# Patient Record
Sex: FEMALE | Race: WHITE | HISPANIC OR LATINO | Employment: PART TIME | URBAN - METROPOLITAN AREA
[De-identification: names, ages, dates, MRNs, and addresses within clinical notes are randomized per-mention and may not be internally consistent; named-entity substitution may affect disease eponyms.]

---

## 2018-01-24 ENCOUNTER — OFFICE VISIT (OUTPATIENT)
Dept: SURGERY | Facility: CLINIC | Age: 43
End: 2018-01-24

## 2018-01-24 VITALS — WEIGHT: 174.6 LBS | HEIGHT: 60 IN | OXYGEN SATURATION: 96 % | HEART RATE: 80 BPM | BODY MASS INDEX: 34.28 KG/M2

## 2018-01-24 DIAGNOSIS — Z94.0 HX OF KIDNEY TRANSPLANT: ICD-10-CM

## 2018-01-24 DIAGNOSIS — N05.9 NEPHRITIS: ICD-10-CM

## 2018-01-24 DIAGNOSIS — I10 HYPERTENSION, UNSPECIFIED TYPE: ICD-10-CM

## 2018-01-24 DIAGNOSIS — E21.3 HYPERPARATHYROIDISM (HCC): Primary | ICD-10-CM

## 2018-01-24 DIAGNOSIS — Z94.0 KIDNEY TRANSPLANT STATUS, CADAVERIC: ICD-10-CM

## 2018-01-24 DIAGNOSIS — E83.52 HYPERCALCEMIA: ICD-10-CM

## 2018-01-24 PROBLEM — T86.11 ACUTE REJECTION OF KIDNEY TRANSPLANT: Status: ACTIVE | Noted: 2017-05-10

## 2018-01-24 PROCEDURE — 99215 OFFICE O/P EST HI 40 MIN: CPT | Performed by: SURGERY

## 2018-01-24 RX ORDER — CYCLOSPORINE 100 MG/1
100 CAPSULE, GELATIN COATED ORAL 2 TIMES DAILY
COMMUNITY
End: 2019-07-02 | Stop reason: ALTCHOICE

## 2018-01-24 RX ORDER — CYCLOSPORINE 25 MG/1
25 CAPSULE, GELATIN COATED ORAL 2 TIMES DAILY
COMMUNITY
End: 2019-07-02 | Stop reason: ALTCHOICE

## 2018-01-24 RX ORDER — OXYCODONE AND ACETAMINOPHEN 7.5; 325 MG/1; MG/1
1 TABLET ORAL EVERY 6 HOURS PRN
COMMUNITY
Start: 2018-01-19 | End: 2018-01-24

## 2018-01-24 NOTE — PROGRESS NOTES
SURGERY  Sunni Mcneil   1975  01/24/18    Chief Complaint:  hyperparathyroidism    HPI    Patient is a very nice 42 y.o. female who has a kidney transplant, cadaveric, who presents with hyperparathyroidism.  She has a decreasing PTH, going from 797 to 377, but has been on sensipar, with increase in calcium to 11.7.  Her phosphorus is 2.4, consistent with an endogenous hyperparathyroidism.      She has a CKD with 35 GFR, with creatinine of 1.6.  She is maintained on cellcept, prednisone, and cyclosporine for her transplant.      She had a nuclear medicine study scheduled by Dr Hansen for the end of October, but she says she did not even know about it.      She denies a history of kidney stones, muscle aches, or memory changes.  She does have mild fatigue, tho she works at [employer] part time.    Past Medical History:   Diagnosis Date   • Acid reflux    • Anemia    • Arthritis    • History of peritoneal dialysis     KIDNEY TRANSPLANT   • History of transfusion    • Hypertension    • Kidney disease     End-stage renal disease   • Kidney transplant recipient 2016    RIGHT KIDNEY     Past Surgical History:   Procedure Laterality Date   • INSERTION PERITONEAL DIALYSIS CATHETER  2014    Laparoscopic placement of Curl Cath peritoneal dialysis catheter, Dr. Vinod Goldstein   • REMOVAL PERITONEAL DIALYSIS CATHETER N/A 10/17/2016    Procedure: REMOVAL PERITONEAL DIALYSIS CATHETER;  Surgeon: Damon Rooney MD;  Location: [facility];  Service:    • TRANSPLANTATION RENAL Right 2016    from  donor   • TUBAL ABDOMINAL LIGATION Bilateral      No family history on file.  Social History     Social History   • Marital status:      Spouse name: N/A   • Number of children: 2   • Years of education: 12     Occupational History   •  [employer]     Social History Main Topics   • Smoking status: Never Smoker   • Smokeless tobacco: Never Used   • Alcohol use No   • Drug use: No   • Sexual activity:  "Defer     Other Topics Concern   • Not on file     Social History Narrative     Occupation/Additional Social Hx: [employer]       Current Outpatient Prescriptions:   •  aspirin 81 MG EC tablet, Take 81 mg by mouth Daily., Disp: , Rfl:   •  cinacalcet (SENSIPAR) 60 MG tablet, Take 120 mg by mouth Daily., Disp: , Rfl:   •  cycloSPORINE (sandIMMUNE) 100 MG capsule, Take 100 mg by mouth 2 (Two) Times a Day., Disp: , Rfl:   •  cycloSPORINE (sandIMMUNE) 25 MG capsule, Take 25 mg by mouth 2 (Two) Times a Day., Disp: , Rfl:   •  folic acid (FOLVITE) 1 MG tablet, Take 1 mg by mouth Daily., Disp: , Rfl:   •  iron polysaccharides (NIFEREX) 150 MG capsule, Take 150 mg by mouth 2 (Two) Times a Day., Disp: , Rfl:   •  metoprolol tartrate (LOPRESSOR) 25 MG tablet, Take 25 mg by mouth 2 (Two) Times a Day., Disp: , Rfl:   •  mycophenolate (CELLCEPT) 250 MG capsule, Take 250 mg by mouth 2 (Two) Times a Day., Disp: , Rfl:   •  predniSONE (DELTASONE) 5 MG tablet, Take 15 mg by mouth Every Other Day., Disp: , Rfl:   •  raNITIdine (ZANTAC) 150 MG tablet, Take 150 mg by mouth 2 (Two) Times a Day., Disp: , Rfl:     No Known Allergies    Review of Systems   Respiratory: Negative for shortness of breath.    Cardiovascular: Negative for chest pain.   Allergic/Immunologic:        No history of wound infections or MRSA   Hematological: Does not bruise/bleed easily.   All other systems reviewed and are negative.      Vitals:    01/24/18 1338   Pulse: 80   SpO2: 96%   Weight: 79.2 kg (174 lb 9.6 oz)   Height: 152.4 cm (60\")       PHYSICAL EXAM:    Pulse 80  Ht 152.4 cm (60\")  Wt 79.2 kg (174 lb 9.6 oz)  SpO2 96%  BMI 34.1 kg/m2  Body mass index is 34.1 kg/(m^2).    Constitutional: well developed, well nourished, appears healthy, despite her transplant status  Eyes: sclera nonicteric, conjunctiva not injected or edematous  ENMT: Hearing intact, trachea midline, thyroid without masses  CVS: RRR, no murmur, peripheral edema not " present  Respiratory: CTA, normal respiratory effort   Gastrointestinal: no hepatosplenomegaly, abdomen soft, nontender, no masses, abdominal hernia not detected  Genitourinary: inguinal hernia not detected  Musculoskeletal: gait normal, muscle mass normal, and good in tone  Skin: warm and dry, no rashes visible  Neurological: awake and alert, seems to have reasonable capacity for understanding for medical decision making  Psychiatric: appears to have reasonable judgement, pleasant  Lymphatics: no cervical adenopathy     Radiographic Findings: 2 cm lobular nodular opacity in the right lower lobe, 2015, stable since 2014    Lab Findings: as above    Pamphlet reviewed: none    IMPRESSION:  · Hyperparathyroidism, likely tertiary, with elevated calcium and PTH, 11 and 377  · S/p cadaveric transplant, on cellcept, cyclosporine, prednione  · Chronic immunosuppression  · Stable lung nodule    PLAN:  · SPECT CT scan, without IV contrast.  I understand that this will compromise the quality of such, without IV contrast, but this will be necessary with her kidney disease.  This will give us information on the location of the most overactive parathyroid, noting to her that she will likely have a picture that is more complex than a single overactive parathyroid.  · Follow up in the office after her study.  The risk of surgery were discussed with the patient including: bleeding, infection, potential difficulty in identifying all 4 glands intraoperatively and determining if each of these is hyperplastic or normal, thus resulting in either incomplete resection of all hyperplastic glands (and continued hyperparathyroidism) or excess removal (with the potential need for permanent supplementation of calcium and activated vitamin D), Location of parathyroids outside the neck despite studies that do not indicate such, recurrent laryngeal nerve damage resulting in temporary or permanent hoarseness, swallowing difficulty with aspiration,  swelling and scar at the neck and arm incision (if autotransplantation of a portion of a gland is necessary)  · We also discussed the fact that if she undergoes arm autotransplantation, she may have recurrent hyperparathyroidism that will require re-entry into the arm and additional removal.  · Hold sensipar 2 weeks prior to surgery  · Solumedrol in pre op    Rochelle Barreto MD   4:52 PM  01/24/18    >50 minutes spent with over half in counselling      Patient originally scheduled for parathyroid scan and cancelled due to anxiety.  We gave her an Rx for ativan 0.5 night prior and 0.5 AM of.  Despite this, she did not do the CT portion.  Thus we have a study that is not ideal, showing a dominant right inferior parathyroid and mildly suspicious left superior based on SPECT study.    Rochelle Barreto MD  03/09/18    Patient needs office visit to discuss findings, as they are not straightforward.    03/15/18

## 2018-02-12 ENCOUNTER — TELEPHONE (OUTPATIENT)
Dept: SURGERY | Facility: CLINIC | Age: 43
End: 2018-02-12

## 2018-02-12 RX ORDER — LORAZEPAM 0.5 MG/1
TABLET ORAL
Qty: 2 TABLET | Refills: 0 | Status: SHIPPED | OUTPATIENT
Start: 2018-02-12 | End: 2018-03-28

## 2018-02-12 NOTE — TELEPHONE ENCOUNTER
Patient called stating that she had a NM Parathyroid scan scheduled this morning at [facility], however, she was unable to complete it due to being too claustrophobic. She was advised to ask you if there is something you can prescribe her to take prior to having this test completed.    Addendum  I am reluctant to give her too much with her reduced kidney function and the fact that the medicine is cleared thru the kidneys.  Atenolol happily early labor relative Kang will give her a low dose.  rx will be on Arlene's desk.  Rochelle Barreto MD

## 2018-03-07 DIAGNOSIS — E21.3 HYPERPARATHYROIDISM (HCC): ICD-10-CM

## 2018-03-15 ENCOUNTER — TELEPHONE (OUTPATIENT)
Dept: SURGERY | Facility: CLINIC | Age: 43
End: 2018-03-15

## 2018-03-15 NOTE — TELEPHONE ENCOUNTER
Addend  Luna,  Please look at my two addendums to her last office visit, and forward that information, noting she needs an office visit.  Rochelle Barreto MD  03/15/18

## 2018-03-28 ENCOUNTER — PREP FOR SURGERY (OUTPATIENT)
Dept: OTHER | Facility: HOSPITAL | Age: 43
End: 2018-03-28

## 2018-03-28 ENCOUNTER — OFFICE VISIT (OUTPATIENT)
Dept: SURGERY | Facility: CLINIC | Age: 43
End: 2018-03-28

## 2018-03-28 VITALS — BODY MASS INDEX: 34.65 KG/M2 | WEIGHT: 177.4 LBS | HEART RATE: 76 BPM | OXYGEN SATURATION: 99 %

## 2018-03-28 DIAGNOSIS — E83.52 HYPERCALCEMIA: ICD-10-CM

## 2018-03-28 DIAGNOSIS — Z94.0 KIDNEY TRANSPLANT STATUS, CADAVERIC: ICD-10-CM

## 2018-03-28 DIAGNOSIS — E21.3 HYPERPARATHYROIDISM (HCC): ICD-10-CM

## 2018-03-28 DIAGNOSIS — I10 HYPERTENSION, UNSPECIFIED TYPE: Primary | ICD-10-CM

## 2018-03-28 DIAGNOSIS — Z94.0 HX OF KIDNEY TRANSPLANT: Primary | ICD-10-CM

## 2018-03-28 PROCEDURE — 99214 OFFICE O/P EST MOD 30 MIN: CPT | Performed by: SURGERY

## 2018-03-28 RX ORDER — ACETAMINOPHEN 325 MG/1
650 TABLET ORAL ONCE
Status: CANCELLED | OUTPATIENT
Start: 2018-04-18 | End: 2018-04-18

## 2018-03-28 RX ORDER — SODIUM CHLORIDE 0.9 % (FLUSH) 0.9 %
1-10 SYRINGE (ML) INJECTION AS NEEDED
Status: CANCELLED | OUTPATIENT
Start: 2018-04-18

## 2018-03-28 RX ORDER — METHYLPREDNISOLONE SODIUM SUCCINATE 125 MG/2ML
80 INJECTION, POWDER, LYOPHILIZED, FOR SOLUTION INTRAMUSCULAR; INTRAVENOUS ONCE
Status: CANCELLED | OUTPATIENT
Start: 2018-04-18 | End: 2018-04-18

## 2018-03-28 NOTE — PROGRESS NOTES
SURGERY  Sunni Mcneil   1975  03/28/18    Chief Complaint:  hyperparathyroidism    HPI    Patient is a very nice 42 y.o. female who returns after initial visit January 24, 2018, at which time I recommended that she get a SPECT-CT scan, to further evaluate her hyperparathyroidism.  She had presented at that visit in January with a history of a kidney transplant, cadaveric, and with hyperparathyroidism.  She initially had a nuclear medicine study scheduled by Dr. Hansen for the end of October, that being last fall, but she says she never knew anything about it.  After we scheduled her SPECT-CT scan, she showed up on the day of the procedure, and would not proceed due to claustrophobia.  We gave her a prescription for Ativan the night prior, in the morning, but she still presented and refused to do the CT portion and thus we have only a SPECT result.  That result shows a dominant isotope avid right inferior cervical parathyroid adenoma, and a mildly metabolic less dominant left superior cervical parathyroid adenoma suspected.  This was read by Dr. Casimiro Huertas.    She has a decreasing PTH, going from 797 to 377, but has been on sensipar, with increase in calcium to 11.7.  Her phosphorus is 2.4, consistent with an endogenous hyperparathyroidism.      She has a CKD with 35 GFR, with creatinine of 1.6.  She is maintained on cellcept, prednisone, and cyclosporine for her transplant.      She denies a history of kidney stones, muscle aches, or memory changes.  She does have mild fatigue, tho she works at [employer] part time.    Past Medical History:   Diagnosis Date   • Acid reflux    • Anemia    • Arthritis    • History of peritoneal dialysis     KIDNEY TRANSPLANT   • History of transfusion    • Hypertension    • Kidney disease     End-stage renal disease   • Kidney transplant recipient 09/02/2016    RIGHT KIDNEY     Past Surgical History:   Procedure Laterality Date   • INSERTION PERITONEAL DIALYSIS CATHETER   2014    Laparoscopic placement of Curl Cath peritoneal dialysis catheter, Dr. Vinod Goldstein   • REMOVAL PERITONEAL DIALYSIS CATHETER N/A 10/17/2016    Procedure: REMOVAL PERITONEAL DIALYSIS CATHETER;  Surgeon: Damon Rooney MD;  Location: [facility];  Service:    • TRANSPLANTATION RENAL Right 2016    from  donor   • TUBAL ABDOMINAL LIGATION Bilateral      History reviewed. No pertinent family history.  Social History     Social History   • Marital status:      Spouse name: N/A   • Number of children: 2   • Years of education: 12     Occupational History   •  [employer]     Social History Main Topics   • Smoking status: Never Smoker   • Smokeless tobacco: Never Used   • Alcohol use No   • Drug use: No   • Sexual activity: Defer     Other Topics Concern   • Not on file     Social History Narrative   • No narrative on file     Occupation/Additional Social Hx: [employer]       Current Outpatient Prescriptions:   •  aspirin 81 MG EC tablet, Take 81 mg by mouth Daily., Disp: , Rfl:   •  cinacalcet (SENSIPAR) 60 MG tablet, Take 120 mg by mouth Daily., Disp: , Rfl:   •  cycloSPORINE (sandIMMUNE) 100 MG capsule, Take 100 mg by mouth 2 (Two) Times a Day., Disp: , Rfl:   •  cycloSPORINE (sandIMMUNE) 25 MG capsule, Take 25 mg by mouth 2 (Two) Times a Day., Disp: , Rfl:   •  folic acid (FOLVITE) 1 MG tablet, Take 1 mg by mouth Daily., Disp: , Rfl:   •  iron polysaccharides (NIFEREX) 150 MG capsule, Take 150 mg by mouth 2 (Two) Times a Day., Disp: , Rfl:   •  metoprolol tartrate (LOPRESSOR) 25 MG tablet, Take 25 mg by mouth 2 (Two) Times a Day., Disp: , Rfl:   •  mycophenolate (CELLCEPT) 250 MG capsule, Take 250 mg by mouth 2 (Two) Times a Day., Disp: , Rfl:   •  predniSONE (DELTASONE) 5 MG tablet, Take 15 mg by mouth Every Other Day., Disp: , Rfl:   •  raNITIdine (ZANTAC) 150 MG tablet, Take 150 mg by mouth 2 (Two) Times a Day., Disp: , Rfl:     No Known Allergies    Review of Systems    Respiratory: Negative for shortness of breath.    Cardiovascular: Negative for chest pain.   Allergic/Immunologic:        No history of wound infections or MRSA   Hematological: Does not bruise/bleed easily.   All other systems reviewed and are negative.      Vitals:    03/28/18 1419   Pulse: 76   SpO2: 99%   Weight: 80.5 kg (177 lb 6.4 oz)       PHYSICAL EXAM:    Pulse 76   Wt 80.5 kg (177 lb 6.4 oz)   SpO2 99%   BMI 34.65 kg/m²   Body mass index is 34.65 kg/m².    Constitutional: well developed, well nourished, appears healthy, despite her transplant status  Eyes: sclera nonicteric, conjunctiva not injected or edematous  ENMT: Hearing intact, trachea midline, thyroid without masses  CVS: RRR, no murmur, peripheral edema not present  Respiratory: CTA, normal respiratory effort   Gastrointestinal: no hepatosplenomegaly, abdomen soft, nontender, no masses, abdominal hernia not detected  Genitourinary: inguinal hernia not detected  Musculoskeletal: gait normal, muscle mass normal, and good in tone  Skin: warm and dry, no rashes visible  Neurological: awake and alert, seems to have reasonable capacity for understanding for medical decision making  Psychiatric: appears to have reasonable judgement, pleasant  Lymphatics: no cervical adenopathy     Radiographic Findings: 2 cm lobular nodular opacity in the right lower lobe, 2015, stable since 2014    Lab Findings: Latest calcium is 10.4, phosphorus 2.2.    Pamphlet reviewed: none    IMPRESSION:  · Hyperparathyroidism, likely tertiary, with elevated calcium and PTH, 11 and 377  · Hypercalcemia   · Suspected right inferior dominant parathyroid adenoma, less dominant left superior parathyroid adenoma by SPECT.  She could in fact have 4 gland hyperplasia.  · S/p cadaveric transplant, on cellcept, cyclosporine, prednisone.  · Chronic immunosuppression  · Stable lung nodule    PLAN:  · Bilateral neck exploration, with right inferior and left superior parathyroid adenoma  resection.  She understands that this may wind up being a 4 gland resection with possible half gland autotransplantation.  She understands that the risk associated with having to explore both sides is greater, both for recurrent laryngeal nerve injury, as well as hypocalcemia postoperatively.  She understands that the inability to complete the CT portion of the SPECT CT may in fact have increased her risk associated with surgery.  Case request is been entered  Additional risk of surgery were discussed with the patient including: bleeding, infection, potential difficulty in identifying all 4 glands intraoperatively and determining if each of these is hyperplastic or normal, thus resulting in either incomplete resection of all hyperplastic glands (and continued hyperparathyroidism) or excess removal (with the potential need for permanent supplementation of calcium and activated vitamin D), Location of parathyroids outside the neck despite studies that do not indicate such, recurrent laryngeal nerve damage resulting in temporary or permanent hoarseness, swallowing difficulty with aspiration, swelling and scar at the neck and arm incision (if autotransplantation of a portion of a gland is necessary)  · We also discussed the fact that if she undergoes arm autotransplantation, she may have recurrent hyperparathyroidism that will require re-entry into the arm and additional removal.  · Hold sensipar 2 weeks prior to surgery  · Hold aspirin 2 weeks prior to surgery  · Solumedrol in pre op  · We've notified Dr. Hansen of the planned surgical time, and phoned his office, work through this staff, to get an appointment set up for the patient for 20/4/18, one week after her planned surgery, to ensure that she gets labs and is seen by the physician at that time.    Rochelle Barreto MD   4:52 PM  03/28/18

## 2018-04-04 ENCOUNTER — HOSPITAL ENCOUNTER (OUTPATIENT)
Dept: ULTRASOUND IMAGING | Facility: HOSPITAL | Age: 43
Discharge: HOME | End: 2018-04-04
Attending: SURGERY | Admitting: SURGERY

## 2018-04-04 DIAGNOSIS — E21.3 HYPERPARATHYROIDISM (HCC): ICD-10-CM

## 2018-04-04 PROCEDURE — 76536 US EXAM OF HEAD AND NECK: CPT

## 2018-04-16 ENCOUNTER — APPOINTMENT (OUTPATIENT)
Dept: PREADMISSION TESTING | Facility: HOSPITAL | Age: 43
End: 2018-04-16

## 2018-04-16 VITALS
SYSTOLIC BLOOD PRESSURE: 145 MMHG | BODY MASS INDEX: 34.95 KG/M2 | RESPIRATION RATE: 16 BRPM | WEIGHT: 178 LBS | HEART RATE: 85 BPM | DIASTOLIC BLOOD PRESSURE: 96 MMHG | HEIGHT: 60 IN | TEMPERATURE: 98.4 F | OXYGEN SATURATION: 99 %

## 2018-04-16 DIAGNOSIS — E21.3 HYPERPARATHYROIDISM (HCC): ICD-10-CM

## 2018-04-16 DIAGNOSIS — E83.52 HYPERCALCEMIA: ICD-10-CM

## 2018-04-16 LAB
25(OH)D3 SERPL-MCNC: 8.9 NG/ML (ref 30–100)
ANION GAP SERPL CALCULATED.3IONS-SCNC: 11.3 MMOL/L
BASOPHILS # BLD AUTO: 0.01 10*3/MM3 (ref 0–0.2)
BASOPHILS NFR BLD AUTO: 0.1 % (ref 0–1.5)
BUN BLD-MCNC: 16 MG/DL (ref 6–20)
BUN/CREAT SERPL: 14.2 (ref 7–25)
CALCIUM SPEC-SCNC: 10.3 MG/DL (ref 8.6–10.5)
CALCIUM SPEC-SCNC: 10.3 MG/DL (ref 8.6–10.5)
CHLORIDE SERPL-SCNC: 102 MMOL/L (ref 98–107)
CO2 SERPL-SCNC: 20.7 MMOL/L (ref 22–29)
CREAT BLD-MCNC: 1.13 MG/DL (ref 0.57–1)
DEPRECATED RDW RBC AUTO: 48.8 FL (ref 37–54)
EOSINOPHIL # BLD AUTO: 0.16 10*3/MM3 (ref 0–0.7)
EOSINOPHIL NFR BLD AUTO: 2 % (ref 0.3–6.2)
ERYTHROCYTE [DISTWIDTH] IN BLOOD BY AUTOMATED COUNT: 15.8 % (ref 11.7–13)
GFR SERPL CREATININE-BSD FRML MDRD: 53 ML/MIN/1.73
GLUCOSE BLD-MCNC: 88 MG/DL (ref 65–99)
HCT VFR BLD AUTO: 32.3 % (ref 35.6–45.5)
HGB BLD-MCNC: 10.3 G/DL (ref 11.9–15.5)
IMM GRANULOCYTES # BLD: 0.18 10*3/MM3 (ref 0–0.03)
IMM GRANULOCYTES NFR BLD: 2.2 % (ref 0–0.5)
LYMPHOCYTES # BLD AUTO: 0.58 10*3/MM3 (ref 0.9–4.8)
LYMPHOCYTES NFR BLD AUTO: 7.2 % (ref 19.6–45.3)
MCH RBC QN AUTO: 27.1 PG (ref 26.9–32)
MCHC RBC AUTO-ENTMCNC: 31.9 G/DL (ref 32.4–36.3)
MCV RBC AUTO: 85 FL (ref 80.5–98.2)
MONOCYTES # BLD AUTO: 0.53 10*3/MM3 (ref 0.2–1.2)
MONOCYTES NFR BLD AUTO: 6.6 % (ref 5–12)
NEUTROPHILS # BLD AUTO: 6.61 10*3/MM3 (ref 1.9–8.1)
NEUTROPHILS NFR BLD AUTO: 81.9 % (ref 42.7–76)
PLATELET # BLD AUTO: 338 10*3/MM3 (ref 140–500)
PMV BLD AUTO: 10.5 FL (ref 6–12)
POTASSIUM BLD-SCNC: 3.5 MMOL/L (ref 3.5–5.2)
PTH-INTACT SERPL-MCNC: 223.5 PG/ML (ref 15–65)
RBC # BLD AUTO: 3.8 10*6/MM3 (ref 3.9–5.2)
SODIUM BLD-SCNC: 134 MMOL/L (ref 136–145)
WBC NRBC COR # BLD: 8.07 10*3/MM3 (ref 4.5–10.7)

## 2018-04-16 PROCEDURE — 36415 COLL VENOUS BLD VENIPUNCTURE: CPT

## 2018-04-16 PROCEDURE — 80048 BASIC METABOLIC PNL TOTAL CA: CPT | Performed by: SURGERY

## 2018-04-16 PROCEDURE — 82652 VIT D 1 25-DIHYDROXY: CPT | Performed by: SURGERY

## 2018-04-16 PROCEDURE — 93005 ELECTROCARDIOGRAM TRACING: CPT

## 2018-04-16 PROCEDURE — 87081 CULTURE SCREEN ONLY: CPT | Performed by: SURGERY

## 2018-04-16 PROCEDURE — 82306 VITAMIN D 25 HYDROXY: CPT | Performed by: SURGERY

## 2018-04-16 PROCEDURE — 83970 ASSAY OF PARATHORMONE: CPT | Performed by: SURGERY

## 2018-04-16 PROCEDURE — 85025 COMPLETE CBC W/AUTO DIFF WBC: CPT | Performed by: SURGERY

## 2018-04-16 PROCEDURE — 93010 ELECTROCARDIOGRAM REPORT: CPT | Performed by: INTERNAL MEDICINE

## 2018-04-16 RX ORDER — CEFDINIR 300 MG/1
300 CAPSULE ORAL 2 TIMES DAILY
COMMUNITY
End: 2019-07-02

## 2018-04-16 NOTE — DISCHARGE INSTRUCTIONS
Take the following medications the morning of surgery with a small sip of water: CELLCEPT, CYCLOSPORINE, METOPROLOL      General Instructions:  • Do not eat solid food after midnight the night before surgery.  • You may drink clear liquids day of surgery but must stop at least one hour before your hospital arrival time.  • It is beneficial for you to have a clear drink that contains carbohydrates the day of surgery.  We suggest a 12 to 20 ounce bottle of Gatorade or Powerade for non-diabetic patients or a 12 to 20 ounce bottle of G2 or Powerade Zero for diabetic patients.     Clear liquids are liquids you can see through.  Nothing red in color.     Plain water                               Sports drinks  Sodas                                   Gelatin (Jell-O)  Fruit juices without pulp such as white grape juice and apple juice  Popsicles that contain no fruit or yogurt  Tea or coffee (no cream or milk added)  Gatorade / Powerade  G2 / Powerade Zero    • Bring any papers given to you in the doctor’s office.  • Wear clean comfortable clothes and socks.  • Do not wear contact lenses or make-up.  Bring a case for your glasses.   • Remove all piercings.  Leave jewelry and any other valuables at home.  • The Pre-Admission Testing nurse will instruct you to bring medications if unable to obtain an accurate list in Pre-Admission Testing.            Preventing a Surgical Site Infection:  • For 2 to 3 days before surgery, avoid shaving with a razor because the razor can irritate skin and make it easier to develop an infection.  • The night prior to surgery sleep in a clean bed with clean clothing.  Do not allow pets to sleep with you.  • Shower on the morning of surgery using a fresh bar of anti-bacterial soap (such as Dial) and clean washcloth.  Dry with a clean towel and dress in clean clothing.  • Ask your surgeon if you will be receiving antibiotics prior to surgery.  • Make sure you, your family, and all healthcare  providers clean their hands with soap and water or an alcohol based hand  before caring for you or your wound.    Day of surgery: 4/18/2018. MAIN Or. ARRIVAL TIME 6 AM  Upon arrival, a Pre-op nurse and Anesthesiologist will review your health history, obtain vital signs, and answer questions you may have.  The only belongings needed at this time will be your home medications and if applicable your C-PAP/BI-PAP machine.  If you are staying overnight your family can leave the rest of your belongings in the car and bring them to your room later.  A Pre-op nurse will start an IV and you may receive medication in preparation for surgery, including something to help you relax.  Your family will be able to see you in the Pre-op area.  While you are in surgery your family should notify the waiting room  if they leave the waiting room area and provide a contact phone number.    Please be aware that surgery does come with discomfort.  We want to make every effort to control your discomfort so please discuss any uncontrolled symptoms with your nurse.   Your doctor will most likely have prescribed pain medications.          If you are staying overnight following surgery, you will be transported to your hospital room following the recovery period.  [facility] has all private rooms.    If you have any questions please call Pre-Admission Testing at [phone].  Deductibles and co-payments are collected on the day of service. Please be prepared to pay the required co-pay, deductible or deposit on the day of service as defined by your plan.

## 2018-04-18 ENCOUNTER — HOSPITAL ENCOUNTER (OUTPATIENT)
Facility: HOSPITAL | Age: 43
Discharge: HOME | End: 2018-04-19
Attending: SURGERY | Admitting: SURGERY

## 2018-04-18 ENCOUNTER — ANESTHESIA (OUTPATIENT)
Dept: PERIOP | Facility: HOSPITAL | Age: 43
End: 2018-04-18

## 2018-04-18 ENCOUNTER — ANESTHESIA EVENT (OUTPATIENT)
Dept: PERIOP | Facility: HOSPITAL | Age: 43
End: 2018-04-18

## 2018-04-18 DIAGNOSIS — E83.52 HYPERCALCEMIA: ICD-10-CM

## 2018-04-18 DIAGNOSIS — E21.3 HYPERPARATHYROIDISM (HCC): ICD-10-CM

## 2018-04-18 LAB
1,25(OH)2D3 SERPL-MCNC: 56.5 PG/ML (ref 19.9–79.3)
B-HCG UR QL: NEGATIVE
CA-I BLD-MCNC: 5.7 MG/DL (ref 4.6–5.4)
CA-I SERPL ISE-MCNC: 1.42 MMOL/L (ref 1.15–1.35)
CALCIUM SPEC-SCNC: 9.7 MG/DL (ref 8.6–10.5)
INTERNAL NEGATIVE CONTROL: NEGATIVE
INTERNAL POSITIVE CONTROL: POSITIVE
Lab: NORMAL
MAGNESIUM SERPL-MCNC: 1.4 MG/DL (ref 1.6–2.6)
MRSA SPEC QL CULT: NORMAL
PTH-INTACT SERPL-MCNC: 10 PG/ML (ref 15–65)
PTH-INTACT SERPL-SCNC: 244.2 PG/ML (ref 15–65)
PTH-INTACT SERPL-SCNC: 30.7 PG/ML (ref 15–65)

## 2018-04-18 PROCEDURE — 88331 PATH CONSLTJ SURG 1 BLK 1SPC: CPT | Performed by: SURGERY

## 2018-04-18 PROCEDURE — A9270 NON-COVERED ITEM OR SERVICE: HCPCS | Performed by: INTERNAL MEDICINE

## 2018-04-18 PROCEDURE — 25010000002 METHYLPREDNISOLONE PER 125 MG: Performed by: SURGERY

## 2018-04-18 PROCEDURE — 63710000001 POTASSIUM CHLORIDE 10 MEQ CAPSULE CONTROLLED-RELEASE: Performed by: INTERNAL MEDICINE

## 2018-04-18 PROCEDURE — 60500 EXPLORE PARATHYROID GLANDS: CPT | Performed by: SURGERY

## 2018-04-18 PROCEDURE — 25010000002 HYDROMORPHONE PER 4 MG: Performed by: NURSE ANESTHETIST, CERTIFIED REGISTERED

## 2018-04-18 PROCEDURE — 88305 TISSUE EXAM BY PATHOLOGIST: CPT | Performed by: SURGERY

## 2018-04-18 PROCEDURE — 25010000002 MIDAZOLAM PER 1 MG: Performed by: ANESTHESIOLOGY

## 2018-04-18 PROCEDURE — 83735 ASSAY OF MAGNESIUM: CPT | Performed by: SURGERY

## 2018-04-18 PROCEDURE — A9270 NON-COVERED ITEM OR SERVICE: HCPCS | Performed by: SURGERY

## 2018-04-18 PROCEDURE — 63710000001 CALCIUM CARBONATE 500 MG CHEWABLE TABLET: Performed by: INTERNAL MEDICINE

## 2018-04-18 PROCEDURE — 63710000001 ACETAMINOPHEN 325 MG TABLET: Performed by: SURGERY

## 2018-04-18 PROCEDURE — 82330 ASSAY OF CALCIUM: CPT | Performed by: INTERNAL MEDICINE

## 2018-04-18 PROCEDURE — 63710000001 MYCOPHENOLATE MOFETIL PER 250 MG: Performed by: SURGERY

## 2018-04-18 PROCEDURE — 63710000001 FOLIC ACID 1 MG TABLET: Performed by: SURGERY

## 2018-04-18 PROCEDURE — 60512 AUTOTRANSPLANT PARATHYROID: CPT | Performed by: PHYSICIAN ASSISTANT

## 2018-04-18 PROCEDURE — 82310 ASSAY OF CALCIUM: CPT | Performed by: SURGERY

## 2018-04-18 PROCEDURE — 83970 ASSAY OF PARATHORMONE: CPT | Performed by: SURGERY

## 2018-04-18 PROCEDURE — 63710000001 FAMOTIDINE 20 MG TABLET: Performed by: SURGERY

## 2018-04-18 PROCEDURE — 25010000002 PROPOFOL 10 MG/ML EMULSION: Performed by: NURSE ANESTHETIST, CERTIFIED REGISTERED

## 2018-04-18 PROCEDURE — 25010000002 MAGNESIUM SULFATE IN D5W 1G/100ML (PREMIX) 1-5 GM/100ML-% SOLUTION: Performed by: INTERNAL MEDICINE

## 2018-04-18 PROCEDURE — 63710000001 CYCLOSPORINE PER 100 MG: Performed by: SURGERY

## 2018-04-18 PROCEDURE — 63710000001 CYCLOSPORINE PER 25 MG: Performed by: SURGERY

## 2018-04-18 PROCEDURE — 25010000002 FENTANYL CITRATE (PF) 100 MCG/2ML SOLUTION: Performed by: NURSE ANESTHETIST, CERTIFIED REGISTERED

## 2018-04-18 PROCEDURE — 63710000001 CALCITRIOL 0.25 MCG CAPSULE: Performed by: INTERNAL MEDICINE

## 2018-04-18 PROCEDURE — 60512 AUTOTRANSPLANT PARATHYROID: CPT | Performed by: SURGERY

## 2018-04-18 PROCEDURE — 25010000002 DEXAMETHASONE PER 1 MG: Performed by: NURSE ANESTHETIST, CERTIFIED REGISTERED

## 2018-04-18 PROCEDURE — 25010000002 ONDANSETRON PER 1 MG: Performed by: NURSE ANESTHETIST, CERTIFIED REGISTERED

## 2018-04-18 PROCEDURE — 25010000002 HYDRALAZINE PER 20 MG: Performed by: NURSE ANESTHETIST, CERTIFIED REGISTERED

## 2018-04-18 PROCEDURE — G0378 HOSPITAL OBSERVATION PER HR: HCPCS

## 2018-04-18 PROCEDURE — 60500 EXPLORE PARATHYROID GLANDS: CPT | Performed by: PHYSICIAN ASSISTANT

## 2018-04-18 PROCEDURE — 25010000002 FENTANYL CITRATE (PF) 100 MCG/2ML SOLUTION: Performed by: ANESTHESIOLOGY

## 2018-04-18 PROCEDURE — 25010000002 VANCOMYCIN PER 500 MG: Performed by: SURGERY

## 2018-04-18 PROCEDURE — 63710000001 METOPROLOL TARTRATE 25 MG TABLET: Performed by: SURGERY

## 2018-04-18 PROCEDURE — 88307 TISSUE EXAM BY PATHOLOGIST: CPT | Performed by: SURGERY

## 2018-04-18 PROCEDURE — 25010000002 ONDANSETRON PER 1 MG: Performed by: SURGERY

## 2018-04-18 RX ORDER — OXYCODONE AND ACETAMINOPHEN 7.5; 325 MG/1; MG/1
1 TABLET ORAL ONCE AS NEEDED
Status: DISCONTINUED | OUTPATIENT
Start: 2018-04-18 | End: 2018-04-18 | Stop reason: HOSPADM

## 2018-04-18 RX ORDER — SODIUM CHLORIDE, SODIUM LACTATE, POTASSIUM CHLORIDE, CALCIUM CHLORIDE 600; 310; 30; 20 MG/100ML; MG/100ML; MG/100ML; MG/100ML
9 INJECTION, SOLUTION INTRAVENOUS CONTINUOUS
Status: DISCONTINUED | OUTPATIENT
Start: 2018-04-18 | End: 2018-04-18

## 2018-04-18 RX ORDER — PROMETHAZINE HYDROCHLORIDE 25 MG/ML
12.5 INJECTION, SOLUTION INTRAMUSCULAR; INTRAVENOUS ONCE AS NEEDED
Status: DISCONTINUED | OUTPATIENT
Start: 2018-04-18 | End: 2018-04-18 | Stop reason: HOSPADM

## 2018-04-18 RX ORDER — LIDOCAINE HYDROCHLORIDE 20 MG/ML
INJECTION, SOLUTION INFILTRATION; PERINEURAL AS NEEDED
Status: DISCONTINUED | OUTPATIENT
Start: 2018-04-18 | End: 2018-04-18 | Stop reason: SURG

## 2018-04-18 RX ORDER — FLUMAZENIL 0.1 MG/ML
0.2 INJECTION INTRAVENOUS AS NEEDED
Status: DISCONTINUED | OUTPATIENT
Start: 2018-04-18 | End: 2018-04-18 | Stop reason: HOSPADM

## 2018-04-18 RX ORDER — HYDROMORPHONE HCL 110MG/55ML
0.5 PATIENT CONTROLLED ANALGESIA SYRINGE INTRAVENOUS
Status: DISCONTINUED | OUTPATIENT
Start: 2018-04-18 | End: 2018-04-18 | Stop reason: HOSPADM

## 2018-04-18 RX ORDER — EPHEDRINE SULFATE 50 MG/ML
5 INJECTION, SOLUTION INTRAVENOUS ONCE AS NEEDED
Status: DISCONTINUED | OUTPATIENT
Start: 2018-04-18 | End: 2018-04-18 | Stop reason: HOSPADM

## 2018-04-18 RX ORDER — HYDROMORPHONE HYDROCHLORIDE 1 MG/ML
0.5 INJECTION, SOLUTION INTRAMUSCULAR; INTRAVENOUS; SUBCUTANEOUS
Status: DISCONTINUED | OUTPATIENT
Start: 2018-04-18 | End: 2018-04-19 | Stop reason: HOSPADM

## 2018-04-18 RX ORDER — HYDROCODONE BITARTRATE AND ACETAMINOPHEN 5; 325 MG/1; MG/1
1 TABLET ORAL EVERY 4 HOURS PRN
Status: DISCONTINUED | OUTPATIENT
Start: 2018-04-18 | End: 2018-04-19 | Stop reason: HOSPADM

## 2018-04-18 RX ORDER — ONDANSETRON 4 MG/1
4 TABLET, FILM COATED ORAL EVERY 6 HOURS PRN
Status: DISCONTINUED | OUTPATIENT
Start: 2018-04-18 | End: 2018-04-19 | Stop reason: HOSPADM

## 2018-04-18 RX ORDER — HYDROCODONE BITARTRATE AND ACETAMINOPHEN 5; 325 MG/1; MG/1
1 TABLET ORAL ONCE AS NEEDED
Status: DISCONTINUED | OUTPATIENT
Start: 2018-04-18 | End: 2018-04-18 | Stop reason: HOSPADM

## 2018-04-18 RX ORDER — METHYLPREDNISOLONE SODIUM SUCCINATE 125 MG/2ML
80 INJECTION, POWDER, LYOPHILIZED, FOR SOLUTION INTRAMUSCULAR; INTRAVENOUS ONCE
Status: COMPLETED | OUTPATIENT
Start: 2018-04-18 | End: 2018-04-18

## 2018-04-18 RX ORDER — ONDANSETRON 2 MG/ML
4 INJECTION INTRAMUSCULAR; INTRAVENOUS EVERY 6 HOURS PRN
Status: DISCONTINUED | OUTPATIENT
Start: 2018-04-18 | End: 2018-04-19 | Stop reason: HOSPADM

## 2018-04-18 RX ORDER — POTASSIUM CHLORIDE 750 MG/1
40 CAPSULE, EXTENDED RELEASE ORAL ONCE
Status: COMPLETED | OUTPATIENT
Start: 2018-04-18 | End: 2018-04-18

## 2018-04-18 RX ORDER — METOCLOPRAMIDE HYDROCHLORIDE 5 MG/ML
10 INJECTION INTRAMUSCULAR; INTRAVENOUS EVERY 6 HOURS PRN
Status: DISCONTINUED | OUTPATIENT
Start: 2018-04-18 | End: 2018-04-19 | Stop reason: HOSPADM

## 2018-04-18 RX ORDER — HYDRALAZINE HYDROCHLORIDE 20 MG/ML
5 INJECTION INTRAMUSCULAR; INTRAVENOUS
Status: DISCONTINUED | OUTPATIENT
Start: 2018-04-18 | End: 2018-04-18 | Stop reason: HOSPADM

## 2018-04-18 RX ORDER — DEXAMETHASONE SODIUM PHOSPHATE 10 MG/ML
INJECTION INTRAMUSCULAR; INTRAVENOUS AS NEEDED
Status: DISCONTINUED | OUTPATIENT
Start: 2018-04-18 | End: 2018-04-18 | Stop reason: SURG

## 2018-04-18 RX ORDER — MAGNESIUM SULFATE 1 G/100ML
1 INJECTION INTRAVENOUS
Status: COMPLETED | OUTPATIENT
Start: 2018-04-18 | End: 2018-04-18

## 2018-04-18 RX ORDER — PROMETHAZINE HYDROCHLORIDE 25 MG/1
12.5 TABLET ORAL ONCE AS NEEDED
Status: DISCONTINUED | OUTPATIENT
Start: 2018-04-18 | End: 2018-04-18 | Stop reason: HOSPADM

## 2018-04-18 RX ORDER — SODIUM CHLORIDE 450 MG/100ML
45 INJECTION, SOLUTION INTRAVENOUS CONTINUOUS
Status: DISCONTINUED | OUTPATIENT
Start: 2018-04-18 | End: 2018-04-19

## 2018-04-18 RX ORDER — NALOXONE HCL 0.4 MG/ML
0.4 VIAL (ML) INJECTION
Status: DISCONTINUED | OUTPATIENT
Start: 2018-04-18 | End: 2018-04-19 | Stop reason: HOSPADM

## 2018-04-18 RX ORDER — FENTANYL CITRATE 50 UG/ML
50 INJECTION, SOLUTION INTRAMUSCULAR; INTRAVENOUS
Status: DISCONTINUED | OUTPATIENT
Start: 2018-04-18 | End: 2018-04-18 | Stop reason: HOSPADM

## 2018-04-18 RX ORDER — LIDOCAINE HYDROCHLORIDE 10 MG/ML
0.5 INJECTION, SOLUTION EPIDURAL; INFILTRATION; INTRACAUDAL; PERINEURAL ONCE AS NEEDED
Status: DISCONTINUED | OUTPATIENT
Start: 2018-04-18 | End: 2018-04-18 | Stop reason: HOSPADM

## 2018-04-18 RX ORDER — FOLIC ACID 1 MG/1
1 TABLET ORAL DAILY
Status: DISCONTINUED | OUTPATIENT
Start: 2018-04-18 | End: 2018-04-19 | Stop reason: HOSPADM

## 2018-04-18 RX ORDER — CYCLOSPORINE 25 MG/1
25 CAPSULE, GELATIN COATED ORAL 2 TIMES DAILY
Status: DISCONTINUED | OUTPATIENT
Start: 2018-04-18 | End: 2018-04-19 | Stop reason: HOSPADM

## 2018-04-18 RX ORDER — CALCIUM CARBONATE 200(500)MG
1 TABLET,CHEWABLE ORAL 3 TIMES DAILY
Status: DISCONTINUED | OUTPATIENT
Start: 2018-04-18 | End: 2018-04-19 | Stop reason: HOSPADM

## 2018-04-18 RX ORDER — ONDANSETRON 2 MG/ML
4 INJECTION INTRAMUSCULAR; INTRAVENOUS ONCE AS NEEDED
Status: DISCONTINUED | OUTPATIENT
Start: 2018-04-18 | End: 2018-04-18 | Stop reason: HOSPADM

## 2018-04-18 RX ORDER — MORPHINE SULFATE 2 MG/ML
4 INJECTION, SOLUTION INTRAMUSCULAR; INTRAVENOUS
Status: DISCONTINUED | OUTPATIENT
Start: 2018-04-18 | End: 2018-04-19 | Stop reason: HOSPADM

## 2018-04-18 RX ORDER — ROCURONIUM BROMIDE 10 MG/ML
INJECTION, SOLUTION INTRAVENOUS AS NEEDED
Status: DISCONTINUED | OUTPATIENT
Start: 2018-04-18 | End: 2018-04-18 | Stop reason: SURG

## 2018-04-18 RX ORDER — PROMETHAZINE HYDROCHLORIDE 25 MG/1
25 SUPPOSITORY RECTAL ONCE AS NEEDED
Status: DISCONTINUED | OUTPATIENT
Start: 2018-04-18 | End: 2018-04-18 | Stop reason: HOSPADM

## 2018-04-18 RX ORDER — LABETALOL HYDROCHLORIDE 5 MG/ML
5 INJECTION, SOLUTION INTRAVENOUS
Status: DISCONTINUED | OUTPATIENT
Start: 2018-04-18 | End: 2018-04-18 | Stop reason: HOSPADM

## 2018-04-18 RX ORDER — ONDANSETRON 2 MG/ML
INJECTION INTRAMUSCULAR; INTRAVENOUS AS NEEDED
Status: DISCONTINUED | OUTPATIENT
Start: 2018-04-18 | End: 2018-04-18 | Stop reason: SURG

## 2018-04-18 RX ORDER — MIDAZOLAM HYDROCHLORIDE 1 MG/ML
1 INJECTION INTRAMUSCULAR; INTRAVENOUS
Status: DISCONTINUED | OUTPATIENT
Start: 2018-04-18 | End: 2018-04-18 | Stop reason: HOSPADM

## 2018-04-18 RX ORDER — DIPHENHYDRAMINE HYDROCHLORIDE 50 MG/ML
12.5 INJECTION INTRAMUSCULAR; INTRAVENOUS
Status: DISCONTINUED | OUTPATIENT
Start: 2018-04-18 | End: 2018-04-18 | Stop reason: HOSPADM

## 2018-04-18 RX ORDER — ONDANSETRON 4 MG/1
4 TABLET, ORALLY DISINTEGRATING ORAL EVERY 6 HOURS PRN
Status: DISCONTINUED | OUTPATIENT
Start: 2018-04-18 | End: 2018-04-19 | Stop reason: HOSPADM

## 2018-04-18 RX ORDER — LABETALOL HYDROCHLORIDE 5 MG/ML
INJECTION, SOLUTION INTRAVENOUS AS NEEDED
Status: DISCONTINUED | OUTPATIENT
Start: 2018-04-18 | End: 2018-04-18 | Stop reason: SURG

## 2018-04-18 RX ORDER — NALOXONE HCL 0.4 MG/ML
0.1 VIAL (ML) INJECTION
Status: DISCONTINUED | OUTPATIENT
Start: 2018-04-18 | End: 2018-04-19 | Stop reason: HOSPADM

## 2018-04-18 RX ORDER — SODIUM CHLORIDE 0.9 % (FLUSH) 0.9 %
1-10 SYRINGE (ML) INJECTION AS NEEDED
Status: DISCONTINUED | OUTPATIENT
Start: 2018-04-18 | End: 2018-04-18 | Stop reason: HOSPADM

## 2018-04-18 RX ORDER — ACETAMINOPHEN 325 MG/1
650 TABLET ORAL ONCE
Status: COMPLETED | OUTPATIENT
Start: 2018-04-18 | End: 2018-04-18

## 2018-04-18 RX ORDER — HYDROCODONE BITARTRATE AND ACETAMINOPHEN 7.5; 325 MG/1; MG/1
1 TABLET ORAL ONCE AS NEEDED
Status: DISCONTINUED | OUTPATIENT
Start: 2018-04-18 | End: 2018-04-18 | Stop reason: HOSPADM

## 2018-04-18 RX ORDER — MIDAZOLAM HYDROCHLORIDE 1 MG/ML
2 INJECTION INTRAMUSCULAR; INTRAVENOUS
Status: DISCONTINUED | OUTPATIENT
Start: 2018-04-18 | End: 2018-04-18 | Stop reason: HOSPADM

## 2018-04-18 RX ORDER — PROPOFOL 10 MG/ML
VIAL (ML) INTRAVENOUS AS NEEDED
Status: DISCONTINUED | OUTPATIENT
Start: 2018-04-18 | End: 2018-04-18 | Stop reason: SURG

## 2018-04-18 RX ORDER — CALCITRIOL 0.25 UG/1
0.5 CAPSULE, LIQUID FILLED ORAL EVERY 12 HOURS SCHEDULED
Status: DISCONTINUED | OUTPATIENT
Start: 2018-04-18 | End: 2018-04-19 | Stop reason: HOSPADM

## 2018-04-18 RX ORDER — NALOXONE HCL 0.4 MG/ML
0.2 VIAL (ML) INJECTION AS NEEDED
Status: DISCONTINUED | OUTPATIENT
Start: 2018-04-18 | End: 2018-04-18 | Stop reason: HOSPADM

## 2018-04-18 RX ORDER — MYCOPHENOLATE MOFETIL 250 MG/1
250 CAPSULE ORAL EVERY 12 HOURS SCHEDULED
Status: DISCONTINUED | OUTPATIENT
Start: 2018-04-18 | End: 2018-04-19 | Stop reason: HOSPADM

## 2018-04-18 RX ORDER — CYCLOSPORINE 100 MG/1
100 CAPSULE, GELATIN COATED ORAL 2 TIMES DAILY
Status: DISCONTINUED | OUTPATIENT
Start: 2018-04-18 | End: 2018-04-19 | Stop reason: HOSPADM

## 2018-04-18 RX ORDER — MAGNESIUM HYDROXIDE 1200 MG/15ML
LIQUID ORAL AS NEEDED
Status: DISCONTINUED | OUTPATIENT
Start: 2018-04-18 | End: 2018-04-18 | Stop reason: HOSPADM

## 2018-04-18 RX ORDER — PROMETHAZINE HYDROCHLORIDE 25 MG/1
25 TABLET ORAL ONCE AS NEEDED
Status: DISCONTINUED | OUTPATIENT
Start: 2018-04-18 | End: 2018-04-18 | Stop reason: HOSPADM

## 2018-04-18 RX ORDER — BUPIVACAINE HYDROCHLORIDE AND EPINEPHRINE 5; 5 MG/ML; UG/ML
INJECTION, SOLUTION PERINEURAL AS NEEDED
Status: DISCONTINUED | OUTPATIENT
Start: 2018-04-18 | End: 2018-04-18 | Stop reason: HOSPADM

## 2018-04-18 RX ORDER — FAMOTIDINE 10 MG/ML
20 INJECTION, SOLUTION INTRAVENOUS ONCE
Status: COMPLETED | OUTPATIENT
Start: 2018-04-18 | End: 2018-04-18

## 2018-04-18 RX ORDER — FAMOTIDINE 20 MG/1
20 TABLET, FILM COATED ORAL DAILY
Status: DISCONTINUED | OUTPATIENT
Start: 2018-04-18 | End: 2018-04-19 | Stop reason: HOSPADM

## 2018-04-18 RX ADMIN — Medication 1 TABLET: at 20:28

## 2018-04-18 RX ADMIN — FAMOTIDINE 20 MG: 10 INJECTION INTRAVENOUS at 07:33

## 2018-04-18 RX ADMIN — FAMOTIDINE 20 MG: 20 TABLET, FILM COATED ORAL at 15:02

## 2018-04-18 RX ADMIN — ROCURONIUM BROMIDE 50 MG: 10 INJECTION INTRAVENOUS at 08:05

## 2018-04-18 RX ADMIN — CYCLOSPORINE 25 MG: 25 CAPSULE, LIQUID FILLED ORAL at 20:29

## 2018-04-18 RX ADMIN — LABETALOL HYDROCHLORIDE 10 MG: 5 INJECTION, SOLUTION INTRAVENOUS at 09:41

## 2018-04-18 RX ADMIN — CALCITRIOL 0.5 MCG: 0.25 CAPSULE, LIQUID FILLED ORAL at 20:28

## 2018-04-18 RX ADMIN — SODIUM CHLORIDE, POTASSIUM CHLORIDE, SODIUM LACTATE AND CALCIUM CHLORIDE 9 ML/HR: 600; 310; 30; 20 INJECTION, SOLUTION INTRAVENOUS at 07:28

## 2018-04-18 RX ADMIN — ONDANSETRON 4 MG: 2 INJECTION, SOLUTION INTRAMUSCULAR; INTRAVENOUS at 13:27

## 2018-04-18 RX ADMIN — FENTANYL CITRATE 50 MCG: 50 INJECTION, SOLUTION INTRAMUSCULAR; INTRAVENOUS at 11:04

## 2018-04-18 RX ADMIN — FENTANYL CITRATE 50 MCG: 50 INJECTION INTRAMUSCULAR; INTRAVENOUS at 10:20

## 2018-04-18 RX ADMIN — FOLIC ACID 1 MG: 1 TABLET ORAL at 15:03

## 2018-04-18 RX ADMIN — METHYLPREDNISOLONE SODIUM SUCCINATE 80 MG: 125 INJECTION, POWDER, FOR SOLUTION INTRAMUSCULAR; INTRAVENOUS at 07:33

## 2018-04-18 RX ADMIN — MYCOPHENOLATE MOFETIL 250 MG: 250 CAPSULE ORAL at 20:28

## 2018-04-18 RX ADMIN — ONDANSETRON 4 MG: 2 INJECTION INTRAMUSCULAR; INTRAVENOUS at 09:52

## 2018-04-18 RX ADMIN — SODIUM CHLORIDE 45 ML/HR: 4.5 INJECTION, SOLUTION INTRAVENOUS at 15:03

## 2018-04-18 RX ADMIN — HYDROMORPHONE HYDROCHLORIDE 0.5 MG: 2 INJECTION, SOLUTION INTRAMUSCULAR; INTRAVENOUS; SUBCUTANEOUS at 11:22

## 2018-04-18 RX ADMIN — FENTANYL CITRATE 50 MCG: 50 INJECTION INTRAMUSCULAR; INTRAVENOUS at 08:26

## 2018-04-18 RX ADMIN — MIDAZOLAM HYDROCHLORIDE 2 MG: 2 INJECTION, SOLUTION INTRAMUSCULAR; INTRAVENOUS at 07:39

## 2018-04-18 RX ADMIN — ROCURONIUM BROMIDE 20 MG: 10 INJECTION INTRAVENOUS at 08:26

## 2018-04-18 RX ADMIN — Medication 5 MG: at 10:44

## 2018-04-18 RX ADMIN — ACETAMINOPHEN 650 MG: 325 TABLET ORAL at 07:33

## 2018-04-18 RX ADMIN — SODIUM CHLORIDE 0.5 G: 900 INJECTION, SOLUTION INTRAVENOUS at 08:29

## 2018-04-18 RX ADMIN — POTASSIUM CHLORIDE 40 MEQ: 750 CAPSULE, EXTENDED RELEASE ORAL at 20:34

## 2018-04-18 RX ADMIN — FENTANYL CITRATE 50 MCG: 50 INJECTION, SOLUTION INTRAMUSCULAR; INTRAVENOUS at 10:59

## 2018-04-18 RX ADMIN — LIDOCAINE HYDROCHLORIDE 40 MG: 20 INJECTION, SOLUTION INFILTRATION; PERINEURAL at 08:03

## 2018-04-18 RX ADMIN — MAGNESIUM SULFATE HEPTAHYDRATE 1 G: 1 INJECTION, SOLUTION INTRAVENOUS at 20:27

## 2018-04-18 RX ADMIN — SUGAMMADEX 300 MG: 100 INJECTION, SOLUTION INTRAVENOUS at 09:52

## 2018-04-18 RX ADMIN — METOPROLOL TARTRATE 25 MG: 25 TABLET ORAL at 20:34

## 2018-04-18 RX ADMIN — CYCLOSPORINE 100 MG: 100 CAPSULE, GELATIN COATED ORAL at 20:28

## 2018-04-18 RX ADMIN — FENTANYL CITRATE 150 MCG: 50 INJECTION INTRAMUSCULAR; INTRAVENOUS at 08:02

## 2018-04-18 RX ADMIN — DEXAMETHASONE SODIUM PHOSPHATE 8 MG: 10 INJECTION INTRAMUSCULAR; INTRAVENOUS at 08:08

## 2018-04-18 RX ADMIN — SODIUM CHLORIDE, POTASSIUM CHLORIDE, SODIUM LACTATE AND CALCIUM CHLORIDE: 600; 310; 30; 20 INJECTION, SOLUTION INTRAVENOUS at 08:32

## 2018-04-18 RX ADMIN — PROPOFOL 200 MG: 10 INJECTION, EMULSION INTRAVENOUS at 08:03

## 2018-04-18 RX ADMIN — LABETALOL HYDROCHLORIDE 10 MG: 5 INJECTION, SOLUTION INTRAVENOUS at 10:25

## 2018-04-18 RX ADMIN — Medication 5 MG: at 10:57

## 2018-04-18 RX ADMIN — MAGNESIUM SULFATE HEPTAHYDRATE 1 G: 1 INJECTION, SOLUTION INTRAVENOUS at 16:01

## 2018-04-18 NOTE — PROGRESS NOTES
Clinical Pharmacy Services: Medication History    Sunni Mcneil is a 43 y.o. female presenting to [facility] for Hypercalcemia [E83.52]  Hyperparathyroidism [E21.3]  Hyperparathyroidism [E21.3]    She  has a past medical history of Acid reflux; Anemia; Arthritis; History of peritoneal dialysis; History of transfusion; Hypercalcemia; Hyperparathyroidism; Hypertension; Kidney disease; Kidney transplant recipient (09/02/2016); Seasonal allergies; and UTI (urinary tract infection).    Allergies as of 03/28/2018   • (No Known Allergies)       Medication information was obtained from: patient  Pharmacy and Phone Number: [facility] [address] [place] [address] [phone] [facility] [phone] [facility]    Prior to Admission Medications       Prescriptions Last Dose Informant Patient Reported? Taking?    aspirin 81 MG EC tablet 2 weeks ago Self Yes Yes    Take 81 mg by mouth Daily. HELD FOR SURGERY    cefdinir (OMNICEF) 300 MG capsule 4/17/2018 Self Yes Yes    Take 300 mg by mouth 2 (Two) Times a Day. FOR 7 DAYS. FOR RECENT DX UTI, pt states she has three days left    cycloSPORINE (sandIMMUNE) 100 MG capsule 4/18/2018 Self Yes Yes    Take 100 mg by mouth 2 (Two) Times a Day.    cycloSPORINE (sandIMMUNE) 25 MG capsule 4/18/2018 Self Yes Yes    Take 25 mg by mouth 2 (Two) Times a Day.    folic acid (FOLVITE) 1 MG tablet 4/17/2018 Self Yes Yes    Take 1 mg by mouth Daily.    iron polysaccharides (NIFEREX) 150 MG capsule 4/17/2018 Self Yes Yes    Take 150 mg by mouth 2 (Two) Times a Day.    metoprolol tartrate (LOPRESSOR) 25 MG tablet 4/18/2018 Self Yes Yes    Take 25 mg by mouth 2 (Two) Times a Day.    mycophenolate (CELLCEPT) 250 MG capsule 4/18/2018 Self Yes Yes    Take 250 mg by mouth 2 (Two) Times a Day.    predniSONE (DELTASONE) 5 MG tablet 4/17/2018 Self Yes Yes    Take 15 mg by mouth Every Other Day.    raNITIdine (ZANTAC) 150 MG tablet 4/17/2018 Self Yes Yes    Take 150 mg by mouth  2 (Two) Times a Day.       Medication notes:     This medication list is complete to the best of my knowledge as of 4/18/2018    Please call if questions.    Fatoumata Seymour, PharmD, BCPS  4/18/2018 1:45 PM

## 2018-04-18 NOTE — PROGRESS NOTES
Discharge Planning Assessment  [facility]     Patient Name: Sunni Mcneil  MRN: [id number]  Today's Date: 4/18/2018    Admit Date: 4/18/2018          Discharge Needs Assessment     Row Name 04/18/18 1401       Living Environment    Lives With spouse;child(dagmar), adult    Name(s) of Who Lives With Patient Chuy Mcneil,     Current Living Arrangements home/apartment/condo    Primary Care Provided by self    Provides Primary Care For no one    Family Caregiver if Needed spouse    Quality of Family Relationships helpful;involved;supportive    Able to Return to Prior Arrangements yes       Resource/Environmental Concerns    Resource/Environmental Concerns none    Transportation Concerns car, none       Transition Planning    Patient/Family Anticipates Transition to home with family    Patient/Family Anticipated Services at Transition none    Transportation Anticipated family or friend will provide       Discharge Needs Assessment    Concerns to be Addressed no discharge needs identified;denies needs/concerns at this time    Equipment Currently Used at Home none    Discharge Coordination/Progress Home            Discharge Plan     Row Name 04/18/18 1408       Plan    Plan Home    Patient/Family in Agreement with Plan yes    Plan Comments IMM letter checked. CCP met with pt and  to discuss d/c planning. Facesheet verified. CCP role explained. Pt resides with her  and son in a second floor apartment with one flight exterior steps and denies DME use. Pt denies past home health or sub-acute rehab. Pt's pharmacy is [facility] on [facility]. Pt uses [facility], no particular provider, for primary care. Pt denies known needs at this time. CCP to follow to assist should d/c needs arise. Grecia Lord [facility]        Destination     No service coordination in this encounter.      Durable Medical Equipment     No service coordination in this encounter.      Dialysis/Infusion     No  service coordination in this encounter.      Home Medical Care     No service coordination in this encounter.      Social Care     No service coordination in this encounter.                Demographic Summary     Row Name 04/18/18 1401       General Information    Admission Type inpatient    Arrived From home    Required Notices Provided Important Message from Medicare    Referral Source nursing;physician    Reason for Consult discharge planning    Preferred Language English            Functional Status     Row Name 04/18/18 1401       Functional Status    Usual Activity Tolerance good    Current Activity Tolerance good       Functional Status, IADL    Medications independent    Meal Preparation independent    Housekeeping independent    Laundry independent    Shopping independent       Mental Status Summary    Recent Changes in Mental Status/Cognitive Functioning no changes            Psychosocial    No documentation.           Abuse/Neglect    No documentation.           Legal    No documentation.           Substance Abuse    No documentation.           Patient Forms    No documentation.         Estefania Lord LCSW

## 2018-04-18 NOTE — H&P (VIEW-ONLY)
SURGERY  Sunni Mcneil   1975  03/28/18    Chief Complaint:  hyperparathyroidism    HPI    Patient is a very nice 42 y.o. female who returns after initial visit January 24, 2018, at which time I recommended that she get a SPECT-CT scan, to further evaluate her hyperparathyroidism.  She had presented at that visit in January with a history of a kidney transplant, cadaveric, and with hyperparathyroidism.  She initially had a nuclear medicine study scheduled by Dr. Hansen for the end of October, that being last fall, but she says she never knew anything about it.  After we scheduled her SPECT-CT scan, she showed up on the day of the procedure, and would not proceed due to claustrophobia.  We gave her a prescription for Ativan the night prior, in the morning, but she still presented and refused to do the CT portion and thus we have only a SPECT result.  That result shows a dominant isotope avid right inferior cervical parathyroid adenoma, and a mildly metabolic less dominant left superior cervical parathyroid adenoma suspected.  This was read by Dr. Casimiro Huertas.    She has a decreasing PTH, going from 797 to 377, but has been on sensipar, with increase in calcium to 11.7.  Her phosphorus is 2.4, consistent with an endogenous hyperparathyroidism.      She has a CKD with 35 GFR, with creatinine of 1.6.  She is maintained on cellcept, prednisone, and cyclosporine for her transplant.      She denies a history of kidney stones, muscle aches, or memory changes.  She does have mild fatigue, tho she works at [employer] part time.    Past Medical History:   Diagnosis Date   • Acid reflux    • Anemia    • Arthritis    • History of peritoneal dialysis     KIDNEY TRANSPLANT   • History of transfusion    • Hypertension    • Kidney disease     End-stage renal disease   • Kidney transplant recipient 09/02/2016    RIGHT KIDNEY     Past Surgical History:   Procedure Laterality Date   • INSERTION PERITONEAL DIALYSIS CATHETER   2014    Laparoscopic placement of Curl Cath peritoneal dialysis catheter, Dr. Vinod Goldstein   • REMOVAL PERITONEAL DIALYSIS CATHETER N/A 10/17/2016    Procedure: REMOVAL PERITONEAL DIALYSIS CATHETER;  Surgeon: Damon Rooney MD;  Location: [facility];  Service:    • TRANSPLANTATION RENAL Right 2016    from  donor   • TUBAL ABDOMINAL LIGATION Bilateral      History reviewed. No pertinent family history.  Social History     Social History   • Marital status:      Spouse name: N/A   • Number of children: 2   • Years of education: 12     Occupational History   •  [employer]     Social History Main Topics   • Smoking status: Never Smoker   • Smokeless tobacco: Never Used   • Alcohol use No   • Drug use: No   • Sexual activity: Defer     Other Topics Concern   • Not on file     Social History Narrative   • No narrative on file     Occupation/Additional Social Hx: [employer]       Current Outpatient Prescriptions:   •  aspirin 81 MG EC tablet, Take 81 mg by mouth Daily., Disp: , Rfl:   •  cinacalcet (SENSIPAR) 60 MG tablet, Take 120 mg by mouth Daily., Disp: , Rfl:   •  cycloSPORINE (sandIMMUNE) 100 MG capsule, Take 100 mg by mouth 2 (Two) Times a Day., Disp: , Rfl:   •  cycloSPORINE (sandIMMUNE) 25 MG capsule, Take 25 mg by mouth 2 (Two) Times a Day., Disp: , Rfl:   •  folic acid (FOLVITE) 1 MG tablet, Take 1 mg by mouth Daily., Disp: , Rfl:   •  iron polysaccharides (NIFEREX) 150 MG capsule, Take 150 mg by mouth 2 (Two) Times a Day., Disp: , Rfl:   •  metoprolol tartrate (LOPRESSOR) 25 MG tablet, Take 25 mg by mouth 2 (Two) Times a Day., Disp: , Rfl:   •  mycophenolate (CELLCEPT) 250 MG capsule, Take 250 mg by mouth 2 (Two) Times a Day., Disp: , Rfl:   •  predniSONE (DELTASONE) 5 MG tablet, Take 15 mg by mouth Every Other Day., Disp: , Rfl:   •  raNITIdine (ZANTAC) 150 MG tablet, Take 150 mg by mouth 2 (Two) Times a Day., Disp: , Rfl:     No Known Allergies    Review of Systems    Respiratory: Negative for shortness of breath.    Cardiovascular: Negative for chest pain.   Allergic/Immunologic:        No history of wound infections or MRSA   Hematological: Does not bruise/bleed easily.   All other systems reviewed and are negative.      Vitals:    03/28/18 1419   Pulse: 76   SpO2: 99%   Weight: 80.5 kg (177 lb 6.4 oz)       PHYSICAL EXAM:    Pulse 76   Wt 80.5 kg (177 lb 6.4 oz)   SpO2 99%   BMI 34.65 kg/m²   Body mass index is 34.65 kg/m².    Constitutional: well developed, well nourished, appears healthy, despite her transplant status  Eyes: sclera nonicteric, conjunctiva not injected or edematous  ENMT: Hearing intact, trachea midline, thyroid without masses  CVS: RRR, no murmur, peripheral edema not present  Respiratory: CTA, normal respiratory effort   Gastrointestinal: no hepatosplenomegaly, abdomen soft, nontender, no masses, abdominal hernia not detected  Genitourinary: inguinal hernia not detected  Musculoskeletal: gait normal, muscle mass normal, and good in tone  Skin: warm and dry, no rashes visible  Neurological: awake and alert, seems to have reasonable capacity for understanding for medical decision making  Psychiatric: appears to have reasonable judgement, pleasant  Lymphatics: no cervical adenopathy     Radiographic Findings: 2 cm lobular nodular opacity in the right lower lobe, 2015, stable since 2014    Lab Findings: Latest calcium is 10.4, phosphorus 2.2.    Pamphlet reviewed: none    IMPRESSION:  · Hyperparathyroidism, likely tertiary, with elevated calcium and PTH, 11 and 377  · Hypercalcemia   · Suspected right inferior dominant parathyroid adenoma, less dominant left superior parathyroid adenoma by SPECT.  She could in fact have 4 gland hyperplasia.  · S/p cadaveric transplant, on cellcept, cyclosporine, prednisone.  · Chronic immunosuppression  · Stable lung nodule    PLAN:  · Bilateral neck exploration, with right inferior and left superior parathyroid adenoma  resection.  She understands that this may wind up being a 4 gland resection with possible half gland autotransplantation.  She understands that the risk associated with having to explore both sides is greater, both for recurrent laryngeal nerve injury, as well as hypocalcemia postoperatively.  She understands that the inability to complete the CT portion of the SPECT CT may in fact have increased her risk associated with surgery.  Case request is been entered  Additional risk of surgery were discussed with the patient including: bleeding, infection, potential difficulty in identifying all 4 glands intraoperatively and determining if each of these is hyperplastic or normal, thus resulting in either incomplete resection of all hyperplastic glands (and continued hyperparathyroidism) or excess removal (with the potential need for permanent supplementation of calcium and activated vitamin D), Location of parathyroids outside the neck despite studies that do not indicate such, recurrent laryngeal nerve damage resulting in temporary or permanent hoarseness, swallowing difficulty with aspiration, swelling and scar at the neck and arm incision (if autotransplantation of a portion of a gland is necessary)  · We also discussed the fact that if she undergoes arm autotransplantation, she may have recurrent hyperparathyroidism that will require re-entry into the arm and additional removal.  · Hold sensipar 2 weeks prior to surgery  · Hold aspirin 2 weeks prior to surgery  · Solumedrol in pre op  · We've notified Dr. Hansen of the planned surgical time, and phoned his office, work through this staff, to get an appointment set up for the patient for 20/4/18, one week after her planned surgery, to ensure that she gets labs and is seen by the physician at that time.    Rochelle Barreto MD   4:52 PM  03/28/18

## 2018-04-18 NOTE — ANESTHESIA POSTPROCEDURE EVALUATION
Patient: Sunni Mcneil    Procedure Summary     Date:  04/18/18 Room / Location:  [facility] OR  / [facility] MAIN OR    Anesthesia Start:  0758 Anesthesia Stop:  1032    Procedure:  4 gland parathyroid resection with autotransplantation, thyroid nodule excision (N/A Neck) Diagnosis:       Hypercalcemia      Hyperparathyroidism      (Hypercalcemia [E83.52])      (Hyperparathyroidism [E21.3])    Surgeon:  Rochelle Barreto MD Provider:  Lisa Smith MD    Anesthesia Type:  general ASA Status:  3          Anesthesia Type: general  Last vitals  BP   (!) 183/116 (04/18/18 1045)   Temp   37.1 °C (98.7 °F) (04/18/18 1030)   Pulse   76 (04/18/18 1045)   Resp   16 (04/18/18 1045)     SpO2   98 % (04/18/18 1045)     Post Anesthesia Care and Evaluation    Patient location during evaluation: PACU  Patient participation: complete - patient participated  Level of consciousness: awake and alert  Pain management: adequate  Airway patency: patent  Anesthetic complications: No anesthetic complications    Cardiovascular status: acceptable  Respiratory status: acceptable  Hydration status: acceptable    Comments: ---------------------            04/18/18                1045      ---------------------   BP:     (!) 183/116   Pulse:      76        Resp:       16        Temp:                 SpO2:       98%      ---------------------

## 2018-04-18 NOTE — ANESTHESIA PREPROCEDURE EVALUATION
Anesthesia Evaluation     NPO Solid Status: > 8 hours             Airway   Mallampati: IV  Neck ROM: full  Possible difficult intubation  Dental - normal exam     Pulmonary - normal exam   (-) not a smoker  Cardiovascular - normal exam    ECG reviewed  Patient on routine beta blocker and Beta blocker given within 24 hours of surgery    (+) hypertension,       Neuro/Psych  GI/Hepatic/Renal/Endo    (+)  GERD,  renal disease,     ROS Comment: Kidney transplant 2016 and hyperparathyroid    Musculoskeletal     Abdominal    Substance History      OB/GYN          Other   (+) arthritis                     Anesthesia Plan    ASA 3     general     intravenous induction   Anesthetic plan and risks discussed with patient.

## 2018-04-18 NOTE — CONSULTS
Referring Provider: Dr. Jamaal Barreto  Reason for Consultation: hypercalcemia; prior kidney transplant    Subjective     Chief complaint No chief complaint on file.      History of present illness:  42 yo woman with CKT in  for Rx of ESRD attributed to HTN, admitted today for planned PTx to address persistent hyperCa d/t tertiary hyperparathyroidism that was resistant to sensipar.  PMH outlined below; pertinent is prior PD for ~ 2 years before her successful txplnt.  Current IS regimen: pred 15 QOD, CsA 150 BID, and  BID.  Her surgery took place earlier today. Pre-op Ca 10.3 and pre-op , with intra-op PTH earlier today similar at 220.  Post-op PTH 10 and Ca 9.7 this afternoon.  · Pain is controlled  · Had felt SOB earlier, but has no SOB now; talking w/o difficulty  · Already tolerating liquid diet  · F/c in place; no abd pain  · Had auto-txplnt of one gland into left forearm     Past Medical History:   Diagnosis Date   • Acid reflux    • Anemia    • Arthritis    • History of peritoneal dialysis     KIDNEY TRANSPLANT 2016   • History of transfusion     BEEN A WHILE   • Hypercalcemia    • Hyperparathyroidism    • Hypertension    • Kidney disease     End-stage renal disease. TRANSPLANT   • Kidney transplant recipient 2016    RIGHT KIDNEY   • Seasonal allergies     CURRENTLY    • UTI (urinary tract infection)     CURRENTLY BEING TREATED. WAS HOSPITALIZED AT [facility] - 2018     Past Surgical History:   Procedure Laterality Date   • INSERTION PERITONEAL DIALYSIS CATHETER  2014    Laparoscopic placement of Curl Cath peritoneal dialysis catheter, Dr. Vinod Goldstein   • REMOVAL PERITONEAL DIALYSIS CATHETER N/A 10/17/2016    Procedure: REMOVAL PERITONEAL DIALYSIS CATHETER;  Surgeon: Damon Rooney MD;  Location: [facility];  Service:    • TRANSPLANTATION RENAL Right 2016    from  donor   • TUBAL ABDOMINAL LIGATION Bilateral      History reviewed.  "No pertinent family history.  Social History   Substance Use Topics   • Smoking status: Never Smoker   • Smokeless tobacco: Never Used   • Alcohol use No     Prescriptions Prior to Admission   Medication Sig Dispense Refill Last Dose   • aspirin 81 MG EC tablet Take 81 mg by mouth Daily. HELD FOR SURGERY   2 weeks ago   • cefdinir (OMNICEF) 300 MG capsule Take 300 mg by mouth 2 (Two) Times a Day. FOR 7 DAYS. FOR RECENT DX UTI, pt states she has three days left   4/17/2018 at 1800   • cycloSPORINE (sandIMMUNE) 100 MG capsule Take 100 mg by mouth 2 (Two) Times a Day.   4/18/2018 at 0645   • cycloSPORINE (sandIMMUNE) 25 MG capsule Take 25 mg by mouth 2 (Two) Times a Day.   4/18/2018 at 0645   • folic acid (FOLVITE) 1 MG tablet Take 1 mg by mouth Daily.   4/17/2018 at 1500   • iron polysaccharides (NIFEREX) 150 MG capsule Take 150 mg by mouth 2 (Two) Times a Day.   4/17/2018 at 2130   • metoprolol tartrate (LOPRESSOR) 25 MG tablet Take 25 mg by mouth 2 (Two) Times a Day.   4/18/2018 at 0645   • mycophenolate (CELLCEPT) 250 MG capsule Take 250 mg by mouth 2 (Two) Times a Day.   4/18/2018 at 0645   • predniSONE (DELTASONE) 5 MG tablet Take 15 mg by mouth Every Other Day.   4/17/2018 at 1500   • raNITIdine (ZANTAC) 150 MG tablet Take 150 mg by mouth 2 (Two) Times a Day.   4/17/2018 at 1500     Allergies:  Review of patient's allergies indicates no known allergies.    Review of Systems  14-point ROS performed and all negative except for pertinent +/-'s detailed in HPI. Does have some neck pain post-op, but pain is controlled well.     Objective     Vital Signs  Temp:  [97.7 °F (36.5 °C)-99.1 °F (37.3 °C)] 97.7 °F (36.5 °C)  Heart Rate:  [72-85] 85  Resp:  [12-18] 18  BP: (134-184)/() 149/95    Flowsheet Rows    Flowsheet Row First Filed Value   Admission Height 152.4 cm (60\") Documented at 04/18/2018 0703   Admission Weight 79.5 kg (175 lb 6 oz) Documented at 04/18/2018 0703           I/O this shift:  In: 1400 " [I.V.:1400]  Out: 920 [Urine:900; Drains:20]  No intake/output data recorded.    Intake/Output Summary (Last 24 hours) at 04/18/18 1634  Last data filed at 04/18/18 1140   Gross per 24 hour   Intake             1400 ml   Output              920 ml   Net              480 ml       Physical Exam:  NAD; pleasant; oriented; looks stated age  Voice soft but not raspy  Dry MM; AT/NC  No eye d/c; no scleral icterus  No JVD; no carotid bruits; bandage on neck is C/D/I  CTA bilat; not labored; no stridor  RRR, no rub  Soft, NT, +D, BS+, renal allograft right iliac fossa  No edema  No clubbing  No asterixis  Moves all extremities   Left forearm with incision C/D/I  Speech fluent    Results Review:    Results from last 7 days  Lab Units 04/18/18  1325 04/16/18  0923   SODIUM mmol/L  --  134*   POTASSIUM mmol/L  --  3.5   CHLORIDE mmol/L  --  102   CO2 mmol/L  --  20.7*   BUN mg/dL  --  16   CREATININE mg/dL  --  1.13*   CALCIUM mg/dL 9.7 10.3  10.3   GLUCOSE mg/dL  --  88       Estimated Creatinine Clearance: 59.9 mL/min (by C-G formula based on SCr of 1.13 mg/dL (H)).      Results from last 7 days  Lab Units 04/18/18  1325   MAGNESIUM mg/dL 1.4*         Results from last 7 days  Lab Units 04/16/18  0923   WBC 10*3/mm3 8.07   HEMOGLOBIN g/dL 10.3*   PLATELETS 10*3/mm3 338             Active Medications    cycloSPORINE 100 mg Oral BID   cycloSPORINE 25 mg Oral BID   famotidine 20 mg Oral Daily   folic acid 1 mg Oral Daily   magnesium sulfate 1 g Intravenous Q1H   metoprolol tartrate 25 mg Oral Q12H   mycophenolate 250 mg Oral Q12H   [START ON 4/20/2018] predniSONE 15 mg Oral Every Other Day       sodium chloride 45 mL/hr Last Rate: 45 mL/hr (04/18/18 1503)       Assessment/Plan   Assessment  1.  CKT '16 for Rx of ESRD from HTN:  excellent allograft fxn; stable lytes other than low Mg and low K.  Serum Ca is normal but already starting to fall.  Vol fine  2.  Immunosuppression: being tolerated fine  3.  S/p 4-gland PTx with  auto-txplnt of one gland to left forearm: current serum Ca is normal  4.  Anemia  5.  HTN, acceptable for now    Active Problems:    Hyperparathyroidism    Hypercalcemia      Plan  1.  Magnesium sulfate 2 grams iv already ordered  2.  Serial Ca this afternoon and evening  3.  Will call our office to ensure she has f/u appt and labs already scheduled with Dr. Hansen next week  4.  Begin calcitriol; will have our office call this in tomorrow to ensure it will be available to her upon d/c  5.  Surveillance labs    I discussed the patient's findings and my recommendations with patient     Víctor Bee MD  04/18/18  4:34 PM

## 2018-04-18 NOTE — PROGRESS NOTES
Discharge Planning Assessment  [facility]     Patient Name: Sunni Mcneil  MRN: [id number]  Today's Date: 4/18/2018    Admit Date: 4/18/2018          Discharge Needs Assessment     Row Name 04/18/18 1401       Living Environment    Lives With spouse;child(dagmar), adult    Name(s) of Who Lives With Patient Chuy Mcneil,     Current Living Arrangements home/apartment/condo    Primary Care Provided by self    Provides Primary Care For no one    Family Caregiver if Needed spouse    Quality of Family Relationships helpful;involved;supportive    Able to Return to Prior Arrangements yes       Resource/Environmental Concerns    Resource/Environmental Concerns none    Transportation Concerns car, none       Transition Planning    Patient/Family Anticipates Transition to home with family    Patient/Family Anticipated Services at Transition none    Transportation Anticipated family or friend will provide       Discharge Needs Assessment    Concerns to be Addressed no discharge needs identified;denies needs/concerns at this time    Equipment Currently Used at Home none    Discharge Coordination/Progress Home            Discharge Plan     Row Name 04/18/18 1406       Plan    Plan Home    Patient/Family in Agreement with Plan yes    Plan Comments IMM letter checked. CCP met with pt and  to discuss d/c planning. Facesheet verified. CCP role explained. Pt resides with her  and son in a second floor apartment with one flight exterior steps and denies DME use. Pt denies past home health or sub-acute rehab. Pt's pharmacy is [facility] on [facility]. Pt denies known needs at this time. CCP to follow to assist should d/c needs arise. Grecia Lord [facility]        Destination     No service coordination in this encounter.      Durable Medical Equipment     No service coordination in this encounter.      Dialysis/Infusion     No service coordination in this encounter.      Home Medical Care     No service  coordination in this encounter.      Social Care     No service coordination in this encounter.                Demographic Summary     Row Name 04/18/18 1401       General Information    Admission Type inpatient    Arrived From home    Required Notices Provided Important Message from Medicare    Referral Source nursing;physician    Reason for Consult discharge planning    Preferred Language English            Functional Status     Row Name 04/18/18 1401       Functional Status    Usual Activity Tolerance good    Current Activity Tolerance good       Functional Status, IADL    Medications independent    Meal Preparation independent    Housekeeping independent    Laundry independent    Shopping independent       Mental Status Summary    Recent Changes in Mental Status/Cognitive Functioning no changes            Psychosocial    No documentation.           Abuse/Neglect    No documentation.           Legal    No documentation.           Substance Abuse    No documentation.           Patient Forms    No documentation.         Estefania Lord LCSW

## 2018-04-18 NOTE — ANESTHESIA PROCEDURE NOTES
Airway  Urgency: elective    Airway not difficult    General Information and Staff    Patient location during procedure: OR  Anesthesiologist: NATHANIEL YUAN  CRNA: YOLANDA BREWSTER    Indications and Patient Condition  Indications for airway management: airway protection    Preoxygenated: yes  Mask difficulty assessment: 1 - vent by mask    Final Airway Details  Final airway type: endotracheal airway      Successful airway: ETT  Cuffed: yes   Successful intubation technique: direct laryngoscopy  Facilitating devices/methods: Bougie  Endotracheal tube insertion site: oral  Blade: Leandra  Blade size: #3  ETT size: 7.0 mm  Cormack-Lehane Classification: grade IIa - partial view of glottis  Placement verified by: chest auscultation and capnometry   Measured from: lips  ETT to lips (cm): 21  Number of attempts at approach: 1    Additional Comments  Smooth IV/mask induction/intubation. Easy bag-mask ventilation. Orally intubated, easy, atraumatic, lips/teeth/mouth left intact, as preop. +ETCO2, bilateral breath sounds and equal.

## 2018-04-18 NOTE — OP NOTE
Operative Note  Parathyroid  04/18/18      Pre-op Diagnosis:   · Hyperparathyroidism, likely tertiary, with elevated calcium and PTH, 11 and 377  · Hypercalcemia   · Suspected right inferior dominant parathyroid adenoma, less dominant left superior parathyroid adenoma by SPECT.  She could in fact have 4 gland hyperplasia.  · S/p cadaveric transplant, on cellcept, cyclosporine, prednisone.  · Chronic immunosuppression  · Stable lung nodule    Post-op Diagnosis:  · Parathyroid hyperplasia, 4 gland  · Left thyroid nodule    Procedure:   · Parathyroid gland resection ×4, half gland autotransplantation left arm  · Excision left thyroid nodule    Surgeon: Mary Lou    Assistant: Can    Indications:  · 43-year-old patient who had a kidney transplant, remains on appropriate medications, and has developed hypercalcemia, and elevated PTH, despite the use of Sensipar.  She had a SPECT evaluation for parathyroid adenoma, with suspicion of right inferior, left superior parathyroid.  Despite the use of Ativan, she was unable to complete the CT portion due to claustrophobia.  · Nares swab negative for MRSA, but patient with acne, and posterior lesions on the face prompted use of vancomycin preoperatively    Findings:   · Intraoperative STAT .2, preoperatively  · Intraoperative STAT PTH 30.7, after 4 gland resection    Recommendations:   · We'll consult nephrology.  Anticipate that she will need to be on calcitriol, 0.25 µg probably twice a day, in the context of her PTH level, and low vitamin D level at 8.9, as well as the 4 gland resection.  She'll also need calcium supplementation  · She'll stay in the hospital until nephrology think she is stable from a electrolyte standpoint    Technique:     General anesthetic was induced.  IV vancomycin was given.  The neck was extended, and then prepped with Hibiclens and draped sterilely.    The neck was examined and the skin creases evaluated to determine the best location for the  incision, attempting to maximize both operative exposure and post op cosmesis.  I selected a lower neck site, at the site of a faintly visible crease, and marked.      1/2% Marcaine with epinephrine was used to inject the site.  Incision was made thru the skin and subcutaneous space and then completed with cautery to the cervical fascia.  Flaps were then raised with the facelift retractors and cautery, directly on the anterior aspect of the fascia, both superiorly and inferiorly.  The strap muscles were then divided in the midline.    Dissection was then begun under the strap muscle on the right side.  The bipolar on 15 was used.  The dissection was somewhat difficult, she having what appeared to be more of a neck consistent with a prior surgery.  The thyroid was stuck to the muscle, and tended to bleed, and was friable and difficult to pull up.    Ultimately identified an extremely large right inferior parathyroid, at least 2 cm, almost intrathyroidal.  It was dissected away from the thyroid and the muscle, lifted up and the pedicle divided with the bipolar.    I then dissected posteriorly on the right and identified the superior which likewise was enlarged and had a very unusual appearance, consistent with disease parathyroid of kidney disease, with a white nodular appearance.  This was resected as well.  Both were sent to pathology and confirmed to be hypercellular parathyroid.    We then went to the left side, and similarly, the strap muscles were difficult to lift up off of the thyroid.  Ultimately identified a left superior, that was enlarged, but did not appear to be as disease.  However with the SPECT scan suggesting it to be more active than the others, I felt compelled to consider that to be diseased.  In addition there was a nodule extending from the midportion of the thyroid somewhat posteriorly, and I resected that to ensure that it wasn't a disease parathyroid, although I felt it was likely a cornified  cyst of the thyroid, and sent it over as thyroid tissue.  This was confirmed to be thyroid.      I then dissected in the left inferior aspect, and at the pole, found a very enlarged very diseased appearing parathyroid.  They all appeared diseased without question, and more enlarged.    I thus went ahead and took out the left superior, and the left inferior, and collected what I fall was about the equivalent of 1-1/2 parathyroids from those 2, that had a more normal appearance.  I saved that, and sent the rest for pathology.    I went back and hemostased.  I had to use the cautery fairly extensively on the thyroid, where had use an Allis to lift the thyroid up.  I also had to use the bipolar for some of the more posterior areas, were very tiny vessels were identified to be bleeding.  The recurrent laryngeal nerve was identified on both sides and noted not to be intact.  Surgicel was placed.    In the context of her oozy state, and the inflammatory appearance around the thyroid.  I opted to place a 10 Emir[place] drain, and brought it out to the right lower aspect, with the silk suture site even bleeding.  Pressure resulted in hemostasis.    All pathology specimens were sent as parathyroid were identified as hypercellular parathyroid    The wound was then closed with a running 3-0 Vicryl to the midline strap muscles, followed by interrupted 3-0 vicryl to the platysma, interrupted 3-0 vicryl to the subcutaneous, and running 5-0 vicryl to the skin.  Skin affix was applied to the wound.    I then collected the parathyroid which I had set aside, minced that finding.  The left arm was prepped and draped with Hibiclens.  I anesthetized the region transversely over the brachial radialis muscle, made a transverse incision through the skin and the subcutaneous and hemostasis small vessel.  The muscle fascia was opened in the length of the fibers, and about half of the parathyroid tissue was inserted into a muscular pocket, with the  fascia being closed with a figure-of-eight 4-0 Prolene left long to identify the site.  Adjacent to that, offset by about a centimeter, total more lateral location, was the remaining parathyroid, which was secured in the subcutaneous space with interrupted 3-0 Vicryl, the dermis with 3-0 Vicryl, the skin with running 5-0 Vicryl.  Skin affix was applied to the wound.    Rochelle Barreto MD  04/18/18  10:15 AM

## 2018-04-19 VITALS
WEIGHT: 189.9 LBS | SYSTOLIC BLOOD PRESSURE: 128 MMHG | TEMPERATURE: 98.4 F | DIASTOLIC BLOOD PRESSURE: 97 MMHG | HEIGHT: 60 IN | RESPIRATION RATE: 16 BRPM | OXYGEN SATURATION: 96 % | BODY MASS INDEX: 37.28 KG/M2 | HEART RATE: 91 BPM

## 2018-04-19 LAB
ALBUMIN SERPL-MCNC: 3.5 G/DL (ref 3.5–5.2)
ANION GAP SERPL CALCULATED.3IONS-SCNC: 14.5 MMOL/L
BUN BLD-MCNC: 25 MG/DL (ref 6–20)
BUN/CREAT SERPL: 18.4 (ref 7–25)
CALCIUM SPEC-SCNC: 9.5 MG/DL (ref 8.6–10.5)
CALCIUM SPEC-SCNC: 9.7 MG/DL (ref 8.6–10.5)
CHLORIDE SERPL-SCNC: 105 MMOL/L (ref 98–107)
CO2 SERPL-SCNC: 16.5 MMOL/L (ref 22–29)
CREAT BLD-MCNC: 1.36 MG/DL (ref 0.57–1)
CYTO UR: NORMAL
GFR SERPL CREATININE-BSD FRML MDRD: 42 ML/MIN/1.73
GLUCOSE BLD-MCNC: 111 MG/DL (ref 65–99)
LAB AP CASE REPORT: NORMAL
Lab: NORMAL
Lab: NORMAL
MAGNESIUM SERPL-MCNC: 2.1 MG/DL (ref 1.6–2.6)
PATH REPORT.FINAL DX SPEC: NORMAL
PATH REPORT.GROSS SPEC: NORMAL
PHOSPHATE SERPL-MCNC: 2.9 MG/DL (ref 2.5–4.5)
POTASSIUM BLD-SCNC: 5 MMOL/L (ref 3.5–5.2)
PTH-INTACT SERPL-MCNC: 8.4 PG/ML (ref 15–65)
SODIUM BLD-SCNC: 136 MMOL/L (ref 136–145)

## 2018-04-19 PROCEDURE — 63710000001 METOPROLOL TARTRATE 25 MG TABLET: Performed by: SURGERY

## 2018-04-19 PROCEDURE — 83970 ASSAY OF PARATHORMONE: CPT | Performed by: SURGERY

## 2018-04-19 PROCEDURE — 63710000001 CALCITRIOL 0.25 MCG CAPSULE: Performed by: INTERNAL MEDICINE

## 2018-04-19 PROCEDURE — A9270 NON-COVERED ITEM OR SERVICE: HCPCS | Performed by: SURGERY

## 2018-04-19 PROCEDURE — A9270 NON-COVERED ITEM OR SERVICE: HCPCS | Performed by: INTERNAL MEDICINE

## 2018-04-19 PROCEDURE — 63710000001 FOLIC ACID 1 MG TABLET: Performed by: SURGERY

## 2018-04-19 PROCEDURE — 63710000001 SODIUM BICARBONATE 650 MG TABLET: Performed by: INTERNAL MEDICINE

## 2018-04-19 PROCEDURE — 80069 RENAL FUNCTION PANEL: CPT | Performed by: INTERNAL MEDICINE

## 2018-04-19 PROCEDURE — 63710000001 FAMOTIDINE 20 MG TABLET: Performed by: SURGERY

## 2018-04-19 PROCEDURE — G0378 HOSPITAL OBSERVATION PER HR: HCPCS

## 2018-04-19 PROCEDURE — 82310 ASSAY OF CALCIUM: CPT | Performed by: SURGERY

## 2018-04-19 PROCEDURE — 63710000001 HYDROCODONE-ACETAMINOPHEN 5-325 MG TABLET: Performed by: SURGERY

## 2018-04-19 PROCEDURE — 63710000001 CYCLOSPORINE PER 100 MG: Performed by: SURGERY

## 2018-04-19 PROCEDURE — 63710000001 MYCOPHENOLATE MOFETIL PER 250 MG: Performed by: SURGERY

## 2018-04-19 PROCEDURE — 63710000001 CYCLOSPORINE PER 25 MG: Performed by: SURGERY

## 2018-04-19 PROCEDURE — 83735 ASSAY OF MAGNESIUM: CPT | Performed by: INTERNAL MEDICINE

## 2018-04-19 PROCEDURE — 63710000001 CALCIUM CARBONATE 500 MG CHEWABLE TABLET: Performed by: INTERNAL MEDICINE

## 2018-04-19 RX ORDER — ONDANSETRON 4 MG/1
4 TABLET, FILM COATED ORAL EVERY 6 HOURS PRN
Qty: 20 TABLET | Refills: 0 | Status: SHIPPED | OUTPATIENT
Start: 2018-04-19 | End: 2019-08-06

## 2018-04-19 RX ORDER — OXYCODONE HYDROCHLORIDE AND ACETAMINOPHEN 5; 325 MG/1; MG/1
TABLET ORAL
Qty: 20 TABLET | Refills: 0 | Status: SHIPPED | OUTPATIENT
Start: 2018-04-19 | End: 2018-04-19

## 2018-04-19 RX ORDER — SODIUM BICARBONATE 650 MG/1
1300 TABLET ORAL ONCE
Status: COMPLETED | OUTPATIENT
Start: 2018-04-19 | End: 2018-04-19

## 2018-04-19 RX ORDER — OXYCODONE HYDROCHLORIDE AND ACETAMINOPHEN 5; 325 MG/1; MG/1
TABLET ORAL
Qty: 20 TABLET | Refills: 0 | Status: SHIPPED | OUTPATIENT
Start: 2018-04-19 | End: 2019-07-02

## 2018-04-19 RX ORDER — CALCIUM CARBONATE 200(500)MG
1 TABLET,CHEWABLE ORAL 3 TIMES DAILY
Qty: 90 TABLET | Refills: 0 | Status: SHIPPED | OUTPATIENT
Start: 2018-04-19 | End: 2019-07-02

## 2018-04-19 RX ORDER — ASPIRIN 81 MG/1
81 TABLET ORAL DAILY
Start: 2018-04-26 | End: 2019-07-02

## 2018-04-19 RX ADMIN — CYCLOSPORINE 25 MG: 25 CAPSULE, LIQUID FILLED ORAL at 11:00

## 2018-04-19 RX ADMIN — CALCITRIOL 0.5 MCG: 0.25 CAPSULE, LIQUID FILLED ORAL at 09:06

## 2018-04-19 RX ADMIN — METOPROLOL TARTRATE 25 MG: 25 TABLET ORAL at 09:06

## 2018-04-19 RX ADMIN — CYCLOSPORINE 100 MG: 100 CAPSULE, GELATIN COATED ORAL at 11:00

## 2018-04-19 RX ADMIN — FAMOTIDINE 20 MG: 20 TABLET, FILM COATED ORAL at 09:06

## 2018-04-19 RX ADMIN — HYDROCODONE BITARTRATE AND ACETAMINOPHEN 1 TABLET: 5; 325 TABLET ORAL at 06:46

## 2018-04-19 RX ADMIN — MYCOPHENOLATE MOFETIL 250 MG: 250 CAPSULE ORAL at 09:06

## 2018-04-19 RX ADMIN — SODIUM BICARBONATE 1300 MG: 650 TABLET ORAL at 12:14

## 2018-04-19 RX ADMIN — Medication 1 TABLET: at 09:06

## 2018-04-19 RX ADMIN — FOLIC ACID 1 MG: 1 TABLET ORAL at 09:06

## 2018-04-19 NOTE — PROGRESS NOTES
NEPHROLOGY PROGRESS NOTE    PATIENT IDENTIFICATION:   Name:  Sunni Mcneil      MRN:  [id number]     43 y.o.  female             Reason for visit: CKT and hypercalcemia, s/p PTx on 4.18.18    SUBJECTIVE:  Feels better; no SOB or difficulty swallowing; no hoarseness; no muscle twitching; eager to go home    OBJECTIVE:  Vitals:    04/18/18 1930 04/18/18 2328 04/19/18 0500 04/19/18 0700   BP: 130/83 118/85  128/97   BP Location: Left arm Left arm  Left arm   Patient Position: Sitting Lying  Lying   Pulse: 99 91     Resp: 18 16 16   Temp: 98.1 °F (36.7 °C) 98.5 °F (36.9 °C)  98.4 °F (36.9 °C)   TempSrc: Oral Oral  Oral   SpO2: 98% 96%     Weight:   86.1 kg (189 lb 14.4 oz)    Height:               Body mass index is 37.09 kg/m².    Intake/Output Summary (Last 24 hours) at 04/19/18 0817  Last data filed at 04/19/18 0500   Gross per 24 hour   Intake             1350 ml   Output             1930 ml   Net             -580 ml     Wt Readings from Last 1 Encounters:   04/19/18 0500 86.1 kg (189 lb 14.4 oz)   04/18/18 0703 79.5 kg (175 lb 6 oz)     Wt Readings from Last 3 Encounters:   04/19/18 86.1 kg (189 lb 14.4 oz)   04/16/18 80.7 kg (178 lb)   03/28/18 80.5 kg (177 lb 6.4 oz)         Exam:  NAD; pleasant; oriented; looks stated age  Voice stronger than yesterday  MMM; AT/NC  No eye d/c; no scleral icterus  No JVD; no carotid bruits; bandage on neck is C/D/I  CTA bilat; not labored; no stridor  RRR, no rub  Soft, NT, +D, BS+, renal allograft right iliac fossa  No edema  No clubbing  No asterixis; no myoclonus  Moves all extremities   Left forearm with incision C/D/I  Speech fluent    Scheduled meds:    calcitriol 0.5 mcg Oral Q12H   calcium carbonate 1 tablet Oral TID   cycloSPORINE 100 mg Oral BID   cycloSPORINE 25 mg Oral BID   famotidine 20 mg Oral Daily   folic acid 1 mg Oral Daily   metoprolol tartrate 25 mg Oral Q12H   mycophenolate 250 mg Oral Q12H   [START ON 4/20/2018] predniSONE 15 mg Oral Every Other Day      IV meds:                        sodium chloride 45 mL/hr Last Rate: 45 mL/hr (04/18/18 1503)       Data Review:      Results from last 7 days  Lab Units 04/19/18  0435 04/18/18  1325 04/16/18  0923   SODIUM mmol/L 136  --  134*   POTASSIUM mmol/L 5.0  --  3.5   CHLORIDE mmol/L 105  --  102   CO2 mmol/L 16.5*  --  20.7*   BUN mg/dL 25*  --  16   CREATININE mg/dL 1.36*  --  1.13*   CALCIUM mg/dL 9.7 9.7 10.3  10.3   GLUCOSE mg/dL 111*  --  88     Estimated Creatinine Clearance: 52 mL/min (by C-G formula based on SCr of 1.36 mg/dL (H)).        Results from last 7 days  Lab Units 04/19/18  0435 04/18/18  1325   MAGNESIUM mg/dL 2.1 1.4*   PHOSPHORUS mg/dL 2.9  --          Results from last 7 days  Lab Units 04/16/18  0923   WBC 10*3/mm3 8.07   HEMOGLOBIN g/dL 10.3*   PLATELETS 10*3/mm3 338                   ASSESSMENT:   Active Problems:    Hyperparathyroidism    Hypercalcemia    1.  CKT '16 for Rx of ESRD from HTN:  SCr has risen a bit, which is prob prerenal from modest PO intake; Ca is normal; serum bicarb has fallen, but AG is normal  2.  Immunosuppression: being tolerated fine  3.  S/p 4-gland PTx yesterday with auto-txplnt of one gland to left forearm: current serum Ca is normal  4.  Anemia  5.  HTN, acceptable for now      PLAN:  1.  Remove escobar catheter  2.  One dose of oral Na bicarb this AM  3.  Has f/u appt and labs 4.24.18 with our office (already set up by Dr. Barreto's office)   4.  Rx for calcitriol 0.5 mcg PO BID has been called in to her pharmacy already ([facility]) by our office  5.  Home anytime from renal view    Víctor Bee MD  4/19/2018    8:17 AM

## 2018-04-19 NOTE — DISCHARGE SUMMARY
Discharge Summary    Patient name: Sunni Mcneil    Medical record number: [id number]    Admission date: 4/18/2018  Discharge date:  4/19/2018        Attending physician: Dr. Rochelle Barreto    Primary care physician: Provider Not In System    Referring physician: Rochelle Barreto MD  [address] [address]    Consulting physician(s):  Víctor Bee    Primary Diagnoses:    · Hyperparathyroidism, tertiary    Secondary Diagnoses:     · Chronic kidney disease, status post transplant   · Chronic immunosuppression  · Hypercalcemia  · Hypomagnesemia, treated with one dose IV magnesium  · Expected postoperative hypocalcemia, now treated with calcitriol and calcium supplement    Procedure/Proc Date:      · 4 gland parathyroid resection, with half gland left arm autotransplantation: 4/18/2018    Hospital Course:   The patient is a very pleasant 43 y.o. female that was admitted to the hospital on 4/18/2018 for hypercalcemia and hyperparathyroidism, that has persisted to be elevated despite the use of Sensipar.    Pathology:   · Final pathology pending, but frozen sections consistent with hypercellular parathyroid tissue    Discharge medications:      Your medication list      START taking these medications      Instructions Last Dose Given Next Dose Due   calcium carbonate 500 MG chewable tablet  Commonly known as:  TUMS      Chew 500 mg 3 (Three) Times a Day.       ondansetron 4 MG tablet  Commonly known as:  ZOFRAN      Take 1 tablet by mouth Every 6 (Six) Hours As Needed for Nausea or Vomiting.       oxyCODONE-acetaminophen 5-325 MG per tablet  Commonly known as:  ROXICET      1-2 tabs q 4 hour prn pain          CONTINUE taking these medications      Instructions Last Dose Given Next Dose Due   aspirin 81 MG EC tablet  Start taking on:  4/26/2018      Take 1 tablet by mouth Daily. HELD FOR SURGERY       cefdinir 300 MG capsule  Commonly known as:  OMNICEF      Take 300 mg by mouth 2 (Two) Times a Day.  FOR 7 DAYS. FOR RECENT DX UTI, pt states she has three days left       cycloSPORINE 100 MG capsule  Commonly known as:  sandIMMUNE      Take 100 mg by mouth 2 (Two) Times a Day.       cycloSPORINE 25 MG capsule  Commonly known as:  sandIMMUNE      Take 25 mg by mouth 2 (Two) Times a Day.       folic acid 1 MG tablet  Commonly known as:  FOLVITE      Take 1 mg by mouth Daily.       iron polysaccharides 150 MG capsule  Commonly known as:  NIFEREX      Take 150 mg by mouth 2 (Two) Times a Day.       metoprolol tartrate 25 MG tablet  Commonly known as:  LOPRESSOR      Take 25 mg by mouth 2 (Two) Times a Day.       mycophenolate 250 MG capsule  Commonly known as:  CELLCEPT      Take 250 mg by mouth 2 (Two) Times a Day.       predniSONE 5 MG tablet  Commonly known as:  DELTASONE      Take 15 mg by mouth Every Other Day.       raNITIdine 150 MG tablet  Commonly known as:  ZANTAC      Take 150 mg by mouth 2 (Two) Times a Day.             Where to Get Your Medications      These medications were sent to [facility] [address] [place] [address] - [address] [place] [address] [phone] [facility] [phone] [address] [address]    Phone:  [phone]    calcium carbonate 500 MG chewable tablet   ondansetron 4 MG tablet     You can get these medications from any pharmacy    Bring a paper prescription for each of these medications   oxyCODONE-acetaminophen 5-325 MG per tablet     Information about where to get these medications is not yet available    Ask your nurse or doctor about these medications   aspirin 81 MG EC tablet         Discharge diet:  · Regular    Recommendations:    · Labs in the morning and call our office  · Follow-up with Dr. Hansen next week.  This was arranged with labs prior to surgery to ensure that the patient would have close follow-up postop        Rochelle Barreto MD  Office Number: [phone].

## 2018-04-19 NOTE — PLAN OF CARE
Problem: Patient Care Overview  Goal: Plan of Care Review   04/18/18 2008   Coping/Psychosocial   Plan of Care Reviewed With patient;spouse   OTHER   Outcome Summary VSS. Patient had complaints of nausea which was relieved with Zofran. Tolerated clear liquids. Magnesium has been infusing. Elida CTM.       Problem: Wound (Includes Pressure Injury) (Adult)  Goal: Signs and Symptoms of Listed Potential Problems Will be Absent, Minimized or Managed (Wound)  Outcome: Ongoing (interventions implemented as appropriate)      Problem: Infection, Risk/Actual (Adult)  Goal: Identify Related Risk Factors and Signs and Symptoms  Outcome: Ongoing (interventions implemented as appropriate)    Goal: Infection Prevention/Resolution  Outcome: Ongoing (interventions implemented as appropriate)      Problem: Nausea/Vomiting (Adult)  Goal: Identify Related Risk Factors and Signs and Symptoms  Outcome: Ongoing (interventions implemented as appropriate)    Goal: Symptom Relief  Outcome: Ongoing (interventions implemented as appropriate)    Goal: Adequate Hydration  Outcome: Ongoing (interventions implemented as appropriate)

## 2018-04-19 NOTE — PLAN OF CARE
Problem: Patient Care Overview  Goal: Plan of Care Review  Outcome: Ongoing (interventions implemented as appropriate)   04/19/18 0513   Coping/Psychosocial   Plan of Care Reviewed With patient   OTHER   Outcome Summary VSS, no c/o pain or nausea on this shift. Tolerating clear liquids. F/C in place. Pt ambulating with standby assist. Will continue to monitor progress closely   Plan of Care Review   Progress improving       Problem: Wound (Includes Pressure Injury) (Adult)  Goal: Signs and Symptoms of Listed Potential Problems Will be Absent, Minimized or Managed (Wound)  Outcome: Ongoing (interventions implemented as appropriate)      Problem: Infection, Risk/Actual (Adult)  Goal: Identify Related Risk Factors and Signs and Symptoms  Outcome: Ongoing (interventions implemented as appropriate)    Goal: Infection Prevention/Resolution  Outcome: Ongoing (interventions implemented as appropriate)      Problem: Nausea/Vomiting (Adult)  Goal: Identify Related Risk Factors and Signs and Symptoms  Outcome: Ongoing (interventions implemented as appropriate)    Goal: Symptom Relief  Outcome: Ongoing (interventions implemented as appropriate)    Goal: Adequate Hydration  Outcome: Ongoing (interventions implemented as appropriate)

## 2018-04-19 NOTE — PROGRESS NOTES
Case Management Discharge Note    Final Note: Home no additional dc needs. Fanny RN/CCP    Destination     No service coordination in this encounter.      Durable Medical Equipment     No service coordination in this encounter.      Dialysis/Infusion     No service coordination in this encounter.      Home Medical Care     No service coordination in this encounter.      Social Care     No service coordination in this encounter.        Other: Other (FAMILY)    Final Discharge Disposition Code: 01 - home or self-care

## 2018-04-20 ENCOUNTER — TELEPHONE (OUTPATIENT)
Dept: SURGERY | Facility: CLINIC | Age: 43
End: 2018-04-20

## 2018-04-20 ENCOUNTER — LAB (OUTPATIENT)
Dept: LAB | Facility: HOSPITAL | Age: 43
End: 2018-04-20
Attending: SURGERY

## 2018-04-20 DIAGNOSIS — E21.3 HYPERPARATHYROIDISM (HCC): ICD-10-CM

## 2018-04-20 LAB — CALCIUM SPEC-SCNC: 8.8 MG/DL (ref 8.6–10.5)

## 2018-04-20 PROCEDURE — 82310 ASSAY OF CALCIUM: CPT

## 2018-04-20 PROCEDURE — 36415 COLL VENOUS BLD VENIPUNCTURE: CPT

## 2018-04-20 NOTE — TELEPHONE ENCOUNTER
ADDENDUM    Please let the patient know that I forwarded her calcium results to Dr. Bee this morning, the nephrologist who saw her in the hospital.  He indicated that he would increase her calcium dosing.  If she has not heard from him, she should call the pharmacy to see if they have been given notification to increase her dose, and follow those directions that they have.  If she has not had a change in her medications through the pharmacy, ask her to call us, and I will contact Dr. Bee again.      Rochelle Barreto M.D.  4/20/2018  1:28 PM    Calling for blood work results

## 2018-10-01 ENCOUNTER — TRANSCRIBE ORDERS (OUTPATIENT)
Dept: ADMINISTRATIVE | Facility: HOSPITAL | Age: 43
End: 2018-10-01

## 2018-10-01 DIAGNOSIS — T86.11 CHRONIC REJECTION OF RENAL TRANSPLANT: Primary | ICD-10-CM

## 2018-10-03 ENCOUNTER — HOSPITAL ENCOUNTER (OUTPATIENT)
Dept: INFUSION THERAPY | Facility: HOSPITAL | Age: 43
Discharge: HOME | End: 2018-10-03
Attending: INTERNAL MEDICINE | Admitting: INTERNAL MEDICINE

## 2018-10-04 ENCOUNTER — HOSPITAL ENCOUNTER (OUTPATIENT)
Dept: INFUSION THERAPY | Facility: HOSPITAL | Age: 43
Discharge: HOME | End: 2018-10-04
Attending: INTERNAL MEDICINE

## 2018-10-04 VITALS
WEIGHT: 170 LBS | HEART RATE: 74 BPM | TEMPERATURE: 98.2 F | DIASTOLIC BLOOD PRESSURE: 98 MMHG | BODY MASS INDEX: 33.38 KG/M2 | RESPIRATION RATE: 16 BRPM | SYSTOLIC BLOOD PRESSURE: 180 MMHG | OXYGEN SATURATION: 98 % | HEIGHT: 60 IN

## 2018-10-04 DIAGNOSIS — T86.11 ACUTE REJECTION OF KIDNEY TRANSPLANT: ICD-10-CM

## 2018-10-04 PROCEDURE — P9041 ALBUMIN (HUMAN),5%, 50ML: HCPCS | Performed by: INTERNAL MEDICINE

## 2018-10-04 PROCEDURE — 25010000002 ALBUMIN HUMAN 5% PER 50 ML: Performed by: INTERNAL MEDICINE

## 2018-10-04 PROCEDURE — 36514 APHERESIS PLASMA: CPT

## 2018-10-04 RX ORDER — ANTICOAGULANT CITRATE DEXTROSE SOLUTION FORMULA A 12.25; 11; 3.65 G/500ML; G/500ML; G/500ML
500 SOLUTION INTRAVENOUS ONCE
Status: CANCELLED | OUTPATIENT
Start: 2018-10-04

## 2018-10-04 RX ORDER — HEPARIN SODIUM 5000 [USP'U]/ML
5000 INJECTION, SOLUTION INTRAVENOUS; SUBCUTANEOUS ONCE
Status: CANCELLED
Start: 2018-10-04 | End: 2018-10-04

## 2018-10-04 RX ORDER — ALBUMIN, HUMAN INJ 5% 5 %
2750 SOLUTION INTRAVENOUS ONCE
Status: CANCELLED | OUTPATIENT
Start: 2018-10-04

## 2018-10-04 RX ORDER — ALBUMIN, HUMAN INJ 5% 5 %
2750 SOLUTION INTRAVENOUS ONCE
Status: COMPLETED | OUTPATIENT
Start: 2018-10-04 | End: 2018-10-04

## 2018-10-04 RX ORDER — ANTICOAGULANT CITRATE DEXTROSE SOLUTION FORMULA A 12.25; 11; 3.65 G/500ML; G/500ML; G/500ML
500 SOLUTION INTRAVENOUS ONCE
Status: COMPLETED | OUTPATIENT
Start: 2018-10-04 | End: 2018-10-04

## 2018-10-04 RX ADMIN — ALBUMIN HUMAN 2500 ML: 0.05 INJECTION, SOLUTION INTRAVENOUS at 09:35

## 2018-10-04 RX ADMIN — ANTICOAGULANT CITRATE DEXTROSE SOLUTION FORMULA A 500 ML: 12.25; 11; 3.65 SOLUTION INTRAVENOUS at 09:35

## 2018-10-04 RX ADMIN — CALCIUM CHLORIDE 1 G: 100 INJECTION, SOLUTION INTRAVENOUS at 09:35

## 2018-10-04 NOTE — PATIENT INSTRUCTIONS
Plasmapheresis, Adult, Care After  Refer to this sheet in the next few weeks. These instructions provide you with information on caring for yourself after your procedure. Your health care provider may also give you more specific instructions. Your treatment has been planned according to current medical practices, but problems sometimes occur. Call your health care provider if you have any problems or questions after your procedure.  What can I expect after the procedure?  After your procedure, it is common:  · To have fatigue.  · To have soreness and bruising at your IV tube insertion sites.  · To bleed more easily during the several hours after your procedure.    Follow these instructions at home:  · Do not shave and do not cut your nails for several hours after treatment. Medicine that is used during the procedure slows blood clotting, and that makes it easier for you to bleed if you cut or injure yourself.  · You can eat what you usually do.  · You can do all of your regular activities.  · Go to all of your additional treatments and keep all follow-up visits as directed by your health care provider. This is important.  · Check your IV tube insertion site every day for signs of infection. Watch for:  ? Redness.  ? Swelling.  ? A feeling of warmth.  Contact a health care provider if:  · You have a fever or chills.  · You have redness, swelling, or a feeling of warmth at an IV tube insertion site.  · You have any unusual bruising or bleeding.  · You develop a rash or itching.  Get help right away if:  · You have persistent bleeding.  · You start to wheeze.  · You have trouble breathing.  · You have chest pain.  This information is not intended to replace advice given to you by your health care provider. Make sure you discuss any questions you have with your health care provider.  Document Released: 01/08/2016 Document Revised: 05/25/2017 Document Reviewed: 05/13/2015  [employer] Interactive Patient Education © 2018  [employer]

## 2018-10-04 NOTE — PROGRESS NOTES
Care provided by [facility] nurse, Kendall Greenberg. See Apheresis nurse treatment record for documentation regarding specific medication administration times, dosages and patient care. Charge nurse, SABRINA Bradley called MD office to clarify plasmapheresis orders, message left for return call. Return call received at 1050. See orders.    Patient tolerated treatment per dialysis nurse. Patient ambulatory, D/C'd from [facility] at 1054.

## 2018-10-06 ENCOUNTER — HOSPITAL ENCOUNTER (OUTPATIENT)
Dept: INFUSION THERAPY | Facility: HOSPITAL | Age: 43
Discharge: HOME | End: 2018-10-06
Attending: INTERNAL MEDICINE | Admitting: INTERNAL MEDICINE

## 2018-10-06 DIAGNOSIS — T86.11 ACUTE REJECTION OF KIDNEY TRANSPLANT: ICD-10-CM

## 2018-10-06 PROCEDURE — 25010000002 ALBUMIN HUMAN 5% PER 50 ML: Performed by: INTERNAL MEDICINE

## 2018-10-06 PROCEDURE — 36514 APHERESIS PLASMA: CPT

## 2018-10-06 PROCEDURE — P9041 ALBUMIN (HUMAN),5%, 50ML: HCPCS | Performed by: INTERNAL MEDICINE

## 2018-10-06 RX ORDER — ANTICOAGULANT CITRATE DEXTROSE SOLUTION FORMULA A 12.25; 11; 3.65 G/500ML; G/500ML; G/500ML
500 SOLUTION INTRAVENOUS ONCE
Status: CANCELLED | OUTPATIENT
Start: 2018-10-06

## 2018-10-06 RX ORDER — ALBUMIN, HUMAN INJ 5% 5 %
2750 SOLUTION INTRAVENOUS ONCE
Status: CANCELLED | OUTPATIENT
Start: 2018-10-06

## 2018-10-06 RX ORDER — ANTICOAGULANT CITRATE DEXTROSE SOLUTION FORMULA A 12.25; 11; 3.65 G/500ML; G/500ML; G/500ML
500 SOLUTION INTRAVENOUS ONCE
Status: COMPLETED | OUTPATIENT
Start: 2018-10-06 | End: 2018-10-06

## 2018-10-06 RX ORDER — HEPARIN SODIUM 5000 [USP'U]/ML
5000 INJECTION, SOLUTION INTRAVENOUS; SUBCUTANEOUS ONCE
Status: DISCONTINUED | OUTPATIENT
Start: 2018-10-06 | End: 2018-10-09 | Stop reason: HOSPADM

## 2018-10-06 RX ORDER — ALBUMIN, HUMAN INJ 5% 5 %
2750 SOLUTION INTRAVENOUS ONCE
Status: COMPLETED | OUTPATIENT
Start: 2018-10-06 | End: 2018-10-06

## 2018-10-06 RX ORDER — HEPARIN SODIUM 5000 [USP'U]/ML
5000 INJECTION, SOLUTION INTRAVENOUS; SUBCUTANEOUS ONCE
Start: 2018-10-06 | End: 2018-10-06

## 2018-10-06 RX ADMIN — CALCIUM CHLORIDE 1 G: 100 INJECTION, SOLUTION INTRAVENOUS at 09:20

## 2018-10-06 RX ADMIN — ANTICOAGULANT CITRATE DEXTROSE SOLUTION FORMULA A 500 ML: 12.25; 11; 3.65 SOLUTION INTRAVENOUS at 09:20

## 2018-10-06 RX ADMIN — ALBUMIN HUMAN 2500 ML: 0.05 INJECTION, SOLUTION INTRAVENOUS at 09:28

## 2018-10-08 ENCOUNTER — HOSPITAL ENCOUNTER (OUTPATIENT)
Dept: INFUSION THERAPY | Facility: HOSPITAL | Age: 43
Discharge: HOME | End: 2018-10-08
Attending: INTERNAL MEDICINE | Admitting: INTERNAL MEDICINE

## 2018-10-08 VITALS
SYSTOLIC BLOOD PRESSURE: 125 MMHG | TEMPERATURE: 98.6 F | DIASTOLIC BLOOD PRESSURE: 90 MMHG | RESPIRATION RATE: 20 BRPM | HEART RATE: 66 BPM | OXYGEN SATURATION: 95 %

## 2018-10-08 DIAGNOSIS — T86.11 ACUTE REJECTION OF KIDNEY TRANSPLANT: ICD-10-CM

## 2018-10-08 PROCEDURE — 36514 APHERESIS PLASMA: CPT

## 2018-10-08 PROCEDURE — G0463 HOSPITAL OUTPT CLINIC VISIT: HCPCS

## 2018-10-08 PROCEDURE — P9041 ALBUMIN (HUMAN),5%, 50ML: HCPCS | Performed by: INTERNAL MEDICINE

## 2018-10-08 PROCEDURE — 25010000002 ALBUMIN HUMAN 5% PER 50 ML: Performed by: INTERNAL MEDICINE

## 2018-10-08 RX ORDER — ANTICOAGULANT CITRATE DEXTROSE SOLUTION FORMULA A 12.25; 11; 3.65 G/500ML; G/500ML; G/500ML
500 SOLUTION INTRAVENOUS ONCE
Status: COMPLETED | OUTPATIENT
Start: 2018-10-08 | End: 2018-10-08

## 2018-10-08 RX ORDER — ALBUMIN, HUMAN INJ 5% 5 %
2750 SOLUTION INTRAVENOUS ONCE
Status: COMPLETED | OUTPATIENT
Start: 2018-10-08 | End: 2018-10-08

## 2018-10-08 RX ORDER — ALBUMIN, HUMAN INJ 5% 5 %
2750 SOLUTION INTRAVENOUS ONCE
Status: CANCELLED | OUTPATIENT
Start: 2018-10-08

## 2018-10-08 RX ORDER — ANTICOAGULANT CITRATE DEXTROSE SOLUTION FORMULA A 12.25; 11; 3.65 G/500ML; G/500ML; G/500ML
500 SOLUTION INTRAVENOUS ONCE
Status: CANCELLED | OUTPATIENT
Start: 2018-10-08

## 2018-10-08 RX ORDER — HEPARIN SODIUM 5000 [USP'U]/ML
5000 INJECTION, SOLUTION INTRAVENOUS; SUBCUTANEOUS ONCE
Start: 2018-10-08 | End: 2018-10-08

## 2018-10-08 RX ADMIN — CALCIUM CHLORIDE 1 G: 100 INJECTION, SOLUTION INTRAVENOUS at 12:15

## 2018-10-08 RX ADMIN — ANTICOAGULANT CITRATE DEXTROSE SOLUTION FORMULA A 500 ML: 12.25; 11; 3.65 SOLUTION INTRAVENOUS at 12:15

## 2018-10-08 RX ADMIN — ALBUMIN HUMAN 2500 ML: 0.05 INJECTION, SOLUTION INTRAVENOUS at 12:15

## 2018-10-08 NOTE — PROGRESS NOTES
Care provided by [facility] nurse, Kendall Greenberg. See Apheresis nurse treatment record for documentation regarding specific medication administration times, dosages and patient care. Right Shiley catheter removed after cleaning area with chlorahexidine. Vasoline gauze applied followed be 4x4 and paper tape. Continous presure held for 3 minutes post removal. Patient stayed in a reclined position for 20 minutes post removal.  Patient discharged ambulatory at 1432 with dressing intact. Patient instructed to change dsg daily starting tomorrow night with antibotic ointment, gauze, and paper tape until healed.

## 2019-01-04 ENCOUNTER — TRANSCRIBE ORDERS (OUTPATIENT)
Dept: ADMINISTRATIVE | Facility: HOSPITAL | Age: 44
End: 2019-01-04

## 2019-01-04 DIAGNOSIS — T86.11 KIDNEY TRANSPLANT REJECTION: Primary | ICD-10-CM

## 2019-01-10 RX ORDER — SODIUM CHLORIDE 9 MG/ML
250 INJECTION, SOLUTION INTRAVENOUS ONCE
Status: CANCELLED | OUTPATIENT
Start: 2019-01-11

## 2019-01-11 ENCOUNTER — INFUSION (OUTPATIENT)
Dept: ONCOLOGY | Facility: HOSPITAL | Age: 44
End: 2019-01-11
Attending: INTERNAL MEDICINE

## 2019-01-11 VITALS
OXYGEN SATURATION: 96 % | HEIGHT: 60 IN | HEART RATE: 76 BPM | DIASTOLIC BLOOD PRESSURE: 89 MMHG | WEIGHT: 194 LBS | SYSTOLIC BLOOD PRESSURE: 145 MMHG | BODY MASS INDEX: 38.09 KG/M2 | RESPIRATION RATE: 18 BRPM | TEMPERATURE: 97.7 F

## 2019-01-11 DIAGNOSIS — T86.11 ACUTE REJECTION OF KIDNEY TRANSPLANT: Primary | ICD-10-CM

## 2019-01-11 PROCEDURE — 96366 THER/PROPH/DIAG IV INF ADDON: CPT | Performed by: NURSE PRACTITIONER

## 2019-01-11 PROCEDURE — A9270 NON-COVERED ITEM OR SERVICE: HCPCS | Performed by: INTERNAL MEDICINE

## 2019-01-11 PROCEDURE — 63710000001 DIPHENHYDRAMINE PER 50 MG: Performed by: INTERNAL MEDICINE

## 2019-01-11 PROCEDURE — 96365 THER/PROPH/DIAG IV INF INIT: CPT | Performed by: NURSE PRACTITIONER

## 2019-01-11 PROCEDURE — 25010000002 IMMUNE GLOBULIN (HUMAN) 20 GM/200ML SOLUTION: Performed by: INTERNAL MEDICINE

## 2019-01-11 PROCEDURE — 63710000001 ACETAMINOPHEN 325 MG TABLET: Performed by: INTERNAL MEDICINE

## 2019-01-11 RX ORDER — ACETAMINOPHEN 325 MG/1
650 TABLET ORAL EVERY 6 HOURS PRN
Status: DISCONTINUED | OUTPATIENT
Start: 2019-01-11 | End: 2019-01-11 | Stop reason: HOSPADM

## 2019-01-11 RX ORDER — ACETAMINOPHEN 325 MG/1
650 TABLET ORAL ONCE
Status: CANCELLED
Start: 2019-01-11 | End: 2019-01-11

## 2019-01-11 RX ORDER — SODIUM CHLORIDE 9 MG/ML
250 INJECTION, SOLUTION INTRAVENOUS ONCE
Status: CANCELLED | OUTPATIENT
Start: 2019-01-11

## 2019-01-11 RX ORDER — DIPHENHYDRAMINE HCL 25 MG
25 CAPSULE ORAL ONCE
Status: COMPLETED | OUTPATIENT
Start: 2019-01-11 | End: 2019-01-11

## 2019-01-11 RX ORDER — SODIUM CHLORIDE 9 MG/ML
250 INJECTION, SOLUTION INTRAVENOUS ONCE
Status: COMPLETED | OUTPATIENT
Start: 2019-01-11 | End: 2019-01-11

## 2019-01-11 RX ADMIN — ACETAMINOPHEN 650 MG: 325 TABLET, FILM COATED ORAL at 09:21

## 2019-01-11 RX ADMIN — IMMUNE GLOBULIN INFUSION (HUMAN) 40 G: 100 INJECTION, SOLUTION INTRAVENOUS; SUBCUTANEOUS at 09:47

## 2019-01-11 RX ADMIN — DIPHENHYDRAMINE HYDROCHLORIDE 25 MG: 25 CAPSULE ORAL at 09:21

## 2019-01-11 RX ADMIN — SODIUM CHLORIDE 250 ML: 900 INJECTION, SOLUTION INTRAVENOUS at 08:30

## 2019-01-11 NOTE — PROGRESS NOTES
Verbal order from Dr. Hansen to premedicate with Benadryl 25mg PO and Tylenol 650mg with all four IVIG treatments.

## 2019-01-18 ENCOUNTER — INFUSION (OUTPATIENT)
Dept: ONCOLOGY | Facility: HOSPITAL | Age: 44
End: 2019-01-18

## 2019-01-18 VITALS
TEMPERATURE: 98.2 F | WEIGHT: 196.2 LBS | DIASTOLIC BLOOD PRESSURE: 93 MMHG | OXYGEN SATURATION: 96 % | SYSTOLIC BLOOD PRESSURE: 152 MMHG | BODY MASS INDEX: 38.32 KG/M2 | HEART RATE: 88 BPM

## 2019-01-18 DIAGNOSIS — T86.11 ACUTE REJECTION OF KIDNEY TRANSPLANT: Primary | ICD-10-CM

## 2019-01-18 PROCEDURE — 25010000002 DIPHENHYDRAMINE PER 50 MG: Performed by: INTERNAL MEDICINE

## 2019-01-18 PROCEDURE — 63710000001 ACETAMINOPHEN 325 MG TABLET: Performed by: INTERNAL MEDICINE

## 2019-01-18 PROCEDURE — 25010000002 IMMUNE GLOBULIN (HUMAN) 20 GM/200ML SOLUTION: Performed by: INTERNAL MEDICINE

## 2019-01-18 PROCEDURE — A9270 NON-COVERED ITEM OR SERVICE: HCPCS | Performed by: INTERNAL MEDICINE

## 2019-01-18 PROCEDURE — 96365 THER/PROPH/DIAG IV INF INIT: CPT | Performed by: NURSE PRACTITIONER

## 2019-01-18 PROCEDURE — 96375 TX/PRO/DX INJ NEW DRUG ADDON: CPT | Performed by: NURSE PRACTITIONER

## 2019-01-18 PROCEDURE — 96366 THER/PROPH/DIAG IV INF ADDON: CPT | Performed by: NURSE PRACTITIONER

## 2019-01-18 RX ORDER — SODIUM CHLORIDE 9 MG/ML
250 INJECTION, SOLUTION INTRAVENOUS ONCE
Status: COMPLETED | OUTPATIENT
Start: 2019-01-18 | End: 2019-01-18

## 2019-01-18 RX ORDER — SODIUM CHLORIDE 9 MG/ML
250 INJECTION, SOLUTION INTRAVENOUS ONCE
Status: CANCELLED | OUTPATIENT
Start: 2019-01-18

## 2019-01-18 RX ORDER — ACETAMINOPHEN 325 MG/1
650 TABLET ORAL ONCE
Status: COMPLETED | OUTPATIENT
Start: 2019-01-18 | End: 2019-01-18

## 2019-01-18 RX ORDER — ACETAMINOPHEN 325 MG/1
650 TABLET ORAL ONCE
Status: CANCELLED
Start: 2019-01-18 | End: 2019-01-18

## 2019-01-18 RX ADMIN — DIPHENHYDRAMINE HYDROCHLORIDE 25 MG: 50 INJECTION, SOLUTION INTRAMUSCULAR; INTRAVENOUS at 09:45

## 2019-01-18 RX ADMIN — SODIUM CHLORIDE 250 ML: 900 INJECTION, SOLUTION INTRAVENOUS at 09:30

## 2019-01-18 RX ADMIN — ACETAMINOPHEN 650 MG: 325 TABLET, FILM COATED ORAL at 09:45

## 2019-01-18 RX ADMIN — IMMUNE GLOBULIN INFUSION (HUMAN) 40 G: 100 INJECTION, SOLUTION INTRAVENOUS; SUBCUTANEOUS at 10:30

## 2019-01-25 ENCOUNTER — INFUSION (OUTPATIENT)
Dept: ONCOLOGY | Facility: HOSPITAL | Age: 44
End: 2019-01-25

## 2019-01-25 VITALS
SYSTOLIC BLOOD PRESSURE: 161 MMHG | BODY MASS INDEX: 37.18 KG/M2 | OXYGEN SATURATION: 98 % | HEIGHT: 60 IN | DIASTOLIC BLOOD PRESSURE: 105 MMHG | TEMPERATURE: 97.5 F | WEIGHT: 189.4 LBS | HEART RATE: 65 BPM | RESPIRATION RATE: 20 BRPM

## 2019-01-25 DIAGNOSIS — T86.11 ACUTE REJECTION OF KIDNEY TRANSPLANT: Primary | ICD-10-CM

## 2019-01-25 PROCEDURE — 25010000002 DIPHENHYDRAMINE PER 50 MG: Performed by: INTERNAL MEDICINE

## 2019-01-25 PROCEDURE — 96366 THER/PROPH/DIAG IV INF ADDON: CPT | Performed by: NURSE PRACTITIONER

## 2019-01-25 PROCEDURE — A9270 NON-COVERED ITEM OR SERVICE: HCPCS | Performed by: INTERNAL MEDICINE

## 2019-01-25 PROCEDURE — 25010000002 IMMUNE GLOBULIN (HUMAN) 20 GM/200ML SOLUTION: Performed by: INTERNAL MEDICINE

## 2019-01-25 PROCEDURE — 96365 THER/PROPH/DIAG IV INF INIT: CPT | Performed by: NURSE PRACTITIONER

## 2019-01-25 PROCEDURE — 63710000001 ACETAMINOPHEN 325 MG TABLET: Performed by: INTERNAL MEDICINE

## 2019-01-25 PROCEDURE — 96375 TX/PRO/DX INJ NEW DRUG ADDON: CPT | Performed by: NURSE PRACTITIONER

## 2019-01-25 RX ORDER — ACETAMINOPHEN 325 MG/1
650 TABLET ORAL ONCE
Status: CANCELLED
Start: 2019-01-25 | End: 2019-01-25

## 2019-01-25 RX ORDER — ACETAMINOPHEN 325 MG/1
650 TABLET ORAL ONCE
Status: COMPLETED | OUTPATIENT
Start: 2019-01-25 | End: 2019-01-25

## 2019-01-25 RX ORDER — SODIUM CHLORIDE 9 MG/ML
250 INJECTION, SOLUTION INTRAVENOUS ONCE
Status: COMPLETED | OUTPATIENT
Start: 2019-01-25 | End: 2019-01-25

## 2019-01-25 RX ORDER — SODIUM CHLORIDE 9 MG/ML
250 INJECTION, SOLUTION INTRAVENOUS ONCE
Status: CANCELLED | OUTPATIENT
Start: 2019-01-25

## 2019-01-25 RX ADMIN — DIPHENHYDRAMINE HYDROCHLORIDE 25 MG: 50 INJECTION, SOLUTION INTRAMUSCULAR; INTRAVENOUS at 10:23

## 2019-01-25 RX ADMIN — IMMUNE GLOBULIN INFUSION (HUMAN) 20 G: 100 INJECTION, SOLUTION INTRAVENOUS; SUBCUTANEOUS at 10:39

## 2019-01-25 RX ADMIN — ACETAMINOPHEN 650 MG: 325 TABLET, FILM COATED ORAL at 10:20

## 2019-01-25 RX ADMIN — SODIUM CHLORIDE 250 ML: 900 INJECTION, SOLUTION INTRAVENOUS at 10:15

## 2019-01-31 ENCOUNTER — APPOINTMENT (OUTPATIENT)
Dept: ONCOLOGY | Facility: HOSPITAL | Age: 44
End: 2019-01-31

## 2019-02-01 ENCOUNTER — INFUSION (OUTPATIENT)
Dept: ONCOLOGY | Facility: HOSPITAL | Age: 44
End: 2019-02-01

## 2019-02-01 VITALS
WEIGHT: 191.6 LBS | OXYGEN SATURATION: 96 % | DIASTOLIC BLOOD PRESSURE: 98 MMHG | HEART RATE: 72 BPM | SYSTOLIC BLOOD PRESSURE: 168 MMHG | BODY MASS INDEX: 37.42 KG/M2

## 2019-02-01 DIAGNOSIS — T86.11 ACUTE REJECTION OF KIDNEY TRANSPLANT: Primary | ICD-10-CM

## 2019-02-01 PROCEDURE — 63710000001 ACETAMINOPHEN 325 MG TABLET: Performed by: INTERNAL MEDICINE

## 2019-02-01 PROCEDURE — A9270 NON-COVERED ITEM OR SERVICE: HCPCS | Performed by: INTERNAL MEDICINE

## 2019-02-01 PROCEDURE — 25010000002 DIPHENHYDRAMINE PER 50 MG: Performed by: INTERNAL MEDICINE

## 2019-02-01 PROCEDURE — 96365 THER/PROPH/DIAG IV INF INIT: CPT | Performed by: NURSE PRACTITIONER

## 2019-02-01 PROCEDURE — 25010000002 IMMUNE GLOBULIN (HUMAN) 20 GM/200ML SOLUTION: Performed by: INTERNAL MEDICINE

## 2019-02-01 PROCEDURE — 96366 THER/PROPH/DIAG IV INF ADDON: CPT | Performed by: NURSE PRACTITIONER

## 2019-02-01 PROCEDURE — 96375 TX/PRO/DX INJ NEW DRUG ADDON: CPT | Performed by: NURSE PRACTITIONER

## 2019-02-01 RX ORDER — ACETAMINOPHEN 325 MG/1
650 TABLET ORAL ONCE
Status: CANCELLED
Start: 2019-02-01 | End: 2019-02-01

## 2019-02-01 RX ORDER — ACETAMINOPHEN 325 MG/1
650 TABLET ORAL ONCE
Status: COMPLETED | OUTPATIENT
Start: 2019-02-01 | End: 2019-02-01

## 2019-02-01 RX ORDER — SODIUM CHLORIDE 9 MG/ML
250 INJECTION, SOLUTION INTRAVENOUS ONCE
Status: COMPLETED | OUTPATIENT
Start: 2019-02-01 | End: 2019-02-01

## 2019-02-01 RX ORDER — SODIUM CHLORIDE 9 MG/ML
250 INJECTION, SOLUTION INTRAVENOUS ONCE
Status: CANCELLED | OUTPATIENT
Start: 2019-02-01

## 2019-02-01 RX ADMIN — DIPHENHYDRAMINE HYDROCHLORIDE 25 MG: 50 INJECTION, SOLUTION INTRAMUSCULAR; INTRAVENOUS at 10:13

## 2019-02-01 RX ADMIN — IMMUNE GLOBULIN INFUSION (HUMAN) 40 G: 100 INJECTION, SOLUTION INTRAVENOUS; SUBCUTANEOUS at 10:36

## 2019-02-01 RX ADMIN — ACETAMINOPHEN 650 MG: 325 TABLET, FILM COATED ORAL at 10:13

## 2019-02-01 RX ADMIN — SODIUM CHLORIDE 250 ML: 900 INJECTION, SOLUTION INTRAVENOUS at 10:13

## 2019-03-05 ENCOUNTER — TRANSCRIBE ORDERS (OUTPATIENT)
Dept: ADMINISTRATIVE | Facility: HOSPITAL | Age: 44
End: 2019-03-05

## 2019-03-05 DIAGNOSIS — T86.11 KIDNEY TRANSPLANT REJECTION: Primary | ICD-10-CM

## 2019-03-12 ENCOUNTER — APPOINTMENT (OUTPATIENT)
Dept: ONCOLOGY | Facility: HOSPITAL | Age: 44
End: 2019-03-12

## 2019-03-13 ENCOUNTER — INFUSION (OUTPATIENT)
Dept: ONCOLOGY | Facility: HOSPITAL | Age: 44
End: 2019-03-13

## 2019-03-13 VITALS
SYSTOLIC BLOOD PRESSURE: 149 MMHG | HEART RATE: 81 BPM | DIASTOLIC BLOOD PRESSURE: 96 MMHG | OXYGEN SATURATION: 100 % | TEMPERATURE: 97.7 F | BODY MASS INDEX: 37.54 KG/M2 | WEIGHT: 192.2 LBS

## 2019-03-13 DIAGNOSIS — T86.11 ACUTE REJECTION OF KIDNEY TRANSPLANT: Primary | ICD-10-CM

## 2019-03-13 PROCEDURE — 96365 THER/PROPH/DIAG IV INF INIT: CPT | Performed by: NURSE PRACTITIONER

## 2019-03-13 PROCEDURE — A9270 NON-COVERED ITEM OR SERVICE: HCPCS | Performed by: INTERNAL MEDICINE

## 2019-03-13 PROCEDURE — 63710000001 ACETAMINOPHEN 325 MG TABLET: Performed by: INTERNAL MEDICINE

## 2019-03-13 PROCEDURE — 96366 THER/PROPH/DIAG IV INF ADDON: CPT | Performed by: NURSE PRACTITIONER

## 2019-03-13 PROCEDURE — 25010000002 IMMUNE GLOBULIN (HUMAN) 5 GM/50ML SOLUTION: Performed by: INTERNAL MEDICINE

## 2019-03-13 PROCEDURE — 63710000001 DIPHENHYDRAMINE PER 50 MG: Performed by: INTERNAL MEDICINE

## 2019-03-13 PROCEDURE — 25010000002 IMMUNE GLOBULIN (HUMAN) 30 GM/300ML SOLUTION: Performed by: INTERNAL MEDICINE

## 2019-03-13 PROCEDURE — 96375 TX/PRO/DX INJ NEW DRUG ADDON: CPT | Performed by: NURSE PRACTITIONER

## 2019-03-13 PROCEDURE — 25010000002 METHYLPREDNISOLONE PER 125 MG: Performed by: INTERNAL MEDICINE

## 2019-03-13 PROCEDURE — 25010000002 IMMUNE GLOBULIN (HUMAN) 10 GM/100ML SOLUTION: Performed by: INTERNAL MEDICINE

## 2019-03-13 RX ORDER — ACETAMINOPHEN 325 MG/1
650 TABLET ORAL ONCE
Status: CANCELLED
Start: 2019-03-13 | End: 2019-03-13

## 2019-03-13 RX ORDER — METHYLPREDNISOLONE SODIUM SUCCINATE 125 MG/2ML
125 INJECTION, POWDER, LYOPHILIZED, FOR SOLUTION INTRAMUSCULAR; INTRAVENOUS ONCE
Status: CANCELLED
Start: 2019-03-13 | End: 2019-03-13

## 2019-03-13 RX ORDER — SODIUM CHLORIDE 9 MG/ML
250 INJECTION, SOLUTION INTRAVENOUS ONCE
Status: COMPLETED | OUTPATIENT
Start: 2019-03-13 | End: 2019-03-13

## 2019-03-13 RX ORDER — DIPHENHYDRAMINE HCL 25 MG
25 CAPSULE ORAL ONCE
Status: CANCELLED
Start: 2019-03-13 | End: 2019-03-13

## 2019-03-13 RX ORDER — DIPHENHYDRAMINE HCL 25 MG
25 CAPSULE ORAL ONCE
Status: COMPLETED | OUTPATIENT
Start: 2019-03-13 | End: 2019-03-13

## 2019-03-13 RX ORDER — METHYLPREDNISOLONE SODIUM SUCCINATE 125 MG/2ML
125 INJECTION, POWDER, LYOPHILIZED, FOR SOLUTION INTRAMUSCULAR; INTRAVENOUS ONCE
Status: COMPLETED | OUTPATIENT
Start: 2019-03-13 | End: 2019-03-13

## 2019-03-13 RX ORDER — ACETAMINOPHEN 325 MG/1
650 TABLET ORAL ONCE
Status: COMPLETED | OUTPATIENT
Start: 2019-03-13 | End: 2019-03-13

## 2019-03-13 RX ORDER — SODIUM CHLORIDE 9 MG/ML
250 INJECTION, SOLUTION INTRAVENOUS ONCE
Status: CANCELLED | OUTPATIENT
Start: 2019-03-13

## 2019-03-13 RX ADMIN — IMMUNE GLOBULIN INFUSION (HUMAN) 10 G: 100 INJECTION, SOLUTION INTRAVENOUS; SUBCUTANEOUS at 14:01

## 2019-03-13 RX ADMIN — IMMUNE GLOBULIN INFUSION (HUMAN) 5 G: 100 INJECTION, SOLUTION INTRAVENOUS; SUBCUTANEOUS at 14:38

## 2019-03-13 RX ADMIN — IMMUNE GLOBULIN INFUSION (HUMAN) 30 G: 100 INJECTION, SOLUTION INTRAVENOUS; SUBCUTANEOUS at 11:35

## 2019-03-13 RX ADMIN — ACETAMINOPHEN 650 MG: 325 TABLET, FILM COATED ORAL at 11:04

## 2019-03-13 RX ADMIN — DIPHENHYDRAMINE HYDROCHLORIDE 25 MG: 25 CAPSULE ORAL at 11:04

## 2019-03-13 RX ADMIN — SODIUM CHLORIDE 250 ML: 900 INJECTION, SOLUTION INTRAVENOUS at 11:07

## 2019-03-13 RX ADMIN — METHYLPREDNISOLONE SODIUM SUCCINATE 125 MG: 125 INJECTION, POWDER, FOR SOLUTION INTRAMUSCULAR; INTRAVENOUS at 11:05

## 2019-03-27 ENCOUNTER — INFUSION (OUTPATIENT)
Dept: ONCOLOGY | Facility: HOSPITAL | Age: 44
End: 2019-03-27

## 2019-03-27 VITALS
SYSTOLIC BLOOD PRESSURE: 152 MMHG | TEMPERATURE: 97.4 F | DIASTOLIC BLOOD PRESSURE: 96 MMHG | RESPIRATION RATE: 20 BRPM | HEART RATE: 88 BPM | OXYGEN SATURATION: 100 %

## 2019-03-27 DIAGNOSIS — T86.11 ACUTE REJECTION OF KIDNEY TRANSPLANT: Primary | ICD-10-CM

## 2019-03-27 PROCEDURE — 25010000002 METHYLPREDNISOLONE PER 125 MG: Performed by: INTERNAL MEDICINE

## 2019-03-27 PROCEDURE — 96365 THER/PROPH/DIAG IV INF INIT: CPT | Performed by: NURSE PRACTITIONER

## 2019-03-27 PROCEDURE — A9270 NON-COVERED ITEM OR SERVICE: HCPCS | Performed by: INTERNAL MEDICINE

## 2019-03-27 PROCEDURE — 63710000001 DIPHENHYDRAMINE PER 50 MG: Performed by: INTERNAL MEDICINE

## 2019-03-27 PROCEDURE — 25010000002 IMMUNE GLOBULIN (HUMAN) 5 GM/50ML SOLUTION 50 ML VIAL: Performed by: INTERNAL MEDICINE

## 2019-03-27 PROCEDURE — 63710000001 ACETAMINOPHEN 325 MG TABLET: Performed by: INTERNAL MEDICINE

## 2019-03-27 PROCEDURE — 96366 THER/PROPH/DIAG IV INF ADDON: CPT | Performed by: NURSE PRACTITIONER

## 2019-03-27 PROCEDURE — 96375 TX/PRO/DX INJ NEW DRUG ADDON: CPT | Performed by: NURSE PRACTITIONER

## 2019-03-27 PROCEDURE — 25010000002 IMMUNE GLOBULIN (HUMAN) 20 GM/200ML SOLUTION 200 ML VIAL: Performed by: INTERNAL MEDICINE

## 2019-03-27 RX ORDER — DIPHENHYDRAMINE HCL 25 MG
25 CAPSULE ORAL ONCE
Start: 2019-03-27 | End: 2019-03-27

## 2019-03-27 RX ORDER — DIPHENHYDRAMINE HCL 25 MG
25 CAPSULE ORAL ONCE
Status: COMPLETED | OUTPATIENT
Start: 2019-03-27 | End: 2019-03-27

## 2019-03-27 RX ORDER — METHYLPREDNISOLONE SODIUM SUCCINATE 125 MG/2ML
125 INJECTION, POWDER, LYOPHILIZED, FOR SOLUTION INTRAMUSCULAR; INTRAVENOUS ONCE
Start: 2019-03-27 | End: 2019-03-27

## 2019-03-27 RX ORDER — ACETAMINOPHEN 325 MG/1
650 TABLET ORAL ONCE
Status: COMPLETED | OUTPATIENT
Start: 2019-03-27 | End: 2019-03-27

## 2019-03-27 RX ORDER — ACETAMINOPHEN 325 MG/1
650 TABLET ORAL ONCE
Start: 2019-03-27 | End: 2019-03-27

## 2019-03-27 RX ORDER — SODIUM CHLORIDE 9 MG/ML
250 INJECTION, SOLUTION INTRAVENOUS ONCE
Status: COMPLETED | OUTPATIENT
Start: 2019-03-27 | End: 2019-03-27

## 2019-03-27 RX ORDER — SODIUM CHLORIDE 9 MG/ML
250 INJECTION, SOLUTION INTRAVENOUS ONCE
OUTPATIENT
Start: 2019-03-27

## 2019-03-27 RX ORDER — METHYLPREDNISOLONE SODIUM SUCCINATE 125 MG/2ML
125 INJECTION, POWDER, LYOPHILIZED, FOR SOLUTION INTRAMUSCULAR; INTRAVENOUS ONCE
Status: COMPLETED | OUTPATIENT
Start: 2019-03-27 | End: 2019-03-27

## 2019-03-27 RX ADMIN — METHYLPREDNISOLONE SODIUM SUCCINATE 125 MG: 125 INJECTION, POWDER, FOR SOLUTION INTRAMUSCULAR; INTRAVENOUS at 14:06

## 2019-03-27 RX ADMIN — ACETAMINOPHEN 650 MG: 325 TABLET, FILM COATED ORAL at 09:37

## 2019-03-27 RX ADMIN — DIPHENHYDRAMINE HYDROCHLORIDE 25 MG: 25 CAPSULE ORAL at 09:38

## 2019-03-27 RX ADMIN — IMMUNE GLOBULIN INFUSION (HUMAN) 45 G: 100 INJECTION, SOLUTION INTRAVENOUS; SUBCUTANEOUS at 10:12

## 2019-03-27 RX ADMIN — SODIUM CHLORIDE 250 ML: 900 INJECTION, SOLUTION INTRAVENOUS at 09:15

## 2019-04-10 ENCOUNTER — APPOINTMENT (OUTPATIENT)
Dept: ONCOLOGY | Facility: HOSPITAL | Age: 44
End: 2019-04-10

## 2019-07-02 ENCOUNTER — OFFICE VISIT (OUTPATIENT)
Dept: SURGERY | Facility: CLINIC | Age: 44
End: 2019-07-02

## 2019-07-02 VITALS — WEIGHT: 172 LBS | BODY MASS INDEX: 33.77 KG/M2 | HEIGHT: 60 IN | HEART RATE: 122 BPM | OXYGEN SATURATION: 99 %

## 2019-07-02 DIAGNOSIS — N18.6 ESRD ON HEMODIALYSIS (HCC): Primary | ICD-10-CM

## 2019-07-02 DIAGNOSIS — Z99.2 ESRD ON HEMODIALYSIS (HCC): Primary | ICD-10-CM

## 2019-07-02 PROCEDURE — 99213 OFFICE O/P EST LOW 20 MIN: CPT | Performed by: SURGERY

## 2019-07-02 RX ORDER — CEFAZOLIN SODIUM 2 G/100ML
2 INJECTION, SOLUTION INTRAVENOUS ONCE
Status: CANCELLED | OUTPATIENT
Start: 2019-07-24 | End: 2019-07-02

## 2019-07-02 RX ORDER — POLYETHYLENE GLYCOL 3350 17 G/17G
17 POWDER, FOR SOLUTION ORAL DAILY
Qty: 30 EACH | Refills: 2 | Status: ON HOLD | OUTPATIENT
Start: 2019-07-02 | End: 2020-03-16 | Stop reason: SDUPTHER

## 2019-07-02 RX ORDER — SIROLIMUS 0.5 MG/1
0.5 TABLET, FILM COATED ORAL DAILY
COMMUNITY
Start: 2019-06-20 | End: 2019-07-15

## 2019-07-02 RX ORDER — AMOXICILLIN 250 MG
2 CAPSULE ORAL DAILY PRN
Qty: 30 TABLET | Refills: 1 | Status: SHIPPED | OUTPATIENT
Start: 2019-07-02 | End: 2019-07-24 | Stop reason: HOSPADM

## 2019-07-02 NOTE — H&P (VIEW-ONLY)
Chief Complaint   Patient presents with   • PD Catheter Placement       Subjective      Sunni Mcneil is a 44 y.o. female who is referred by Dr. Hansen to be evaluated for peritoneal dialysis catheter placement. Patient has ESRD secondary to chronic glomerulonephritis and  is on dialysis.  She has history of cadaveric renal transplantation in 2016.  After transplant she underwent peritoneal dialysis catheter removal by me.  The kidney has rejected. She gets dialysis on Tuesday, Thursday and Saturday since last month. Patient does not have a AV access.  Patient has not had a recent intraabdominal infection. She reports having constipation.   Patient did have a Colonoscopy.     Past Medical History:   Diagnosis Date   • Acid reflux    • Anemia    • Arthritis    • History of peritoneal dialysis     KIDNEY TRANSPLANT SEPT 2016   • History of transfusion     BEEN A WHILE   • Hypercalcemia    • Hyperparathyroidism (CMS/HCC)    • Hypertension    • Kidney disease     End-stage renal disease. TRANSPLANT   • Kidney transplant recipient 09/02/2016    RIGHT KIDNEY   • Seasonal allergies     CURRENTLY    • UTI (urinary tract infection)     CURRENTLY BEING TREATED. WAS HOSPITALIZED AT [facility] MONDAY 9- THUR 12 2018     Surgical history:   -Tubal ligation 1988  -Laparoscopic peritoneal dialysis catheter placement  -Cadaveric kidney transplantation 2016  -Open peritoneal dialysis catheter removal 2016  -4 gland parathyroid resection without a transplantation and excision of left thyroid nodule  -Temporary dialysis catheter    Current Outpatient Medications:   •  folic acid (FOLVITE) 1 MG tablet, Take 1 mg by mouth Daily., Disp: , Rfl:   •  metoprolol tartrate (LOPRESSOR) 25 MG tablet, Take 25 mg by mouth 2 (Two) Times a Day., Disp: , Rfl:   •  ondansetron (ZOFRAN) 4 MG tablet, Take 1 tablet by mouth Every 6 (Six) Hours As Needed for Nausea or Vomiting., Disp: 20 tablet, Rfl: 0  •  sirolimus (RAPAMUNE) 0.5 MG tablet tablet, Take  "0.5 mg by mouth Daily., Disp: , Rfl:   •  iron polysaccharides (NIFEREX) 150 MG capsule, Take 150 mg by mouth 2 (Two) Times a Day., Disp: , Rfl:     No Known Allergies    No family history on file.    Social History     Socioeconomic History   • Marital status:      Spouse name: Not on file   • Number of children: 2   • Years of education: 12   • Highest education level: Not on file   Occupational History     Employer: [employer]   Tobacco Use   • Smoking status: Never Smoker   • Smokeless tobacco: Never Used   Substance and Sexual Activity   • Alcohol use: No   • Drug use: No   • Sexual activity: Defer     REVIEW OF SYSTEMS    Review of Systems   Constitutional: Positive for appetite change and unexpected weight change.   HENT: Positive for facial swelling. Negative for hearing loss.    Eyes: Negative for pain and discharge.   Respiratory: Negative for choking and chest tightness.    Cardiovascular: Positive for leg swelling. Negative for palpitations.   Gastrointestinal: Positive for constipation. Negative for abdominal distention.   Endocrine: Negative for cold intolerance and heat intolerance.   Genitourinary: Negative for dysuria and hematuria.   Musculoskeletal: Negative for arthralgias and back pain.   Allergic/Immunologic: Negative for environmental allergies and food allergies.   Neurological: Negative for dizziness, tremors and weakness.   Hematological: Negative for adenopathy. Does not bruise/bleed easily.   Psychiatric/Behavioral: Negative for agitation. The patient is nervous/anxious.        Physical Examination  Pulse (!) 122   Ht 152.4 cm (60\")   Wt 78 kg (172 lb)   SpO2 99%   BMI 33.59 kg/m²   Body mass index is 33.59 kg/m².  Physical Exam   Constitutional: She is oriented to person, place, and time. She appears well-developed and well-nourished.   HENT:   Head: Normocephalic and atraumatic.   Eyes: Conjunctivae are normal. No scleral icterus.   Neck: Normal range of motion. Neck supple.   "   Cardiovascular: Normal rate and regular rhythm.   Pulmonary/Chest: Effort normal and breath sounds normal.   Abdominal: Soft. Bowel sounds are normal. She exhibits no distension and no mass. There is no tenderness. There is no rebound and no guarding. No hernia.       Well-healed right lower quadrant incision, no hernias, well-healed periumbilical incisions and well-healed left lower quadrant prior peritoneal dialysis catheter site   Musculoskeletal: Normal range of motion.   Neurological: She is alert and oriented to person, place, and time.   Skin: Skin is warm and dry.   Psychiatric: She has a normal mood and affect. Her behavior is normal.     Labs reviewed on nephrologist note    Assessment:   Sunni Mcneil is a 44 y.o. female with end-stage renal disease status post CRT now with rejection in need of peritoneal dialysis.  She is now on hemodialysis.  She understands catheter may not be able to be placed due to adhesions of the pelvis.  She has constipation and discussed with her about the need to treat this    The procedure was explained in detail to the patient including risks and benefits.  The benefits including the possibility of having dialysis at home without the side effects of the hemodialysis.  The risks including but not limited to catheter dislodgment, obstruction, malfunction, bleeding, infection and possible injury to surrounding organs during peritoneal access. She is interested in proceeding with laparoscopic placement of peritoneal dialysis catheter. The patient understands that the catheter will not be used for dialysis for a period of approximately 2 weeks after its placement and that during this time they should not shower until the exit site is completely healed. The patient underwent at least one training session with the peritoneal dialysis nurse and their house was evaluated and is ready for the peritoneal dialysis. Patient verbalized understanding and agreed with the plan. All  questions were answered at this time.      Plan:     - Laparoscopic peritoneal dialysis catheter placement, possible open.  - Preparation for surgery orders have been placed  - Surgery scheduling.   -Senna and MiraLAX for constipation    Damon Rooney MD  General, Minimally Invasive and Endoscopic Surgery  [facility]    [address], [address]  [place], [place], [address]  P: [phone]  F: [phone]

## 2019-07-02 NOTE — PROGRESS NOTES
Chief Complaint   Patient presents with   • PD Catheter Placement       Subjective      Sunni Mcneil is a 44 y.o. female who is referred by Dr. Hansen to be evaluated for peritoneal dialysis catheter placement. Patient has ESRD secondary to chronic glomerulonephritis and  is on dialysis.  She has history of cadaveric renal transplantation in 2016.  After transplant she underwent peritoneal dialysis catheter removal by me.  The kidney has rejected. She gets dialysis on Tuesday, Thursday and Saturday since last month. Patient does not have a AV access.  Patient has not had a recent intraabdominal infection. She reports having constipation.   Patient did have a Colonoscopy.     Past Medical History:   Diagnosis Date   • Acid reflux    • Anemia    • Arthritis    • History of peritoneal dialysis     KIDNEY TRANSPLANT SEPT 2016   • History of transfusion     BEEN A WHILE   • Hypercalcemia    • Hyperparathyroidism (CMS/HCC)    • Hypertension    • Kidney disease     End-stage renal disease. TRANSPLANT   • Kidney transplant recipient 09/02/2016    RIGHT KIDNEY   • Seasonal allergies     CURRENTLY    • UTI (urinary tract infection)     CURRENTLY BEING TREATED. WAS HOSPITALIZED AT [facility] MONDAY 9- THUR 12 2018     Surgical history:   -Tubal ligation 1988  -Laparoscopic peritoneal dialysis catheter placement  -Cadaveric kidney transplantation 2016  -Open peritoneal dialysis catheter removal 2016  -4 gland parathyroid resection without a transplantation and excision of left thyroid nodule  -Temporary dialysis catheter    Current Outpatient Medications:   •  folic acid (FOLVITE) 1 MG tablet, Take 1 mg by mouth Daily., Disp: , Rfl:   •  metoprolol tartrate (LOPRESSOR) 25 MG tablet, Take 25 mg by mouth 2 (Two) Times a Day., Disp: , Rfl:   •  ondansetron (ZOFRAN) 4 MG tablet, Take 1 tablet by mouth Every 6 (Six) Hours As Needed for Nausea or Vomiting., Disp: 20 tablet, Rfl: 0  •  sirolimus (RAPAMUNE) 0.5 MG tablet tablet, Take  "0.5 mg by mouth Daily., Disp: , Rfl:   •  iron polysaccharides (NIFEREX) 150 MG capsule, Take 150 mg by mouth 2 (Two) Times a Day., Disp: , Rfl:     No Known Allergies    No family history on file.    Social History     Socioeconomic History   • Marital status:      Spouse name: Not on file   • Number of children: 2   • Years of education: 12   • Highest education level: Not on file   Occupational History     Employer: [employer]   Tobacco Use   • Smoking status: Never Smoker   • Smokeless tobacco: Never Used   Substance and Sexual Activity   • Alcohol use: No   • Drug use: No   • Sexual activity: Defer     REVIEW OF SYSTEMS    Review of Systems   Constitutional: Positive for appetite change and unexpected weight change.   HENT: Positive for facial swelling. Negative for hearing loss.    Eyes: Negative for pain and discharge.   Respiratory: Negative for choking and chest tightness.    Cardiovascular: Positive for leg swelling. Negative for palpitations.   Gastrointestinal: Positive for constipation. Negative for abdominal distention.   Endocrine: Negative for cold intolerance and heat intolerance.   Genitourinary: Negative for dysuria and hematuria.   Musculoskeletal: Negative for arthralgias and back pain.   Allergic/Immunologic: Negative for environmental allergies and food allergies.   Neurological: Negative for dizziness, tremors and weakness.   Hematological: Negative for adenopathy. Does not bruise/bleed easily.   Psychiatric/Behavioral: Negative for agitation. The patient is nervous/anxious.        Physical Examination  Pulse (!) 122   Ht 152.4 cm (60\")   Wt 78 kg (172 lb)   SpO2 99%   BMI 33.59 kg/m²   Body mass index is 33.59 kg/m².  Physical Exam   Constitutional: She is oriented to person, place, and time. She appears well-developed and well-nourished.   HENT:   Head: Normocephalic and atraumatic.   Eyes: Conjunctivae are normal. No scleral icterus.   Neck: Normal range of motion. Neck supple. "   Cardiovascular: Normal rate and regular rhythm.   Pulmonary/Chest: Effort normal and breath sounds normal.   Abdominal: Soft. Bowel sounds are normal. She exhibits no distension and no mass. There is no tenderness. There is no rebound and no guarding. No hernia.       Well-healed right lower quadrant incision, no hernias, well-healed periumbilical incisions and well-healed left lower quadrant prior peritoneal dialysis catheter site   Musculoskeletal: Normal range of motion.   Neurological: She is alert and oriented to person, place, and time.   Skin: Skin is warm and dry.   Psychiatric: She has a normal mood and affect. Her behavior is normal.     Labs reviewed on nephrologist note    Assessment:   Sunni Mcneil is a 44 y.o. female with end-stage renal disease status post CRT now with rejection in need of peritoneal dialysis.  She is now on hemodialysis.  She understands catheter may not be able to be placed due to adhesions of the pelvis.  She has constipation and discussed with her about the need to treat this    The procedure was explained in detail to the patient including risks and benefits.  The benefits including the possibility of having dialysis at home without the side effects of the hemodialysis.  The risks including but not limited to catheter dislodgment, obstruction, malfunction, bleeding, infection and possible injury to surrounding organs during peritoneal access. She is interested in proceeding with laparoscopic placement of peritoneal dialysis catheter. The patient understands that the catheter will not be used for dialysis for a period of approximately 2 weeks after its placement and that during this time they should not shower until the exit site is completely healed. The patient underwent at least one training session with the peritoneal dialysis nurse and their house was evaluated and is ready for the peritoneal dialysis. Patient verbalized understanding and agreed with the plan. All  questions were answered at this time.      Plan:     - Laparoscopic peritoneal dialysis catheter placement, possible open.  - Preparation for surgery orders have been placed  - Surgery scheduling.   -Senna and MiraLAX for constipation    Damon Rooney MD  General, Minimally Invasive and Endoscopic Surgery  [facility]    [address], [address]  [place], [place], [address]  P: [phone]  F: [phone]

## 2019-07-15 ENCOUNTER — APPOINTMENT (OUTPATIENT)
Dept: PREADMISSION TESTING | Facility: HOSPITAL | Age: 44
End: 2019-07-15

## 2019-07-15 VITALS
WEIGHT: 181.5 LBS | DIASTOLIC BLOOD PRESSURE: 96 MMHG | TEMPERATURE: 97.4 F | SYSTOLIC BLOOD PRESSURE: 158 MMHG | BODY MASS INDEX: 35.63 KG/M2 | HEIGHT: 60 IN | HEART RATE: 92 BPM | RESPIRATION RATE: 16 BRPM | OXYGEN SATURATION: 99 %

## 2019-07-15 DIAGNOSIS — Z99.2 ESRD ON HEMODIALYSIS (HCC): ICD-10-CM

## 2019-07-15 DIAGNOSIS — N18.6 ESRD ON HEMODIALYSIS (HCC): ICD-10-CM

## 2019-07-15 LAB
ANION GAP SERPL CALCULATED.3IONS-SCNC: 14.6 MMOL/L (ref 5–15)
BASOPHILS # BLD AUTO: 0.05 10*3/MM3 (ref 0–0.2)
BASOPHILS NFR BLD AUTO: 0.5 % (ref 0–1.5)
BUN BLD-MCNC: 31 MG/DL (ref 6–20)
BUN/CREAT SERPL: 4.5 (ref 7–25)
CALCIUM SPEC-SCNC: 8.3 MG/DL (ref 8.6–10.5)
CHLORIDE SERPL-SCNC: 97 MMOL/L (ref 98–107)
CO2 SERPL-SCNC: 26.4 MMOL/L (ref 22–29)
CREAT BLD-MCNC: 6.9 MG/DL (ref 0.57–1)
DEPRECATED RDW RBC AUTO: 51 FL (ref 37–54)
EOSINOPHIL # BLD AUTO: 0.47 10*3/MM3 (ref 0–0.4)
EOSINOPHIL NFR BLD AUTO: 4.4 % (ref 0.3–6.2)
ERYTHROCYTE [DISTWIDTH] IN BLOOD BY AUTOMATED COUNT: 16.2 % (ref 12.3–15.4)
GFR SERPL CREATININE-BSD FRML MDRD: 6 ML/MIN/1.73
GFR SERPL CREATININE-BSD FRML MDRD: ABNORMAL ML/MIN/1.73
GLUCOSE BLD-MCNC: 135 MG/DL (ref 65–99)
HCT VFR BLD AUTO: 26 % (ref 34–46.6)
HGB BLD-MCNC: 7.8 G/DL (ref 12–15.9)
IMM GRANULOCYTES # BLD AUTO: 0.22 10*3/MM3 (ref 0–0.05)
IMM GRANULOCYTES NFR BLD AUTO: 2.1 % (ref 0–0.5)
LYMPHOCYTES # BLD AUTO: 1.09 10*3/MM3 (ref 0.7–3.1)
LYMPHOCYTES NFR BLD AUTO: 10.2 % (ref 19.6–45.3)
MCH RBC QN AUTO: 26.4 PG (ref 26.6–33)
MCHC RBC AUTO-ENTMCNC: 30 G/DL (ref 31.5–35.7)
MCV RBC AUTO: 87.8 FL (ref 79–97)
MONOCYTES # BLD AUTO: 0.58 10*3/MM3 (ref 0.1–0.9)
MONOCYTES NFR BLD AUTO: 5.4 % (ref 5–12)
NEUTROPHILS # BLD AUTO: 8.24 10*3/MM3 (ref 1.7–7)
NEUTROPHILS NFR BLD AUTO: 77.4 % (ref 42.7–76)
NRBC BLD AUTO-RTO: 0.1 /100 WBC (ref 0–0.2)
PLATELET # BLD AUTO: 229 10*3/MM3 (ref 140–450)
PMV BLD AUTO: 10.5 FL (ref 6–12)
POTASSIUM BLD-SCNC: 3.4 MMOL/L (ref 3.5–5.2)
RBC # BLD AUTO: 2.96 10*6/MM3 (ref 3.77–5.28)
SODIUM BLD-SCNC: 138 MMOL/L (ref 136–145)
WBC NRBC COR # BLD: 10.65 10*3/MM3 (ref 3.4–10.8)

## 2019-07-15 PROCEDURE — 85025 COMPLETE CBC W/AUTO DIFF WBC: CPT | Performed by: SURGERY

## 2019-07-15 PROCEDURE — 80048 BASIC METABOLIC PNL TOTAL CA: CPT | Performed by: SURGERY

## 2019-07-15 PROCEDURE — 36415 COLL VENOUS BLD VENIPUNCTURE: CPT

## 2019-07-15 RX ORDER — CLONIDINE HYDROCHLORIDE 0.3 MG/1
0.3 TABLET ORAL 3 TIMES DAILY
COMMUNITY
End: 2021-03-18 | Stop reason: HOSPADM

## 2019-07-15 RX ORDER — CARVEDILOL 12.5 MG/1
12.5 TABLET ORAL 2 TIMES DAILY WITH MEALS
COMMUNITY
End: 2020-09-15

## 2019-07-15 RX ORDER — MINOXIDIL 10 MG/1
10 TABLET ORAL DAILY
Status: ON HOLD | COMMUNITY
End: 2020-05-06

## 2019-07-15 NOTE — DISCHARGE INSTRUCTIONS
Take the following medications the morning of surgery with a small sip of water:    CLONIDINE  CARVEDILOL      General Instructions:  • Do not eat solid food after midnight the night before surgery.  • You may drink clear liquids day of surgery but must stop at least one hour before your hospital arrival time @ 0930 AM STOP DRINKING!!!  • It is beneficial for you to have a clear drink that contains carbohydrates the day of surgery.  We suggest  a 12 to 20 ounce bottle of G2 or Powerade Zero for diabetic patients.     Clear liquids are liquids you can see through.  Nothing red in color.     Plain water                               Sports drinks  Sodas                                   Gelatin (Jell-O)  Fruit juices without pulp such as white grape juice and apple juice  Popsicles that contain no fruit or yogurt  Tea or coffee (no cream or milk added)  G2 / Powerade Zero    • Patients who avoid smoking, chewing tobacco and alcohol for 4 weeks prior to surgery have a reduced risk of post-operative complications.  Quit smoking as many days before surgery as you can.  • Do not smoke, use chewing tobacco or drink alcohol the day of surgery.   • If applicable bring your C-PAP/ BI-PAP machine.  • Bring any papers given to you in the doctor’s office.  • Wear clean comfortable clothes and socks.  • Do not wear contact lenses, false eyelashes or make-up.  Bring a case for your glasses.   • Bring crutches or walker if applicable.  • Remove all piercings.  Leave jewelry and any other valuables at home.  • Hair extensions with metal clips must be removed prior to surgery.  • The Pre-Admission Testing nurse will instruct you to bring medications if unable to obtain an accurate list in Pre-Admission Testing.      Preventing a Surgical Site Infection:  • For 2 to 3 days before surgery, avoid shaving with a razor because the razor can irritate skin and make it easier to develop an infection.    • Any areas of open skin can increase  the risk of a post-operative wound infection by allowing bacteria to enter and travel throughout the body.  Notify your surgeon if you have any skin wounds / rashes even if it is not near the expected surgical site.  The area will need assessed to determine if surgery should be delayed until it is healed.  • The night prior to surgery sleep in a clean bed with clean clothing.  Do not allow pets to sleep with you.  • Shower on the morning of surgery using a fresh bar of anti-bacterial soap (such as Dial) and clean washcloth.  Dry with a clean towel and dress in clean clothing.  • Ask your surgeon if you will be receiving antibiotics prior to surgery.  • Make sure you, your family, and all healthcare providers clean their hands with soap and water or an alcohol based hand  before caring for you or your wound.    Day of surgery:07- ARRIVE @ 1030 AM REPORT TO THE MAIN OR   Upon arrival, a Pre-op nurse and Anesthesiologist will review your health history, obtain vital signs, and answer questions you may have.  The only belongings needed at this time will be a list of your home medications and if applicable your C-PAP/BI-PAP machine.  If you are staying overnight your family can leave the rest of your belongings in the car and bring them to your room later.  A Pre-op nurse will start an IV and you may receive medication in preparation for surgery, including something to help you relax.  Your family will be able to see you in the Pre-op area.  While you are in surgery your family should notify the waiting room  if they leave the waiting room area and provide a contact phone number.    Please be aware that surgery does come with discomfort.  We want to make every effort to control your discomfort so please discuss any uncontrolled symptoms with your nurse.   Your doctor will most likely have prescribed pain medications.      If you are going home after surgery you will receive individualized  written care instructions before being discharged.  A responsible adult must drive you to and from the hospital on the day of your surgery and stay with you for 24 hours.    If you are staying overnight following surgery, you will be transported to your hospital room following the recovery period.  [facility] has all private rooms.    You have received a list of surgical assistants for your reference.  If you have any questions please call Pre-Admission Testing at [phone].  Deductibles and co-payments are collected on the day of service. Please be prepared to pay the required co-pay, deductible or deposit on the day of service as defined by your plan.

## 2019-07-24 ENCOUNTER — ANESTHESIA (OUTPATIENT)
Dept: PERIOP | Facility: HOSPITAL | Age: 44
End: 2019-07-24

## 2019-07-24 ENCOUNTER — ANESTHESIA EVENT (OUTPATIENT)
Dept: PERIOP | Facility: HOSPITAL | Age: 44
End: 2019-07-24

## 2019-07-24 ENCOUNTER — HOSPITAL ENCOUNTER (OUTPATIENT)
Facility: HOSPITAL | Age: 44
Setting detail: HOSPITAL OUTPATIENT SURGERY
Discharge: HOME | End: 2019-07-24
Attending: SURGERY | Admitting: SURGERY

## 2019-07-24 VITALS
HEART RATE: 79 BPM | TEMPERATURE: 97.9 F | RESPIRATION RATE: 16 BRPM | OXYGEN SATURATION: 98 % | DIASTOLIC BLOOD PRESSURE: 99 MMHG | SYSTOLIC BLOOD PRESSURE: 133 MMHG

## 2019-07-24 DIAGNOSIS — N18.6 ESRD ON HEMODIALYSIS (HCC): ICD-10-CM

## 2019-07-24 DIAGNOSIS — Z99.2 ESRD ON HEMODIALYSIS (HCC): ICD-10-CM

## 2019-07-24 LAB
ABO GROUP BLD: NORMAL
ANION GAP SERPL CALCULATED.3IONS-SCNC: 15.5 MMOL/L (ref 5–15)
BLD GP AB SCN SERPL QL: NEGATIVE
BUN BLD-MCNC: 25 MG/DL (ref 6–20)
BUN/CREAT SERPL: 4.3 (ref 7–25)
CALCIUM SPEC-SCNC: 9 MG/DL (ref 8.6–10.5)
CHLORIDE SERPL-SCNC: 96 MMOL/L (ref 98–107)
CO2 SERPL-SCNC: 24.5 MMOL/L (ref 22–29)
CREAT BLD-MCNC: 5.85 MG/DL (ref 0.57–1)
GFR SERPL CREATININE-BSD FRML MDRD: 8 ML/MIN/1.73
GFR SERPL CREATININE-BSD FRML MDRD: ABNORMAL ML/MIN/1.73
GLUCOSE BLD-MCNC: 127 MG/DL (ref 65–99)
GLUCOSE BLDC GLUCOMTR-MCNC: 123 MG/DL (ref 70–130)
GLUCOSE BLDC GLUCOMTR-MCNC: 125 MG/DL (ref 70–130)
HCG SERPL QL: NEGATIVE
POTASSIUM BLD-SCNC: 4 MMOL/L (ref 3.5–5.2)
RH BLD: POSITIVE
SODIUM BLD-SCNC: 136 MMOL/L (ref 136–145)
T&S EXPIRATION DATE: NORMAL

## 2019-07-24 PROCEDURE — 25010000002 ONDANSETRON PER 1 MG: Performed by: NURSE ANESTHETIST, CERTIFIED REGISTERED

## 2019-07-24 PROCEDURE — 86901 BLOOD TYPING SEROLOGIC RH(D): CPT | Performed by: SURGERY

## 2019-07-24 PROCEDURE — C1750 CATH, HEMODIALYSIS,LONG-TERM: HCPCS | Performed by: SURGERY

## 2019-07-24 PROCEDURE — 25010000002 NEOSTIGMINE PER 0.5 MG: Performed by: NURSE ANESTHETIST, CERTIFIED REGISTERED

## 2019-07-24 PROCEDURE — 82962 GLUCOSE BLOOD TEST: CPT

## 2019-07-24 PROCEDURE — 80048 BASIC METABOLIC PNL TOTAL CA: CPT | Performed by: SURGERY

## 2019-07-24 PROCEDURE — 25010000003 CEFAZOLIN IN DEXTROSE 2-4 GM/100ML-% SOLUTION: Performed by: SURGERY

## 2019-07-24 PROCEDURE — 25010000002 FENTANYL CITRATE (PF) 100 MCG/2ML SOLUTION: Performed by: NURSE ANESTHETIST, CERTIFIED REGISTERED

## 2019-07-24 PROCEDURE — 94799 UNLISTED PULMONARY SVC/PX: CPT

## 2019-07-24 PROCEDURE — 25010000002 PROPOFOL 10 MG/ML EMULSION: Performed by: NURSE ANESTHETIST, CERTIFIED REGISTERED

## 2019-07-24 PROCEDURE — 25010000002 MIDAZOLAM PER 1 MG: Performed by: ANESTHESIOLOGY

## 2019-07-24 PROCEDURE — 25010000002 PROMETHAZINE PER 50 MG: Performed by: NURSE ANESTHETIST, CERTIFIED REGISTERED

## 2019-07-24 PROCEDURE — 49324 LAP INSERT TUNNEL IP CATH: CPT | Performed by: SURGERY

## 2019-07-24 PROCEDURE — 25010000002 HEPARIN (PORCINE) PER 1000 UNITS: Performed by: SURGERY

## 2019-07-24 PROCEDURE — 86850 RBC ANTIBODY SCREEN: CPT | Performed by: SURGERY

## 2019-07-24 PROCEDURE — 84703 CHORIONIC GONADOTROPIN ASSAY: CPT | Performed by: SURGERY

## 2019-07-24 PROCEDURE — 86900 BLOOD TYPING SEROLOGIC ABO: CPT | Performed by: SURGERY

## 2019-07-24 DEVICE — IMPLANTABLE DEVICE: Type: IMPLANTABLE DEVICE | Site: ABDOMEN | Status: FUNCTIONAL

## 2019-07-24 RX ORDER — SIROLIMUS 0.5 MG/1
0.5 TABLET, FILM COATED ORAL DAILY
COMMUNITY
Start: 2019-06-20 | End: 2019-07-24 | Stop reason: HOSPADM

## 2019-07-24 RX ORDER — SODIUM CHLORIDE 0.9 % (FLUSH) 0.9 %
3 SYRINGE (ML) INJECTION EVERY 12 HOURS SCHEDULED
Status: DISCONTINUED | OUTPATIENT
Start: 2019-07-24 | End: 2019-07-24 | Stop reason: HOSPADM

## 2019-07-24 RX ORDER — HYDROCODONE BITARTRATE AND ACETAMINOPHEN 5; 325 MG/1; MG/1
1 TABLET ORAL ONCE AS NEEDED
Status: DISCONTINUED | OUTPATIENT
Start: 2019-07-24 | End: 2019-07-24 | Stop reason: HOSPADM

## 2019-07-24 RX ORDER — PROMETHAZINE HYDROCHLORIDE 25 MG/1
12.5 TABLET ORAL ONCE AS NEEDED
Status: DISCONTINUED | OUTPATIENT
Start: 2019-07-24 | End: 2019-07-24 | Stop reason: HOSPADM

## 2019-07-24 RX ORDER — PROMETHAZINE HYDROCHLORIDE 25 MG/ML
12.5 INJECTION, SOLUTION INTRAMUSCULAR; INTRAVENOUS ONCE AS NEEDED
Status: DISCONTINUED | OUTPATIENT
Start: 2019-07-24 | End: 2019-07-24 | Stop reason: HOSPADM

## 2019-07-24 RX ORDER — AMOXICILLIN 250 MG
2 CAPSULE ORAL DAILY PRN
Qty: 30 TABLET | Refills: 1 | Status: SHIPPED | OUTPATIENT
Start: 2019-07-24 | End: 2019-10-02

## 2019-07-24 RX ORDER — MAGNESIUM HYDROXIDE 1200 MG/15ML
LIQUID ORAL AS NEEDED
Status: DISCONTINUED | OUTPATIENT
Start: 2019-07-24 | End: 2019-07-24 | Stop reason: HOSPADM

## 2019-07-24 RX ORDER — LABETALOL HYDROCHLORIDE 5 MG/ML
5 INJECTION, SOLUTION INTRAVENOUS
Status: DISCONTINUED | OUTPATIENT
Start: 2019-07-24 | End: 2019-07-24 | Stop reason: HOSPADM

## 2019-07-24 RX ORDER — SODIUM CHLORIDE 0.9 % (FLUSH) 0.9 %
3-10 SYRINGE (ML) INJECTION AS NEEDED
Status: DISCONTINUED | OUTPATIENT
Start: 2019-07-24 | End: 2019-07-24 | Stop reason: HOSPADM

## 2019-07-24 RX ORDER — PROMETHAZINE HYDROCHLORIDE 25 MG/ML
12.5 INJECTION, SOLUTION INTRAMUSCULAR; INTRAVENOUS ONCE AS NEEDED
Status: COMPLETED | OUTPATIENT
Start: 2019-07-24 | End: 2019-07-24

## 2019-07-24 RX ORDER — ACETAMINOPHEN 325 MG/1
650 TABLET ORAL ONCE AS NEEDED
Status: DISCONTINUED | OUTPATIENT
Start: 2019-07-24 | End: 2019-07-24 | Stop reason: HOSPADM

## 2019-07-24 RX ORDER — GLYCOPYRROLATE 0.2 MG/ML
INJECTION INTRAMUSCULAR; INTRAVENOUS AS NEEDED
Status: DISCONTINUED | OUTPATIENT
Start: 2019-07-24 | End: 2019-07-24 | Stop reason: SURG

## 2019-07-24 RX ORDER — BUPIVACAINE HYDROCHLORIDE AND EPINEPHRINE 5; 5 MG/ML; UG/ML
INJECTION, SOLUTION PERINEURAL AS NEEDED
Status: DISCONTINUED | OUTPATIENT
Start: 2019-07-24 | End: 2019-07-24 | Stop reason: HOSPADM

## 2019-07-24 RX ORDER — NALOXONE HCL 0.4 MG/ML
0.2 VIAL (ML) INJECTION AS NEEDED
Status: DISCONTINUED | OUTPATIENT
Start: 2019-07-24 | End: 2019-07-24 | Stop reason: HOSPADM

## 2019-07-24 RX ORDER — HYDROCODONE BITARTRATE AND ACETAMINOPHEN 7.5; 325 MG/1; MG/1
1 TABLET ORAL ONCE AS NEEDED
Status: DISCONTINUED | OUTPATIENT
Start: 2019-07-24 | End: 2019-07-24 | Stop reason: HOSPADM

## 2019-07-24 RX ORDER — OXYCODONE AND ACETAMINOPHEN 7.5; 325 MG/1; MG/1
1 TABLET ORAL ONCE AS NEEDED
Status: DISCONTINUED | OUTPATIENT
Start: 2019-07-24 | End: 2019-07-24 | Stop reason: HOSPADM

## 2019-07-24 RX ORDER — DIPHENHYDRAMINE HYDROCHLORIDE 50 MG/ML
12.5 INJECTION INTRAMUSCULAR; INTRAVENOUS
Status: DISCONTINUED | OUTPATIENT
Start: 2019-07-24 | End: 2019-07-24 | Stop reason: HOSPADM

## 2019-07-24 RX ORDER — SODIUM CHLORIDE, SODIUM LACTATE, POTASSIUM CHLORIDE, CALCIUM CHLORIDE 600; 310; 30; 20 MG/100ML; MG/100ML; MG/100ML; MG/100ML
9 INJECTION, SOLUTION INTRAVENOUS CONTINUOUS
Status: DISCONTINUED | OUTPATIENT
Start: 2019-07-24 | End: 2019-07-24 | Stop reason: HOSPADM

## 2019-07-24 RX ORDER — PROMETHAZINE HYDROCHLORIDE 25 MG/ML
6.25 INJECTION, SOLUTION INTRAMUSCULAR; INTRAVENOUS
Status: COMPLETED | OUTPATIENT
Start: 2019-07-24 | End: 2019-07-24

## 2019-07-24 RX ORDER — PROMETHAZINE HYDROCHLORIDE 25 MG/1
25 SUPPOSITORY RECTAL ONCE AS NEEDED
Status: COMPLETED | OUTPATIENT
Start: 2019-07-24 | End: 2019-07-24

## 2019-07-24 RX ORDER — MIDAZOLAM HYDROCHLORIDE 1 MG/ML
2 INJECTION INTRAMUSCULAR; INTRAVENOUS
Status: DISCONTINUED | OUTPATIENT
Start: 2019-07-24 | End: 2019-07-24 | Stop reason: HOSPADM

## 2019-07-24 RX ORDER — EPHEDRINE SULFATE 50 MG/ML
5 INJECTION, SOLUTION INTRAVENOUS ONCE AS NEEDED
Status: DISCONTINUED | OUTPATIENT
Start: 2019-07-24 | End: 2019-07-24 | Stop reason: HOSPADM

## 2019-07-24 RX ORDER — ACETAMINOPHEN 500 MG
1000 TABLET ORAL ONCE
Status: COMPLETED | OUTPATIENT
Start: 2019-07-24 | End: 2019-07-24

## 2019-07-24 RX ORDER — ONDANSETRON 2 MG/ML
4 INJECTION INTRAMUSCULAR; INTRAVENOUS ONCE AS NEEDED
Status: COMPLETED | OUTPATIENT
Start: 2019-07-24 | End: 2019-07-24

## 2019-07-24 RX ORDER — HYDRALAZINE HYDROCHLORIDE 20 MG/ML
5 INJECTION INTRAMUSCULAR; INTRAVENOUS
Status: DISCONTINUED | OUTPATIENT
Start: 2019-07-24 | End: 2019-07-24 | Stop reason: HOSPADM

## 2019-07-24 RX ORDER — FENTANYL CITRATE 50 UG/ML
50 INJECTION, SOLUTION INTRAMUSCULAR; INTRAVENOUS
Status: DISCONTINUED | OUTPATIENT
Start: 2019-07-24 | End: 2019-07-24 | Stop reason: HOSPADM

## 2019-07-24 RX ORDER — LIDOCAINE HYDROCHLORIDE 20 MG/ML
INJECTION, SOLUTION INFILTRATION; PERINEURAL AS NEEDED
Status: DISCONTINUED | OUTPATIENT
Start: 2019-07-24 | End: 2019-07-24 | Stop reason: SURG

## 2019-07-24 RX ORDER — HYDROMORPHONE HYDROCHLORIDE 1 MG/ML
0.5 INJECTION, SOLUTION INTRAMUSCULAR; INTRAVENOUS; SUBCUTANEOUS
Status: DISCONTINUED | OUTPATIENT
Start: 2019-07-24 | End: 2019-07-24 | Stop reason: HOSPADM

## 2019-07-24 RX ORDER — FENTANYL CITRATE 50 UG/ML
INJECTION, SOLUTION INTRAMUSCULAR; INTRAVENOUS AS NEEDED
Status: DISCONTINUED | OUTPATIENT
Start: 2019-07-24 | End: 2019-07-24 | Stop reason: SURG

## 2019-07-24 RX ORDER — SODIUM CHLORIDE 9 MG/ML
9 INJECTION, SOLUTION INTRAVENOUS CONTINUOUS
Status: DISCONTINUED | OUTPATIENT
Start: 2019-07-24 | End: 2019-07-24 | Stop reason: HOSPADM

## 2019-07-24 RX ORDER — CEFAZOLIN SODIUM 2 G/100ML
2 INJECTION, SOLUTION INTRAVENOUS ONCE
Status: COMPLETED | OUTPATIENT
Start: 2019-07-24 | End: 2019-07-24

## 2019-07-24 RX ORDER — DIPHENHYDRAMINE HCL 25 MG
25 CAPSULE ORAL
Status: DISCONTINUED | OUTPATIENT
Start: 2019-07-24 | End: 2019-07-24 | Stop reason: HOSPADM

## 2019-07-24 RX ORDER — FAMOTIDINE 10 MG/ML
20 INJECTION, SOLUTION INTRAVENOUS ONCE
Status: COMPLETED | OUTPATIENT
Start: 2019-07-24 | End: 2019-07-24

## 2019-07-24 RX ORDER — FLUMAZENIL 0.1 MG/ML
0.2 INJECTION INTRAVENOUS AS NEEDED
Status: DISCONTINUED | OUTPATIENT
Start: 2019-07-24 | End: 2019-07-24 | Stop reason: HOSPADM

## 2019-07-24 RX ORDER — PROMETHAZINE HYDROCHLORIDE 25 MG/1
25 TABLET ORAL ONCE AS NEEDED
Status: COMPLETED | OUTPATIENT
Start: 2019-07-24 | End: 2019-07-24

## 2019-07-24 RX ORDER — HYDROCODONE BITARTRATE AND ACETAMINOPHEN 5; 325 MG/1; MG/1
1 TABLET ORAL EVERY 6 HOURS PRN
Qty: 30 TABLET | Refills: 0 | Status: SHIPPED | OUTPATIENT
Start: 2019-07-24 | End: 2019-08-06

## 2019-07-24 RX ORDER — SENNOSIDES 8.6 MG
1 TABLET ORAL ONCE
Status: DISCONTINUED | OUTPATIENT
Start: 2019-07-24 | End: 2019-07-24 | Stop reason: HOSPADM

## 2019-07-24 RX ORDER — PROMETHAZINE HYDROCHLORIDE 12.5 MG/1
12.5 TABLET ORAL EVERY 6 HOURS PRN
Qty: 10 TABLET | Refills: 0 | Status: SHIPPED | OUTPATIENT
Start: 2019-07-24 | End: 2019-08-06

## 2019-07-24 RX ORDER — ONDANSETRON 2 MG/ML
INJECTION INTRAMUSCULAR; INTRAVENOUS AS NEEDED
Status: DISCONTINUED | OUTPATIENT
Start: 2019-07-24 | End: 2019-07-24 | Stop reason: SURG

## 2019-07-24 RX ORDER — ROCURONIUM BROMIDE 10 MG/ML
INJECTION, SOLUTION INTRAVENOUS AS NEEDED
Status: DISCONTINUED | OUTPATIENT
Start: 2019-07-24 | End: 2019-07-24 | Stop reason: SURG

## 2019-07-24 RX ORDER — PROPOFOL 10 MG/ML
VIAL (ML) INTRAVENOUS AS NEEDED
Status: DISCONTINUED | OUTPATIENT
Start: 2019-07-24 | End: 2019-07-24 | Stop reason: SURG

## 2019-07-24 RX ORDER — GABAPENTIN 300 MG/1
600 CAPSULE ORAL ONCE
Status: COMPLETED | OUTPATIENT
Start: 2019-07-24 | End: 2019-07-24

## 2019-07-24 RX ORDER — MIDAZOLAM HYDROCHLORIDE 1 MG/ML
1 INJECTION INTRAMUSCULAR; INTRAVENOUS
Status: DISCONTINUED | OUTPATIENT
Start: 2019-07-24 | End: 2019-07-24 | Stop reason: HOSPADM

## 2019-07-24 RX ADMIN — ACETAMINOPHEN 1000 MG: 500 TABLET, FILM COATED ORAL at 11:30

## 2019-07-24 RX ADMIN — ROCURONIUM BROMIDE 40 MG: 10 INJECTION INTRAVENOUS at 13:43

## 2019-07-24 RX ADMIN — PROPOFOL 200 MG: 10 INJECTION, EMULSION INTRAVENOUS at 13:43

## 2019-07-24 RX ADMIN — FAMOTIDINE 20 MG: 10 INJECTION INTRAVENOUS at 11:33

## 2019-07-24 RX ADMIN — ONDANSETRON 4 MG: 2 INJECTION INTRAMUSCULAR; INTRAVENOUS at 14:16

## 2019-07-24 RX ADMIN — ONDANSETRON 4 MG: 2 INJECTION INTRAMUSCULAR; INTRAVENOUS at 14:45

## 2019-07-24 RX ADMIN — CEFAZOLIN SODIUM 2 G: 2 INJECTION, SOLUTION INTRAVENOUS at 13:50

## 2019-07-24 RX ADMIN — GABAPENTIN 600 MG: 300 CAPSULE ORAL at 11:30

## 2019-07-24 RX ADMIN — GLYCOPYRROLATE 0.8 MG: 0.2 INJECTION INTRAMUSCULAR; INTRAVENOUS at 14:22

## 2019-07-24 RX ADMIN — FENTANYL CITRATE 100 MCG: 50 INJECTION INTRAMUSCULAR; INTRAVENOUS at 13:43

## 2019-07-24 RX ADMIN — LIDOCAINE HYDROCHLORIDE 60 MG: 20 INJECTION, SOLUTION INFILTRATION; PERINEURAL at 13:43

## 2019-07-24 RX ADMIN — SUGAMMADEX 170 MG: 100 INJECTION, SOLUTION INTRAVENOUS at 14:35

## 2019-07-24 RX ADMIN — PROMETHAZINE HYDROCHLORIDE 6.25 MG: 25 INJECTION INTRAMUSCULAR; INTRAVENOUS at 16:04

## 2019-07-24 RX ADMIN — MIDAZOLAM 2 MG: 1 INJECTION INTRAMUSCULAR; INTRAVENOUS at 11:33

## 2019-07-24 RX ADMIN — MIDAZOLAM 1 MG: 1 INJECTION INTRAMUSCULAR; INTRAVENOUS at 12:58

## 2019-07-24 RX ADMIN — SODIUM CHLORIDE 9 ML/HR: 9 INJECTION, SOLUTION INTRAVENOUS at 11:34

## 2019-07-24 RX ADMIN — PROMETHAZINE HYDROCHLORIDE 6.25 MG: 25 INJECTION INTRAMUSCULAR; INTRAVENOUS at 15:13

## 2019-07-24 RX ADMIN — NEOSTIGMINE METHYLSULFATE 5 MG: 1 INJECTION INTRAMUSCULAR; INTRAVENOUS; SUBCUTANEOUS at 14:22

## 2019-07-24 NOTE — BRIEF OP NOTE
INSERTION PERITONEAL DIALYSIS CATHETER LAPAROSCOPIC  Progress Note    Sunni Mcneil  7/24/2019    Pre-op Diagnosis:   ESRD on hemodialysis (CMS/HCC) [N18.6, Z99.2]       Post-Op Diagnosis Codes:     * ESRD on hemodialysis (CMS/HCC) [N18.6, Z99.2]    Procedure/CPT® Codes:      Procedure(s):  LAPAROSCOPIC INSERTION PERITONEAL DIALYSIS CATHETER    Surgeon(s):  Damon Rooney MD    Anesthesia: General    Staff:   Circulator: Fatoumata Sage RN; Lisa Watson RN  Scrub Person: Daylin Mendoza Maurie  Assistant: Lorenzo Espino CSA    Estimated Blood Loss: 2 mL    Urine Voided: * No values recorded between 7/24/2019  1:36 PM and 7/24/2019  2:22 PM *    Specimens:                None          Drains:   Peritoneal Dialysis Catheter  (Active)       Findings: Great working PD catheter    Complications: None      Damon Rooney MD     Date: 7/24/2019  Time: 2:22 PM

## 2019-07-24 NOTE — ANESTHESIA POSTPROCEDURE EVALUATION
Patient: Sunni Mcneil    Procedure Summary     Date:  07/24/19 Room / Location:  [facility] OR [address] [place] [address] MAIN OR    Anesthesia Start:  1338 Anesthesia Stop:  1444    Procedure:  LAPAROSCOPIC INSERTION PERITONEAL DIALYSIS CATHETER (N/A Abdomen) Diagnosis:       ESRD on hemodialysis (CMS/HCC)      (ESRD on hemodialysis (CMS/HCC) [N18.6, Z99.2])    Surgeon:  Damon Rooney MD Provider:  Kelly Cormier MD    Anesthesia Type:  general ASA Status:  3          Anesthesia Type: general  Last vitals  BP   132/92 (07/24/19 1700)   Temp   36.6 °C (97.9 °F) (07/24/19 1645)   Pulse   88 (07/24/19 1700)   Resp   16 (07/24/19 1700)     SpO2   95 % (07/24/19 1700)     Post Anesthesia Care and Evaluation    Patient location during evaluation: bedside  Patient participation: complete - patient participated  Level of consciousness: awake  Pain score: 2  Pain management: adequate  Airway patency: patent  Anesthetic complications: No anesthetic complications  PONV Status: none  Cardiovascular status: acceptable  Respiratory status: acceptable  Hydration status: acceptable    Comments: /92   Pulse 88   Temp 36.6 °C (97.9 °F) (Oral)   Resp 16   LMP 05/26/2019   SpO2 95%

## 2019-07-24 NOTE — ANESTHESIA PREPROCEDURE EVALUATION
Anesthesia Evaluation     no history of anesthetic complications:               Airway   Mallampati: IV  Neck ROM: full  Possible difficult intubation  Dental - normal exam     Pulmonary - negative pulmonary ROS and normal exam   (-) COPD, asthma, sleep apnea, not a smoker    PE comment: nonlabored  Cardiovascular - normal exam    Rhythm: regular  Rate: normal    (+) hypertension,   (-) valvular problems/murmurs, past MI, CAD, dysrhythmias, angina      Neuro/Psych- negative ROS  (-) seizures, TIA, CVA  GI/Hepatic/Renal/Endo    (+) morbid obesity, GERD,  renal disease (ESRD-->renal transplant-->rejection-->ESRD on hemodialysis) ESRD and dialysis, diabetes mellitus type 2,   (-) liver disease, hypothyroidism, hyperthyroidism    ROS Comment: Hyperparathyroidism    Musculoskeletal     (+) arthralgias,   Abdominal    Substance History      OB/GYN          Other   (+) blood dyscrasia (anemia), arthritis                     Anesthesia Plan    ASA 3     general     intravenous induction   Anesthetic plan, all risks, benefits, and alternatives have been provided, discussed and informed consent has been obtained with: patient.

## 2019-07-24 NOTE — PERIOPERATIVE NURSING NOTE
Dr Rooney notified of decreased h&h from 7/15/19- T&S sent as ordered, stated repeat CBC- NOT needed pre-op

## 2019-07-24 NOTE — ANESTHESIA PROCEDURE NOTES
Airway  Urgency: elective    Airway not difficult    General Information and Staff    Patient location during procedure: OR  Anesthesiologist: Kelly Cormier MD  CRNA: Yanira Hogue CRNA    Indications and Patient Condition  Indications for airway management: airway protection    Preoxygenated: yes  Mask difficulty assessment: 1 - vent by mask    Final Airway Details  Final airway type: endotracheal airway      Successful airway: ETT  Cuffed: yes   Successful intubation technique: direct laryngoscopy  Facilitating devices/methods: intubating stylet  Endotracheal tube insertion site: oral  Blade: Mendoza  Blade size: 2  ETT size (mm): 7.0  Cormack-Lehane Classification: grade IIa - partial view of glottis  Placement verified by: chest auscultation and capnometry   Measured from: lips  ETT to lips (cm): 20  Number of attempts at approach: 1    Additional Comments  Atraumatic, MOP to cuff, BSBE, no change to dentition, secured with tape

## 2019-07-24 NOTE — OP NOTE
DATE OF PROCEDURE: 7/24/19    SURGEON: Damon Rooney MD     ASSISTANT: SAMMY Espino CSA     PREOPERATIVE DIAGNOSES:   1. End stage kidney disease on hemodialysis.     POSTOPERATIVE DIAGNOSES:   1. same    PROCEDURES PERFORMED:   1. Laparoscopic insertion of Argyl peritoneal dialysis catheter.     ANESTHESIA: General.   ESTIMATED BLOOD LOSS: Minimal.   SPECIMEN: None.   IMPLANT: Cazenovia peritoneal dialysis catheter.   FINDINGS: Great catheter flow.   COMPLICATIONS: None.     INDICATIONS FOR PROCEDURE: The patient is a very pleasant 44 y.o. year old female with ESRD on HD that wants to switch to PD. She was evaluated for peritoneal dialysis and was found to be good candidate. I offered the patient laparoscopic peritoneal dialysis catheter placement. The risks and benefits of the procedure were explained to her. The patient verbalized understanding and agreed with the plan.     DESCRIPTION OF PROCEDURE: The patient was taken to the operating room and she was placed on the OR table in the supine position. Preoperative antibiotics were given and SCDs were placed. General endotracheal anesthesia was then induced. The patient's abdomen was then prepped and draped in the usual sterile fashion. Timeout was performed and the patient was correctly identified. I started the procedure by injecting 0.5% Marcaine with epinephrine in the left upper abdomen. With optiview technique a 5 mm trocar was introduced in the patient abdomen.  I then proceeded to insufflate patient's abdomen again, and upon exploration of the abdominal cavity there was no injury from the trocar placement. There were no intraabdominal adhesions. After this, 0.5% Marcaine was injected in the left  periumbilical area and an incision was performed. An 8 mm trocar was then placed in that position at 60°. It was pointed towards the pelvis. An Cazenovia coiled catheter was then placed through the trocar and the trocar was removed. It was positioned in the retrovesical  area. The first cuff was withdrawn back to the level of the rectus muscle fascia. The catheter was then tunnelized and an exit site was selected in the left upper quadrant.I then proceeded to desufflate the pneumoperitoneum and let 500 mL of heparinized saline run. The fluid flowed without any resistance. The catheter was placed then to gravity and good flow of the fluid was found and 300 mL of heparinized saline was retrieved. The catheter was then capped and pneumoperitoneum was again insufflated. Upon exploration of the abdominal cavity, there was no evidence of injury from the procedure.I then proceeded to remove all the trocars under direct laparoscopic vision. The catheter was then flushed with 25 mL of heparinized saline without any resistance to the flow. All the incisions were then closed in 2 layers with 3-0 Vicryl and 4-0 Monocryl. The catheter was secured in place with Steri-Strips. A biopatch was placed around the exit site.  All the incisions were covered with 4 x 4 and Tegaderm. The patient was awakened in the operating room and she was taken to the recovery area in stable condition.     Damon Rooney M.D.

## 2019-08-06 ENCOUNTER — OFFICE VISIT (OUTPATIENT)
Dept: SURGERY | Facility: CLINIC | Age: 44
End: 2019-08-06

## 2019-08-06 DIAGNOSIS — Z09 POSTOP CHECK: Primary | ICD-10-CM

## 2019-08-06 PROCEDURE — 99212 OFFICE O/P EST SF 10 MIN: CPT | Performed by: SURGERY

## 2019-08-06 NOTE — PROGRESS NOTES
CC: Postop check    S: This is a 44 y.o. female who presents for a post-operative visit after undergoing a Laparoscopic Peritoneal dialysis catheter placement on 7/24/19.     Patient reports she is doing well. Eating well without any significant nausea. Having good bowel function. No problems with constipation or diarrhea. No urinary complaints. Denies fever. Ambulating well and slowly returning to normal activities.   She complains of crampy abdominal pain that is intermittent and very mild    soft, nontender, nondistended, no masses or organomegaly  Incisions are healing well without any erythema or signs of infection. No signs of hernia. The catheter exit site is clean, dry and intact.      Assessment and plan:     The patient is a very pleasant 44 y.o. female s/p laparoscopic peritoneal dialysis catheter placement.  Patient is doing fine and does not have any specific complaints other than mild incisional pain.  She has mild constipation.  Advised her to continue with bowel regimen    - The patient was advised to continue to refrain from showering for 3 weeks but can have sponge bath  - continued to follow with the peritoneal dialysis catheter nurse for catheter flushes and training  -  can start using the catheter       I advised the patient to continue to refrain from doing any heavy lifting of more than 15 pounds for a total of 6 weeks.  Patient may start doing an aerobic type exercise in 4 weeks after surgery.    Patient was given time to ask questions and all of them were answered appropriately.  The patient verbalized understanding and agreed with the plan.    she will follow-up at our office on a prn basis unless there are any problems.

## 2019-10-02 ENCOUNTER — OFFICE VISIT (OUTPATIENT)
Dept: SURGERY | Facility: CLINIC | Age: 44
End: 2019-10-02

## 2019-10-02 VITALS — HEART RATE: 111 BPM | BODY MASS INDEX: 36.08 KG/M2 | HEIGHT: 60 IN | OXYGEN SATURATION: 98 % | WEIGHT: 183.8 LBS

## 2019-10-02 DIAGNOSIS — L72.3 SEBACEOUS CYST: Primary | ICD-10-CM

## 2019-10-02 PROCEDURE — 88304 TISSUE EXAM BY PATHOLOGIST: CPT | Performed by: SURGERY

## 2019-10-02 PROCEDURE — 11422 EXC H-F-NK-SP B9+MARG 1.1-2: CPT | Performed by: SURGERY

## 2019-10-04 LAB
CYTO UR: NORMAL
LAB AP CASE REPORT: NORMAL
LAB AP CLINICAL INFORMATION: NORMAL
PATH REPORT.FINAL DX SPEC: NORMAL
PATH REPORT.GROSS SPEC: NORMAL

## 2019-10-27 ENCOUNTER — APPOINTMENT (OUTPATIENT)
Dept: GENERAL RADIOLOGY | Facility: HOSPITAL | Age: 44
End: 2019-10-27

## 2019-10-27 ENCOUNTER — HOSPITAL ENCOUNTER (EMERGENCY)
Facility: HOSPITAL | Age: 44
Discharge: HOME | End: 2019-10-27
Attending: EMERGENCY MEDICINE | Admitting: EMERGENCY MEDICINE

## 2019-10-27 VITALS
BODY MASS INDEX: 35.93 KG/M2 | TEMPERATURE: 98.3 F | HEART RATE: 91 BPM | WEIGHT: 183 LBS | RESPIRATION RATE: 18 BRPM | HEIGHT: 60 IN | OXYGEN SATURATION: 98 % | SYSTOLIC BLOOD PRESSURE: 149 MMHG | DIASTOLIC BLOOD PRESSURE: 90 MMHG

## 2019-10-27 DIAGNOSIS — E83.51 HYPOCALCEMIA: ICD-10-CM

## 2019-10-27 DIAGNOSIS — R11.2 NON-INTRACTABLE VOMITING WITH NAUSEA, UNSPECIFIED VOMITING TYPE: Primary | ICD-10-CM

## 2019-10-27 DIAGNOSIS — R94.31 LONG QT INTERVAL: ICD-10-CM

## 2019-10-27 DIAGNOSIS — N18.6 ESRD (END STAGE RENAL DISEASE) (HCC): ICD-10-CM

## 2019-10-27 DIAGNOSIS — E87.1 HYPONATREMIA: ICD-10-CM

## 2019-10-27 DIAGNOSIS — E87.6 HYPOKALEMIA: ICD-10-CM

## 2019-10-27 LAB
ALBUMIN SERPL-MCNC: 3.8 G/DL (ref 3.5–5.2)
ALBUMIN/GLOB SERPL: 0.6 G/DL
ALP SERPL-CCNC: 76 U/L (ref 39–117)
ALT SERPL W P-5'-P-CCNC: 6 U/L (ref 1–33)
ANION GAP SERPL CALCULATED.3IONS-SCNC: 21.1 MMOL/L (ref 5–15)
AST SERPL-CCNC: 13 U/L (ref 1–32)
ATMOSPHERIC PRESS: 751.4 MMHG
B-OH-BUTYR SERPL-SCNC: 0.09 MMOL/L (ref 0.02–0.27)
BASE EXCESS BLDV CALC-SCNC: 0.8 MMOL/L
BASOPHILS # BLD AUTO: 0.07 10*3/MM3 (ref 0–0.2)
BASOPHILS NFR BLD AUTO: 0.5 % (ref 0–1.5)
BDY SITE: ABNORMAL
BILIRUB SERPL-MCNC: 0.3 MG/DL (ref 0.2–1.2)
BUN BLD-MCNC: 61 MG/DL (ref 6–20)
BUN/CREAT SERPL: 7 (ref 7–25)
CALCIUM SPEC-SCNC: 7.9 MG/DL (ref 8.6–10.5)
CHLORIDE SERPL-SCNC: 88 MMOL/L (ref 98–107)
CO2 SERPL-SCNC: 19.9 MMOL/L (ref 22–29)
CREAT BLD-MCNC: 8.76 MG/DL (ref 0.57–1)
DEPRECATED RDW RBC AUTO: 59.4 FL (ref 37–54)
EOSINOPHIL # BLD AUTO: 0.31 10*3/MM3 (ref 0–0.4)
EOSINOPHIL NFR BLD AUTO: 2.4 % (ref 0.3–6.2)
ERYTHROCYTE [DISTWIDTH] IN BLOOD BY AUTOMATED COUNT: 19 % (ref 12.3–15.4)
GFR SERPL CREATININE-BSD FRML MDRD: 5 ML/MIN/1.73
GFR SERPL CREATININE-BSD FRML MDRD: ABNORMAL ML/MIN/{1.73_M2}
GLOBULIN UR ELPH-MCNC: 6 GM/DL
GLUCOSE BLD-MCNC: 210 MG/DL (ref 65–99)
HCO3 BLDV-SCNC: 25.7 MMOL/L (ref 22–28)
HCT VFR BLD AUTO: 35.8 % (ref 34–46.6)
HGB BLD-MCNC: 12.1 G/DL (ref 12–15.9)
IMM GRANULOCYTES # BLD AUTO: 0.18 10*3/MM3 (ref 0–0.05)
IMM GRANULOCYTES NFR BLD AUTO: 1.4 % (ref 0–0.5)
LYMPHOCYTES # BLD AUTO: 1.33 10*3/MM3 (ref 0.7–3.1)
LYMPHOCYTES NFR BLD AUTO: 10.4 % (ref 19.6–45.3)
MAGNESIUM SERPL-MCNC: 2.5 MG/DL (ref 1.6–2.6)
MCH RBC QN AUTO: 29.3 PG (ref 26.6–33)
MCHC RBC AUTO-ENTMCNC: 33.8 G/DL (ref 31.5–35.7)
MCV RBC AUTO: 86.7 FL (ref 79–97)
MODALITY: ABNORMAL
MONOCYTES # BLD AUTO: 0.67 10*3/MM3 (ref 0.1–0.9)
MONOCYTES NFR BLD AUTO: 5.2 % (ref 5–12)
NEUTROPHILS # BLD AUTO: 10.22 10*3/MM3 (ref 1.7–7)
NEUTROPHILS NFR BLD AUTO: 80.1 % (ref 42.7–76)
NRBC BLD AUTO-RTO: 0 /100 WBC (ref 0–0.2)
PCO2 BLDV: 40.9 MM HG (ref 41–51)
PH BLDV: 7.41 PH UNITS (ref 7.31–7.41)
PHOSPHATE SERPL-MCNC: 5.9 MG/DL (ref 2.5–4.5)
PLATELET # BLD AUTO: 324 10*3/MM3 (ref 140–450)
PMV BLD AUTO: 9.7 FL (ref 6–12)
PO2 BLDV: 71.4 MM HG (ref 35–45)
POTASSIUM BLD-SCNC: 3.2 MMOL/L (ref 3.5–5.2)
PROT SERPL-MCNC: 9.8 G/DL (ref 6–8.5)
RBC # BLD AUTO: 4.13 10*6/MM3 (ref 3.77–5.28)
SAO2 % BLDCOA: 94.2 % (ref 92–99)
SODIUM BLD-SCNC: 129 MMOL/L (ref 136–145)
TOTAL RATE: 16 BREATHS/MINUTE
WBC NRBC COR # BLD: 12.78 10*3/MM3 (ref 3.4–10.8)

## 2019-10-27 PROCEDURE — 80053 COMPREHEN METABOLIC PANEL: CPT | Performed by: EMERGENCY MEDICINE

## 2019-10-27 PROCEDURE — 71045 X-RAY EXAM CHEST 1 VIEW: CPT

## 2019-10-27 PROCEDURE — 82803 BLOOD GASES ANY COMBINATION: CPT

## 2019-10-27 PROCEDURE — 83735 ASSAY OF MAGNESIUM: CPT | Performed by: EMERGENCY MEDICINE

## 2019-10-27 PROCEDURE — 93010 ELECTROCARDIOGRAM REPORT: CPT | Performed by: INTERNAL MEDICINE

## 2019-10-27 PROCEDURE — 84100 ASSAY OF PHOSPHORUS: CPT | Performed by: EMERGENCY MEDICINE

## 2019-10-27 PROCEDURE — 99284 EMERGENCY DEPT VISIT MOD MDM: CPT

## 2019-10-27 PROCEDURE — 25010000002 CALCIUM GLUCONATE PER 10 ML: Performed by: EMERGENCY MEDICINE

## 2019-10-27 PROCEDURE — 96374 THER/PROPH/DIAG INJ IV PUSH: CPT

## 2019-10-27 PROCEDURE — 25010000002 ONDANSETRON PER 1 MG: Performed by: EMERGENCY MEDICINE

## 2019-10-27 PROCEDURE — 96375 TX/PRO/DX INJ NEW DRUG ADDON: CPT

## 2019-10-27 PROCEDURE — 93005 ELECTROCARDIOGRAM TRACING: CPT | Performed by: EMERGENCY MEDICINE

## 2019-10-27 PROCEDURE — 85025 COMPLETE CBC W/AUTO DIFF WBC: CPT | Performed by: EMERGENCY MEDICINE

## 2019-10-27 PROCEDURE — 82010 KETONE BODYS QUAN: CPT | Performed by: EMERGENCY MEDICINE

## 2019-10-27 RX ORDER — ONDANSETRON 2 MG/ML
4 INJECTION INTRAMUSCULAR; INTRAVENOUS ONCE
Status: COMPLETED | OUTPATIENT
Start: 2019-10-27 | End: 2019-10-27

## 2019-10-27 RX ORDER — SODIUM CHLORIDE 0.9 % (FLUSH) 0.9 %
10 SYRINGE (ML) INJECTION AS NEEDED
Status: DISCONTINUED | OUTPATIENT
Start: 2019-10-27 | End: 2019-10-27 | Stop reason: HOSPADM

## 2019-10-27 RX ORDER — LORAZEPAM 0.5 MG/1
0.5 TABLET ORAL EVERY 6 HOURS PRN
Qty: 8 TABLET | Refills: 0 | Status: SHIPPED | OUTPATIENT
Start: 2019-10-27 | End: 2019-10-29

## 2019-10-27 RX ORDER — POTASSIUM CHLORIDE 750 MG/1
40 CAPSULE, EXTENDED RELEASE ORAL ONCE
Status: COMPLETED | OUTPATIENT
Start: 2019-10-27 | End: 2019-10-27

## 2019-10-27 RX ADMIN — ONDANSETRON 4 MG: 2 INJECTION INTRAMUSCULAR; INTRAVENOUS at 14:05

## 2019-10-27 RX ADMIN — POTASSIUM CHLORIDE 40 MEQ: 750 CAPSULE, EXTENDED RELEASE ORAL at 15:23

## 2019-10-27 RX ADMIN — SODIUM CHLORIDE 500 ML: 9 INJECTION, SOLUTION INTRAVENOUS at 13:53

## 2019-10-27 RX ADMIN — CALCIUM GLUCONATE 1 G: 98 INJECTION, SOLUTION INTRAVENOUS at 15:12

## 2019-11-06 ENCOUNTER — HOSPITAL ENCOUNTER (OUTPATIENT)
Facility: HOSPITAL | Age: 44
Setting detail: OBSERVATION
Discharge: HOME | End: 2019-11-08
Attending: EMERGENCY MEDICINE | Admitting: INTERNAL MEDICINE

## 2019-11-06 DIAGNOSIS — D64.9 ANEMIA, UNSPECIFIED TYPE: ICD-10-CM

## 2019-11-06 DIAGNOSIS — E87.6 HYPOKALEMIA: ICD-10-CM

## 2019-11-06 DIAGNOSIS — R11.2 NON-INTRACTABLE VOMITING WITH NAUSEA, UNSPECIFIED VOMITING TYPE: ICD-10-CM

## 2019-11-06 DIAGNOSIS — E83.39 HYPERPHOSPHATEMIA: ICD-10-CM

## 2019-11-06 DIAGNOSIS — R94.31 PROLONGED QT INTERVAL: Primary | ICD-10-CM

## 2019-11-06 LAB
ALBUMIN SERPL-MCNC: 4 G/DL (ref 3.5–5.2)
ALBUMIN/GLOB SERPL: 0.8 G/DL
ALP SERPL-CCNC: 69 U/L (ref 39–117)
ALT SERPL W P-5'-P-CCNC: <5 U/L (ref 1–33)
ANION GAP SERPL CALCULATED.3IONS-SCNC: 18.8 MMOL/L (ref 5–15)
ANISOCYTOSIS BLD QL: ABNORMAL
ARTERIAL PATENCY WRIST A: POSITIVE
AST SERPL-CCNC: 9 U/L (ref 1–32)
ATMOSPHERIC PRESS: 755.7 MMHG
BASE EXCESS BLDA CALC-SCNC: 3.7 MMOL/L (ref 0–2)
BASOPHILS # BLD MANUAL: 0.13 10*3/MM3 (ref 0–0.2)
BASOPHILS NFR BLD AUTO: 1 % (ref 0–1.5)
BDY SITE: ABNORMAL
BILIRUB SERPL-MCNC: 0.3 MG/DL (ref 0.2–1.2)
BUN BLD-MCNC: 42 MG/DL (ref 6–20)
BUN/CREAT SERPL: 4.6 (ref 7–25)
CALCIUM SPEC-SCNC: 7.6 MG/DL (ref 8.6–10.5)
CHLORIDE SERPL-SCNC: 90 MMOL/L (ref 98–107)
CO2 SERPL-SCNC: 26.2 MMOL/L (ref 22–29)
CREAT BLD-MCNC: 9.06 MG/DL (ref 0.57–1)
DEPRECATED RDW RBC AUTO: 55.5 FL (ref 37–54)
EOSINOPHIL # BLD MANUAL: 0.39 10*3/MM3 (ref 0–0.4)
EOSINOPHIL NFR BLD MANUAL: 2.9 % (ref 0.3–6.2)
ERYTHROCYTE [DISTWIDTH] IN BLOOD BY AUTOMATED COUNT: 17.6 % (ref 12.3–15.4)
GFR SERPL CREATININE-BSD FRML MDRD: 5 ML/MIN/1.73
GFR SERPL CREATININE-BSD FRML MDRD: ABNORMAL ML/MIN/{1.73_M2}
GLOBULIN UR ELPH-MCNC: 5.1 GM/DL
GLUCOSE BLD-MCNC: 144 MG/DL (ref 65–99)
HCO3 BLDA-SCNC: 27.6 MMOL/L (ref 22–28)
HCT VFR BLD AUTO: 29.7 % (ref 34–46.6)
HGB BLD-MCNC: 9.7 G/DL (ref 12–15.9)
LYMPHOCYTES # BLD MANUAL: 1.18 10*3/MM3 (ref 0.7–3.1)
LYMPHOCYTES NFR BLD MANUAL: 2.9 % (ref 5–12)
LYMPHOCYTES NFR BLD MANUAL: 8.8 % (ref 19.6–45.3)
MAGNESIUM SERPL-MCNC: 2.4 MG/DL (ref 1.6–2.6)
MCH RBC QN AUTO: 28.2 PG (ref 26.6–33)
MCHC RBC AUTO-ENTMCNC: 32.7 G/DL (ref 31.5–35.7)
MCV RBC AUTO: 86.3 FL (ref 79–97)
MODALITY: ABNORMAL
MONOCYTES # BLD AUTO: 0.39 10*3/MM3 (ref 0.1–0.9)
NEUTROPHILS # BLD AUTO: 11.28 10*3/MM3 (ref 1.7–7)
NEUTROPHILS NFR BLD MANUAL: 84.3 % (ref 42.7–76)
PCO2 BLDA: 38.2 MM HG (ref 35–45)
PH BLDA: 7.47 PH UNITS (ref 7.35–7.45)
PHOSPHATE SERPL-MCNC: 6.5 MG/DL (ref 2.5–4.5)
PLAT MORPH BLD: NORMAL
PLATELET # BLD AUTO: 441 10*3/MM3 (ref 140–450)
PMV BLD AUTO: 9.4 FL (ref 6–12)
PO2 BLDA: 83.4 MM HG (ref 80–100)
POTASSIUM BLD-SCNC: 3 MMOL/L (ref 3.5–5.2)
PROT SERPL-MCNC: 9.1 G/DL (ref 6–8.5)
RBC # BLD AUTO: 3.44 10*6/MM3 (ref 3.77–5.28)
SAO2 % BLDCOA: 96.9 % (ref 92–99)
SODIUM BLD-SCNC: 135 MMOL/L (ref 136–145)
TOTAL RATE: 18 BREATHS/MINUTE
WBC MORPH BLD: NORMAL
WBC NRBC COR # BLD: 13.38 10*3/MM3 (ref 3.4–10.8)

## 2019-11-06 PROCEDURE — 83735 ASSAY OF MAGNESIUM: CPT | Performed by: PHYSICIAN ASSISTANT

## 2019-11-06 PROCEDURE — 84100 ASSAY OF PHOSPHORUS: CPT | Performed by: PHYSICIAN ASSISTANT

## 2019-11-06 PROCEDURE — 93010 ELECTROCARDIOGRAM REPORT: CPT | Performed by: INTERNAL MEDICINE

## 2019-11-06 PROCEDURE — 85007 BL SMEAR W/DIFF WBC COUNT: CPT | Performed by: PHYSICIAN ASSISTANT

## 2019-11-06 PROCEDURE — 25010000002 PROMETHAZINE PER 50 MG: Performed by: PHYSICIAN ASSISTANT

## 2019-11-06 PROCEDURE — 99285 EMERGENCY DEPT VISIT HI MDM: CPT

## 2019-11-06 PROCEDURE — 85025 COMPLETE CBC W/AUTO DIFF WBC: CPT | Performed by: PHYSICIAN ASSISTANT

## 2019-11-06 PROCEDURE — 80053 COMPREHEN METABOLIC PANEL: CPT | Performed by: PHYSICIAN ASSISTANT

## 2019-11-06 PROCEDURE — 82803 BLOOD GASES ANY COMBINATION: CPT

## 2019-11-06 PROCEDURE — 93005 ELECTROCARDIOGRAM TRACING: CPT | Performed by: PHYSICIAN ASSISTANT

## 2019-11-06 PROCEDURE — 96375 TX/PRO/DX INJ NEW DRUG ADDON: CPT

## 2019-11-06 PROCEDURE — 36600 WITHDRAWAL OF ARTERIAL BLOOD: CPT

## 2019-11-06 RX ORDER — PROMETHAZINE HYDROCHLORIDE 25 MG/ML
12.5 INJECTION, SOLUTION INTRAMUSCULAR; INTRAVENOUS ONCE
Status: COMPLETED | OUTPATIENT
Start: 2019-11-06 | End: 2019-11-06

## 2019-11-06 RX ORDER — SODIUM CHLORIDE 0.9 % (FLUSH) 0.9 %
10 SYRINGE (ML) INJECTION AS NEEDED
Status: DISCONTINUED | OUTPATIENT
Start: 2019-11-06 | End: 2019-11-08 | Stop reason: HOSPADM

## 2019-11-06 RX ORDER — POTASSIUM CHLORIDE 750 MG/1
40 CAPSULE, EXTENDED RELEASE ORAL ONCE
Status: COMPLETED | OUTPATIENT
Start: 2019-11-06 | End: 2019-11-07

## 2019-11-06 RX ORDER — ONDANSETRON 4 MG/1
4 TABLET, FILM COATED ORAL EVERY 4 HOURS PRN
COMMUNITY
End: 2019-11-08 | Stop reason: HOSPADM

## 2019-11-06 RX ADMIN — PROMETHAZINE HYDROCHLORIDE 12.5 MG: 25 INJECTION INTRAMUSCULAR; INTRAVENOUS at 22:45

## 2019-11-07 ENCOUNTER — APPOINTMENT (OUTPATIENT)
Dept: GENERAL RADIOLOGY | Facility: HOSPITAL | Age: 44
End: 2019-11-07

## 2019-11-07 PROBLEM — R94.31 PROLONGED QT INTERVAL: Status: ACTIVE | Noted: 2019-11-07

## 2019-11-07 PROBLEM — E11.9 DM TYPE 2 (DIABETES MELLITUS, TYPE 2): Status: ACTIVE | Noted: 2019-11-07

## 2019-11-07 PROBLEM — D72.829 LEUKOCYTOSIS: Status: ACTIVE | Noted: 2019-11-07

## 2019-11-07 PROBLEM — E83.51 HYPOCALCEMIA: Status: ACTIVE | Noted: 2019-11-07

## 2019-11-07 PROBLEM — E83.39 HYPERPHOSPHATEMIA: Status: ACTIVE | Noted: 2019-11-07

## 2019-11-07 PROBLEM — E87.6 HYPOKALEMIA: Status: ACTIVE | Noted: 2019-11-07

## 2019-11-07 PROBLEM — R11.0 NAUSEA: Status: ACTIVE | Noted: 2019-11-07

## 2019-11-07 PROBLEM — N18.6 ESRD (END STAGE RENAL DISEASE): Status: ACTIVE | Noted: 2019-11-07

## 2019-11-07 LAB
AMYLASE SERPL-CCNC: 128 U/L (ref 28–100)
ANION GAP SERPL CALCULATED.3IONS-SCNC: 16.3 MMOL/L (ref 5–15)
BUN BLD-MCNC: 39 MG/DL (ref 6–20)
BUN/CREAT SERPL: 4 (ref 7–25)
CALCIUM SPEC-SCNC: 6.9 MG/DL (ref 8.6–10.5)
CHLORIDE SERPL-SCNC: 92 MMOL/L (ref 98–107)
CO2 SERPL-SCNC: 27.7 MMOL/L (ref 22–29)
CREAT BLD-MCNC: 9.66 MG/DL (ref 0.57–1)
D-LACTATE SERPL-SCNC: 1.7 MMOL/L (ref 0.5–2)
DEPRECATED RDW RBC AUTO: 56.7 FL (ref 37–54)
ERYTHROCYTE [DISTWIDTH] IN BLOOD BY AUTOMATED COUNT: 17.9 % (ref 12.3–15.4)
GFR SERPL CREATININE-BSD FRML MDRD: 4 ML/MIN/1.73
GFR SERPL CREATININE-BSD FRML MDRD: ABNORMAL ML/MIN/{1.73_M2}
GLUCOSE BLD-MCNC: 104 MG/DL (ref 65–99)
GLUCOSE BLDC GLUCOMTR-MCNC: 104 MG/DL (ref 70–130)
GLUCOSE BLDC GLUCOMTR-MCNC: 117 MG/DL (ref 70–130)
GLUCOSE BLDC GLUCOMTR-MCNC: 128 MG/DL (ref 70–130)
GLUCOSE BLDC GLUCOMTR-MCNC: 186 MG/DL (ref 70–130)
HCT VFR BLD AUTO: 29.7 % (ref 34–46.6)
HGB BLD-MCNC: 9.7 G/DL (ref 12–15.9)
LIPASE SERPL-CCNC: 128 U/L (ref 13–60)
MAGNESIUM SERPL-MCNC: 2.2 MG/DL (ref 1.6–2.6)
MCH RBC QN AUTO: 28.6 PG (ref 26.6–33)
MCHC RBC AUTO-ENTMCNC: 32.7 G/DL (ref 31.5–35.7)
MCV RBC AUTO: 87.6 FL (ref 79–97)
PHOSPHATE SERPL-MCNC: 7.1 MG/DL (ref 2.5–4.5)
PLATELET # BLD AUTO: 385 10*3/MM3 (ref 140–450)
PMV BLD AUTO: 9.5 FL (ref 6–12)
POTASSIUM BLD-SCNC: 3.1 MMOL/L (ref 3.5–5.2)
PROCALCITONIN SERPL-MCNC: 0.39 NG/ML (ref 0.1–0.25)
RBC # BLD AUTO: 3.39 10*6/MM3 (ref 3.77–5.28)
SODIUM BLD-SCNC: 136 MMOL/L (ref 136–145)
WBC NRBC COR # BLD: 10.99 10*3/MM3 (ref 3.4–10.8)

## 2019-11-07 PROCEDURE — 36415 COLL VENOUS BLD VENIPUNCTURE: CPT | Performed by: NURSE PRACTITIONER

## 2019-11-07 PROCEDURE — 99203 OFFICE O/P NEW LOW 30 MIN: CPT | Performed by: INTERNAL MEDICINE

## 2019-11-07 PROCEDURE — G0378 HOSPITAL OBSERVATION PER HR: HCPCS

## 2019-11-07 PROCEDURE — 93010 ELECTROCARDIOGRAM REPORT: CPT | Performed by: INTERNAL MEDICINE

## 2019-11-07 PROCEDURE — 93005 ELECTROCARDIOGRAM TRACING: CPT | Performed by: NURSE PRACTITIONER

## 2019-11-07 PROCEDURE — 63710000001 INSULIN LISPRO (HUMAN) PER 5 UNITS: Performed by: INTERNAL MEDICINE

## 2019-11-07 PROCEDURE — 25010000002 CALCIUM GLUCONATE PER 10 ML: Performed by: INTERNAL MEDICINE

## 2019-11-07 PROCEDURE — 84155 ASSAY OF PROTEIN SERUM: CPT | Performed by: INTERNAL MEDICINE

## 2019-11-07 PROCEDURE — 80048 BASIC METABOLIC PNL TOTAL CA: CPT | Performed by: NURSE PRACTITIONER

## 2019-11-07 PROCEDURE — 83735 ASSAY OF MAGNESIUM: CPT | Performed by: NURSE PRACTITIONER

## 2019-11-07 PROCEDURE — 82150 ASSAY OF AMYLASE: CPT | Performed by: INTERNAL MEDICINE

## 2019-11-07 PROCEDURE — 84165 PROTEIN E-PHORESIS SERUM: CPT | Performed by: INTERNAL MEDICINE

## 2019-11-07 PROCEDURE — 84100 ASSAY OF PHOSPHORUS: CPT | Performed by: NURSE PRACTITIONER

## 2019-11-07 PROCEDURE — 83690 ASSAY OF LIPASE: CPT | Performed by: INTERNAL MEDICINE

## 2019-11-07 PROCEDURE — 83605 ASSAY OF LACTIC ACID: CPT | Performed by: NURSE PRACTITIONER

## 2019-11-07 PROCEDURE — 71046 X-RAY EXAM CHEST 2 VIEWS: CPT

## 2019-11-07 PROCEDURE — 82962 GLUCOSE BLOOD TEST: CPT

## 2019-11-07 PROCEDURE — 96366 THER/PROPH/DIAG IV INF ADDON: CPT

## 2019-11-07 PROCEDURE — 96365 THER/PROPH/DIAG IV INF INIT: CPT

## 2019-11-07 PROCEDURE — 84145 PROCALCITONIN (PCT): CPT | Performed by: NURSE PRACTITIONER

## 2019-11-07 PROCEDURE — 85027 COMPLETE CBC AUTOMATED: CPT | Performed by: NURSE PRACTITIONER

## 2019-11-07 RX ORDER — NICOTINE POLACRILEX 4 MG
15 LOZENGE BUCCAL
Status: DISCONTINUED | OUTPATIENT
Start: 2019-11-07 | End: 2019-11-08 | Stop reason: HOSPADM

## 2019-11-07 RX ORDER — ACETAMINOPHEN 650 MG/1
650 SUPPOSITORY RECTAL EVERY 4 HOURS PRN
Status: DISCONTINUED | OUTPATIENT
Start: 2019-11-07 | End: 2019-11-08 | Stop reason: HOSPADM

## 2019-11-07 RX ORDER — CALCIUM CARBONATE 200(500)MG
2 TABLET,CHEWABLE ORAL 2 TIMES DAILY PRN
Status: DISCONTINUED | OUTPATIENT
Start: 2019-11-07 | End: 2019-11-08 | Stop reason: HOSPADM

## 2019-11-07 RX ORDER — CALCIUM CARBONATE 500(1250)
1000 TABLET ORAL 2 TIMES DAILY
Status: DISCONTINUED | OUTPATIENT
Start: 2019-11-07 | End: 2019-11-08 | Stop reason: HOSPADM

## 2019-11-07 RX ORDER — DOXYLAMINE SUCCINATE AND PYRIDOXINE HYDROCHLORIDE, DELAYED RELEASE TABLETS 10 MG/10 MG 10; 10 MG/1; MG/1
1 TABLET, DELAYED RELEASE ORAL 4 TIMES DAILY
Status: DISCONTINUED | OUTPATIENT
Start: 2019-11-07 | End: 2019-11-08 | Stop reason: HOSPADM

## 2019-11-07 RX ORDER — ACETAMINOPHEN 325 MG/1
650 TABLET ORAL EVERY 4 HOURS PRN
Status: DISCONTINUED | OUTPATIENT
Start: 2019-11-07 | End: 2019-11-08 | Stop reason: HOSPADM

## 2019-11-07 RX ORDER — BISACODYL 5 MG/1
5 TABLET, DELAYED RELEASE ORAL DAILY PRN
Status: DISCONTINUED | OUTPATIENT
Start: 2019-11-07 | End: 2019-11-08 | Stop reason: HOSPADM

## 2019-11-07 RX ORDER — NITROGLYCERIN 0.4 MG/1
0.4 TABLET SUBLINGUAL
Status: DISCONTINUED | OUTPATIENT
Start: 2019-11-07 | End: 2019-11-08 | Stop reason: HOSPADM

## 2019-11-07 RX ORDER — SODIUM CHLORIDE 0.9 % (FLUSH) 0.9 %
10 SYRINGE (ML) INJECTION AS NEEDED
Status: DISCONTINUED | OUTPATIENT
Start: 2019-11-07 | End: 2019-11-08 | Stop reason: HOSPADM

## 2019-11-07 RX ORDER — DOXYLAMINE SUCCINATE AND PYRIDOXINE HYDROCHLORIDE, DELAYED RELEASE TABLETS 10 MG/10 MG 10; 10 MG/1; MG/1
2 TABLET, DELAYED RELEASE ORAL ONCE
Status: COMPLETED | OUTPATIENT
Start: 2019-11-07 | End: 2019-11-07

## 2019-11-07 RX ORDER — DEXTROSE MONOHYDRATE 25 G/50ML
25 INJECTION, SOLUTION INTRAVENOUS
Status: DISCONTINUED | OUTPATIENT
Start: 2019-11-07 | End: 2019-11-08 | Stop reason: HOSPADM

## 2019-11-07 RX ORDER — POLYETHYLENE GLYCOL 3350 17 G/17G
17 POWDER, FOR SOLUTION ORAL DAILY PRN
Status: DISCONTINUED | OUTPATIENT
Start: 2019-11-07 | End: 2019-11-07

## 2019-11-07 RX ORDER — ACETAMINOPHEN 160 MG/5ML
650 SOLUTION ORAL EVERY 4 HOURS PRN
Status: DISCONTINUED | OUTPATIENT
Start: 2019-11-07 | End: 2019-11-08 | Stop reason: HOSPADM

## 2019-11-07 RX ORDER — CLONIDINE HYDROCHLORIDE 0.1 MG/1
0.3 TABLET ORAL 3 TIMES DAILY
Status: DISCONTINUED | OUTPATIENT
Start: 2019-11-07 | End: 2019-11-08 | Stop reason: HOSPADM

## 2019-11-07 RX ORDER — ONDANSETRON 4 MG/1
4 TABLET, FILM COATED ORAL EVERY 6 HOURS PRN
Status: DISCONTINUED | OUTPATIENT
Start: 2019-11-07 | End: 2019-11-07

## 2019-11-07 RX ORDER — POTASSIUM CHLORIDE 750 MG/1
40 CAPSULE, EXTENDED RELEASE ORAL ONCE
Status: COMPLETED | OUTPATIENT
Start: 2019-11-07 | End: 2019-11-07

## 2019-11-07 RX ORDER — MINOXIDIL 10 MG/1
10 TABLET ORAL DAILY
Status: DISCONTINUED | OUTPATIENT
Start: 2019-11-07 | End: 2019-11-08 | Stop reason: HOSPADM

## 2019-11-07 RX ORDER — POLYETHYLENE GLYCOL 3350 17 G/17G
17 POWDER, FOR SOLUTION ORAL DAILY
Status: DISCONTINUED | OUTPATIENT
Start: 2019-11-07 | End: 2019-11-08 | Stop reason: HOSPADM

## 2019-11-07 RX ORDER — FAMOTIDINE 20 MG/1
20 TABLET, FILM COATED ORAL DAILY
Status: DISCONTINUED | OUTPATIENT
Start: 2019-11-07 | End: 2019-11-08 | Stop reason: HOSPADM

## 2019-11-07 RX ORDER — SODIUM CHLORIDE 0.9 % (FLUSH) 0.9 %
10 SYRINGE (ML) INJECTION EVERY 12 HOURS SCHEDULED
Status: DISCONTINUED | OUTPATIENT
Start: 2019-11-07 | End: 2019-11-08 | Stop reason: HOSPADM

## 2019-11-07 RX ORDER — ONDANSETRON 2 MG/ML
4 INJECTION INTRAMUSCULAR; INTRAVENOUS EVERY 6 HOURS PRN
Status: DISCONTINUED | OUTPATIENT
Start: 2019-11-07 | End: 2019-11-07

## 2019-11-07 RX ORDER — DEXTROSE MONOHYDRATE, SODIUM CHLORIDE, SODIUM LACTATE, CALCIUM CHLORIDE, MAGNESIUM CHLORIDE 1.5; 538; 448; 18.4; 5.08 G/100ML; MG/100ML; MG/100ML; MG/100ML; MG/100ML
1600 SOLUTION INTRAPERITONEAL
Status: DISCONTINUED | OUTPATIENT
Start: 2019-11-07 | End: 2019-11-08 | Stop reason: HOSPADM

## 2019-11-07 RX ORDER — DEXTROSE MONOHYDRATE, SODIUM CHLORIDE, SODIUM LACTATE, CALCIUM CHLORIDE, MAGNESIUM CHLORIDE 2.5; 538; 448; 18.4; 5.08 G/100ML; MG/100ML; MG/100ML; MG/100ML; MG/100ML
1600 SOLUTION INTRAPERITONEAL
Status: DISCONTINUED | OUTPATIENT
Start: 2019-11-07 | End: 2019-11-08 | Stop reason: HOSPADM

## 2019-11-07 RX ORDER — CARVEDILOL 12.5 MG/1
12.5 TABLET ORAL 2 TIMES DAILY WITH MEALS
Status: DISCONTINUED | OUTPATIENT
Start: 2019-11-07 | End: 2019-11-08 | Stop reason: HOSPADM

## 2019-11-07 RX ADMIN — POLYETHYLENE GLYCOL 3350 17 G: 17 POWDER, FOR SOLUTION ORAL at 08:47

## 2019-11-07 RX ADMIN — MINOXIDIL 10 MG: 10 TABLET ORAL at 08:46

## 2019-11-07 RX ADMIN — CLONIDINE HYDROCHLORIDE 0.3 MG: 0.1 TABLET ORAL at 21:21

## 2019-11-07 RX ADMIN — DEXTROSE MONOHYDRATE, SODIUM CHLORIDE, SODIUM LACTATE, CALCIUM CHLORIDE, MAGNESIUM CHLORIDE 1600 ML: 1.5; 538; 448; 18.4; 5.08 SOLUTION INTRAPERITONEAL at 14:00

## 2019-11-07 RX ADMIN — DOXYLAMINE SUCCINATE AND PYRIDOXINE HYDROCHLORIDE 1 TABLET: 10; 10 TABLET, DELAYED RELEASE ORAL at 17:27

## 2019-11-07 RX ADMIN — DEXTROSE MONOHYDRATE, SODIUM CHLORIDE, SODIUM LACTATE, CALCIUM CHLORIDE, MAGNESIUM CHLORIDE 1600 ML: 1.5; 538; 448; 18.4; 5.08 SOLUTION INTRAPERITONEAL at 22:00

## 2019-11-07 RX ADMIN — POTASSIUM CHLORIDE 40 MEQ: 750 CAPSULE, EXTENDED RELEASE ORAL at 00:06

## 2019-11-07 RX ADMIN — SODIUM CHLORIDE 500 ML: 9 INJECTION, SOLUTION INTRAVENOUS at 00:06

## 2019-11-07 RX ADMIN — DEXTROSE MONOHYDRATE, SODIUM CHLORIDE, SODIUM LACTATE, CALCIUM CHLORIDE, MAGNESIUM CHLORIDE 1600 ML: 2.5; 538; 448; 18.4; 5.08 SOLUTION INTRAPERITONEAL at 17:26

## 2019-11-07 RX ADMIN — CLONIDINE HYDROCHLORIDE 0.3 MG: 0.1 TABLET ORAL at 08:47

## 2019-11-07 RX ADMIN — CALCIUM 1000 MG: 500 TABLET ORAL at 11:27

## 2019-11-07 RX ADMIN — DOXYLAMINE SUCCINATE AND PYRIDOXINE HYDROCHLORIDE 2 TABLET: 10; 10 TABLET, DELAYED RELEASE ORAL at 06:59

## 2019-11-07 RX ADMIN — CALCIUM 1000 MG: 500 TABLET ORAL at 21:24

## 2019-11-07 RX ADMIN — SODIUM CHLORIDE, PRESERVATIVE FREE 10 ML: 5 INJECTION INTRAVENOUS at 21:25

## 2019-11-07 RX ADMIN — CARVEDILOL 12.5 MG: 12.5 TABLET, FILM COATED ORAL at 08:46

## 2019-11-07 RX ADMIN — INSULIN LISPRO 2 UNITS: 100 INJECTION, SOLUTION INTRAVENOUS; SUBCUTANEOUS at 21:21

## 2019-11-07 RX ADMIN — CALCIUM GLUCONATE 1 G: 98 INJECTION, SOLUTION INTRAVENOUS at 11:27

## 2019-11-07 RX ADMIN — CLONIDINE HYDROCHLORIDE 0.3 MG: 0.1 TABLET ORAL at 17:27

## 2019-11-07 RX ADMIN — DOXYLAMINE SUCCINATE AND PYRIDOXINE HYDROCHLORIDE 1 TABLET: 10; 10 TABLET, DELAYED RELEASE ORAL at 12:08

## 2019-11-07 RX ADMIN — FAMOTIDINE 20 MG: 20 TABLET, FILM COATED ORAL at 08:47

## 2019-11-07 RX ADMIN — DOXYLAMINE SUCCINATE AND PYRIDOXINE HYDROCHLORIDE 1 TABLET: 10; 10 TABLET, DELAYED RELEASE ORAL at 21:21

## 2019-11-07 RX ADMIN — CARVEDILOL 12.5 MG: 12.5 TABLET, FILM COATED ORAL at 17:27

## 2019-11-07 RX ADMIN — SODIUM CHLORIDE, PRESERVATIVE FREE 10 ML: 5 INJECTION INTRAVENOUS at 03:30

## 2019-11-07 RX ADMIN — POTASSIUM CHLORIDE 40 MEQ: 750 CAPSULE, EXTENDED RELEASE ORAL at 11:27

## 2019-11-07 RX ADMIN — SODIUM CHLORIDE, PRESERVATIVE FREE 10 ML: 5 INJECTION INTRAVENOUS at 08:47

## 2019-11-07 NOTE — ED NOTES
RESTING IN BED QUIETLY. NAD NOTED. VSS. DENIES NAUSEA AT THIS TIME. CALL LIGHT IN REACH. SPOUSE AT BEDSIDE.     Sultana Macedo RN  11/07/19 0012

## 2019-11-07 NOTE — CONSULTS
Referring Provider: Tamara  Reason for Consultation: ESRD on PD    Subjective     Chief complaint   Chief Complaint   Patient presents with   • Nausea       History of present illness:  43 yo female with ESRD sp failed DDKT ( transplant , failed ).  Initial kidney disease hypertensive.  DM2, HTN.  On CCPD with one manual exchange daily.  Complains of 3 weeks of nausea without vomiting. Some reflux symptoms.  Occurs all day. No weight loss. No contacts ill. Bowels moving.  Occas. Constipation relieved with Miralax.  PD going fine. Fluid clear.  No abdominal pain. No fever.  Makes no urine.     Past Medical History:   Diagnosis Date   • Acid reflux    • Anemia    • Arthritis    • Diabetes mellitus (CMS/HCC)    • ESRD on dialysis (CMS/[facility])     TUESDAY, THURSDAY AND SATURDAY   • History of peritoneal dialysis     KIDNEY TRANSPLANT 2016   • History of transfusion     BEEN A WHILE   • Hypercalcemia    • Hyperparathyroidism (CMS/[facility])    • Hypertension    • Kidney disease     End-stage renal disease. TRANSPLANT   • Kidney transplant recipient 2016    RIGHT KIDNEY   • Seasonal allergies     CURRENTLY    • UTI (urinary tract infection)     CURRENTLY BEING TREATED. WAS HOSPITALIZED AT [facility] - 2018     Past Surgical History:   Procedure Laterality Date   • INSERTION PERITONEAL DIALYSIS CATHETER  2014    Laparoscopic placement of Curl Cath peritoneal dialysis catheter, Dr. Vinod Goldstein   • INSERTION PERITONEAL DIALYSIS CATHETER N/A 2019    Procedure: LAPAROSCOPIC INSERTION PERITONEAL DIALYSIS CATHETER;  Surgeon: Damon Rooney MD;  Location: [facility];  Service: General   • REMOVAL PERITONEAL DIALYSIS CATHETER N/A 10/17/2016    Procedure: REMOVAL PERITONEAL DIALYSIS CATHETER;  Surgeon: Damon Rooney MD;  Location: [facility] OR;  Service:    • TRANSPLANTATION RENAL Right 2016    from  donor   • TUBAL ABDOMINAL LIGATION Bilateral   "    Family History   Problem Relation Age of Onset   • Malig Hyperthermia Neg Hx        Social History     Tobacco Use   • Smoking status: Never Smoker   • Smokeless tobacco: Never Used   Substance Use Topics   • Alcohol use: No   • Drug use: No     Medications Prior to Admission   Medication Sig Dispense Refill Last Dose   • calcium acetate (PHOSLO) 667 MG capsule Take 2,001 mg by mouth 3 (Three) Times a Day With Meals.   11/6/2019 at Unknown time   • carvedilol (COREG) 12.5 MG tablet Take 12.5 mg by mouth 2 (Two) Times a Day With Meals.   11/6/2019 at Unknown time   • CloNIDine (CATAPRES) 0.3 MG tablet Take 0.3 mg by mouth 3 (Three) Times a Day.   11/6/2019 at Unknown time   • minoxidil (LONITEN) 10 MG tablet Take 10 mg by mouth Daily.   11/6/2019 at Unknown time   • ondansetron (ZOFRAN) 4 MG tablet Take 4 mg by mouth Every 4 (Four) Hours As Needed for Nausea or Vomiting.   11/6/2019 at Unknown time   • polyethylene glycol (MIRALAX) packet Take 17 g by mouth Daily. (Patient taking differently: Take 17 g by mouth Daily As Needed.) 30 each 2 Past Month at Unknown time     Allergies:  Patient has no known allergies.    Review of Systems  Nausea, no vomiting. No fever. No weight loss. Bowels occas constipated. No melena or hematochezia.  No abdominal pain. Not soa. No edema.     Objective     Vital Signs  Temp:  [98 °F (36.7 °C)-99.6 °F (37.6 °C)] 98 °F (36.7 °C)  Heart Rate:  [] 88  Resp:  [17-20] 18  BP: (110-136)/() 132/86    Flowsheet Rows      First Filed Value   Admission Height  152.4 cm (60\") Documented at 11/06/2019 2204   Admission Weight  81.6 kg (180 lb) Documented at 11/06/2019 2221           I/O this shift:  In: 60 [P.O.:60]  Out: -   I/O last 3 completed shifts:  In: 500 [IV Piggyback:500]  Out: -     Intake/Output Summary (Last 24 hours) at 11/7/2019 0920  Last data filed at 11/7/2019 0908  Gross per 24 hour   Intake 560 ml   Output --   Net 560 ml       Physical Exam:  Awake, alert. " Chronically ill .Lying in bed  Oral mucosa moist  Neck no jvd  Heart RRR no s3 or rub  Lungs clear to auscultation, no wheezes  Abd +bs , soft, nontender. Exit site clean. KT RLQ nontender.  No body wall edema  Ext trace lower ext edema.   Skin no rash.   Neuro speech fluent. Flat affect. Moves all 4 ext.     Results Review:  Results for orders placed or performed during the hospital encounter of 11/06/19   Comprehensive Metabolic Panel   Result Value Ref Range    Glucose 144 (H) 65 - 99 mg/dL    BUN 42 (H) 6 - 20 mg/dL    Creatinine 9.06 (H) 0.57 - 1.00 mg/dL    Sodium 135 (L) 136 - 145 mmol/L    Potassium 3.0 (L) 3.5 - 5.2 mmol/L    Chloride 90 (L) 98 - 107 mmol/L    CO2 26.2 22.0 - 29.0 mmol/L    Calcium 7.6 (L) 8.6 - 10.5 mg/dL    Total Protein 9.1 (H) 6.0 - 8.5 g/dL    Albumin 4.00 3.50 - 5.20 g/dL    ALT (SGPT) <5 1 - 33 U/L    AST (SGOT) 9 1 - 32 U/L    Alkaline Phosphatase 69 39 - 117 U/L    Total Bilirubin 0.3 0.2 - 1.2 mg/dL    eGFR Non African Amer 5 (L) >60 mL/min/1.73    eGFR  African Amer      Globulin 5.1 gm/dL    A/G Ratio 0.8 g/dL    BUN/Creatinine Ratio 4.6 (L) 7.0 - 25.0    Anion Gap 18.8 (H) 5.0 - 15.0 mmol/L   Phosphorus   Result Value Ref Range    Phosphorus 6.5 (H) 2.5 - 4.5 mg/dL   Magnesium   Result Value Ref Range    Magnesium 2.4 1.6 - 2.6 mg/dL   CBC Auto Differential   Result Value Ref Range    WBC 13.38 (H) 3.40 - 10.80 10*3/mm3    RBC 3.44 (L) 3.77 - 5.28 10*6/mm3    Hemoglobin 9.7 (L) 12.0 - 15.9 g/dL    Hematocrit 29.7 (L) 34.0 - 46.6 %    MCV 86.3 79.0 - 97.0 fL    MCH 28.2 26.6 - 33.0 pg    MCHC 32.7 31.5 - 35.7 g/dL    RDW 17.6 (H) 12.3 - 15.4 %    RDW-SD 55.5 (H) 37.0 - 54.0 fl    MPV 9.4 6.0 - 12.0 fL    Platelets 441 140 - 450 10*3/mm3   Blood Gas, Arterial   Result Value Ref Range    Site Arterial: right radial     Cristopher's Test Positive     pH, Arterial 7.467 (H) 7.350 - 7.450 pH units    pCO2, Arterial 38.2 35.0 - 45.0 mm Hg    pO2, Arterial 83.4 80.0 - 100.0 mm Hg    HCO3,  Arterial 27.6 22.0 - 28.0 mmol/L    Base Excess, Arterial 3.7 (H) 0.0 - 2.0 mmol/L    O2 Saturation Calculated 96.9 92.0 - 99.0 %    Barometric Pressure for Blood Gas 755.7 mmHg    Modality Room Air     Rate 18 Breaths/minute   Basic Metabolic Panel   Result Value Ref Range    Glucose 104 (H) 65 - 99 mg/dL    BUN 39 (H) 6 - 20 mg/dL    Creatinine 9.66 (H) 0.57 - 1.00 mg/dL    Sodium 136 136 - 145 mmol/L    Potassium 3.1 (L) 3.5 - 5.2 mmol/L    Chloride 92 (L) 98 - 107 mmol/L    CO2 27.7 22.0 - 29.0 mmol/L    Calcium 6.9 (L) 8.6 - 10.5 mg/dL    eGFR  African Amer      eGFR Non African Amer 4 (L) >60 mL/min/1.73    BUN/Creatinine Ratio 4.0 (L) 7.0 - 25.0    Anion Gap 16.3 (H) 5.0 - 15.0 mmol/L   CBC (No Diff)   Result Value Ref Range    WBC 10.99 (H) 3.40 - 10.80 10*3/mm3    RBC 3.39 (L) 3.77 - 5.28 10*6/mm3    Hemoglobin 9.7 (L) 12.0 - 15.9 g/dL    Hematocrit 29.7 (L) 34.0 - 46.6 %    MCV 87.6 79.0 - 97.0 fL    MCH 28.6 26.6 - 33.0 pg    MCHC 32.7 31.5 - 35.7 g/dL    RDW 17.9 (H) 12.3 - 15.4 %    RDW-SD 56.7 (H) 37.0 - 54.0 fl    MPV 9.5 6.0 - 12.0 fL    Platelets 385 140 - 450 10*3/mm3   Magnesium   Result Value Ref Range    Magnesium 2.2 1.6 - 2.6 mg/dL   Phosphorus   Result Value Ref Range    Phosphorus 7.1 (H) 2.5 - 4.5 mg/dL   Procalcitonin   Result Value Ref Range    Procalcitonin 0.39 (H) 0.10 - 0.25 ng/mL   Lactic Acid, Plasma   Result Value Ref Range    Lactate 1.7 0.5 - 2.0 mmol/L   POC Glucose Once   Result Value Ref Range    Glucose 128 70 - 130 mg/dL   Manual Differential   Result Value Ref Range    Neutrophil % 84.3 (H) 42.7 - 76.0 %    Lymphocyte % 8.8 (L) 19.6 - 45.3 %    Monocyte % 2.9 (L) 5.0 - 12.0 %    Eosinophil % 2.9 0.3 - 6.2 %    Basophil % 1.0 0.0 - 1.5 %    Neutrophils Absolute 11.28 (H) 1.70 - 7.00 10*3/mm3    Lymphocytes Absolute 1.18 0.70 - 3.10 10*3/mm3    Monocytes Absolute 0.39 0.10 - 0.90 10*3/mm3    Eosinophils Absolute 0.39 0.00 - 0.40 10*3/mm3    Basophils Absolute 0.13 0.00 - 0.20  10*3/mm3    Anisocytosis Mod/2+ None Seen    WBC Morphology Normal Normal    Platelet Morphology Normal Normal     Imaging Results (Last 72 Hours)     Procedure Component Value Units Date/Time    XR Chest PA & Lateral [[id number]] Collected:  11/07/19 0754     Updated:  11/07/19 0825    Narrative:       2-VIEW CHEST     HISTORY: Leukocytosis.     FINDINGS: The lungs are moderately expanded and clear. Allowing for the  lung expansion, the heart size is normal and there is no acute disease  or change from 10/27/2019.     This report was finalized on 11/7/2019 8:22 AM by Dr. Adam Aviles M.D.                 calcium carbonate (oyster shell) 1,000 mg Oral BID   calcium gluconate 1 g Intravenous Once   carvedilol 12.5 mg Oral BID With Meals   cloNIDine 0.3 mg Oral TID   DELFLEX-LC/1.5% DEXTROSE 1,600 mL Intraperitoneal 5x Daily   DELFLEX-LC/2.5% DEXTROSE 1,600 mL Intraperitoneal 5x Daily   famotidine 20 mg Oral Daily   minoxidil 10 mg Oral Daily   polyethylene glycol 17 g Oral Daily   potassium chloride 40 mEq Oral Once   sodium chloride 10 mL Intravenous Q12H          Assessment/Plan   1. ESRD. On CCPD at home with a manual exchange during day.  Will do CAPD 5 exchanges here alternating 1.5 with 2.5 %.  Replace K and Calcium.  Elevated Total protein.  Check PRINCE.   2. Nausea without vomiting.  Possible GERD.  Added Pepcid.  GI to see If Ok with Dr. Mcdonald.  WBC up a little yesterday, but better today.  No indication of Peritonitis clinically, but check fluid .  3. Prolonged QT interval  4. HTN  5. SP failed DDKT.  Transplant in 2016, failed 7/19.  6. Anemia CKD  7. DM2 on oral agent.   8. Hypocalcemia.  SP parathyroidectomy with auto transplant to left forearm 4/18. Replace Calcium.      I discussed the patients findings and my recommendations with patient.     Addie Watt MD  11/07/19  9:20 AM

## 2019-11-07 NOTE — ED TRIAGE NOTES
Pt to ED with c/o nausea x a few weeks.  Pt admits to being in ED last week for same symptoms and was prescribed zofran with no relief.  Pt reports doing peritoneal dialysis at home, and hasnt missed treatment.

## 2019-11-07 NOTE — PROGRESS NOTES
Discharge Planning Assessment  [facility]     Patient Name: Sunni Mcneil  MRN: [id number]  Today's Date: 11/7/2019    Admit Date: 11/6/2019    Discharge Needs Assessment     Row Name 11/07/19 1605       Living Environment    Lives With  spouse;child(dagmar), adult    Name(s) of Who Lives With Patient  spouse, Chuy Mcneil, [phone] and daughter, Angelique Simons, [phone]    Current Living Arrangements  home/apartment/condo    Primary Care Provided by  self    Provides Primary Care For  no one    Family Caregiver if Needed  spouse    Quality of Family Relationships  helpful;involved;supportive    Able to Return to Prior Arrangements  yes       Resource/Environmental Concerns    Resource/Environmental Concerns  none       Transition Planning    Patient/Family Anticipates Transition to  home with family    Patient/Family Anticipated Services at Transition  none    Transportation Anticipated  family or friend will provide       Discharge Needs Assessment    Concerns to be Addressed  no discharge needs identified;denies needs/concerns at this time    Equipment Currently Used at Home  other (see comments) PD equipment    Outpatient/Agency/Support Group Needs  other (see comments) PD    Discharge Coordination/Progress  Home        Discharge Plan     Row Name 11/07/19 1607       Plan    Plan  Home with family    Patient/Family in Agreement with Plan  yes    Plan Comments  CCP met with pt to discuss d/c planning. DEVINE notice checked. Pt resides in an apartment with her  and daughter, has home PD but uses no additional DME, and denies past HH/sub-acute rehab. Pt uses [facility] pharmacy on [address], and [facility] for primary [facility]. Pt anticipates no needs at d/c. CCP to follow to assist should d/c needs arise. Grecia Lord LCSW        Destination      No service coordination in this encounter.      Durable Medical Equipment      No service coordination in this encounter.      Dialysis/Infusion       No service coordination in this encounter.      Home Medical Care      No service coordination in this encounter.      Therapy      No service coordination in this encounter.      Community Resources      No service coordination in this encounter.          Demographic Summary     Row Name 11/07/19 1604       General Information    Admission Type  observation    Arrived From  home    Required Notices Provided  Observation Status Notice    Referral Source  admission list    Reason for Consult  discharge planning    Preferred Language  English        Functional Status     Row Name 11/07/19 1604       Functional Status    Usual Activity Tolerance  good    Current Activity Tolerance  good       Functional Status, IADL    Medications  independent    Meal Preparation  independent    Housekeeping  independent    Laundry  independent    Shopping  independent       Mental Status Summary    Recent Changes in Mental Status/Cognitive Functioning  no changes        Psychosocial    No documentation.       Abuse/Neglect    No documentation.       Legal    No documentation.       Substance Abuse    No documentation.       Patient Forms    No documentation.           Estefania Lord LCSW

## 2019-11-07 NOTE — ED NOTES
PT PRESENTS TO ER WITH C/O CONSTANT NAUSEA FOR THE PAST 3 WEEKS. DENIES ABDOMINAL PAIN, VOMITING, DIARRHEA, FEVER, OR ANY OTHER SYMPTOMS. REPORTS SHE WAS SEEN HERE IN THIS ER LAST WEEK FOR SAME SYMPTOMS. SAYS SHE HAS BEEN TAKING ZOFRAN WHICH HELPED FOR A FEW DAYS BUT HAS NOT HELPED AT ALL TODAY. SAYS SHE CALLED HER PCP TODAY WHO CALLED IN ANOTHER PRESCRIPTION BUT SHE WAS UNABLE TO GET IT FILLED BEFORE PHARMACY CLOSED. PT STATES SHE DOES NOT KNOW WHAT THE NAME OF THE PRESCRIPTION IS.     A/O X4. MMM. RR E/U. SKIN PWD. HYPERTENSIVE AND TACHYCARDIC. PD CATHETER NOTED TO LEFT UPPER ABDOMEN.     Sultana Macedo, RN  11/06/19 0258

## 2019-11-07 NOTE — ED NOTES
Nursing report ED to floor  Sunni Mcneil  44 y.o.  female    HPI (triage note):   Chief Complaint   Patient presents with   • Nausea       Admitting doctor:   Raciel Kesley MD    Admitting diagnosis:   The primary encounter diagnosis was Prolonged QT interval. Diagnoses of Hyperphosphatemia, Hypokalemia, Non-intractable vomiting with nausea, unspecified vomiting type, and Anemia, unspecified type were also pertinent to this visit.    Code status:   Current Code Status     Date Active Code Status Order ID Comments User Context       11/7/2019 02:02 CPR [id number]  Renee Muller APRN ED       Questions for Current Code Status     Question Answer Comment    Code Status CPR     Medical Interventions (Level of Support Prior to Arrest) Full           Allergies:   Patient has no known allergies.    Weight:       11/06/19 2221   Weight: 81.6 kg (180 lb)       Most recent vitals:   Vitals:    11/07/19 0030 11/07/19 0100 11/07/19 0130 11/07/19 0200   BP: 116/87 116/86 130/90 127/82   Pulse: 101 104 102 107   Resp:       Temp:       TempSrc:       SpO2: 97% 97%     Weight:       Height:           Active LDAs/IV Access:   Lines, Drains & Airways    Active LDAs     Name:   Placement date:   Placement time:   Site:   Days:    Peripheral IV 11/06/19 2239 Left Antecubital   11/06/19 2239    Antecubital   less than 1    Peritoneal Dialysis Catheter    07/24/19    1408    -- left upper abdomen   105    Hemodialysis Cath Double   -- Present on arrival.     --    Chest                   Labs (abnormal labs have a star):   Labs Reviewed   COMPREHENSIVE METABOLIC PANEL - Abnormal; Notable for the following components:       Result Value    Glucose 144 (*)     BUN 42 (*)     Creatinine 9.06 (*)     Sodium 135 (*)     Potassium 3.0 (*)     Chloride 90 (*)     Calcium 7.6 (*)     Total Protein 9.1 (*)     eGFR Non African Amer 5 (*)     BUN/Creatinine Ratio 4.6 (*)     Anion Gap 18.8 (*)     All other components  within normal limits    Narrative:     GFR Normal >60  Chronic Kidney Disease <60  Kidney Failure <15   PHOSPHORUS - Abnormal; Notable for the following components:    Phosphorus 6.5 (*)     All other components within normal limits   CBC WITH AUTO DIFFERENTIAL - Abnormal; Notable for the following components:    WBC 13.38 (*)     RBC 3.44 (*)     Hemoglobin 9.7 (*)     Hematocrit 29.7 (*)     RDW 17.6 (*)     RDW-SD 55.5 (*)     All other components within normal limits   BLOOD GAS, ARTERIAL - Abnormal; Notable for the following components:    pH, Arterial 7.467 (*)     Base Excess, Arterial 3.7 (*)     All other components within normal limits   MANUAL DIFFERENTIAL - Abnormal; Notable for the following components:    Neutrophil % 84.3 (*)     Lymphocyte % 8.8 (*)     Monocyte % 2.9 (*)     Neutrophils Absolute 11.28 (*)     All other components within normal limits   MAGNESIUM - Normal   BLOOD GAS, ARTERIAL   BASIC METABOLIC PANEL   CBC (NO DIFF)   MAGNESIUM   CBC AND DIFFERENTIAL    Narrative:     The following orders were created for panel order CBC & Differential.  Procedure                               Abnormality         Status                     ---------                               -----------         ------                     CBC Auto Differential[[id number]]        Abnormal            Final result                 Please view results for these tests on the individual orders.       EKG:   ECG 12 Lead   Preliminary Result   HEART RATE= 91  bpm   RR Interval= 656  ms   MN Interval= 167  ms   P Horizontal Axis= 12  deg   P Front Axis= 16  deg   QRSD Interval= 102  ms   QT Interval= 432  ms   QRS Axis= 18  deg   T Wave Axis= 3  deg   - ABNORMAL ECG -   Sinus rhythm   Prolonged QT interval   Electronically Signed By:    Date and Time of Study: 2019-11-06 22:50:47      ECG 12 Lead    (Results Pending)       Meds given in ED:   Medications   sodium chloride 0.9 % flush 10 mL (not administered)   sodium  chloride 0.9 % flush 10 mL (not administered)   sodium chloride 0.9 % flush 10 mL (not administered)   nitroglycerin (NITROSTAT) SL tablet 0.4 mg (not administered)   acetaminophen (TYLENOL) tablet 650 mg (not administered)     Or   acetaminophen (TYLENOL) 160 MG/5ML solution 650 mg (not administered)     Or   acetaminophen (TYLENOL) suppository 650 mg (not administered)   bisacodyl (DULCOLAX) EC tablet 5 mg (not administered)   ondansetron (ZOFRAN) tablet 4 mg (not administered)     Or   ondansetron (ZOFRAN) injection 4 mg (not administered)   calcium carbonate (TUMS) chewable tablet 500 mg (200 mg elemental) (not administered)   promethazine (PHENERGAN) injection 12.5 mg (12.5 mg Intravenous Given 11/6/19 2245)   sodium chloride 0.9 % bolus 500 mL (0 mL Intravenous Stopped 11/7/19 0159)   potassium chloride (MICRO-K) CR capsule 40 mEq (40 mEq Oral Given 11/7/19 0006)       Imaging results:  No radiology results for the last day    Ambulatory status:   STANDBY ASSIST    Social issues:   Social History     Socioeconomic History   • Marital status:      Spouse name: Not on file   • Number of children: 2   • Years of education: 12   • Highest education level: Not on file   Occupational History     Employer: [employer]   Tobacco Use   • Smoking status: Never Smoker   • Smokeless tobacco: Never Used   Substance and Sexual Activity   • Alcohol use: No   • Drug use: No   • Sexual activity: Sultana Green RN  11/07/19 7653

## 2019-11-07 NOTE — ED PROVIDER NOTES
EMERGENCY DEPARTMENT ENCOUNTER    Room Number:  22/22  Date of encounter:  11/7/2019  PCP: Provider, No Known  Historian: patient, family      HPI:  Chief Complaint: nausea  Context: Sunni Mcneil is a 44 y.o. female with Hx of ESRD, who presents to the ED c/o constant nausea for the past 3 weeks. Pt is currently on peritoneal dialysis. Per pt, she was seen in the ED last week for similar nausea. She states that the nausea resolved for a few days after that, but has since returned. Denies abd pain, vomiting, CP, fever, chills, dizziness, and lightheadedness. Pt reports that she has been taking Zofran for her nausea, which used to help, but has since stopped working. The pt also reports that she called her PCP this morning who then called in a new prescription for the pt, but she has been unable to pick this up. There are no other complaints.     MEDICAL RECORD REVIEW  Pt was seen in the ED on 10/27/19 by Dr. Perry. Per his note, the pt refused to have CT abd done. Additionally, both Dr. Perry and Dr. Bee from Nephrology wanted the pt admitted, however, the pt was adamant that she not be admitted. The pt was informed of the risks of discharge and was given Rx for Ativan.     PAST MEDICAL HISTORY  Active Ambulatory Problems     Diagnosis Date Noted   • Hyperparathyroidism (CMS/[facility]) 01/24/2018   • Acute rejection of kidney transplant 05/10/2017   • ARF (acute renal failure) (CMS/[facility]) 04/04/2014   • Hx of kidney transplant 07/06/2017   • Hypertension 01/24/2018   • Kidney transplant status, cadaveric 07/06/2017   • Nephritis 04/13/2014   • Hypercalcemia 01/24/2018   • ESRD on hemodialysis (CMS/[facility]) 07/02/2019   • Peritoneal dialysis catheter in place (CMS/[facility]) 07/24/2019     Resolved Ambulatory Problems     Diagnosis Date Noted   • No Resolved Ambulatory Problems     Past Medical History:   Diagnosis Date   • Acid reflux    • Anemia    • Arthritis    • Diabetes mellitus (CMS/[facility])    • ESRD on dialysis (CMS/[facility])    •  History of peritoneal dialysis    • History of transfusion    • Hypercalcemia    • Hyperparathyroidism (CMS/[facility])    • Hypertension    • Kidney disease    • Kidney transplant recipient 2016   • Seasonal allergies    • UTI (urinary tract infection)          PAST SURGICAL HISTORY  Past Surgical History:   Procedure Laterality Date   • INSERTION PERITONEAL DIALYSIS CATHETER  2014    Laparoscopic placement of Curl Cath peritoneal dialysis catheter, Dr. Vinod Goldstein   • INSERTION PERITONEAL DIALYSIS CATHETER N/A 2019    Procedure: LAPAROSCOPIC INSERTION PERITONEAL DIALYSIS CATHETER;  Surgeon: Damon Rooney MD;  Location: [facility] MAIN OR;  Service: General   • REMOVAL PERITONEAL DIALYSIS CATHETER N/A 10/17/2016    Procedure: REMOVAL PERITONEAL DIALYSIS CATHETER;  Surgeon: Damon Rooney MD;  Location: [facility] MAIN OR;  Service:    • TRANSPLANTATION RENAL Right 2016    from  donor   • TUBAL ABDOMINAL LIGATION Bilateral          FAMILY HISTORY  Family History   Problem Relation Age of Onset   • Malig Hyperthermia Neg Hx          SOCIAL HISTORY  Social History     Socioeconomic History   • Marital status:      Spouse name: Not on file   • Number of children: 2   • Years of education: 12   • Highest education level: Not on file   Occupational History     Employer: [employer]   Tobacco Use   • Smoking status: Never Smoker   • Smokeless tobacco: Never Used   Substance and Sexual Activity   • Alcohol use: No   • Drug use: No   • Sexual activity: Defer         ALLERGIES  Patient has no known allergies.        REVIEW OF SYSTEMS  Review of Systems   Constitutional: Negative for fever.   HENT: Negative for sore throat.    Eyes: Negative.    Respiratory: Negative for cough and shortness of breath.    Cardiovascular: Negative for chest pain.   Gastrointestinal: Positive for nausea. Negative for abdominal pain, diarrhea and vomiting.   Genitourinary: Negative for dysuria.    Musculoskeletal: Negative for neck pain.   Skin: Negative for rash.   Allergic/Immunologic: Negative.    Neurological: Negative for weakness, numbness and headaches.   Hematological: Negative.    Psychiatric/Behavioral: Negative.    All other systems reviewed and are negative.       PHYSICAL EXAM    I have reviewed the triage vital signs and nursing notes.    ED Triage Vitals   Temp Heart Rate Resp BP SpO2   11/06/19 2204 11/06/19 2204 11/06/19 2204 11/06/19 2221 11/06/19 2204   99.6 °F (37.6 °C) (!) 121 17 (!) 129/101 100 %      Temp src Heart Rate Source Patient Position BP Location FiO2 (%)   11/06/19 2204 11/06/19 2221 11/06/19 2221 11/06/19 2221 --   Tympanic Monitor Lying Right arm          Physical Exam   Constitutional: She is oriented to person, place, and time. No distress.   HENT:   Head: Normocephalic and atraumatic.   Eyes: EOM are normal. Pupils are equal, round, and reactive to light.   Neck: Normal range of motion. Neck supple.   Cardiovascular: Normal rate, regular rhythm and normal heart sounds.   Pulmonary/Chest: Effort normal and breath sounds normal. No respiratory distress.   Abdominal: Soft. There is no tenderness. There is no rebound and no guarding.   Musculoskeletal: Normal range of motion. She exhibits no edema.   Neurological: She is alert and oriented to person, place, and time. She has normal sensation and normal strength.   Skin: Skin is warm and dry. No rash noted.   Psychiatric: Mood and affect normal.   Nursing note and vitals reviewed.      LAB RESULTS  Recent Results (from the past 24 hour(s))   Comprehensive Metabolic Panel    Collection Time: 11/06/19 10:40 PM   Result Value Ref Range    Glucose 144 (H) 65 - 99 mg/dL    BUN 42 (H) 6 - 20 mg/dL    Creatinine 9.06 (H) 0.57 - 1.00 mg/dL    Sodium 135 (L) 136 - 145 mmol/L    Potassium 3.0 (L) 3.5 - 5.2 mmol/L    Chloride 90 (L) 98 - 107 mmol/L    CO2 26.2 22.0 - 29.0 mmol/L    Calcium 7.6 (L) 8.6 - 10.5 mg/dL    Total Protein 9.1  (H) 6.0 - 8.5 g/dL    Albumin 4.00 3.50 - 5.20 g/dL    ALT (SGPT) <5 1 - 33 U/L    AST (SGOT) 9 1 - 32 U/L    Alkaline Phosphatase 69 39 - 117 U/L    Total Bilirubin 0.3 0.2 - 1.2 mg/dL    eGFR Non African Amer 5 (L) >60 mL/min/1.73    eGFR  African Amer      Globulin 5.1 gm/dL    A/G Ratio 0.8 g/dL    BUN/Creatinine Ratio 4.6 (L) 7.0 - 25.0    Anion Gap 18.8 (H) 5.0 - 15.0 mmol/L   Phosphorus    Collection Time: 11/06/19 10:40 PM   Result Value Ref Range    Phosphorus 6.5 (H) 2.5 - 4.5 mg/dL   Magnesium    Collection Time: 11/06/19 10:40 PM   Result Value Ref Range    Magnesium 2.4 1.6 - 2.6 mg/dL   CBC Auto Differential    Collection Time: 11/06/19 10:40 PM   Result Value Ref Range    WBC 13.38 (H) 3.40 - 10.80 10*3/mm3    RBC 3.44 (L) 3.77 - 5.28 10*6/mm3    Hemoglobin 9.7 (L) 12.0 - 15.9 g/dL    Hematocrit 29.7 (L) 34.0 - 46.6 %    MCV 86.3 79.0 - 97.0 fL    MCH 28.2 26.6 - 33.0 pg    MCHC 32.7 31.5 - 35.7 g/dL    RDW 17.6 (H) 12.3 - 15.4 %    RDW-SD 55.5 (H) 37.0 - 54.0 fl    MPV 9.4 6.0 - 12.0 fL    Platelets 441 140 - 450 10*3/mm3   Manual Differential    Collection Time: 11/06/19 10:40 PM   Result Value Ref Range    Neutrophil % 84.3 (H) 42.7 - 76.0 %    Lymphocyte % 8.8 (L) 19.6 - 45.3 %    Monocyte % 2.9 (L) 5.0 - 12.0 %    Eosinophil % 2.9 0.3 - 6.2 %    Basophil % 1.0 0.0 - 1.5 %    Neutrophils Absolute 11.28 (H) 1.70 - 7.00 10*3/mm3    Lymphocytes Absolute 1.18 0.70 - 3.10 10*3/mm3    Monocytes Absolute 0.39 0.10 - 0.90 10*3/mm3    Eosinophils Absolute 0.39 0.00 - 0.40 10*3/mm3    Basophils Absolute 0.13 0.00 - 0.20 10*3/mm3    Anisocytosis Mod/2+ None Seen    WBC Morphology Normal Normal    Platelet Morphology Normal Normal   Blood Gas, Arterial    Collection Time: 11/06/19 11:54 PM   Result Value Ref Range    Site Arterial: right radial     Cristopher's Test Positive     pH, Arterial 7.467 (H) 7.350 - 7.450 pH units    pCO2, Arterial 38.2 35.0 - 45.0 mm Hg    pO2, Arterial 83.4 80.0 - 100.0 mm Hg    HCO3,  Arterial 27.6 22.0 - 28.0 mmol/L    Base Excess, Arterial 3.7 (H) 0.0 - 2.0 mmol/L    O2 Saturation Calculated 96.9 92.0 - 99.0 %    Barometric Pressure for Blood Gas 755.7 mmHg    Modality Room Air     Rate 18 Breaths/minute       Ordered the above labs and independently reviewed the results.      PROCEDURES    Procedures        MEDICATIONS GIVEN IN ER    Medications   sodium chloride 0.9 % flush 10 mL (not administered)   sodium chloride 0.9 % flush 10 mL (not administered)   sodium chloride 0.9 % flush 10 mL (not administered)   nitroglycerin (NITROSTAT) SL tablet 0.4 mg (not administered)   acetaminophen (TYLENOL) tablet 650 mg (not administered)     Or   acetaminophen (TYLENOL) 160 MG/5ML solution 650 mg (not administered)     Or   acetaminophen (TYLENOL) suppository 650 mg (not administered)   bisacodyl (DULCOLAX) EC tablet 5 mg (not administered)   calcium carbonate (TUMS) chewable tablet 500 mg (200 mg elemental) (not administered)   promethazine (PHENERGAN) injection 12.5 mg (12.5 mg Intravenous Given 11/6/19 2245)   sodium chloride 0.9 % bolus 500 mL (0 mL Intravenous Stopped 11/7/19 0159)   potassium chloride (MICRO-K) CR capsule 40 mEq (40 mEq Oral Given 11/7/19 0006)         PROGRESS, DATA ANALYSIS, CONSULTS, AND MEDICAL DECISION MAKING    All labs have been independently reviewed by me.  All radiology studies have been reviewed by me and discussed with radiologist dictating the report.   EKG's independently viewed and interpreted by me.  Discussion below represents my analysis of pertinent findings related to patient's condition, differential diagnosis, treatment plan and final disposition.    ED Course as of Nov 07 0302   Wed Nov 06, 2019   2349 Potassium: (!) 3.0 [RC]   2350 Sodium: (!) 135 [RC]   2350 Calcium: (!) 7.6 [RC]   2350 Hemoglobin: (!) 9.7 [RC]   2350 WBC: (!) 13.38 [RC]   2350 Phosphorus: (!) 6.5 [RC]   Thu Nov 07, 2019   0144 EKG          EKG time: 2250  Rhythm/Rate: 91/Sinus Rythm  P  waves and [place]: unremarkable  QRS, axis: Normal Axis   ST and T waves: Non-specific T wave changes in inferior leads   Prolonged QT Interval    Interpreted Contemporaneously by me, independently viewed  No acute changes. QT interval was prolonged on 10/27/2019.    [RC]      ED Course User Index  [RC] True Whitaker Josue LIUDMILA, PA       0119- Rechecked pt. Pt is resting comfortably. I informed the pt of her lab work and EKG results, which are worsened from her prior ED visit. I informed the pt that due to this I believe she needs to be admitted for further evaluation and treatment. I discussed the risks of not being admitted and the possibility of death. The pt confirmed understanding of these risks, however, she is refusing to be admitted at this time. I again informed her of the risks and my concern over discharging her, but she remained adamant. I then discussed that the pt must follow up with her Nephrologist for further treatment if she is not willing to be admitted. She expressed understanding of this and agrees to f/u with nephrology. Will provide pt with Rx for an antiemetic.     0125- Discussed pt with Dr. Marshall, who, after a beside evaluation of the pt, agrees with the course of care.     0144- Pt has now changed her mind and states that she would like to be admitted. Will place call to [facility].     0150- Discussed pt with JERONIMO Monteiro for Dr. Kelsey ([facility]). She agrees to admit the pt.   --  AS OF 3:02 AM VITALS:    BP - 127/82  HR - 99  TEMP - 98.8 °F (37.1 °C) (Oral)  O2 SATS - 99%  --    DIAGNOSIS  Final diagnoses:   Prolonged QT interval   Hyperphosphatemia   Hypokalemia   Non-intractable vomiting with nausea, unspecified vomiting type   Anemia, unspecified type         DISPOSITION  ADMISSION    Discussed treatment plan and reason for admission with pt/family and admitting physician.  Pt/family voiced understanding of the plan for admission for further testing/treatment as needed.     --  Documentation  assistance provided by alexei Cerda for Ayo Whitaker PA-C.  Information recorded by the rollyibharry was done at my direction and has been verified and validated by me.       Isaiah Cerda  11/07/19 0151       True Whitaker III, PA  11/07/19 030

## 2019-11-07 NOTE — PLAN OF CARE
Problem: Patient Care Overview  Goal: Plan of Care Review  Outcome: Ongoing (interventions implemented as appropriate)   11/07/19 0330 11/07/19 0450   Plan of Care Review   Progress --  no change   OTHER   Outcome Summary --  Patient admitted overnight with complaints of continuous nausea. Patient is currently a PD patient. Her PD catheter is in her LUQ. She states that she has a cycler at home and her PD is done overnight. She is still complaining of continuous nausea. Potassium 3.0. VSS. Will continue to monitor.    Coping/Psychosocial   Plan of Care Reviewed With patient --      Goal: Individualization and Mutuality  Outcome: Ongoing (interventions implemented as appropriate)    Goal: Discharge Needs Assessment  Outcome: Ongoing (interventions implemented as appropriate)    Goal: Interprofessional Rounds/Family Conf  Outcome: Ongoing (interventions implemented as appropriate)

## 2019-11-07 NOTE — CONSULTS
[facility], [facility]  Initial Inpatient Consult Note    Referring Provider: Dr Watt    Reason for Consultation: nausea    Subjective     History of present illness:    44 y.o. female , not previously known to our service, that we are asked to see for nausea.    Patient has past medical history significant for end-stage renal disease on peritoneal dialysis, hypertension, diabetes.  She has had a prior kidney transplant in 2016.  She was admitted yesterday with persistent nausea.    She reports nausea ongoing for the past 3 weeks but denies any vomiting.  She denies any associated symptoms.  She has not started any new medications.  She denies abdominal pain or vomiting.  She has not had heartburn or difficulty swallowing.  She is really not been able to eat due to the persistent nausea.  She has a history of mild constipation but reports she has a bowel movement most days without taking any medication.  This has not changed.  She has had no recent fevers or chills.  No previous GI work-up.    Past Medical History:  Past Medical History:   Diagnosis Date   • Acid reflux    • Anemia    • Arthritis    • Diabetes mellitus (CMS/[facility])    • ESRD on dialysis (CMS/[facility])     TUESDAY, THURSDAY AND SATURDAY   • History of peritoneal dialysis     KIDNEY TRANSPLANT SEPT 2016   • History of transfusion     BEEN A WHILE   • Hypercalcemia    • Hyperparathyroidism (CMS/[facility])    • Hypertension    • Kidney disease     End-stage renal disease. TRANSPLANT   • Kidney transplant recipient 09/02/2016    RIGHT KIDNEY   • Seasonal allergies     CURRENTLY    • UTI (urinary tract infection)     CURRENTLY BEING TREATED. WAS HOSPITALIZED AT [facility] MONDAY 9- THUR 12 2018     Past Surgical History:  Past Surgical History:   Procedure Laterality Date   • INSERTION PERITONEAL DIALYSIS CATHETER  07/29/2014    Laparoscopic placement of Curl Cath peritoneal dialysis catheter, Dr. Vinod Goldstein   • INSERTION PERITONEAL DIALYSIS CATHETER N/A  2019    Procedure: LAPAROSCOPIC INSERTION PERITONEAL DIALYSIS CATHETER;  Surgeon: Damon Rooney MD;  [facility];  Service: General   • REMOVAL PERITONEAL DIALYSIS CATHETER N/A 10/17/2016    Procedure: REMOVAL PERITONEAL DIALYSIS CATHETER;  Surgeon: Damon Rooney MD;  Location:  [facility] MAIN OR;  Service:    • TRANSPLANTATION RENAL Right 2016    from  donor   • TUBAL ABDOMINAL LIGATION Bilateral       Social History:   Social History     Tobacco Use   • Smoking status: Never Smoker   • Smokeless tobacco: Never Used   Substance Use Topics   • Alcohol use: No      Family History:  Family History   Problem Relation Age of Onset   • Malig Hyperthermia Neg Hx        Home Meds:  Medications Prior to Admission   Medication Sig Dispense Refill Last Dose   • calcium acetate (PHOSLO) 667 MG capsule Take 2,001 mg by mouth 3 (Three) Times a Day With Meals.   2019 at Unknown time   • carvedilol (COREG) 12.5 MG tablet Take 12.5 mg by mouth 2 (Two) Times a Day With Meals.   2019 at Unknown time   • CloNIDine (CATAPRES) 0.3 MG tablet Take 0.3 mg by mouth 3 (Three) Times a Day.   2019 at Unknown time   • minoxidil (LONITEN) 10 MG tablet Take 10 mg by mouth Daily.   2019 at Unknown time   • ondansetron (ZOFRAN) 4 MG tablet Take 4 mg by mouth Every 4 (Four) Hours As Needed for Nausea or Vomiting.   2019 at Unknown time   • polyethylene glycol (MIRALAX) packet Take 17 g by mouth Daily. (Patient taking differently: Take 17 g by mouth Daily As Needed.) 30 each 2 Past Month at Unknown time     Current Meds:     calcium carbonate (oyster shell) 1,000 mg Oral BID   calcium gluconate 1 g Intravenous Once   carvedilol 12.5 mg Oral BID With Meals   cloNIDine 0.3 mg Oral TID   DELFLEX-LC/1.5% DEXTROSE 1,600 mL Intraperitoneal 5x Daily   DELFLEX-LC/2.5% DEXTROSE 1,600 mL Intraperitoneal 5x Daily   doxylamine-pyridoxine 1 tablet Oral 4x Daily   famotidine 20 mg Oral  Daily   insulin lispro 0-7 Units Subcutaneous 4x Daily With Meals & Nightly   minoxidil 10 mg Oral Daily   polyethylene glycol 17 g Oral Daily   potassium chloride 40 mEq Oral Once   sodium chloride 10 mL Intravenous Q12H     Allergies:  No Known Allergies  Review of Systems  Pertinent items are noted in HPI, all other systems reviewed and negative     Objective     Vital Signs  Temp:  [98 °F (36.7 °C)-99.6 °F (37.6 °C)] 98 °F (36.7 °C)  Heart Rate:  [] 88  Resp:  [17-20] 18  BP: (110-136)/() 132/86  Physical Exam:  General Appearance:    Alert, cooperative, in no acute distress   Head:    Normocephalic, without obvious abnormality, atraumatic   Eyes:          conjunctivae and sclerae normal, no   icterus   Throat:   no thrush, oral mucosa moist   Neck:   Supple, no adenopathy   Lungs:     Clear to auscultation bilaterally    Heart:    Regular rhythm and normal rate    Chest Wall:    No abnormalities observed   Abdomen:     Soft, nondistended, nontender; normal bowel sounds; PD cath in place   Extremities:   no edema, no redness   Skin:   No bruising or rash   Psychiatric:  normal mood and insight     Results Review:   I reviewed the patient's new clinical results.    Results from last 7 days   Lab Units 11/07/19  0620 11/06/19  2240   WBC 10*3/mm3 10.99* 13.38*   HEMOGLOBIN g/dL 9.7* 9.7*   HEMATOCRIT % 29.7* 29.7*   PLATELETS 10*3/mm3 385 441     Results from last 7 days   Lab Units 11/07/19  0620 11/06/19  2240   SODIUM mmol/L 136 135*   POTASSIUM mmol/L 3.1* 3.0*   CHLORIDE mmol/L 92* 90*   CO2 mmol/L 27.7 26.2   BUN mg/dL 39* 42*   CREATININE mg/dL 9.66* 9.06*   CALCIUM mg/dL 6.9* 7.6*   BILIRUBIN mg/dL  --  0.3   ALK PHOS U/L  --  69   ALT (SGPT) U/L  --  <5   AST (SGOT) U/L  --  9   GLUCOSE mg/dL 104* 144*         Lab Results   Lab Value Date/Time    LIPASE 128 (H) 11/07/2019 0620    LIPASE 230 06/30/2019 0130       Radiology:  XR Chest PA & Lateral   Final Result          Assessment/Plan    Patient Active Problem List   Diagnosis   • Hyperparathyroidism (CMS/HCC)   • Acute rejection of kidney transplant   • ARF (acute renal failure) (CMS/HCC)   • Hx of kidney transplant   • Hypertension   • Kidney transplant status, cadaveric   • Nephritis   • Hypercalcemia   • ESRD on hemodialysis (CMS/HCC)   • Peritoneal dialysis catheter in place (CMS/HCC)   • Prolonged QT interval   • ESRD (end stage renal disease) (CMS/HCC)   • Hypokalemia   • Hyperphosphatemia   • Leukocytosis   • Nausea   • Hypocalcemia   • DM type 2 (diabetes mellitus, type 2) (CMS/HCC)       Assessment:  1. Chronic nausea  2. Stage renal disease on peritoneal dialysis  3. Hypokalemia  4. Hypocalcemia  5. Diabetes  6. Mildly elevated lipase    Plan:  · Discussed options of UGI eval with pt - egd vs UGI - she is not interested in egd but agreeable to UGI  · Check h pylori stool antigen  · Continue pepcid  · Check a1c to assess blood sugar control  · Repeat lipase in am      I discussed the patients findings and my recommendations with patient.    Brionna Glasgow MD

## 2019-11-07 NOTE — H&P
Patient Name:  Sunni Mcneil  YOB: 1975  MRN:  [id number]  Admit Date:  11/6/2019  Patient Care Team:  Provider, No Known as PCP - Bo Hummel MD as Consulting Physician (Nephrology)  Damon Rooney MD as Surgeon (General Surgery)      Subjective   History Present Illness     Chief Complaint   Patient presents with   • Nausea       Ms. Mcneil is a 44 y.o. non-smoker with a history of ESRD on peritoneal dialysis, kidney transplant, and HTN that presents to [facility] complaining of persistent nausea.  She reports she has had ongoing nausea for three months, but denies any recent vomiting.  She states she has been taking Zofran at home for the symptoms with no improvement.  She denies abdominal pain, fever, and chills.  Denies chest pain, shortness of breath, and palpitations.  She denies headache, dizziness, and light-headedness. She denies any urinary and bowel symptoms.  Work up in the ED revealed pH 7.467, Na 135, K+ 3.0, Cl 90, anion gap 18.8, creat 9.06, BUN 42, GFR 5, phosphorous 6.5, WBC 13.38, hgb 9.7, and HCT 29.7.  EKG showed sinus rhythm with a prolonged QT interval of 432 ms.  She received Phenergan and PO potassium in the ED.      History of Present Illness  Review of Systems   Constitutional: Negative.    HENT: Negative.    Eyes: Negative.    Respiratory: Negative.    Cardiovascular: Negative.    Gastrointestinal: Positive for nausea.   Genitourinary: Negative.    Musculoskeletal: Negative.    Skin: Negative.    Neurological: Negative.    Psychiatric/Behavioral: Negative.         Personal History     Past Medical History:   Diagnosis Date   • Acid reflux    • Anemia    • Arthritis    • Diabetes mellitus (CMS/[facility])    • ESRD on dialysis (CMS/[facility])     TUESDAY, THURSDAY AND SATURDAY   • History of peritoneal dialysis     KIDNEY TRANSPLANT SEPT 2016   • History of transfusion     BEEN A WHILE   • Hypercalcemia    • Hyperparathyroidism  (CMS/[facility])    • Hypertension    • Kidney disease     End-stage renal disease. TRANSPLANT   • Kidney transplant recipient 2016    RIGHT KIDNEY   • Seasonal allergies     CURRENTLY    • UTI (urinary tract infection)     CURRENTLY BEING TREATED. WAS HOSPITALIZED AT [facility]      Past Surgical History:   Procedure Laterality Date   • INSERTION PERITONEAL DIALYSIS CATHETER  2014    Laparoscopic placement of Curl Cath peritoneal dialysis catheter, Dr. Vinod Goldstein   • INSERTION PERITONEAL DIALYSIS CATHETER N/A 2019    Procedure: LAPAROSCOPIC INSERTION PERITONEAL DIALYSIS CATHETER;  Surgeon: Damon Rooney MD;  Location: [facility];  Service: General   • REMOVAL PERITONEAL DIALYSIS CATHETER N/A 10/17/2016    Procedure: REMOVAL PERITONEAL DIALYSIS CATHETER;  Surgeon: Damon Rooney MD;  Location: [facility];  Service:    • TRANSPLANTATION RENAL Right 2016    from  donor   • TUBAL ABDOMINAL LIGATION Bilateral      Family History   Problem Relation Age of Onset   • Malig Hyperthermia Neg Hx      Social History     Tobacco Use   • Smoking status: Never Smoker   • Smokeless tobacco: Never Used   Substance Use Topics   • Alcohol use: No   • Drug use: No     Medications Prior to Admission   Medication Sig Dispense Refill Last Dose   • calcium acetate (PHOSLO) 667 MG capsule Take 2,001 mg by mouth 3 (Three) Times a Day With Meals.   2019 at Unknown time   • carvedilol (COREG) 12.5 MG tablet Take 12.5 mg by mouth 2 (Two) Times a Day With Meals.   2019 at Unknown time   • CloNIDine (CATAPRES) 0.3 MG tablet Take 0.3 mg by mouth 3 (Three) Times a Day.   2019 at Unknown time   • minoxidil (LONITEN) 10 MG tablet Take 10 mg by mouth Daily.   2019 at Unknown time   • ondansetron (ZOFRAN) 4 MG tablet Take 4 mg by mouth Every 4 (Four) Hours As Needed for Nausea or Vomiting.   2019 at Unknown time   • polyethylene glycol (MIRALAX) packet  Take 17 g by mouth Daily. (Patient taking differently: Take 17 g by mouth Daily As Needed.) 30 each 2 Past Month at Unknown time     Allergies:  No Known Allergies    Objective    Objective     Vital Signs  Temp:  [98.3 °F (36.8 °C)-99.6 °F (37.6 °C)] 98.3 °F (36.8 °C)  Heart Rate:  [] 94  Resp:  [17-20] 18  BP: (110-136)/() 136/96  SpO2:  [97 %-100 %] 100 %  on   ;   Device (Oxygen Therapy): room air  Body mass index is 36.05 kg/m².    Physical Exam   Constitutional: She is oriented to person, place, and time. She appears well-developed and well-nourished.   HENT:   Head: Normocephalic and atraumatic.   Eyes: Conjunctivae and EOM are normal.   Neck: Normal range of motion. Neck supple.   Cardiovascular: Regular rhythm, normal heart sounds and intact distal pulses. Tachycardia present.   Pulmonary/Chest: Effort normal and breath sounds normal.   Abdominal: Soft. Bowel sounds are normal.   Peritoneal dialysis catheter to LUQ   Musculoskeletal: Normal range of motion. She exhibits no edema.   Neurological: She is alert and oriented to person, place, and time.   Skin: Skin is warm and dry.   Psychiatric: She has a normal mood and affect. Her behavior is normal.   Nursing note and vitals reviewed.      Results Review:  I reviewed the patient's new clinical results.  I reviewed the patient's new imaging results and agree with the interpretation.  I reviewed the patient's other test results and agree with the interpretation  I personally viewed and interpreted the patient's EKG/Telemetry data  Discussed with ED provider.    Lab Results (last 24 hours)     Procedure Component Value Units Date/Time    CBC & Differential [[id number]] Collected:  11/06/19 2240    Specimen:  Blood Updated:  11/06/19 2938    Narrative:       The following orders were created for panel order CBC & Differential.  Procedure                               Abnormality         Status                     ---------                                -----------         ------                     CBC Auto Differential[[id number]]        Abnormal            Final result                 Please view results for these tests on the individual orders.    Comprehensive Metabolic Panel [[id number]]  (Abnormal) Collected:  11/06/19 2240    Specimen:  Blood Updated:  11/06/19 2320     Glucose 144 mg/dL      BUN 42 mg/dL      Creatinine 9.06 mg/dL      Sodium 135 mmol/L      Potassium 3.0 mmol/L      Chloride 90 mmol/L      CO2 26.2 mmol/L      Calcium 7.6 mg/dL      Total Protein 9.1 g/dL      Albumin 4.00 g/dL      ALT (SGPT) <5 U/L      AST (SGOT) 9 U/L      Alkaline Phosphatase 69 U/L      Total Bilirubin 0.3 mg/dL      eGFR Non African Amer 5 mL/min/1.73      Comment: <15 Indicative of kidney failure.        eGFR   Amer --     Comment: <15 Indicative of kidney failure.        Globulin 5.1 gm/dL      A/G Ratio 0.8 g/dL      BUN/Creatinine Ratio 4.6     Anion Gap 18.8 mmol/L     Narrative:       GFR Normal >60  Chronic Kidney Disease <60  Kidney Failure <15    Phosphorus [[id number]]  (Abnormal) Collected:  11/06/19 2240    Specimen:  Blood Updated:  11/06/19 2315     Phosphorus 6.5 mg/dL     Magnesium [[id number]]  (Normal) Collected:  11/06/19 2240    Specimen:  Blood Updated:  11/06/19 2315     Magnesium 2.4 mg/dL     CBC Auto Differential [[id number]]  (Abnormal) Collected:  11/06/19 2240    Specimen:  Blood Updated:  11/06/19 2338     WBC 13.38 10*3/mm3      RBC 3.44 10*6/mm3      Hemoglobin 9.7 g/dL      Hematocrit 29.7 %      MCV 86.3 fL      MCH 28.2 pg      MCHC 32.7 g/dL      RDW 17.6 %      RDW-SD 55.5 fl      MPV 9.4 fL      Platelets 441 10*3/mm3     Manual Differential [[id number]]  (Abnormal) Collected:  11/06/19 2240    Specimen:  Blood Updated:  11/06/19 2338     Neutrophil % 84.3 %      Lymphocyte % 8.8 %      Monocyte % 2.9 %      Eosinophil % 2.9 %      Basophil % 1.0 %      Neutrophils Absolute 11.28 10*3/mm3      Lymphocytes Absolute 1.18  10*3/mm3      Monocytes Absolute 0.39 10*3/mm3      Eosinophils Absolute 0.39 10*3/mm3      Basophils Absolute 0.13 10*3/mm3      Anisocytosis Mod/2+     WBC Morphology Normal     Platelet Morphology Normal    Blood Gas, Arterial [[id number]]  (Abnormal) Collected:  11/06/19 2354    Specimen:  Arterial Blood Updated:  11/06/19 2357     Site Arterial: right radial     Cristopher's Test Positive     pH, Arterial 7.467 pH units      pCO2, Arterial 38.2 mm Hg      pO2, Arterial 83.4 mm Hg      HCO3, Arterial 27.6 mmol/L      Base Excess, Arterial 3.7 mmol/L      O2 Saturation Calculated 96.9 %      Barometric Pressure for Blood Gas 755.7 mmHg      Modality Room Air     Rate 18 Breaths/minute           Imaging Results (Last 24 Hours)     ** No results found for the last 24 hours. **               ECG 12 Lead   Preliminary Result   HEART RATE= 91  bpm   RR Interval= 656  ms   NJ Interval= 167  ms   P Horizontal Axis= 12  deg   P Front Axis= 16  deg   QRSD Interval= 102  ms   QT Interval= 432  ms   QRS Axis= 18  deg   T Wave Axis= 3  deg   - ABNORMAL ECG -   Sinus rhythm   Prolonged QT interval   Electronically Signed By:    Date and Time of Study: 2019-11-06 22:50:47      ECG 12 Lead    (Results Pending)        Assessment/Plan     Active Hospital Problems    Diagnosis POA   • **Prolonged QT interval [R94.31] Yes   • ESRD (end stage renal disease) (CMS/[facility]) [N18.6] Unknown   • Hypokalemia [E87.6] Unknown   • Hyperphosphatemia [E83.39] Unknown   • Leukocytosis [D72.829] Unknown   • Nausea [R11.0] Unknown   • Hypocalcemia [E83.51] Unknown   • DM type 2 (diabetes mellitus, type 2) (CMS/[facility]) [E11.9] Unknown   • Hypertension [I10] Yes   • Hx of kidney transplant [Z94.0] Not Applicable     Prolonged QT interval/Persistent Nausea  -Most likely due to electrolyte abnormalities  -Admit to telemetry and monitor  -No c/o chest pain, troponin negative  -Repeat EKG in the AM  -Nausea medications limited due to prolonged QT interval. Pharmacy  recommends  Doxylamine-Pyridoxine. Will order x 1    ESRD on peritoneal dialysis/Hypokalemia/Hyperphosphatemia/Hypocalcemia  -Metabolic alkalosis with pH 7.467  -Creat 9.06, BUN 42  -Nephrology consult  -Received PO potassium in the ED, recheck labs in the AM    Leukocytosis  -Possibly reactive?  -Tachycardic, but afebrile  -No urinary or respiratory complaints. Will obtain chest x-ray in the AM  -Recheck CBC in the AM    Diabetes Mellitus Type 2  -No oral or PO medications listed  -Last Hgb A1C 6.9 in June  -Will add correctional factor insulin  -Monitor blood sugars ACHS    Hypertension  -Continue home blood pressure medications  -Monitor blood pressures closely    -I discussed the patients findings and my recommendations with patient.    VTE Prophylaxis - SCDs.  Code Status - Full code.       JERONIMO Khan  [facility]  11/07/19  5:40 AM    Attending Note:  I have personally interviewed and examined the patient and agree with the above note.  Patient with a history of type 2 DM, HTN, and ESRD on peritoneal dialysis here with nausea for about the past 3 weeks. She has had decreased appetite but no vomiting.  She has been on zofran without much benefit. There has been no abdominal pain or reflux.  No dysphagia, constipation, diarrhea, or melena/hematochezia.  She was found to have electrolyte abnormalities and a prolonged QT interval.  She has been started on diclegis with some improvement.  GI is consulted.  Nephrology is consulted for her ESRD and electrolyte abnormalities.    Attending Physical Exam   Constitutional: She is oriented to person, place, and time. She appears well-developed and well-nourished.   Head: Normocephalic and atraumatic.   Eyes: Conjunctivae and EOM are normal.   Neck: Normal range of motion. Neck supple.   Cardiovascular: Normal rate, regular rhythm, and intact distal pulses.  Pulmonary/Chest: Effort normal and breath sounds normal.   Abdominal: Soft.  Non-tender.  Bowel sounds are normal. Peritoneal dialysis catheter to LUQ surrounding area looks good, no erythema or drainage   Musculoskeletal: Normal range of motion. She exhibits no edema.   Neurological: She is alert and oriented to person, place, and time.   Skin: Skin is warm and dry.   Psychiatric: She has a normal mood and affect. Her behavior is normal.   Nursing note and vitals reviewed.    Phu Mcdonald MD  11/7/2019  10:50 AM

## 2019-11-07 NOTE — PLAN OF CARE
Problem: Patient Care Overview  Goal: Plan of Care Review  Outcome: Ongoing (interventions implemented as appropriate)   11/07/19 1404   Plan of Care Review   Progress no change   OTHER   Outcome Summary pt has orders for PD 5x a day; alternating with 1.5% and 2.5% fluid; VSS; GI consulted- upper GI xray ordered; NPO at 0000- however patient adamantly refuses any invasive testing including an EGD/ c-scope; IV ca gluconate given once; Pt has flat affect and no c/o nausea today.   Coping/Psychosocial   Plan of Care Reviewed With patient     Goal: Individualization and Mutuality  Outcome: Ongoing (interventions implemented as appropriate)    Goal: Discharge Needs Assessment  Outcome: Ongoing (interventions implemented as appropriate)    Goal: Interprofessional Rounds/Family Conf  Outcome: Ongoing (interventions implemented as appropriate)

## 2019-11-07 NOTE — NURSING NOTE
Orders written to start peritoneal dialysis at 1000/ 5x a day.  Pt has [facility].  Distibution called for kpad to start warming of PD fluid- however no Kpadsw are available in the hospital. Nurse manager and Xiao notified.      KAYCEE Pearson RN

## 2019-11-07 NOTE — ED NOTES
PT STATES SHE DOES NOT URINATE AT ALL AND WILL BE UNABLE TO GIVE URINE SAMPLE. PT IS ON PERITONEAL DIALYSIS.     Sultana Macedo RN  11/06/19 8005

## 2019-11-08 ENCOUNTER — APPOINTMENT (OUTPATIENT)
Dept: GENERAL RADIOLOGY | Facility: HOSPITAL | Age: 44
End: 2019-11-08

## 2019-11-08 VITALS
TEMPERATURE: 97.9 F | DIASTOLIC BLOOD PRESSURE: 59 MMHG | SYSTOLIC BLOOD PRESSURE: 93 MMHG | BODY MASS INDEX: 35.88 KG/M2 | HEART RATE: 80 BPM | WEIGHT: 182.76 LBS | OXYGEN SATURATION: 96 % | RESPIRATION RATE: 16 BRPM | HEIGHT: 60 IN

## 2019-11-08 PROBLEM — E87.6 HYPOKALEMIA: Status: RESOLVED | Noted: 2019-11-07 | Resolved: 2019-11-08

## 2019-11-08 PROBLEM — E83.51 HYPOCALCEMIA: Status: RESOLVED | Noted: 2019-11-07 | Resolved: 2019-11-08

## 2019-11-08 PROBLEM — R11.0 NAUSEA: Status: RESOLVED | Noted: 2019-11-07 | Resolved: 2019-11-08

## 2019-11-08 PROBLEM — D72.829 LEUKOCYTOSIS: Status: RESOLVED | Noted: 2019-11-07 | Resolved: 2019-11-08

## 2019-11-08 LAB
ALBUMIN SERPL-MCNC: 3 G/DL (ref 3.5–5.2)
ALBUMIN/GLOB SERPL: 0.6 G/DL
ALP SERPL-CCNC: 52 U/L (ref 39–117)
ALT SERPL W P-5'-P-CCNC: <5 U/L (ref 1–33)
ANION GAP SERPL CALCULATED.3IONS-SCNC: 18.8 MMOL/L (ref 5–15)
AST SERPL-CCNC: 8 U/L (ref 1–32)
BILIRUB SERPL-MCNC: 0.2 MG/DL (ref 0.2–1.2)
BUN BLD-MCNC: 45 MG/DL (ref 6–20)
BUN/CREAT SERPL: 5 (ref 7–25)
CALCIUM SPEC-SCNC: 7.3 MG/DL (ref 8.6–10.5)
CHLORIDE SERPL-SCNC: 99 MMOL/L (ref 98–107)
CO2 SERPL-SCNC: 23.2 MMOL/L (ref 22–29)
CREAT BLD-MCNC: 8.95 MG/DL (ref 0.57–1)
GFR SERPL CREATININE-BSD FRML MDRD: 5 ML/MIN/1.73
GFR SERPL CREATININE-BSD FRML MDRD: ABNORMAL ML/MIN/{1.73_M2}
GLOBULIN UR ELPH-MCNC: 4.8 GM/DL
GLUCOSE BLD-MCNC: 117 MG/DL (ref 65–99)
GLUCOSE BLDC GLUCOMTR-MCNC: 112 MG/DL (ref 70–130)
GLUCOSE BLDC GLUCOMTR-MCNC: 122 MG/DL (ref 70–130)
GLUCOSE BLDC GLUCOMTR-MCNC: 152 MG/DL (ref 70–130)
LIPASE SERPL-CCNC: 153 U/L (ref 13–60)
MAGNESIUM SERPL-MCNC: 2.3 MG/DL (ref 1.6–2.6)
PHOSPHATE SERPL-MCNC: 7.3 MG/DL (ref 2.5–4.5)
POTASSIUM BLD-SCNC: 3.5 MMOL/L (ref 3.5–5.2)
PROT SERPL-MCNC: 7.8 G/DL (ref 6–8.5)
SODIUM BLD-SCNC: 141 MMOL/L (ref 136–145)

## 2019-11-08 PROCEDURE — 83690 ASSAY OF LIPASE: CPT | Performed by: INTERNAL MEDICINE

## 2019-11-08 PROCEDURE — 99213 OFFICE O/P EST LOW 20 MIN: CPT | Performed by: INTERNAL MEDICINE

## 2019-11-08 PROCEDURE — 80053 COMPREHEN METABOLIC PANEL: CPT | Performed by: INTERNAL MEDICINE

## 2019-11-08 PROCEDURE — 82962 GLUCOSE BLOOD TEST: CPT

## 2019-11-08 PROCEDURE — 74241: CPT

## 2019-11-08 PROCEDURE — G0378 HOSPITAL OBSERVATION PER HR: HCPCS

## 2019-11-08 PROCEDURE — 84100 ASSAY OF PHOSPHORUS: CPT | Performed by: INTERNAL MEDICINE

## 2019-11-08 PROCEDURE — 83735 ASSAY OF MAGNESIUM: CPT | Performed by: INTERNAL MEDICINE

## 2019-11-08 RX ORDER — DOXYLAMINE SUCCINATE AND PYRIDOXINE HYDROCHLORIDE, DELAYED RELEASE TABLETS 10 MG/10 MG 10; 10 MG/1; MG/1
1 TABLET, DELAYED RELEASE ORAL 3 TIMES DAILY PRN
Qty: 90 TABLET | Refills: 0 | Status: SHIPPED | OUTPATIENT
Start: 2019-11-08 | End: 2020-05-07 | Stop reason: HOSPADM

## 2019-11-08 RX ORDER — FAMOTIDINE 20 MG/1
20 TABLET, FILM COATED ORAL DAILY
Qty: 30 TABLET | Refills: 0 | Status: ON HOLD | OUTPATIENT
Start: 2019-11-09 | End: 2020-05-06

## 2019-11-08 RX ADMIN — FAMOTIDINE 20 MG: 20 TABLET, FILM COATED ORAL at 10:22

## 2019-11-08 RX ADMIN — CLONIDINE HYDROCHLORIDE 0.3 MG: 0.1 TABLET ORAL at 15:08

## 2019-11-08 RX ADMIN — POLYETHYLENE GLYCOL 3350 17 G: 17 POWDER, FOR SOLUTION ORAL at 10:23

## 2019-11-08 RX ADMIN — DOXYLAMINE SUCCINATE AND PYRIDOXINE HYDROCHLORIDE 1 TABLET: 10; 10 TABLET, DELAYED RELEASE ORAL at 10:23

## 2019-11-08 RX ADMIN — DEXTROSE MONOHYDRATE, SODIUM CHLORIDE, SODIUM LACTATE, CALCIUM CHLORIDE, MAGNESIUM CHLORIDE 1600 ML: 2.5; 538; 448; 18.4; 5.08 SOLUTION INTRAPERITONEAL at 15:08

## 2019-11-08 RX ADMIN — CLONIDINE HYDROCHLORIDE 0.3 MG: 0.1 TABLET ORAL at 10:22

## 2019-11-08 RX ADMIN — MINOXIDIL 10 MG: 10 TABLET ORAL at 10:22

## 2019-11-08 RX ADMIN — CARVEDILOL 12.5 MG: 12.5 TABLET, FILM COATED ORAL at 10:23

## 2019-11-08 RX ADMIN — CALCIUM 1000 MG: 500 TABLET ORAL at 10:22

## 2019-11-08 RX ADMIN — DEXTROSE MONOHYDRATE, SODIUM CHLORIDE, SODIUM LACTATE, CALCIUM CHLORIDE, MAGNESIUM CHLORIDE 1600 ML: 2.5; 538; 448; 18.4; 5.08 SOLUTION INTRAPERITONEAL at 06:14

## 2019-11-08 RX ADMIN — SODIUM CHLORIDE, PRESERVATIVE FREE 10 ML: 5 INJECTION INTRAVENOUS at 10:24

## 2019-11-08 RX ADMIN — BARIUM SULFATE 183 ML: 960 POWDER, FOR SUSPENSION ORAL at 09:25

## 2019-11-08 RX ADMIN — DEXTROSE MONOHYDRATE, SODIUM CHLORIDE, SODIUM LACTATE, CALCIUM CHLORIDE, MAGNESIUM CHLORIDE 1600 ML: 1.5; 538; 448; 18.4; 5.08 SOLUTION INTRAPERITONEAL at 11:43

## 2019-11-08 NOTE — PROGRESS NOTES
[facility], [facility]  Inpatient Progress Note    Reason for Follow Up:  nausea    Subjective     Interval History:   Upper GIs series showed a esophageal diverticulum, no stricture, small hiatal hernia and otherwise normal.  Says nausea is better.  She says she is tolerating her diet.  She is not interested in endoscopy.    Current Facility-Administered Medications:   •  acetaminophen (TYLENOL) tablet 650 mg, 650 mg, Oral, Q4H PRN **OR** acetaminophen (TYLENOL) 160 MG/5ML solution 650 mg, 650 mg, Oral, Q4H PRN **OR** acetaminophen (TYLENOL) suppository 650 mg, 650 mg, Rectal, Q4H PRN, Renee Muller APRN  •  bisacodyl (DULCOLAX) EC tablet 5 mg, 5 mg, Oral, Daily PRN, Renee Muller APRN  •  calcium carbonate (oyster shell) tablet 1,000 mg, 1,000 mg, Oral, BID, Addie Watt MD, 1,000 mg at 11/08/19 1022  •  calcium carbonate (TUMS) chewable tablet 500 mg (200 mg elemental), 2 tablet, Oral, BID PRN, Renee Muller APRN  •  carvedilol (COREG) tablet 12.5 mg, 12.5 mg, Oral, BID With Meals, Renee Muller APRN, 12.5 mg at 11/08/19 1023  •  cloNIDine (CATAPRES) tablet 0.3 mg, 0.3 mg, Oral, TID, Renee Muller APRN, 0.3 mg at 11/08/19 1508  •  DELFLEX-LC/1.5% DEXTROSE (DIANEAL) CAPD 1,600 mL, 1,600 mL, Intraperitoneal, 5x Daily, Addie Watt MD, Stopped at 11/08/19 1440  •  DELFLEX-LC/2.5% DEXTROSE (DIANEAL) CAPD 1,600 mL, 1,600 mL, Intraperitoneal, 5x Daily, Addie Watt MD, Last Rate: 0 mL/hr at 11/08/19 0824, 1,600 mL at 11/08/19 1508  •  dextrose (D50W) 25 g/ 50mL Intravenous Solution 25 g, 25 g, Intravenous, Q15 Min PRN, Phu Mcdonald MD  •  dextrose (GLUTOSE) oral gel 15 g, 15 g, Oral, Q15 Min PRN, Phu Mcdonald MD  •  doxylamine-pyridoxine (DICLEGIS) EC tablet 1 tablet, 1 tablet, Oral, 4x Daily, Phu Mcdonald MD, Stopped at 11/08/19 1147  •  famotidine (PEPCID) tablet 20 mg, 20 mg, Oral, Daily, Addie Watt MD, 20 mg at  11/08/19 1022  •  glucagon (human recombinant) (GLUCAGEN DIAGNOSTIC) injection 1 mg, 1 mg, Subcutaneous, Q15 Min PRN, Phu Mcdonald MD  •  insulin lispro (humaLOG) injection 0-7 Units, 0-7 Units, Subcutaneous, 4x Daily With Meals & Nightly, Phu Mcdonald MD, 2 Units at 11/07/19 2121  •  minoxidil (LONITEN) tablet 10 mg, 10 mg, Oral, Daily, Renee Muller APRN, 10 mg at 11/08/19 1022  •  nitroglycerin (NITROSTAT) SL tablet 0.4 mg, 0.4 mg, Sublingual, Q5 Min PRN, Renee Muller APRN  •  polyethylene glycol (MIRALAX) packet 17 g, 17 g, Oral, Daily, Addie Watt MD, 17 g at 11/08/19 1023  •  [COMPLETED] Insert peripheral IV, , , Once **AND** sodium chloride 0.9 % flush 10 mL, 10 mL, Intravenous, PRN, True Whitaker III, PA  •  sodium chloride 0.9 % flush 10 mL, 10 mL, Intravenous, Q12H, Renee Muller APRN, 10 mL at 11/08/19 1024  •  sodium chloride 0.9 % flush 10 mL, 10 mL, Intravenous, PRN, Renee Muller APRN  Review of Systems:       The following systems were reviewed and negative: Constitution, pulmonary, cardiac, gastrointestinal, genitourinary      Objective     Vital Signs  Temp:  [97.4 °F (36.3 °C)-97.9 °F (36.6 °C)] 97.9 °F (36.6 °C)  Heart Rate:  [79-99] 80  Resp:  [16-18] 16  BP: ()/(59-77) 93/59  Body mass index is 35.69 kg/m².    Intake/Output Summary (Last 24 hours) at 11/8/2019 1618  Last data filed at 11/8/2019 1535  Gross per 24 hour   Intake 9410 ml   Output 8500 ml   Net 910 ml     I/O this shift:  In: 3310 [P.O.:210; Other:3100]  Out: 4100 [Other:4100]     Physical Exam:   General: patient awake, alert and cooperative   Eyes: Normal lids and lashes, no scleral icterus   Neck: supple, normal ROM   Skin: warm and dry, not jaundiced   Cardiovascular: regular rhythm and rate, no murmurs auscultated   Pulm: clear to auscultation bilaterally, regular and unlabored   Abdomen: soft, obese, nontender, nondistended; normal bowel sounds   Rectal:  deferred   Extremities: no rash or edema   Psychiatric: Normal mood and behavior; memory intact     Results Review:     I reviewed the patient's new clinical results.    Results from last 7 days   Lab Units 11/07/19  0620 11/06/19  2240   WBC 10*3/mm3 10.99* 13.38*   HEMOGLOBIN g/dL 9.7* 9.7*   HEMATOCRIT % 29.7* 29.7*   PLATELETS 10*3/mm3 385 441     Results from last 7 days   Lab Units 11/08/19  0557 11/07/19  0620 11/06/19  2240   SODIUM mmol/L 141 136 135*   POTASSIUM mmol/L 3.5 3.1* 3.0*   CHLORIDE mmol/L 99 92* 90*   CO2 mmol/L 23.2 27.7 26.2   BUN mg/dL 45* 39* 42*   CREATININE mg/dL 8.95* 9.66* 9.06*   CALCIUM mg/dL 7.3* 6.9* 7.6*   BILIRUBIN mg/dL 0.2  --  0.3   ALK PHOS U/L 52  --  69   ALT (SGPT) U/L <5  --  <5   AST (SGOT) U/L 8  --  9   GLUCOSE mg/dL 117* 104* 144*         Lab Results   Lab Value Date/Time    LIPASE 153 (H) 11/08/2019 0557    LIPASE 128 (H) 11/07/2019 0620    LIPASE 230 06/30/2019 0130       Radiology:  FL Upper GI Single Contrast With KUB   Final Result      XR Chest PA & Lateral   Final Result          Assessment/Plan     Patient Active Problem List   Diagnosis   • Hyperparathyroidism (CMS/[facility])   • Acute rejection of kidney transplant   • ARF (acute renal failure) (CMS/[facility])   • Hx of kidney transplant   • Hypertension   • Kidney transplant status, cadaveric   • Nephritis   • Hypercalcemia   • ESRD on hemodialysis (CMS/[facility])   • Peritoneal dialysis catheter in place (CMS/[facility])   • Prolonged QT interval   • ESRD (end stage renal disease) (CMS/[facility])   • Hypokalemia   • Hyperphosphatemia   • Leukocytosis   • Nausea   • Hypocalcemia   • DM type 2 (diabetes mellitus, type 2) (CMS/[facility])       Impression  1.  Chronic nausea    2.  End-stage renal disease: On peritoneal dialysis    3.  Diabetes    4.  Elevated lipase: Suspected secondary to peritoneal dialysis, CKD    Plan  Overall symptoms have improved  Recommend sending her out with some oral Zofran and Phenergan to help symptoms at home  Diet  as tolerated  Okay for home from GI standpoint    I discussed the patients findings and my recommendations with patient and nursing staff.    Lisa Patrick MD

## 2019-11-08 NOTE — ED PROVIDER NOTES
MD ATTESTATION NOTE    The ALISSA and I have discussed this patient's history, physical exam, and treatment plan.  I have reviewed the documentation and personally had a face to face interaction with the patient. I affirm the documentation and agree with the treatment and plan.  The attached note describes my personal findings.    Patient presents with symptoms of nausea for the last 3 weeks.  She is on peritoneal dialysis, and is been to the ED a couple other times for similar symptoms.  She is yet to get resolution and returns tonight with more symptoms.    Comp getting factors that she is had a persistent prolonged QT on her EKG, and tonight is actually a little longer, making choice of antiemetics difficult.  We spoke with nephrology and the patient agrees to admit for further evaluation and management.     Marty Marshall MD  11/07/19 2035

## 2019-11-08 NOTE — PLAN OF CARE
Problem: Patient Care Overview  Goal: Plan of Care Review  Outcome: Ongoing (interventions implemented as appropriate)   11/08/19 0047 11/08/19 0527   Plan of Care Review   Progress --  no change   OTHER   Outcome Summary --  PD continued. Alternating with 1.5% and 2.5% fluid. Patient had no complaints of nausea throughout the night. Pt upset about being NPO and demanding a sip of water. VSS. Will continue to monitor.    Coping/Psychosocial   Plan of Care Reviewed With patient --      Goal: Individualization and Mutuality  Outcome: Ongoing (interventions implemented as appropriate)    Goal: Discharge Needs Assessment  Outcome: Ongoing (interventions implemented as appropriate)    Goal: Interprofessional Rounds/Family Conf  Outcome: Ongoing (interventions implemented as appropriate)      Problem: Fall Risk (Adult)  Goal: Identify Related Risk Factors and Signs and Symptoms  Outcome: Ongoing (interventions implemented as appropriate)    Goal: Absence of Fall  Outcome: Ongoing (interventions implemented as appropriate)

## 2019-11-08 NOTE — PLAN OF CARE
Problem: Patient Care Overview  Goal: Plan of Care Review  Outcome: Ongoing (interventions implemented as appropriate)   11/08/19 1541   Plan of Care Review   Progress no change   OTHER   Outcome Summary pt had UGI series this AM- GI following; PD continued with alternating 1.5% and 2.5% up to 1600ml; no c/o nausea this shift; possible DC today; VSS- BP runs a little low; pt standby assist- produces very little urine.   Coping/Psychosocial   Plan of Care Reviewed With patient     Goal: Individualization and Mutuality  Outcome: Ongoing (interventions implemented as appropriate)    Goal: Discharge Needs Assessment  Outcome: Ongoing (interventions implemented as appropriate)    Goal: Interprofessional Rounds/Family Conf  Outcome: Ongoing (interventions implemented as appropriate)      Problem: Fall Risk (Adult)  Goal: Identify Related Risk Factors and Signs and Symptoms  Outcome: Ongoing (interventions implemented as appropriate)    Goal: Absence of Fall  Outcome: Ongoing (interventions implemented as appropriate)

## 2019-11-08 NOTE — DISCHARGE SUMMARY
Date of Admission: 11/6/2019  Date of Discharge:  11/8/2019  Primary Care Physician: Provider, No Known     Discharge Diagnosis:  Active Hospital Problems    Diagnosis  POA   • **Prolonged QT interval [R94.31]  Yes   • ESRD (end stage renal disease) (CMS/[facility]) [N18.6]  Yes   • Hyperphosphatemia [E83.39]  Yes   • DM type 2 (diabetes mellitus, type 2) (CMS/[facility]) [E11.9]  Yes   • Hypertension [I10]  Yes   • Hx of kidney transplant [Z94.0]  Not Applicable      Resolved Hospital Problems    Diagnosis Date Resolved POA   • Hypokalemia [E87.6] 11/08/2019 Yes   • Leukocytosis [D72.829] 11/08/2019 Yes   • Nausea [R11.0] 11/08/2019 Yes   • Hypocalcemia [E83.51] 11/08/2019 Yes       Presenting Problem/History of Present Illness:  Hypokalemia [E87.6]  Hyperphosphatemia [E83.39]  Prolonged QT interval [R94.31]  Anemia, unspecified type [D64.9]  Non-intractable vomiting with nausea, unspecified vomiting type [R11.2]     Hospital Course:  The patient is a 44 y.o. female with a history of ESRD on peritoneal dialysis, failed kidney transplant, and HTN who presented with nausea and electrolyte abnormalities.  Please see admission H&P from 11/7/19 for further details.  On admission the patient was started on diclegis due to her prolonged QT interval as well as famotidine and she did well with these.  She was tolerating a regular diet without further nausea.  She did not have vomiting or abdominal pain.  Her lipase was mildly elevated in the 200s likely secondary to peritoneal dialysis.  Nephrology evaluated the patient and adjusted her peritoneal dialysis.  She was evaluated by gastroenterology and she had an upper GI series which revealed an esophageal diverticulum and a small hiatal hernia but otherwise unremarkable.  An EGD was recommended but the patient declined this.  She will be discharged home on diclegis and famotidine.  She will be switched to CCPD at discharge per nephrology.     Exam Today:  Blood pressure 93/59, pulse 80,  "temperature 97.9 °F (36.6 °C), temperature source Oral, resp. rate 16, height 152.4 cm (60\"), weight 82.9 kg (182 lb 12.2 oz), last menstrual period 10/23/2019, SpO2 96 %, not currently breastfeeding.  Constitutional: She is oriented to person, place, and time. She appears well-developed and well-nourished.   Head: Normocephalic and atraumatic.   Eyes: Conjunctivae and EOM are normal.   Neck: Normal range of motion. Neck supple.   Cardiovascular: Normal rate, regular rhythm, and intact distal pulses.  Pulmonary/Chest: Effort normal and breath sounds normal.   Abdominal: Soft. Non-tender.  Bowel sounds are normal. Peritoneal dialysis catheter to LUQ surrounding area looks good, no erythema or drainage   Musculoskeletal: Normal range of motion. She exhibits no edema.   Neurological: She is alert and oriented to person, place, and time.   Skin: Skin is warm and dry.   Psychiatric: She has a normal mood and affect. Her behavior is normal.   Nursing note and vitals reviewed.    Procedures Performed:  UPPER GI SERIES     HISTORY: Chronic renal failure with peritoneal dialysis. Persistent  nausea.     FINDINGS: An initial plain film of the abdomen shows a peritoneal  dialysis catheter coiled in the pelvis. There is a moderate amount of  stool scattered throughout the colon.     The patient swallowed barium without difficulty. There is a small  diverticulum in the distal one-third of the esophagus measuring 10 mm.  There is no associated stricture. There is also a small hiatus hernia  but no reflux occurred during the water siphon test.     The mucosal pattern of the stomach and proximal small bowel is normal  and there is no evidence of ulcer disease or outlet obstruction.     CONCLUSION: Small diverticulum in the distal one-third of the esophagus  measuring 10 mm. No associated stricture. Small hiatus hernia without  reflux. Otherwise negative upper GI series.      Consults:   Consults     Date and Time Order Name Status " Description    11/7/2019 0918 Inpatient Gastroenterology Consult Completed     11/7/2019 0432 Inpatient Nephrology Consult      11/7/2019 0140 [facility] (on-call MD unless specified) Details Completed     10/27/2019 1458 Nephrology (on -call MD unless specified) Completed            Discharge Disposition:  Home or Self Care    Discharge Medications:     Discharge Medications      New Medications      Instructions Start Date   doxylamine-pyridoxine 10-10 MG tablet delayed-release EC tablet  Commonly known as:  DICLEGIS   1 tablet, Oral, 3 Times Daily PRN      famotidine 20 MG tablet  Commonly known as:  PEPCID   20 mg, Oral, Daily   Start Date:  11/9/2019        Changes to Medications      Instructions Start Date   polyethylene glycol packet  Commonly known as:  MIRALAX  What changed:    · when to take this  · reasons to take this   17 g, Oral, Daily         Continue These Medications      Instructions Start Date   calcium acetate 667 MG capsule  Commonly known as:  PHOSLO   2,001 mg, Oral, 3 Times Daily With Meals      carvedilol 12.5 MG tablet  Commonly known as:  COREG   12.5 mg, Oral, 2 Times Daily With Meals      cloNIDine 0.3 MG tablet  Commonly known as:  CATAPRES   0.3 mg, Oral, 3 Times Daily      minoxidil 10 MG tablet  Commonly known as:  LONITEN   10 mg, Oral, Daily         Stop These Medications    ondansetron 4 MG tablet  Commonly known as:  ZOFRAN            Discharge Diet:   Diet Instructions     Diet: Regular, Renal; Thin      Discharge Diet:   Regular  Renal       Fluid Consistency:  Thin          Activity at Discharge:   Activity Instructions     Activity as Tolerated            Follow-up Appointments:  No future appointments.  Additional Instructions for the Follow-ups that You Need to Schedule     Discharge Follow-up with Specialty: Nephrology   As directed      Specialty:  Nephrology    Follow Up Details:  As scheduled               Test Results Pending at Discharge:   Order Current Status    Protein  Elec + Interp, Serum In process           Phu Mcdonald MD  11/08/19  5:01 PM    Time Spent on Discharge Activities: Greater than 30 minutes.

## 2019-11-08 NOTE — PROGRESS NOTES
"   LOS: 0 days    Patient Care Team:  Provider, No Known as PCP - Bo Hummel MD as Consulting Physician (Nephrology)  Damon Rooney MD as Surgeon (General Surgery)    Chief Complaint:    Chief Complaint   Patient presents with   • Nausea     Follow-up end-stage renal disease on peritoneal dialysis  Subjective     Interval History:   Patient was admitted with persistent nausea and poor appetite, since admission her nausea improved, she declined to have upper endoscopy and she got instead of upper GI series.  Her nausea has improved since admission, no vomiting, no abdominal pain, tolerating her peritoneal dialysis without any difficulties, denies any chest pain or shortness of air, no lightheadedness.      Review of Systems:   As noted above    Objective     Vital Signs  Temp:  [97.4 °F (36.3 °C)-98 °F (36.7 °C)] 97.7 °F (36.5 °C)  Heart Rate:  [79-99] 79  Resp:  [16-18] 16  BP: ()/(61-77) 95/68    Flowsheet Rows      First Filed Value   Admission Height  152.4 cm (60\") Documented at 11/06/2019 2204   Admission Weight  81.6 kg (180 lb) Documented at 11/06/2019 2221          I/O this shift:  In: 210 [P.O.:210]  Out: -   I/O last 3 completed shifts:  In: 8160 [P.O.:60; I.V.:1500; Other:6100; IV Piggyback:500]  Out: 5150 [Other:5150]    Intake/Output Summary (Last 24 hours) at 11/8/2019 1125  Last data filed at 11/8/2019 0900  Gross per 24 hour   Intake 7810 ml   Output 5150 ml   Net 2660 ml       Physical Exam:  General Appearance: alert, oriented x 3, no acute distress, appears to be chronically  Skin: warm and dry  HEENT: pupils round and reactive to light, oral mucosa normal,   Neck: supple, no JVD, trachea midline  Lungs: CTA, unlabored breathing effort  Heart: RRR, normal S1 and S2, no S3, no rub  Abdomen: soft, non-tender,  present bowel sounds to auscultation, can be catheter in place, exit site is clean with no tunnel tenderness  : no palpable bladder,  Extremities: no " edema, cyanosis or clubbing  Neuro: normal speech and mental status        Results Review:    Results from last 7 days   Lab Units 11/08/19  0557 11/07/19  0620 11/06/19  2240   SODIUM mmol/L 141 136 135*   POTASSIUM mmol/L 3.5 3.1* 3.0*   CHLORIDE mmol/L 99 92* 90*   CO2 mmol/L 23.2 27.7 26.2   BUN mg/dL 45* 39* 42*   CREATININE mg/dL 8.95* 9.66* 9.06*   CALCIUM mg/dL 7.3* 6.9* 7.6*   BILIRUBIN mg/dL 0.2  --  0.3   ALK PHOS U/L 52  --  69   ALT (SGPT) U/L <5  --  <5   AST (SGOT) U/L 8  --  9   GLUCOSE mg/dL 117* 104* 144*       Estimated Creatinine Clearance: 7.7 mL/min (A) (by C-G formula based on SCr of 8.95 mg/dL (H)).    Results from last 7 days   Lab Units 11/08/19  0557 11/07/19  0620 11/06/19  2240   MAGNESIUM mg/dL 2.3 2.2 2.4   PHOSPHORUS mg/dL 7.3* 7.1* 6.5*             Results from last 7 days   Lab Units 11/07/19  0620 11/06/19  2240   WBC 10*3/mm3 10.99* 13.38*   HEMOGLOBIN g/dL 9.7* 9.7*   PLATELETS 10*3/mm3 385 441               Imaging Results (Last 24 Hours)     Procedure Component Value Units Date/Time    FL Upper GI Single Contrast With KUB [[id number]] Collected:  11/08/19 1039     Updated:  11/08/19 1122    Narrative:       UPPER GI SERIES     HISTORY: Chronic renal failure with peritoneal dialysis. Persistent  nausea.     FINDINGS: An initial plain film of the abdomen shows a peritoneal  dialysis catheter coiled in the pelvis. There is a moderate amount of  stool scattered throughout the colon.     The patient swallowed barium without difficulty. There is a small  diverticulum in the distal one-third of the esophagus measuring 10 mm.  There is no associated stricture. There is also a small hiatus hernia  but no reflux occurred during the water siphon test.     The mucosal pattern of the stomach and proximal small bowel is normal  and there is no evidence of ulcer disease or outlet obstruction.     CONCLUSION: Small diverticulum in the distal one-third of the esophagus  measuring 10 mm. No  associated stricture. Small hiatus hernia without  reflux. Otherwise negative upper GI series.     52 images were obtained and the fluoroscopy time measures 35 seconds.     This report was finalized on 11/8/2019 11:19 AM by Dr. Adam Aviles M.D.             calcium carbonate (oyster shell) 1,000 mg Oral BID   carvedilol 12.5 mg Oral BID With Meals   cloNIDine 0.3 mg Oral TID   DELFLEX-LC/1.5% DEXTROSE 1,600 mL Intraperitoneal 5x Daily   DELFLEX-LC/2.5% DEXTROSE 1,600 mL Intraperitoneal 5x Daily   doxylamine-pyridoxine 1 tablet Oral 4x Daily   famotidine 20 mg Oral Daily   insulin lispro 0-7 Units Subcutaneous 4x Daily With Meals & Nightly   minoxidil 10 mg Oral Daily   polyethylene glycol 17 g Oral Daily   sodium chloride 10 mL Intravenous Q12H          Medication Review:   Current Facility-Administered Medications   Medication Dose Route Frequency Provider Last Rate Last Dose   • acetaminophen (TYLENOL) tablet 650 mg  650 mg Oral Q4H PRN Renee Muller APRN        Or   • acetaminophen (TYLENOL) 160 MG/5ML solution 650 mg  650 mg Oral Q4H PRN Renee Muller APRN        Or   • acetaminophen (TYLENOL) suppository 650 mg  650 mg Rectal Q4H PRN Renee Muller APRN       • bisacodyl (DULCOLAX) EC tablet 5 mg  5 mg Oral Daily PRN Renee Muller APRN       • calcium carbonate (oyster shell) tablet 1,000 mg  1,000 mg Oral BID Addie Watt MD   1,000 mg at 11/08/19 1022   • calcium carbonate (TUMS) chewable tablet 500 mg (200 mg elemental)  2 tablet Oral BID PRN Renee Muller APRN       • carvedilol (COREG) tablet 12.5 mg  12.5 mg Oral BID With Meals Renee Muller APRN   12.5 mg at 11/08/19 1023   • cloNIDine (CATAPRES) tablet 0.3 mg  0.3 mg Oral TID Renee Muller APRN   0.3 mg at 11/08/19 1022   • DELFLEX-LC/1.5% DEXTROSE (DIANEAL) CAPD 1,600 mL  1,600 mL Intraperitoneal 5x Daily Addie Watt MD   Stopped at 11/08/19 0824   • DELFLEX-LC/2.5% DEXTROSE  (DIANEAL) CAPD 1,600 mL  1,600 mL Intraperitoneal 5x Daily Addie Watt MD   Stopped at 11/08/19 0824   • dextrose (D50W) 25 g/ 50mL Intravenous Solution 25 g  25 g Intravenous Q15 Min PRN Phu Mcdonald MD       • dextrose (GLUTOSE) oral gel 15 g  15 g Oral Q15 Min PRN Phu Mcdonald MD       • doxylamine-pyridoxine (DICLEGIS) EC tablet 1 tablet  1 tablet Oral 4x Daily Phu Mcdonald MD   1 tablet at 11/08/19 1023   • famotidine (PEPCID) tablet 20 mg  20 mg Oral Daily Addie Watt MD   20 mg at 11/08/19 1022   • glucagon (human recombinant) (GLUCAGEN DIAGNOSTIC) injection 1 mg  1 mg Subcutaneous Q15 Min PRN Phu Mcdonald MD       • insulin lispro (humaLOG) injection 0-7 Units  0-7 Units Subcutaneous 4x Daily With Meals & Nightly Phu Mcdonald MD   2 Units at 11/07/19 2121   • minoxidil (LONITEN) tablet 10 mg  10 mg Oral Daily Renee Muller APRN   10 mg at 11/08/19 1022   • nitroglycerin (NITROSTAT) SL tablet 0.4 mg  0.4 mg Sublingual Q5 Min PRN Renee Muller APRN       • polyethylene glycol (MIRALAX) packet 17 g  17 g Oral Daily Addie Watt MD   17 g at 11/08/19 1023   • sodium chloride 0.9 % flush 10 mL  10 mL Intravenous PRN True Whitaker III, PA       • sodium chloride 0.9 % flush 10 mL  10 mL Intravenous Q12H Renee Muller APRN   10 mL at 11/08/19 1024   • sodium chloride 0.9 % flush 10 mL  10 mL Intravenous PRN Renee Muller APRN           Assessment/Plan   1.  End-stage renal disease on peritoneal dialysis currently on CAPD instead of CCPD tolerating the treatment without any difficulties.  No evidence of peritonitis  2.  Nausea without vomiting.  Evaluated by the GI service declined to have upper endoscopy states she had upper GI series.  Is currently on famotidine, symptoms is improving but not resolved.  3.  Prolonged QT syndrome  4.  Failed kidney transplant  5.  Anemia of chronic kidney disease, on TERESA as an  outpatient which would be continued  6.  Diabetes mellitus type 2  7.  Status post parathyroidectomy with autotransplant to the left forearm in April 2018.  On calcium supplement, her calcium today measured to be 7.3 with albumin is 3 corrected calcium is 8.1.,  She continued to take her calcitriol      Plan:  1.  Continue the same treatment.  With her symptoms have improved she could be discharged from the renal standpoint  2.  To be switched to her CCPD when she goes home.  3.  If the nausea worsened would arrange for her to see the GI service as an outpatient      I discussed the case with Dr. Tamara Gonzales MD  11/08/19  11:25 AM

## 2019-11-10 ENCOUNTER — APPOINTMENT (OUTPATIENT)
Dept: GENERAL RADIOLOGY | Facility: HOSPITAL | Age: 44
End: 2019-11-10

## 2019-11-10 ENCOUNTER — HOSPITAL ENCOUNTER (EMERGENCY)
Facility: HOSPITAL | Age: 44
Discharge: HOME | End: 2019-11-10
Attending: EMERGENCY MEDICINE | Admitting: EMERGENCY MEDICINE

## 2019-11-10 VITALS
WEIGHT: 190 LBS | DIASTOLIC BLOOD PRESSURE: 58 MMHG | RESPIRATION RATE: 16 BRPM | BODY MASS INDEX: 37.3 KG/M2 | HEIGHT: 60 IN | SYSTOLIC BLOOD PRESSURE: 106 MMHG | OXYGEN SATURATION: 98 % | TEMPERATURE: 97 F | HEART RATE: 78 BPM

## 2019-11-10 DIAGNOSIS — R06.02 SHORTNESS OF BREATH: Primary | ICD-10-CM

## 2019-11-10 DIAGNOSIS — N18.5 CHRONIC RENAL FAILURE, STAGE 5 (HCC): ICD-10-CM

## 2019-11-10 LAB
ALBUMIN SERPL-MCNC: 3.4 G/DL (ref 3.5–5.2)
ALBUMIN/GLOB SERPL: 0.8 G/DL
ALP SERPL-CCNC: 56 U/L (ref 39–117)
ALT SERPL W P-5'-P-CCNC: <5 U/L (ref 1–33)
ANION GAP SERPL CALCULATED.3IONS-SCNC: 17.9 MMOL/L (ref 5–15)
AST SERPL-CCNC: 9 U/L (ref 1–32)
B PARAPERT DNA SPEC QL NAA+PROBE: NOT DETECTED
B PERT DNA SPEC QL NAA+PROBE: NOT DETECTED
BASOPHILS # BLD AUTO: 0.06 10*3/MM3 (ref 0–0.2)
BASOPHILS NFR BLD AUTO: 0.5 % (ref 0–1.5)
BILIRUB SERPL-MCNC: 0.3 MG/DL (ref 0.2–1.2)
BUN BLD-MCNC: 42 MG/DL (ref 6–20)
BUN/CREAT SERPL: 4.1 (ref 7–25)
C PNEUM DNA NPH QL NAA+NON-PROBE: NOT DETECTED
CALCIUM SPEC-SCNC: 7.4 MG/DL (ref 8.6–10.5)
CHLORIDE SERPL-SCNC: 94 MMOL/L (ref 98–107)
CO2 SERPL-SCNC: 26.1 MMOL/L (ref 22–29)
CREAT BLD-MCNC: 10.36 MG/DL (ref 0.57–1)
DEPRECATED RDW RBC AUTO: 55.9 FL (ref 37–54)
EOSINOPHIL # BLD AUTO: 0.45 10*3/MM3 (ref 0–0.4)
EOSINOPHIL NFR BLD AUTO: 3.8 % (ref 0.3–6.2)
ERYTHROCYTE [DISTWIDTH] IN BLOOD BY AUTOMATED COUNT: 17.8 % (ref 12.3–15.4)
FLUAV H1 2009 PAND RNA NPH QL NAA+PROBE: NOT DETECTED
FLUAV H1 HA GENE NPH QL NAA+PROBE: NOT DETECTED
FLUAV H3 RNA NPH QL NAA+PROBE: NOT DETECTED
FLUAV SUBTYP SPEC NAA+PROBE: NOT DETECTED
FLUBV RNA ISLT QL NAA+PROBE: NOT DETECTED
GFR SERPL CREATININE-BSD FRML MDRD: 4 ML/MIN/1.73
GFR SERPL CREATININE-BSD FRML MDRD: ABNORMAL ML/MIN/{1.73_M2}
GLOBULIN UR ELPH-MCNC: 4.5 GM/DL
GLUCOSE BLD-MCNC: 131 MG/DL (ref 65–99)
HADV DNA SPEC NAA+PROBE: NOT DETECTED
HCG SERPL QL: NEGATIVE
HCOV 229E RNA SPEC QL NAA+PROBE: NOT DETECTED
HCOV HKU1 RNA SPEC QL NAA+PROBE: NOT DETECTED
HCOV NL63 RNA SPEC QL NAA+PROBE: NOT DETECTED
HCOV OC43 RNA SPEC QL NAA+PROBE: NOT DETECTED
HCT VFR BLD AUTO: 24.8 % (ref 34–46.6)
HGB BLD-MCNC: 8.4 G/DL (ref 12–15.9)
HMPV RNA NPH QL NAA+NON-PROBE: NOT DETECTED
HPIV1 RNA SPEC QL NAA+PROBE: NOT DETECTED
HPIV2 RNA SPEC QL NAA+PROBE: NOT DETECTED
HPIV3 RNA NPH QL NAA+PROBE: NOT DETECTED
HPIV4 P GENE NPH QL NAA+PROBE: NOT DETECTED
IMM GRANULOCYTES # BLD AUTO: 0.41 10*3/MM3 (ref 0–0.05)
IMM GRANULOCYTES NFR BLD AUTO: 3.5 % (ref 0–0.5)
LIPASE SERPL-CCNC: 86 U/L (ref 13–60)
LYMPHOCYTES # BLD AUTO: 1.31 10*3/MM3 (ref 0.7–3.1)
LYMPHOCYTES NFR BLD AUTO: 11.1 % (ref 19.6–45.3)
M PNEUMO IGG SER IA-ACNC: NOT DETECTED
MAGNESIUM SERPL-MCNC: 2.2 MG/DL (ref 1.6–2.6)
MCH RBC QN AUTO: 29.3 PG (ref 26.6–33)
MCHC RBC AUTO-ENTMCNC: 33.9 G/DL (ref 31.5–35.7)
MCV RBC AUTO: 86.4 FL (ref 79–97)
MONOCYTES # BLD AUTO: 0.44 10*3/MM3 (ref 0.1–0.9)
MONOCYTES NFR BLD AUTO: 3.7 % (ref 5–12)
NEUTROPHILS # BLD AUTO: 9.17 10*3/MM3 (ref 1.7–7)
NEUTROPHILS NFR BLD AUTO: 77.4 % (ref 42.7–76)
NRBC BLD AUTO-RTO: 0 /100 WBC (ref 0–0.2)
PLATELET # BLD AUTO: 394 10*3/MM3 (ref 140–450)
PMV BLD AUTO: 9.4 FL (ref 6–12)
POTASSIUM BLD-SCNC: 3.3 MMOL/L (ref 3.5–5.2)
PROT SERPL-MCNC: 7.9 G/DL (ref 6–8.5)
RBC # BLD AUTO: 2.87 10*6/MM3 (ref 3.77–5.28)
RHINOVIRUS RNA SPEC NAA+PROBE: NOT DETECTED
RSV RNA NPH QL NAA+NON-PROBE: NOT DETECTED
SODIUM BLD-SCNC: 138 MMOL/L (ref 136–145)
WBC NRBC COR # BLD: 11.84 10*3/MM3 (ref 3.4–10.8)

## 2019-11-10 PROCEDURE — 84703 CHORIONIC GONADOTROPIN ASSAY: CPT | Performed by: NURSE PRACTITIONER

## 2019-11-10 PROCEDURE — 83735 ASSAY OF MAGNESIUM: CPT | Performed by: NURSE PRACTITIONER

## 2019-11-10 PROCEDURE — 99284 EMERGENCY DEPT VISIT MOD MDM: CPT

## 2019-11-10 PROCEDURE — 83690 ASSAY OF LIPASE: CPT | Performed by: NURSE PRACTITIONER

## 2019-11-10 PROCEDURE — 0100U HC BIOFIRE FILMARRAY RESP PANEL 2: CPT | Performed by: EMERGENCY MEDICINE

## 2019-11-10 PROCEDURE — 93005 ELECTROCARDIOGRAM TRACING: CPT

## 2019-11-10 PROCEDURE — 80053 COMPREHEN METABOLIC PANEL: CPT | Performed by: NURSE PRACTITIONER

## 2019-11-10 PROCEDURE — 85025 COMPLETE CBC W/AUTO DIFF WBC: CPT | Performed by: NURSE PRACTITIONER

## 2019-11-10 PROCEDURE — 93005 ELECTROCARDIOGRAM TRACING: CPT | Performed by: EMERGENCY MEDICINE

## 2019-11-10 PROCEDURE — 71046 X-RAY EXAM CHEST 2 VIEWS: CPT

## 2019-11-10 PROCEDURE — 93010 ELECTROCARDIOGRAM REPORT: CPT | Performed by: INTERNAL MEDICINE

## 2019-11-10 PROCEDURE — 93005 ELECTROCARDIOGRAM TRACING: CPT | Performed by: NURSE PRACTITIONER

## 2019-11-10 RX ORDER — SODIUM CHLORIDE 0.9 % (FLUSH) 0.9 %
10 SYRINGE (ML) INJECTION AS NEEDED
Status: DISCONTINUED | OUTPATIENT
Start: 2019-11-10 | End: 2019-11-10 | Stop reason: HOSPADM

## 2019-11-10 NOTE — ED PROVIDER NOTES
EMERGENCY DEPARTMENT ENCOUNTER    Room number:  26/26  Date Seen:  11/10/2019  Time of transfer:1100  PCP:  Provider, No Known    HPI  Sunni Mcneil is a 44 y.o. female who presents to the ED with c/o of SOB since this morning which has been progressively worsening since onset. Pt denies fever/chills, cough, or CP. Pt currently is receiving dialysis treatment. Pt denies smoking tobacco products.     PHYSICAL EXAM  HEENT show WNL and PERRLA. RESP exam show no respiratory distress, no JVD on neck, CTAB, breath sounds normal. CARDIO exam show RRR no murmurs or gallops. ABD exam show soft, non-tender, non-distended. EXT exam show minimal edema in bilateral LE with good distal pulses. Skin exam show warm and dry.    LABORATORY RESULTS:  Recent Results (from the past 24 hour(s))   Comprehensive Metabolic Panel    Collection Time: 11/10/19 11:10 AM   Result Value Ref Range    Glucose 131 (H) 65 - 99 mg/dL    BUN 42 (H) 6 - 20 mg/dL    Creatinine 10.36 (H) 0.57 - 1.00 mg/dL    Sodium 138 136 - 145 mmol/L    Potassium 3.3 (L) 3.5 - 5.2 mmol/L    Chloride 94 (L) 98 - 107 mmol/L    CO2 26.1 22.0 - 29.0 mmol/L    Calcium 7.4 (L) 8.6 - 10.5 mg/dL    Total Protein 7.9 6.0 - 8.5 g/dL    Albumin 3.40 (L) 3.50 - 5.20 g/dL    ALT (SGPT) <5 1 - 33 U/L    AST (SGOT) 9 1 - 32 U/L    Alkaline Phosphatase 56 39 - 117 U/L    Total Bilirubin 0.3 0.2 - 1.2 mg/dL    eGFR Non African Amer 4 (L) >60 mL/min/1.73    eGFR  African Amer      Globulin 4.5 gm/dL    A/G Ratio 0.8 g/dL    BUN/Creatinine Ratio 4.1 (L) 7.0 - 25.0    Anion Gap 17.9 (H) 5.0 - 15.0 mmol/L   Lipase    Collection Time: 11/10/19 11:10 AM   Result Value Ref Range    Lipase 86 (H) 13 - 60 U/L   Magnesium    Collection Time: 11/10/19 11:10 AM   Result Value Ref Range    Magnesium 2.2 1.6 - 2.6 mg/dL   hCG, Serum, Qualitative    Collection Time: 11/10/19 11:10 AM   Result Value Ref Range    HCG Qualitative Negative Negative   CBC Auto Differential    Collection Time: 11/10/19  11:10 AM   Result Value Ref Range    WBC 11.84 (H) 3.40 - 10.80 10*3/mm3    RBC 2.87 (L) 3.77 - 5.28 10*6/mm3    Hemoglobin 8.4 (L) 12.0 - 15.9 g/dL    Hematocrit 24.8 (L) 34.0 - 46.6 %    MCV 86.4 79.0 - 97.0 fL    MCH 29.3 26.6 - 33.0 pg    MCHC 33.9 31.5 - 35.7 g/dL    RDW 17.8 (H) 12.3 - 15.4 %    RDW-SD 55.9 (H) 37.0 - 54.0 fl    MPV 9.4 6.0 - 12.0 fL    Platelets 394 140 - 450 10*3/mm3    Neutrophil % 77.4 (H) 42.7 - 76.0 %    Lymphocyte % 11.1 (L) 19.6 - 45.3 %    Monocyte % 3.7 (L) 5.0 - 12.0 %    Eosinophil % 3.8 0.3 - 6.2 %    Basophil % 0.5 0.0 - 1.5 %    Immature Grans % 3.5 (H) 0.0 - 0.5 %    Neutrophils, Absolute 9.17 (H) 1.70 - 7.00 10*3/mm3    Lymphocytes, Absolute 1.31 0.70 - 3.10 10*3/mm3    Monocytes, Absolute 0.44 0.10 - 0.90 10*3/mm3    Eosinophils, Absolute 0.45 (H) 0.00 - 0.40 10*3/mm3    Basophils, Absolute 0.06 0.00 - 0.20 10*3/mm3    Immature Grans, Absolute 0.41 (H) 0.00 - 0.05 10*3/mm3    nRBC 0.0 0.0 - 0.2 /100 WBC   Respiratory Panel, PCR - Swab, Nasopharynx    Collection Time: 11/10/19 12:11 PM   Result Value Ref Range    ADENOVIRUS, PCR Not Detected Not Detected    Coronavirus 229E Not Detected Not Detected    Coronavirus HKU1 Not Detected Not Detected    Coronavirus NL63 Not Detected Not Detected    Coronavirus OC43 Not Detected Not Detected    Human Metapneumovirus Not Detected Not Detected    Human Rhinovirus/Enterovirus Not Detected Not Detected    Influenza B PCR Not Detected Not Detected    Parainfluenza Virus 1 Not Detected Not Detected    Parainfluenza Virus 2 Not Detected Not Detected    Parainfluenza Virus 3 Not Detected Not Detected    Parainfluenza Virus 4 Not Detected Not Detected    Bordetella pertussis pcr Not Detected Not Detected    Influenza A H1 2009 PCR Not Detected Not Detected    Chlamydophila pneumoniae PCR Not Detected Not Detected    Mycoplasma pneumo by PCR Not Detected Not Detected    Influenza A PCR Not Detected Not Detected    Influenza A H3 Not Detected  Not Detected    Influenza A H1 Not Detected Not Detected    RSV, PCR Not Detected Not Detected    Bordetella parapertussis PCR Not Detected Not Detected     I reviewed the above results.     RADIOLOGY:  XR Chest 2 View   Final Result   No focal pulmonary consolidation. Evidence of nonspecific   free intraperitoneal gas.       Discussed by telephone with Alisha Aishwarya at time of interpretation, 1056,   11/10/2019       This report was finalized on 11/10/2019 10:57 AM by Dr. David oMntoya M.D.            I reviewed the above results    MEDICATIONS ORDERED IN THE ED:  Medications   sodium chloride 0.9 % flush 10 mL (not administered)     PROGRESS  Procedures  EKG          EKG time: 1003  Rhythm/Rate: NSR 85 BPM  P waves and CA: Normal  QRS, axis: Normal  ST and T waves: Prolonged QT    Interpreted Contemporaneously by me, independently viewed  Unchanged compared to prior 11/07/19.      PROGRESS AND CONSULT NOTES:  1100:  [facility] remainder of workup and final disposition.    1119: Examined pt. Pt is resting comfortably. Notified pt of lab work and imaging studies results. Discussed the plan to proceed with Respiratory Panel to rule out influenza. Pt understands and agrees with the plan, all questions answered.    1120: Ordered Respiratory Panel for further evaluation and management.    0320: Rechecked pt. Pt is resting comfortably. Notified pt respiratory panel results which is negative for influenza. Discussed the plan to discharge the pt home. I instructed the pt to follow up with her PCP as needed for further evaluation and management. Pt understands and agrees with the plan, all questions answered.      DIAGNOSIS:  Final diagnoses:   Shortness of breath   Chronic renal failure, stage 5 (CMS/HCC)- on peritoneal dialysis       DISPOSITION:  DISCHARGE    Patient discharged in stable condition.    Reviewed implications of results, diagnosis, meds, responsibility  to follow up, warning signs and symptoms of possible worsening, potential complications and reasons to return to ER.    Patient/Family voiced understanding of above instructions.    Discussed plan for discharge, as there is no emergent indication for admission. Patient referred to primary care provider for BP management due to today's BP. Pt/family is agreeable and understands need for follow up and repeat testing.  Pt is aware that discharge does not mean that nothing is wrong but it indicates no emergency is present that requires admission and they must continue care with follow-up as given below or physician of their choice.     FOLLOW-UP  [facility] CL[facility] AT [address] [address]  [phone]  Schedule an appointment as soon as possible for a visit   for continued care         Medication List      Changed    polyethylene glycol packet  Commonly known as:  MIRALAX  Take 17 g by mouth Daily.  What changed:    when to take this  reasons to take this              Provider attestation:  I personally reviewed the past medical history, past surgical history, social history, family history, current medications, and allergies as they appear in the chart.    Documentation assistance provided by alexei Davison for Dr. Mando North MD.  Information recorded by the alexei was done at my direction and has been verified and validated by me.            Orly Davison  11/10/19 9864       Mando North MD  11/10/19 1617

## 2019-11-10 NOTE — ED PROVIDER NOTES
"EMERGENCY DEPARTMENT ENCOUNTER    Room Number:  26/26  Date seen:  11/10/2019  Time seen: 10:09 AM  PCP: Provider, No Known  Historian: patient     HPI:  Chief complaint: chest tightness and SOA w/ inspiration  Context:Sunni Mcneil is a 44 y.o. female who presents to the ED with c/o chest tightness and SOA w/ inspiration that began this morning. Pt is a dialysis patient and receives 6 exchanges per day. Pt denies cough, sneezing, rhinorrhea, vomiting, diaphoresis, or BLE swelling. Pt was recently in the hospital from 11/6/2019- 11/8/2019 for prolonged QT interval, hyperphosphatemia, hypokalemia, non-intractable vomiting w/ nausea, and anemia. Pt states her nausea has been better ever since she left the hospital. No hx of asthma. Pt denies being around anyone ill recently. Pt denies smoking. Hx of kidney transplant, ESRD, hypercalcemia, and hyperparathyroidism. There are no other complaints at this time.     Timing: constant   Duration: hours   Location:chest   Radiation: none   Quality: \"chest tightness and SOA\"   Intensity/Severity: moderate   Associated Symptoms: nausea has been better   Aggravating Factors: inspiration  Alleviating Factors: none mentioned   Previous Episodes:hx of kidney transplant, ESRD, hypercalcemia, and hyperparathyroidism  Treatment before arrival: recent hospital admission from 11/6/2019- 11/8/2019    MEDICAL RECORD REVIEW  Pt's previous medical records show she was admitted to telemetry from 11/6/2019- 11/8/2019  Hospital course:   The patient is a 44 y.o. female with a history of ESRD on peritoneal dialysis, failed kidney transplant, and HTN who presented with nausea and electrolyte abnormalities.  Please see admission H&P from 11/7/19 for further details.  On admission the patient was started on diclegis due to her prolonged QT interval as well as famotidine and she did well with these.  She was tolerating a regular diet without further nausea.  She did not have vomiting or abdominal " pain.  Her lipase was mildly elevated in the 200s likely secondary to peritoneal dialysis.  Nephrology evaluated the patient and adjusted her peritoneal dialysis.  She was evaluated by gastroenterology and she had an upper GI series which revealed an esophageal diverticulum and a small hiatal hernia but otherwise unremarkable.  An EGD was recommended but the patient declined this.  She will be discharged home on diclegis and famotidine.  She will be switched to CCPD at discharge     ALLERGIES  Patient has no known allergies.    PAST MEDICAL HISTORY  Active Ambulatory Problems     Diagnosis Date Noted   • Hyperparathyroidism (CMS/[facility]) 01/24/2018   • Acute rejection of kidney transplant 05/10/2017   • ARF (acute renal failure) (CMS/[facility]) 04/04/2014   • Hx of kidney transplant 07/06/2017   • Hypertension 01/24/2018   • Kidney transplant status, cadaveric 07/06/2017   • Nephritis 04/13/2014   • Hypercalcemia 01/24/2018   • ESRD on hemodialysis (CMS/[facility]) 07/02/2019   • Peritoneal dialysis catheter in place (CMS/[facility]) 07/24/2019   • Prolonged QT interval 11/07/2019   • ESRD (end stage renal disease) (CMS/[facility]) 11/07/2019   • Hyperphosphatemia 11/07/2019   • DM type 2 (diabetes mellitus, type 2) (CMS/[facility]) 11/07/2019     Resolved Ambulatory Problems     Diagnosis Date Noted   • Hypokalemia 11/07/2019   • Leukocytosis 11/07/2019   • Nausea 11/07/2019   • Hypocalcemia 11/07/2019     Past Medical History:   Diagnosis Date   • Acid reflux    • Anemia    • Arthritis    • Diabetes mellitus (CMS/[facility])    • ESRD on dialysis (CMS/[facility])    • History of peritoneal dialysis    • History of transfusion    • Hypercalcemia    • Hyperparathyroidism (CMS/[facility])    • Hypertension    • Kidney disease    • Kidney transplant recipient 09/02/2016   • Seasonal allergies    • UTI (urinary tract infection)        PAST SURGICAL HISTORY  Past Surgical History:   Procedure Laterality Date   • INSERTION PERITONEAL DIALYSIS CATHETER  07/29/2014    Laparoscopic  placement of Curl Cath peritoneal dialysis catheter, Dr. Vinod Goldstein   • INSERTION PERITONEAL DIALYSIS CATHETER N/A 2019    Procedure: LAPAROSCOPIC INSERTION PERITONEAL DIALYSIS CATHETER;  Surgeon: Damon Rooney MD;  Location: [facility] MAIN OR;  Service: General   • REMOVAL PERITONEAL DIALYSIS CATHETER N/A 10/17/2016    Procedure: REMOVAL PERITONEAL DIALYSIS CATHETER;  Surgeon: Damon Rooney MD;  Location: [facility] MAIN OR;  Service:    • TRANSPLANTATION RENAL Right 2016    from  donor   • TUBAL ABDOMINAL LIGATION Bilateral        FAMILY HISTORY  Family History   Problem Relation Age of Onset   • Malig Hyperthermia Neg Hx        SOCIAL HISTORY  Social History     Socioeconomic History   • Marital status:      Spouse name: Not on file   • Number of children: 2   • Years of education: 12   • Highest education level: Not on file   Occupational History     Employer: [employer]   Tobacco Use   • Smoking status: Never Smoker   • Smokeless tobacco: Never Used   Substance and Sexual Activity   • Alcohol use: No   • Drug use: No   • Sexual activity: Defer       REVIEW OF SYSTEMS  Review of Systems   Constitutional: Negative for activity change, appetite change, diaphoresis and fever.   HENT: Negative for rhinorrhea, sneezing and trouble swallowing.    Eyes: Negative for visual disturbance.   Respiratory: Positive for chest tightness and shortness of breath (w/ inspiration). Negative for cough and wheezing.    Cardiovascular: Negative for chest pain, palpitations and leg swelling.   Gastrointestinal: Positive for nausea. Negative for abdominal pain, diarrhea and vomiting.   Genitourinary: Negative for dysuria.   Musculoskeletal: Negative for back pain.   Skin: Negative for rash.   Neurological: Negative for dizziness, speech difficulty and light-headedness.       PHYSICAL EXAM  ED Triage Vitals [11/10/19 0958]   Temp Heart Rate Resp BP SpO2   97.4 °F (36.3 °C) 102 16 -- 100 %      Temp  src Heart Rate Source Patient Position BP Location FiO2 (%)   Tympanic -- -- -- --     Physical Exam   Constitutional: She is oriented to person, place, and time and well-developed, well-nourished, and in no distress. She does not have a sickly appearance. No distress.   Pt does not appear in any distress.   HENT:   Head: Normocephalic and atraumatic.   Right Ear: External ear normal.   Left Ear: External ear normal.   Nose: Nose normal.   Mouth/Throat: Uvula is midline, oropharynx is clear and moist and mucous membranes are normal.   Eyes: Conjunctivae and EOM are normal. Pupils are equal, round, and reactive to light.   Neck: Normal range of motion. Neck supple.   Cardiovascular: Normal rate, regular rhythm, S1 normal, S2 normal and normal heart sounds. Exam reveals no gallop and no friction rub.   No murmur heard.  Pulmonary/Chest: Effort normal and breath sounds normal. She has no decreased breath sounds. She has no wheezes. She has no rhonchi. She has no rales.   Abdominal: Soft. Bowel sounds are normal. There is no tenderness. There is no rebound and no guarding.   Abd is rounded.  L sided PD catheter in place w/o erythema or edema.   Musculoskeletal: Normal range of motion.   Neurological: She is alert and oriented to person, place, and time. GCS score is 15.   Skin: Skin is warm, dry and intact.   Psychiatric: Affect and judgment normal.   Nursing note and vitals reviewed.      LAB RESULTS  Recent Results (from the past 24 hour(s))   Comprehensive Metabolic Panel    Collection Time: 11/10/19 11:10 AM   Result Value Ref Range    Glucose 131 (H) 65 - 99 mg/dL    BUN 42 (H) 6 - 20 mg/dL    Creatinine 10.36 (H) 0.57 - 1.00 mg/dL    Sodium 138 136 - 145 mmol/L    Potassium 3.3 (L) 3.5 - 5.2 mmol/L    Chloride 94 (L) 98 - 107 mmol/L    CO2 26.1 22.0 - 29.0 mmol/L    Calcium 7.4 (L) 8.6 - 10.5 mg/dL    Total Protein 7.9 6.0 - 8.5 g/dL    Albumin 3.40 (L) 3.50 - 5.20 g/dL    ALT (SGPT) <5 1 - 33 U/L    AST (SGOT) 9  1 - 32 U/L    Alkaline Phosphatase 56 39 - 117 U/L    Total Bilirubin 0.3 0.2 - 1.2 mg/dL    eGFR Non African Amer 4 (L) >60 mL/min/1.73    eGFR  African Amer      Globulin 4.5 gm/dL    A/G Ratio 0.8 g/dL    BUN/Creatinine Ratio 4.1 (L) 7.0 - 25.0    Anion Gap 17.9 (H) 5.0 - 15.0 mmol/L   Lipase    Collection Time: 11/10/19 11:10 AM   Result Value Ref Range    Lipase 86 (H) 13 - 60 U/L   Magnesium    Collection Time: 11/10/19 11:10 AM   Result Value Ref Range    Magnesium 2.2 1.6 - 2.6 mg/dL   hCG, Serum, Qualitative    Collection Time: 11/10/19 11:10 AM   Result Value Ref Range    HCG Qualitative Negative Negative   CBC Auto Differential    Collection Time: 11/10/19 11:10 AM   Result Value Ref Range    WBC 11.84 (H) 3.40 - 10.80 10*3/mm3    RBC 2.87 (L) 3.77 - 5.28 10*6/mm3    Hemoglobin 8.4 (L) 12.0 - 15.9 g/dL    Hematocrit 24.8 (L) 34.0 - 46.6 %    MCV 86.4 79.0 - 97.0 fL    MCH 29.3 26.6 - 33.0 pg    MCHC 33.9 31.5 - 35.7 g/dL    RDW 17.8 (H) 12.3 - 15.4 %    RDW-SD 55.9 (H) 37.0 - 54.0 fl    MPV 9.4 6.0 - 12.0 fL    Platelets 394 140 - 450 10*3/mm3    Neutrophil % 77.4 (H) 42.7 - 76.0 %    Lymphocyte % 11.1 (L) 19.6 - 45.3 %    Monocyte % 3.7 (L) 5.0 - 12.0 %    Eosinophil % 3.8 0.3 - 6.2 %    Basophil % 0.5 0.0 - 1.5 %    Immature Grans % 3.5 (H) 0.0 - 0.5 %    Neutrophils, Absolute 9.17 (H) 1.70 - 7.00 10*3/mm3    Lymphocytes, Absolute 1.31 0.70 - 3.10 10*3/mm3    Monocytes, Absolute 0.44 0.10 - 0.90 10*3/mm3    Eosinophils, Absolute 0.45 (H) 0.00 - 0.40 10*3/mm3    Basophils, Absolute 0.06 0.00 - 0.20 10*3/mm3    Immature Grans, Absolute 0.41 (H) 0.00 - 0.05 10*3/mm3    nRBC 0.0 0.0 - 0.2 /100 WBC   Respiratory Panel, PCR - Swab, Nasopharynx    Collection Time: 11/10/19 12:11 PM   Result Value Ref Range    ADENOVIRUS, PCR Not Detected Not Detected    Coronavirus 229E Not Detected Not Detected    Coronavirus HKU1 Not Detected Not Detected    Coronavirus NL63 Not Detected Not Detected    Coronavirus OC43 Not  Detected Not Detected    Human Metapneumovirus Not Detected Not Detected    Human Rhinovirus/Enterovirus Not Detected Not Detected    Influenza B PCR Not Detected Not Detected    Parainfluenza Virus 1 Not Detected Not Detected    Parainfluenza Virus 2 Not Detected Not Detected    Parainfluenza Virus 3 Not Detected Not Detected    Parainfluenza Virus 4 Not Detected Not Detected    Bordetella pertussis pcr Not Detected Not Detected    Influenza A H1 2009 PCR Not Detected Not Detected    Chlamydophila pneumoniae PCR Not Detected Not Detected    Mycoplasma pneumo by PCR Not Detected Not Detected    Influenza A PCR Not Detected Not Detected    Influenza A H3 Not Detected Not Detected    Influenza A H1 Not Detected Not Detected    RSV, PCR Not Detected Not Detected    Bordetella parapertussis PCR Not Detected Not Detected       Ordered the above labs and independently reviewed the results.     RADIOLOGY  Xr Chest 2 View    Result Date: 11/10/2019  XR CHEST 2 VW-  HISTORY: Female who is 44 years-old,  short of breath  TECHNIQUE: Frontal and lateral views of the chest  COMPARISON: 11/07/2019  FINDINGS: The heart size is borderline. Pulmonary vasculature is unremarkable. No focal pulmonary consolidation, pleural effusion, or pneumothorax. Lucency under the diaphragm suggesting free intraperitoneal gas, could for example be gas introduced during peritoneal dialysis or bowel perforation, nonspecific, further imaging evaluation could be obtained as indicated. Residual contrast material is seen in the colon. No acute osseous process.      No focal pulmonary consolidation. Evidence of nonspecific free intraperitoneal gas.  Discussed by telephone with Alisha Neff at time of interpretation, 1056, 11/10/2019  This report was finalized on 11/10/2019 10:57 AM by Dr. David Montoya M.D.        I ordered the above noted radiological studies and reviewed the images on the PACS system.  Reviewed by me and discussed with radiologist.   "See dictation for official radiology interpretation.       MEDICATIONS GIVEN IN ER  Medications   sodium chloride 0.9 % flush 10 mL (not administered)       EKG  Interpreted by ED Physician    PROCEDURES  Procedures        PROGRESS, DATA ANALYSIS, CONSULTS AND MEDICAL DECISION MAKING  All labs have been independently reviewed by me.  All radiology studies have been reviewed by me and discussed with radiologist dictating the report.  EKG's independently viewed and interpreted by me unless stated otherwise. Discussion below represents my analysis of pertinent findings related to patient's condition, differential diagnosis, treatment plan and final disposition.          1017  Ordered CBC, CMP, lipase, magnesium, HCG, and CXR for further evaluation.    1100  Pt care turned over to Dr. North (ER physician) pending remainder of workup and final disposition.         Disposition vitals:  BP 97/72   Pulse 81   Temp 97.4 °F (36.3 °C) (Tympanic)   Resp 16   Ht 152.4 cm (60\")   Wt 86.2 kg (190 lb)   LMP 10/23/2019   SpO2 98%   BMI 37.11 kg/m²       DIAGNOSIS  Final diagnoses:   Shortness of breath   Chronic renal failure, stage 5 (CMS/HCC)- on peritoneal dialysis       Documentation assistance provided by alexei Newsome for JERONIMO Keys.  Information recorded by the alexei was done at my direction and has been verified and validated by me.             Thania Newsome  11/10/19 1123       Alisha Neff APRN  11/10/19 1329    "

## 2019-11-11 LAB
ALBUMIN SERPL-MCNC: 2.5 G/DL (ref 2.9–4.4)
ALBUMIN/GLOB SERPL: 0.6 {RATIO} (ref 0.7–1.7)
ALPHA1 GLOB FLD ELPH-MCNC: 0.3 G/DL (ref 0–0.4)
ALPHA2 GLOB SERPL ELPH-MCNC: 1.2 G/DL (ref 0.4–1)
B-GLOBULIN SERPL ELPH-MCNC: 1 G/DL (ref 0.7–1.3)
GAMMA GLOB SERPL ELPH-MCNC: 1.6 G/DL (ref 0.4–1.8)
GLOBULIN SER CALC-MCNC: 4.1 G/DL (ref 2.2–3.9)
Lab: ABNORMAL
M-SPIKE: ABNORMAL G/DL
PROT PATTERN SERPL ELPH-IMP: ABNORMAL
PROT SERPL-MCNC: 6.6 G/DL (ref 6–8.5)

## 2019-11-23 ENCOUNTER — HOSPITAL ENCOUNTER (EMERGENCY)
Facility: HOSPITAL | Age: 44
Discharge: LEFT BEFORE DISCHARGE | End: 2019-11-23

## 2019-11-23 VITALS
HEIGHT: 60 IN | BODY MASS INDEX: 37.11 KG/M2 | DIASTOLIC BLOOD PRESSURE: 103 MMHG | HEART RATE: 144 BPM | SYSTOLIC BLOOD PRESSURE: 148 MMHG | OXYGEN SATURATION: 100 % | TEMPERATURE: 97.7 F | RESPIRATION RATE: 20 BRPM

## 2020-01-19 ENCOUNTER — APPOINTMENT (OUTPATIENT)
Dept: CT IMAGING | Facility: HOSPITAL | Age: 45
End: 2020-01-19

## 2020-01-19 ENCOUNTER — HOSPITAL ENCOUNTER (EMERGENCY)
Facility: HOSPITAL | Age: 45
Discharge: LEFT BEFORE DISCHARGE | End: 2020-01-19
Attending: EMERGENCY MEDICINE | Admitting: EMERGENCY MEDICINE

## 2020-01-19 VITALS
SYSTOLIC BLOOD PRESSURE: 107 MMHG | WEIGHT: 167 LBS | HEIGHT: 60 IN | DIASTOLIC BLOOD PRESSURE: 76 MMHG | HEART RATE: 87 BPM | TEMPERATURE: 97.1 F | RESPIRATION RATE: 18 BRPM | OXYGEN SATURATION: 96 % | BODY MASS INDEX: 32.79 KG/M2

## 2020-01-19 DIAGNOSIS — R10.84 GENERALIZED ABDOMINAL PAIN: Primary | ICD-10-CM

## 2020-01-19 DIAGNOSIS — Z99.2 PERITONEAL DIALYSIS STATUS (HCC): ICD-10-CM

## 2020-01-19 LAB
ALBUMIN SERPL-MCNC: 4.2 G/DL (ref 3.5–5.2)
ALBUMIN/GLOB SERPL: 0.8 G/DL
ALP SERPL-CCNC: 53 U/L (ref 39–117)
ALT SERPL W P-5'-P-CCNC: <5 U/L (ref 1–33)
ANION GAP SERPL CALCULATED.3IONS-SCNC: 24.7 MMOL/L (ref 5–15)
APPEARANCE FLD: CLEAR
AST SERPL-CCNC: 10 U/L (ref 1–32)
BASOPHILS # BLD AUTO: 0.05 10*3/MM3 (ref 0–0.2)
BASOPHILS NFR BLD AUTO: 0.4 % (ref 0–1.5)
BILIRUB SERPL-MCNC: 0.3 MG/DL (ref 0.2–1.2)
BUN BLD-MCNC: 43 MG/DL (ref 6–20)
BUN/CREAT SERPL: 3.9 (ref 7–25)
CALCIUM SPEC-SCNC: 7.7 MG/DL (ref 8.6–10.5)
CHLORIDE SERPL-SCNC: 87 MMOL/L (ref 98–107)
CO2 SERPL-SCNC: 24.3 MMOL/L (ref 22–29)
COLOR FLD: COLORLESS
CREAT BLD-MCNC: 11.02 MG/DL (ref 0.57–1)
D-LACTATE SERPL-SCNC: 1.9 MMOL/L (ref 0.5–2)
DEPRECATED RDW RBC AUTO: 54.3 FL (ref 37–54)
EOSINOPHIL # BLD AUTO: 0.53 10*3/MM3 (ref 0–0.4)
EOSINOPHIL NFR BLD AUTO: 4.4 % (ref 0.3–6.2)
ERYTHROCYTE [DISTWIDTH] IN BLOOD BY AUTOMATED COUNT: 16.3 % (ref 12.3–15.4)
GFR SERPL CREATININE-BSD FRML MDRD: 4 ML/MIN/1.73
GFR SERPL CREATININE-BSD FRML MDRD: ABNORMAL ML/MIN/{1.73_M2}
GLOBULIN UR ELPH-MCNC: 5.3 GM/DL
GLUCOSE BLD-MCNC: 130 MG/DL (ref 65–99)
HCT VFR BLD AUTO: 29.4 % (ref 34–46.6)
HGB BLD-MCNC: 9.8 G/DL (ref 12–15.9)
HOLD SPECIMEN: NORMAL
HOLD SPECIMEN: NORMAL
IMM GRANULOCYTES # BLD AUTO: 0.22 10*3/MM3 (ref 0–0.05)
IMM GRANULOCYTES NFR BLD AUTO: 1.8 % (ref 0–0.5)
LIPASE SERPL-CCNC: 67 U/L (ref 13–60)
LYMPHOCYTES # BLD AUTO: 1.26 10*3/MM3 (ref 0.7–3.1)
LYMPHOCYTES NFR BLD AUTO: 10.5 % (ref 19.6–45.3)
MCH RBC QN AUTO: 30.2 PG (ref 26.6–33)
MCHC RBC AUTO-ENTMCNC: 33.3 G/DL (ref 31.5–35.7)
MCV RBC AUTO: 90.5 FL (ref 79–97)
MONOCYTES # BLD AUTO: 0.69 10*3/MM3 (ref 0.1–0.9)
MONOCYTES NFR BLD AUTO: 5.7 % (ref 5–12)
NEUTROPHILS # BLD AUTO: 9.28 10*3/MM3 (ref 1.7–7)
NEUTROPHILS NFR BLD AUTO: 77.2 % (ref 42.7–76)
NRBC BLD AUTO-RTO: 0.1 /100 WBC (ref 0–0.2)
PLATELET # BLD AUTO: 404 10*3/MM3 (ref 140–450)
PMV BLD AUTO: 9.3 FL (ref 6–12)
POTASSIUM BLD-SCNC: 3.2 MMOL/L (ref 3.5–5.2)
PROCALCITONIN SERPL-MCNC: 0.74 NG/ML (ref 0.1–0.25)
PROT SERPL-MCNC: 9.5 G/DL (ref 6–8.5)
RBC # BLD AUTO: 3.25 10*6/MM3 (ref 3.77–5.28)
RBC # FLD AUTO: 2 /MM3
SODIUM BLD-SCNC: 136 MMOL/L (ref 136–145)
WBC # FLD AUTO: 5 /MM3
WBC NRBC COR # BLD: 12.03 10*3/MM3 (ref 3.4–10.8)
WHOLE BLOOD HOLD SPECIMEN: NORMAL
WHOLE BLOOD HOLD SPECIMEN: NORMAL

## 2020-01-19 PROCEDURE — 25010000002 ONDANSETRON PER 1 MG: Performed by: NURSE PRACTITIONER

## 2020-01-19 PROCEDURE — 83605 ASSAY OF LACTIC ACID: CPT | Performed by: NURSE PRACTITIONER

## 2020-01-19 PROCEDURE — 87070 CULTURE OTHR SPECIMN AEROBIC: CPT | Performed by: NURSE PRACTITIONER

## 2020-01-19 PROCEDURE — 25010000002 MORPHINE PER 10 MG: Performed by: NURSE PRACTITIONER

## 2020-01-19 PROCEDURE — 83690 ASSAY OF LIPASE: CPT | Performed by: NURSE PRACTITIONER

## 2020-01-19 PROCEDURE — 85025 COMPLETE CBC W/AUTO DIFF WBC: CPT | Performed by: NURSE PRACTITIONER

## 2020-01-19 PROCEDURE — 84145 PROCALCITONIN (PCT): CPT | Performed by: NURSE PRACTITIONER

## 2020-01-19 PROCEDURE — 89050 BODY FLUID CELL COUNT: CPT | Performed by: NURSE PRACTITIONER

## 2020-01-19 PROCEDURE — 87205 SMEAR GRAM STAIN: CPT | Performed by: NURSE PRACTITIONER

## 2020-01-19 PROCEDURE — 96375 TX/PRO/DX INJ NEW DRUG ADDON: CPT

## 2020-01-19 PROCEDURE — 96374 THER/PROPH/DIAG INJ IV PUSH: CPT

## 2020-01-19 PROCEDURE — 87015 SPECIMEN INFECT AGNT CONCNTJ: CPT | Performed by: NURSE PRACTITIONER

## 2020-01-19 PROCEDURE — 99284 EMERGENCY DEPT VISIT MOD MDM: CPT

## 2020-01-19 PROCEDURE — 80053 COMPREHEN METABOLIC PANEL: CPT | Performed by: NURSE PRACTITIONER

## 2020-01-19 RX ORDER — MORPHINE SULFATE 2 MG/ML
4 INJECTION, SOLUTION INTRAMUSCULAR; INTRAVENOUS ONCE
Status: COMPLETED | OUTPATIENT
Start: 2020-01-19 | End: 2020-01-19

## 2020-01-19 RX ORDER — ONDANSETRON 2 MG/ML
4 INJECTION INTRAMUSCULAR; INTRAVENOUS ONCE
Status: COMPLETED | OUTPATIENT
Start: 2020-01-19 | End: 2020-01-19

## 2020-01-19 RX ORDER — SODIUM CHLORIDE 0.9 % (FLUSH) 0.9 %
10 SYRINGE (ML) INJECTION AS NEEDED
Status: DISCONTINUED | OUTPATIENT
Start: 2020-01-19 | End: 2020-01-19 | Stop reason: HOSPADM

## 2020-01-19 RX ADMIN — MORPHINE SULFATE 2 MG: 2 INJECTION, SOLUTION INTRAMUSCULAR; INTRAVENOUS at 09:11

## 2020-01-19 RX ADMIN — ONDANSETRON 4 MG: 2 INJECTION INTRAMUSCULAR; INTRAVENOUS at 09:06

## 2020-01-19 NOTE — ED PROVIDER NOTES
" EMERGENCY DEPARTMENT ENCOUNTER    Room Number:  22/22  Date of encounter:  1/19/2020  PCP: Provider, No Known  Historian: Patient   Full history not obtainable due to: none     HPI:  Chief Complaint: abdominal pain     Context: Sunni Mcneil is a 44 y.o. female  With a hx of renal failure and peritoneal dialysis who presents to the ED c/o abdominal pain onset 2 days ago. Pain is constant, describes as \"just pain\". Non radiating. Unable to eat but no vomiting or diarrhea. No urinary complaints. No fever.   Hx of renal failure secondary to htn. Does home peritoneal dialysis. Did not perform dialysis 2 days ago and missed 2 treatments because she felt depressed. Did do her treatment last night.   She would not elaborate on the depression however did states she was not homicidal or suicidal.     PAST MEDICAL HISTORY    Active Ambulatory Problems     Diagnosis Date Noted   • Hyperparathyroidism (CMS/[facility]) 01/24/2018   • Acute rejection of kidney transplant 05/10/2017   • ARF (acute renal failure) (CMS/[facility]) 04/04/2014   • Hx of kidney transplant 07/06/2017   • Hypertension 01/24/2018   • Kidney transplant status, cadaveric 07/06/2017   • Nephritis 04/13/2014   • Hypercalcemia 01/24/2018   • ESRD on hemodialysis (CMS/[facility]) 07/02/2019   • Peritoneal dialysis catheter in place (CMS/[facility]) 07/24/2019   • Prolonged QT interval 11/07/2019   • ESRD (end stage renal disease) (CMS/[facility]) 11/07/2019   • Hyperphosphatemia 11/07/2019   • DM type 2 (diabetes mellitus, type 2) (CMS/[facility]) 11/07/2019     Resolved Ambulatory Problems     Diagnosis Date Noted   • Hypokalemia 11/07/2019   • Leukocytosis 11/07/2019   • Nausea 11/07/2019   • Hypocalcemia 11/07/2019     Past Medical History:   Diagnosis Date   • Acid reflux    • Anemia    • Arthritis    • Diabetes mellitus (CMS/[facility])    • ESRD on dialysis (CMS/[facility])    • History of peritoneal dialysis    • History of transfusion    • Kidney disease    • Kidney transplant recipient 09/02/2016   • " Seasonal allergies    • UTI (urinary tract infection)          PAST SURGICAL HISTORY  Past Surgical History:   Procedure Laterality Date   • INSERTION PERITONEAL DIALYSIS CATHETER  2014    Laparoscopic placement of Curl Cath peritoneal dialysis catheter, Dr. Vinod Goldstein   • INSERTION PERITONEAL DIALYSIS CATHETER N/A 2019    Procedure: LAPAROSCOPIC INSERTION PERITONEAL DIALYSIS CATHETER;  Surgeon: Damon Rooney MD;  Location: [facility] OR;  Service: General   • REMOVAL PERITONEAL DIALYSIS CATHETER N/A 10/17/2016    Procedure: REMOVAL PERITONEAL DIALYSIS CATHETER;  Surgeon: Damon Rooney MD;  Location: [facility] OR;  Service:    • TRANSPLANTATION RENAL Right 2016    from  donor   • TUBAL ABDOMINAL LIGATION Bilateral          FAMILY HISTORY  Family History   Problem Relation Age of Onset   • Malig Hyperthermia Neg Hx          SOCIAL HISTORY  Social History     Socioeconomic History   • Marital status:      Spouse name: Not on file   • Number of children: 2   • Years of education: 12   • Highest education level: Not on file   Occupational History     Employer: [employer]   Tobacco Use   • Smoking status: Never Smoker   • Smokeless tobacco: Never Used   Substance and Sexual Activity   • Alcohol use: No   • Drug use: No   • Sexual activity: Defer         ALLERGIES  Patient has no known allergies.        REVIEW OF SYSTEMS  Review of Systems   All systems reviewed and marked as negative except as listed in HPI       PHYSICAL EXAM    I have reviewed the triage vital signs and nursing notes.    ED Triage Vitals [20 0802]   Temp Heart Rate Resp BP SpO2   97.1 °F (36.2 °C) 111 18 -- 99 %      Temp src Heart Rate Source Patient Position BP Location FiO2 (%)   Tympanic -- -- -- --       GENERAL: Alert well developed, well nourished in no distress  HENT: NCAT, neck supple, trachea midline  EYES: no scleral icterus, PERRL, normal conjunctiva  CV: regular rhythm, regular  rate, no murmur  RESPIRATORY: unlabored effort, CTAB  ABDOMEN: soft, non-tender, non-distended, bowel sounds present  MUSCULOSKELETAL: no gross deformity  NEURO: alert,  sensory and motor function of extremities grossly intact, speech clear, mental status normal/baseline  SKIN: warm, dry, no rash  PSYCH:  Appropriate mood and affect    Vital signs and nursing notes reviewed.          LAB RESULTS  Recent Results (from the past 24 hour(s))   CBC Auto Differential    Collection Time: 01/19/20  8:51 AM   Result Value Ref Range    WBC 12.03 (H) 3.40 - 10.80 10*3/mm3    RBC 3.25 (L) 3.77 - 5.28 10*6/mm3    Hemoglobin 9.8 (L) 12.0 - 15.9 g/dL    Hematocrit 29.4 (L) 34.0 - 46.6 %    MCV 90.5 79.0 - 97.0 fL    MCH 30.2 26.6 - 33.0 pg    MCHC 33.3 31.5 - 35.7 g/dL    RDW 16.3 (H) 12.3 - 15.4 %    RDW-SD 54.3 (H) 37.0 - 54.0 fl    MPV 9.3 6.0 - 12.0 fL    Platelets 404 140 - 450 10*3/mm3    Neutrophil % 77.2 (H) 42.7 - 76.0 %    Lymphocyte % 10.5 (L) 19.6 - 45.3 %    Monocyte % 5.7 5.0 - 12.0 %    Eosinophil % 4.4 0.3 - 6.2 %    Basophil % 0.4 0.0 - 1.5 %    Immature Grans % 1.8 (H) 0.0 - 0.5 %    Neutrophils, Absolute 9.28 (H) 1.70 - 7.00 10*3/mm3    Lymphocytes, Absolute 1.26 0.70 - 3.10 10*3/mm3    Monocytes, Absolute 0.69 0.10 - 0.90 10*3/mm3    Eosinophils, Absolute 0.53 (H) 0.00 - 0.40 10*3/mm3    Basophils, Absolute 0.05 0.00 - 0.20 10*3/mm3    Immature Grans, Absolute 0.22 (H) 0.00 - 0.05 10*3/mm3    nRBC 0.1 0.0 - 0.2 /100 WBC   Light Blue Top    Collection Time: 01/19/20  8:51 AM   Result Value Ref Range    Extra Tube hold for add-on    Lavender Top    Collection Time: 01/19/20  8:51 AM   Result Value Ref Range    Extra Tube hold for add-on    Gold Top - SST    Collection Time: 01/19/20  8:51 AM   Result Value Ref Range    Extra Tube Hold for add-ons.    Lactic Acid, Plasma    Collection Time: 01/19/20  8:51 AM   Result Value Ref Range    Lactate 1.9 0.5 - 2.0 mmol/L   Comprehensive Metabolic Panel    Collection  Time: 01/19/20  8:52 AM   Result Value Ref Range    Glucose 130 (H) 65 - 99 mg/dL    BUN 43 (H) 6 - 20 mg/dL    Creatinine 11.02 (H) 0.57 - 1.00 mg/dL    Sodium 136 136 - 145 mmol/L    Potassium 3.2 (L) 3.5 - 5.2 mmol/L    Chloride 87 (L) 98 - 107 mmol/L    CO2 24.3 22.0 - 29.0 mmol/L    Calcium 7.7 (L) 8.6 - 10.5 mg/dL    Total Protein 9.5 (H) 6.0 - 8.5 g/dL    Albumin 4.20 3.50 - 5.20 g/dL    ALT (SGPT) <5 1 - 33 U/L    AST (SGOT) 10 1 - 32 U/L    Alkaline Phosphatase 53 39 - 117 U/L    Total Bilirubin 0.3 0.2 - 1.2 mg/dL    eGFR Non African Amer 4 (L) >60 mL/min/1.73    eGFR  African Amer      Globulin 5.3 gm/dL    A/G Ratio 0.8 g/dL    BUN/Creatinine Ratio 3.9 (L) 7.0 - 25.0    Anion Gap 24.7 (H) 5.0 - 15.0 mmol/L   Lipase    Collection Time: 01/19/20  8:52 AM   Result Value Ref Range    Lipase 67 (H) 13 - 60 U/L   Green Top (Gel)    Collection Time: 01/19/20  8:52 AM   Result Value Ref Range    Extra Tube Hold for add-ons.    Procalcitonin    Collection Time: 01/19/20  8:52 AM   Result Value Ref Range    Procalcitonin 0.74 (H) 0.10 - 0.25 ng/mL   Body Fluid Culture - Body Fluid, Peritoneum    Collection Time: 01/19/20 12:12 PM   Result Value Ref Range    Gram Stain No WBCs or organisms seen    Body fluid cell count - Body Fluid, Peritoneum    Collection Time: 01/19/20 12:12 PM   Result Value Ref Range    Color, Fluid Colorless     Appearance, Fluid Clear Clear    WBC, Fluid 5 /mm3    RBC, Fluid 2 /mm3       Ordered the above labs and independently reviewed the results.        RADIOLOGY  Declined    Procedures        MEDICATIONS GIVEN IN ER    Medications   sodium chloride 0.9 % flush 10 mL (has no administration in time range)   ondansetron (ZOFRAN) injection 4 mg (4 mg Intravenous Given 1/19/20 0906)   morphine injection 4 mg (2 mg Intravenous Given 1/19/20 0911)         PROGRESS, DATA ANALYSIS, CONSULTS, AND MEDICAL DECISION MAKING    All labs have been independently reviewed by me.  All radiology studies  "have been reviewed by me.   EKG's independently reviewed by me.  Discussion below represents my analysis of pertinent findings related to patient's condition, differential diagnosis, treatment plan and final disposition.      ED Course as of Jan 19 1327   Sun Jan 19, 2020   0900 Offered pt access services , she declines.    [JS]   1157 Pt declined CT and UA. Discussed with her. She wants to go home and doesn't want to be here for another \"2 hours\". Discussed with her that we do not know what's wrong with her and she requires a work up to determine etiolgoy of symptoms. She states her pain is gone. I reminded her we gave her morphine and likely that is why the pain is resolved. AMA discussion had. She is now agreeable to testing.     [JS]   1259 Pt declined ct scan again. Did consent to peritoneal fluid analysis which is in process.     [JS]   1325 Spoke with Dr Bee for Dr Gonzales. Discussed pt presentation and labs. He does not feel like she requires admission from a dialysis standpoint. Discussed with pt. Recommended she proceed with CT scan. She declines. We discussed life threatening intra abdominal process in layman's terms and she would need to sign out AMA if she declined the test. She is agreeable to this. She should return to the ER if she changes her mind and would like to proceed with testing. Nurse informed.    [JS]      ED Course User Index  [JS] Renee Clemente, JERONIMO       AS OF 1:27 PM VITALS:    BP - 108/74  HR - 89  TEMP - 97.1 °F (36.2 °C) (Tympanic)  02 SATS - 99%        DIAGNOSIS  Final diagnoses:   Generalized abdominal pain   Peritoneal dialysis status (CMS/[facility])         DISPOSITION  Renee Gamboa, JERONIMO  01/19/20 1328       Renee Clemente, JERONIMO  01/19/20 1328    "

## 2020-01-19 NOTE — DISCHARGE INSTRUCTIONS
Return to ED if you decide you would like your ct scan as offered   Follow up with your doctor   Return if worse or new concerns   Continue care with your primary care physician and have your blood pressure regularly checked and managed. Normal blood pressure is 120/80.

## 2020-01-19 NOTE — ED PROVIDER NOTES
Pt presents to the ED complaining of generalized abd pain for the past several days, improving today. Pt confirms diarrhea 2 days ago (that has since resolved), but denies chest pain, SOA, fever, or chills. Per pt, she is a peritoneal dialysis patient monitored by Dr. Gonzales, but states she missed two days of dialysis on 1/16 and 1/17 of this month. Pt affirms she resumed dialysis treatment last night, and denies any abnormal output. Pt states she still makes some urine, and denies any issue with urination.     On exam, pt is pt's heart is RRR without murmur. Pt's lungs are CTAB. Pt's abd is normal active bowel sounds, soft, and non tender to palpation. Pt has PD catheter in LUQ of abd - fluid clear and area non tender.     I agree with midlevel plan for lab workup and CT in ED for further evaluation.     The ALISSA and I have discussed this patient's history, physical exam, and treatment plan.  I have reviewed the documentation and personally had a face to face interaction with the patient. I affirm the documentation and agree with the treatment and plan.  The attached note describes my personal findings.    Documentation assistance provided by alexei Larsen for Dr. Plaza.  Information recorded by the scribe was done at my direction and has been verified and validated by me.            Luz Larsen  01/19/20 1696       Yoshi Plaza MD  01/19/20 7494

## 2020-01-19 NOTE — NURSING NOTE
PD sample obtained using [employer].  PD fluid is clear, pale, and yellow.  No fluid was instilled into the catheter.  New cap was applied.  Patient reports no signs of symptoms of pain or distress during the procedure.  Specimen was sent to lab by this RN.

## 2020-01-19 NOTE — ED TRIAGE NOTES
"Abd. Pain and nausea x 2 days. Pt is on peritoneal dialysis states she skipped it 1/16/2020 and 1/17/2020 did it last night when asked why she skipped her dialysis states \"I don't know.\"  "

## 2020-01-20 ENCOUNTER — APPOINTMENT (OUTPATIENT)
Dept: CT IMAGING | Facility: HOSPITAL | Age: 45
End: 2020-01-20

## 2020-01-20 ENCOUNTER — HOSPITAL ENCOUNTER (EMERGENCY)
Facility: HOSPITAL | Age: 45
Discharge: HOME | End: 2020-01-20
Attending: EMERGENCY MEDICINE | Admitting: EMERGENCY MEDICINE

## 2020-01-20 VITALS
HEART RATE: 101 BPM | TEMPERATURE: 96.7 F | DIASTOLIC BLOOD PRESSURE: 89 MMHG | BODY MASS INDEX: 35.34 KG/M2 | RESPIRATION RATE: 16 BRPM | OXYGEN SATURATION: 98 % | SYSTOLIC BLOOD PRESSURE: 135 MMHG | WEIGHT: 180 LBS | HEIGHT: 60 IN

## 2020-01-20 DIAGNOSIS — N18.6 ESRD (END STAGE RENAL DISEASE) (HCC): ICD-10-CM

## 2020-01-20 DIAGNOSIS — R45.851 SUICIDAL IDEATION: Primary | ICD-10-CM

## 2020-01-20 DIAGNOSIS — R11.0 NAUSEA: ICD-10-CM

## 2020-01-20 LAB
ALBUMIN SERPL-MCNC: 4.5 G/DL (ref 3.5–5.2)
ALBUMIN/GLOB SERPL: 0.9 G/DL
ALP SERPL-CCNC: 57 U/L (ref 39–117)
ALT SERPL W P-5'-P-CCNC: <5 U/L (ref 1–33)
ANION GAP SERPL CALCULATED.3IONS-SCNC: 25.5 MMOL/L (ref 5–15)
AST SERPL-CCNC: 11 U/L (ref 1–32)
BASOPHILS # BLD AUTO: 0.06 10*3/MM3 (ref 0–0.2)
BASOPHILS NFR BLD AUTO: 0.5 % (ref 0–1.5)
BILIRUB SERPL-MCNC: 0.3 MG/DL (ref 0.2–1.2)
BUN BLD-MCNC: 50 MG/DL (ref 6–20)
BUN/CREAT SERPL: 4.2 (ref 7–25)
CALCIUM SPEC-SCNC: 7.7 MG/DL (ref 8.6–10.5)
CHLORIDE SERPL-SCNC: 88 MMOL/L (ref 98–107)
CO2 SERPL-SCNC: 24.5 MMOL/L (ref 22–29)
CREAT BLD-MCNC: 12 MG/DL (ref 0.57–1)
DEPRECATED RDW RBC AUTO: 53.4 FL (ref 37–54)
EOSINOPHIL # BLD AUTO: 0.45 10*3/MM3 (ref 0–0.4)
EOSINOPHIL NFR BLD AUTO: 3.4 % (ref 0.3–6.2)
ERYTHROCYTE [DISTWIDTH] IN BLOOD BY AUTOMATED COUNT: 16.4 % (ref 12.3–15.4)
ETHANOL BLD-MCNC: <10 MG/DL (ref 0–10)
ETHANOL UR QL: <0.01 %
GFR SERPL CREATININE-BSD FRML MDRD: 3 ML/MIN/1.73
GFR SERPL CREATININE-BSD FRML MDRD: ABNORMAL ML/MIN/{1.73_M2}
GLOBULIN UR ELPH-MCNC: 5.2 GM/DL
GLUCOSE BLD-MCNC: 100 MG/DL (ref 65–99)
HCG SERPL QL: NEGATIVE
HCT VFR BLD AUTO: 31.1 % (ref 34–46.6)
HGB BLD-MCNC: 10.7 G/DL (ref 12–15.9)
HOLD SPECIMEN: NORMAL
IMM GRANULOCYTES # BLD AUTO: 0.17 10*3/MM3 (ref 0–0.05)
IMM GRANULOCYTES NFR BLD AUTO: 1.3 % (ref 0–0.5)
LIPASE SERPL-CCNC: 67 U/L (ref 13–60)
LYMPHOCYTES # BLD AUTO: 2.07 10*3/MM3 (ref 0.7–3.1)
LYMPHOCYTES NFR BLD AUTO: 15.8 % (ref 19.6–45.3)
MCH RBC QN AUTO: 31.2 PG (ref 26.6–33)
MCHC RBC AUTO-ENTMCNC: 34.4 G/DL (ref 31.5–35.7)
MCV RBC AUTO: 90.7 FL (ref 79–97)
MONOCYTES # BLD AUTO: 0.62 10*3/MM3 (ref 0.1–0.9)
MONOCYTES NFR BLD AUTO: 4.7 % (ref 5–12)
NEUTROPHILS # BLD AUTO: 9.71 10*3/MM3 (ref 1.7–7)
NEUTROPHILS NFR BLD AUTO: 74.3 % (ref 42.7–76)
NRBC BLD AUTO-RTO: 0.1 /100 WBC (ref 0–0.2)
PLATELET # BLD AUTO: 432 10*3/MM3 (ref 140–450)
PMV BLD AUTO: 9.4 FL (ref 6–12)
POTASSIUM BLD-SCNC: 3.2 MMOL/L (ref 3.5–5.2)
PROT SERPL-MCNC: 9.7 G/DL (ref 6–8.5)
RBC # BLD AUTO: 3.43 10*6/MM3 (ref 3.77–5.28)
SODIUM BLD-SCNC: 138 MMOL/L (ref 136–145)
WBC NRBC COR # BLD: 13.08 10*3/MM3 (ref 3.4–10.8)
WHOLE BLOOD HOLD SPECIMEN: NORMAL
WHOLE BLOOD HOLD SPECIMEN: NORMAL

## 2020-01-20 PROCEDURE — 90791 PSYCH DIAGNOSTIC EVALUATION: CPT

## 2020-01-20 PROCEDURE — 80053 COMPREHEN METABOLIC PANEL: CPT

## 2020-01-20 PROCEDURE — 83690 ASSAY OF LIPASE: CPT

## 2020-01-20 PROCEDURE — 85025 COMPLETE CBC W/AUTO DIFF WBC: CPT

## 2020-01-20 PROCEDURE — 99284 EMERGENCY DEPT VISIT MOD MDM: CPT

## 2020-01-20 PROCEDURE — 80307 DRUG TEST PRSMV CHEM ANLYZR: CPT | Performed by: NURSE PRACTITIONER

## 2020-01-20 PROCEDURE — 84703 CHORIONIC GONADOTROPIN ASSAY: CPT

## 2020-01-20 RX ORDER — ONDANSETRON 4 MG/1
4 TABLET, FILM COATED ORAL EVERY 6 HOURS PRN
Qty: 10 TABLET | Refills: 0 | Status: SHIPPED | OUTPATIENT
Start: 2020-01-20 | End: 2020-05-07 | Stop reason: HOSPADM

## 2020-01-20 RX ORDER — ONDANSETRON 2 MG/ML
4 INJECTION INTRAMUSCULAR; INTRAVENOUS ONCE
Status: DISCONTINUED | OUTPATIENT
Start: 2020-01-20 | End: 2020-01-21 | Stop reason: HOSPADM

## 2020-01-20 RX ORDER — SODIUM CHLORIDE 0.9 % (FLUSH) 0.9 %
10 SYRINGE (ML) INJECTION AS NEEDED
Status: DISCONTINUED | OUTPATIENT
Start: 2020-01-20 | End: 2020-01-21 | Stop reason: HOSPADM

## 2020-01-20 NOTE — ED NOTES
"On second look in triage when asked about SI, pt states: \"I'm really depressed today.\" When asked if pt has specifically had thoughts of killing self, pt states \"yes.\"  Pt denies specific plan, denies hx of suicide attempt.     Last Marie RN  01/20/20 2818    "

## 2020-01-20 NOTE — ED TRIAGE NOTES
Patient to er from home with c/o nausea. Patient reported she was seen here yesterday and they drained fluid off her ABD. Patient reported she is back today related to nausea. However, at the triage desk stated no ABD pain.

## 2020-01-20 NOTE — ED NOTES
Pt is unable to give urine specimen at this time. Pt state she is a dialysis pt and does not make a lot of urine.     Sharri Nielsen RN  01/20/20 9378

## 2020-01-20 NOTE — ED PROVIDER NOTES
EMERGENCY DEPARTMENT ENCOUNTER    Room Number:  43/43  Date seen:  1/20/2020  Time seen: 6:14 PM  PCP: Provider, No Known    HPI:  Chief complaint: Suicidal thoughts  Context:Sunni Mcneil is a 44 y.o. female who presents to the ED with c/o suicidal ideation.  She reports her  left her last week on Tuesday.  She has been increasingly depressed throughout the week and feeling suicidal starting today.  She does report feeling nauseated, however she believes this is due to her extreme depression due to her  leaving.  She states that he is currently not staying at her house.  Denies any history of depression, anxiety, suicidal ideation or attempt.  She is a peritoneal dialysis patient and reports that last night when she did her fluid exchange, she left her fluid out overnight in an attempt to kill herself.  She did to her peritoneal dialysis twice correctly today and reports no other issues with the peritoneal dialysis.  Denies any abdominal pain, fever, vomiting, hallucinations, drug or alcohol use, other complaints.      MEDICAL RECORD REVIEW  Patient seen yesterday for generalized abdominal pain with peritoneal dialysis, she did report some depression, however denied suicidal ideation yesterday.  And admitted to missing 2 treatments due to feeling depressed.  Patient declined CT scan and reported lying to go home, patient did agree to peritoneal fluid analysis.  ALISSA spoke with Dr. Bee for Dr. Gonzales her nephrologist and stated they were okay with patient going home.  She still declined the CT scan and signed out AMA.    ALLERGIES  Patient has no known allergies.    PAST MEDICAL HISTORY  Active Ambulatory Problems     Diagnosis Date Noted   • Hyperparathyroidism (CMS/[facility]) 01/24/2018   • Acute rejection of kidney transplant 05/10/2017   • ARF (acute renal failure) (CMS/[facility]) 04/04/2014   • Hx of kidney transplant 07/06/2017   • Hypertension 01/24/2018   • Kidney transplant status, cadaveric 07/06/2017    • Nephritis 2014   • Hypercalcemia 2018   • ESRD on hemodialysis (CMS/[facility]) 2019   • Peritoneal dialysis catheter in place (CMS/[facility]) 2019   • Prolonged QT interval 2019   • ESRD (end stage renal disease) (CMS/[facility]) 2019   • Hyperphosphatemia 2019   • DM type 2 (diabetes mellitus, type 2) (CMS/[facility]) 2019     Resolved Ambulatory Problems     Diagnosis Date Noted   • Hypokalemia 2019   • Leukocytosis 2019   • Nausea 2019   • Hypocalcemia 2019     Past Medical History:   Diagnosis Date   • Acid reflux    • Anemia    • Arthritis    • Diabetes mellitus (CMS/[facility])    • ESRD on dialysis (CMS/[facility])    • History of peritoneal dialysis    • History of transfusion    • Kidney disease    • Kidney transplant recipient 2016   • Seasonal allergies    • UTI (urinary tract infection)        PAST SURGICAL HISTORY  Past Surgical History:   Procedure Laterality Date   • INSERTION PERITONEAL DIALYSIS CATHETER  2014    Laparoscopic placement of Curl Cath peritoneal dialysis catheter, Dr. Vinod Goldstein   • INSERTION PERITONEAL DIALYSIS CATHETER N/A 2019    Procedure: LAPAROSCOPIC INSERTION PERITONEAL DIALYSIS CATHETER;  Surgeon: Damon Rooney MD;  Location: [facility] OR;  Service: General   • REMOVAL PERITONEAL DIALYSIS CATHETER N/A 10/17/2016    Procedure: REMOVAL PERITONEAL DIALYSIS CATHETER;  Surgeon: Damon Rooney MD;  Location: [facility] OR;  Service:    • TRANSPLANTATION RENAL Right 2016    from  donor   • TUBAL ABDOMINAL LIGATION Bilateral        FAMILY HISTORY  Family History   Problem Relation Age of Onset   • Malig Hyperthermia Neg Hx        SOCIAL HISTORY  Social History     Socioeconomic History   • Marital status:      Spouse name: Not on file   • Number of children: 2   • Years of education: 12   • Highest education level: Not on file   Occupational History     Employer: [employer]   Tobacco Use   •  Smoking status: Never Smoker   • Smokeless tobacco: Never Used   Substance and Sexual Activity   • Alcohol use: No   • Drug use: No   • Sexual activity: Defer       REVIEW OF SYSTEMS  Review of Systems   Constitutional: Negative.  Negative for chills and fever.   HENT: Negative.    Eyes: Negative.    Respiratory: Negative for shortness of breath.    Cardiovascular: Negative for chest pain.   Gastrointestinal: Positive for nausea. Negative for abdominal pain and vomiting.   Genitourinary: Negative.  Negative for dysuria and hematuria.   Musculoskeletal: Negative.    Skin: Negative.  Negative for rash.   Neurological: Negative.  Negative for syncope and headaches.   Psychiatric/Behavioral: Positive for dysphoric mood and suicidal ideas. Negative for confusion.       PHYSICAL EXAM  ED Triage Vitals   Temp Heart Rate Resp BP SpO2   01/20/20 1704 01/20/20 1704 01/20/20 1728 01/20/20 1730 01/20/20 1704   96.7 °F (35.9 °C) 107 20 (!) 159/131 98 %      Temp src Heart Rate Source Patient Position BP Location FiO2 (%)   01/20/20 1704 01/20/20 1759 01/20/20 1730 01/20/20 1730 --   Tympanic Monitor Sitting Left arm      Physical Exam   Constitutional: She is oriented to person, place, and time and well-developed, well-nourished, and in no distress. No distress.   HENT:   Head: Normocephalic and atraumatic.   Mouth/Throat: Mucous membranes are normal.   Eyes: Pupils are equal, round, and reactive to light.   Neck: Normal range of motion. Neck supple.   Cardiovascular: Normal rate, regular rhythm and normal heart sounds.   Pulmonary/Chest: Effort normal and breath sounds normal. No respiratory distress.   Abdominal: Soft. There is no tenderness. There is no rebound and no guarding.   Peritoneal dialysis catheter in place   Neurological: She is alert and oriented to person, place, and time. She has normal strength.   Skin: Skin is warm, dry and intact.   Psychiatric: Judgment normal. She expresses suicidal ideation. She expresses  suicidal plans.   + tearful   Nursing note and vitals reviewed.      LAB RESULTS  Recent Results (from the past 24 hour(s))   Comprehensive Metabolic Panel    Collection Time: 01/20/20  6:07 PM   Result Value Ref Range    Glucose 100 (H) 65 - 99 mg/dL    BUN 50 (H) 6 - 20 mg/dL    Creatinine 12.00 (H) 0.57 - 1.00 mg/dL    Sodium 138 136 - 145 mmol/L    Potassium 3.2 (L) 3.5 - 5.2 mmol/L    Chloride 88 (L) 98 - 107 mmol/L    CO2 24.5 22.0 - 29.0 mmol/L    Calcium 7.7 (L) 8.6 - 10.5 mg/dL    Total Protein 9.7 (H) 6.0 - 8.5 g/dL    Albumin 4.50 3.50 - 5.20 g/dL    ALT (SGPT) <5 1 - 33 U/L    AST (SGOT) 11 1 - 32 U/L    Alkaline Phosphatase 57 39 - 117 U/L    Total Bilirubin 0.3 0.2 - 1.2 mg/dL    eGFR Non African Amer 3 (L) >60 mL/min/1.73    eGFR  African Amer      Globulin 5.2 gm/dL    A/G Ratio 0.9 g/dL    BUN/Creatinine Ratio 4.2 (L) 7.0 - 25.0    Anion Gap 25.5 (H) 5.0 - 15.0 mmol/L   Lipase    Collection Time: 01/20/20  6:07 PM   Result Value Ref Range    Lipase 67 (H) 13 - 60 U/L   hCG, Serum, Qualitative    Collection Time: 01/20/20  6:07 PM   Result Value Ref Range    HCG Qualitative Negative Negative   Light Blue Top    Collection Time: 01/20/20  6:07 PM   Result Value Ref Range    Extra Tube hold for add-on    Green Top (Gel)    Collection Time: 01/20/20  6:07 PM   Result Value Ref Range    Extra Tube Hold for add-ons.    Lavender Top    Collection Time: 01/20/20  6:07 PM   Result Value Ref Range    Extra Tube hold for add-on    CBC Auto Differential    Collection Time: 01/20/20  6:07 PM   Result Value Ref Range    WBC 13.08 (H) 3.40 - 10.80 10*3/mm3    RBC 3.43 (L) 3.77 - 5.28 10*6/mm3    Hemoglobin 10.7 (L) 12.0 - 15.9 g/dL    Hematocrit 31.1 (L) 34.0 - 46.6 %    MCV 90.7 79.0 - 97.0 fL    MCH 31.2 26.6 - 33.0 pg    MCHC 34.4 31.5 - 35.7 g/dL    RDW 16.4 (H) 12.3 - 15.4 %    RDW-SD 53.4 37.0 - 54.0 fl    MPV 9.4 6.0 - 12.0 fL    Platelets 432 140 - 450 10*3/mm3    Neutrophil % 74.3 42.7 - 76.0 %     "Lymphocyte % 15.8 (L) 19.6 - 45.3 %    Monocyte % 4.7 (L) 5.0 - 12.0 %    Eosinophil % 3.4 0.3 - 6.2 %    Basophil % 0.5 0.0 - 1.5 %    Immature Grans % 1.3 (H) 0.0 - 0.5 %    Neutrophils, Absolute 9.71 (H) 1.70 - 7.00 10*3/mm3    Lymphocytes, Absolute 2.07 0.70 - 3.10 10*3/mm3    Monocytes, Absolute 0.62 0.10 - 0.90 10*3/mm3    Eosinophils, Absolute 0.45 (H) 0.00 - 0.40 10*3/mm3    Basophils, Absolute 0.06 0.00 - 0.20 10*3/mm3    Immature Grans, Absolute 0.17 (H) 0.00 - 0.05 10*3/mm3    nRBC 0.1 0.0 - 0.2 /100 WBC   Ethanol    Collection Time: 01/20/20  6:07 PM   Result Value Ref Range    Ethanol <10 0 - 10 mg/dL    Ethanol % <0.010 %       I ordered the above labs and reviewed the results    RADIOLOGY  CT Abdomen Pelvis Without Contrast    (Results Pending)       MEDICATIONS GIVEN IN ER  Medications   sodium chloride 0.9 % flush 10 mL (has no administration in time range)   ondansetron (ZOFRAN) injection 4 mg (4 mg Intravenous Not Given 1/20/20 1902)       PROCEDURES  Procedures      PROGRESS AND CONSULTS    Progress Notes:           1910: Discussed the patient and plan of care with Dr. Mancini. After a bedside evaluation; they agree with the plan of care.    -patient called out and stated she wanted to leave the ED. Explained to patient that she was on a 72 hr hold d/t SI attempt and ideations and that she couldn't leave for her own safety, pt verbalized understanding.    2000: Care transferred to Dr Mancini.    Disposition vitals:  /99 (BP Location: Right arm, Patient Position: Lying)   Pulse 104   Temp 96.7 °F (35.9 °C) (Tympanic)   Resp 18   Ht 152.4 cm (60\")   Wt 81.6 kg (180 lb)   SpO2 100%   BMI 35.15 kg/m²       DIAGNOSIS  Final diagnoses:   Suicidal ideation                  GrandyBritt Seo, APRN  01/20/20 1945    "

## 2020-01-21 NOTE — ED NOTES
"Pt refused Zofran. Pt states \"I want to sign myself out, I want to leave\". Pt notified that she is on a 72 hr hold for her safety and cannot leave at this time. Pt states \"I don't care, I want to leave, I will walk out of here\". Security at bedside. NP notified.      Sharri Nielsen RN  01/20/20 0110    "

## 2020-01-21 NOTE — CONSULTS
"Pt's  with pt in ED on arrival, Chuy Mcneil, [phone].     Pt reports she came in today originally for her nausea. Reports she did her peritoneal dialysis this morning. Reports that she left her peritoneal dialysis in her abdominal cavity today. Reports that she added the fluid today at noon, and that she would normally connect herself to her machine to draw the fluid off again tonight. Reports that she is somewhat late in hooking herself back up to the machine to do so, but that this is because we are holding her here against her will.     Pt henriles when asked if she left her fluid \"out\", explains that it is hooked up inside a bag, which she attaches to her abdomen via tubs and is not open to the air.     On f/u, reports that \"I took some fluid off\" last night, but didn't stay connected as long as she normally would have. Reports the machine \"just kept beeping and beeping so I gave up and I turned it off\".     Pt volunteers, \"I understand and I'm going through some issues right now, with my  [states that this is hard but you have to be mature and deal with it] I have a lot to live for, I have a grandbaby that I take care off ... Sometimes life is hard and stuff comes to your mind ... He [] talked to me a little bit and he said he's gonna be there for me, even though we're splitting up ... So I'm okay with that\".     Reports she has to be at work at 7 AM, hasn't worked since  left on Tuesday. Anxious that they could fire her if she is not there at 7 AM, \"I need the money now that he's not\" staying. Reports she then gets home at 1 PM and will take care of her until 6 or 7 PM.     Reports that the machine was beeping, because it said that line was blocked, was keeping her awake. Reports on f/u that she thought about not engaging in her dialysis treatment because she wanted to die, \"I thought about it at that moment, but then in the morning I realized that it wasn't the right thing to " "do, so I put the fluid back on\". States on f/u that both things are true.     Asked what convinced her not to try to die, \"I just thought about it, and it's something stupid, I have my grand baby that I love very much, and it's just not worth it\".     Denies any hx of suicide attempts. Denies hx of inpatient psychiatric admission. Denies any psychiatric hx at all.     Denies wanting to die now. Denies recent thoughts of harming others.     Elijah to tell family, call 911 or return to ED prior to acting on any thoughts of harming herself in the future.     I spoke with , reported pt's expressed anxiety that she could loose her job at [employer] if she doesn't show up for work tomorrow. I discussed hypothetical scenario that pt were talking her way out of hospital to go kill herself.  reports that he does NOT think that pt is trying to talk her way out to go kill herself, and that he does believe pt's statements that she is anxious about the possibility that she could loose her job if she doesn't make it to work tomorrow.  reports that he thinks that pt is safe to go home tonight.  report that they are breaking up, and that he did leave on Tuesday.  reports that pt does take care of grand daughter.  report that he believes that pt is working in the morning and that when she does so, she typically takes care of grand daughter starting \"around 1\" (consistent with pt's statements).     Case discussed with Dr. Mancini, plan to d/c with IOP and outpatient referrals, Dr. Mancini agrees with plan.           "

## 2020-01-21 NOTE — ED NOTES
"Pt states \"I want to leave, I am not staying. I need to go to my dialysis appointment and take care of my grandchild\". Pt notified again that due to her suicidal ideations she cannot leave until she is evaluated by psych. Pt is refusing to be evaluated by psych. Pt states \"I wont talk to them, if I knew I was going to have to go through all of this I wouldn't have came\". MD notified.      Sharri Nielsen RN  01/20/20 2025    "

## 2020-01-21 NOTE — ED PROVIDER NOTES
"    The patient presents complaining of SI. Pt states her  left her last week and has felt increasingly depressed since that time due to the stress. Pt admits to nausea but believes this is due to her stress and depression.    Physical Exam:  Patient is nontoxic appearing and in NAD. The pt is alert and oriented X 3.  HENT: normocephalic, atraumatic, dry mucous membranes  Lungs/cardiovascular: The pt's heart is RRR without murmur . The pt's lungs are clear to auscultation.  Abdomen: The pt's abd is soft and nontender. Softly distended.  Neuro: Nonfocal  Back/extremities: FROM  Skin: warm and dry    Plan: Discussed plan to have [facility] see the pt. Pt states \"I will refuse that, I need to go home and use my dialysis machine\". Discussed because pt has expressed SI, she is on a 72 hour hold and is legally required to stay until she has been cleared by [facility]. All questions have been answered.    Progress:    2001  Pt refuses abd/pel CT.    2245: Patient has been seen by Adán from access.  The patient currently denies suicidal ideation.  He has discussed the case with her  who also thinks she is not a risk for suicide.  The  will take her home and assume care of her this evening.  The patient states she needs to go home to do her home dialysis.  I offered the patient further medical work-up including a CT of the abdomen and pelvis and she again refuses and refuses to stay in the emergency room for further medical work-up.  Since she has been cleared by access and refuses further medical work-up I will discharge her with a prescription for Zofran and have her call Dr. Gonzales for follow-up      Diagnosis:  ESRD  Nausea  Suicidal Ideation      Disposition:  Discharged    MD ATTESTATION NOTE    The ALISSA and I have discussed this patient's history, physical exam, and treatment plan.  I have reviewed the documentation and personally had a face to face interaction with the patient. I affirm the " documentation and agree with the treatment and plan.  The attached note describes my personal findings.    Documentation assistance provided by alexei Landry for Dr Mancini. Information recorded by the scribe was done at my direction and has been verified and validated by me.          Vonnie Landry  01/20/20 2001       Esvin Mancini MD  01/20/20 1358

## 2020-01-22 LAB
BACTERIA FLD CULT: NORMAL
GRAM STN SPEC: NORMAL

## 2020-02-25 ENCOUNTER — APPOINTMENT (OUTPATIENT)
Dept: PREADMISSION TESTING | Facility: HOSPITAL | Age: 45
End: 2020-02-25

## 2020-02-25 VITALS
SYSTOLIC BLOOD PRESSURE: 138 MMHG | HEART RATE: 77 BPM | TEMPERATURE: 98.2 F | OXYGEN SATURATION: 100 % | HEIGHT: 60 IN | BODY MASS INDEX: 35.05 KG/M2 | RESPIRATION RATE: 16 BRPM | DIASTOLIC BLOOD PRESSURE: 94 MMHG | WEIGHT: 178.5 LBS

## 2020-02-25 LAB
ANION GAP SERPL CALCULATED.3IONS-SCNC: 19.3 MMOL/L (ref 5–15)
BUN BLD-MCNC: 34 MG/DL (ref 6–20)
BUN/CREAT SERPL: 4.2 (ref 7–25)
CALCIUM SPEC-SCNC: 6.8 MG/DL (ref 8.6–10.5)
CHLORIDE SERPL-SCNC: 98 MMOL/L (ref 98–107)
CO2 SERPL-SCNC: 24.7 MMOL/L (ref 22–29)
CREAT BLD-MCNC: 8.05 MG/DL (ref 0.57–1)
DEPRECATED RDW RBC AUTO: 51.7 FL (ref 37–54)
ERYTHROCYTE [DISTWIDTH] IN BLOOD BY AUTOMATED COUNT: 15.7 % (ref 12.3–15.4)
GFR SERPL CREATININE-BSD FRML MDRD: 5 ML/MIN/1.73
GFR SERPL CREATININE-BSD FRML MDRD: ABNORMAL ML/MIN/{1.73_M2}
GLUCOSE BLD-MCNC: 99 MG/DL (ref 65–99)
HCG SERPL QL: NEGATIVE
HCT VFR BLD AUTO: 28 % (ref 34–46.6)
HGB BLD-MCNC: 9.5 G/DL (ref 12–15.9)
MCH RBC QN AUTO: 30.8 PG (ref 26.6–33)
MCHC RBC AUTO-ENTMCNC: 33.9 G/DL (ref 31.5–35.7)
MCV RBC AUTO: 90.9 FL (ref 79–97)
PLATELET # BLD AUTO: 407 10*3/MM3 (ref 140–450)
PMV BLD AUTO: 9.2 FL (ref 6–12)
POTASSIUM BLD-SCNC: 3.7 MMOL/L (ref 3.5–5.2)
RBC # BLD AUTO: 3.08 10*6/MM3 (ref 3.77–5.28)
SODIUM BLD-SCNC: 142 MMOL/L (ref 136–145)
WBC NRBC COR # BLD: 11.42 10*3/MM3 (ref 3.4–10.8)

## 2020-02-25 PROCEDURE — 85027 COMPLETE CBC AUTOMATED: CPT | Performed by: SURGERY

## 2020-02-25 PROCEDURE — 36415 COLL VENOUS BLD VENIPUNCTURE: CPT

## 2020-02-25 PROCEDURE — 84703 CHORIONIC GONADOTROPIN ASSAY: CPT | Performed by: SURGERY

## 2020-02-25 PROCEDURE — 80048 BASIC METABOLIC PNL TOTAL CA: CPT | Performed by: SURGERY

## 2020-02-25 NOTE — DISCHARGE INSTRUCTIONS
Take the following medications the morning of surgery:  CARVEDILOL,CLONIDINE,PEPCID AND MINOXIDIL      General Instructions: CLEAR LIQUIDS UNTIL 5:30 AM MORNING OF SURGERY  • Do not eat solid food after midnight the night before surgery.  • You may drink clear liquids day of surgery but must stop at least one hour before your hospital arrival time.  • It is beneficial for you to have a clear drink that contains carbohydrates the day of surgery.  We suggest a 12 to 20 ounce bottle of Gatorade or Powerade for non-diabetic patients or a 12 to 20 ounce bottle of G2 or Powerade Zero for diabetic patients. (Pediatric patients, are not advised to drink a 12 to 20 ounce carbohydrate drink)    Clear liquids are liquids you can see through.  Nothing red in color.     Plain water                               Sports drinks  Sodas                                   Gelatin (Jell-O)  Fruit juices without pulp such as white grape juice and apple juice  Popsicles that contain no fruit or yogurt  Tea or coffee (no cream or milk added)  Gatorade / Powerade  G2 / Powerade Zero    • Infants may have breast milk up to four hours before surgery.  • Infants drinking formula may drink formula up to six hours before surgery.   • Patients who avoid smoking, chewing tobacco and alcohol for 4 weeks prior to surgery have a reduced risk of post-operative complications.  Quit smoking as many days before surgery as you can.  • Do not smoke, use chewing tobacco or drink alcohol the day of surgery.   • If applicable bring your C-PAP/ BI-PAP machine.  • Bring any papers given to you in the doctor’s office.  • Wear clean comfortable clothes.  • Do not wear contact lenses, false eyelashes or make-up.  Bring a case for your glasses.   • Bring crutches or walker if applicable.  • Remove all piercings.  Leave jewelry and any other valuables at home.  • Hair extensions with metal clips must be removed prior to surgery.  • The Pre-Admission Testing nurse  will instruct you to bring medications if unable to obtain an accurate list in Pre-Admission Testing.        If you were given a blood bank ID arm band remember to bring it with you the day of surgery.    Preventing a Surgical Site Infection:  • For 2 to 3 days before surgery, avoid shaving with a razor because the razor can irritate skin and make it easier to develop an infection.    • Any areas of open skin can increase the risk of a post-operative wound infection by allowing bacteria to enter and travel throughout the body.  Notify your surgeon if you have any skin wounds / rashes even if it is not near the expected surgical site.  The area will need assessed to determine if surgery should be delayed until it is healed.  • The night prior to surgery shower using a fresh bar of anti-bacterial soap (such as Dial) and clean washcloth.  Sleep in a clean bed with clean clothing.  Do not allow pets to sleep with you.  • Shower on the morning of surgery using a fresh bar of anti-bacterial soap (such as Dial) and clean washcloth.  Dry with a clean towel and dress in clean clothing.  • Ask your surgeon if you will be receiving antibiotics prior to surgery.  • Make sure you, your family, and all healthcare providers clean their hands with soap and water or an alcohol based hand  before caring for you or your wound.    Day of surgery: 2/26/2020 ARRIVAL TIME 6:30 AM  Your arrival time is approximately two hours before your scheduled surgery time.  Upon arrival, a Pre-op nurse and Anesthesiologist will review your health history, obtain vital signs, and answer questions you may have.  The only belongings needed at this time will be a list of your home medications and if applicable your C-PAP/BI-PAP machine.  If you are staying overnight your family can leave the rest of your belongings in the car and bring them to your room later.  A Pre-op nurse will start an IV and you may receive medication in preparation for  surgery, including something to help you relax.  Your family will be able to see you in the Pre-op area.  Two visitors at a time will be allowed in the Pre-op room.  While you are in surgery your family should notify the waiting room  if they leave the waiting room area and provide a contact phone number.    Please be aware that surgery does come with discomfort.  We want to make every effort to control your discomfort so please discuss any uncontrolled symptoms with your nurse.   Your doctor will most likely have prescribed pain medications.      If you are going home after surgery you will receive individualized written care instructions before being discharged.  A responsible adult must drive you to and from the hospital on the day of your surgery and stay with you for 24 hours.    If you are staying overnight following surgery, you will be transported to your hospital room following the recovery period.  [facility] has all private rooms.    If you have any questions please call Pre-Admission Testing at [phone].  Deductibles and co-payments are collected on the day of service. Please be prepared to pay the required co-pay, deductible or deposit on the day of service as defined by your plan.CHLORHEXIDINE CLOTH INSTRUCTIONS  The morning of surgery follow these instructions using the Chlorhexidine cloths you've been given.  These steps reduce bacteria on the body.  Do not use the cloths near your eyes, ears mouth, genitalia or on open wounds.  Throw the cloths away after use but do not try to flush them down a toilet.      • Open and remove one cloth at a time from the package.    • Leave the cloth unfolded and begin the bathing.  • Massage the skin with the cloths using gentle pressure to remove bacteria.  Do not scrub harshly.   • Follow the steps below with one 2% CHG cloth per area (6 total cloths).  • One cloth for neck, shoulders and chest.  • One cloth for both arms, hands,  fingers and underarms (do underarms last).  • One cloth for the abdomen followed by groin.  • One cloth for right leg and foot including between the toes.  • One cloth for left leg and foot including between the toes.  • The last cloth is to be used for the back of the neck, back and buttocks.    Allow the CHG to air dry 3 minutes on the skin which will give it time to work and decrease the chance of irritation.  The skin may feel sticky until it is dry.  Do not rinse with water or any other liquid or you will lose the beneficial effects of the CHG.  If mild skin irritation occurs, do rinse the skin to remove the CHG.  Report this to the nurse at time of admission.  Do not apply lotions, creams, ointments, deodorants or perfumes after using the clothes. Dress in clean clothes before coming to the hospital.

## 2020-02-26 ENCOUNTER — ANESTHESIA EVENT (OUTPATIENT)
Dept: PERIOP | Facility: HOSPITAL | Age: 45
End: 2020-02-26

## 2020-02-26 ENCOUNTER — ANESTHESIA (OUTPATIENT)
Dept: PERIOP | Facility: HOSPITAL | Age: 45
End: 2020-02-26

## 2020-02-26 ENCOUNTER — HOSPITAL ENCOUNTER (OUTPATIENT)
Facility: HOSPITAL | Age: 45
Setting detail: HOSPITAL OUTPATIENT SURGERY
Discharge: HOME | End: 2020-02-26
Attending: SURGERY | Admitting: SURGERY

## 2020-02-26 VITALS
SYSTOLIC BLOOD PRESSURE: 155 MMHG | OXYGEN SATURATION: 96 % | BODY MASS INDEX: 35.48 KG/M2 | RESPIRATION RATE: 16 BRPM | DIASTOLIC BLOOD PRESSURE: 98 MMHG | TEMPERATURE: 97.5 F | WEIGHT: 181.66 LBS | HEART RATE: 64 BPM

## 2020-02-26 DIAGNOSIS — Z99.2 ESRD ON HEMODIALYSIS (HCC): Primary | ICD-10-CM

## 2020-02-26 DIAGNOSIS — N18.6 ESRD ON HEMODIALYSIS (HCC): Primary | ICD-10-CM

## 2020-02-26 LAB
GLUCOSE BLDC GLUCOMTR-MCNC: 102 MG/DL (ref 70–130)
GLUCOSE BLDC GLUCOMTR-MCNC: 104 MG/DL (ref 70–130)

## 2020-02-26 PROCEDURE — 25010000002 MIDAZOLAM PER 1 MG: Performed by: ANESTHESIOLOGY

## 2020-02-26 PROCEDURE — 25010000002 PROMETHAZINE PER 50 MG: Performed by: NURSE ANESTHETIST, CERTIFIED REGISTERED

## 2020-02-26 PROCEDURE — 25010000002 HEPARIN (PORCINE) PER 1000 UNITS: Performed by: NURSE ANESTHETIST, CERTIFIED REGISTERED

## 2020-02-26 PROCEDURE — 25010000002 ONDANSETRON PER 1 MG: Performed by: NURSE ANESTHETIST, CERTIFIED REGISTERED

## 2020-02-26 PROCEDURE — 25010000002 PROTAMINE SULFATE PER 10 MG: Performed by: NURSE ANESTHETIST, CERTIFIED REGISTERED

## 2020-02-26 PROCEDURE — 25010000002 PHENYLEPHRINE PER 1 ML: Performed by: NURSE ANESTHETIST, CERTIFIED REGISTERED

## 2020-02-26 PROCEDURE — 25010000002 FENTANYL CITRATE (PF) 100 MCG/2ML SOLUTION: Performed by: ANESTHESIOLOGY

## 2020-02-26 PROCEDURE — 25010000003 CEFAZOLIN IN DEXTROSE 2-4 GM/100ML-% SOLUTION: Performed by: SURGERY

## 2020-02-26 PROCEDURE — 25010000003 CEFAZOLIN PER 500 MG: Performed by: SURGERY

## 2020-02-26 PROCEDURE — A4565 SLINGS: HCPCS | Performed by: SURGERY

## 2020-02-26 PROCEDURE — 25010000002 PROPOFOL 10 MG/ML EMULSION: Performed by: NURSE ANESTHETIST, CERTIFIED REGISTERED

## 2020-02-26 PROCEDURE — 25010000002 HEPARIN (PORCINE) PER 1000 UNITS: Performed by: SURGERY

## 2020-02-26 PROCEDURE — 82962 GLUCOSE BLOOD TEST: CPT

## 2020-02-26 RX ORDER — EPHEDRINE SULFATE 50 MG/ML
5 INJECTION, SOLUTION INTRAVENOUS ONCE AS NEEDED
Status: DISCONTINUED | OUTPATIENT
Start: 2020-02-26 | End: 2020-02-26 | Stop reason: HOSPADM

## 2020-02-26 RX ORDER — OXYCODONE AND ACETAMINOPHEN 7.5; 325 MG/1; MG/1
1 TABLET ORAL ONCE AS NEEDED
Status: DISCONTINUED | OUTPATIENT
Start: 2020-02-26 | End: 2020-02-26 | Stop reason: HOSPADM

## 2020-02-26 RX ORDER — FLUMAZENIL 0.1 MG/ML
0.2 INJECTION INTRAVENOUS AS NEEDED
Status: DISCONTINUED | OUTPATIENT
Start: 2020-02-26 | End: 2020-02-26 | Stop reason: HOSPADM

## 2020-02-26 RX ORDER — HYDROMORPHONE HYDROCHLORIDE 1 MG/ML
0.5 INJECTION, SOLUTION INTRAMUSCULAR; INTRAVENOUS; SUBCUTANEOUS
Status: DISCONTINUED | OUTPATIENT
Start: 2020-02-26 | End: 2020-02-26 | Stop reason: HOSPADM

## 2020-02-26 RX ORDER — CEFAZOLIN SODIUM 2 G/100ML
2 INJECTION, SOLUTION INTRAVENOUS ONCE
Status: COMPLETED | OUTPATIENT
Start: 2020-02-26 | End: 2020-02-26

## 2020-02-26 RX ORDER — DIPHENHYDRAMINE HCL 25 MG
25 CAPSULE ORAL
Status: DISCONTINUED | OUTPATIENT
Start: 2020-02-26 | End: 2020-02-26 | Stop reason: HOSPADM

## 2020-02-26 RX ORDER — PROMETHAZINE HYDROCHLORIDE 25 MG/ML
12.5 INJECTION, SOLUTION INTRAMUSCULAR; INTRAVENOUS ONCE AS NEEDED
Status: DISCONTINUED | OUTPATIENT
Start: 2020-02-26 | End: 2020-02-26 | Stop reason: HOSPADM

## 2020-02-26 RX ORDER — BUPIVACAINE HYDROCHLORIDE AND EPINEPHRINE 5; 5 MG/ML; UG/ML
INJECTION, SOLUTION EPIDURAL; INTRACAUDAL; PERINEURAL
Status: COMPLETED | OUTPATIENT
Start: 2020-02-26 | End: 2020-02-26

## 2020-02-26 RX ORDER — HYDRALAZINE HYDROCHLORIDE 20 MG/ML
5 INJECTION INTRAMUSCULAR; INTRAVENOUS
Status: DISCONTINUED | OUTPATIENT
Start: 2020-02-26 | End: 2020-02-26 | Stop reason: HOSPADM

## 2020-02-26 RX ORDER — FENTANYL CITRATE 50 UG/ML
50 INJECTION, SOLUTION INTRAMUSCULAR; INTRAVENOUS
Status: DISCONTINUED | OUTPATIENT
Start: 2020-02-26 | End: 2020-02-26 | Stop reason: HOSPADM

## 2020-02-26 RX ORDER — SODIUM CHLORIDE 0.9 % (FLUSH) 0.9 %
3-10 SYRINGE (ML) INJECTION AS NEEDED
Status: DISCONTINUED | OUTPATIENT
Start: 2020-02-26 | End: 2020-02-26 | Stop reason: HOSPADM

## 2020-02-26 RX ORDER — FAMOTIDINE 10 MG/ML
20 INJECTION, SOLUTION INTRAVENOUS ONCE
Status: COMPLETED | OUTPATIENT
Start: 2020-02-26 | End: 2020-02-26

## 2020-02-26 RX ORDER — MIDAZOLAM HYDROCHLORIDE 1 MG/ML
2 INJECTION INTRAMUSCULAR; INTRAVENOUS
Status: DISCONTINUED | OUTPATIENT
Start: 2020-02-26 | End: 2020-02-26 | Stop reason: HOSPADM

## 2020-02-26 RX ORDER — ONDANSETRON 2 MG/ML
4 INJECTION INTRAMUSCULAR; INTRAVENOUS ONCE AS NEEDED
Status: COMPLETED | OUTPATIENT
Start: 2020-02-26 | End: 2020-02-26

## 2020-02-26 RX ORDER — PROMETHAZINE HYDROCHLORIDE 25 MG/ML
6.25 INJECTION, SOLUTION INTRAMUSCULAR; INTRAVENOUS
Status: DISCONTINUED | OUTPATIENT
Start: 2020-02-26 | End: 2020-02-26 | Stop reason: HOSPADM

## 2020-02-26 RX ORDER — PROPOFOL 10 MG/ML
VIAL (ML) INTRAVENOUS AS NEEDED
Status: DISCONTINUED | OUTPATIENT
Start: 2020-02-26 | End: 2020-02-26 | Stop reason: SURG

## 2020-02-26 RX ORDER — MIDAZOLAM HYDROCHLORIDE 1 MG/ML
1 INJECTION INTRAMUSCULAR; INTRAVENOUS
Status: DISCONTINUED | OUTPATIENT
Start: 2020-02-26 | End: 2020-02-26 | Stop reason: HOSPADM

## 2020-02-26 RX ORDER — SODIUM CHLORIDE 0.9 % (FLUSH) 0.9 %
3 SYRINGE (ML) INJECTION EVERY 12 HOURS SCHEDULED
Status: DISCONTINUED | OUTPATIENT
Start: 2020-02-26 | End: 2020-02-26 | Stop reason: HOSPADM

## 2020-02-26 RX ORDER — NALOXONE HCL 0.4 MG/ML
0.2 VIAL (ML) INJECTION AS NEEDED
Status: DISCONTINUED | OUTPATIENT
Start: 2020-02-26 | End: 2020-02-26 | Stop reason: HOSPADM

## 2020-02-26 RX ORDER — LABETALOL HYDROCHLORIDE 5 MG/ML
5 INJECTION, SOLUTION INTRAVENOUS
Status: DISCONTINUED | OUTPATIENT
Start: 2020-02-26 | End: 2020-02-26 | Stop reason: HOSPADM

## 2020-02-26 RX ORDER — CARVEDILOL 3.12 MG/1
12.5 TABLET ORAL ONCE
Status: COMPLETED | OUTPATIENT
Start: 2020-02-26 | End: 2020-02-26

## 2020-02-26 RX ORDER — HYDROCODONE BITARTRATE AND ACETAMINOPHEN 7.5; 325 MG/1; MG/1
1 TABLET ORAL ONCE AS NEEDED
Status: DISCONTINUED | OUTPATIENT
Start: 2020-02-26 | End: 2020-02-26 | Stop reason: HOSPADM

## 2020-02-26 RX ORDER — DIPHENHYDRAMINE HYDROCHLORIDE 50 MG/ML
12.5 INJECTION INTRAMUSCULAR; INTRAVENOUS
Status: DISCONTINUED | OUTPATIENT
Start: 2020-02-26 | End: 2020-02-26 | Stop reason: HOSPADM

## 2020-02-26 RX ORDER — PROTAMINE SULFATE 10 MG/ML
INJECTION, SOLUTION INTRAVENOUS AS NEEDED
Status: DISCONTINUED | OUTPATIENT
Start: 2020-02-26 | End: 2020-02-26 | Stop reason: SURG

## 2020-02-26 RX ORDER — PROPOFOL 10 MG/ML
VIAL (ML) INTRAVENOUS CONTINUOUS PRN
Status: DISCONTINUED | OUTPATIENT
Start: 2020-02-26 | End: 2020-02-26 | Stop reason: SURG

## 2020-02-26 RX ORDER — HYDROCODONE BITARTRATE AND ACETAMINOPHEN 10; 325 MG/1; MG/1
1 TABLET ORAL EVERY 6 HOURS PRN
Qty: 12 TABLET | Refills: 0 | Status: ON HOLD | OUTPATIENT
Start: 2020-02-26 | End: 2020-05-06

## 2020-02-26 RX ORDER — ACETAMINOPHEN 325 MG/1
650 TABLET ORAL ONCE AS NEEDED
Status: DISCONTINUED | OUTPATIENT
Start: 2020-02-26 | End: 2020-02-26 | Stop reason: HOSPADM

## 2020-02-26 RX ORDER — PROMETHAZINE HYDROCHLORIDE 25 MG/1
25 TABLET ORAL ONCE AS NEEDED
Status: DISCONTINUED | OUTPATIENT
Start: 2020-02-26 | End: 2020-02-26 | Stop reason: HOSPADM

## 2020-02-26 RX ORDER — HEPARIN SODIUM 1000 [USP'U]/ML
INJECTION, SOLUTION INTRAVENOUS; SUBCUTANEOUS AS NEEDED
Status: DISCONTINUED | OUTPATIENT
Start: 2020-02-26 | End: 2020-02-26 | Stop reason: SURG

## 2020-02-26 RX ORDER — SODIUM CHLORIDE 9 MG/ML
9 INJECTION, SOLUTION INTRAVENOUS CONTINUOUS PRN
Status: DISCONTINUED | OUTPATIENT
Start: 2020-02-26 | End: 2020-02-26 | Stop reason: HOSPADM

## 2020-02-26 RX ORDER — LIDOCAINE HYDROCHLORIDE 10 MG/ML
0.5 INJECTION, SOLUTION EPIDURAL; INFILTRATION; INTRACAUDAL; PERINEURAL ONCE AS NEEDED
Status: DISCONTINUED | OUTPATIENT
Start: 2020-02-26 | End: 2020-02-26 | Stop reason: HOSPADM

## 2020-02-26 RX ORDER — PROMETHAZINE HYDROCHLORIDE 25 MG/1
25 SUPPOSITORY RECTAL ONCE AS NEEDED
Status: DISCONTINUED | OUTPATIENT
Start: 2020-02-26 | End: 2020-02-26 | Stop reason: HOSPADM

## 2020-02-26 RX ADMIN — PROMETHAZINE HYDROCHLORIDE 6.25 MG: 25 INJECTION INTRAMUSCULAR; INTRAVENOUS at 10:56

## 2020-02-26 RX ADMIN — BUPIVACAINE HYDROCHLORIDE AND EPINEPHRINE BITARTRATE 20 ML: 5; .0091 INJECTION, SOLUTION EPIDURAL; INTRACAUDAL; PERINEURAL at 08:14

## 2020-02-26 RX ADMIN — PROTAMINE SULFATE 30 MG: 10 INJECTION, SOLUTION INTRAVENOUS at 09:49

## 2020-02-26 RX ADMIN — CARVEDILOL 12.5 MG: 12.5 TABLET, FILM COATED ORAL at 08:07

## 2020-02-26 RX ADMIN — ONDANSETRON 4 MG: 2 INJECTION INTRAMUSCULAR; INTRAVENOUS at 10:28

## 2020-02-26 RX ADMIN — SODIUM CHLORIDE 9 ML/HR: 9 INJECTION, SOLUTION INTRAVENOUS at 08:04

## 2020-02-26 RX ADMIN — PROPOFOL 40 MG: 10 INJECTION, EMULSION INTRAVENOUS at 08:40

## 2020-02-26 RX ADMIN — CEFAZOLIN SODIUM 2 G: 2 INJECTION, SOLUTION INTRAVENOUS at 08:40

## 2020-02-26 RX ADMIN — SODIUM CHLORIDE: 9 INJECTION, SOLUTION INTRAVENOUS at 08:38

## 2020-02-26 RX ADMIN — MIDAZOLAM 1 MG: 1 INJECTION INTRAMUSCULAR; INTRAVENOUS at 08:13

## 2020-02-26 RX ADMIN — HEPARIN SODIUM 5000 UNITS: 1000 INJECTION, SOLUTION INTRAVENOUS; SUBCUTANEOUS at 09:24

## 2020-02-26 RX ADMIN — PROPOFOL 30 MG: 10 INJECTION, EMULSION INTRAVENOUS at 08:41

## 2020-02-26 RX ADMIN — PHENYLEPHRINE HYDROCHLORIDE 100 MCG: 10 INJECTION INTRAVENOUS at 09:44

## 2020-02-26 RX ADMIN — FAMOTIDINE 20 MG: 10 INJECTION INTRAVENOUS at 08:04

## 2020-02-26 RX ADMIN — PROPOFOL 100 MCG/KG/MIN: 10 INJECTION, EMULSION INTRAVENOUS at 08:44

## 2020-02-26 RX ADMIN — FENTANYL CITRATE 50 MCG: 50 INJECTION, SOLUTION INTRAMUSCULAR; INTRAVENOUS at 08:13

## 2020-02-26 NOTE — ANESTHESIA PROCEDURE NOTES
Peripheral Block      Patient location during procedure: holding area  Start time: 2/26/2020 8:10 AM  Stop time: 2/26/2020 8:15 AM  Reason for block: primary anesthetic  Performed by  Anesthesiologist: David Degroot MD  Preanesthetic Checklist  Completed: patient identified, site marked, surgical consent, pre-op evaluation, timeout performed, IV checked, risks and benefits discussed and monitors and equipment checked  Prep:  Pt Position: supine  Prep: ChloraPrep  Patient monitoring: blood pressure monitoring, continuous pulse oximetry and EKG  Procedure  Sedation:yes    Guidance:ultrasound guided  ULTRASOUND INTERPRETATION. Using ultrasound guidance a 21 G gauge needle was placed in close proximity to the nerve, at which point, under ultrasound guidance anesthetic was injected in the area of the nerve and spread of the anesthesia was seen on ultrasound in close proximity thereto.  There were no abnormalities seen on ultrasound; a digital image was taken; and the patient tolerated the procedure with no complications. Images:still images obtained, printed/placed on chart    Laterality:right  Block Type:interscalene and supraclavicular  Injection Technique:single-shotResistance on Injection: none    Medications Used: bupivacaine-EPINEPHrine PF (MARCAINE w/EPI) 0.5% -[id number] injection, 20 mL  Med admintered at 2/26/2020 8:14 AM      Post Assessment  Injection Assessment: negative aspiration for heme  Complications:no

## 2020-02-26 NOTE — ANESTHESIA POSTPROCEDURE EVALUATION
Patient: Sunni Mcneil    Procedure Summary     Date:  02/26/20 Room / Location:  [facility] OR [address] [place] [address] MAIN OR    Anesthesia Start:  0835 Anesthesia Stop:  1013    Procedure:  RIGHT ARM ARTERIAL VENOUS FISTULA (Right ) Diagnosis:      Surgeon:  Elijah Herrera MD Provider:  David Degroot MD    Anesthesia Type:  regional ASA Status:  3          Anesthesia Type: regional    Vitals  Vitals Value Taken Time   /105 2/26/2020 11:00 AM   Temp 36.4 °C (97.5 °F) 2/26/2020 10:11 AM   Pulse 64 2/26/2020 10:45 AM   Resp 15 2/26/2020 10:45 AM   SpO2 98 % 2/26/2020 11:15 AM   Vitals shown include unvalidated device data.        Post Anesthesia Care and Evaluation    Patient location during evaluation: PACU  Patient participation: complete - patient participated  Level of consciousness: awake and alert  Pain management: adequate  Airway patency: patent  Anesthetic complications: No anesthetic complications    Cardiovascular status: acceptable  Respiratory status: acceptable  Hydration status: acceptable    Comments: --------------------            02/26/20               1045     --------------------   BP:      163/100     Pulse:      64       Resp:       15       Temp:                SpO2:      96%      --------------------

## 2020-02-26 NOTE — ANESTHESIA PREPROCEDURE EVALUATION
Anesthesia Evaluation     Patient summary reviewed and Nursing notes reviewed                Airway   Mallampati: III  Dental      Pulmonary - negative pulmonary ROS   Cardiovascular     ECG reviewed  Rhythm: regular  Rate: normal    (+) hypertension,       Neuro/Psych- negative ROS  GI/Hepatic/Renal/Endo    (+) obesity,  GERD,  renal disease ESRD, diabetes mellitus type 2,     Musculoskeletal     Abdominal    Substance History - negative use     OB/GYN negative ob/gyn ROS         Other   arthritis,                      Anesthesia Plan    ASA 3     regional   (Right SCBx)    Anesthetic plan, all risks, benefits, and alternatives have been provided, discussed and informed consent has been obtained with: patient.

## 2020-03-13 ENCOUNTER — APPOINTMENT (OUTPATIENT)
Dept: GENERAL RADIOLOGY | Facility: HOSPITAL | Age: 45
End: 2020-03-13

## 2020-03-13 ENCOUNTER — HOSPITAL ENCOUNTER (INPATIENT)
Facility: HOSPITAL | Age: 45
LOS: 3 days | Discharge: HOME | End: 2020-03-16
Attending: EMERGENCY MEDICINE | Admitting: INTERNAL MEDICINE

## 2020-03-13 DIAGNOSIS — D64.9 CHRONIC ANEMIA: ICD-10-CM

## 2020-03-13 DIAGNOSIS — R79.89 ELEVATED PROCALCITONIN: ICD-10-CM

## 2020-03-13 DIAGNOSIS — R19.7 DIARRHEA, UNSPECIFIED TYPE: Primary | ICD-10-CM

## 2020-03-13 DIAGNOSIS — E83.51 HYPOCALCEMIA: ICD-10-CM

## 2020-03-13 PROBLEM — N39.0 ACUTE UTI (URINARY TRACT INFECTION): Status: ACTIVE | Noted: 2020-03-13

## 2020-03-13 PROBLEM — D63.8 ANEMIA, CHRONIC DISEASE: Status: ACTIVE | Noted: 2020-03-13

## 2020-03-13 PROBLEM — N30.01 ACUTE CYSTITIS WITH HEMATURIA: Status: ACTIVE | Noted: 2020-03-13

## 2020-03-13 PROBLEM — E66.9 OBESITY (BMI 30-39.9): Status: ACTIVE | Noted: 2020-03-13

## 2020-03-13 PROBLEM — I77.0 A-V FISTULA (HCC): Status: ACTIVE | Noted: 2020-03-13

## 2020-03-13 LAB
ALBUMIN SERPL-MCNC: 3.1 G/DL (ref 3.5–5.2)
ALBUMIN/GLOB SERPL: 0.7 G/DL
ALP SERPL-CCNC: 57 U/L (ref 39–117)
ALT SERPL W P-5'-P-CCNC: <5 U/L (ref 1–33)
ANION GAP SERPL CALCULATED.3IONS-SCNC: 19 MMOL/L (ref 5–15)
AST SERPL-CCNC: 17 U/L (ref 1–32)
BACTERIA UR QL AUTO: ABNORMAL /HPF
BASOPHILS # BLD AUTO: 0.06 10*3/MM3 (ref 0–0.2)
BASOPHILS NFR BLD AUTO: 0.4 % (ref 0–1.5)
BILIRUB SERPL-MCNC: 0.3 MG/DL (ref 0.2–1.2)
BILIRUB UR QL STRIP: NEGATIVE
BUN BLD-MCNC: 46 MG/DL (ref 6–20)
BUN/CREAT SERPL: 5.7 (ref 7–25)
CALCIUM SPEC-SCNC: 5.5 MG/DL (ref 8.6–10.5)
CHLORIDE SERPL-SCNC: 99 MMOL/L (ref 98–107)
CLARITY UR: ABNORMAL
CO2 SERPL-SCNC: 22 MMOL/L (ref 22–29)
COLOR UR: YELLOW
CREAT BLD-MCNC: 8.01 MG/DL (ref 0.57–1)
D-LACTATE SERPL-SCNC: 1.1 MMOL/L (ref 0.5–2)
DEPRECATED RDW RBC AUTO: 53.1 FL (ref 37–54)
EOSINOPHIL # BLD AUTO: 0.35 10*3/MM3 (ref 0–0.4)
EOSINOPHIL NFR BLD AUTO: 2.6 % (ref 0.3–6.2)
ERYTHROCYTE [DISTWIDTH] IN BLOOD BY AUTOMATED COUNT: 16.1 % (ref 12.3–15.4)
GFR SERPL CREATININE-BSD FRML MDRD: 5 ML/MIN/1.73
GFR SERPL CREATININE-BSD FRML MDRD: ABNORMAL ML/MIN/{1.73_M2}
GLOBULIN UR ELPH-MCNC: 4.5 GM/DL
GLUCOSE BLD-MCNC: 94 MG/DL (ref 65–99)
GLUCOSE BLDC GLUCOMTR-MCNC: 84 MG/DL (ref 70–130)
GLUCOSE BLDC GLUCOMTR-MCNC: 85 MG/DL (ref 70–130)
GLUCOSE BLDC GLUCOMTR-MCNC: 96 MG/DL (ref 70–130)
GLUCOSE UR STRIP-MCNC: NEGATIVE MG/DL
HBA1C MFR BLD: 5.2 % (ref 4.8–5.6)
HCT VFR BLD AUTO: 27.6 % (ref 34–46.6)
HGB BLD-MCNC: 8.8 G/DL (ref 12–15.9)
HGB UR QL STRIP.AUTO: ABNORMAL
HYALINE CASTS UR QL AUTO: ABNORMAL /LPF
IMM GRANULOCYTES # BLD AUTO: 0.24 10*3/MM3 (ref 0–0.05)
IMM GRANULOCYTES NFR BLD AUTO: 1.8 % (ref 0–0.5)
KETONES UR QL STRIP: NEGATIVE
LEUKOCYTE ESTERASE UR QL STRIP.AUTO: ABNORMAL
LYMPHOCYTES # BLD AUTO: 1.6 10*3/MM3 (ref 0.7–3.1)
LYMPHOCYTES NFR BLD AUTO: 11.9 % (ref 19.6–45.3)
MAGNESIUM SERPL-MCNC: 2.2 MG/DL (ref 1.6–2.6)
MCH RBC QN AUTO: 28.8 PG (ref 26.6–33)
MCHC RBC AUTO-ENTMCNC: 31.9 G/DL (ref 31.5–35.7)
MCV RBC AUTO: 90.2 FL (ref 79–97)
MONOCYTES # BLD AUTO: 0.51 10*3/MM3 (ref 0.1–0.9)
MONOCYTES NFR BLD AUTO: 3.8 % (ref 5–12)
NEUTROPHILS # BLD AUTO: 10.68 10*3/MM3 (ref 1.7–7)
NEUTROPHILS NFR BLD AUTO: 79.5 % (ref 42.7–76)
NITRITE UR QL STRIP: NEGATIVE
NRBC BLD AUTO-RTO: 0 /100 WBC (ref 0–0.2)
PH UR STRIP.AUTO: 6 [PH] (ref 5–8)
PLATELET # BLD AUTO: 461 10*3/MM3 (ref 140–450)
PMV BLD AUTO: 8.8 FL (ref 6–12)
POTASSIUM BLD-SCNC: 3.8 MMOL/L (ref 3.5–5.2)
PROCALCITONIN SERPL-MCNC: 0.38 NG/ML (ref 0.1–0.25)
PROT SERPL-MCNC: 7.6 G/DL (ref 6–8.5)
PROT UR QL STRIP: ABNORMAL
RBC # BLD AUTO: 3.06 10*6/MM3 (ref 3.77–5.28)
RBC # UR: ABNORMAL /HPF
REF LAB TEST METHOD: ABNORMAL
RETICS # AUTO: 0.03 10*6/MM3 (ref 0.02–0.13)
RETICS/RBC NFR AUTO: 1.08 % (ref 0.7–1.9)
SODIUM BLD-SCNC: 140 MMOL/L (ref 136–145)
SP GR UR STRIP: 1.01 (ref 1–1.03)
SQUAMOUS #/AREA URNS HPF: ABNORMAL /HPF
UROBILINOGEN UR QL STRIP: ABNORMAL
WBC NRBC COR # BLD: 13.44 10*3/MM3 (ref 3.4–10.8)
WBC UR QL AUTO: ABNORMAL /HPF

## 2020-03-13 PROCEDURE — 85045 AUTOMATED RETICULOCYTE COUNT: CPT | Performed by: INTERNAL MEDICINE

## 2020-03-13 PROCEDURE — 71046 X-RAY EXAM CHEST 2 VIEWS: CPT

## 2020-03-13 PROCEDURE — 84145 PROCALCITONIN (PCT): CPT | Performed by: EMERGENCY MEDICINE

## 2020-03-13 PROCEDURE — 85025 COMPLETE CBC W/AUTO DIFF WBC: CPT | Performed by: EMERGENCY MEDICINE

## 2020-03-13 PROCEDURE — P9612 CATHETERIZE FOR URINE SPEC: HCPCS

## 2020-03-13 PROCEDURE — 83036 HEMOGLOBIN GLYCOSYLATED A1C: CPT | Performed by: INTERNAL MEDICINE

## 2020-03-13 PROCEDURE — 74018 RADEX ABDOMEN 1 VIEW: CPT

## 2020-03-13 PROCEDURE — 83605 ASSAY OF LACTIC ACID: CPT | Performed by: EMERGENCY MEDICINE

## 2020-03-13 PROCEDURE — 99284 EMERGENCY DEPT VISIT MOD MDM: CPT

## 2020-03-13 PROCEDURE — 83735 ASSAY OF MAGNESIUM: CPT | Performed by: EMERGENCY MEDICINE

## 2020-03-13 PROCEDURE — 87040 BLOOD CULTURE FOR BACTERIA: CPT | Performed by: EMERGENCY MEDICINE

## 2020-03-13 PROCEDURE — 81001 URINALYSIS AUTO W/SCOPE: CPT | Performed by: EMERGENCY MEDICINE

## 2020-03-13 PROCEDURE — 82962 GLUCOSE BLOOD TEST: CPT

## 2020-03-13 PROCEDURE — 87086 URINE CULTURE/COLONY COUNT: CPT | Performed by: NURSE PRACTITIONER

## 2020-03-13 PROCEDURE — 36415 COLL VENOUS BLD VENIPUNCTURE: CPT

## 2020-03-13 PROCEDURE — 25010000002 ONDANSETRON PER 1 MG: Performed by: NURSE PRACTITIONER

## 2020-03-13 PROCEDURE — 3E1M39Z IRRIGATION OF PERITONEAL CAVITY USING DIALYSATE, PERCUTANEOUS APPROACH: ICD-10-PCS | Performed by: INTERNAL MEDICINE

## 2020-03-13 PROCEDURE — 80053 COMPREHEN METABOLIC PANEL: CPT | Performed by: EMERGENCY MEDICINE

## 2020-03-13 PROCEDURE — 99222 1ST HOSP IP/OBS MODERATE 55: CPT | Performed by: INTERNAL MEDICINE

## 2020-03-13 PROCEDURE — 25010000002 CEFTRIAXONE PER 250 MG: Performed by: NURSE PRACTITIONER

## 2020-03-13 RX ORDER — CALCITRIOL 0.25 UG/1
1 CAPSULE, LIQUID FILLED ORAL EVERY 12 HOURS SCHEDULED
Status: DISCONTINUED | OUTPATIENT
Start: 2020-03-13 | End: 2020-03-16 | Stop reason: HOSPADM

## 2020-03-13 RX ORDER — CALCITRIOL 0.25 UG/1
1 CAPSULE, LIQUID FILLED ORAL ONCE
Status: COMPLETED | OUTPATIENT
Start: 2020-03-13 | End: 2020-03-16

## 2020-03-13 RX ORDER — CEFTRIAXONE SODIUM 1 G/50ML
1 INJECTION, SOLUTION INTRAVENOUS EVERY 24 HOURS
Status: COMPLETED | OUTPATIENT
Start: 2020-03-13 | End: 2020-03-15

## 2020-03-13 RX ORDER — DOXYLAMINE SUCCINATE AND PYRIDOXINE HYDROCHLORIDE, DELAYED RELEASE TABLETS 10 MG/10 MG 10; 10 MG/1; MG/1
1 TABLET, DELAYED RELEASE ORAL 3 TIMES DAILY PRN
Status: DISCONTINUED | OUTPATIENT
Start: 2020-03-13 | End: 2020-03-16 | Stop reason: HOSPADM

## 2020-03-13 RX ORDER — DEXTROSE MONOHYDRATE 25 G/50ML
25 INJECTION, SOLUTION INTRAVENOUS
Status: DISCONTINUED | OUTPATIENT
Start: 2020-03-13 | End: 2020-03-16 | Stop reason: HOSPADM

## 2020-03-13 RX ORDER — ACETAMINOPHEN 325 MG/1
650 TABLET ORAL EVERY 4 HOURS PRN
Status: DISCONTINUED | OUTPATIENT
Start: 2020-03-13 | End: 2020-03-16 | Stop reason: HOSPADM

## 2020-03-13 RX ORDER — ONDANSETRON 4 MG/1
4 TABLET, FILM COATED ORAL EVERY 6 HOURS PRN
Status: DISCONTINUED | OUTPATIENT
Start: 2020-03-13 | End: 2020-03-16 | Stop reason: HOSPADM

## 2020-03-13 RX ORDER — DEXTROSE MONOHYDRATE, SODIUM CHLORIDE, SODIUM LACTATE, CALCIUM CHLORIDE, MAGNESIUM CHLORIDE 2.5; 538; 448; 18.4; 5.08 G/100ML; MG/100ML; MG/100ML; MG/100ML; MG/100ML
2000 SOLUTION INTRAPERITONEAL AS NEEDED
Status: DISCONTINUED | OUTPATIENT
Start: 2020-03-13 | End: 2020-03-14

## 2020-03-13 RX ORDER — ONDANSETRON 2 MG/ML
4 INJECTION INTRAMUSCULAR; INTRAVENOUS EVERY 6 HOURS PRN
Status: DISCONTINUED | OUTPATIENT
Start: 2020-03-13 | End: 2020-03-16 | Stop reason: HOSPADM

## 2020-03-13 RX ORDER — SODIUM CHLORIDE 9 MG/ML
50 INJECTION, SOLUTION INTRAVENOUS CONTINUOUS
Status: DISCONTINUED | OUTPATIENT
Start: 2020-03-13 | End: 2020-03-14

## 2020-03-13 RX ORDER — MINOXIDIL 10 MG/1
10 TABLET ORAL ONCE
Status: DISCONTINUED | OUTPATIENT
Start: 2020-03-13 | End: 2020-03-16 | Stop reason: HOSPADM

## 2020-03-13 RX ORDER — SODIUM CHLORIDE 0.9 % (FLUSH) 0.9 %
10 SYRINGE (ML) INJECTION EVERY 12 HOURS SCHEDULED
Status: DISCONTINUED | OUTPATIENT
Start: 2020-03-13 | End: 2020-03-16 | Stop reason: HOSPADM

## 2020-03-13 RX ORDER — HYDROCODONE BITARTRATE AND ACETAMINOPHEN 10; 325 MG/1; MG/1
1 TABLET ORAL EVERY 6 HOURS PRN
Status: DISCONTINUED | OUTPATIENT
Start: 2020-03-13 | End: 2020-03-16 | Stop reason: HOSPADM

## 2020-03-13 RX ORDER — CLONIDINE HYDROCHLORIDE 0.1 MG/1
0.3 TABLET ORAL 3 TIMES DAILY
Status: DISCONTINUED | OUTPATIENT
Start: 2020-03-13 | End: 2020-03-16 | Stop reason: HOSPADM

## 2020-03-13 RX ORDER — DOCUSATE SODIUM 100 MG/1
100 CAPSULE, LIQUID FILLED ORAL 2 TIMES DAILY PRN
Status: DISCONTINUED | OUTPATIENT
Start: 2020-03-13 | End: 2020-03-16 | Stop reason: HOSPADM

## 2020-03-13 RX ORDER — SODIUM CHLORIDE 0.9 % (FLUSH) 0.9 %
10 SYRINGE (ML) INJECTION AS NEEDED
Status: DISCONTINUED | OUTPATIENT
Start: 2020-03-13 | End: 2020-03-16 | Stop reason: HOSPADM

## 2020-03-13 RX ORDER — NICOTINE POLACRILEX 4 MG
15 LOZENGE BUCCAL
Status: DISCONTINUED | OUTPATIENT
Start: 2020-03-13 | End: 2020-03-16 | Stop reason: HOSPADM

## 2020-03-13 RX ORDER — CARVEDILOL 12.5 MG/1
12.5 TABLET ORAL 2 TIMES DAILY WITH MEALS
Status: DISCONTINUED | OUTPATIENT
Start: 2020-03-13 | End: 2020-03-16 | Stop reason: HOSPADM

## 2020-03-13 RX ORDER — FAMOTIDINE 20 MG/1
20 TABLET, FILM COATED ORAL DAILY
Status: DISCONTINUED | OUTPATIENT
Start: 2020-03-13 | End: 2020-03-16 | Stop reason: HOSPADM

## 2020-03-13 RX ORDER — MINOXIDIL 10 MG/1
10 TABLET ORAL DAILY
Status: DISCONTINUED | OUTPATIENT
Start: 2020-03-13 | End: 2020-03-16 | Stop reason: HOSPADM

## 2020-03-13 RX ADMIN — SODIUM CHLORIDE 125 ML/HR: 9 INJECTION, SOLUTION INTRAVENOUS at 13:58

## 2020-03-13 RX ADMIN — CLONIDINE HYDROCHLORIDE 0.3 MG: 0.1 TABLET ORAL at 22:15

## 2020-03-13 RX ADMIN — FAMOTIDINE 20 MG: 20 TABLET, FILM COATED ORAL at 13:58

## 2020-03-13 RX ADMIN — SODIUM CHLORIDE 500 ML: 9 INJECTION, SOLUTION INTRAVENOUS at 09:50

## 2020-03-13 RX ADMIN — CALCIUM ACETATE 2001 MG: 667 CAPSULE ORAL at 13:56

## 2020-03-13 RX ADMIN — CEFTRIAXONE SODIUM 1 G: 1 INJECTION, SOLUTION INTRAVENOUS at 13:55

## 2020-03-13 RX ADMIN — MINOXIDIL 10 MG: 10 TABLET ORAL at 13:55

## 2020-03-13 RX ADMIN — CARVEDILOL 12.5 MG: 12.5 TABLET, FILM COATED ORAL at 22:14

## 2020-03-13 RX ADMIN — DEXTROSE MONOHYDRATE, SODIUM CHLORIDE, SODIUM LACTATE, CALCIUM CHLORIDE, MAGNESIUM CHLORIDE 2000 ML: 2.5; 538; 448; 18.4; 5.08 SOLUTION INTRAPERITONEAL at 22:20

## 2020-03-13 RX ADMIN — ONDANSETRON 4 MG: 2 INJECTION INTRAMUSCULAR; INTRAVENOUS at 15:00

## 2020-03-13 RX ADMIN — SODIUM CHLORIDE 125 ML/HR: 9 INJECTION, SOLUTION INTRAVENOUS at 09:51

## 2020-03-13 RX ADMIN — SODIUM CHLORIDE, PRESERVATIVE FREE 10 ML: 5 INJECTION INTRAVENOUS at 22:17

## 2020-03-13 RX ADMIN — CALCITRIOL 1 MCG: 0.25 CAPSULE ORAL at 13:55

## 2020-03-13 NOTE — PLAN OF CARE
BP high at times and is effected by her emotions. Mostly tachy this afternoon. 1 episode of emesis; zofran x 1.  Pt gives short answers due to her displeasure with her diet status. PD ordered 5 times daily. She initially refused due to her clear liquid diet. GI paged to possibly advance diet. Pt refused afternoon meds until she can have something to eat.

## 2020-03-13 NOTE — ED NOTES
Patient reports having diarrhea and nausea x 2 days. Patient denies having any abdominal pain. Patient denies taking anything to help with the pain. Patient denies having any GI hx. Patient denies having any blood in her stool.       Maddie Hung RN  03/13/20 0814

## 2020-03-13 NOTE — ED TRIAGE NOTES
Patient presents to er via private vehicle from home.  Patient is reporting diarrhea and nausea for two days.

## 2020-03-13 NOTE — ED NOTES
Nursing report ED to floor  Sunni Mcneil  44 y.o.  female    HPI (triage note):   Chief Complaint   Patient presents with   • Diarrhea       Admitting doctor:   Jess Benz MD    Admitting diagnosis:   The primary encounter diagnosis was Diarrhea, unspecified type. Diagnoses of Hypocalcemia, Chronic anemia, and Elevated procalcitonin were also pertinent to this visit.    Code status:   Current Code Status     Date Active Code Status Order ID Comments User Context       Prior          Allergies:   Patient has no known allergies.    Weight:       03/13/20  0817   Weight: 77.1 kg (170 lb)       Most recent vitals:   Vitals:    03/13/20 0817 03/13/20 0820 03/13/20 0830 03/13/20 0934   BP:   (!) 163/108 (!) 178/111   Pulse:  86 91 93   Resp:  16 16 16   Temp:       TempSrc:       SpO2:  99% 100% 98%   Weight: 77.1 kg (170 lb)      Height:           Active LDAs/IV Access:   Lines, Drains & Airways    Active LDAs     Name:   Placement date:   Placement time:   Site:   Days:    Peripheral IV 03/13/20 0948 Left Antecubital   03/13/20    0948    Antecubital   less than 1    Peritoneal Dialysis Catheter    07/24/19    1408    -- left upper abdomen   232                Labs (abnormal labs have a star):   Labs Reviewed   COMPREHENSIVE METABOLIC PANEL - Abnormal; Notable for the following components:       Result Value    BUN 46 (*)     Creatinine 8.01 (*)     Calcium 5.5 (*)     Albumin 3.10 (*)     eGFR Non African Amer 5 (*)     BUN/Creatinine Ratio 5.7 (*)     Anion Gap 19.0 (*)     All other components within normal limits    Narrative:     GFR Normal >60  Chronic Kidney Disease <60  Kidney Failure <15     CBC WITH AUTO DIFFERENTIAL - Abnormal; Notable for the following components:    WBC 13.44 (*)     RBC 3.06 (*)     Hemoglobin 8.8 (*)     Hematocrit 27.6 (*)     RDW 16.1 (*)     Platelets 461 (*)     Neutrophil % 79.5 (*)     Lymphocyte % 11.9 (*)     Monocyte % 3.8 (*)     Immature Grans % 1.8 (*)     Neutrophils,  "Absolute 10.68 (*)     Immature Grans, Absolute 0.24 (*)     All other components within normal limits   URINALYSIS W/ MICROSCOPIC IF INDICATED (NO CULTURE) - Abnormal; Notable for the following components:    Appearance, UA Cloudy (*)     Blood, UA Small (1+) (*)     Protein,  mg/dL (2+) (*)     Leuk Esterase, UA Trace (*)     All other components within normal limits   PROCALCITONIN - Abnormal; Notable for the following components:    Procalcitonin 0.38 (*)     All other components within normal limits    Narrative:     As a Marker for Sepsis (Non-Neonates):   1. <0.5 ng/mL represents a low risk of severe sepsis and/or septic shock.  1. >2 ng/mL represents a high risk of severe sepsis and/or septic shock.    As a Marker for Lower Respiratory Tract Infections that require antibiotic therapy:  PCT on Admission     Antibiotic Therapy             6-12 Hrs later  > 0.5                Strongly Recommended            >0.25 - <0.5         Recommended  0.1 - 0.25           Discouraged                   Remeasure/reassess PCT  <0.1                 Strongly Discouraged          Remeasure/reassess PCT      As 28 day mortality risk marker: \"Change in Procalcitonin Result\" (> 80 % or <=80 %) if Day 0 (or Day 1) and Day 4 values are available. Refer to http://www.[employer]-pct-calculator.com/   Change in PCT <=80 %   A decrease of PCT levels below or equal to 80 % defines a positive change in PCT test result representing a higher risk for 28-day all-cause mortality of patients diagnosed with severe sepsis or septic shock.  Change in PCT > 80 %   A decrease of PCT levels of more than 80 % defines a negative change in PCT result representing a lower risk for 28-day all-cause mortality of patients diagnosed with severe sepsis or septic shock.                Results may be falsely decreased if patient taking Biotin.    MAGNESIUM - Normal   LACTIC ACID, PLASMA - Normal   BLOOD CULTURE   BLOOD CULTURE   GASTROINTESTINAL PANEL, PCR "   URINALYSIS, MICROSCOPIC ONLY   CBC AND DIFFERENTIAL    Narrative:     The following orders were created for panel order CBC & Differential.  Procedure                               Abnormality         Status                     ---------                               -----------         ------                     CBC Auto Differential[[id number]]        Abnormal            Final result                 Please view results for these tests on the individual orders.       EKG:   No orders to display       Meds given in ED:   Medications   sodium chloride 0.9 % infusion (125 mL/hr Intravenous New Bag 3/13/20 0951)   sodium chloride 0.9 % flush 10 mL (has no administration in time range)   sodium chloride 0.9 % bolus 500 mL (500 mL Intravenous New Bag 3/13/20 0952)       Imaging results:  Xr Chest 2 View    Result Date: 3/13/2020  Negative chest radiographs.        Ambulatory status:   - ad karl    Social issues:   Social History     Socioeconomic History   • Marital status:      Spouse name: Not on file   • Number of children: 2   • Years of education: 12   • Highest education level: Not on file   Occupational History     Employer: [employer]   Tobacco Use   • Smoking status: Never Smoker   • Smokeless tobacco: Never Used   Substance and Sexual Activity   • Alcohol use: No   • Drug use: No   • Sexual activity: Maddie Baer RN  03/13/20 1022

## 2020-03-13 NOTE — PROGRESS NOTES
Discharge Planning Assessment  [facility]     Patient Name: Sunni Mcneil  MRN: [id number]  Today's Date: 3/13/2020    Admit Date: 3/13/2020    Discharge Needs Assessment     Row Name 03/13/20 1429       Living Environment    Current Living Arrangements  home/apartment/condo    Primary Care Provided by  self    Provides Primary Care For  no one    Family Caregiver if Needed  spouse    Family Caregiver Names  Chuy- spouse    Quality of Family Relationships  helpful;involved;supportive       Resource/Environmental Concerns    Resource/Environmental Concerns  none    Transportation Concerns  car, none       Transition Planning    Patient/Family Anticipates Transition to  home with family    Patient/Family Anticipated Services at Transition  none    Transportation Anticipated  family or friend will provide       Discharge Needs Assessment    Readmission Within the Last 30 Days  no previous admission in last 30 days    Concerns to be Addressed  no discharge needs identified    Equipment Currently Used at Home  other (see comments) PD supplies and cycler    Anticipated Changes Related to Illness  none    Equipment Needed After Discharge  none    Row Name 03/13/20 1427       Living Environment    Lives With  child(dagmar), adult;spouse        Discharge Plan     Row Name 03/13/20 5198       Plan    Plan  plans to return home- CCP will follow for needs    Patient/Family in Agreement with Plan  yes    Plan Comments  Spoke with patient at bedside.  Facesheet, PCP and pharmacy verified.  Patient lives in a second floor apartment with her daughter, Renetta Simons ( [phone]) and spouse, Chuy Mcneil ([phone]).  Jesus is IADLS.  Her only DME is a cycler for her peritoneal dialysis.  She gets her supplies delivered from [employer].  She does not have a HH or SNF history. Her primary care provider is Mulu Whaley on [facility] [address].  Plan is home.  CCP will follow. Alix Alejandro RN        Destination      Coordination has not  been started for this encounter.      Durable Medical Equipment      Coordination has not been started for this encounter.      Dialysis/Infusion      Coordination has not been started for this encounter.      Home Medical Care      Coordination has not been started for this encounter.      Therapy      Coordination has not been started for this encounter.      Community Resources      Coordination has not been started for this encounter.          Demographic Summary     Row Name 03/13/20 1426       General Information    Admission Type  inpatient    Arrived From  home    Referral Source  admission list    Reason for Consult  discharge planning    Preferred Language  English        Functional Status     Row Name 03/13/20 1427       Functional Status    Usual Activity Tolerance  good    Current Activity Tolerance  good       Functional Status, IADL    Medications  independent    Meal Preparation  independent    Housekeeping  independent    Laundry  independent    Shopping  independent       Mental Status    General Appearance WDL  WDL       Mental Status Summary    Recent Changes in Mental Status/Cognitive Functioning  no changes        Psychosocial    No documentation.       Abuse/Neglect    No documentation.       Legal    No documentation.       Substance Abuse    No documentation.       Patient Forms    No documentation.           Alix Alejandro RN

## 2020-03-13 NOTE — CONSULTS
Referring Provider: JERONIMO Lenz  Reason for Consultation: Management of patient with end-stage renal disease on peritoneal dialysis    Subjective     Chief complaint   Chief Complaint   Patient presents with   • Diarrhea       History of present illness:    This is a 44-year-old  female presented to the emergency department with diarrhea and recurrent nausea also she was hypocalcemic she has history of end-stage renal disease on maintenance peritoneal dialysis.  She has history of failed  donor kidney transplant which was transplanted on in 2016 and failed in 2019.The initial kidney disease associated with hypertension and diabetes type II.  She has secondary hyperparathyroidism, And seasonal allergies, denies any heart or lung disease.  She has been having recurrent diarrhea for the past few days 3-4 times watery bowel movement, no blood noted.  She is having persistent nausea which is chronic she had GI evaluation in 2019 but declined to have upper endoscopy.  She has been on famotidine.  Calcium is 5.5 with albumin 3.1 when the calcium is corrected to the albumin it is only 6.3.  She denies any chest pain or shortness of air, no numbness of tingling of her finger or toes no perioral numbness.  Past Medical History:   Diagnosis Date   • Acid reflux    • Anemia    • Arthritis    • Diabetes mellitus (CMS/HCC)    • ESRD on dialysis (CMS/HCC)    • History of peritoneal dialysis     KIDNEY TRANSPLANT 2016 CURRENTLY DOING PERITONEAL DIALYSIS.   • History of transfusion     BEEN A WHILE   • Hypercalcemia    • Hyperparathyroidism (CMS/HCC)    • Hypertension    • Kidney disease     End-stage renal disease. TRANSPLANT   • Kidney transplant recipient 2016    RIGHT KIDNEY   • Peritoneal dialysis catheter in situ (CMS/HCC)     LEFT ABDOMEN   • Seasonal allergies     CURRENTLY      Past Surgical History:   Procedure Laterality Date   • ARTERIOVENOUS FISTULA/SHUNT  SURGERY Right 2020    Procedure: RIGHT ARM ARTERIAL VENOUS FISTULA;  Surgeon: Elijah Herrera MD;  Location:  [facility];  Service: Vascular;  Laterality: Right;   • INSERTION PERITONEAL DIALYSIS CATHETER  2014    Laparoscopic placement of Curl Cath peritoneal dialysis catheter, Dr. Vinod Goldstein   • INSERTION PERITONEAL DIALYSIS CATHETER N/A 2019    Procedure: LAPAROSCOPIC INSERTION PERITONEAL DIALYSIS CATHETER;  Surgeon: Damon Rooney MD;  Location:  [facility];  Service: General   • REMOVAL PERITONEAL DIALYSIS CATHETER N/A 10/17/2016    Procedure: REMOVAL PERITONEAL DIALYSIS CATHETER;  Surgeon: Damon Rooney MD;  Location:  [facility];  Service:    • TRANSPLANTATION RENAL Right 2016    from  donor   • TUBAL ABDOMINAL LIGATION Bilateral      Family History   Problem Relation Age of Onset   • Malig Hyperthermia Neg Hx        Social History     Tobacco Use   • Smoking status: Never Smoker   • Smokeless tobacco: Never Used   Substance Use Topics   • Alcohol use: No   • Drug use: No     Medications Prior to Admission   Medication Sig Dispense Refill Last Dose   • calcium acetate (PHOSLO) 667 MG capsule Take 2,001 mg by mouth 3 (Three) Times a Day With Meals.   2020 at 1800   • carvedilol (COREG) 12.5 MG tablet Take 12.5 mg by mouth 2 (Two) Times a Day With Meals.   2020 at 0800   • CloNIDine (CATAPRES) 0.3 MG tablet Take 0.3 mg by mouth 3 (Three) Times a Day.   2020 at 0500   • doxylamine-pyridoxine (DICLEGIS) 10-10 MG tablet delayed-release EC tablet Take 1 tablet by mouth 3 (Three) Times a Day As Needed (nausea). 90 tablet 0 2020 at 1800   • famotidine (PEPCID) 20 MG tablet Take 1 tablet by mouth Daily. 30 tablet 0 2020   • HYDROcodone-acetaminophen (NORCO)  MG per tablet Take 1 tablet by mouth Every 6 (Six) Hours As Needed for Moderate Pain . 12 tablet 0    • minoxidil (LONITEN) 10 MG tablet Take 10 mg by mouth Daily.   " 2/25/2020 at 1800   • ondansetron (ZOFRAN) 4 MG tablet Take 1 tablet by mouth Every 6 (Six) Hours As Needed for Nausea or Vomiting. 10 tablet 0 2/19/2020   • polyethylene glycol (MIRALAX) packet Take 17 g by mouth Daily. (Patient taking differently: Take 17 g by mouth Daily As Needed.) 30 each 2 More than a month at Unknown time   • CHLORHEXIDINE GLUCONATE CLOTH EX Apply 1 application topically 2 (Two) Times a Day. AS DIRECTED PREOP    2/26/2020 at 0500     Allergies:  Patient has no known allergies.    Review of Systems  14 points review of system was performed and it was negative other than what noted above in the HPI    Objective     Vital Signs  Temp:  [98.3 °F (36.8 °C)-99.3 °F (37.4 °C)] 98.3 °F (36.8 °C)  Heart Rate:  [] 93  Resp:  [12-18] 12  BP: (163-185)/() 185/90    Flowsheet Rows      First Filed Value   Admission Height  152.4 cm (60\") Documented at 03/13/2020 0803   Admission Weight  77.1 kg (170 lb) Documented at 03/13/2020 0817           No intake/output data recorded.  No intake/output data recorded.  No intake or output data in the 24 hours ending 03/13/20 1211    Physical Exam:  General Appearance: alert, oriented x 3, no acute distress, appears to be chronically  Skin: warm and dry  HEENT: pupils round and reactive to light, oral mucosa normal, nonicteric sclera  Neck: supple, no JVD, trachea midline  Lungs: CTA, unlabored breathing effort  Heart: RRR, normal S1 and S2, no S3, no rub  Abdomen: soft, non-tender,  present bowel sounds to auscultation, can be catheter in place, exit site is clean with no tunnel tenderness  : no palpable bladder,  Extremities: no edema, cyanosis or clubbing, functional AV fistula in the right upper arm  Joints: No significant deformities noted, no crepitation over the knees or back  Lymphatics: No cervical or supraclavicular lymphadenopathy  Neuro: normal speech and mental status    Results Review:  Results for orders placed or performed during the " hospital encounter of 03/13/20   Comprehensive Metabolic Panel   Result Value Ref Range    Glucose 94 65 - 99 mg/dL    BUN 46 (H) 6 - 20 mg/dL    Creatinine 8.01 (H) 0.57 - 1.00 mg/dL    Sodium 140 136 - 145 mmol/L    Potassium 3.8 3.5 - 5.2 mmol/L    Chloride 99 98 - 107 mmol/L    CO2 22.0 22.0 - 29.0 mmol/L    Calcium 5.5 (C) 8.6 - 10.5 mg/dL    Total Protein 7.6 6.0 - 8.5 g/dL    Albumin 3.10 (L) 3.50 - 5.20 g/dL    ALT (SGPT) <5 1 - 33 U/L    AST (SGOT) 17 1 - 32 U/L    Alkaline Phosphatase 57 39 - 117 U/L    Total Bilirubin 0.3 0.2 - 1.2 mg/dL    eGFR Non African Amer 5 (L) >60 mL/min/1.73    eGFR  African Amer      Globulin 4.5 gm/dL    A/G Ratio 0.7 g/dL    BUN/Creatinine Ratio 5.7 (L) 7.0 - 25.0    Anion Gap 19.0 (H) 5.0 - 15.0 mmol/L   Magnesium   Result Value Ref Range    Magnesium 2.2 1.6 - 2.6 mg/dL   CBC Auto Differential   Result Value Ref Range    WBC 13.44 (H) 3.40 - 10.80 10*3/mm3    RBC 3.06 (L) 3.77 - 5.28 10*6/mm3    Hemoglobin 8.8 (L) 12.0 - 15.9 g/dL    Hematocrit 27.6 (L) 34.0 - 46.6 %    MCV 90.2 79.0 - 97.0 fL    MCH 28.8 26.6 - 33.0 pg    MCHC 31.9 31.5 - 35.7 g/dL    RDW 16.1 (H) 12.3 - 15.4 %    RDW-SD 53.1 37.0 - 54.0 fl    MPV 8.8 6.0 - 12.0 fL    Platelets 461 (H) 140 - 450 10*3/mm3    Neutrophil % 79.5 (H) 42.7 - 76.0 %    Lymphocyte % 11.9 (L) 19.6 - 45.3 %    Monocyte % 3.8 (L) 5.0 - 12.0 %    Eosinophil % 2.6 0.3 - 6.2 %    Basophil % 0.4 0.0 - 1.5 %    Immature Grans % 1.8 (H) 0.0 - 0.5 %    Neutrophils, Absolute 10.68 (H) 1.70 - 7.00 10*3/mm3    Lymphocytes, Absolute 1.60 0.70 - 3.10 10*3/mm3    Monocytes, Absolute 0.51 0.10 - 0.90 10*3/mm3    Eosinophils, Absolute 0.35 0.00 - 0.40 10*3/mm3    Basophils, Absolute 0.06 0.00 - 0.20 10*3/mm3    Immature Grans, Absolute 0.24 (H) 0.00 - 0.05 10*3/mm3    nRBC 0.0 0.0 - 0.2 /100 WBC   Urinalysis With Microscopic If Indicated (No Culture) - Urine, Clean Catch   Result Value Ref Range    Color, UA Yellow Yellow, Straw    Appearance, UA  Cloudy (A) Clear    pH, UA 6.0 5.0 - 8.0    Specific Gravity, UA 1.012 1.005 - 1.030    Glucose, UA Negative Negative    Ketones, UA Negative Negative    Bilirubin, UA Negative Negative    Blood, UA Small (1+) (A) Negative    Protein,  mg/dL (2+) (A) Negative    Leuk Esterase, UA Trace (A) Negative    Nitrite, UA Negative Negative    Urobilinogen, UA 0.2 E.U./dL 0.2 - 1.0 E.U./dL   Procalcitonin   Result Value Ref Range    Procalcitonin 0.38 (H) 0.10 - 0.25 ng/mL   Lactic Acid, Plasma   Result Value Ref Range    Lactate 1.1 0.5 - 2.0 mmol/L   Urinalysis, Microscopic Only - Urine, Clean Catch   Result Value Ref Range    RBC, UA 0-2 None Seen, 0-2 /HPF    WBC, UA 21-30 (A) None Seen, 0-2 /HPF    Bacteria, UA 4+ (A) None Seen /HPF    Squamous Epithelial Cells, UA 31-50 (A) None Seen, 0-2 /HPF    Hyaline Casts, UA 3-6 None Seen /LPF    Methodology Automated Microscopy      Imaging Results (Last 72 Hours)     Procedure Component Value Units Date/Time    XR Chest 2 View [[id number]] Collected:  03/13/20 1008     Updated:  03/13/20 1008    Narrative:       2 VIEWS OF THE CHEST     HISTORY: 44-year-old female with a history of diarrhea and leukocytosis.     FINDINGS: PA and lateral chest radiograph were obtained. Comparison is  made to chest radiograph dated 11/10/2019. The lungs are normal in  volume and are clear. The cardiomediastinal silhouette and pulmonary  vascular appear normal.       Impression:       Negative chest radiographs.                 calcium acetate 2,001 mg Oral TID With Meals   carvedilol 12.5 mg Oral BID With Meals   cloNIDine 0.3 mg Oral TID   famotidine 20 mg Oral Daily   minoxidil 10 mg Oral Daily   sodium chloride 10 mL Intravenous Q12H       sodium chloride 125 mL/hr Last Rate: 125 mL/hr (03/13/20 0951)       Assessment/Plan   1.  End-stage renal disease currently on peritoneal dialysis.No evidence of peritonitis.    2.  Nausea without vomiting and recurrent diarrhea she was evaluated by GI  service but declined to have upper endoscopy in the past currently treated with H2 blockers  3.  Hypocalcemia, Despite being on calcium acetate as a phosphate binder.  The magnesium is within normal range.  4.  Anemia of chronic kidney disease receiving TERESA as an outpatient  5.  Failed kidney transplant  6.  Reported history of prolonged QT syndrome  7.  Diabetes mellitus type 2  8.  Prior parathyroidectomy with autotransplant to the left forearm in April 2018 not been taking her calcitriol.      Plan:  1.  Start calcitriol 1 mcg twice daily  2.  Resume peritoneal dialysis while she is hospitalized with use CAPD.  3.  Surveillance labs        Thank you for asking me to see this patient in consultation        I discussed the patients findings and my recommendations with the patient    Carlos Manuel Gonzales MD  03/13/20  12:11      Much of this encounter note is an electronic transcription/translation of spoken language to printed text. The electronic translation of spoken language may permit erroneous, or at times, nonsensical words or phrases to be inadvertently transcribed; Although I have reviewed the note for such errors, some may still exist

## 2020-03-13 NOTE — CONSULTS
[facility], [place], operated by [facility]  Initial Inpatient Consult Note    Referring Provider: [facility]    Reason for Consultation: Diarrhea    Subjective     History of present illness:    44 y.o. female seen by our service in 2019 for nausea, presents with a 2-day history of diarrhea described as loose stools on 3 separate episodes, no bowel movement since admission.  No reported melena or bright red blood per rectum.  She denies any exposure history, recent antibiotics, or change in medications.  She is on dialysis last done 2 days ago.  No complaints of abdominal pain.  She describes mild nausea which is a chronic issue.    Past Medical History:  Past Medical History:   Diagnosis Date   • Acid reflux    • Anemia    • Arthritis    • Diabetes mellitus (CMS/HCC)    • ESRD on dialysis (CMS/[facility])    • History of peritoneal dialysis     KIDNEY TRANSPLANT SEPT 2016 CURRENTLY DOING PERITONEAL DIALYSIS.   • History of transfusion     BEEN A WHILE   • Hypercalcemia    • Hyperparathyroidism (CMS/HCC)    • Hypertension    • Kidney disease     End-stage renal disease. TRANSPLANT   • Kidney transplant recipient 09/02/2016    RIGHT KIDNEY   • Peritoneal dialysis catheter in situ (CMS/HCC)     LEFT ABDOMEN   • Seasonal allergies     CURRENTLY      Past Surgical History:  Past Surgical History:   Procedure Laterality Date   • ARTERIOVENOUS FISTULA/SHUNT SURGERY Right 2/26/2020    Procedure: RIGHT ARM ARTERIAL VENOUS FISTULA;  Surgeon: Elijah Herrera MD;  Location: [facility];  Service: Vascular;  Laterality: Right;   • INSERTION PERITONEAL DIALYSIS CATHETER  07/29/2014    Laparoscopic placement of Curl Cath peritoneal dialysis catheter, Dr. Vinod Goldstein   • INSERTION PERITONEAL DIALYSIS CATHETER N/A 7/24/2019    Procedure: LAPAROSCOPIC INSERTION PERITONEAL DIALYSIS CATHETER;  Surgeon: Damon Rooney MD;  Location: [facility];  Service: General   • REMOVAL PERITONEAL DIALYSIS CATHETER N/A 10/17/2016    Procedure:  REMOVAL PERITONEAL DIALYSIS CATHETER;  Surgeon: Damon Rooney MD;  Location: [facility];  Service:    • TRANSPLANTATION RENAL Right 2016    from  donor   • TUBAL ABDOMINAL LIGATION Bilateral       Social History:   Social History     Tobacco Use   • Smoking status: Never Smoker   • Smokeless tobacco: Never Used   Substance Use Topics   • Alcohol use: No      Family History:  Family History   Problem Relation Age of Onset   • Malig Hyperthermia Neg Hx        Home Meds:  Medications Prior to Admission   Medication Sig Dispense Refill Last Dose   • calcium acetate (PHOSLO) 667 MG capsule Take 2,001 mg by mouth 3 (Three) Times a Day With Meals.   2020 at 1800   • carvedilol (COREG) 12.5 MG tablet Take 12.5 mg by mouth 2 (Two) Times a Day With Meals.   2020 at 0800   • CloNIDine (CATAPRES) 0.3 MG tablet Take 0.3 mg by mouth 3 (Three) Times a Day.   2020 at 0500   • doxylamine-pyridoxine (DICLEGIS) 10-10 MG tablet delayed-release EC tablet Take 1 tablet by mouth 3 (Three) Times a Day As Needed (nausea). 90 tablet 0 2020 at 1800   • famotidine (PEPCID) 20 MG tablet Take 1 tablet by mouth Daily. 30 tablet 0 2020   • HYDROcodone-acetaminophen (NORCO)  MG per tablet Take 1 tablet by mouth Every 6 (Six) Hours As Needed for Moderate Pain . 12 tablet 0    • minoxidil (LONITEN) 10 MG tablet Take 10 mg by mouth Daily.   2020 at 1800   • ondansetron (ZOFRAN) 4 MG tablet Take 1 tablet by mouth Every 6 (Six) Hours As Needed for Nausea or Vomiting. 10 tablet 0 2020   • polyethylene glycol (MIRALAX) packet Take 17 g by mouth Daily. (Patient taking differently: Take 17 g by mouth Daily As Needed.) 30 each 2 More than a month at Unknown time   • CHLORHEXIDINE GLUCONATE CLOTH EX Apply 1 application topically 2 (Two) Times a Day. AS DIRECTED PREOP    2020 at 0500     Current Meds:     calcium acetate 2,001 mg Oral TID With Meals   carvedilol 12.5 mg Oral BID With  Meals   cloNIDine 0.3 mg Oral TID   famotidine 20 mg Oral Daily   minoxidil 10 mg Oral Daily   sodium chloride 10 mL Intravenous Q12H     Allergies:  No Known Allergies  Review of Systems  Pertinent items are noted in HPI, all other systems reviewed and negative     Objective     Vital Signs  Temp:  [98.3 °F (36.8 °C)-99.3 °F (37.4 °C)] 98.3 °F (36.8 °C)  Heart Rate:  [] 93  Resp:  [12-18] 12  BP: (163-185)/() 185/90  Physical Exam:  General Appearance:    Alert, cooperative, in no acute distress   Head:    Normocephalic, without obvious abnormality, atraumatic   Eyes:          conjunctivae and sclerae normal, no   icterus   Throat:   no thrush, oral mucosa moist   Neck:   Supple, no adenopathy   Lungs:     Clear to auscultation bilaterally    Heart:    Regular rhythm and normal rate    Chest Wall:    No abnormalities observed   Abdomen:     Soft, nondistended, nontender; normal bowel sounds   Extremities:   no edema, no redness   Skin:   No bruising or rash   Psychiatric:  normal mood and insight     Results Review:   I reviewed the patient's new clinical results.    Results from last 7 days   Lab Units 03/13/20  0819   WBC 10*3/mm3 13.44*   HEMOGLOBIN g/dL 8.8*   HEMATOCRIT % 27.6*   PLATELETS 10*3/mm3 461*     Results from last 7 days   Lab Units 03/13/20  0819   SODIUM mmol/L 140   POTASSIUM mmol/L 3.8   CHLORIDE mmol/L 99   CO2 mmol/L 22.0   BUN mg/dL 46*   CREATININE mg/dL 8.01*   CALCIUM mg/dL 5.5*   BILIRUBIN mg/dL 0.3   ALK PHOS U/L 57   ALT (SGPT) U/L <5   AST (SGOT) U/L 17   GLUCOSE mg/dL 94         Lab Results   Lab Value Date/Time    LIPASE 67 (H) 01/20/2020 1807    LIPASE 67 (H) 01/19/2020 0852    LIPASE 86 (H) 11/10/2019 1110    LIPASE 153 (H) 11/08/2019 0557    LIPASE 128 (H) 11/07/2019 0620    LIPASE 230 06/30/2019 0130       Radiology:  XR Chest 2 View   Preliminary Result   Negative chest radiographs.              Assessment/Plan   Patient Active Problem List   Diagnosis   •  Hyperparathyroidism (CMS/[facility])   • Acute rejection of kidney transplant   • ARF (acute renal failure) (CMS/[facility])   • Hx of kidney transplant   • Hypertension   • Kidney transplant status, cadaveric   • Nephritis   • Hypercalcemia   • ESRD on hemodialysis (CMS/[facility])   • Peritoneal dialysis catheter in place (CMS/[facility])   • Prolonged QT interval   • ESRD (end stage renal disease) (CMS/[facility])   • Hyperphosphatemia   • DM type 2 (diabetes mellitus, type 2) (CMS/[facility])   • Diarrhea       Assessment:  1. Diarrhea: Acute onset possibilities include infectious source, medication related, or other etiology.  2. End-stage renal disease  3. Diabetes mellitus    Plan:  · Routine work-up for diarrhea to include stool studies  · Can consider more formal evaluation pending those results      I discussed the patients findings and my recommendations with patient and nursing staff.    Casimiro Perkins MD

## 2020-03-13 NOTE — H&P
Patient Name:  Sunni Mcneil  YOB: 1975  MRN:  [id number]  Admit Date:  3/13/2020  Patient Care Team:  Provider, No Known as PCP - Bo Hummel MD as Consulting Physician (Nephrology)  Damon Rooney MD as Surgeon (General Surgery)      Subjective   History Present Illness     Chief Complaint   Patient presents with   • Diarrhea         Ms. Mcneil is a 44 y.o. non-smoker with a history of hyper parathyroidism, ESRD on peritoneal dialysis daily, status post AV fistula right upper extremity, hypertension, acute rejection of kidney transplant who admitted for diarrhea.    Patient for a history of kidney transplant with rejection, followed by Dr. Gonzales, initiated daily peritoneal dialysis June 2019 per nephrology instruction; currently works at [employer] and lives with  -- reportedly developed nonbloody diarrhea (unspecified /unknown occurrences) approximately 2 days ago; therefore, went to [facility] ER for further evaluation.    Patient denies recent exposure, fever, chills, nausea, vomiting, abdominal pain, recent antibiotic therapy, or urinary complaints.    In ER, noted mild grade temperature 99.3, elevated /90, mild tachycardia; otherwise, unremarkable vital signs.  Lactate 1.1, anion gap 19, bicarb 22, serum calcium 5.5, procalcitonin 0.38, WBC 13.4, neutrophil percent 79.5, hemoglobin 8.8, urinalysis cloudy appearance, small blood, trace leukocyte, WBC 21-30, bacteria 4+, BC x2 pending results, urine culture pending, and chest x-ray unremarkable.    GI consult for diarrhea, nephrology consult for ESRD, and empiric antibiotic therapy ceftriaxone initiated on 3/13/2020.    Further recommendations per hospital course.  See additional details below in assessment/plan.    History of Present Illness  Review of Systems   Constitutional: Negative for chills and fever.   HENT: Negative for congestion and rhinorrhea.    Respiratory: Negative for cough and  shortness of breath.    Cardiovascular: Negative for chest pain and leg swelling.   Gastrointestinal: Positive for diarrhea (non-bloody). Negative for abdominal distention, abdominal pain, nausea and vomiting.   Endocrine: Negative for polydipsia, polyphagia and polyuria.   Genitourinary: Negative for difficulty urinating and dysuria.   Musculoskeletal: Negative for back pain and neck pain.   Skin: Negative for rash and wound.   Neurological: Negative for dizziness and weakness.   Psychiatric/Behavioral: Negative for confusion and hallucinations.        Personal History     Past Medical History:   Diagnosis Date   • Acid reflux    • Anemia    • Arthritis    • Diabetes mellitus (CMS/HCC)    • ESRD on dialysis (CMS/HCC)    • History of peritoneal dialysis     KIDNEY TRANSPLANT SEPT 2016 CURRENTLY DOING PERITONEAL DIALYSIS.   • History of transfusion     BEEN A WHILE   • Hypercalcemia    • Hyperparathyroidism (CMS/HCC)    • Hypertension    • Kidney disease     End-stage renal disease. TRANSPLANT   • Kidney transplant recipient 09/02/2016    RIGHT KIDNEY   • Peritoneal dialysis catheter in situ (CMS/HCC)     LEFT ABDOMEN   • Seasonal allergies     CURRENTLY      Past Surgical History:   Procedure Laterality Date   • ARTERIOVENOUS FISTULA/SHUNT SURGERY Right 2/26/2020    Procedure: RIGHT ARM ARTERIAL VENOUS FISTULA;  Surgeon: Elijah Herrera MD;  Location: [facility];  Service: Vascular;  Laterality: Right;   • INSERTION PERITONEAL DIALYSIS CATHETER  07/29/2014    Laparoscopic placement of Curl Cath peritoneal dialysis catheter, Dr. Vinod Goldstein   • INSERTION PERITONEAL DIALYSIS CATHETER N/A 7/24/2019    Procedure: LAPAROSCOPIC INSERTION PERITONEAL DIALYSIS CATHETER;  Surgeon: Damon Rooney MD;  Location: [facility];  Service: General   • REMOVAL PERITONEAL DIALYSIS CATHETER N/A 10/17/2016    Procedure: REMOVAL PERITONEAL DIALYSIS CATHETER;  Surgeon: Damon Rooney MD;  Location:   [facility];  Service:    • TRANSPLANTATION RENAL Right 2016    from  donor   • TUBAL ABDOMINAL LIGATION Bilateral      Family History   Problem Relation Age of Onset   • Malig Hyperthermia Neg Hx      Social History     Tobacco Use   • Smoking status: Never Smoker   • Smokeless tobacco: Never Used   Substance Use Topics   • Alcohol use: No   • Drug use: No     No current facility-administered medications on file prior to encounter.      Current Outpatient Medications on File Prior to Encounter   Medication Sig Dispense Refill   • calcium acetate (PHOSLO) 667 MG capsule Take 2,001 mg by mouth 3 (Three) Times a Day With Meals.     • carvedilol (COREG) 12.5 MG tablet Take 12.5 mg by mouth 2 (Two) Times a Day With Meals.     • CloNIDine (CATAPRES) 0.3 MG tablet Take 0.3 mg by mouth 3 (Three) Times a Day.     • doxylamine-pyridoxine (DICLEGIS) 10-10 MG tablet delayed-release EC tablet Take 1 tablet by mouth 3 (Three) Times a Day As Needed (nausea). 90 tablet 0   • famotidine (PEPCID) 20 MG tablet Take 1 tablet by mouth Daily. 30 tablet 0   • HYDROcodone-acetaminophen (NORCO)  MG per tablet Take 1 tablet by mouth Every 6 (Six) Hours As Needed for Moderate Pain . 12 tablet 0   • minoxidil (LONITEN) 10 MG tablet Take 10 mg by mouth Daily.     • ondansetron (ZOFRAN) 4 MG tablet Take 1 tablet by mouth Every 6 (Six) Hours As Needed for Nausea or Vomiting. 10 tablet 0   • polyethylene glycol (MIRALAX) packet Take 17 g by mouth Daily. (Patient taking differently: Take 17 g by mouth Daily As Needed.) 30 each 2   • CHLORHEXIDINE GLUCONATE CLOTH EX Apply 1 application topically 2 (Two) Times a Day. AS DIRECTED PREOP        No Known Allergies    Objective    Objective     Vital Signs  Temp:  [98.3 °F (36.8 °C)-99.3 °F (37.4 °C)] 98.3 °F (36.8 °C)  Heart Rate:  [] 93  Resp:  [12-18] 12  BP: (163-185)/() 185/90  SpO2:  [98 %-100 %] 100 %  on   ;   Device (Oxygen Therapy): room air  Body mass index is  33.2 kg/m².    Physical Exam    Results Review:  I reviewed the patient's new clinical results.  I reviewed the patient's new imaging results and agree with the interpretation.  I reviewed the patient's other test results and agree with the interpretation  I personally viewed and interpreted the patient's EKG/Telemetry data  Discussed with ED provider.    Lab Results (last 24 hours)     Procedure Component Value Units Date/Time    CBC & Differential [[id number]] Collected:  03/13/20 0819    Specimen:  Blood Updated:  03/13/20 0830    Narrative:       The following orders were created for panel order CBC & Differential.  Procedure                               Abnormality         Status                     ---------                               -----------         ------                     CBC Auto Differential[[id number]]        Abnormal            Final result                 Please view results for these tests on the individual orders.    Comprehensive Metabolic Panel [[id number]]  (Abnormal) Collected:  03/13/20 0819    Specimen:  Blood Updated:  03/13/20 0859     Glucose 94 mg/dL      BUN 46 mg/dL      Creatinine 8.01 mg/dL      Sodium 140 mmol/L      Potassium 3.8 mmol/L      Chloride 99 mmol/L      CO2 22.0 mmol/L      Calcium 5.5 mg/dL      Total Protein 7.6 g/dL      Albumin 3.10 g/dL      ALT (SGPT) <5 U/L      AST (SGOT) 17 U/L      Comment: Specimen hemolyzed.  Results may be affected.        Alkaline Phosphatase 57 U/L      Total Bilirubin 0.3 mg/dL      eGFR Non African Amer 5 mL/min/1.73      Comment: <15 Indicative of kidney failure.        eGFR   Amer --     Comment: <15 Indicative of kidney failure.        Globulin 4.5 gm/dL      A/G Ratio 0.7 g/dL      BUN/Creatinine Ratio 5.7     Anion Gap 19.0 mmol/L     Narrative:       GFR Normal >60  Chronic Kidney Disease <60  Kidney Failure <15      Magnesium [[id number]]  (Normal) Collected:  03/13/20 0819    Specimen:  Blood Updated:  03/13/20 0853     " Magnesium 2.2 mg/dL     CBC Auto Differential [[id number]]  (Abnormal) Collected:  03/13/20 0819    Specimen:  Blood Updated:  03/13/20 0830     WBC 13.44 10*3/mm3      RBC 3.06 10*6/mm3      Hemoglobin 8.8 g/dL      Hematocrit 27.6 %      MCV 90.2 fL      MCH 28.8 pg      MCHC 31.9 g/dL      RDW 16.1 %      RDW-SD 53.1 fl      MPV 8.8 fL      Platelets 461 10*3/mm3      Neutrophil % 79.5 %      Lymphocyte % 11.9 %      Monocyte % 3.8 %      Eosinophil % 2.6 %      Basophil % 0.4 %      Immature Grans % 1.8 %      Neutrophils, Absolute 10.68 10*3/mm3      Lymphocytes, Absolute 1.60 10*3/mm3      Monocytes, Absolute 0.51 10*3/mm3      Eosinophils, Absolute 0.35 10*3/mm3      Basophils, Absolute 0.06 10*3/mm3      Immature Grans, Absolute 0.24 10*3/mm3      nRBC 0.0 /100 WBC     Procalcitonin [[id number]]  (Abnormal) Collected:  03/13/20 0819    Specimen:  Blood Updated:  03/13/20 0934     Procalcitonin 0.38 ng/mL     Narrative:       As a Marker for Sepsis (Non-Neonates):   1. <0.5 ng/mL represents a low risk of severe sepsis and/or septic shock.  1. >2 ng/mL represents a high risk of severe sepsis and/or septic shock.    As a Marker for Lower Respiratory Tract Infections that require antibiotic therapy:  PCT on Admission     Antibiotic Therapy             6-12 Hrs later  > 0.5                Strongly Recommended            >0.25 - <0.5         Recommended  0.1 - 0.25           Discouraged                   Remeasure/reassess PCT  <0.1                 Strongly Discouraged          Remeasure/reassess PCT      As 28 day mortality risk marker: \"Change in Procalcitonin Result\" (> 80 % or <=80 %) if Day 0 (or Day 1) and Day 4 values are available. Refer to http://[email or web address]-pct-calculator.com/   Change in PCT <=80 %   A decrease of PCT levels below or equal to 80 % defines a positive change in PCT test result representing a higher risk for 28-day all-cause mortality of patients diagnosed with severe sepsis or septic " shock.  Change in PCT > 80 %   A decrease of PCT levels of more than 80 % defines a negative change in PCT result representing a lower risk for 28-day all-cause mortality of patients diagnosed with severe sepsis or septic shock.                Results may be falsely decreased if patient taking Biotin.     Blood Culture - Blood, Arm, Left [[id number]] Collected:  03/13/20 0900    Specimen:  Blood from Arm, Left Updated:  03/13/20 0956    Urinalysis With Microscopic If Indicated (No Culture) - Urine, Clean Catch [[id number]]  (Abnormal) Collected:  03/13/20 0926    Specimen:  Urine, Clean Catch Updated:  03/13/20 1015     Color, UA Yellow     Appearance, UA Cloudy     pH, UA 6.0     Specific Gravity, UA 1.012     Glucose, UA Negative     Ketones, UA Negative     Bilirubin, UA Negative     Blood, UA Small (1+)     Protein,  mg/dL (2+)     Leuk Esterase, UA Trace     Nitrite, UA Negative     Urobilinogen, UA 0.2 E.U./dL    Urinalysis, Microscopic Only - Urine, Clean Catch [[id number]]  (Abnormal) Collected:  03/13/20 0926    Specimen:  Urine, Clean Catch Updated:  03/13/20 1023     RBC, UA 0-2 /HPF      WBC, UA 21-30 /HPF      Bacteria, UA 4+ /HPF      Squamous Epithelial Cells, UA 31-50 /HPF      Hyaline Casts, UA 3-6 /LPF      Methodology Automated Microscopy    Lactic Acid, Plasma [[id number]]  (Normal) Collected:  03/13/20 0948    Specimen:  Blood Updated:  03/13/20 1013     Lactate 1.1 mmol/L     Blood Culture - Blood, Arm, Left [[id number]] Collected:  03/13/20 0949    Specimen:  Blood from Arm, Left Updated:  03/13/20 0956          Imaging Results (Last 24 Hours)     Procedure Component Value Units Date/Time    XR Chest 2 View [[id number]] Collected:  03/13/20 1008     Updated:  03/13/20 1008    Narrative:       2 VIEWS OF THE CHEST     HISTORY: 44-year-old female with a history of diarrhea and leukocytosis.     FINDINGS: PA and lateral chest radiograph were obtained. Comparison is  made to chest radiograph dated  11/10/2019. The lungs are normal in  volume and are clear. The cardiomediastinal silhouette and pulmonary  vascular appear normal.       Impression:       Negative chest radiographs.                  No orders to display        Assessment/Plan     Active Hospital Problems    Diagnosis  POA   • **Diarrhea [R19.7]  Yes   • Acute UTI (urinary tract infection) [N39.0]  Yes   • Obesity (BMI 30-39.9) [E66.9]  Yes   • A-V fistula (CMS/[facility]), right upper extremity [I77.0]  Yes   • Acute cystitis with hematuria [N30.01]  Yes   • Anemia, chronic disease [D63.8]  Yes   • DM type 2 (diabetes mellitus, type 2) (CMS/[facility]) [E11.9]  Yes   • Leukocytosis [D72.829]  Yes   • Hypocalcemia [E83.51]  Yes   • Peritoneal dialysis catheter in place (CMS/[facility]) [Z99.2]  Not Applicable   • ESRD on hemodialysis (CMS/[facility]) [N18.6, Z99.2]  Not Applicable   • Hypertension [I10]  Yes      Resolved Hospital Problems   No resolved problems to display.       Ms. Mcneil is a 44 y.o. non-smoker with a history of hyper parathyroidism, ESRD on peritoneal dialysis daily, status post AV fistula right upper extremity, hypertension, acute rejection of kidney transplant who admitted for diarrhea.      Diarrhea   GI PCR pending results   Consult GI   No clinical concerns for SBP   Ordering KUB      Hypertension   Continue home carvedilol 12.5 mg p.o. twice daily, clonidine 0.3 mg 3 times daily, minoxidil 10 mg p.o. Daily   Continue to monitor for changes in BP      ESRD on hemodialysis (CMS/[facility]) / Peritoneal dialysis catheter in place (CMS/[facility]) / hypocalcemia   Consult nephrology   Peritoneal dialysis catheter unremarkable for infection   Status post AV fistula right upper extremity   A.m. Labs      DM type 2 (diabetes mellitus, type 2) (CMS/[facility])   Correctional lispro SS low dose   Continue to monitor glucoscan AC/HS    ·   Anemia of chronic disease   Likely 2/2 ESRD vs hematuria vs both vs other   A.m. Labs      Acute UTI (urinary tract infection) / Leukocytosis /  acute cystitis with hematuria   Ordering empiric anbx ceftriaxone x3 days   Ordering urine culture -- pending results       Obesity (BMI 30-39.9)   Monitor weight changes   Daily weight    · I discussed the patient's findings and my recommendations with Dr. Benz    VTE Prophylaxis - SCD  Code Status - CPR       JERONIMO Shepard  [facility]  03/13/20  12:31

## 2020-03-13 NOTE — ED PROVIDER NOTES
EMERGENCY DEPARTMENT ENCOUNTER    Room Number:  33/33  Date of encounter:  3/13/2020  PCP: Provider, No Known  Historian: Pt      HPI:  Chief Complaint: diarrhea  A complete HPI/ROS/PMH/PSH/SH/FH are unobtainable due to: none  Context: Sunni Mcneil is a 44 y.o. female who presents to the ED c/o frequent runny diarrhea that began two days ago. Pt reports associated nausea and decreased PO intake but denies any vomiting, black or bloody stools, abdominal pain, fever, chills, or cough. Pt denies a hx of diarrhea in the past. She denies any recent ill contacts. She denies any recent abx usage. Pt reports a PMHx of ESRD and is on PD. Pt still makes urine. Pt denies missing any treatments of her PD and states her dialysate has been clear. 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 She is not on any immunosuppressants. Pt sees Dr Gonzales (Nephrology).     Previous Episodes: none  Current Symptoms: diarrhea, nausea    MEDICAL HISTORY REVIEWED  Pt was seen here in January with depression and suicidal ideation. There was some sort of record on the pt on March 2nd but I don't see anything written about her at that time.     PAST MEDICAL HISTORY  Active Ambulatory Problems     Diagnosis Date Noted   • Hyperparathyroidism (CMS/[facility]) 01/24/2018   • Acute rejection of kidney transplant 05/10/2017   • ARF (acute renal failure) (CMS/[facility]) 04/04/2014   • Hx of kidney transplant 07/06/2017   • Hypertension 01/24/2018   • Kidney transplant status, cadaveric 07/06/2017   • Nephritis 04/13/2014   • Hypercalcemia 01/24/2018   • ESRD on hemodialysis (CMS/[facility]) 07/02/2019   • Peritoneal dialysis catheter in place (CMS/[facility]) 07/24/2019   • Prolonged QT interval 11/07/2019   • ESRD (end stage renal disease) (CMS/[facility]) 11/07/2019   • Hyperphosphatemia 11/07/2019   • DM type 2 (diabetes mellitus, type 2) (CMS/[facility]) 11/07/2019     Resolved Ambulatory Problems     Diagnosis Date Noted   • Hypokalemia 11/07/2019   • Leukocytosis 11/07/2019    • Nausea 2019   • Hypocalcemia 2019     Past Medical History:   Diagnosis Date   • Acid reflux    • Anemia    • Arthritis    • Diabetes mellitus (CMS/[facility])    • ESRD on dialysis (CMS/[facility])    • History of peritoneal dialysis    • History of transfusion    • Kidney disease    • Kidney transplant recipient 2016   • Peritoneal dialysis catheter in situ (CMS/[facility])    • Seasonal allergies          PAST SURGICAL HISTORY  Past Surgical History:   Procedure Laterality Date   • ARTERIOVENOUS FISTULA/SHUNT SURGERY Right 2020    Procedure: RIGHT ARM ARTERIAL VENOUS FISTULA;  Surgeon: Elijah Herrera MD;  Location: [facility] MAIN OR;  Service: Vascular;  Laterality: Right;   • INSERTION PERITONEAL DIALYSIS CATHETER  2014    Laparoscopic placement of Curl Cath peritoneal dialysis catheter, Dr. Vinod Goldstein   • INSERTION PERITONEAL DIALYSIS CATHETER N/A 2019    Procedure: LAPAROSCOPIC INSERTION PERITONEAL DIALYSIS CATHETER;  Surgeon: Damon Rooney MD;  Location: [facility] MAIN OR;  Service: General   • REMOVAL PERITONEAL DIALYSIS CATHETER N/A 10/17/2016    Procedure: REMOVAL PERITONEAL DIALYSIS CATHETER;  Surgeon: Damon Rooney MD;  Location: [facility] OR;  Service:    • TRANSPLANTATION RENAL Right 2016    from  donor   • TUBAL ABDOMINAL LIGATION Bilateral          FAMILY HISTORY  Family History   Problem Relation Age of Onset   • Malig Hyperthermia Neg Hx          SOCIAL HISTORY  Social History     Socioeconomic History   • Marital status:      Spouse name: Not on file   • Number of children: 2   • Years of education: 12   • Highest education level: Not on file   Occupational History     Employer: [employer]   Tobacco Use   • Smoking status: Never Smoker   • Smokeless tobacco: Never Used   Substance and Sexual Activity   • Alcohol use: No   • Drug use: No   • Sexual activity: Defer         ALLERGIES  Patient has no known allergies.        REVIEW OF  SYSTEMS  Review of Systems     All systems reviewed and negative except for those discussed in HPI.       PHYSICAL EXAM    I have reviewed the triage vital signs and nursing notes.    ED Triage Vitals   Temp Heart Rate Resp BP SpO2   03/13/20 0804 03/13/20 0803 03/13/20 0803 -- 03/13/20 0803   99.3 °F (37.4 °C) 106 18  100 %      Temp src Heart Rate Source Patient Position BP Location FiO2 (%)   03/13/20 0804 03/13/20 0803 -- -- --   Tympanic Monitor          GENERAL: appears weak and tired, chronically ill-appearing, no acute distress   HENT: nares patent  Head/neck/ face are symmetric without gross deformity, signs of trauma, or swelling  EYES: no scleral icterus, no conjunctival pallor.  NECK: Supple, no meningismus  CV: regular rhythm, mildly tachycardic with rate in the low 100s. intact distal pulses. Soft systolic murmur  RESPIRATORY: normal effort and no respiratory distress, CTAB. O2 sats 100% on RA  ABDOMEN: soft and non-tender, PD catheter in place on left side of abdomen. Dialysate fluid is clear  MUSCULOSKELETAL: no deformity  NEURO: alert and appropriate, moves all extremities, follows commands  SKIN: warm, dry, patient does have an AV fistula with good thrill.  No pain or signs of infection    Vital signs and nursing notes reviewed.      LAB RESULTS  Recent Results (from the past 24 hour(s))   Comprehensive Metabolic Panel    Collection Time: 03/13/20  8:19 AM   Result Value Ref Range    Glucose 94 65 - 99 mg/dL    BUN 46 (H) 6 - 20 mg/dL    Creatinine 8.01 (H) 0.57 - 1.00 mg/dL    Sodium 140 136 - 145 mmol/L    Potassium 3.8 3.5 - 5.2 mmol/L    Chloride 99 98 - 107 mmol/L    CO2 22.0 22.0 - 29.0 mmol/L    Calcium 5.5 (C) 8.6 - 10.5 mg/dL    Total Protein 7.6 6.0 - 8.5 g/dL    Albumin 3.10 (L) 3.50 - 5.20 g/dL    ALT (SGPT) <5 1 - 33 U/L    AST (SGOT) 17 1 - 32 U/L    Alkaline Phosphatase 57 39 - 117 U/L    Total Bilirubin 0.3 0.2 - 1.2 mg/dL    eGFR Non African Amer 5 (L) >60 mL/min/1.73    eGFR   African Amer      Globulin 4.5 gm/dL    A/G Ratio 0.7 g/dL    BUN/Creatinine Ratio 5.7 (L) 7.0 - 25.0    Anion Gap 19.0 (H) 5.0 - 15.0 mmol/L   Magnesium    Collection Time: 03/13/20  8:19 AM   Result Value Ref Range    Magnesium 2.2 1.6 - 2.6 mg/dL   CBC Auto Differential    Collection Time: 03/13/20  8:19 AM   Result Value Ref Range    WBC 13.44 (H) 3.40 - 10.80 10*3/mm3    RBC 3.06 (L) 3.77 - 5.28 10*6/mm3    Hemoglobin 8.8 (L) 12.0 - 15.9 g/dL    Hematocrit 27.6 (L) 34.0 - 46.6 %    MCV 90.2 79.0 - 97.0 fL    MCH 28.8 26.6 - 33.0 pg    MCHC 31.9 31.5 - 35.7 g/dL    RDW 16.1 (H) 12.3 - 15.4 %    RDW-SD 53.1 37.0 - 54.0 fl    MPV 8.8 6.0 - 12.0 fL    Platelets 461 (H) 140 - 450 10*3/mm3    Neutrophil % 79.5 (H) 42.7 - 76.0 %    Lymphocyte % 11.9 (L) 19.6 - 45.3 %    Monocyte % 3.8 (L) 5.0 - 12.0 %    Eosinophil % 2.6 0.3 - 6.2 %    Basophil % 0.4 0.0 - 1.5 %    Immature Grans % 1.8 (H) 0.0 - 0.5 %    Neutrophils, Absolute 10.68 (H) 1.70 - 7.00 10*3/mm3    Lymphocytes, Absolute 1.60 0.70 - 3.10 10*3/mm3    Monocytes, Absolute 0.51 0.10 - 0.90 10*3/mm3    Eosinophils, Absolute 0.35 0.00 - 0.40 10*3/mm3    Basophils, Absolute 0.06 0.00 - 0.20 10*3/mm3    Immature Grans, Absolute 0.24 (H) 0.00 - 0.05 10*3/mm3    nRBC 0.0 0.0 - 0.2 /100 WBC   Procalcitonin    Collection Time: 03/13/20  8:19 AM   Result Value Ref Range    Procalcitonin 0.38 (H) 0.10 - 0.25 ng/mL   Urinalysis With Microscopic If Indicated (No Culture) - Urine, Clean Catch    Collection Time: 03/13/20  9:26 AM   Result Value Ref Range    Color, UA Yellow Yellow, Straw    Appearance, UA Cloudy (A) Clear    pH, UA 6.0 5.0 - 8.0    Specific Gravity, UA 1.012 1.005 - 1.030    Glucose, UA Negative Negative    Ketones, UA Negative Negative    Bilirubin, UA Negative Negative    Blood, UA Small (1+) (A) Negative    Protein,  mg/dL (2+) (A) Negative    Leuk Esterase, UA Trace (A) Negative    Nitrite, UA Negative Negative    Urobilinogen, UA 0.2 E.U./dL 0.2 -  1.0 E.U./dL   Lactic Acid, Plasma    Collection Time: 03/13/20  9:48 AM   Result Value Ref Range    Lactate 1.1 0.5 - 2.0 mmol/L       Ordered the above labs and independently reviewed the results.        RADIOLOGY  Xr Chest 2 View    Result Date: 3/13/2020  2 VIEWS OF THE CHEST  HISTORY: 44-year-old female with a history of diarrhea and leukocytosis.  FINDINGS: PA and lateral chest radiograph were obtained. Comparison is made to chest radiograph dated 11/10/2019. The lungs are normal in volume and are clear. The cardiomediastinal silhouette and pulmonary vascular appear normal.      Negative chest radiographs.        I ordered the above noted radiological studies. Reviewed by me and discussed with radiologist.  See dictation for official radiology interpretation.      PROCEDURES    Procedures      MEDICATIONS GIVEN IN ER    Medications   sodium chloride 0.9 % infusion (125 mL/hr Intravenous New Bag 3/13/20 0980)   sodium chloride 0.9 % flush 10 mL (has no administration in time range)   sodium chloride 0.9 % bolus 500 mL (500 mL Intravenous New Bag 3/13/20 0988)         PROGRESS, DATA ANALYSIS, CONSULTS, AND MEDICAL DECISION MAKING        All labs have been independently reviewed by me.  All radiology studies have been reviewed by me and discussed with radiologist dictating the report.   EKG's independently viewed and interpreted by me.  Discussion below represents my analysis of pertinent findings related to patient's condition, differential diagnosis, treatment plan and final disposition.      ED Course as of Mar 13 1018   Fri Mar 13, 2020   0901 I used personal protective gear consistent with mask, goggles, gloves and my evaluation of this patient.    [MM]   0935 Patient's previous hemoglobin from 2 weeks ago 2 over the previous months ranged from 9.5-10.7   Hemoglobin(!): 8.8 [MM]   0937 Patient also has low albumin at 3.1.   Calcium(!!): 5.5 [MM]   0937 Patient is on dialysis peritoneal and this could be a  possible reason why her procalcitonin is elevated a little.   Procalcitonin(!): 0.38 [MM]   0949 I spoke with Dr. High who will admit the patient to the hospital.  I am also can obtain a stool PCR.I spoke with Dr. Gonzales who will come and see the patient shortly.  He will consult.At this time I do not think this patient has a bacterial infection.  Is more consistent more likely she has a viral illness, specifically a viral gastroenteritis.  Her procalcitonin is mildly elevated but that occurs with people have chronic renal failure.  Her calcium is also a little bit low and Dr. Gonzales will correct that.Patient's white blood cell count is little elevated but she has had some elevation in her white blood cell count with the look at her previous white blood cell counts over the past several months.  We will obtain a lactic acid as well as blood cultures.  It would not surprise me if her lactic acid is mildly elevated because of the chronic renal failure.    [MM]      ED Course User Index  [MM] Everardo Gerard MD       0978 Discussed the pt's case and findings with Dr. Benz ([facility]) who agrees to admit the pt.     1010 Pt recheck. Updated pt of test results and plan for admission. Pt understands and agrees with the plan, all concerns addressed.    1015 Dr. Gonzales at bedside.       AS OF 10:18 VITALS:    BP - (!) 178/111  HR - 93  TEMP - 99.3 °F (37.4 °C) (Tympanic)  02 SATS - 98%        DIAGNOSIS  Final diagnoses:   Diarrhea, unspecified type   Hypocalcemia   Chronic anemia   Elevated procalcitonin         DISPOSITION  ADMISSION    Discussed treatment plan and reason for admission with pt/family and admitting physician.  Pt/family voiced understanding of the plan for admission for further testing/treatment as needed.             Documentation assistance provided by alexei Albright for Dr Gerard.  Information recorded by the alexei was done at my direction and has been verified and validated by me.               Isaiah Albright  03/13/20 1019       Everardo Gerard MD  03/13/20 1665

## 2020-03-14 LAB
ALBUMIN SERPL-MCNC: 2.6 G/DL (ref 3.5–5.2)
ANION GAP SERPL CALCULATED.3IONS-SCNC: 16.1 MMOL/L (ref 5–15)
BACTERIA SPEC AEROBE CULT: NORMAL
BASOPHILS # BLD AUTO: 0.06 10*3/MM3 (ref 0–0.2)
BASOPHILS NFR BLD AUTO: 0.4 % (ref 0–1.5)
BUN BLD-MCNC: 45 MG/DL (ref 6–20)
BUN/CREAT SERPL: 6.2 (ref 7–25)
CALCIUM SPEC-SCNC: 5.6 MG/DL (ref 8.6–10.5)
CHLORIDE SERPL-SCNC: 101 MMOL/L (ref 98–107)
CO2 SERPL-SCNC: 21.9 MMOL/L (ref 22–29)
CREAT BLD-MCNC: 7.23 MG/DL (ref 0.57–1)
DEPRECATED RDW RBC AUTO: 51.2 FL (ref 37–54)
EOSINOPHIL # BLD AUTO: 0.16 10*3/MM3 (ref 0–0.4)
EOSINOPHIL NFR BLD AUTO: 1 % (ref 0.3–6.2)
ERYTHROCYTE [DISTWIDTH] IN BLOOD BY AUTOMATED COUNT: 15.9 % (ref 12.3–15.4)
FERRITIN SERPL-MCNC: 1100 NG/ML (ref 13–150)
FOLATE SERPL-MCNC: 3.18 NG/ML (ref 4.78–24.2)
GFR SERPL CREATININE-BSD FRML MDRD: 6 ML/MIN/1.73
GFR SERPL CREATININE-BSD FRML MDRD: ABNORMAL ML/MIN/{1.73_M2}
GLUCOSE BLD-MCNC: 118 MG/DL (ref 65–99)
GLUCOSE BLDC GLUCOMTR-MCNC: 102 MG/DL (ref 70–130)
GLUCOSE BLDC GLUCOMTR-MCNC: 107 MG/DL (ref 70–130)
GLUCOSE BLDC GLUCOMTR-MCNC: 128 MG/DL (ref 70–130)
GLUCOSE BLDC GLUCOMTR-MCNC: 150 MG/DL (ref 70–130)
HCT VFR BLD AUTO: 22.1 % (ref 34–46.6)
HGB BLD-MCNC: 7.3 G/DL (ref 12–15.9)
IMM GRANULOCYTES # BLD AUTO: 0.24 10*3/MM3 (ref 0–0.05)
IMM GRANULOCYTES NFR BLD AUTO: 1.5 % (ref 0–0.5)
IRON 24H UR-MRATE: 25 MCG/DL (ref 37–145)
IRON SATN MFR SERPL: 19 % (ref 20–50)
LYMPHOCYTES # BLD AUTO: 1.26 10*3/MM3 (ref 0.7–3.1)
LYMPHOCYTES NFR BLD AUTO: 8 % (ref 19.6–45.3)
MCH RBC QN AUTO: 29.1 PG (ref 26.6–33)
MCHC RBC AUTO-ENTMCNC: 33 G/DL (ref 31.5–35.7)
MCV RBC AUTO: 88 FL (ref 79–97)
MONOCYTES # BLD AUTO: 0.53 10*3/MM3 (ref 0.1–0.9)
MONOCYTES NFR BLD AUTO: 3.4 % (ref 5–12)
NEUTROPHILS # BLD AUTO: 13.56 10*3/MM3 (ref 1.7–7)
NEUTROPHILS NFR BLD AUTO: 85.7 % (ref 42.7–76)
NRBC BLD AUTO-RTO: 0 /100 WBC (ref 0–0.2)
PHOSPHATE SERPL-MCNC: 5.8 MG/DL (ref 2.5–4.5)
PLATELET # BLD AUTO: 422 10*3/MM3 (ref 140–450)
PMV BLD AUTO: 9 FL (ref 6–12)
POTASSIUM BLD-SCNC: 3.6 MMOL/L (ref 3.5–5.2)
RBC # BLD AUTO: 2.51 10*6/MM3 (ref 3.77–5.28)
SODIUM BLD-SCNC: 139 MMOL/L (ref 136–145)
TIBC SERPL-MCNC: 134 MCG/DL (ref 298–536)
TRANSFERRIN SERPL-MCNC: 90 MG/DL (ref 200–360)
VIT B12 BLD-MCNC: 433 PG/ML (ref 211–946)
WBC NRBC COR # BLD: 15.81 10*3/MM3 (ref 3.4–10.8)

## 2020-03-14 PROCEDURE — 82728 ASSAY OF FERRITIN: CPT | Performed by: INTERNAL MEDICINE

## 2020-03-14 PROCEDURE — 85025 COMPLETE CBC W/AUTO DIFF WBC: CPT | Performed by: INTERNAL MEDICINE

## 2020-03-14 PROCEDURE — 82746 ASSAY OF FOLIC ACID SERUM: CPT | Performed by: INTERNAL MEDICINE

## 2020-03-14 PROCEDURE — 84466 ASSAY OF TRANSFERRIN: CPT | Performed by: INTERNAL MEDICINE

## 2020-03-14 PROCEDURE — 82962 GLUCOSE BLOOD TEST: CPT

## 2020-03-14 PROCEDURE — 25010000002 CEFTRIAXONE PER 250 MG: Performed by: NURSE PRACTITIONER

## 2020-03-14 PROCEDURE — 83540 ASSAY OF IRON: CPT | Performed by: INTERNAL MEDICINE

## 2020-03-14 PROCEDURE — 80069 RENAL FUNCTION PANEL: CPT | Performed by: INTERNAL MEDICINE

## 2020-03-14 PROCEDURE — 25010000002 ONDANSETRON PER 1 MG: Performed by: NURSE PRACTITIONER

## 2020-03-14 PROCEDURE — 82607 VITAMIN B-12: CPT | Performed by: INTERNAL MEDICINE

## 2020-03-14 RX ORDER — CALCIUM CARBONATE 500(1250)
1000 TABLET ORAL 3 TIMES DAILY
Status: DISCONTINUED | OUTPATIENT
Start: 2020-03-14 | End: 2020-03-16 | Stop reason: HOSPADM

## 2020-03-14 RX ORDER — DEXTROSE MONOHYDRATE, SODIUM CHLORIDE, SODIUM LACTATE, CALCIUM CHLORIDE, MAGNESIUM CHLORIDE 1.5; 538; 448; 18.4; 5.08 G/100ML; MG/100ML; MG/100ML; MG/100ML; MG/100ML
2000 SOLUTION INTRAPERITONEAL AS NEEDED
Status: DISCONTINUED | OUTPATIENT
Start: 2020-03-14 | End: 2020-03-16 | Stop reason: HOSPADM

## 2020-03-14 RX ADMIN — FAMOTIDINE 20 MG: 20 TABLET, FILM COATED ORAL at 09:32

## 2020-03-14 RX ADMIN — CALCIUM 1000 MG: 500 TABLET ORAL at 19:33

## 2020-03-14 RX ADMIN — CLONIDINE HYDROCHLORIDE 0.3 MG: 0.1 TABLET ORAL at 19:33

## 2020-03-14 RX ADMIN — MINOXIDIL 10 MG: 10 TABLET ORAL at 09:32

## 2020-03-14 RX ADMIN — CARVEDILOL 12.5 MG: 12.5 TABLET, FILM COATED ORAL at 19:35

## 2020-03-14 RX ADMIN — SODIUM CHLORIDE, PRESERVATIVE FREE 10 ML: 5 INJECTION INTRAVENOUS at 20:07

## 2020-03-14 RX ADMIN — DEXTROSE MONOHYDRATE, SODIUM CHLORIDE, SODIUM LACTATE, CALCIUM CHLORIDE, MAGNESIUM CHLORIDE 2000 ML: 2.5; 538; 448; 18.4; 5.08 SOLUTION INTRAPERITONEAL at 04:19

## 2020-03-14 RX ADMIN — SODIUM CHLORIDE, PRESERVATIVE FREE 10 ML: 5 INJECTION INTRAVENOUS at 09:34

## 2020-03-14 RX ADMIN — CALCIUM ACETATE 2001 MG: 667 CAPSULE ORAL at 09:32

## 2020-03-14 RX ADMIN — CALCIUM 1000 MG: 500 TABLET ORAL at 13:22

## 2020-03-14 RX ADMIN — CLONIDINE HYDROCHLORIDE 0.3 MG: 0.1 TABLET ORAL at 09:32

## 2020-03-14 RX ADMIN — CEFTRIAXONE SODIUM 1 G: 1 INJECTION, SOLUTION INTRAVENOUS at 13:22

## 2020-03-14 RX ADMIN — CALCIUM ACETATE 2001 MG: 667 CAPSULE ORAL at 19:33

## 2020-03-14 RX ADMIN — CALCITRIOL 1 MCG: 0.25 CAPSULE ORAL at 09:32

## 2020-03-14 RX ADMIN — CARVEDILOL 12.5 MG: 12.5 TABLET, FILM COATED ORAL at 09:33

## 2020-03-14 RX ADMIN — ONDANSETRON 4 MG: 2 INJECTION INTRAMUSCULAR; INTRAVENOUS at 09:34

## 2020-03-14 RX ADMIN — CALCIUM ACETATE 2001 MG: 667 CAPSULE ORAL at 13:22

## 2020-03-14 RX ADMIN — CALCITRIOL 1 MCG: 0.25 CAPSULE ORAL at 20:00

## 2020-03-14 RX ADMIN — ACETAMINOPHEN 650 MG: 325 TABLET, FILM COATED ORAL at 23:29

## 2020-03-14 NOTE — PROGRESS NOTES
"   LOS: 1 day    Patient Care Team:  Provider, No Known as PCP - Bo Hummel MD as Consulting Physician (Nephrology)  Damon Rooney MD as Surgeon (General Surgery)    Chief Complaint:    Chief Complaint   Patient presents with   • Diarrhea     Follow-up ESRD and hypocalcemia  Subjective     Interval History:   Patient is feeling better, less nauseous, remains severely hypocalcemic, denies any chest pain or shortness of air, no orthopnea.  Tolerating her PD without any problems.  Denies numbness or tingling      Review of Systems:   As noted above    Objective     Vital Signs  Temp:  [98.3 °F (36.8 °C)-99.4 °F (37.4 °C)] 98.3 °F (36.8 °C)  Heart Rate:  [] 96  Resp:  [16-20] 18  BP: ()/() 98/69    Flowsheet Rows      First Filed Value   Admission Height  152.4 cm (60\") Documented at 03/13/2020 0803   Admission Weight  77.1 kg (170 lb) Documented at 03/13/2020 0817          I/O this shift:  In: 1600 [Other:1600]  Out: 2300 [Other:2300]  I/O last 3 completed shifts:  In: 5620 [P.O.:120; I.V.:1000; Other:4500]  Out: 4200 [Other:4200]    Intake/Output Summary (Last 24 hours) at 3/14/2020 1241  Last data filed at 3/14/2020 1016  Gross per 24 hour   Intake 7220 ml   Output 6500 ml   Net 720 ml       Physical Exam:  General Appearance: alert, oriented x 3, no acute distress, appears to be chronically  Skin: warm and dry  HEENT: pupils round and reactive to light, oral mucosa normal, nonicteric sclera  Neck: supple, no JVD, trachea midline  Lungs: CTA, unlabored breathing effort  Heart: RRR, normal S1 and S2, no S3, no rub  Abdomen: soft, non-tender,  present bowel sounds to auscultation, can be catheter in place, exit site is clean with no tunnel tenderness  : no palpable bladder,  Extremities: no edema, cyanosis or clubbing, functional AV fistula in the right upper arm  Neuro: normal speech and mental status      Results Review:    Results from last 7 days   Lab Units " 03/14/20  0512 03/13/20  0819   SODIUM mmol/L 139 140   POTASSIUM mmol/L 3.6 3.8   CHLORIDE mmol/L 101 99   CO2 mmol/L 21.9* 22.0   BUN mg/dL 45* 46*   CREATININE mg/dL 7.23* 8.01*   CALCIUM mg/dL 5.6* 5.5*   BILIRUBIN mg/dL  --  0.3   ALK PHOS U/L  --  57   ALT (SGPT) U/L  --  <5   AST (SGOT) U/L  --  17   GLUCOSE mg/dL 118* 94       Estimated Creatinine Clearance: 9.4 mL/min (A) (by C-G formula based on SCr of 7.23 mg/dL (H)).    Results from last 7 days   Lab Units 03/14/20 0512 03/13/20 0819   MAGNESIUM mg/dL  --  2.2   PHOSPHORUS mg/dL 5.8*  --              Results from last 7 days   Lab Units 03/14/20 0512 03/13/20 0819   WBC 10*3/mm3 15.81* 13.44*   HEMOGLOBIN g/dL 7.3* 8.8*   PLATELETS 10*3/mm3 422 461*               Imaging Results (Last 24 Hours)     Procedure Component Value Units Date/Time    XR Chest 2 View [[id number]] Collected:  03/13/20 1008     Updated:  03/13/20 1615    Narrative:       2 VIEWS OF THE CHEST     HISTORY: 44-year-old female with a history of diarrhea and leukocytosis.     FINDINGS: PA and lateral chest radiograph were obtained. Comparison is  made to chest radiograph dated 11/10/2019. The lungs are normal in  volume and are clear. The cardiomediastinal silhouette and pulmonary  vascular appear normal.       Impression:       Negative chest radiographs.     This report was finalized on 3/13/2020 4:12 PM by Dr. Kofi Oviedo M.D.       XR Abdomen KUB [[id number]] Collected:  03/13/20 1445     Updated:  03/13/20 1450    Narrative:       XR ABDOMEN KUB-     INDICATIONS: Diarrhea, peritoneal dialysis catheter     TECHNIQUE: Supine view of the abdomen     COMPARISON: 11/18/2019     FINDINGS:     A peritoneal dialysis catheter extends to the pelvis. The bowel gas  pattern is nonspecific. Mild gaseous distention of the stomach. Gas is  seen in small bowel and in the large bowel. A loop of small bowel in the  left aspect of the upper pelvis/lower abdomen shows borderline caliber.  No  supine evidence for free intraperitoneal gas. No definite  nephrolithiasis. If there is further clinical concern, CT can be  obtained for further examination.       Impression:          As described.     This report was finalized on 3/13/2020 2:47 PM by Dr. David Montoya M.D.             calcitriol 1 mcg Oral Q12H   calcitriol 1 mcg Oral Once   calcium acetate 2,001 mg Oral TID With Meals   calcium carbonate (oyster shell) 1,000 mg Oral TID   carvedilol 12.5 mg Oral BID With Meals   cefTRIAXone 1 g Intravenous Q24H   cloNIDine 0.3 mg Oral TID   famotidine 20 mg Oral Daily   insulin lispro 0-7 Units Subcutaneous 4x Daily With Meals & Nightly   minoxidil 10 mg Oral Daily   minoxidil 10 mg Oral Once   sodium chloride 10 mL Intravenous Q12H       sodium chloride 50 mL/hr Last Rate: 50 mL/hr (03/13/20 2032)       Medication Review:   Current Facility-Administered Medications   Medication Dose Route Frequency Provider Last Rate Last Dose   • acetaminophen (TYLENOL) tablet 650 mg  650 mg Oral Q4H PRN Charlie Braxton APRN       • calcitriol (ROCALTROL) capsule 1 mcg  1 mcg Oral Q12H Carlos Manuel Gonzales MD   1 mcg at 03/14/20 0932   • calcitriol (ROCALTROL) capsule 1 mcg  1 mcg Oral Once Carlos Manuel Gonzales MD       • calcium acetate (PHOSLO) capsule 2,001 mg  2,001 mg Oral TID With Meals Charlie Braxton APRN   2,001 mg at 03/14/20 0932   • calcium carbonate (oyster shell) tablet 1,000 mg  1,000 mg Oral TID Carlos Manuel Gonzales MD       • carvedilol (COREG) tablet 12.5 mg  12.5 mg Oral BID With Meals Charlie Braxton APRN   12.5 mg at 03/14/20 0933   • cefTRIAXone (ROCEPHIN) IVPB 1 g  1 g Intravenous Q24H Charlie Braxton APRN 100 mL/hr at 03/13/20 1355 1 g at 03/13/20 1355   • cloNIDine (CATAPRES) tablet 0.3 mg  0.3 mg Oral TID Charlie Braxton APRN   0.3 mg at 03/14/20 0932   • DELFLEX-LC/2.5% DEXTROSE (DIANEAL) CAPD 2,000 mL  2,000 mL Intraperitoneal PRN Carlos Manuel Gonzales MD   2,000 mL at 03/14/20 3387    • dextrose (D50W) 25 g/ 50mL Intravenous Solution 25 g  25 g Intravenous Q15 Min PRN Charlie Braxton APRN       • dextrose (GLUTOSE) oral gel 15 g  15 g Oral Q15 Min PRN Charlie Braxton APRN       • docusate sodium (COLACE) capsule 100 mg  100 mg Oral BID PRN Charlie Braxton APRN       • doxylamine-pyridoxine (DICLEGIS) EC tablet 1 tablet  1 tablet Oral TID PRN Charlie Braxton APRN       • famotidine (PEPCID) tablet 20 mg  20 mg Oral Daily Charlie Braxton APRN   20 mg at 03/14/20 0932   • glucagon (human recombinant) (GLUCAGEN DIAGNOSTIC) injection 1 mg  1 mg Subcutaneous Q15 Min PRN Charlie Braxton APRN       • HYDROcodone-acetaminophen (NORCO)  MG per tablet 1 tablet  1 tablet Oral Q6H PRN Charlie Braxton APRN       • insulin lispro (humaLOG) injection 0-7 Units  0-7 Units Subcutaneous 4x Daily With Meals & Nightly Charlie Braxton APRN       • minoxidil (LONITEN) tablet 10 mg  10 mg Oral Daily Charlie Braxton APRN   10 mg at 03/14/20 0932   • minoxidil (LONITEN) tablet 10 mg  10 mg Oral Once Carlos Manuel Gonzales MD       • ondansetron (ZOFRAN) tablet 4 mg  4 mg Oral Q6H PRN Charlie Braxton APRN        Or   • ondansetron (ZOFRAN) injection 4 mg  4 mg Intravenous Q6H PRN Charlie Braxton APRN   4 mg at 03/14/20 0934   • sodium chloride 0.9 % flush 10 mL  10 mL Intravenous PRN Everardo Gerard MD       • sodium chloride 0.9 % flush 10 mL  10 mL Intravenous Q12H Charlie Braxton APRN   10 mL at 03/14/20 0934   • sodium chloride 0.9 % flush 10 mL  10 mL Intravenous PRN Charlie Braxton APRN       • sodium chloride 0.9 % infusion  50 mL/hr Intravenous Continuous Víctor Bee MD 50 mL/hr at 03/13/20 2032 50 mL/hr at 03/13/20 2032       Assessment/Plan   1.  End-stage renal disease currently on peritoneal dialysis.No evidence of peritonitis.    2.  Nausea without vomiting and recurrent diarrhea she was evaluated by GI service but declined to have upper endoscopy in the past currently treated  with H2 blockers, the symptoms of the nausea and vomiting and recurrent diarrhea has improved significantly  3.  Hypocalcemia, Despite being on calcium acetate as a phosphate binder.  The magnesium is within normal range.  Calcium remains low, was restarted on her calcitriol  4.  Anemia of chronic kidney disease receiving TERESA as an outpatient, hemoglobin 7.5 today  5.  Failed kidney transplant  6.  Reported history of prolonged QT syndrome  7.  Diabetes mellitus type 2  8.  Prior parathyroidectomy with autotransplant to the left forearm in April 2018 not been taking her calcitriol.  But it was restarted yesterday      Plan:  1.  Will continue the calcitriol 1 mcg twice daily and calcium acetate as phosphate binder and would add calcium carbonate 1 g 4 times daily  2.  Decrease the concentration of the peritoneal dialysate to 1.5%  3.  Surveillance labs          Carlos Manuel Gonzales MD  03/14/20  12:41

## 2020-03-14 NOTE — PLAN OF CARE
Problem: Patient Care Overview  Goal: Plan of Care Review  Outcome: Ongoing (interventions implemented as appropriate)  Flowsheets (Taken 3/14/2020 0619)  Progress: no change  Plan of Care Reviewed With: patient  Outcome Summary: Patient had two peritoneal dialysis exchanges this shift.  She tolerated them without difficulty. No complaints of pain. She did complaint of nausea but did not want any medication for it. Some medications were refused this shift. Blood pressure and heart rate was better after medication was given. Clear liquid diet; patient wants to eat solid food.

## 2020-03-14 NOTE — PROGRESS NOTES
[facility]     Inpatient Follow Up Note    Patient Identification:  Name: Sunni Mcneil  Age: 44 y.o.  Sex: female  :  1975  MRN: [id number]    Information from:patient     CC: Nausea, anemia    History:   Currently asleep.  Did not awaken.    Review of Systems:  Unobtainable.            Problem List:  Patient Active Problem List    Diagnosis   • *Diarrhea [R19.7]   • Acute UTI (urinary tract infection) [N39.0]   • Obesity (BMI 30-39.9) [E66.9]   • A-V fistula (CMS/[facility]), right upper extremity [I77.0]   • Acute cystitis with hematuria [N30.01]   • Anemia, chronic disease [D63.8]   • Prolonged QT interval [R94.31]   • ESRD (end stage renal disease) (CMS/[facility]) [N18.6]   • Hyperphosphatemia [E83.39]   • Leukocytosis [D72.829]   • Hypocalcemia [E83.51]   • DM type 2 (diabetes mellitus, type 2) (CMS/HCC) [E11.9]   • Peritoneal dialysis catheter in place (CMS/HCC) [Z99.2]   • ESRD on hemodialysis (CMS/HCC) [N18.6, Z99.2]   • Hyperparathyroidism (CMS/HCC) [E21.3]   • Hypertension [I10]   • Hypercalcemia [E83.52]   • Hx of kidney transplant [Z94.0]   • Kidney transplant status, cadaveric [Z94.0]   • Acute rejection of kidney transplant [T86.11]   • Nephritis [N05.9]   • ARF (acute renal failure) (CMS/HCC) [N17.9]     Current Meds:  MAR Reviewed  Scheduled Meds:  calcitriol 1 mcg Oral Q12H   calcitriol 1 mcg Oral Once   calcium acetate 2,001 mg Oral TID With Meals   calcium carbonate (oyster shell) 1,000 mg Oral TID   carvedilol 12.5 mg Oral BID With Meals   cefTRIAXone 1 g Intravenous Q24H   cloNIDine 0.3 mg Oral TID   famotidine 20 mg Oral Daily   insulin lispro 0-7 Units Subcutaneous 4x Daily With Meals & Nightly   minoxidil 10 mg Oral Daily   minoxidil 10 mg Oral Once   sodium chloride 10 mL Intravenous Q12H     Continuous Infusions:   PRN Meds:.•  acetaminophen  •  dextrose  •  dextrose  •  docusate sodium  •  doxylamine-pyridoxine  •  glucagon (human recombinant)  •   "HYDROcodone-acetaminophen  •  ondansetron **OR** ondansetron  •  [COMPLETED] Insert peripheral IV **AND** sodium chloride  •  sodium chloride  Allergies:  No Known Allergies    Intake/Output:     Intake/Output Summary (Last 24 hours) at 3/14/2020 1325  Last data filed at 3/14/2020 1016  Gross per 24 hour   Intake 7220 ml   Output 6500 ml   Net 720 ml     New allergies/reactions:  None    Physical Exam:  Vitals:   Temp (24hrs), Av.9 °F (37.2 °C), Min:98.3 °F (36.8 °C), Max:99.4 °F (37.4 °C)    Temp:  [98.3 °F (36.8 °C)-99.4 °F (37.4 °C)] 98.3 °F (36.8 °C)  Heart Rate:  [] 96  Resp:  [16-20] 18  BP: ()/() 98/69  BP 98/69 (BP Location: Right arm, Patient Position: Lying)   Pulse 96   Temp 98.3 °F (36.8 °C) (Oral)   Resp 18   Ht 152.4 cm (60\")   Wt 81.2 kg (179 lb 1.6 oz)   LMP 2020   SpO2 100%   BMI 34.98 kg/m²     Exam:  NAD  Asleep        DATA:  Radiology and Labs:   Recent Results (from the past 24 hour(s))   POC Glucose Once    Collection Time: 20  1:26 PM   Result Value Ref Range    Glucose 85 70 - 130 mg/dL   POC Glucose Once    Collection Time: 20  4:28 PM   Result Value Ref Range    Glucose 84 70 - 130 mg/dL   Reticulocytes    Collection Time: 20  5:01 PM   Result Value Ref Range    Reticulocyte % 1.08 0.70 - 1.90 %    Reticulocyte Absolute 0.0286 0.0200 - 0.1300 10*6/mm3   Hemoglobin A1c    Collection Time: 20  5:01 PM   Result Value Ref Range    Hemoglobin A1C 5.20 4.80 - 5.60 %   POC Glucose Once    Collection Time: 20  8:26 PM   Result Value Ref Range    Glucose 96 70 - 130 mg/dL   Renal Function Panel    Collection Time: 20  5:12 AM   Result Value Ref Range    Glucose 118 (H) 65 - 99 mg/dL    BUN 45 (H) 6 - 20 mg/dL    Creatinine 7.23 (H) 0.57 - 1.00 mg/dL    Sodium 139 136 - 145 mmol/L    Potassium 3.6 3.5 - 5.2 mmol/L    Chloride 101 98 - 107 mmol/L    CO2 21.9 (L) 22.0 - 29.0 mmol/L    Calcium 5.6 (C) 8.6 - 10.5 mg/dL    Albumin " 2.60 (L) 3.50 - 5.20 g/dL    Phosphorus 5.8 (H) 2.5 - 4.5 mg/dL    Anion Gap 16.1 (H) 5.0 - 15.0 mmol/L    BUN/Creatinine Ratio 6.2 (L) 7.0 - 25.0    eGFR Non African Amer 6 (L) >60 mL/min/1.73    eGFR  African Amer     CBC Auto Differential    Collection Time: 03/14/20  5:12 AM   Result Value Ref Range    WBC 15.81 (H) 3.40 - 10.80 10*3/mm3    RBC 2.51 (L) 3.77 - 5.28 10*6/mm3    Hemoglobin 7.3 (L) 12.0 - 15.9 g/dL    Hematocrit 22.1 (L) 34.0 - 46.6 %    MCV 88.0 79.0 - 97.0 fL    MCH 29.1 26.6 - 33.0 pg    MCHC 33.0 31.5 - 35.7 g/dL    RDW 15.9 (H) 12.3 - 15.4 %    RDW-SD 51.2 37.0 - 54.0 fl    MPV 9.0 6.0 - 12.0 fL    Platelets 422 140 - 450 10*3/mm3    Neutrophil % 85.7 (H) 42.7 - 76.0 %    Lymphocyte % 8.0 (L) 19.6 - 45.3 %    Monocyte % 3.4 (L) 5.0 - 12.0 %    Eosinophil % 1.0 0.3 - 6.2 %    Basophil % 0.4 0.0 - 1.5 %    Immature Grans % 1.5 (H) 0.0 - 0.5 %    Neutrophils, Absolute 13.56 (H) 1.70 - 7.00 10*3/mm3    Lymphocytes, Absolute 1.26 0.70 - 3.10 10*3/mm3    Monocytes, Absolute 0.53 0.10 - 0.90 10*3/mm3    Eosinophils, Absolute 0.16 0.00 - 0.40 10*3/mm3    Basophils, Absolute 0.06 0.00 - 0.20 10*3/mm3    Immature Grans, Absolute 0.24 (H) 0.00 - 0.05 10*3/mm3    nRBC 0.0 0.0 - 0.2 /100 WBC   Iron Profile    Collection Time: 03/14/20  5:12 AM   Result Value Ref Range    Iron 25 (L) 37 - 145 mcg/dL    Iron Saturation 19 (L) 20 - 50 %    Transferrin 90 (L) 200 - 360 mg/dL    TIBC 134 (L) 298 - 536 mcg/dL   Ferritin    Collection Time: 03/14/20  5:12 AM   Result Value Ref Range    Ferritin 1,100.00 (H) 13.00 - 150.00 ng/mL   Vitamin B12    Collection Time: 03/14/20  5:12 AM   Result Value Ref Range    Vitamin B-12 433 211 - 946 pg/mL   Folate    Collection Time: 03/14/20  5:12 AM   Result Value Ref Range    Folate 3.18 (L) 4.78 - 24.20 ng/mL   POC Glucose Once    Collection Time: 03/14/20  6:00 AM   Result Value Ref Range    Glucose 128 70 - 130 mg/dL   POC Glucose Once    Collection Time: 03/14/20 11:33 AM    Result Value Ref Range    Glucose 102 70 - 130 mg/dL       Assessment:   Problem List:     Diarrhea    Hypertension    ESRD on hemodialysis (CMS/[facility])    Peritoneal dialysis catheter in place (CMS/[facility])    Leukocytosis    Hypocalcemia    DM type 2 (diabetes mellitus, type 2) (CMS/[facility])    Acute UTI (urinary tract infection)    Obesity (BMI 30-39.9)    A-V fistula (CMS/[facility]), right upper extremity    Acute cystitis with hematuria    Anemia, chronic disease    Medical issues seem stable enough.  Metabolic status reviewed, defer to nephrology.    Plan:   Continue current management and careful monitoring.         Ruy Garcia MD  [facility]  3/14/2020  \

## 2020-03-14 NOTE — PROGRESS NOTES
LOS: 1 day     Name: Sunni Mcneil  Age/Sex: 44 y.o. female  :  1975        PCP: Provider, No Known  Chief Complaint   Patient presents with   • Diarrhea      Subjective   Overall she is feeling better she was still nauseous this morning.  She was able to eat lunch and dinner today without difficulty.  Overall she shown signs of improvement  General: No Fever or Chills, Cardiac: No Chest Pain or Palpitations, Resp: No Cough or SOA, GI: No Nausea, Vomiting, or Diarrhea and Other: No bleeding      calcitriol 1 mcg Oral Q12H   calcitriol 1 mcg Oral Once   calcium acetate 2,001 mg Oral TID With Meals   calcium carbonate (oyster shell) 1,000 mg Oral TID   carvedilol 12.5 mg Oral BID With Meals   cefTRIAXone 1 g Intravenous Q24H   cloNIDine 0.3 mg Oral TID   famotidine 20 mg Oral Daily   insulin lispro 0-7 Units Subcutaneous 4x Daily With Meals & Nightly   minoxidil 10 mg Oral Daily   minoxidil 10 mg Oral Once   sodium chloride 10 mL Intravenous Q12H       sodium chloride 50 mL/hr Last Rate: 50 mL/hr (20)       Objective   Vital Signs  Temp:  [98.3 °F (36.8 °C)-99.4 °F (37.4 °C)] 98.3 °F (36.8 °C)  Heart Rate:  [] 96  Resp:  [12-20] 18  BP: ()/() 98/69  Body mass index is 34.98 kg/m².    Intake/Output Summary (Last 24 hours) at 3/14/2020 1056  Last data filed at 3/14/2020 1016  Gross per 24 hour   Intake 7220 ml   Output 6500 ml   Net 720 ml       Physical Exam   Constitutional: She is oriented to person, place, and time. She appears well-developed and well-nourished.   Cardiovascular: Normal rate and regular rhythm.   Pulmonary/Chest: Effort normal and breath sounds normal.   Abdominal: Soft. Bowel sounds are normal.   Musculoskeletal: Normal range of motion. She exhibits no edema.   Neurological: She is alert and oriented to person, place, and time.   Skin: Skin is warm and dry.   Psychiatric: She has a normal mood and affect. Her behavior is normal.   Nursing note and vitals  reviewed.        Results Review:       I reviewed the patient's new clinical results.  Results from last 7 days   Lab Units 03/14/20  0512 03/13/20  0819   WBC 10*3/mm3 15.81* 13.44*   HEMOGLOBIN g/dL 7.3* 8.8*   PLATELETS 10*3/mm3 422 461*     Results from last 7 days   Lab Units 03/14/20  0512 03/13/20  0819   SODIUM mmol/L 139 140   POTASSIUM mmol/L 3.6 3.8   CHLORIDE mmol/L 101 99   CO2 mmol/L 21.9* 22.0   BUN mg/dL 45* 46*   CREATININE mg/dL 7.23* 8.01*   CALCIUM mg/dL 5.6* 5.5*   MAGNESIUM mg/dL  --  2.2   PHOSPHORUS mg/dL 5.8*  --    Estimated Creatinine Clearance: 9.4 mL/min (A) (by C-G formula based on SCr of 7.23 mg/dL (H)).      Assessment/Plan     Diarrhea    Hypertension    ESRD on hemodialysis (CMS/[facility])    Peritoneal dialysis catheter in place (CMS/[facility])    Leukocytosis    Hypocalcemia    DM type 2 (diabetes mellitus, type 2) (CMS/[facility])    Acute UTI (urinary tract infection)    Obesity (BMI 30-39.9)    A-V fistula (CMS/[facility]), right upper extremity    Acute cystitis with hematuria    Anemia, chronic disease      PLAN  -Hemoglobin is down to 7.3.  Unclear the source of her anemia.  -White blood count is increased to 15 no fevers though continue to monitor this could be reactive  -Continue peritoneal dialysis as ordered  -Hopefully the nausea vomiting and diarrhea have resolved      Disposition        Donovan Galarza MD  [facility]  03/14/20  10:56

## 2020-03-15 LAB
ABO GROUP BLD: NORMAL
ALBUMIN SERPL-MCNC: 2.7 G/DL (ref 3.5–5.2)
ANION GAP SERPL CALCULATED.3IONS-SCNC: 15.6 MMOL/L (ref 5–15)
BASOPHILS # BLD AUTO: 0.03 10*3/MM3 (ref 0–0.2)
BASOPHILS NFR BLD AUTO: 0.2 % (ref 0–1.5)
BLD GP AB SCN SERPL QL: NEGATIVE
BUN BLD-MCNC: 47 MG/DL (ref 6–20)
BUN/CREAT SERPL: 5.5 (ref 7–25)
CALCIUM SPEC-SCNC: 7.4 MG/DL (ref 8.6–10.5)
CHLORIDE SERPL-SCNC: 97 MMOL/L (ref 98–107)
CO2 SERPL-SCNC: 24.4 MMOL/L (ref 22–29)
CREAT BLD-MCNC: 8.61 MG/DL (ref 0.57–1)
DEPRECATED RDW RBC AUTO: 51 FL (ref 37–54)
EOSINOPHIL # BLD AUTO: 0.29 10*3/MM3 (ref 0–0.4)
EOSINOPHIL NFR BLD AUTO: 2.2 % (ref 0.3–6.2)
ERYTHROCYTE [DISTWIDTH] IN BLOOD BY AUTOMATED COUNT: 15.6 % (ref 12.3–15.4)
FERRITIN SERPL-MCNC: 1075 NG/ML (ref 13–150)
FOLATE SERPL-MCNC: 3.64 NG/ML (ref 4.78–24.2)
GFR SERPL CREATININE-BSD FRML MDRD: 5 ML/MIN/1.73
GFR SERPL CREATININE-BSD FRML MDRD: ABNORMAL ML/MIN/{1.73_M2}
GLUCOSE BLD-MCNC: 102 MG/DL (ref 65–99)
GLUCOSE BLDC GLUCOMTR-MCNC: 109 MG/DL (ref 70–130)
GLUCOSE BLDC GLUCOMTR-MCNC: 116 MG/DL (ref 70–130)
GLUCOSE BLDC GLUCOMTR-MCNC: 117 MG/DL (ref 70–130)
GLUCOSE BLDC GLUCOMTR-MCNC: 130 MG/DL (ref 70–130)
HCT VFR BLD AUTO: 21.8 % (ref 34–46.6)
HGB BLD-MCNC: 7.1 G/DL (ref 12–15.9)
IMM GRANULOCYTES # BLD AUTO: 0.18 10*3/MM3 (ref 0–0.05)
IMM GRANULOCYTES NFR BLD AUTO: 1.3 % (ref 0–0.5)
IRON 24H UR-MRATE: 23 MCG/DL (ref 37–145)
IRON SATN MFR SERPL: 17 % (ref 20–50)
LYMPHOCYTES # BLD AUTO: 1.44 10*3/MM3 (ref 0.7–3.1)
LYMPHOCYTES NFR BLD AUTO: 10.8 % (ref 19.6–45.3)
MCH RBC QN AUTO: 28.7 PG (ref 26.6–33)
MCHC RBC AUTO-ENTMCNC: 32.6 G/DL (ref 31.5–35.7)
MCV RBC AUTO: 88.3 FL (ref 79–97)
MONOCYTES # BLD AUTO: 0.62 10*3/MM3 (ref 0.1–0.9)
MONOCYTES NFR BLD AUTO: 4.6 % (ref 5–12)
NEUTROPHILS # BLD AUTO: 10.78 10*3/MM3 (ref 1.7–7)
NEUTROPHILS NFR BLD AUTO: 80.9 % (ref 42.7–76)
NRBC BLD AUTO-RTO: 0.1 /100 WBC (ref 0–0.2)
PHOSPHATE SERPL-MCNC: 5.6 MG/DL (ref 2.5–4.5)
PLATELET # BLD AUTO: 416 10*3/MM3 (ref 140–450)
PMV BLD AUTO: 8.9 FL (ref 6–12)
POTASSIUM BLD-SCNC: 3.3 MMOL/L (ref 3.5–5.2)
RBC # BLD AUTO: 2.47 10*6/MM3 (ref 3.77–5.28)
RETICS # AUTO: 0.03 10*6/MM3 (ref 0.02–0.13)
RETICS/RBC NFR AUTO: 1.26 % (ref 0.7–1.9)
RH BLD: POSITIVE
SODIUM BLD-SCNC: 137 MMOL/L (ref 136–145)
T&S EXPIRATION DATE: NORMAL
TIBC SERPL-MCNC: 136 MCG/DL (ref 298–536)
TRANSFERRIN SERPL-MCNC: 91 MG/DL (ref 200–360)
VIT B12 BLD-MCNC: 402 PG/ML (ref 211–946)
WBC NRBC COR # BLD: 13.34 10*3/MM3 (ref 3.4–10.8)

## 2020-03-15 PROCEDURE — 85014 HEMATOCRIT: CPT | Performed by: HOSPITALIST

## 2020-03-15 PROCEDURE — 80069 RENAL FUNCTION PANEL: CPT | Performed by: INTERNAL MEDICINE

## 2020-03-15 PROCEDURE — 25010000002 ONDANSETRON PER 1 MG: Performed by: NURSE PRACTITIONER

## 2020-03-15 PROCEDURE — 82962 GLUCOSE BLOOD TEST: CPT

## 2020-03-15 PROCEDURE — 82607 VITAMIN B-12: CPT | Performed by: HOSPITALIST

## 2020-03-15 PROCEDURE — 84466 ASSAY OF TRANSFERRIN: CPT | Performed by: HOSPITALIST

## 2020-03-15 PROCEDURE — 82746 ASSAY OF FOLIC ACID SERUM: CPT | Performed by: HOSPITALIST

## 2020-03-15 PROCEDURE — 86901 BLOOD TYPING SEROLOGIC RH(D): CPT | Performed by: HOSPITALIST

## 2020-03-15 PROCEDURE — 86923 COMPATIBILITY TEST ELECTRIC: CPT

## 2020-03-15 PROCEDURE — P9016 RBC LEUKOCYTES REDUCED: HCPCS

## 2020-03-15 PROCEDURE — 83540 ASSAY OF IRON: CPT | Performed by: HOSPITALIST

## 2020-03-15 PROCEDURE — 86900 BLOOD TYPING SEROLOGIC ABO: CPT

## 2020-03-15 PROCEDURE — 86900 BLOOD TYPING SEROLOGIC ABO: CPT | Performed by: HOSPITALIST

## 2020-03-15 PROCEDURE — 25010000002 CEFTRIAXONE PER 250 MG: Performed by: NURSE PRACTITIONER

## 2020-03-15 PROCEDURE — 85045 AUTOMATED RETICULOCYTE COUNT: CPT | Performed by: HOSPITALIST

## 2020-03-15 PROCEDURE — 85025 COMPLETE CBC W/AUTO DIFF WBC: CPT | Performed by: INTERNAL MEDICINE

## 2020-03-15 PROCEDURE — 82747 ASSAY OF FOLIC ACID RBC: CPT | Performed by: HOSPITALIST

## 2020-03-15 PROCEDURE — 36430 TRANSFUSION BLD/BLD COMPNT: CPT

## 2020-03-15 PROCEDURE — 99232 SBSQ HOSP IP/OBS MODERATE 35: CPT | Performed by: INTERNAL MEDICINE

## 2020-03-15 PROCEDURE — 86850 RBC ANTIBODY SCREEN: CPT | Performed by: HOSPITALIST

## 2020-03-15 PROCEDURE — 82728 ASSAY OF FERRITIN: CPT | Performed by: HOSPITALIST

## 2020-03-15 RX ADMIN — CALCIUM 1000 MG: 500 TABLET ORAL at 10:31

## 2020-03-15 RX ADMIN — CLONIDINE HYDROCHLORIDE 0.3 MG: 0.1 TABLET ORAL at 10:32

## 2020-03-15 RX ADMIN — MINOXIDIL 10 MG: 10 TABLET ORAL at 10:32

## 2020-03-15 RX ADMIN — SODIUM CHLORIDE, PRESERVATIVE FREE 10 ML: 5 INJECTION INTRAVENOUS at 22:45

## 2020-03-15 RX ADMIN — CALCITRIOL 1 MCG: 0.25 CAPSULE ORAL at 10:32

## 2020-03-15 RX ADMIN — CARVEDILOL 12.5 MG: 12.5 TABLET, FILM COATED ORAL at 18:55

## 2020-03-15 RX ADMIN — SODIUM CHLORIDE, PRESERVATIVE FREE 10 ML: 5 INJECTION INTRAVENOUS at 09:34

## 2020-03-15 RX ADMIN — CALCIUM ACETATE 2001 MG: 667 CAPSULE ORAL at 18:55

## 2020-03-15 RX ADMIN — CEFTRIAXONE SODIUM 1 G: 1 INJECTION, SOLUTION INTRAVENOUS at 14:25

## 2020-03-15 RX ADMIN — FAMOTIDINE 20 MG: 20 TABLET, FILM COATED ORAL at 10:33

## 2020-03-15 RX ADMIN — ONDANSETRON 4 MG: 2 INJECTION INTRAMUSCULAR; INTRAVENOUS at 09:21

## 2020-03-15 RX ADMIN — DEXTROSE MONOHYDRATE, SODIUM CHLORIDE, SODIUM LACTATE, CALCIUM CHLORIDE, MAGNESIUM CHLORIDE 2000 ML: 1.5; 538; 448; 18.4; 5.08 SOLUTION INTRAPERITONEAL at 04:45

## 2020-03-15 RX ADMIN — CARVEDILOL 12.5 MG: 12.5 TABLET, FILM COATED ORAL at 10:33

## 2020-03-15 RX ADMIN — CALCIUM ACETATE 2001 MG: 667 CAPSULE ORAL at 10:33

## 2020-03-15 RX ADMIN — SODIUM CHLORIDE, POTASSIUM CHLORIDE, SODIUM LACTATE AND CALCIUM CHLORIDE 1000 ML: 600; 310; 30; 20 INJECTION, SOLUTION INTRAVENOUS at 20:21

## 2020-03-15 RX ADMIN — DEXTROSE MONOHYDRATE, SODIUM CHLORIDE, SODIUM LACTATE, CALCIUM CHLORIDE, MAGNESIUM CHLORIDE 2000 ML: 1.5; 538; 448; 18.4; 5.08 SOLUTION INTRAPERITONEAL at 00:32

## 2020-03-15 NOTE — PROGRESS NOTES
[facility]     Inpatient Follow Up Note    Patient Identification:  Name: Sunni Mcneil  Age: 44 y.o.  Sex: female  :  1975  MRN: [id number]    Information from:patient     CC: diarrhea    History:   Diarrhea resolving.  No abdominal pain.  No rectal bleeding.  Patient feeling much better.    Review of Systems:  Constitutional:  Negative   Cardiovascular:  Negative   Respiratory:  Negative             Problem List:  Patient Active Problem List    Diagnosis   • *Diarrhea [R19.7]   • Acute UTI (urinary tract infection) [N39.0]   • Obesity (BMI 30-39.9) [E66.9]   • A-V fistula (CMS/[facility]), right upper extremity [I77.0]   • Acute cystitis with hematuria [N30.01]   • Anemia, chronic disease [D63.8]   • Prolonged QT interval [R94.31]   • ESRD (end stage renal disease) (CMS/HCC) [N18.6]   • Hyperphosphatemia [E83.39]   • Leukocytosis [D72.829]   • Hypocalcemia [E83.51]   • DM type 2 (diabetes mellitus, type 2) (CMS/HCC) [E11.9]   • Peritoneal dialysis catheter in place (CMS/HCC) [Z99.2]   • ESRD on hemodialysis (CMS/HCC) [N18.6, Z99.2]   • Hyperparathyroidism (CMS/HCC) [E21.3]   • Hypertension [I10]   • Hypercalcemia [E83.52]   • Hx of kidney transplant [Z94.0]   • Kidney transplant status, cadaveric [Z94.0]   • Acute rejection of kidney transplant [T86.11]   • Nephritis [N05.9]   • ARF (acute renal failure) (CMS/HCC) [N17.9]     Current Meds:  MAR Reviewed  Scheduled Meds:  calcitriol 1 mcg Oral Q12H   calcitriol 1 mcg Oral Once   calcium acetate 2,001 mg Oral TID With Meals   calcium carbonate (oyster shell) 1,000 mg Oral TID   carvedilol 12.5 mg Oral BID With Meals   cefTRIAXone 1 g Intravenous Q24H   cloNIDine 0.3 mg Oral TID   famotidine 20 mg Oral Daily   insulin lispro 0-7 Units Subcutaneous 4x Daily With Meals & Nightly   minoxidil 10 mg Oral Daily   minoxidil 10 mg Oral Once   sodium chloride 10 mL Intravenous Q12H     Continuous Infusions:   PRN Meds:.•  acetaminophen  •   "DELFLEX-LC/1.5% DEXTROSE  •  dextrose  •  dextrose  •  docusate sodium  •  doxylamine-pyridoxine  •  glucagon (human recombinant)  •  HYDROcodone-acetaminophen  •  ondansetron **OR** ondansetron  •  [COMPLETED] Insert peripheral IV **AND** sodium chloride  •  sodium chloride  Allergies:  No Known Allergies    Intake/Output:     Intake/Output Summary (Last 24 hours) at 3/15/2020 1306  Last data filed at 3/15/2020 1018  Gross per 24 hour   Intake 8340 ml   Output 9200 ml   Net -860 ml     New allergies/reactions:  None    Physical Exam:  Vitals:   Temp (24hrs), Av °F (37.8 °C), Min:98.1 °F (36.7 °C), Max:102.9 °F (39.4 °C)    Temp:  [98.1 °F (36.7 °C)-102.9 °F (39.4 °C)] 98.3 °F (36.8 °C)  Heart Rate:  [] 98  Resp:  [18] 18  BP: ()/(45-67) 101/54  /54   Pulse 98   Temp 98.3 °F (36.8 °C) (Oral)   Resp 18   Ht 152.4 cm (60\")   Wt 81.7 kg (180 lb 3.2 oz)   LMP 2020   SpO2 100%   BMI 35.19 kg/m²     Exam:  NAD  PERRLA. Sclerae and conjunctivae normal  HENT: external inspection normal. Hearing intact.  No respiratory distress.  Alert, oriented, normal affect.         DATA:  Radiology and Labs:   Recent Results (from the past 24 hour(s))   POC Glucose Once    Collection Time: 20  4:40 PM   Result Value Ref Range    Glucose 107 70 - 130 mg/dL   POC Glucose Once    Collection Time: 20 11:23 PM   Result Value Ref Range    Glucose 150 (H) 70 - 130 mg/dL   Renal Function Panel    Collection Time: 03/15/20  5:24 AM   Result Value Ref Range    Glucose 102 (H) 65 - 99 mg/dL    BUN 47 (H) 6 - 20 mg/dL    Creatinine 8.61 (H) 0.57 - 1.00 mg/dL    Sodium 137 136 - 145 mmol/L    Potassium 3.3 (L) 3.5 - 5.2 mmol/L    Chloride 97 (L) 98 - 107 mmol/L    CO2 24.4 22.0 - 29.0 mmol/L    Calcium 7.4 (L) 8.6 - 10.5 mg/dL    Albumin 2.70 (L) 3.50 - 5.20 g/dL    Phosphorus 5.6 (H) 2.5 - 4.5 mg/dL    Anion Gap 15.6 (H) 5.0 - 15.0 mmol/L    BUN/Creatinine Ratio 5.5 (L) 7.0 - 25.0    eGFR Non  " Amer 5 (L) >60 mL/min/1.73    eGFR  African Amer     CBC Auto Differential    Collection Time: 03/15/20  5:24 AM   Result Value Ref Range    WBC 13.34 (H) 3.40 - 10.80 10*3/mm3    RBC 2.47 (L) 3.77 - 5.28 10*6/mm3    Hemoglobin 7.1 (L) 12.0 - 15.9 g/dL    Hematocrit 21.8 (L) 34.0 - 46.6 %    MCV 88.3 79.0 - 97.0 fL    MCH 28.7 26.6 - 33.0 pg    MCHC 32.6 31.5 - 35.7 g/dL    RDW 15.6 (H) 12.3 - 15.4 %    RDW-SD 51.0 37.0 - 54.0 fl    MPV 8.9 6.0 - 12.0 fL    Platelets 416 140 - 450 10*3/mm3    Neutrophil % 80.9 (H) 42.7 - 76.0 %    Lymphocyte % 10.8 (L) 19.6 - 45.3 %    Monocyte % 4.6 (L) 5.0 - 12.0 %    Eosinophil % 2.2 0.3 - 6.2 %    Basophil % 0.2 0.0 - 1.5 %    Immature Grans % 1.3 (H) 0.0 - 0.5 %    Neutrophils, Absolute 10.78 (H) 1.70 - 7.00 10*3/mm3    Lymphocytes, Absolute 1.44 0.70 - 3.10 10*3/mm3    Monocytes, Absolute 0.62 0.10 - 0.90 10*3/mm3    Eosinophils, Absolute 0.29 0.00 - 0.40 10*3/mm3    Basophils, Absolute 0.03 0.00 - 0.20 10*3/mm3    Immature Grans, Absolute 0.18 (H) 0.00 - 0.05 10*3/mm3    nRBC 0.1 0.0 - 0.2 /100 WBC   POC Glucose Once    Collection Time: 03/15/20  6:46 AM   Result Value Ref Range    Glucose 116 70 - 130 mg/dL   POC Glucose Once    Collection Time: 03/15/20 11:28 AM   Result Value Ref Range    Glucose 109 70 - 130 mg/dL   Iron Profile    Collection Time: 03/15/20 12:25 PM   Result Value Ref Range    Iron 23 (L) 37 - 145 mcg/dL    Iron Saturation 17 (L) 20 - 50 %    Transferrin 91 (L) 200 - 360 mg/dL    TIBC 136 (L) 298 - 536 mcg/dL   Reticulocytes    Collection Time: 03/15/20 12:25 PM   Result Value Ref Range    Reticulocyte % 1.26 0.70 - 1.90 %    Reticulocyte Absolute 0.0300 0.0200 - 0.1300 10*6/mm3       Assessment:   Problem List:     Diarrhea    Hypertension    ESRD on hemodialysis (CMS/[facility])    Peritoneal dialysis catheter in place (CMS/[facility])    Leukocytosis    Hypocalcemia    DM type 2 (diabetes mellitus, type 2) (CMS/[facility])    Acute UTI (urinary tract infection)    Obesity  (BMI 30-39.9)    A-V fistula (CMS/HCC), right upper extremity    Acute cystitis with hematuria    Anemia, chronic disease    Her diarrhea is resolving.    Plan:   No further GI work-up needed at this point.  We will sign off.      Ruy Garcia MD  [facility]  3/15/2020

## 2020-03-15 NOTE — PROGRESS NOTES
"   LOS: 2 days    Patient Care Team:  Provider, No Known as PCP - Bo Hummel MD as Consulting Physician (Nephrology)  Damon Rooney MD as Surgeon (General Surgery)    Chief Complaint:    Chief Complaint   Patient presents with   • Diarrhea     Follow-up ESRD and hypocalcemia  Subjective     Interval History:   Patient is feeling better today, denies nausea or vomiting, no abdominal pain, tolerating her PD without any difficulties, no chest pain no shortness of air, no orthopnea or PND, no diarrhea or constipation.      Review of Systems:   As noted above    Objective     Vital Signs  Temp:  [98.1 °F (36.7 °C)-102.9 °F (39.4 °C)] 98.3 °F (36.8 °C)  Heart Rate:  [] 98  Resp:  [18] 18  BP: ()/(45-67) 101/54    Flowsheet Rows      First Filed Value   Admission Height  152.4 cm (60\") Documented at 03/13/2020 0803   Admission Weight  77.1 kg (170 lb) Documented at 03/13/2020 0817          I/O this shift:  In: 1840 [P.O.:240; Other:1600]  Out: 1800 [Other:1800]  I/O last 3 completed shifts:  In: 93505 [I.V.:1000; Other:54271]  Out: 83283 [Other:97008]    Intake/Output Summary (Last 24 hours) at 3/15/2020 1333  Last data filed at 3/15/2020 1018  Gross per 24 hour   Intake 8340 ml   Output 9200 ml   Net -860 ml       Physical Exam:  General Appearance: alert, oriented x 3, no acute distress, appears to be chronically  Skin: warm and dry  HEENT: pupils round and reactive to light, oral mucosa normal, nonicteric sclera  Neck: supple, no JVD, trachea midline  Lungs: CTA, unlabored breathing effort  Heart: RRR, normal S1 and S2, no S3, no rub  Abdomen: soft, non-tender,  present bowel sounds to auscultation, can be catheter in place, exit site is clean with no tunnel tenderness  : no palpable bladder,  Extremities: no edema, cyanosis or clubbing, functional AV fistula in the right upper arm  Neuro: normal speech and mental status      Results Review:    Results from last 7 days "   Lab Units 03/15/20  0524 03/14/20  0512 03/13/20  0819   SODIUM mmol/L 137 139 140   POTASSIUM mmol/L 3.3* 3.6 3.8   CHLORIDE mmol/L 97* 101 99   CO2 mmol/L 24.4 21.9* 22.0   BUN mg/dL 47* 45* 46*   CREATININE mg/dL 8.61* 7.23* 8.01*   CALCIUM mg/dL 7.4* 5.6* 5.5*   BILIRUBIN mg/dL  --   --  0.3   ALK PHOS U/L  --   --  57   ALT (SGPT) U/L  --   --  <5   AST (SGOT) U/L  --   --  17   GLUCOSE mg/dL 102* 118* 94       Estimated Creatinine Clearance: 7.9 mL/min (A) (by C-G formula based on SCr of 8.61 mg/dL (H)).    Results from last 7 days   Lab Units 03/15/20  0524 03/14/20  0512 03/13/20  0819   MAGNESIUM mg/dL  --   --  2.2   PHOSPHORUS mg/dL 5.6* 5.8*  --              Results from last 7 days   Lab Units 03/15/20  0524 03/14/20  0512 03/13/20  0819   WBC 10*3/mm3 13.34* 15.81* 13.44*   HEMOGLOBIN g/dL 7.1* 7.3* 8.8*   PLATELETS 10*3/mm3 416 422 461*               Imaging Results (Last 24 Hours)     ** No results found for the last 24 hours. **          calcitriol 1 mcg Oral Q12H   calcitriol 1 mcg Oral Once   calcium acetate 2,001 mg Oral TID With Meals   calcium carbonate (oyster shell) 1,000 mg Oral TID   carvedilol 12.5 mg Oral BID With Meals   cefTRIAXone 1 g Intravenous Q24H   cloNIDine 0.3 mg Oral TID   famotidine 20 mg Oral Daily   insulin lispro 0-7 Units Subcutaneous 4x Daily With Meals & Nightly   minoxidil 10 mg Oral Daily   minoxidil 10 mg Oral Once   sodium chloride 10 mL Intravenous Q12H          Medication Review:   Current Facility-Administered Medications   Medication Dose Route Frequency Provider Last Rate Last Dose   • acetaminophen (TYLENOL) tablet 650 mg  650 mg Oral Q4H PRN Charlie Braxton APRN   650 mg at 03/14/20 2329   • calcitriol (ROCALTROL) capsule 1 mcg  1 mcg Oral Q12H Carlos Manuel Gonzales MD   1 mcg at 03/15/20 1032   • calcitriol (ROCALTROL) capsule 1 mcg  1 mcg Oral Once Carlos Manuel Gonzales MD       • calcium acetate (PHOSLO) capsule 2,001 mg  2,001 mg Oral TID With Meals  Charlie Braxton APRN   2,001 mg at 03/15/20 1033   • calcium carbonate (oyster shell) tablet 1,000 mg  1,000 mg Oral TID Carlos Manuel Gonzales MD   1,000 mg at 03/15/20 1031   • carvedilol (COREG) tablet 12.5 mg  12.5 mg Oral BID With Meals Charlie Braxton APRN   12.5 mg at 03/15/20 1033   • cefTRIAXone (ROCEPHIN) IVPB 1 g  1 g Intravenous Q24H Charlie Braxton APRN 100 mL/hr at 03/14/20 1322 1 g at 03/14/20 1322   • cloNIDine (CATAPRES) tablet 0.3 mg  0.3 mg Oral TID Charlie Braxton APRN   0.3 mg at 03/15/20 1032   • DELFLEX-LC/1.5% DEXTROSE (DIANEAL) CAPD 2,000 mL  2,000 mL Intraperitoneal PRN Carlos Manuel Gonzales MD   2,000 mL at 03/15/20 0445   • dextrose (D50W) 25 g/ 50mL Intravenous Solution 25 g  25 g Intravenous Q15 Min PRN Charlie Braxton APRN       • dextrose (GLUTOSE) oral gel 15 g  15 g Oral Q15 Min PRN Charlie Braxton APRN       • docusate sodium (COLACE) capsule 100 mg  100 mg Oral BID PRN Charlie Braxton APRN       • doxylamine-pyridoxine (DICLEGIS) EC tablet 1 tablet  1 tablet Oral TID PRN Charlie Braxton APRN       • famotidine (PEPCID) tablet 20 mg  20 mg Oral Daily Charlie Braxton APRN   20 mg at 03/15/20 1033   • glucagon (human recombinant) (GLUCAGEN DIAGNOSTIC) injection 1 mg  1 mg Subcutaneous Q15 Min PRN Charlie Braxton APRN       • HYDROcodone-acetaminophen (NORCO)  MG per tablet 1 tablet  1 tablet Oral Q6H PRN Charlie Braxton APRN       • insulin lispro (humaLOG) injection 0-7 Units  0-7 Units Subcutaneous 4x Daily With Meals & Nightly Charlie Braxton APRN       • minoxidil (LONITEN) tablet 10 mg  10 mg Oral Daily Charlie Braxton APRN   10 mg at 03/15/20 1032   • minoxidil (LONITEN) tablet 10 mg  10 mg Oral Once Carlos Manuel Gonzales MD       • ondansetron (ZOFRAN) tablet 4 mg  4 mg Oral Q6H PRN Charlie Braxton APRN        Or   • ondansetron (ZOFRAN) injection 4 mg  4 mg Intravenous Q6H PRN Charlie Braxton APRN   4 mg at 03/15/20 0921   • sodium chloride 0.9 % flush 10  mL  10 mL Intravenous PRN Everardo Gerard MD       • sodium chloride 0.9 % flush 10 mL  10 mL Intravenous Q12H Charlie Braxton APRN   10 mL at 03/15/20 0934   • sodium chloride 0.9 % flush 10 mL  10 mL Intravenous PRN Charlie Braxton APRN           Assessment/Plan   1.  End-stage renal disease currently on peritoneal dialysis.No evidence of peritonitis.    2.  Nausea without vomiting and recurrent diarrhea she was evaluated by GI service but declined to have upper endoscopy in the past currently treated with H2 blockers, the symptoms of the nausea and vomiting and recurrent diarrhea has improved significantly  3.  Hypocalcemia,  improving calcium up to 7.4 when corrected to albumin is up to 8.5.  4.  Anemia of chronic kidney disease receiving TERESA as an outpatient, hemoglobin 7.5 today  5.  Failed kidney transplant  6.  Reported history of prolonged QT syndrome  7.  Diabetes mellitus type 2  8.  Prior parathyroidectomy with autotransplant to the left forearm in April 2018 not been taking her calcitriol.  But it was restarted yesterday      Plan:  1.  Continue the same treatment and calcium and calcitriol supplement.  2.  Continue PD with her exchanges to be 1.5%  3.  Surveillance labs          Carlos Manuel Gonzales MD  03/15/20  13:33

## 2020-03-15 NOTE — PROGRESS NOTES
LOS: 2 days     Name: Sunni Mcneil  Age/Sex: 44 y.o. female  :  1975        PCP: Provider, No Known  Chief Complaint   Patient presents with   • Diarrhea      Subjective   Overall she is feeling better denies any symptoms today.  General: No Fever or Chills, Cardiac: No Chest Pain or Palpitations, Resp: No Cough or SOA, GI: No Nausea, Vomiting, or Diarrhea and Other: No bleeding      calcitriol 1 mcg Oral Q12H   calcitriol 1 mcg Oral Once   calcium acetate 2,001 mg Oral TID With Meals   calcium carbonate (oyster shell) 1,000 mg Oral TID   carvedilol 12.5 mg Oral BID With Meals   cloNIDine 0.3 mg Oral TID   famotidine 20 mg Oral Daily   insulin lispro 0-7 Units Subcutaneous 4x Daily With Meals & Nightly   minoxidil 10 mg Oral Daily   minoxidil 10 mg Oral Once   sodium chloride 10 mL Intravenous Q12H          Objective   Vital Signs  Temp:  [97.9 °F (36.6 °C)-102.9 °F (39.4 °C)] 98.2 °F (36.8 °C)  Heart Rate:  [] 88  Resp:  [18] 18  BP: ()/(45-66) 107/58  Body mass index is 35.19 kg/m².    Intake/Output Summary (Last 24 hours) at 3/15/2020 1516  Last data filed at 3/15/2020 1300  Gross per 24 hour   Intake 8580 ml   Output 9200 ml   Net -620 ml       Physical Exam   Constitutional: She is oriented to person, place, and time. She appears well-developed and well-nourished.   Cardiovascular: Normal rate and regular rhythm.   Pulmonary/Chest: Effort normal and breath sounds normal.   Abdominal: Soft. Bowel sounds are normal.   Musculoskeletal: Normal range of motion. She exhibits no edema.   Neurological: She is alert and oriented to person, place, and time.   Skin: Skin is warm and dry.   Psychiatric: She has a normal mood and affect. Her behavior is normal.   Nursing note and vitals reviewed.        Results Review:       I reviewed the patient's new clinical results.  Results from last 7 days   Lab Units 03/15/20  0524 20  0512 20  0819   WBC 10*3/mm3 13.34* 15.81* 13.44*    HEMOGLOBIN g/dL 7.1* 7.3* 8.8*   PLATELETS 10*3/mm3 416 422 461*     Results from last 7 days   Lab Units 03/15/20  0524 03/14/20  0512 03/13/20  0819   SODIUM mmol/L 137 139 140   POTASSIUM mmol/L 3.3* 3.6 3.8   CHLORIDE mmol/L 97* 101 99   CO2 mmol/L 24.4 21.9* 22.0   BUN mg/dL 47* 45* 46*   CREATININE mg/dL 8.61* 7.23* 8.01*   CALCIUM mg/dL 7.4* 5.6* 5.5*   MAGNESIUM mg/dL  --   --  2.2   PHOSPHORUS mg/dL 5.6* 5.8*  --    Estimated Creatinine Clearance: 7.9 mL/min (A) (by C-G formula based on SCr of 8.61 mg/dL (H)).      Assessment/Plan     Diarrhea    Hypertension    ESRD on hemodialysis (CMS/[facility])    Peritoneal dialysis catheter in place (CMS/[facility])    Leukocytosis    Hypocalcemia    DM type 2 (diabetes mellitus, type 2) (CMS/[facility])    Acute UTI (urinary tract infection)    Obesity (BMI 30-39.9)    A-V fistula (CMS/[facility]), right upper extremity    Acute cystitis with hematuria    Anemia, chronic disease      PLAN  -Hemoglobin is down to 7.1.  Unclear the source of her anemia.  Gastroenterology declines any endoscopic evaluation at this time.  -Plan transfusion today.  Baseline hemoglobin is normally quite higher.  She is also hypotensive and tachycardic.  I think she needs the blood today.   Addendum: At around 3:20 PM I was called by the nursing staff she is receiving her first unit of packed red blood cells currently and blood pressure was recorded at 78/52.  She is asymptomatic no fever no other symptoms I wish to continue the blood transfusion.  I think the tachycardia and hypotension are related to her hypovolemia and anemia.  This should improve once she starts receiving additional transfusion.  -White blood count is back down to 13 no fevers though continue to monitor this could be reactive  -Continue peritoneal dialysis as ordered, nephrology following  -Hopefully the nausea vomiting and diarrhea have resolved      Disposition  /She remains fever free and diarrhea has resolved and hemoglobin has  appropriately responded she can potentially go home tomorrow.      Donovan Galarza MD  [facility]  03/15/20  15:16

## 2020-03-16 VITALS
HEART RATE: 91 BPM | DIASTOLIC BLOOD PRESSURE: 63 MMHG | WEIGHT: 182.6 LBS | BODY MASS INDEX: 35.85 KG/M2 | OXYGEN SATURATION: 96 % | RESPIRATION RATE: 14 BRPM | SYSTOLIC BLOOD PRESSURE: 102 MMHG | TEMPERATURE: 98.8 F | HEIGHT: 60 IN

## 2020-03-16 PROBLEM — R19.7 DIARRHEA: Status: RESOLVED | Noted: 2020-03-13 | Resolved: 2020-03-16

## 2020-03-16 PROBLEM — N39.0 ACUTE UTI (URINARY TRACT INFECTION): Status: RESOLVED | Noted: 2020-03-13 | Resolved: 2020-03-16

## 2020-03-16 PROBLEM — E83.51 HYPOCALCEMIA: Status: RESOLVED | Noted: 2019-11-07 | Resolved: 2020-03-16

## 2020-03-16 PROBLEM — N30.01 ACUTE CYSTITIS WITH HEMATURIA: Status: RESOLVED | Noted: 2020-03-13 | Resolved: 2020-03-16

## 2020-03-16 PROBLEM — D52.9 FOLATE DEFICIENCY ANEMIA: Status: ACTIVE | Noted: 2020-03-16

## 2020-03-16 LAB
ABO + RH BLD: NORMAL
ABO + RH BLD: NORMAL
ALBUMIN SERPL-MCNC: 2.4 G/DL (ref 3.5–5.2)
ANION GAP SERPL CALCULATED.3IONS-SCNC: 15.5 MMOL/L (ref 5–15)
BASOPHILS # BLD AUTO: 0.03 10*3/MM3 (ref 0–0.2)
BASOPHILS NFR BLD AUTO: 0.3 % (ref 0–1.5)
BH BB BLOOD EXPIRATION DATE: NORMAL
BH BB BLOOD EXPIRATION DATE: NORMAL
BH BB BLOOD TYPE BARCODE: 7300
BH BB BLOOD TYPE BARCODE: 7300
BH BB DISPENSE STATUS: NORMAL
BH BB DISPENSE STATUS: NORMAL
BH BB PRODUCT CODE: NORMAL
BH BB PRODUCT CODE: NORMAL
BH BB UNIT NUMBER: NORMAL
BH BB UNIT NUMBER: NORMAL
BUN BLD-MCNC: 41 MG/DL (ref 6–20)
BUN/CREAT SERPL: 4.4 (ref 7–25)
CALCIUM SPEC-SCNC: 7.7 MG/DL (ref 8.6–10.5)
CHLORIDE SERPL-SCNC: 96 MMOL/L (ref 98–107)
CO2 SERPL-SCNC: 23.5 MMOL/L (ref 22–29)
CREAT BLD-MCNC: 9.26 MG/DL (ref 0.57–1)
DEPRECATED RDW RBC AUTO: 48.7 FL (ref 37–54)
EOSINOPHIL # BLD AUTO: 0.51 10*3/MM3 (ref 0–0.4)
EOSINOPHIL NFR BLD AUTO: 4.6 % (ref 0.3–6.2)
ERYTHROCYTE [DISTWIDTH] IN BLOOD BY AUTOMATED COUNT: 15.5 % (ref 12.3–15.4)
GFR SERPL CREATININE-BSD FRML MDRD: 5 ML/MIN/1.73
GFR SERPL CREATININE-BSD FRML MDRD: ABNORMAL ML/MIN/{1.73_M2}
GLUCOSE BLD-MCNC: 101 MG/DL (ref 65–99)
GLUCOSE BLDC GLUCOMTR-MCNC: 101 MG/DL (ref 70–130)
GLUCOSE BLDC GLUCOMTR-MCNC: 95 MG/DL (ref 70–130)
HCT VFR BLD AUTO: 24.5 % (ref 34–46.6)
HGB BLD-MCNC: 8.1 G/DL (ref 12–15.9)
IMM GRANULOCYTES # BLD AUTO: 0.12 10*3/MM3 (ref 0–0.05)
IMM GRANULOCYTES NFR BLD AUTO: 1.1 % (ref 0–0.5)
LYMPHOCYTES # BLD AUTO: 1.03 10*3/MM3 (ref 0.7–3.1)
LYMPHOCYTES NFR BLD AUTO: 9.2 % (ref 19.6–45.3)
MAGNESIUM SERPL-MCNC: 1.9 MG/DL (ref 1.6–2.6)
MCH RBC QN AUTO: 28.4 PG (ref 26.6–33)
MCHC RBC AUTO-ENTMCNC: 33.1 G/DL (ref 31.5–35.7)
MCV RBC AUTO: 86 FL (ref 79–97)
MONOCYTES # BLD AUTO: 0.53 10*3/MM3 (ref 0.1–0.9)
MONOCYTES NFR BLD AUTO: 4.7 % (ref 5–12)
NEUTROPHILS # BLD AUTO: 8.98 10*3/MM3 (ref 1.7–7)
NEUTROPHILS NFR BLD AUTO: 80.1 % (ref 42.7–76)
NRBC BLD AUTO-RTO: 0 /100 WBC (ref 0–0.2)
PHOSPHATE SERPL-MCNC: 6.5 MG/DL (ref 2.5–4.5)
PLATELET # BLD AUTO: 384 10*3/MM3 (ref 140–450)
PMV BLD AUTO: 8.8 FL (ref 6–12)
POTASSIUM BLD-SCNC: 3.2 MMOL/L (ref 3.5–5.2)
RBC # BLD AUTO: 2.85 10*6/MM3 (ref 3.77–5.28)
SODIUM BLD-SCNC: 135 MMOL/L (ref 136–145)
UNIT  ABO: NORMAL
UNIT  ABO: NORMAL
UNIT  RH: NORMAL
UNIT  RH: NORMAL
WBC NRBC COR # BLD: 11.2 10*3/MM3 (ref 3.4–10.8)

## 2020-03-16 PROCEDURE — 83735 ASSAY OF MAGNESIUM: CPT | Performed by: INTERNAL MEDICINE

## 2020-03-16 PROCEDURE — 82962 GLUCOSE BLOOD TEST: CPT

## 2020-03-16 PROCEDURE — 85025 COMPLETE CBC W/AUTO DIFF WBC: CPT | Performed by: INTERNAL MEDICINE

## 2020-03-16 PROCEDURE — 80069 RENAL FUNCTION PANEL: CPT | Performed by: INTERNAL MEDICINE

## 2020-03-16 RX ORDER — FOLIC ACID 1 MG/1
1 TABLET ORAL DAILY
Qty: 30 TABLET | Refills: 0 | Status: SHIPPED | OUTPATIENT
Start: 2020-03-16 | End: 2020-09-15

## 2020-03-16 RX ORDER — CALCITRIOL 0.5 UG/1
1 CAPSULE, LIQUID FILLED ORAL EVERY 12 HOURS SCHEDULED
Qty: 120 CAPSULE | Refills: 0 | Status: SHIPPED | OUTPATIENT
Start: 2020-03-16 | End: 2020-04-15

## 2020-03-16 RX ORDER — FOLIC ACID 1 MG/1
1 TABLET ORAL DAILY
Status: DISCONTINUED | OUTPATIENT
Start: 2020-03-16 | End: 2020-03-16 | Stop reason: HOSPADM

## 2020-03-16 RX ORDER — DEXTROSE MONOHYDRATE, SODIUM CHLORIDE, SODIUM LACTATE, CALCIUM CHLORIDE, MAGNESIUM CHLORIDE 1.5; 538; 448; 18.4; 5.08 G/100ML; MG/100ML; MG/100ML; MG/100ML; MG/100ML
2000 SOLUTION INTRAPERITONEAL AS NEEDED
Start: 2020-03-16 | End: 2020-09-15

## 2020-03-16 RX ORDER — CALCIUM CARBONATE 500(1250)
1000 TABLET ORAL 3 TIMES DAILY
Qty: 180 TABLET | Refills: 0 | Status: SHIPPED | OUTPATIENT
Start: 2020-03-16 | End: 2020-04-15

## 2020-03-16 RX ORDER — POLYETHYLENE GLYCOL 3350 17 G/17G
17 POWDER, FOR SOLUTION ORAL DAILY PRN
Start: 2020-03-16 | End: 2020-09-15

## 2020-03-16 RX ADMIN — CALCITRIOL 1 MCG: 0.25 CAPSULE ORAL at 09:03

## 2020-03-16 RX ADMIN — CALCIUM 1000 MG: 500 TABLET ORAL at 09:01

## 2020-03-16 RX ADMIN — CARVEDILOL 12.5 MG: 12.5 TABLET, FILM COATED ORAL at 08:54

## 2020-03-16 RX ADMIN — SODIUM CHLORIDE, PRESERVATIVE FREE 10 ML: 5 INJECTION INTRAVENOUS at 09:49

## 2020-03-16 RX ADMIN — FAMOTIDINE 20 MG: 20 TABLET, FILM COATED ORAL at 09:01

## 2020-03-16 RX ADMIN — CALCIUM ACETATE 2001 MG: 667 CAPSULE ORAL at 08:54

## 2020-03-16 RX ADMIN — CALCIUM ACETATE 2001 MG: 667 CAPSULE ORAL at 11:56

## 2020-03-16 RX ADMIN — CLONIDINE HYDROCHLORIDE 0.3 MG: 0.1 TABLET ORAL at 09:00

## 2020-03-16 RX ADMIN — MINOXIDIL 10 MG: 10 TABLET ORAL at 09:00

## 2020-03-16 RX ADMIN — CALCITRIOL 1 MCG: 0.25 CAPSULE ORAL at 09:05

## 2020-03-16 NOTE — PROGRESS NOTES
Continued Stay Note  [facility]     Patient Name: Sunni Mcneil  MRN: [id number]  Today's Date: 3/16/2020    Admit Date: 3/13/2020    Discharge Plan     Row Name 03/16/20 1331       Plan    Plan  Return home    Patient/Family in Agreement with Plan  yes    Plan Comments  Spoke with patient at bedside.  She confirms that she is going to return home with her daughter.  She states she does not have a ride home.  [employer] Consent Form signed.  BHumeniuk RN       Expected Discharge Date and Time     Expected Discharge Date Expected Discharge Time    Mar 16, 2020             Becky S. Humeniuk, RN

## 2020-03-16 NOTE — DISCHARGE SUMMARY
Patient Name: Sunni Mcneil  : 1975  MRN: [id number]    Date of Admission: 3/13/2020  Date of Discharge:  3/16/2020  Primary Care Physician: Provider, No Known      Chief Complaint:   Diarrhea      Discharge Diagnoses     Active Hospital Problems    Diagnosis  POA   • Folate deficiency anemia [D52.9]  Unknown   • Obesity (BMI 30-39.9) [E66.9]  Yes   • A-V fistula (CMS/[facility]), right upper extremity [I77.0]  Yes   • Anemia, chronic disease [D63.8]  Yes   • DM type 2 (diabetes mellitus, type 2) (CMS/[facility]) [E11.9]  Yes   • Leukocytosis [D72.829]  Yes   • Peritoneal dialysis catheter in place (CMS/HCC) [Z99.2]  Not Applicable   • ESRD on hemodialysis (CMS/HCC) [N18.6, Z99.2]  Not Applicable   • Hypertension [I10]  Yes      Resolved Hospital Problems    Diagnosis Date Resolved POA   • **Diarrhea [R19.7] 2020 Yes   • Acute UTI (urinary tract infection) [N39.0] 2020 Yes   • Acute cystitis with hematuria [N30.01] 2020 Yes   • Hypocalcemia [E83.51] 2020 Yes        Hospital Course     Ms. Mcneil is a 44 y.o. woman on peritoneal dialysis for ESRD.  She was admitted because of nausea, vomiting and diarrhea.  This was likely secondary to gastroenteritis.  It resolved fairly quickly on its own.  She had anemia and transfusion was ordered.  During the second unit, her blood pressure dropped so it was stopped.  Hemoglobin is improved today.  She is tolerating her diet and has been up and about.  She has been okayed for discharge by nephrology.  GI signed off her yesterday.  No plans for endoscopy at this time.  Folate level was low.  I have started her on folate.  Patient did receive a course of IV Rocephin for bacteriuria.  She had multiple squamous cells and culture was mixed.  No further antibiotics are needed.  She has been started on calcium replacement for hypocalcemia.    Stable condition; fair prognosis    Day of Discharge     Feels fine.  Anxious for discharge    Physical Exam:  Temp:  [97.9  °F (36.6 °C)-98.8 °F (37.1 °C)] 98.8 °F (37.1 °C)  Heart Rate:  [84-94] 91  Resp:  [14-18] 14  BP: ()/(43-63) 102/63  Body mass index is 35.66 kg/m².  Physical Exam   Constitutional: She appears well-developed and well-nourished. No distress.   Looks older than age   HENT:   Mouth/Throat: No oropharyngeal exudate.   Cardiovascular: Normal rate and regular rhythm.   Pulmonary/Chest: No stridor. No respiratory distress. She has no wheezes.   Clear   Abdominal: Soft. She exhibits no distension. There is no tenderness.   PD catheter site intact   Musculoskeletal: She exhibits edema.   Trace   Neurological: She is alert.   Skin: Skin is warm. She is not diaphoretic.   Nursing note and vitals reviewed.      Consultants     Consult Orders (all) (From admission, onward)     Start     Ordered    03/13/20 1202  Inpatient Nephrology Consult  Once     Specialty:  Nephrology  Provider:  Carlos Manuel Gonzales MD    03/13/20 1202    03/13/20 1047  Inpatient Gastroenterology Consult  Once     Specialty:  Gastroenterology  Provider:  Casimiro Perkins MD    03/13/20 1046    03/13/20 1046  Inpatient Nephrology Consult  Once,   Status:  Canceled     Specialty:  Nephrology  Provider:  Addie Watt MD    03/13/20 1046    03/13/20 1046  Inpatient Gastroenterology Consult  Once,   Status:  Canceled     Specialty:  Gastroenterology  Provider:  (Not yet assigned)    03/13/20 1046    03/13/20 0939  Nephrology (on -call MD unless specified)  Once     Specialty:  Nephrology  Provider:  (Not yet assigned)    03/13/20 0938    03/13/20 0938  [facility] (on-call MD unless specified) Details  Once     Specialty:  Hospitalist  Provider:  (Not yet assigned)    03/13/20 0938              Procedures     * Surgery not found *      Imaging Results (All)     Procedure Component Value Units Date/Time    XR Chest 2 View [[id number]] Collected:  03/13/20 1008     Updated:  03/13/20 1615    Narrative:       2 VIEWS OF THE CHEST     HISTORY:  44-year-old female with a history of diarrhea and leukocytosis.     FINDINGS: PA and lateral chest radiograph were obtained. Comparison is  made to chest radiograph dated 11/10/2019. The lungs are normal in  volume and are clear. The cardiomediastinal silhouette and pulmonary  vascular appear normal.       Impression:       Negative chest radiographs.     This report was finalized on 3/13/2020 4:12 PM by Dr. Kofi Oviedo M.D.       XR Abdomen KUB [[id number]] Collected:  03/13/20 1445     Updated:  03/13/20 1450    Narrative:       XR ABDOMEN KUB-     INDICATIONS: Diarrhea, peritoneal dialysis catheter     TECHNIQUE: Supine view of the abdomen     COMPARISON: 11/18/2019     FINDINGS:     A peritoneal dialysis catheter extends to the pelvis. The bowel gas  pattern is nonspecific. Mild gaseous distention of the stomach. Gas is  seen in small bowel and in the large bowel. A loop of small bowel in the  left aspect of the upper pelvis/lower abdomen shows borderline caliber.  No supine evidence for free intraperitoneal gas. No definite  nephrolithiasis. If there is further clinical concern, CT can be  obtained for further examination.       Impression:          As described.     This report was finalized on 3/13/2020 2:47 PM by Dr. David Montoya M.D.                 Pertinent Labs     Results from last 7 days   Lab Units 03/16/20  0547 03/15/20  0524 03/14/20  0512 03/13/20  0819   WBC 10*3/mm3 11.20* 13.34* 15.81* 13.44*   HEMOGLOBIN g/dL 8.1* 7.1* 7.3* 8.8*   PLATELETS 10*3/mm3 384 416 422 461*     Results from last 7 days   Lab Units 03/16/20  0547 03/15/20  0524 03/14/20  0512 03/13/20  0819   SODIUM mmol/L 135* 137 139 140   POTASSIUM mmol/L 3.2* 3.3* 3.6 3.8   CHLORIDE mmol/L 96* 97* 101 99   CO2 mmol/L 23.5 24.4 21.9* 22.0   BUN mg/dL 41* 47* 45* 46*   CREATININE mg/dL 9.26* 8.61* 7.23* 8.01*   GLUCOSE mg/dL 101* 102* 118* 94   Estimated Creatinine Clearance: 7.4 mL/min (A) (by C-G formula based on SCr of  9.26 mg/dL (H)).  Results from last 7 days   Lab Units 03/16/20  0547 03/15/20  0524 03/14/20  0512 03/13/20  0819   ALBUMIN g/dL 2.40* 2.70* 2.60* 3.10*   BILIRUBIN mg/dL  --   --   --  0.3   ALK PHOS U/L  --   --   --  57   AST (SGOT) U/L  --   --   --  17   ALT (SGPT) U/L  --   --   --  <5     Results from last 7 days   Lab Units 03/16/20  0547 03/15/20  0524 03/14/20  0512 03/13/20  0819   CALCIUM mg/dL 7.7* 7.4* 5.6* 5.5*   ALBUMIN g/dL 2.40* 2.70* 2.60* 3.10*   MAGNESIUM mg/dL 1.9  --   --  2.2   PHOSPHORUS mg/dL 6.5* 5.6* 5.8*  --                Invalid input(s): LDLCALC     B12 402.  Folate low at 3.6  Ferritin high at 1075.  Rest of the iron panel all low: Iron 23 iron saturation 17 transferrin 91 TIBC 137.    Reticulocyte percent normal at 1.3    Results from last 7 days   Lab Units 03/13/20  0949 03/13/20  0926 03/13/20  0900   BLOODCX  No growth at 3 days  --  No growth at 3 days   URINECX   --  50,000 CFU/mL Mixed Kavya Isolated  --        Test Results Pending at Discharge      Order Current Status    Folate RBC In process    Blood Culture - Blood, Arm, Left Preliminary result    Blood Culture - Blood, Arm, Left Preliminary result          Discharge Details        Discharge Medications      New Medications      Instructions Start Date   calcitriol 0.5 MCG capsule  Commonly known as:  ROCALTROL   1 mcg, Oral, Every 12 Hours Scheduled      calcium carbonate (oyster shell) 500 MG tablet tablet   1,000 mg, Oral, 3 Times Daily      DELFLEX-LC/1.5% DEXTROSE 344 MOSM/L CAPD   2,000 mL, Intraperitoneal, As Needed      folic acid 1 MG tablet  Commonly known as:  FOLVITE   1 mg, Oral, Daily         Changes to Medications      Instructions Start Date   polyethylene glycol packet  Commonly known as:  MIRALAX  What changed:    · when to take this  · reasons to take this   17 g, Oral, Daily PRN         Continue These Medications      Instructions Start Date   calcium acetate 667 MG capsule  Commonly known as:   PHOSLO   2,001 mg, Oral, 3 Times Daily With Meals      carvedilol 12.5 MG tablet  Commonly known as:  COREG   12.5 mg, Oral, 2 Times Daily With Meals      CHLORHEXIDINE GLUCONATE CLOTH EX   1 application, Apply externally, 2 Times Daily, AS DIRECTED PREOP      cloNIDine 0.3 MG tablet  Commonly known as:  CATAPRES   0.3 mg, Oral, 3 Times Daily      doxylamine-pyridoxine 10-10 MG tablet delayed-release EC tablet  Commonly known as:  DICLEGIS   1 tablet, Oral, 3 Times Daily PRN      famotidine 20 MG tablet  Commonly known as:  PEPCID   20 mg, Oral, Daily      HYDROcodone-acetaminophen  MG per tablet  Commonly known as:  NORCO   1 tablet, Oral, Every 6 Hours PRN      minoxidil 10 MG tablet  Commonly known as:  LONITEN   10 mg, Oral, Daily      ondansetron 4 MG tablet  Commonly known as:  ZOFRAN   4 mg, Oral, Every 6 Hours PRN             No Known Allergies      Discharge Disposition:  Home or Self Care    Discharge Diet:  Diet Order   Procedures   • Diet Regular       Discharge Activity:   Activity Instructions     Activity as Tolerated            CODE STATUS:    Code Status and Medical Interventions:   Ordered at: 03/13/20 1046     Code Status:    CPR     Medical Interventions (Level of Support Prior to Arrest):    Full       No future appointments.  Follow-up Information     Provider, No Known Follow up in 1 week(s).    Why:  dm; htn  Contact information:  [facility] [address]             Carlos Manuel Gonzales MD Follow up in 3 week(s).    Specialty:  Nephrology  Why:  or as he recs for ESRD and PD  Contact information:  [address]  [address]  [facility] [id number] [phone]             Casimiro Perkins MD Follow up in 1 month(s).    Specialty:  Gastroenterology  Why:  anemia  Contact information:  [address]  [address]  [facility] [id number] [phone]                   Time Spent on Discharge:  Greater than 35 minutes      Tara Bee MD  [facility]  03/16/20  11:47 AM

## 2020-03-16 NOTE — PROGRESS NOTES
Case Management Discharge Note      Final Note: Patient has been DC'd home via [employer]              Transportation Services  Taxi: other([employer])    Final Discharge Disposition Code: 01 - home or self-care

## 2020-03-16 NOTE — PLAN OF CARE
Problem: Patient Care Overview  Goal: Plan of Care Review  Outcome: Ongoing (interventions implemented as appropriate)  Flowsheets  Taken 3/16/2020 0528 by Maricarmen Zhou, RN  Progress: improving  Taken 3/16/2020 1216 by Blanquita Daniels, RN  Plan of Care Reviewed With: patient  Outcome Summary: Pt discharged to home, VSS, performed one session of PD this morning, medications takened as ordered, denies any pain, nephrogology stated pt is ready for DC.

## 2020-03-16 NOTE — PLAN OF CARE
Problem: Patient Care Overview  Goal: Plan of Care Review  Outcome: Ongoing (interventions implemented as appropriate)  Flowsheets (Taken 3/16/2020 4949)  Progress: improving  Plan of Care Reviewed With: patient  Pt no c/o nausea or diarrhea this shift, resting well. BP low with asymptomatic, bolus 1000 cc LR, 2 unit PRBCs completed. BP goes up a little bit. PD exchanges 2x this shift. Poss D/C today if labs stable. Will continue to monitor.

## 2020-03-16 NOTE — PROGRESS NOTES
"   LOS: 3 days    Patient Care Team:  Provider, No Known as PCP - Bo Hummel MD as Consulting Physician (Nephrology)  Damon Rooney MD as Surgeon (General Surgery)    Chief Complaint:    Chief Complaint   Patient presents with   • Diarrhea     Follow-up ESRD and hypocalcemia  Subjective     Interval History:   Patient is feeling better today, denies nausea or vomiting, no abdominal pain, tolerating her PD without any difficulties, no chest pain no shortness of air, no orthopnea or PND, no diarrhea or constipation.      Review of Systems:   As noted above    Objective     Vital Signs  Temp:  [97.9 °F (36.6 °C)-98.8 °F (37.1 °C)] 98.8 °F (37.1 °C)  Heart Rate:  [84-94] 91  Resp:  [14-18] 14  BP: ()/(43-63) 102/63    Flowsheet Rows      First Filed Value   Admission Height  152.4 cm (60\") Documented at 03/13/2020 0803   Admission Weight  77.1 kg (170 lb) Documented at 03/13/2020 0817          I/O this shift:  In: 1750 [P.O.:100; Other:1650]  Out: 1800 [Other:1800]  I/O last 3 completed shifts:  In: 71962 [P.O.:720; Blood:300; Other:00914; IV Piggyback:1000]  Out: 62812 [Other:32549]    Intake/Output Summary (Last 24 hours) at 3/16/2020 1203  Last data filed at 3/16/2020 1000  Gross per 24 hour   Intake 9930 ml   Output 9700 ml   Net 230 ml       Physical Exam:  General Appearance: alert, oriented x 3, no acute distress, appears to be chronically  Skin: warm and dry  HEENT: pupils round and reactive to light, oral mucosa normal, nonicteric sclera  Neck: supple, no JVD, trachea midline  Lungs: CTA, unlabored breathing effort  Heart: RRR, normal S1 and S2, no S3, no rub  Abdomen: soft, non-tender,  present bowel sounds to auscultation, can be catheter in place, exit site is clean with no tunnel tenderness  : no palpable bladder,  Extremities: no edema, cyanosis or clubbing, functional AV fistula in the right upper arm  Neuro: normal speech and mental status      Results Review:  "   Results from last 7 days   Lab Units 03/16/20  0547 03/15/20  0524 03/14/20  0512 03/13/20  0819   SODIUM mmol/L 135* 137 139 140   POTASSIUM mmol/L 3.2* 3.3* 3.6 3.8   CHLORIDE mmol/L 96* 97* 101 99   CO2 mmol/L 23.5 24.4 21.9* 22.0   BUN mg/dL 41* 47* 45* 46*   CREATININE mg/dL 9.26* 8.61* 7.23* 8.01*   CALCIUM mg/dL 7.7* 7.4* 5.6* 5.5*   BILIRUBIN mg/dL  --   --   --  0.3   ALK PHOS U/L  --   --   --  57   ALT (SGPT) U/L  --   --   --  <5   AST (SGOT) U/L  --   --   --  17   GLUCOSE mg/dL 101* 102* 118* 94       Estimated Creatinine Clearance: 7.4 mL/min (A) (by C-G formula based on SCr of 9.26 mg/dL (H)).    Results from last 7 days   Lab Units 03/16/20  0547 03/15/20  0524 03/14/20  0512 03/13/20  0819   MAGNESIUM mg/dL 1.9  --   --  2.2   PHOSPHORUS mg/dL 6.5* 5.6* 5.8*  --              Results from last 7 days   Lab Units 03/16/20  0547 03/15/20  0524 03/14/20  0512 03/13/20  0819   WBC 10*3/mm3 11.20* 13.34* 15.81* 13.44*   HEMOGLOBIN g/dL 8.1* 7.1* 7.3* 8.8*   PLATELETS 10*3/mm3 384 416 422 461*               Imaging Results (Last 24 Hours)     ** No results found for the last 24 hours. **          calcitriol 1 mcg Oral Q12H   calcium acetate 2,001 mg Oral TID With Meals   calcium carbonate (oyster shell) 1,000 mg Oral TID   carvedilol 12.5 mg Oral BID With Meals   cloNIDine 0.3 mg Oral TID   famotidine 20 mg Oral Daily   folic acid 1 mg Oral Daily   insulin lispro 0-7 Units Subcutaneous 4x Daily With Meals & Nightly   minoxidil 10 mg Oral Daily   minoxidil 10 mg Oral Once   sodium chloride 10 mL Intravenous Q12H          Medication Review:   Current Facility-Administered Medications   Medication Dose Route Frequency Provider Last Rate Last Dose   • acetaminophen (TYLENOL) tablet 650 mg  650 mg Oral Q4H PRN Charlie Braxton APRN   650 mg at 03/14/20 2329   • calcitriol (ROCALTROL) capsule 1 mcg  1 mcg Oral Q12H Carlos Manuel Gonzales MD   1 mcg at 03/16/20 0905   • calcium acetate (PHOSLO) capsule 2,001 mg   2,001 mg Oral TID With Meals Charlie Braxton APRN   2,001 mg at 03/16/20 1156   • calcium carbonate (oyster shell) tablet 1,000 mg  1,000 mg Oral TID Carlos Manuel Gonzales MD   1,000 mg at 03/16/20 0901   • carvedilol (COREG) tablet 12.5 mg  12.5 mg Oral BID With Meals Charlie Braxton APRN   12.5 mg at 03/16/20 0854   • cloNIDine (CATAPRES) tablet 0.3 mg  0.3 mg Oral TID Charlie Braxton APRN   0.3 mg at 03/16/20 0900   • DELFLEX-LC/1.5% DEXTROSE (DIANEAL) CAPD 2,000 mL  2,000 mL Intraperitoneal PRN Carlos Manuel Gonzales MD   2,000 mL at 03/15/20 0445   • dextrose (D50W) 25 g/ 50mL Intravenous Solution 25 g  25 g Intravenous Q15 Min PRN Charlie Braxton APRN       • dextrose (GLUTOSE) oral gel 15 g  15 g Oral Q15 Min PRN Charlie Braxton APRN       • docusate sodium (COLACE) capsule 100 mg  100 mg Oral BID PRN Charlie Braxton APRN       • doxylamine-pyridoxine (DICLEGIS) EC tablet 1 tablet  1 tablet Oral TID PRN Charlie Braxton APRN       • famotidine (PEPCID) tablet 20 mg  20 mg Oral Daily Charlie Braxton APRN   20 mg at 03/16/20 0901   • folic acid (FOLVITE) tablet 1 mg  1 mg Oral Daily Tara Bee MD       • glucagon (human recombinant) (GLUCAGEN DIAGNOSTIC) injection 1 mg  1 mg Subcutaneous Q15 Min PRN Charlie Braxton APRN       • HYDROcodone-acetaminophen (NORCO)  MG per tablet 1 tablet  1 tablet Oral Q6H PRN Charlie Braxton APRN       • insulin lispro (humaLOG) injection 0-7 Units  0-7 Units Subcutaneous 4x Daily With Meals & Nightly Charlie Braxton APRN       • minoxidil (LONITEN) tablet 10 mg  10 mg Oral Daily Charlie Braxton APRN   10 mg at 03/16/20 0900   • minoxidil (LONITEN) tablet 10 mg  10 mg Oral Once Carlos Manuel Gonzales MD       • ondansetron (ZOFRAN) tablet 4 mg  4 mg Oral Q6H PRN Charlie Braxton APRN        Or   • ondansetron (ZOFRAN) injection 4 mg  4 mg Intravenous Q6H PRN Charlie Braxton APRN   4 mg at 03/15/20 0921   • sodium chloride 0.9 % flush 10 mL  10 mL  Intravenous PRN Everardo Gerard MD       • sodium chloride 0.9 % flush 10 mL  10 mL Intravenous Q12H Charlie Braxton APRN   10 mL at 03/16/20 0949   • sodium chloride 0.9 % flush 10 mL  10 mL Intravenous PRN Charlie Braxton APRN           Assessment/Plan   1.  End-stage renal disease currently on peritoneal dialysis.No evidence of peritonitis.    2.    Nausea and vomiting completely resolved 3.  Hypocalcemia,  improving calcium up to 7.7 when corrected to albumin is up to 8.9.  4.  Anemia of chronic kidney disease receiving TERESA as an outpatient, hemoglobin 8.1, today  5.  Failed kidney transplant  6.  Reported history of prolonged QT syndrome  7.  Diabetes mellitus type 2  8.  Prior parathyroidectomy with autotransplant to the left forearm in April 2018 not been taking her calcitriol.  But it was restarted yesterday      Plan:  Continue the same treatment.  Patient is cleared for discharge from the renal standpoint and I will follow her up in the home dialysis clinic.          Carlos Manuel Gonzales MD  03/16/20  12:03

## 2020-03-17 ENCOUNTER — READMISSION MANAGEMENT (OUTPATIENT)
Dept: CALL CENTER | Facility: HOSPITAL | Age: 45
End: 2020-03-17

## 2020-03-17 NOTE — OUTREACH NOTE
Prep Survey      Responses   [facility] patient discharged from?  [facility]   Is LACE score < 7 ?  No   Eligibility  Readm Mgmt   Discharge diagnosis  **Diarrhea    Does the patient have one of the following disease processes/diagnoses(primary or secondary)?  Other   Does the patient have Home health ordered?  No   Is there a DME ordered?  No   General alerts for this patient  PD pt    Prep survey completed?  Yes          Rosemary Figueroa RN

## 2020-03-18 LAB
BACTERIA SPEC AEROBE CULT: NORMAL
BACTERIA SPEC AEROBE CULT: NORMAL
FOLATE BLD-MCNC: 273 NG/ML
FOLATE RBC-MCNC: 1294 NG/ML
HCT VFR BLD AUTO: 21.1 % (ref 34–46.6)

## 2020-03-20 ENCOUNTER — READMISSION MANAGEMENT (OUTPATIENT)
Dept: CALL CENTER | Facility: HOSPITAL | Age: 45
End: 2020-03-20

## 2020-03-20 NOTE — OUTREACH NOTE
Medical Week 1 Survey      Responses   [facility] patient discharged from?  [place]   Does the patient have one of the following disease processes/diagnoses(primary or secondary)?  Other   Is there a successful TCM telephone encounter documented?  No   Week 1 attempt successful?  Yes   Call start time  0914   Call end time  0920   General alerts for this patient  PD pt    Discharge diagnosis  **Diarrhea    Is patient permission given to speak with other caregiver?  Yes   List who call center can speak with     Meds reviewed with patient/caregiver?  Yes   Is the patient having any side effects they believe may be caused by any medication additions or changes?  No   Does the patient have all medications ordered at discharge?  Yes   Is the patient taking all medications as directed (includes completed medication regime)?  Yes   Does the patient have a primary care provider?   Yes   Does the patient have an appointment with their PCP within 7 days of discharge?  Yes   Has the patient kept scheduled appointments due by today?  N/A   Psychosocial issues?  No   Comments  Pt reports appetite WNL, denies issues with N/V/D.   Did the patient receive a copy of their discharge instructions?  Yes   Nursing interventions  Reviewed instructions with patient   What is the patient's perception of their health status since discharge?  Returned to baseline/stable   Is the patient/caregiver able to teach back signs and symptoms related to disease process for when to call PCP?  Yes   Is the patient/caregiver able to teach back signs and symptoms related to disease process for when to call 911?  Yes   Is the patient/caregiver able to teach back the hierarchy of who to call/visit for symptoms/problems? PCP, Specialist, Home health nurse, Urgent Care, ED, 911  Yes   Week 1 call completed?  Yes   Revoked  No further contact(revokes)-requires comment   Graduated/Revoked comments  baseline          Taisha Morley RN

## 2020-04-15 ENCOUNTER — TELEPHONE (OUTPATIENT)
Dept: GASTROENTEROLOGY | Facility: CLINIC | Age: 45
End: 2020-04-15

## 2020-05-05 ENCOUNTER — HOSPITAL ENCOUNTER (OUTPATIENT)
Facility: HOSPITAL | Age: 45
Setting detail: OBSERVATION
LOS: 1 days | Discharge: HOME | End: 2020-05-07
Attending: EMERGENCY MEDICINE | Admitting: HOSPITALIST

## 2020-05-05 ENCOUNTER — APPOINTMENT (OUTPATIENT)
Dept: CT IMAGING | Facility: HOSPITAL | Age: 45
End: 2020-05-05

## 2020-05-05 DIAGNOSIS — R50.9 FEBRILE ILLNESS, ACUTE: Primary | ICD-10-CM

## 2020-05-05 DIAGNOSIS — N18.6 END STAGE RENAL DISEASE (HCC): ICD-10-CM

## 2020-05-05 LAB
ALBUMIN SERPL-MCNC: 3.4 G/DL (ref 3.5–5.2)
ALBUMIN/GLOB SERPL: 0.6 G/DL
ALP SERPL-CCNC: 79 U/L (ref 39–117)
ALT SERPL W P-5'-P-CCNC: 5 U/L (ref 1–33)
ANION GAP SERPL CALCULATED.3IONS-SCNC: 19.5 MMOL/L (ref 5–15)
AST SERPL-CCNC: 10 U/L (ref 1–32)
BACTERIA UR QL AUTO: ABNORMAL /HPF
BASOPHILS # BLD AUTO: 0.06 10*3/MM3 (ref 0–0.2)
BASOPHILS NFR BLD AUTO: 0.2 % (ref 0–1.5)
BILIRUB SERPL-MCNC: 0.5 MG/DL (ref 0.2–1.2)
BILIRUB UR QL STRIP: NEGATIVE
BUN BLD-MCNC: 50 MG/DL (ref 6–20)
BUN/CREAT SERPL: 4.1 (ref 7–25)
CALCIUM SPEC-SCNC: 7.3 MG/DL (ref 8.6–10.5)
CHLORIDE SERPL-SCNC: 90 MMOL/L (ref 98–107)
CLARITY UR: ABNORMAL
CO2 SERPL-SCNC: 23.5 MMOL/L (ref 22–29)
COLOR UR: YELLOW
CREAT BLD-MCNC: 12.31 MG/DL (ref 0.57–1)
D-LACTATE SERPL-SCNC: 1.5 MMOL/L (ref 0.5–2)
DEPRECATED RDW RBC AUTO: 54.6 FL (ref 37–54)
EOSINOPHIL # BLD AUTO: 0.14 10*3/MM3 (ref 0–0.4)
EOSINOPHIL NFR BLD AUTO: 0.5 % (ref 0.3–6.2)
ERYTHROCYTE [DISTWIDTH] IN BLOOD BY AUTOMATED COUNT: 17.4 % (ref 12.3–15.4)
GFR SERPL CREATININE-BSD FRML MDRD: 3 ML/MIN/1.73
GFR SERPL CREATININE-BSD FRML MDRD: ABNORMAL ML/MIN/{1.73_M2}
GLOBULIN UR ELPH-MCNC: 5.6 GM/DL
GLUCOSE BLD-MCNC: 123 MG/DL (ref 65–99)
GLUCOSE UR STRIP-MCNC: NEGATIVE MG/DL
HCT VFR BLD AUTO: 25.2 % (ref 34–46.6)
HGB BLD-MCNC: 8.6 G/DL (ref 12–15.9)
HGB UR QL STRIP.AUTO: ABNORMAL
HYALINE CASTS UR QL AUTO: ABNORMAL /LPF
IMM GRANULOCYTES # BLD AUTO: 0.3 10*3/MM3 (ref 0–0.05)
IMM GRANULOCYTES NFR BLD AUTO: 1.1 % (ref 0–0.5)
KETONES UR QL STRIP: NEGATIVE
LEUKOCYTE ESTERASE UR QL STRIP.AUTO: ABNORMAL
LYMPHOCYTES # BLD AUTO: 1.28 10*3/MM3 (ref 0.7–3.1)
LYMPHOCYTES NFR BLD AUTO: 4.9 % (ref 19.6–45.3)
MCH RBC QN AUTO: 29.8 PG (ref 26.6–33)
MCHC RBC AUTO-ENTMCNC: 34.1 G/DL (ref 31.5–35.7)
MCV RBC AUTO: 87.2 FL (ref 79–97)
MONOCYTES # BLD AUTO: 1.31 10*3/MM3 (ref 0.1–0.9)
MONOCYTES NFR BLD AUTO: 5 % (ref 5–12)
NEUTROPHILS # BLD AUTO: 23.27 10*3/MM3 (ref 1.7–7)
NEUTROPHILS NFR BLD AUTO: 88.3 % (ref 42.7–76)
NITRITE UR QL STRIP: POSITIVE
NRBC BLD AUTO-RTO: 0 /100 WBC (ref 0–0.2)
PH UR STRIP.AUTO: 6 [PH] (ref 5–8)
PLATELET # BLD AUTO: 306 10*3/MM3 (ref 140–450)
PMV BLD AUTO: 9.2 FL (ref 6–12)
POTASSIUM BLD-SCNC: 3.4 MMOL/L (ref 3.5–5.2)
PROT SERPL-MCNC: 9 G/DL (ref 6–8.5)
PROT UR QL STRIP: ABNORMAL
RBC # BLD AUTO: 2.89 10*6/MM3 (ref 3.77–5.28)
RBC # UR: ABNORMAL /HPF
REF LAB TEST METHOD: ABNORMAL
SODIUM BLD-SCNC: 133 MMOL/L (ref 136–145)
SP GR UR STRIP: 1.01 (ref 1–1.03)
SQUAMOUS #/AREA URNS HPF: ABNORMAL /HPF
UROBILINOGEN UR QL STRIP: ABNORMAL
WBC NRBC COR # BLD: 26.36 10*3/MM3 (ref 3.4–10.8)
WBC UR QL AUTO: ABNORMAL /HPF

## 2020-05-05 PROCEDURE — 87077 CULTURE AEROBIC IDENTIFY: CPT | Performed by: EMERGENCY MEDICINE

## 2020-05-05 PROCEDURE — 96375 TX/PRO/DX INJ NEW DRUG ADDON: CPT

## 2020-05-05 PROCEDURE — 85025 COMPLETE CBC W/AUTO DIFF WBC: CPT | Performed by: EMERGENCY MEDICINE

## 2020-05-05 PROCEDURE — P9612 CATHETERIZE FOR URINE SPEC: HCPCS

## 2020-05-05 PROCEDURE — 99284 EMERGENCY DEPT VISIT MOD MDM: CPT

## 2020-05-05 PROCEDURE — 87086 URINE CULTURE/COLONY COUNT: CPT | Performed by: EMERGENCY MEDICINE

## 2020-05-05 PROCEDURE — 25010000002 CEFTRIAXONE PER 250 MG: Performed by: EMERGENCY MEDICINE

## 2020-05-05 PROCEDURE — 25010000002 LORAZEPAM PER 2 MG: Performed by: EMERGENCY MEDICINE

## 2020-05-05 PROCEDURE — 81001 URINALYSIS AUTO W/SCOPE: CPT | Performed by: EMERGENCY MEDICINE

## 2020-05-05 PROCEDURE — 80053 COMPREHEN METABOLIC PANEL: CPT | Performed by: EMERGENCY MEDICINE

## 2020-05-05 PROCEDURE — 74176 CT ABD & PELVIS W/O CONTRAST: CPT

## 2020-05-05 PROCEDURE — 87040 BLOOD CULTURE FOR BACTERIA: CPT | Performed by: EMERGENCY MEDICINE

## 2020-05-05 PROCEDURE — 25010000002 DIPHENHYDRAMINE PER 50 MG: Performed by: EMERGENCY MEDICINE

## 2020-05-05 PROCEDURE — 36415 COLL VENOUS BLD VENIPUNCTURE: CPT

## 2020-05-05 PROCEDURE — 83605 ASSAY OF LACTIC ACID: CPT | Performed by: EMERGENCY MEDICINE

## 2020-05-05 PROCEDURE — 87186 SC STD MICRODIL/AGAR DIL: CPT | Performed by: EMERGENCY MEDICINE

## 2020-05-05 PROCEDURE — 96365 THER/PROPH/DIAG IV INF INIT: CPT

## 2020-05-05 RX ORDER — VANCOMYCIN HYDROCHLORIDE 1 G/200ML
1000 INJECTION, SOLUTION INTRAVENOUS ONCE
Status: COMPLETED | OUTPATIENT
Start: 2020-05-05 | End: 2020-05-06

## 2020-05-05 RX ORDER — SODIUM CHLORIDE 0.9 % (FLUSH) 0.9 %
10 SYRINGE (ML) INJECTION AS NEEDED
Status: DISCONTINUED | OUTPATIENT
Start: 2020-05-05 | End: 2020-05-07 | Stop reason: HOSPADM

## 2020-05-05 RX ORDER — LORAZEPAM 2 MG/ML
1 INJECTION INTRAMUSCULAR ONCE
Status: COMPLETED | OUTPATIENT
Start: 2020-05-05 | End: 2020-05-05

## 2020-05-05 RX ORDER — DIPHENHYDRAMINE HYDROCHLORIDE 50 MG/ML
25 INJECTION INTRAMUSCULAR; INTRAVENOUS ONCE
Status: COMPLETED | OUTPATIENT
Start: 2020-05-05 | End: 2020-05-05

## 2020-05-05 RX ORDER — CEFTRIAXONE SODIUM 1 G/50ML
1 INJECTION, SOLUTION INTRAVENOUS ONCE
Status: COMPLETED | OUTPATIENT
Start: 2020-05-05 | End: 2020-05-05

## 2020-05-05 RX ADMIN — SODIUM CHLORIDE, PRESERVATIVE FREE 10 ML: 5 INJECTION INTRAVENOUS at 23:35

## 2020-05-05 RX ADMIN — LORAZEPAM 1 MG: 2 INJECTION INTRAMUSCULAR; INTRAVENOUS at 23:39

## 2020-05-05 RX ADMIN — SODIUM CHLORIDE, PRESERVATIVE FREE 10 ML: 5 INJECTION INTRAVENOUS at 23:41

## 2020-05-05 RX ADMIN — SODIUM CHLORIDE 1000 ML: 9 INJECTION, SOLUTION INTRAVENOUS at 20:20

## 2020-05-05 RX ADMIN — CEFTRIAXONE SODIUM 1 G: 1 INJECTION, SOLUTION INTRAVENOUS at 21:30

## 2020-05-05 RX ADMIN — DIPHENHYDRAMINE HYDROCHLORIDE 25 MG: 50 INJECTION, SOLUTION INTRAMUSCULAR; INTRAVENOUS at 23:35

## 2020-05-05 RX ADMIN — SODIUM CHLORIDE, PRESERVATIVE FREE 10 ML: 5 INJECTION INTRAVENOUS at 23:38

## 2020-05-05 NOTE — ED PROVIDER NOTES
EMERGENCY DEPARTMENT ENCOUNTER    Room Number:  14/14  Date of encounter:  5/6/2020  PCP: Provider, No Known  Historian: Patient      HPI:  Chief Complaint: Flank pain and fever  A complete HPI/ROS/PMH/PSH/SH/FH are unobtainable due to: Nothing    Context: Sunni Mcneil is a 45 y.o. female who presents to the ED c/o right-sided flank pain with fever for 2 days.  T-max of 101.  The right flank pain is moderate in severity, it is burning in character, does not radiate and nothing makes it better or worse.    Patient has no other constitutional symptoms which include no chest pain or shortness of breath, no abdominal pain, no nausea or vomiting.  She has no dysuria, and she said her peritoneal dialysis is been going to spine at home with no difficulty in infusing or removing the fluid.  The fluid is been clear and there is no irritation around the catheter site.    From review of the records, I see that she did have a kidney transplant a few years ago which is since failed and Dr. Brown is her nephrologist      PAST MEDICAL HISTORY  Active Ambulatory Problems     Diagnosis Date Noted   • Hyperparathyroidism (CMS/[facility]) 01/24/2018   • Acute rejection of kidney transplant 05/10/2017   • ARF (acute renal failure) (CMS/[facility]) 04/04/2014   • Hx of kidney transplant 07/06/2017   • Hypertension 01/24/2018   • Kidney transplant status, cadaveric 07/06/2017   • Nephritis 04/13/2014   • Hypercalcemia 01/24/2018   • ESRD on hemodialysis (CMS/[facility]) 07/02/2019   • Peritoneal dialysis catheter in place (CMS/[facility]) 07/24/2019   • Prolonged QT interval 11/07/2019   • ESRD (end stage renal disease) (CMS/[facility]) 11/07/2019   • Hyperphosphatemia 11/07/2019   • Leukocytosis 11/07/2019   • DM type 2 (diabetes mellitus, type 2) (CMS/[facility]) 11/07/2019   • Obesity (BMI 30-39.9) 03/13/2020   • A-V fistula (CMS/[facility]), right upper extremity 03/13/2020   • Anemia, chronic disease 03/13/2020   • Folate deficiency anemia 03/16/2020     Resolved Ambulatory  Problems     Diagnosis Date Noted   • Hypokalemia 2019   • Nausea 2019   • Hypocalcemia 2019   • Diarrhea 2020   • Acute UTI (urinary tract infection) 2020   • Acute cystitis with hematuria 2020     Past Medical History:   Diagnosis Date   • Acid reflux    • Anemia    • Arthritis    • Diabetes mellitus (CMS/[facility])    • ESRD on dialysis (CMS/[facility])    • History of peritoneal dialysis    • History of transfusion    • Kidney disease    • Kidney transplant recipient 2016   • Peritoneal dialysis catheter in situ (CMS/[facility])    • Seasonal allergies          PAST SURGICAL HISTORY  Past Surgical History:   Procedure Laterality Date   • ARTERIOVENOUS FISTULA/SHUNT SURGERY Right 2020    Procedure: RIGHT ARM ARTERIAL VENOUS FISTULA;  Surgeon: Elijah Herrera MD;  Location: [facility] OR;  Service: Vascular;  Laterality: Right;   • INSERTION PERITONEAL DIALYSIS CATHETER  2014    Laparoscopic placement of Curl Cath peritoneal dialysis catheter, Dr. Vinod Goldstein   • INSERTION PERITONEAL DIALYSIS CATHETER N/A 2019    Procedure: LAPAROSCOPIC INSERTION PERITONEAL DIALYSIS CATHETER;  Surgeon: Damon Rooney MD;  Location: [facility] OR;  Service: General   • REMOVAL PERITONEAL DIALYSIS CATHETER N/A 10/17/2016    Procedure: REMOVAL PERITONEAL DIALYSIS CATHETER;  Surgeon: Damon Rooney MD;  Location: [facility] OR;  Service:    • TRANSPLANTATION RENAL Right 2016    from  donor   • TUBAL ABDOMINAL LIGATION Bilateral          FAMILY HISTORY  Family History   Problem Relation Age of Onset   • Malig Hyperthermia Neg Hx          SOCIAL HISTORY  Social History     Socioeconomic History   • Marital status:      Spouse name: Not on file   • Number of children: 2   • Years of education: 12   • Highest education level: Not on file   Occupational History     Employer: [employer]   Tobacco Use   • Smoking status: Never Smoker   • Smokeless tobacco:  Never Used   Substance and Sexual Activity   • Alcohol use: No   • Drug use: No   • Sexual activity: Defer         ALLERGIES  Patient has no known allergies.        REVIEW OF SYSTEMS  Review of Systems     All systems reviewed and negative except for those discussed in HPI.       PHYSICAL EXAM    I have reviewed the triage vital signs and nursing notes.    ED Triage Vitals   Temp Heart Rate Resp BP SpO2   05/05/20 1948 05/05/20 1948 05/05/20 1949 -- 05/05/20 1948   (!) 100.6 °F (38.1 °C) (!) 145 20  95 %      Temp src Heart Rate Source Patient Position BP Location FiO2 (%)   05/05/20 1948 05/05/20 1948 -- -- --   Tympanic Monitor          Physical Exam  GENERAL: Alert and anxious, not toxic in appearance  HENT: nares patent  EYES: no scleral icterus  CV: Sinus tachycardia  RESPIRATORY: normal effort  ABDOMEN: soft, nontender palpation, bowel sounds are present.  The PD catheter has clear fluid within, there is no erythema or warmth around the insertion site.  There is no CVA tenderness, no midline spine tenderness and no erythema or warmth in that area either.  MUSCULOSKELETAL: no deformity  NEURO: alert, moves all extremities, follows commands  SKIN: warm, dry        LAB RESULTS  Recent Results (from the past 24 hour(s))   Comprehensive Metabolic Panel    Collection Time: 05/05/20  8:18 PM   Result Value Ref Range    Glucose 123 (H) 65 - 99 mg/dL    BUN 50 (H) 6 - 20 mg/dL    Creatinine 12.31 (H) 0.57 - 1.00 mg/dL    Sodium 133 (L) 136 - 145 mmol/L    Potassium 3.4 (L) 3.5 - 5.2 mmol/L    Chloride 90 (L) 98 - 107 mmol/L    CO2 23.5 22.0 - 29.0 mmol/L    Calcium 7.3 (L) 8.6 - 10.5 mg/dL    Total Protein 9.0 (H) 6.0 - 8.5 g/dL    Albumin 3.40 (L) 3.50 - 5.20 g/dL    ALT (SGPT) 5 1 - 33 U/L    AST (SGOT) 10 1 - 32 U/L    Alkaline Phosphatase 79 39 - 117 U/L    Total Bilirubin 0.5 0.2 - 1.2 mg/dL    eGFR Non African Amer 3 (L) >60 mL/min/1.73    eGFR  African Amer      Globulin 5.6 gm/dL    A/G Ratio 0.6 g/dL     BUN/Creatinine Ratio 4.1 (L) 7.0 - 25.0    Anion Gap 19.5 (H) 5.0 - 15.0 mmol/L   Lactic Acid, Plasma    Collection Time: 05/05/20  8:18 PM   Result Value Ref Range    Lactate 1.5 0.5 - 2.0 mmol/L   CBC Auto Differential    Collection Time: 05/05/20  8:18 PM   Result Value Ref Range    WBC 26.36 (H) 3.40 - 10.80 10*3/mm3    RBC 2.89 (L) 3.77 - 5.28 10*6/mm3    Hemoglobin 8.6 (L) 12.0 - 15.9 g/dL    Hematocrit 25.2 (L) 34.0 - 46.6 %    MCV 87.2 79.0 - 97.0 fL    MCH 29.8 26.6 - 33.0 pg    MCHC 34.1 31.5 - 35.7 g/dL    RDW 17.4 (H) 12.3 - 15.4 %    RDW-SD 54.6 (H) 37.0 - 54.0 fl    MPV 9.2 6.0 - 12.0 fL    Platelets 306 140 - 450 10*3/mm3    Neutrophil % 88.3 (H) 42.7 - 76.0 %    Lymphocyte % 4.9 (L) 19.6 - 45.3 %    Monocyte % 5.0 5.0 - 12.0 %    Eosinophil % 0.5 0.3 - 6.2 %    Basophil % 0.2 0.0 - 1.5 %    Immature Grans % 1.1 (H) 0.0 - 0.5 %    Neutrophils, Absolute 23.27 (H) 1.70 - 7.00 10*3/mm3    Lymphocytes, Absolute 1.28 0.70 - 3.10 10*3/mm3    Monocytes, Absolute 1.31 (H) 0.10 - 0.90 10*3/mm3    Eosinophils, Absolute 0.14 0.00 - 0.40 10*3/mm3    Basophils, Absolute 0.06 0.00 - 0.20 10*3/mm3    Immature Grans, Absolute 0.30 (H) 0.00 - 0.05 10*3/mm3    nRBC 0.0 0.0 - 0.2 /100 WBC   Urinalysis With Culture If Indicated - Urine, Catheter In/Out    Collection Time: 05/05/20 10:24 PM   Result Value Ref Range    Color, UA Yellow Yellow, Straw    Appearance, UA Turbid (A) Clear    pH, UA 6.0 5.0 - 8.0    Specific Gravity, UA 1.015 1.005 - 1.030    Glucose, UA Negative Negative    Ketones, UA Negative Negative    Bilirubin, UA Negative Negative    Blood, UA Moderate (2+) (A) Negative    Protein, UA >=300 mg/dL (3+) (A) Negative    Leuk Esterase, UA Large (3+) (A) Negative    Nitrite, UA Positive (A) Negative    Urobilinogen, UA 0.2 E.U./dL 0.2 - 1.0 E.U./dL   Urinalysis, Microscopic Only - Urine, Catheter In/Out    Collection Time: 05/05/20 10:24 PM   Result Value Ref Range    RBC, UA 13-20 (A) None Seen, 0-2 /HPF     WBC, UA 21-30 (A) None Seen, 0-2 /HPF    Bacteria, UA 2+ (A) None Seen /HPF    Squamous Epithelial Cells, UA 3-6 (A) None Seen, 0-2 /HPF    Hyaline Casts, UA None Seen None Seen /LPF    Methodology Manual Light Microscopy        Ordered the above labs and independently reviewed the results.        RADIOLOGY  No Radiology Exams Resulted Within Past 24 Hours    I ordered the above noted radiological studies. Reviewed by me and discussed with radiologist.  See dictation for official radiology interpretation.      PROCEDURES    Procedures      MEDICATIONS GIVEN IN ER    Medications   sodium chloride 0.9 % flush 10 mL (10 mL Intravenous Given 5/5/20 2341)   vancomycin (VANCOCIN) in iso-osmotic dextrose IVPB 1 g (premix) 200 mL (1,000 mg Intravenous Incomplete 5/6/20 0002)   sodium chloride 0.9 % bolus 1,000 mL (0 mL Intravenous Stopped 5/6/20 0005)   cefTRIAXone (ROCEPHIN) IVPB 1 g (0 g Intravenous Stopped 5/5/20 2224)   LORazepam (ATIVAN) injection 1 mg (1 mg Intravenous Given 5/5/20 2339)   diphenhydrAMINE (BENADRYL) injection 25 mg (25 mg Intravenous Given 5/5/20 2335)         PROGRESS, DATA ANALYSIS, CONSULTS, AND MEDICAL DECISION MAKING    All labs have been independently reviewed by me.  All radiology studies have been reviewed by me and discussed with radiologist dictating the report.   EKG's independently viewed and interpreted by me.  Discussion below represents my analysis of pertinent findings related to patient's condition, differential diagnosis, treatment plan and final disposition.        ED Course as of May 06 0007   Wed May 06, 2020   0005 There is a pretty significant leukocytosis along with chronic stable anemia    [DP]   0005 BUN 50 creatinine 12.3 and potassium 3.4 which is not surprising for daily PD patient    [DP]   0005 Urinalysis which was obtained through cath is 2+ bacteria with whites and positive leuks and nitrites.    [DP]   0006 CT scan of the abdomen pelvis without contrast shows no  obstructive uropathy    [DP]   0006 Patient was given IV Rocephin and vancomycin.  I spoke with Renee from [facility] who agrees to admit the patient on behalf of Dr Anguiano, and we will consult nephrology for dialysis instructions and to see if they would like to do anything with the peritoneal fluid    [DP]   0006 Heart rates currently 105 and blood pressure stable    [DP]      ED Course User Index  [DP] Marty Marshall MD         AS OF 00:07 VITALS:    BP -    HR - (!) 145  TEMP - (!) 100.6 °F (38.1 °C) (Tympanic)  O2 SATS - 95%        DIAGNOSIS  Final diagnoses:   Febrile illness, acute   End stage renal disease (CMS/[facility])         DISPOSITION  Admit           Marty Marshall MD  05/09/20 4229

## 2020-05-05 NOTE — ED TRIAGE NOTES
Pt reports fever on and off for a couple of days. Pt reports vaginal itching and painful urination. Pt reports taking tylenol 2 hours ago. Patient placed in a mask at triage. Triage RN also wearing a mask.

## 2020-05-06 PROBLEM — A41.9 SEPSIS (HCC): Status: ACTIVE | Noted: 2020-05-06

## 2020-05-06 PROBLEM — R50.9 FEBRILE ILLNESS, ACUTE: Status: ACTIVE | Noted: 2020-05-06

## 2020-05-06 LAB
ANION GAP SERPL CALCULATED.3IONS-SCNC: 23.3 MMOL/L (ref 5–15)
APPEARANCE FLD: ABNORMAL
BUN BLD-MCNC: 58 MG/DL (ref 6–20)
BUN/CREAT SERPL: 4.7 (ref 7–25)
CALCIUM SPEC-SCNC: 6.4 MG/DL (ref 8.6–10.5)
CHLORIDE SERPL-SCNC: 90 MMOL/L (ref 98–107)
CO2 SERPL-SCNC: 20.7 MMOL/L (ref 22–29)
COLOR FLD: YELLOW
CREAT BLD-MCNC: 12.46 MG/DL (ref 0.57–1)
DEPRECATED RDW RBC AUTO: 54.7 FL (ref 37–54)
ERYTHROCYTE [DISTWIDTH] IN BLOOD BY AUTOMATED COUNT: 17.4 % (ref 12.3–15.4)
GFR SERPL CREATININE-BSD FRML MDRD: 3 ML/MIN/1.73
GFR SERPL CREATININE-BSD FRML MDRD: ABNORMAL ML/MIN/{1.73_M2}
GLUCOSE BLD-MCNC: 98 MG/DL (ref 65–99)
GLUCOSE BLDC GLUCOMTR-MCNC: 100 MG/DL (ref 70–130)
GLUCOSE BLDC GLUCOMTR-MCNC: 110 MG/DL (ref 70–130)
GLUCOSE BLDC GLUCOMTR-MCNC: 123 MG/DL (ref 70–130)
HCT VFR BLD AUTO: 23.6 % (ref 34–46.6)
HGB BLD-MCNC: 8.1 G/DL (ref 12–15.9)
LYMPHOCYTES NFR FLD MANUAL: 24 %
MCH RBC QN AUTO: 29.7 PG (ref 26.6–33)
MCHC RBC AUTO-ENTMCNC: 34.3 G/DL (ref 31.5–35.7)
MCV RBC AUTO: 86.4 FL (ref 79–97)
MONOCYTES NFR FLD: 44 %
MONOS+MACROS NFR FLD: 8 %
NEUTROPHILS NFR FLD MANUAL: 24 %
PLATELET # BLD AUTO: 254 10*3/MM3 (ref 140–450)
PMV BLD AUTO: 9 FL (ref 6–12)
POTASSIUM BLD-SCNC: 3.9 MMOL/L (ref 3.5–5.2)
RBC # BLD AUTO: 2.73 10*6/MM3 (ref 3.77–5.28)
RBC # FLD AUTO: 7 /MM3
SODIUM BLD-SCNC: 134 MMOL/L (ref 136–145)
VANCOMYCIN SERPL-MCNC: 24.3 MCG/ML (ref 5–40)
WBC # FLD AUTO: 35 /MM3
WBC NRBC COR # BLD: 21.81 10*3/MM3 (ref 3.4–10.8)

## 2020-05-06 PROCEDURE — 36415 COLL VENOUS BLD VENIPUNCTURE: CPT | Performed by: NURSE PRACTITIONER

## 2020-05-06 PROCEDURE — 25010000002 CEFTRIAXONE PER 250 MG: Performed by: NURSE PRACTITIONER

## 2020-05-06 PROCEDURE — 93010 ELECTROCARDIOGRAM REPORT: CPT | Performed by: INTERNAL MEDICINE

## 2020-05-06 PROCEDURE — 80048 BASIC METABOLIC PNL TOTAL CA: CPT | Performed by: NURSE PRACTITIONER

## 2020-05-06 PROCEDURE — 82962 GLUCOSE BLOOD TEST: CPT

## 2020-05-06 PROCEDURE — 90945 DIALYSIS ONE EVALUATION: CPT

## 2020-05-06 PROCEDURE — 85027 COMPLETE CBC AUTOMATED: CPT | Performed by: NURSE PRACTITIONER

## 2020-05-06 PROCEDURE — 93005 ELECTROCARDIOGRAM TRACING: CPT | Performed by: INTERNAL MEDICINE

## 2020-05-06 PROCEDURE — 25010000002 VANCOMYCIN PER 500 MG: Performed by: EMERGENCY MEDICINE

## 2020-05-06 PROCEDURE — 80202 ASSAY OF VANCOMYCIN: CPT | Performed by: NURSE PRACTITIONER

## 2020-05-06 PROCEDURE — 89051 BODY FLUID CELL COUNT: CPT | Performed by: INTERNAL MEDICINE

## 2020-05-06 RX ORDER — FOLIC ACID 1 MG/1
1 TABLET ORAL DAILY
Status: DISCONTINUED | OUTPATIENT
Start: 2020-05-06 | End: 2020-05-07 | Stop reason: HOSPADM

## 2020-05-06 RX ORDER — CEFTRIAXONE SODIUM 1 G/50ML
1 INJECTION, SOLUTION INTRAVENOUS EVERY 24 HOURS
Status: DISCONTINUED | OUTPATIENT
Start: 2020-05-06 | End: 2020-05-07

## 2020-05-06 RX ORDER — CALCITRIOL 0.25 UG/1
0.5 CAPSULE, LIQUID FILLED ORAL DAILY
Status: DISCONTINUED | OUTPATIENT
Start: 2020-05-06 | End: 2020-05-07 | Stop reason: HOSPADM

## 2020-05-06 RX ORDER — BISACODYL 5 MG/1
5 TABLET, DELAYED RELEASE ORAL DAILY PRN
Status: DISCONTINUED | OUTPATIENT
Start: 2020-05-06 | End: 2020-05-07 | Stop reason: HOSPADM

## 2020-05-06 RX ORDER — ACETAMINOPHEN 325 MG/1
650 TABLET ORAL EVERY 4 HOURS PRN
Status: DISCONTINUED | OUTPATIENT
Start: 2020-05-06 | End: 2020-05-07 | Stop reason: HOSPADM

## 2020-05-06 RX ORDER — DEXTROSE MONOHYDRATE, SODIUM CHLORIDE, SODIUM LACTATE, CALCIUM CHLORIDE, MAGNESIUM CHLORIDE 2.5; 538; 448; 18.4; 5.08 G/100ML; MG/100ML; MG/100ML; MG/100ML; MG/100ML
2000 SOLUTION INTRAPERITONEAL
Status: DISCONTINUED | OUTPATIENT
Start: 2020-05-06 | End: 2020-05-07 | Stop reason: HOSPADM

## 2020-05-06 RX ORDER — ACETAMINOPHEN 160 MG/5ML
650 SOLUTION ORAL EVERY 4 HOURS PRN
Status: DISCONTINUED | OUTPATIENT
Start: 2020-05-06 | End: 2020-05-07 | Stop reason: HOSPADM

## 2020-05-06 RX ORDER — CARVEDILOL 12.5 MG/1
12.5 TABLET ORAL 2 TIMES DAILY WITH MEALS
Status: DISCONTINUED | OUTPATIENT
Start: 2020-05-06 | End: 2020-05-07 | Stop reason: HOSPADM

## 2020-05-06 RX ORDER — DEXTROSE MONOHYDRATE, SODIUM CHLORIDE, SODIUM LACTATE, CALCIUM CHLORIDE, MAGNESIUM CHLORIDE 2.5; 538; 448; 18.4; 5.08 G/100ML; MG/100ML; MG/100ML; MG/100ML; MG/100ML
2000 SOLUTION INTRAPERITONEAL AS NEEDED
Status: CANCELLED | OUTPATIENT
Start: 2020-05-06

## 2020-05-06 RX ORDER — SODIUM CHLORIDE 0.9 % (FLUSH) 0.9 %
10 SYRINGE (ML) INJECTION AS NEEDED
Status: DISCONTINUED | OUTPATIENT
Start: 2020-05-06 | End: 2020-05-07 | Stop reason: HOSPADM

## 2020-05-06 RX ORDER — NICOTINE POLACRILEX 4 MG
15 LOZENGE BUCCAL
Status: DISCONTINUED | OUTPATIENT
Start: 2020-05-06 | End: 2020-05-07 | Stop reason: HOSPADM

## 2020-05-06 RX ORDER — ACETAMINOPHEN 650 MG/1
650 SUPPOSITORY RECTAL EVERY 4 HOURS PRN
Status: DISCONTINUED | OUTPATIENT
Start: 2020-05-06 | End: 2020-05-07 | Stop reason: HOSPADM

## 2020-05-06 RX ORDER — POLYETHYLENE GLYCOL 3350 17 G/17G
17 POWDER, FOR SOLUTION ORAL DAILY PRN
Status: DISCONTINUED | OUTPATIENT
Start: 2020-05-06 | End: 2020-05-07 | Stop reason: HOSPADM

## 2020-05-06 RX ORDER — SODIUM CHLORIDE 0.9 % (FLUSH) 0.9 %
10 SYRINGE (ML) INJECTION EVERY 12 HOURS SCHEDULED
Status: DISCONTINUED | OUTPATIENT
Start: 2020-05-06 | End: 2020-05-07 | Stop reason: HOSPADM

## 2020-05-06 RX ORDER — DEXTROSE MONOHYDRATE 25 G/50ML
25 INJECTION, SOLUTION INTRAVENOUS
Status: DISCONTINUED | OUTPATIENT
Start: 2020-05-06 | End: 2020-05-07 | Stop reason: HOSPADM

## 2020-05-06 RX ORDER — CLONIDINE HYDROCHLORIDE 0.1 MG/1
0.3 TABLET ORAL 3 TIMES DAILY
Status: DISCONTINUED | OUTPATIENT
Start: 2020-05-06 | End: 2020-05-07 | Stop reason: HOSPADM

## 2020-05-06 RX ADMIN — SODIUM CHLORIDE, PRESERVATIVE FREE 10 ML: 5 INJECTION INTRAVENOUS at 09:26

## 2020-05-06 RX ADMIN — DEXTROSE MONOHYDRATE, SODIUM CHLORIDE, SODIUM LACTATE, CALCIUM CHLORIDE, MAGNESIUM CHLORIDE 2000 ML: 2.5; 538; 448; 18.4; 5.08 SOLUTION INTRAPERITONEAL at 23:08

## 2020-05-06 RX ADMIN — CEFTRIAXONE SODIUM 1 G: 1 INJECTION, SOLUTION INTRAVENOUS at 20:54

## 2020-05-06 RX ADMIN — CALCIUM ACETATE 2001 MG: 667 CAPSULE ORAL at 09:29

## 2020-05-06 RX ADMIN — SODIUM CHLORIDE, PRESERVATIVE FREE 10 ML: 5 INJECTION INTRAVENOUS at 20:54

## 2020-05-06 RX ADMIN — ACETAMINOPHEN 650 MG: 325 TABLET, FILM COATED ORAL at 09:23

## 2020-05-06 RX ADMIN — ACETAMINOPHEN 650 MG: 325 TABLET, FILM COATED ORAL at 15:27

## 2020-05-06 RX ADMIN — FOLIC ACID 1 MG: 1 TABLET ORAL at 09:30

## 2020-05-06 RX ADMIN — SODIUM CHLORIDE, PRESERVATIVE FREE 10 ML: 5 INJECTION INTRAVENOUS at 02:00

## 2020-05-06 RX ADMIN — CARVEDILOL 12.5 MG: 12.5 TABLET, FILM COATED ORAL at 09:30

## 2020-05-06 RX ADMIN — CARVEDILOL 12.5 MG: 12.5 TABLET, FILM COATED ORAL at 18:12

## 2020-05-06 RX ADMIN — CALCIUM ACETATE 2001 MG: 667 CAPSULE ORAL at 18:09

## 2020-05-06 RX ADMIN — CALCIUM ACETATE 2001 MG: 667 CAPSULE ORAL at 12:16

## 2020-05-06 RX ADMIN — CALCITRIOL 0.5 MCG: 0.25 CAPSULE ORAL at 09:31

## 2020-05-06 RX ADMIN — VANCOMYCIN HYDROCHLORIDE 1000 MG: 1 INJECTION, SOLUTION INTRAVENOUS at 00:02

## 2020-05-06 RX ADMIN — CLONIDINE HYDROCHLORIDE 0.3 MG: 0.1 TABLET ORAL at 20:54

## 2020-05-06 RX ADMIN — DEXTROSE MONOHYDRATE, SODIUM CHLORIDE, SODIUM LACTATE, CALCIUM CHLORIDE, MAGNESIUM CHLORIDE 2000 ML: 2.5; 538; 448; 18.4; 5.08 SOLUTION INTRAPERITONEAL at 18:18

## 2020-05-06 RX ADMIN — ACETAMINOPHEN 650 MG: 325 TABLET, FILM COATED ORAL at 20:57

## 2020-05-06 RX ADMIN — DEXTROSE MONOHYDRATE, SODIUM CHLORIDE, SODIUM LACTATE, CALCIUM CHLORIDE, MAGNESIUM CHLORIDE 2000 ML: 2.5; 538; 448; 18.4; 5.08 SOLUTION INTRAPERITONEAL at 11:00

## 2020-05-06 RX ADMIN — DEXTROSE MONOHYDRATE, SODIUM CHLORIDE, SODIUM LACTATE, CALCIUM CHLORIDE, MAGNESIUM CHLORIDE 2000 ML: 2.5; 538; 448; 18.4; 5.08 SOLUTION INTRAPERITONEAL at 14:57

## 2020-05-06 NOTE — H&P
Patient Name:  Sunni Mcneil  YOB: 1975  MRN:  [id number]  Admit Date:  5/5/2020  Patient Care Team:  Provider, No Known as PCP - Bo Hummel MD as Consulting Physician (Nephrology)  Damon Rooney MD as Surgeon (General Surgery)      Subjective   History Present Illness     Chief Complaint   Patient presents with   • Difficulty Urinating       Ms. Mcneil is a 45 y.o. non-smoker with a history of ESRD on peritoneal dialysis, hypertension, and anemia of chronic disease that presents to [facility] complaining of difficulty urinating.  She reports the symptoms began yesterday and have not improved, so she presented to the ED.  She also complains of intermittent left flank pain and low grade fevers at home.  She denies suprapubic pain, hematuria, and dysuria.  She denies chest pain, shortness of breath, orthopnea, nausea, and vomiting.  Work up in the ED revealed Na 133, K+ 3.4, creat 12.31, BUN 50, GFR 3, WBC 26.36, and hgb 8.6.  UA showed 2+ bacteria, WBCs, RBCs, 3+ protein, 3+ leukocytes, nitrites, and 2+ blood.  Blood and urine cultures are pending.  CT of the abdomen/pelvis showed right lower quadrant renal transplant, without evidence of hydronephrosis, and enlarged and heterogeneous uterus, likely reflecting the presence of underlying fibroids.  She received a dose of IV Rocephin and Vancomycin in the ED.            History of Present Illness  Review of Systems   Constitutional: Negative.    HENT: Negative.    Eyes: Negative.    Respiratory: Negative.    Cardiovascular: Negative.    Gastrointestinal: Negative.    Genitourinary: Positive for difficulty urinating and flank pain (left ).   Skin: Negative.    Neurological: Negative.    Psychiatric/Behavioral: Negative.         Personal History     Past Medical History:   Diagnosis Date   • Acid reflux    • Anemia    • Arthritis    • Diabetes mellitus (CMS/HCC)    • ESRD on dialysis (CMS/[facility])    •  History of peritoneal dialysis     KIDNEY TRANSPLANT 2016 CURRENTLY DOING PERITONEAL DIALYSIS.   • History of transfusion     BEEN A WHILE   • Hypercalcemia    • Hyperparathyroidism (CMS/HCC)    • Hypertension    • Kidney disease     End-stage renal disease. TRANSPLANT   • Kidney transplant recipient 2016    RIGHT KIDNEY   • Peritoneal dialysis catheter in situ (CMS/HCC)     LEFT ABDOMEN   • Seasonal allergies     CURRENTLY      Past Surgical History:   Procedure Laterality Date   • ARTERIOVENOUS FISTULA/SHUNT SURGERY Right 2020    Procedure: RIGHT ARM ARTERIAL VENOUS FISTULA;  Surgeon: Elijah Herrera MD;  Location: [facility] OR;  Service: Vascular;  Laterality: Right;   • INSERTION PERITONEAL DIALYSIS CATHETER  2014    Laparoscopic placement of Curl Cath peritoneal dialysis catheter, Dr. Vinod Goldstein   • INSERTION PERITONEAL DIALYSIS CATHETER N/A 2019    Procedure: LAPAROSCOPIC INSERTION PERITONEAL DIALYSIS CATHETER;  Surgeon: Damon Rooney MD;  Location: [facility] OR;  Service: General   • REMOVAL PERITONEAL DIALYSIS CATHETER N/A 10/17/2016    Procedure: REMOVAL PERITONEAL DIALYSIS CATHETER;  Surgeon: Damon Rooney MD;  Location: [facility] OR;  Service:    • TRANSPLANTATION RENAL Right 2016    from  donor   • TUBAL ABDOMINAL LIGATION Bilateral      Family History   Problem Relation Age of Onset   • Malig Hyperthermia Neg Hx      Social History     Tobacco Use   • Smoking status: Never Smoker   • Smokeless tobacco: Never Used   Substance Use Topics   • Alcohol use: No   • Drug use: No     Medications Prior to Admission   Medication Sig Dispense Refill Last Dose   • calcium acetate (PHOSLO) 667 MG capsule Take 2,001 mg by mouth 3 (Three) Times a Day With Meals.   2020 at 1800   • carvedilol (COREG) 12.5 MG tablet Take 12.5 mg by mouth 2 (Two) Times a Day With Meals.   2020 at 0800   • CloNIDine (CATAPRES) 0.3 MG tablet Take 0.3 mg  by mouth 3 (Three) Times a Day.   2/26/2020 at 0500   • ondansetron (ZOFRAN) 4 MG tablet Take 1 tablet by mouth Every 6 (Six) Hours As Needed for Nausea or Vomiting. 10 tablet 0 2/19/2020   • polyethylene glycol (MIRALAX) packet Take 17 g by mouth Daily As Needed (constipation).      • CHLORHEXIDINE GLUCONATE CLOTH EX Apply 1 application topically 2 (Two) Times a Day. AS DIRECTED PREOP    2/26/2020 at 0500   • DELFLEX-LC/1.5% DEXTROSE (DIANEAL) 344 MOSM/L CAPD Inject 2,000 mL into the abdomen / abdominal cavity As Needed (Frequency From Procedure Order).      • doxylamine-pyridoxine (DICLEGIS) 10-10 MG tablet delayed-release EC tablet Take 1 tablet by mouth 3 (Three) Times a Day As Needed (nausea). 90 tablet 0 2/25/2020 at 1800   • famotidine (PEPCID) 20 MG tablet Take 1 tablet by mouth Daily. 30 tablet 0 2/24/2020   • folic acid (FOLVITE) 1 MG tablet Take 1 tablet by mouth Daily. 30 tablet 0    • HYDROcodone-acetaminophen (NORCO)  MG per tablet Take 1 tablet by mouth Every 6 (Six) Hours As Needed for Moderate Pain . 12 tablet 0    • minoxidil (LONITEN) 10 MG tablet Take 10 mg by mouth Daily.   2/25/2020 at 1800     Allergies:  No Known Allergies    Objective    Objective     Vital Signs  Temp:  [97.7 °F (36.5 °C)-100.6 °F (38.1 °C)] 97.7 °F (36.5 °C)  Heart Rate:  [] 97  Resp:  [16-20] 16  BP: (113-130)/(76-84) 113/76  SpO2:  [95 %-100 %] 99 %  on   ;   Device (Oxygen Therapy): room air  Body mass index is 32.97 kg/m².    Physical Exam   Constitutional: She is oriented to person, place, and time. She appears well-developed and well-nourished.   HENT:   Head: Normocephalic and atraumatic.   Eyes: Conjunctivae and EOM are normal.   Neck: Normal range of motion. Neck supple.   Cardiovascular: Regular rhythm and normal heart sounds. Tachycardia present.   No murmur heard.  Pulmonary/Chest: Effort normal and breath sounds normal.   Abdominal: Soft. Bowel sounds are normal. She exhibits no distension. There  is no tenderness. There is no rebound, no guarding and no CVA tenderness.   Peritoneal dialysis catheter in place, dressing C/D/I, no erythema, no warmth   Musculoskeletal: Normal range of motion. She exhibits no edema.   Neurological: She is alert and oriented to person, place, and time.   Skin: Skin is warm and dry.   Psychiatric: She has a normal mood and affect. Her behavior is normal.   Nursing note and vitals reviewed.      Results Review:  I reviewed the patient's new clinical results.  I reviewed the patient's new imaging results and agree with the interpretation.  I reviewed the patient's other test results and agree with the interpretation  I personally viewed and interpreted the patient's EKG/Telemetry data  Discussed with ED provider.    Lab Results (last 24 hours)     Procedure Component Value Units Date/Time    CBC & Differential [[id number]] Collected:  05/05/20 2018    Specimen:  Blood Updated:  05/05/20 2035    Narrative:       The following orders were created for panel order CBC & Differential.  Procedure                               Abnormality         Status                     ---------                               -----------         ------                     CBC Auto Differential[[id number]]        Abnormal            Final result                 Please view results for these tests on the individual orders.    Comprehensive Metabolic Panel [[id number]]  (Abnormal) Collected:  05/05/20 2018    Specimen:  Blood Updated:  05/05/20 2054     Glucose 123 mg/dL      BUN 50 mg/dL      Creatinine 12.31 mg/dL      Sodium 133 mmol/L      Potassium 3.4 mmol/L      Chloride 90 mmol/L      CO2 23.5 mmol/L      Calcium 7.3 mg/dL      Total Protein 9.0 g/dL      Albumin 3.40 g/dL      ALT (SGPT) 5 U/L      AST (SGOT) 10 U/L      Alkaline Phosphatase 79 U/L      Total Bilirubin 0.5 mg/dL      eGFR Non African Amer 3 mL/min/1.73      Comment: <15 Indicative of kidney failure.        eGFR   Amer --      Comment: <15 Indicative of kidney failure.        Globulin 5.6 gm/dL      A/G Ratio 0.6 g/dL      BUN/Creatinine Ratio 4.1     Anion Gap 19.5 mmol/L     Narrative:       GFR Normal >60  Chronic Kidney Disease <60  Kidney Failure <15      Lactic Acid, Plasma [[id number]]  (Normal) Collected:  05/05/20 2018    Specimen:  Blood Updated:  05/05/20 2043     Lactate 1.5 mmol/L     CBC Auto Differential [[id number]]  (Abnormal) Collected:  05/05/20 2018    Specimen:  Blood Updated:  05/05/20 2035     WBC 26.36 10*3/mm3      RBC 2.89 10*6/mm3      Hemoglobin 8.6 g/dL      Hematocrit 25.2 %      MCV 87.2 fL      MCH 29.8 pg      MCHC 34.1 g/dL      RDW 17.4 %      RDW-SD 54.6 fl      MPV 9.2 fL      Platelets 306 10*3/mm3      Neutrophil % 88.3 %      Lymphocyte % 4.9 %      Monocyte % 5.0 %      Eosinophil % 0.5 %      Basophil % 0.2 %      Immature Grans % 1.1 %      Neutrophils, Absolute 23.27 10*3/mm3      Lymphocytes, Absolute 1.28 10*3/mm3      Monocytes, Absolute 1.31 10*3/mm3      Eosinophils, Absolute 0.14 10*3/mm3      Basophils, Absolute 0.06 10*3/mm3      Immature Grans, Absolute 0.30 10*3/mm3      nRBC 0.0 /100 WBC     Blood Culture - Blood, Blood, Venous Line [[id number]] Collected:  05/05/20 2018    Specimen:  Blood, Venous Line Updated:  05/05/20 2025    Blood Culture - Blood, Blood, Venous Line [[id number]] Collected:  05/05/20 2123    Specimen:  Blood, Venous Line Updated:  05/05/20 2128    Urinalysis With Culture If Indicated - Urine, Catheter In/Out [[id number]]  (Abnormal) Collected:  05/05/20 2224    Specimen:  Urine, Catheter In/Out Updated:  05/05/20 2253     Color, UA Yellow     Appearance, UA Turbid     pH, UA 6.0     Specific Gravity, UA 1.015     Glucose, UA Negative     Ketones, UA Negative     Bilirubin, UA Negative     Blood, UA Moderate (2+)     Protein, UA >=300 mg/dL (3+)     Leuk Esterase, UA Large (3+)     Nitrite, UA Positive     Urobilinogen, UA 0.2 E.U./dL    Urinalysis, Microscopic Only -  Urine, Catheter In/Out [[id number]]  (Abnormal) Collected:  05/05/20 2224    Specimen:  Urine, Catheter In/Out Updated:  05/05/20 2300     RBC, UA 13-20 /HPF      WBC, UA 21-30 /HPF      Bacteria, UA 2+ /HPF      Squamous Epithelial Cells, UA 3-6 /HPF      Hyaline Casts, UA None Seen /LPF      Methodology Manual Light Microscopy    Urine Culture - Urine, Urine, Catheter In/Out [[id number]] Collected:  05/05/20 2224    Specimen:  Urine, Catheter In/Out Updated:  05/05/20 2300          Imaging Results (Last 24 Hours)     Procedure Component Value Units Date/Time    CT Abdomen Pelvis Without Contrast [[id number]] Collected:  05/06/20 0001     Updated:  05/06/20 0019    Narrative:       CT OF THE ABDOMEN AND PELVIS WITHOUT CONTRAST     HISTORY: Fever and painful urination.     COMPARISON: None available.     TECHNIQUE: Axial CT imaging was obtained from the dome the diaphragm to  the symphysis pubis. No IV contrast was administered.     FINDINGS:  Partially calcified nodule within the right lower lobe has been present  on exams dating back to September 2014, it is benign. There is a small  hiatal hernia. Duodenum appears unremarkable.. The kidneys are atrophic.  Calcified granulomata are seen within the spleen. Pancreas appears  unremarkable. There are some calcified periportal and peripancreatic  nodes. No focal hepatic lesions are seen. Gallbladder is unremarkable.  There is fluid seen within the abdomen, likely reflecting dialysate,  given the presence of a peritoneal dialysis catheter. The patient has a  right lower quadrant renal transplant. There is no hydronephrosis. There  is no evidence of mechanical bowel obstruction. The appendix is normal.  The uterus is enlarged and heterogeneous. This likely reflects the  presence of underlying fibroids. Cysts are identified on the left ovary.  Right ovary appears unremarkable. No acute osseous abnormalities are  seen. The urinary bladder is decompressed, but is grossly  unremarkable.       Impression:          1. Right lower quadrant renal transplant, without evidence of  hydronephrosis.  2. Enlarged and heterogeneous uterus, likely reflecting the presence of  underlying fibroids.     Radiation dose reduction techniques were utilized, including automated  exposure control and exposure modulation based on body size.     This report was finalized on 5/6/2020 12:16 AM by Dr. Zena Pham M.D.                  No orders to display        Assessment/Plan     Active Hospital Problems    Diagnosis POA   • **Acute UTI (urinary tract infection) [N39.0] Unknown   • Sepsis (CMS/[facility]) [A41.9] Unknown   • Folate deficiency anemia [D52.9] Yes   • Anemia, chronic disease [D63.8] Yes   • ESRD (end stage renal disease) (CMS/[facility]) [N18.6] Yes   • Type 2 diabetes mellitus, without long-term current use of insulin (CMS/[facility]) [E11.9] Unknown   • Peritoneal dialysis catheter in place (CMS/[facility]) [Z99.2] Not Applicable   • Hypertension [I10] Yes   • Hx of kidney transplant [Z94.0] Not Applicable     Acute Urinary Tract Infection/Sepsis  -Febrile and tachycardic on arrival   -WBC 26, lactate ok  -Blood and urine cultures pending  -Continue Rocephin and Vancomycin    ESRD on Peritoneal Dialysis  -Nephrology consult  -Monitor renal function    Type 2 Diabetes Mellitus  -Initiate correctional factor insulin  -Monitor blood sugars AC  -Hgb A1C 5.20 on 03/13/20    Hypertension  -Stable. Continue home regimen  -Monitor blood pressures    Anemia of Chronic Disease/Folate Deficiency Anemia  -Hgb 8.6, improved from baseline  -Continue daily Folic Acid    I discussed the patients findings and my recommendations with patient.    VTE Prophylaxis - SCDs.  Code Status - Full code.       JERONIMO Khan  [facility]  05/06/20  03:04

## 2020-05-06 NOTE — NURSING NOTE
MD notified of elevated HR sustained in the 130's, orders for STAT ekg and will evaluate on morning rounds.

## 2020-05-06 NOTE — PROGRESS NOTES
Discharge Planning Assessment  [facility]     Patient Name: Sunni Mcneil  MRN: [id number]  Today's Date: 5/6/2020    Admit Date: 5/5/2020    Discharge Needs Assessment     Row Name 05/06/20 0843       Living Environment    Lives With  child(dagmar), adult;spouse    Name(s) of Who Lives With Patient  daughter, Renetta Simons ( [phone]) and spouse, Chuy Mcneil ( [phone])    Current Living Arrangements  home/apartment/condo    Primary Care Provided by  self    Provides Primary Care For  no one    Family Caregiver if Needed  child(dagmar), adult;spouse    Family Caregiver Names  adult daughter, Renetta Simons, and spouse, Chuy Mcneil    Quality of Family Relationships  helpful;involved;supportive    Able to Return to Prior Arrangements  yes       Resource/Environmental Concerns    Resource/Environmental Concerns  none    Transportation Concerns  car, none       Transition Planning    Patient/Family Anticipates Transition to  home with family    Patient/Family Anticipated Services at Transition      Transportation Anticipated  family or friend will provide       Discharge Needs Assessment    Readmission Within the Last 30 Days  no previous admission in last 30 days    Concerns to be Addressed  no discharge needs identified    Equipment Currently Used at Home  other (see comments) PD cycler    Anticipated Changes Related to Illness  none    Equipment Needed After Discharge  none    Current Discharge Risk  chronically ill        Discharge Plan     Row Name 05/06/20 0845       Plan    Plan  return home with family- CCP will follow for needs    Patient/Family in Agreement with Plan  yes    Plan Comments  Spoke with patient at bedside.  Facesheet, PCP and pharmacy verified.  Patient lives in a second floor apartment with her daughter, Renetta Simons ( [phone]), and spouse, Chuy Mcneil ( [phone]).  She is IADLS and uses a cycler for her peritoneal dialysis.  Her PD supplies are delivered from  [employer].  She gets her primary care @ [facility] [address].  She denies having a HH or SNF history.  At [place], she states that she will likely need assistance getting transportation home.  Advised that CCP will continue to follow. Alix Alejandro RN        Destination      Coordination has not been started for this encounter.      Durable Medical Equipment      Coordination has not been started for this encounter.      Dialysis/Infusion      Coordination has not been started for this encounter.      Home Medical Care      Coordination has not been started for this encounter.      Therapy      Coordination has not been started for this encounter.      Community Resources      Coordination has not been started for this encounter.          Demographic Summary     Row Name 05/06/20 0842       General Information    Admission Type  inpatient    Arrived From  home    Required Notices Provided  Important Message from Medicare    Referral Source  admission list    Reason for Consult  discharge planning    Preferred Language  English        Functional Status     Row Name 05/06/20 0843       Functional Status    Usual Activity Tolerance  good    Current Activity Tolerance  good       Functional Status, IADL    Medications  independent    Meal Preparation  independent    Housekeeping  independent    Laundry  independent    Shopping  independent       Mental Status    General Appearance WDL  WDL       Mental Status Summary    Recent Changes in Mental Status/Cognitive Functioning  no changes        Psychosocial    No documentation.       Abuse/Neglect    No documentation.       Legal    No documentation.       Substance Abuse    No documentation.       Patient Forms    No documentation.           Alix Alejandro RN

## 2020-05-06 NOTE — CONSULTS
Referring Provider: Renee DECKER  Reason for Consultation: Management of patient with end-stage renal disease on peritoneal dialysis    Subjective     Chief complaint   Chief Complaint   Patient presents with   • Difficulty Urinating       History of present illness:    This is a 45-year-old  female, who was admitted through the emergency department yesterday, apparently she stated that she was having difficulty urinating but what she told me she had dysuria, she has recurrent nausea and back pain.  Her urinalysis as reviewed by myself is not reflective of severe UTI with pyuria I have checked her urine for similar complaints as an outpatient and she had low colony counts of E. coli in the order of 10,000-50,000 colonies per high-power field and I opted not to treat the patient.  Because of the risk of antibiotic associated infection and the low yield of improvement in a patient who had low colony counts.  She had failed  donor kidney transplant she was transplanted in 2016 failed and 2019.  The initial cause of her native kidney end-stage renal disease was hypertensive and diabetic glomerulosclerosis.  Her past medical history includes hypertension, diabetes mellitus type 2, and end-stage renal disease.  She has history of recurrent nausea vomiting also GERD.  Also she has seasonal allergies.    The patient stated that she had not missed any of her exchanges.  Denies chest pain or shortness of air, she has runny nose for the past 24 hours, she was febrile on admission I is 100.6 but now she is afebrile.  She has no abdominal pain, complaining of back pain, complaining of dysuria but no gross hematuria.  No lower extremity edema, no lightheadedness, no diarrhea or constipation.  Past Medical History:   Diagnosis Date   • Acid reflux    • Anemia    • Arthritis    • Diabetes mellitus (CMS/[facility])    • ESRD on dialysis (CMS/[facility])    • History of peritoneal dialysis     KIDNEY  TRANSPLANT 2016 CURRENTLY DOING PERITONEAL DIALYSIS.   • History of transfusion     BEEN A WHILE   • Hypercalcemia    • Hyperparathyroidism (CMS/HCC)    • Hypertension    • Kidney disease     End-stage renal disease. TRANSPLANT   • Kidney transplant recipient 2016    RIGHT KIDNEY   • Peritoneal dialysis catheter in situ (CMS/HCC)     LEFT ABDOMEN   • Seasonal allergies     CURRENTLY      Past Surgical History:   Procedure Laterality Date   • ARTERIOVENOUS FISTULA/SHUNT SURGERY Right 2020    Procedure: RIGHT ARM ARTERIAL VENOUS FISTULA;  Surgeon: Elijah Herrera MD;  Location: [facility] MAIN OR;  Service: Vascular;  Laterality: Right;   • INSERTION PERITONEAL DIALYSIS CATHETER  2014    Laparoscopic placement of Curl Cath peritoneal dialysis catheter, Dr. Vinod Goldstein   • INSERTION PERITONEAL DIALYSIS CATHETER N/A 2019    Procedure: LAPAROSCOPIC INSERTION PERITONEAL DIALYSIS CATHETER;  Surgeon: Damon Rooney MD;  Location: [facility] MAIN OR;  Service: General   • REMOVAL PERITONEAL DIALYSIS CATHETER N/A 10/17/2016    Procedure: REMOVAL PERITONEAL DIALYSIS CATHETER;  Surgeon: Damon Rooney MD;  Location: [facility] MAIN OR;  Service:    • TRANSPLANTATION RENAL Right 2016    from  donor   • TUBAL ABDOMINAL LIGATION Bilateral      Family History   Problem Relation Age of Onset   • Malig Hyperthermia Neg Hx        Social History     Tobacco Use   • Smoking status: Never Smoker   • Smokeless tobacco: Never Used   Substance Use Topics   • Alcohol use: No   • Drug use: No     Medications Prior to Admission   Medication Sig Dispense Refill Last Dose   • calcium acetate (PHOSLO) 667 MG capsule Take 2,001 mg by mouth 3 (Three) Times a Day With Meals.   2020 at 1800   • carvedilol (COREG) 12.5 MG tablet Take 12.5 mg by mouth 2 (Two) Times a Day With Meals.   2020 at 0800   • CloNIDine (CATAPRES) 0.3 MG tablet Take 0.3 mg by mouth 3 (Three) Times a Day.    "2/26/2020 at 0500   • ondansetron (ZOFRAN) 4 MG tablet Take 1 tablet by mouth Every 6 (Six) Hours As Needed for Nausea or Vomiting. 10 tablet 0 2/19/2020   • polyethylene glycol (MIRALAX) packet Take 17 g by mouth Daily As Needed (constipation).      • DELFLEX-LC/1.5% DEXTROSE (DIANEAL) 344 MOSM/L CAPD Inject 2,000 mL into the abdomen / abdominal cavity As Needed (Frequency From Procedure Order).      • doxylamine-pyridoxine (DICLEGIS) 10-10 MG tablet delayed-release EC tablet Take 1 tablet by mouth 3 (Three) Times a Day As Needed (nausea). 90 tablet 0 2/25/2020 at 1800   • folic acid (FOLVITE) 1 MG tablet Take 1 tablet by mouth Daily. 30 tablet 0      Allergies:  Patient has no known allergies.    Review of Systems  14 points review of system was performed and it was negative other than what noted above in the HPI    Objective     Vital Signs  Temp:  [97.7 °F (36.5 °C)-100.6 °F (38.1 °C)] 97.7 °F (36.5 °C)  Heart Rate:  [] 97  Resp:  [16-20] 16  BP: (113-130)/(76-84) 113/76    Flowsheet Rows      First Filed Value   Admission Height  152.4 cm (60\") Documented at 05/05/2020 1949   Admission Weight  76.6 kg (168 lb 12.8 oz) Documented at 05/06/2020 0152           No intake/output data recorded.  I/O last 3 completed shifts:  In: 50 [IV Piggyback:50]  Out: -     Intake/Output Summary (Last 24 hours) at 5/6/2020 0810  Last data filed at 5/5/2020 2200  Gross per 24 hour   Intake 50 ml   Output --   Net 50 ml       Physical Exam:  General Appearance: alert, oriented x 3, no acute distress, appears to be chronically  Skin: warm and dry  HEENT: pupils round and reactive to light, oral mucosa normal, nonicteric sclera  Neck: supple, no JVD, trachea midline  Lungs: CTA, unlabored breathing effort  Heart: RRR, normal S1 and S2, no S3, no rub  Abdomen: soft, non-tender,  present bowel sounds to auscultation, can be catheter in place, exit site is clean with no tunnel tenderness  : Renal allograft in the right lower " quadrant is not tender with no bruit over the allograft  Extremities: no edema, cyanosis or clubbing, functional AV fistula in the right upper arm  Joints: No significant deformities noted, no crepitation over the knees or back  Lymphatics: No cervical or supraclavicular lymphadenopathy  Neuro: normal speech and mental status      Results Review:  Results for orders placed or performed during the hospital encounter of 05/05/20   Comprehensive Metabolic Panel   Result Value Ref Range    Glucose 123 (H) 65 - 99 mg/dL    BUN 50 (H) 6 - 20 mg/dL    Creatinine 12.31 (H) 0.57 - 1.00 mg/dL    Sodium 133 (L) 136 - 145 mmol/L    Potassium 3.4 (L) 3.5 - 5.2 mmol/L    Chloride 90 (L) 98 - 107 mmol/L    CO2 23.5 22.0 - 29.0 mmol/L    Calcium 7.3 (L) 8.6 - 10.5 mg/dL    Total Protein 9.0 (H) 6.0 - 8.5 g/dL    Albumin 3.40 (L) 3.50 - 5.20 g/dL    ALT (SGPT) 5 1 - 33 U/L    AST (SGOT) 10 1 - 32 U/L    Alkaline Phosphatase 79 39 - 117 U/L    Total Bilirubin 0.5 0.2 - 1.2 mg/dL    eGFR Non African Amer 3 (L) >60 mL/min/1.73    eGFR  African Amer      Globulin 5.6 gm/dL    A/G Ratio 0.6 g/dL    BUN/Creatinine Ratio 4.1 (L) 7.0 - 25.0    Anion Gap 19.5 (H) 5.0 - 15.0 mmol/L   Lactic Acid, Plasma   Result Value Ref Range    Lactate 1.5 0.5 - 2.0 mmol/L   Urinalysis With Culture If Indicated - Urine, Catheter In/Out   Result Value Ref Range    Color, UA Yellow Yellow, Straw    Appearance, UA Turbid (A) Clear    pH, UA 6.0 5.0 - 8.0    Specific Gravity, UA 1.015 1.005 - 1.030    Glucose, UA Negative Negative    Ketones, UA Negative Negative    Bilirubin, UA Negative Negative    Blood, UA Moderate (2+) (A) Negative    Protein, UA >=300 mg/dL (3+) (A) Negative    Leuk Esterase, UA Large (3+) (A) Negative    Nitrite, UA Positive (A) Negative    Urobilinogen, UA 0.2 E.U./dL 0.2 - 1.0 E.U./dL   CBC Auto Differential   Result Value Ref Range    WBC 26.36 (H) 3.40 - 10.80 10*3/mm3    RBC 2.89 (L) 3.77 - 5.28 10*6/mm3    Hemoglobin 8.6 (L) 12.0  - 15.9 g/dL    Hematocrit 25.2 (L) 34.0 - 46.6 %    MCV 87.2 79.0 - 97.0 fL    MCH 29.8 26.6 - 33.0 pg    MCHC 34.1 31.5 - 35.7 g/dL    RDW 17.4 (H) 12.3 - 15.4 %    RDW-SD 54.6 (H) 37.0 - 54.0 fl    MPV 9.2 6.0 - 12.0 fL    Platelets 306 140 - 450 10*3/mm3    Neutrophil % 88.3 (H) 42.7 - 76.0 %    Lymphocyte % 4.9 (L) 19.6 - 45.3 %    Monocyte % 5.0 5.0 - 12.0 %    Eosinophil % 0.5 0.3 - 6.2 %    Basophil % 0.2 0.0 - 1.5 %    Immature Grans % 1.1 (H) 0.0 - 0.5 %    Neutrophils, Absolute 23.27 (H) 1.70 - 7.00 10*3/mm3    Lymphocytes, Absolute 1.28 0.70 - 3.10 10*3/mm3    Monocytes, Absolute 1.31 (H) 0.10 - 0.90 10*3/mm3    Eosinophils, Absolute 0.14 0.00 - 0.40 10*3/mm3    Basophils, Absolute 0.06 0.00 - 0.20 10*3/mm3    Immature Grans, Absolute 0.30 (H) 0.00 - 0.05 10*3/mm3    nRBC 0.0 0.0 - 0.2 /100 WBC   Urinalysis, Microscopic Only - Urine, Catheter In/Out   Result Value Ref Range    RBC, UA 13-20 (A) None Seen, 0-2 /HPF    WBC, UA 21-30 (A) None Seen, 0-2 /HPF    Bacteria, UA 2+ (A) None Seen /HPF    Squamous Epithelial Cells, UA 3-6 (A) None Seen, 0-2 /HPF    Hyaline Casts, UA None Seen None Seen /LPF    Methodology Manual Light Microscopy    Basic Metabolic Panel   Result Value Ref Range    Glucose 98 65 - 99 mg/dL    BUN 58 (H) 6 - 20 mg/dL    Creatinine 12.46 (H) 0.57 - 1.00 mg/dL    Sodium 134 (L) 136 - 145 mmol/L    Potassium 3.9 3.5 - 5.2 mmol/L    Chloride 90 (L) 98 - 107 mmol/L    CO2 20.7 (L) 22.0 - 29.0 mmol/L    Calcium 6.4 (L) 8.6 - 10.5 mg/dL    eGFR  African Amer      eGFR Non African Amer 3 (L) >60 mL/min/1.73    BUN/Creatinine Ratio 4.7 (L) 7.0 - 25.0    Anion Gap 23.3 (H) 5.0 - 15.0 mmol/L   CBC (No Diff)   Result Value Ref Range    WBC 21.81 (H) 3.40 - 10.80 10*3/mm3    RBC 2.73 (L) 3.77 - 5.28 10*6/mm3    Hemoglobin 8.1 (L) 12.0 - 15.9 g/dL    Hematocrit 23.6 (L) 34.0 - 46.6 %    MCV 86.4 79.0 - 97.0 fL    MCH 29.7 26.6 - 33.0 pg    MCHC 34.3 31.5 - 35.7 g/dL    RDW 17.4 (H) 12.3 - 15.4 %     RDW-SD 54.7 (H) 37.0 - 54.0 fl    MPV 9.0 6.0 - 12.0 fL    Platelets 254 140 - 450 10*3/mm3     Imaging Results (Last 72 Hours)     Procedure Component Value Units Date/Time    CT Abdomen Pelvis Without Contrast [[id number]] Collected:  05/06/20 0001     Updated:  05/06/20 0019    Narrative:       CT OF THE ABDOMEN AND PELVIS WITHOUT CONTRAST     HISTORY: Fever and painful urination.     COMPARISON: None available.     TECHNIQUE: Axial CT imaging was obtained from the dome the diaphragm to  the symphysis pubis. No IV contrast was administered.     FINDINGS:  Partially calcified nodule within the right lower lobe has been present  on exams dating back to September 2014, it is benign. There is a small  hiatal hernia. Duodenum appears unremarkable.. The kidneys are atrophic.  Calcified granulomata are seen within the spleen. Pancreas appears  unremarkable. There are some calcified periportal and peripancreatic  nodes. No focal hepatic lesions are seen. Gallbladder is unremarkable.  There is fluid seen within the abdomen, likely reflecting dialysate,  given the presence of a peritoneal dialysis catheter. The patient has a  right lower quadrant renal transplant. There is no hydronephrosis. There  is no evidence of mechanical bowel obstruction. The appendix is normal.  The uterus is enlarged and heterogeneous. This likely reflects the  presence of underlying fibroids. Cysts are identified on the left ovary.  Right ovary appears unremarkable. No acute osseous abnormalities are  seen. The urinary bladder is decompressed, but is grossly unremarkable.       Impression:          1. Right lower quadrant renal transplant, without evidence of  hydronephrosis.  2. Enlarged and heterogeneous uterus, likely reflecting the presence of  underlying fibroids.     Radiation dose reduction techniques were utilized, including automated  exposure control and exposure modulation based on body size.     This report was finalized on 5/6/2020  12:16 AM by Dr. Zena Pham M.D.                 calcium acetate 2,001 mg Oral TID With Meals   carvedilol 12.5 mg Oral BID With Meals   cefTRIAXone 1 g Intravenous Q24H   cloNIDine 0.3 mg Oral TID   folic acid 1 mg Oral Daily   insulin lispro 0-9 Units Subcutaneous 4x Daily With Meals & Nightly   sodium chloride 10 mL Intravenous Q12H   Vancomycin Pharmacy Intermittent Dosing  Does not apply Daily       Pharmacy to dose vancomycin        Assessment/Plan   1.  End-stage renal disease currently on peritoneal dialysis.no clinical evidence of peritonitis  2.  Recurrent nausea and vomiting  3.  Presumably UTI, Her urinalysis as reviewed by myself is not reflective of severe UTI with pyuria I have checked her urine for similar complaints as an outpatient and she had low colony counts of E. coli in the order of 10,000-50,000 colonies per high-power field and I opted not to treat the patient.  Because of the risk of antibiotic associated infection and that there is no evidence based that treating that low colony count will improve the patient's condition.  But currently she has leukocytosis currently on IV antibiotics  4.  Anemia of chronic kidney disease receiving TERESA as an outpatient, hemoglobin 8.1, today  5.  Failed kidney transplant  6.  Reported history of prolonged QT syndrome  7.  Diabetes mellitus type 2  8.  Prior parathyroidectomy with autotransplant to the left forearm in April 2018 her calcium is low today 6.3 on admission was 7.3 with albumin 3.4.  I will resume calcitriol    Plan:  1.  I agree with the present treatment, resume calcitriol 0.5 mcg daily  2.  Check peritoneal fluid for cell count and differential and cultures to rule out the possibility of peritonitis  3.  Surveillance labs    Thank you for asking me to see this patient in consultation    I discussed the patients findings and my recommendations with the patient    Carlos Manuel Gonzales MD  05/06/20  08:10      Much of this encounter note  is an electronic transcription/translation of spoken language to printed text. The electronic translation of spoken language may permit erroneous, or at times, nonsensical words or phrases to be inadvertently transcribed; Although I have reviewed the note for such errors, some may still exist

## 2020-05-06 NOTE — PLAN OF CARE
Problem: Patient Care Overview  Goal: Plan of Care Review  Outcome: Ongoing (interventions implemented as appropriate)  Flowsheets (Taken 5/6/2020 1334)  Progress: improving  Plan of Care Reviewed With: patient  Outcome Summary: Patient fever in am resolved with Tylenol.  PD 5 exchanges/day 0700, 1100, 1500, 1900, 2300.  Initial PD exchange @ 1100 with Dextrose 2.5 in 2000 mL.  Per patient request only 1600 mL input each exchange.  Specimen sent to lab from initial exchange.  Patient started monthly period and c/o cramping resolved with Tylenol, hot tea, and warm compress to lower abdomen.  Clonidine held today due to decreased BP.  Vancomycin to be held due to Random Vanc level elevated.  Rocephin to be administered this pm.  VS and labs monitored.

## 2020-05-06 NOTE — ED NOTES
"Nursing report ED to floor  Sunni Mcneil  45 y.o.  female    HPI (triage note):   Chief Complaint   Patient presents with   • Difficulty Urinating       Admitting doctor:   Michael Anguiano MD    Admitting diagnosis:   The primary encounter diagnosis was Febrile illness, acute. A diagnosis of End stage renal disease (CMS/HCC) was also pertinent to this visit.    Code status:   Current Code Status     Date Active Code Status Order ID Comments User Context       5/6/2020 0030 CPR [id number]  Renee Muller APRN ED       Questions for Current Code Status     Question Answer Comment    Code Status CPR     Medical Interventions (Level of Support Prior to Arrest) Full           Allergies:   Patient has no known allergies.    Weight:   There were no vitals filed for this visit.    Most recent vitals:   Vitals:    05/05/20 1948 05/05/20 1949 05/06/20 0007 05/06/20 0010   BP:   130/84 125/84   Pulse: (Abnormal) 145  107 109   Resp:  20     Temp: (Abnormal) 100.6 °F (38.1 °C)      TempSrc: Tympanic      SpO2: 95%  100% 99%   Height:  152.4 cm (60\")         Active LDAs/IV Access:   Lines, Drains & Airways    Active LDAs     Name:   Placement date:   Placement time:   Site:   Days:    Peripheral IV 05/05/20 2019 Left Antecubital   05/05/20 2019    Antecubital   less than 1    Peritoneal Dialysis Catheter    07/24/19    1408    Comments Only left upper abdomen   286                Labs (abnormal labs have a star):   Labs Reviewed   COMPREHENSIVE METABOLIC PANEL - Abnormal; Notable for the following components:       Result Value    Glucose 123 (*)     BUN 50 (*)     Creatinine 12.31 (*)     Sodium 133 (*)     Potassium 3.4 (*)     Chloride 90 (*)     Calcium 7.3 (*)     Total Protein 9.0 (*)     Albumin 3.40 (*)     eGFR Non African Amer 3 (*)     BUN/Creatinine Ratio 4.1 (*)     Anion Gap 19.5 (*)     All other components within normal limits    Narrative:     GFR Normal >60  Chronic Kidney Disease <60  Kidney Failure " <15     URINALYSIS W/ CULTURE IF INDICATED - Abnormal; Notable for the following components:    Appearance, UA Turbid (*)     Blood, UA Moderate (2+) (*)     Protein, UA >=300 mg/dL (3+) (*)     Leuk Esterase, UA Large (3+) (*)     Nitrite, UA Positive (*)     All other components within normal limits   CBC WITH AUTO DIFFERENTIAL - Abnormal; Notable for the following components:    WBC 26.36 (*)     RBC 2.89 (*)     Hemoglobin 8.6 (*)     Hematocrit 25.2 (*)     RDW 17.4 (*)     RDW-SD 54.6 (*)     Neutrophil % 88.3 (*)     Lymphocyte % 4.9 (*)     Immature Grans % 1.1 (*)     Neutrophils, Absolute 23.27 (*)     Monocytes, Absolute 1.31 (*)     Immature Grans, Absolute 0.30 (*)     All other components within normal limits   URINALYSIS, MICROSCOPIC ONLY - Abnormal; Notable for the following components:    RBC, UA 13-20 (*)     WBC, UA 21-30 (*)     Bacteria, UA 2+ (*)     Squamous Epithelial Cells, UA 3-6 (*)     All other components within normal limits   LACTIC ACID, PLASMA - Normal   BLOOD CULTURE   BLOOD CULTURE   URINE CULTURE   URINE CULTURE   BASIC METABOLIC PANEL   CBC (NO DIFF)   CBC AND DIFFERENTIAL    Narrative:     The following orders were created for panel order CBC & Differential.  Procedure                               Abnormality         Status                     ---------                               -----------         ------                     CBC Auto Differential[[id number]]        Abnormal            Final result                 Please view results for these tests on the individual orders.       EKG:   No orders to display       Meds given in ED:   Medications   sodium chloride 0.9 % flush 10 mL (10 mL Intravenous Given 5/5/20 3181)   sodium chloride 0.9 % flush 10 mL (has no administration in time range)   sodium chloride 0.9 % flush 10 mL (has no administration in time range)   acetaminophen (TYLENOL) tablet 650 mg (has no administration in time range)     Or   acetaminophen (TYLENOL) 160  MG/5ML solution 650 mg (has no administration in time range)     Or   acetaminophen (TYLENOL) suppository 650 mg (has no administration in time range)   bisacodyl (DULCOLAX) EC tablet 5 mg (has no administration in time range)   cefTRIAXone (ROCEPHIN) IVPB 1 g (has no administration in time range)   Pharmacy to dose vancomycin (has no administration in time range)   sodium chloride 0.9 % bolus 1,000 mL (0 mL Intravenous Stopped 5/6/20 0005)   cefTRIAXone (ROCEPHIN) IVPB 1 g (0 g Intravenous Stopped 5/5/20 2224)   vancomycin (VANCOCIN) in iso-osmotic dextrose IVPB 1 g (premix) 200 mL (1,000 mg Intravenous New Bag 5/6/20 0002)   LORazepam (ATIVAN) injection 1 mg (1 mg Intravenous Given 5/5/20 2339)   diphenhydrAMINE (BENADRYL) injection 25 mg (25 mg Intravenous Given 5/5/20 2335)       Imaging results:  Ct Abdomen Pelvis Without Contrast    Result Date: 5/6/2020   1. Right lower quadrant renal transplant, without evidence of hydronephrosis. 2. Enlarged and heterogeneous uterus, likely reflecting the presence of underlying fibroids.  Radiation dose reduction techniques were utilized, including automated exposure control and exposure modulation based on body size.  This report was finalized on 5/6/2020 12:16 AM by Dr. Zena Pham M.D.        Ambulatory status:   - ab karl    Social issues:   Social History     Socioeconomic History   • Marital status:      Spouse name: Not on file   • Number of children: 2   • Years of education: 12   • Highest education level: Not on file   Occupational History     Employer: [employer]   Tobacco Use   • Smoking status: Never Smoker   • Smokeless tobacco: Never Used   Substance and Sexual Activity   • Alcohol use: No   • Drug use: No   • Sexual activity: Mónica Shearer, RN  05/06/20 0033

## 2020-05-06 NOTE — PLAN OF CARE
Problem: Patient Care Overview  Goal: Plan of Care Review  Outcome: Ongoing (interventions implemented as appropriate)  Flowsheets (Taken 5/6/2020 7328)  Progress: improving  Plan of Care Reviewed With: patient  Outcome Summary: Pt new admit from ER. On peritoneal dialysis. A&O x4 on RA. Up ad karl. No complaints of pain this shift. Will continue to monitor.

## 2020-05-06 NOTE — PROGRESS NOTES
"Pharmacokinetic Consult - Vancomycin Dosing (Initial Note)    Sunni Mcneil has been consulted for pharmacy to dose vancomycin for febrile illness.  Pharmacy dosing vancomycin per Renee DECKER's request.   Goal trough: 15-20 mg/L   Other antimicrobials: ceftiaxone    Relevant clinical data and objective history reviewed:  45 y.o. female 152.4 cm (60\") 76.6 kg (168 lb 12.8 oz)    Past Medical History:   Diagnosis Date    Acid reflux     Anemia     Arthritis     Diabetes mellitus (CMS/[facility])     ESRD on dialysis (CMS/[facility])     History of peritoneal dialysis     KIDNEY TRANSPLANT SEPT 2016 CURRENTLY DOING PERITONEAL DIALYSIS.    History of transfusion     BEEN A WHILE    Hypercalcemia     Hyperparathyroidism (CMS/[facility])     Hypertension     Kidney disease     End-stage renal disease. TRANSPLANT    Kidney transplant recipient 09/02/2016    RIGHT KIDNEY    Peritoneal dialysis catheter in situ (CMS/[facility])     LEFT ABDOMEN    Seasonal allergies     CURRENTLY      Creatinine   Date Value Ref Range Status   05/05/2020 12.31 (H) 0.57 - 1.00 mg/dL Final   03/16/2020 9.26 (H) 0.57 - 1.00 mg/dL Final   03/15/2020 8.61 (H) 0.57 - 1.00 mg/dL Final   07/01/2019 9.0 (H) 0.7 - 1.5 mg/dL Final   06/30/2019 4.5 (H) 0.7 - 1.5 mg/dL Final   06/20/2019 6.4 (H) 0.7 - 1.5 mg/dL Final     BUN   Date Value Ref Range Status   05/05/2020 50 (H) 6 - 20 mg/dL Final   07/01/2019 35 (H) 7 - 20 mg/dL Final     Estimated Creatinine Clearance: 5.3 mL/min (A) (by C-G formula based on SCr of 12.31 mg/dL (H)).    Lab Results   Component Value Date    WBC 26.36 (H) 05/05/2020     Temp Readings from Last 3 Encounters:   05/06/20 97.7 °F (36.5 °C) (Oral)   03/16/20 98.8 °F (37.1 °C) (Oral)   02/26/20 97.5 °F (36.4 °C) (Oral)      Baseline culture/source/susceptibility:   5/5 Blood cultures IP  5/5 Urine culture IP.     Assessment/Plan  Urinalysis nitrites positive and LE positive.  Patient received PD.  Vancomycin 1 gram IV already given, will check a " random level today at 10AM. Will monitor serum creatinine every 24 hours for the first 3 days then at least every 48 hours per dosing recommendations.  Pharmacy will continue to follow daily while on vancomycin and adjust as needed.     Christian Nicholas, PharmD, BCIDP, BCPS

## 2020-05-06 NOTE — PROGRESS NOTES
"Pharmacokinetic Evaluation: Vancomycin IV  Patient: Sunni Mcneil  Admission Date:   MRN: [id number]  : 1975    Day of vancomycin therapy: 2  Consult for JERONIMO Tamayo  Treating: Sepsis  Goal random: 15-20 mcg/mL    Current regimen: Vancomycin Intermittent Dosing  Other antimicrobials: Ceftriaxone 1 gm IV q24hrs    Relevant clinical data and objective history reviewed:  45 y.o. female 152.4 cm (60\") 76.6 kg (168 lb 12.8 oz)  Body mass index is 32.97 kg/m².  Results from last 7 days   Lab Units 20  0558 20   CREATININE mg/dL 12.46* 12.31*     WBC   Date Value Ref Range Status   2020 21.81 (H) 3.40 - 10.80 10*3/mm3 Final   2020 26.36 (H) 3.40 - 10.80 10*3/mm3 Final     Temp:  [97.7 °F (36.5 °C)-100.6 °F (38.1 °C)] 97.7 °F (36.5 °C)  Heart Rate:  [] 97  Resp:  [16-20] 16  BP: (113-130)/(76-84) 113/76    Intake/Output Summary (Last 24 hours) at 2020 0726  Last data filed at 2020 2200  Gross per 24 hour   Intake 50 ml   Output --   Net 50 ml     Baseline labs/radiology/cultures:    Labs:   Lactate: 1.5   UA: WBC 21-30/2+ bacteria/3+ Leuk/Nitrite +    Radiology:    CT abd/pelvis: Right lower quadrant renal transplant, without evidence of hydronephrosis.  Enlarged and heterogeneous uterus, likely reflecting the presence of underlying fibroids.    Cultures:    Blood cx x2: In process   Urine cx: In process    Assessment/Plan:   PMH: Acid reflux, Anemia, Arthritis, Diabetes mellitus, ESRD on dialysis, peritoneal dialysis, transfusion, Hypercalcemia, Hyperparathyroidism, Hypertension, Kidney disease, Kidney transplant recipient (2016), Peritoneal dialysis catheter in situ, and Seasonal allergies.    CC: Difficulty urinating, low grade fever & intermittent left flank pain    1. Vancomycin random level today above desired range  2. HOLD Vanc IV today  3. Vanc random level in AM for further dosing  4. Noted: Patient on CAPD 5 " exchanges/daily    Lab Results   Component Value Date    VANCO RANDOM 24.30 05/06/2020 @ 10:08 am     Vancomycin IV Dosing & Levels History:  5/6 00:02 1000 mg x1   5/6 10:08 Random: 24.30 mcg/mL   5/7 Random: mcg/mL    Thank you for your consult,    Robina Hill [facility]

## 2020-05-06 NOTE — NURSING NOTE
Called Nephrology to clarify PD order for solution and amount of exchange.  Dr. Gonzales stated he put in orders for Dextrose 2.5 in 2000 mL and to speak with pharmacist about order he placed in Epic.  Spoke with Robina with pharmacy and no order found in Epic.  Order for Dextrose 2.5 in 2000 mL entered as telephone order.

## 2020-05-07 VITALS
BODY MASS INDEX: 33.14 KG/M2 | HEART RATE: 90 BPM | OXYGEN SATURATION: 98 % | SYSTOLIC BLOOD PRESSURE: 106 MMHG | WEIGHT: 168.8 LBS | RESPIRATION RATE: 16 BRPM | DIASTOLIC BLOOD PRESSURE: 70 MMHG | TEMPERATURE: 98 F | HEIGHT: 60 IN

## 2020-05-07 LAB
ALBUMIN SERPL-MCNC: 3.2 G/DL (ref 3.5–5.2)
ANION GAP SERPL CALCULATED.3IONS-SCNC: 22 MMOL/L (ref 5–15)
BACTERIA SPEC AEROBE CULT: ABNORMAL
BASOPHILS # BLD AUTO: 0.07 10*3/MM3 (ref 0–0.2)
BASOPHILS NFR BLD AUTO: 0.4 % (ref 0–1.5)
BUN BLD-MCNC: 66 MG/DL (ref 6–20)
BUN/CREAT SERPL: 5.4 (ref 7–25)
CALCIUM SPEC-SCNC: 8.4 MG/DL (ref 8.6–10.5)
CHLORIDE SERPL-SCNC: 89 MMOL/L (ref 98–107)
CO2 SERPL-SCNC: 24 MMOL/L (ref 22–29)
CREAT BLD-MCNC: 12.32 MG/DL (ref 0.57–1)
DEPRECATED RDW RBC AUTO: 54.4 FL (ref 37–54)
EOSINOPHIL # BLD AUTO: 0.62 10*3/MM3 (ref 0–0.4)
EOSINOPHIL NFR BLD AUTO: 3.6 % (ref 0.3–6.2)
ERYTHROCYTE [DISTWIDTH] IN BLOOD BY AUTOMATED COUNT: 17.1 % (ref 12.3–15.4)
GFR SERPL CREATININE-BSD FRML MDRD: 3 ML/MIN/1.73
GFR SERPL CREATININE-BSD FRML MDRD: ABNORMAL ML/MIN/{1.73_M2}
GLUCOSE BLD-MCNC: 79 MG/DL (ref 65–99)
GLUCOSE BLDC GLUCOMTR-MCNC: 78 MG/DL (ref 70–130)
HCT VFR BLD AUTO: 23.6 % (ref 34–46.6)
HGB BLD-MCNC: 8 G/DL (ref 12–15.9)
IMM GRANULOCYTES # BLD AUTO: 0.27 10*3/MM3 (ref 0–0.05)
IMM GRANULOCYTES NFR BLD AUTO: 1.6 % (ref 0–0.5)
LYMPHOCYTES # BLD AUTO: 1.47 10*3/MM3 (ref 0.7–3.1)
LYMPHOCYTES NFR BLD AUTO: 8.6 % (ref 19.6–45.3)
MCH RBC QN AUTO: 29.6 PG (ref 26.6–33)
MCHC RBC AUTO-ENTMCNC: 33.9 G/DL (ref 31.5–35.7)
MCV RBC AUTO: 87.4 FL (ref 79–97)
MONOCYTES # BLD AUTO: 0.93 10*3/MM3 (ref 0.1–0.9)
MONOCYTES NFR BLD AUTO: 5.4 % (ref 5–12)
NEUTROPHILS # BLD AUTO: 13.72 10*3/MM3 (ref 1.7–7)
NEUTROPHILS NFR BLD AUTO: 80.4 % (ref 42.7–76)
NRBC BLD AUTO-RTO: 0 /100 WBC (ref 0–0.2)
PHOSPHATE SERPL-MCNC: 8.8 MG/DL (ref 2.5–4.5)
PLATELET # BLD AUTO: 294 10*3/MM3 (ref 140–450)
PMV BLD AUTO: 9 FL (ref 6–12)
POTASSIUM BLD-SCNC: 3.8 MMOL/L (ref 3.5–5.2)
RBC # BLD AUTO: 2.7 10*6/MM3 (ref 3.77–5.28)
SODIUM BLD-SCNC: 135 MMOL/L (ref 136–145)
VANCOMYCIN SERPL-MCNC: 21.3 MCG/ML (ref 5–40)
WBC NRBC COR # BLD: 17.08 10*3/MM3 (ref 3.4–10.8)

## 2020-05-07 PROCEDURE — 80069 RENAL FUNCTION PANEL: CPT | Performed by: INTERNAL MEDICINE

## 2020-05-07 PROCEDURE — 82962 GLUCOSE BLOOD TEST: CPT

## 2020-05-07 PROCEDURE — 90945 DIALYSIS ONE EVALUATION: CPT

## 2020-05-07 PROCEDURE — 80202 ASSAY OF VANCOMYCIN: CPT | Performed by: HOSPITALIST

## 2020-05-07 PROCEDURE — 85025 COMPLETE CBC W/AUTO DIFF WBC: CPT | Performed by: INTERNAL MEDICINE

## 2020-05-07 RX ORDER — CALCITRIOL 0.25 UG/1
0.5 CAPSULE, LIQUID FILLED ORAL DAILY
Qty: 60 CAPSULE | Refills: 0 | Status: SHIPPED | OUTPATIENT
Start: 2020-05-08 | End: 2022-10-18 | Stop reason: ALTCHOICE

## 2020-05-07 RX ORDER — LEVOFLOXACIN 500 MG/1
500 TABLET, FILM COATED ORAL ONCE
Status: COMPLETED | OUTPATIENT
Start: 2020-05-07 | End: 2020-05-07

## 2020-05-07 RX ORDER — LEVOFLOXACIN 250 MG/1
250 TABLET ORAL EVERY 24 HOURS
Status: DISCONTINUED | OUTPATIENT
Start: 2020-05-07 | End: 2020-05-07 | Stop reason: DRUGHIGH

## 2020-05-07 RX ORDER — DEXTROSE MONOHYDRATE, SODIUM CHLORIDE, SODIUM LACTATE, CALCIUM CHLORIDE, MAGNESIUM CHLORIDE 2.5; 538; 448; 18.4; 5.08 G/100ML; MG/100ML; MG/100ML; MG/100ML; MG/100ML
2000 SOLUTION INTRAPERITONEAL AS NEEDED
Status: DISCONTINUED | OUTPATIENT
Start: 2020-05-07 | End: 2020-05-07 | Stop reason: HOSPADM

## 2020-05-07 RX ORDER — LEVOFLOXACIN 250 MG/1
250 TABLET ORAL EVERY OTHER DAY
Qty: 3 TABLET | Refills: 0 | Status: SHIPPED | OUTPATIENT
Start: 2020-05-09 | End: 2020-05-14

## 2020-05-07 RX ORDER — LEVOFLOXACIN 250 MG/1
250 TABLET ORAL EVERY OTHER DAY
Status: DISCONTINUED | OUTPATIENT
Start: 2020-05-09 | End: 2020-05-07 | Stop reason: HOSPADM

## 2020-05-07 RX ADMIN — DEXTROSE MONOHYDRATE, SODIUM CHLORIDE, SODIUM LACTATE, CALCIUM CHLORIDE, MAGNESIUM CHLORIDE 2000 ML: 2.5; 538; 448; 18.4; 5.08 SOLUTION INTRAPERITONEAL at 06:07

## 2020-05-07 RX ADMIN — FOLIC ACID 1 MG: 1 TABLET ORAL at 08:01

## 2020-05-07 RX ADMIN — CARVEDILOL 12.5 MG: 12.5 TABLET, FILM COATED ORAL at 08:01

## 2020-05-07 RX ADMIN — CALCITRIOL 0.5 MCG: 0.25 CAPSULE ORAL at 08:01

## 2020-05-07 RX ADMIN — SODIUM CHLORIDE, PRESERVATIVE FREE 10 ML: 5 INJECTION INTRAVENOUS at 08:02

## 2020-05-07 RX ADMIN — CALCIUM ACETATE 2001 MG: 667 CAPSULE ORAL at 08:01

## 2020-05-07 RX ADMIN — LEVOFLOXACIN 500 MG: 500 TABLET, FILM COATED ORAL at 10:29

## 2020-05-07 NOTE — PLAN OF CARE
Problem: Patient Care Overview  Goal: Plan of Care Review  Outcome: Ongoing (interventions implemented as appropriate)  Flowsheets (Taken 5/7/2020 2607)  Plan of Care Reviewed With: patient  Outcome Summary: Pt had a slightly elevated temp at the beginning of shift tx with prn tylenol. PD exchanges done per order. No complaints of pain overnight. VSS, will continue to monitor.

## 2020-05-07 NOTE — PROGRESS NOTES
"   LOS: 1 day    Patient Care Team:  Provider, No Known as PCP - Bo Hummel MD as Consulting Physician (Nephrology)  Damon Rooney MD as Surgeon (General Surgery)    Chief Complaint:    Chief Complaint   Patient presents with   • Difficulty Urinating     Follow-up end-stage renal disease on peritoneal dialysis  Subjective     Interval History:   Patient is feeling much better, denies any nausea or vomiting, no abdominal pain, her dysuria improved significantly, no chest pain or shortness of air, no lightheadedness.  Has been tolerating her peritoneal dialysis without any difficulties.      Review of Systems:   As noted above    Objective     Vital Signs  Temp:  [97.4 °F (36.3 °C)-100.8 °F (38.2 °C)] 98.9 °F (37.2 °C)  Heart Rate:  [] 95  Resp:  [16-18] 16  BP: (106-126)/(70-85) 108/70    Flowsheet Rows      First Filed Value   Admission Height  152.4 cm (60\") Documented at 05/05/2020 1949   Admission Weight  76.6 kg (168 lb 12.8 oz) Documented at 05/06/2020 0152          No intake/output data recorded.  I/O last 3 completed shifts:  In: 8310 [P.O.:860; Other:7400; IV Piggyback:50]  Out: 8200 [Other:8200]    Intake/Output Summary (Last 24 hours) at 5/7/2020 0744  Last data filed at 5/7/2020 0653  Gross per 24 hour   Intake 8260 ml   Output 8200 ml   Net 60 ml       Physical Exam:  General Appearance: alert, oriented x 3, no acute distress, appears to be chronically  Skin: warm and dry  HEENT: pupils round and reactive to light, oral mucosa normal, nonicteric sclera  Neck: supple, no JVD, trachea midline  Lungs: CTA, unlabored breathing effort  Heart: RRR, normal S1 and S2, no S3, no rub  Abdomen: soft, non-tender,  present bowel sounds to auscultation, can be catheter in place, exit site is clean with no tunnel tenderness  : Renal allograft in the right lower quadrant is not tender with no bruit over the allograft  Extremities: no edema, cyanosis or clubbing, functional AV " fistula in the right upper arm  Neuro: normal speech and mental status        Results Review:    Results from last 7 days   Lab Units 05/07/20 0553 05/06/20 0558 05/05/20 2018   SODIUM mmol/L 135* 134* 133*   POTASSIUM mmol/L 3.8 3.9 3.4*   CHLORIDE mmol/L 89* 90* 90*   CO2 mmol/L 24.0 20.7* 23.5   BUN mg/dL 66* 58* 50*   CREATININE mg/dL 12.32* 12.46* 12.31*   CALCIUM mg/dL 8.4* 6.4* 7.3*   BILIRUBIN mg/dL  --   --  0.5   ALK PHOS U/L  --   --  79   ALT (SGPT) U/L  --   --  5   AST (SGOT) U/L  --   --  10   GLUCOSE mg/dL 79 98 123*       Estimated Creatinine Clearance: 5.3 mL/min (A) (by C-G formula based on SCr of 12.32 mg/dL (H)).    Results from last 7 days   Lab Units 05/07/20 0553   PHOSPHORUS mg/dL 8.8*             Results from last 7 days   Lab Units 05/07/20 0553 05/06/20 0558 05/05/20 2018   WBC 10*3/mm3 17.08* 21.81* 26.36*   HEMOGLOBIN g/dL 8.0* 8.1* 8.6*   PLATELETS 10*3/mm3 294 254 306               Imaging Results (Last 24 Hours)     ** No results found for the last 24 hours. **          calcitriol 0.5 mcg Oral Daily   calcium acetate 2,001 mg Oral TID With Meals   carvedilol 12.5 mg Oral BID With Meals   cefTRIAXone 1 g Intravenous Q24H   cloNIDine 0.3 mg Oral TID   Delflex-LC/2.5% Dextrose 2,000 mL Intraperitoneal 5x Daily   folic acid 1 mg Oral Daily   insulin lispro 0-9 Units Subcutaneous 4x Daily With Meals & Nightly   sodium chloride 10 mL Intravenous Q12H   Vancomycin Pharmacy Intermittent Dosing  Does not apply Daily       Pharmacy to dose vancomycin        Medication Review:   Current Facility-Administered Medications   Medication Dose Route Frequency Provider Last Rate Last Dose   • acetaminophen (TYLENOL) tablet 650 mg  650 mg Oral Q4H PRN Renee Muller APRN   650 mg at 05/06/20 2057    Or   • acetaminophen (TYLENOL) 160 MG/5ML solution 650 mg  650 mg Oral Q4H PRN Renee Muller APRN        Or   • acetaminophen (TYLENOL) suppository 650 mg  650 mg Rectal Q4H PRN  Renee Muller APRN       • bisacodyl (DULCOLAX) EC tablet 5 mg  5 mg Oral Daily PRN Renee Muller APRN       • calcitriol (ROCALTROL) capsule 0.5 mcg  0.5 mcg Oral Daily Carlos Manuel Gonzales MD   0.5 mcg at 05/06/20 0931   • calcium acetate (PHOSLO) capsule 2,001 mg  2,001 mg Oral TID With Meals Renee Muller APRN   2,001 mg at 05/06/20 1809   • carvedilol (COREG) tablet 12.5 mg  12.5 mg Oral BID With Meals Renee Muller APRN   12.5 mg at 05/06/20 1812   • cefTRIAXone (ROCEPHIN) IVPB 1 g  1 g Intravenous Q24H Renee Muller APRN 100 mL/hr at 05/06/20 2054 1 g at 05/06/20 2054   • cloNIDine (CATAPRES) tablet 0.3 mg  0.3 mg Oral TID Renee Muller APRN   0.3 mg at 05/06/20 2054   • Delflex-LC/2.5% Dextrose (DIANEAL) CAPD 2,000 mL  2,000 mL Intraperitoneal PRN Carlos Manuel Gonzales MD       • Delflex-LC/2.5% Dextrose (DIANEAL) CAPD 2,000 mL  2,000 mL Intraperitoneal 5x Daily Carlos Manuel Gonzales MD   2,000 mL at 05/07/20 0607   • dextrose (D50W) 25 g/ 50mL Intravenous Solution 25 g  25 g Intravenous Q15 Min PRN Renee Muller APRN       • dextrose (GLUTOSE) oral gel 15 g  15 g Oral Q15 Min PRN Renee Muller APRN       • folic acid (FOLVITE) tablet 1 mg  1 mg Oral Daily Renee Muller APRN   1 mg at 05/06/20 0930   • glucagon (human recombinant) (GLUCAGEN DIAGNOSTIC) injection 1 mg  1 mg Subcutaneous PRN Renee Muller APRN       • insulin lispro (humaLOG) injection 0-9 Units  0-9 Units Subcutaneous 4x Daily With Meals & Nightly Renee Muller APRN       • Pharmacy to dose vancomycin   Does not apply Continuous PRN Renee Muller APRN       • polyethylene glycol (MIRALAX) packet 17 g  17 g Oral Daily PRN Renee Muller APRN       • sodium chloride 0.9 % flush 10 mL  10 mL Intravenous PRN Marty Marshall MD   10 mL at 05/05/20 7491   • sodium chloride 0.9 % flush 10 mL  10 mL Intravenous Q12H Renee Muller APRN   10  mL at 05/06/20 2054   • sodium chloride 0.9 % flush 10 mL  10 mL Intravenous PRN Renee Muller APRN       • Vancomycin Pharmacy Intermittent Dosing   Does not apply Daily Renee Muller APRN           Assessment/Plan   1.  End-stage renal disease currently on peritoneal dialysis.no clinical evidence of peritonitis, creatinine today 12.32, electrolytes within normal range.  2.  Recurrent nausea and vomiting, resolved  3.  UTI, grew greater than 100,000 CFU, off E. coli, sensitivity was reviewed  4.  Anemia of chronic kidney disease receiving TERESA as an outpatient, hemoglobin 8, today  5.  Failed kidney transplant  6.  Reported history of prolonged QT syndrome  7.  Diabetes mellitus type 2  8.  Prior parathyroidectomy with autotransplant to the left forearm in April 2018 her calcium is low today 8.4 on admission was 7.3 with albumin 3.2.  Calcitriol was resumed and patient is receiving calcium acetate as phosphate binders  9.  Hyperphosphatemia, phosphorus is 8.8 associated with noncompliance with treatment.      Plan:  1.  Switch to oral Levaquin 250 mg daily since the patient is on peritoneal dialysis.  For total of 7 days  2.  The patient could be discharged from the renal standpoint and I will follow her very closely at the home dialysis clinic.            Carlos Manuel Gonzales MD  05/07/20  07:44

## 2020-05-07 NOTE — DISCHARGE SUMMARY
[facility]    Date of Discharge:  5/7/2020    PCP: Provider, No Known    Discharge Diagnosis:   Active Hospital Problems    Diagnosis  POA   • **Acute UTI (urinary tract infection) [N39.0]  Unknown   • Sepsis (CMS/[facility]) [A41.9]  Unknown   • Folate deficiency anemia [D52.9]  Yes   • Anemia, chronic disease [D63.8]  Yes   • ESRD (end stage renal disease) (CMS/[facility]) [N18.6]  Yes   • Type 2 diabetes mellitus, without long-term current use of insulin (CMS/[facility]) [E11.9]  Unknown   • Peritoneal dialysis catheter in place (CMS/[facility]) [Z99.2]  Not Applicable   • Hypertension [I10]  Yes   • Hx of kidney transplant [Z94.0]  Not Applicable      Resolved Hospital Problems   No resolved problems to display.     Procedures Performed       Consults     Date and Time Order Name Status Description    5/6/2020 0030 Inpatient Nephrology Consult Completed     5/5/2020 2359 Nephrology (on -call MD unless specified) Completed     5/5/2020 5538 [facility] (on-call MD unless specified) Details Completed         Hospital Course  Please see history and physical for details. Patient is a 45 y.o. female initially admitted by myself secondary to UTI/sepsis with past history of ESRD and renal transplant on peritoneal dialysis.  Nephrology followed in consultation and appreciate their input and assistance.  UTI confirmed with E. coli which is penicillin resistant.  Patient was maintained on the above IV antibiotics but has been transitioned over to Levaquin to complete a 7-day course per renal recommendations.  Patient is clinically improved at this juncture no longer has issues nausea vomiting and clinically feels much better.  She is very much proactively wanted to home today.  She has been deemed safe from nephrology perspective and otherwise clinically she looks stable as well.  I discussed case with patient as well as RN present at bedside.  Issues with noncompliance have been noted in regards to  her hyperphosphatemia.  Nephrology wants patient to be on calcitriol in addition to calcium acetate due to past history of parathyroidectomy with autotransplant to the left forearm.  All questions answered to patient prior to disposition.    Addendum -hospital status transition to observation at discharge secondary to discussion with CCP.    Condition on Discharge: Improved.     Temp:  [97.4 °F (36.3 °C)-99.2 °F (37.3 °C)] 98 °F (36.7 °C)  Heart Rate:  [] 90  Resp:  [16-18] 16  BP: (106-126)/(70-85) 106/70  Body mass index is 32.97 kg/m².    Physical Exam   Constitutional: She appears well-developed and well-nourished.   HENT:   Head: Normocephalic.   Eyes: Conjunctivae are normal.   Neck: Neck supple. No JVD present.   Cardiovascular: Normal rate and regular rhythm.   Pulmonary/Chest: Effort normal and breath sounds normal. No respiratory distress.   Abdominal: Soft. Bowel sounds are normal. She exhibits no distension.   Peritoneal dialysis catheter without surrounding cellulitis   Musculoskeletal: She exhibits no edema.   Psychiatric: She has a normal mood and affect. Her behavior is normal.        Discharge Medications      New Medications      Instructions Start Date   calcitriol 0.5 MCG capsule  Commonly known as:  ROCALTROL   0.5 mcg, Oral, Daily   Start Date:  May 8, 2020     levoFLOXacin 250 MG tablet  Commonly known as:  LEVAQUIN   250 mg, Oral, Every Other Day   Start Date:  May 9, 2020        Continue These Medications      Instructions Start Date   calcium acetate 667 MG capsule  Commonly known as:  PHOSLO   2,001 mg, Oral, 3 Times Daily With Meals      carvedilol 12.5 MG tablet  Commonly known as:  COREG   12.5 mg, Oral, 2 Times Daily With Meals      cloNIDine 0.3 MG tablet  Commonly known as:  CATAPRES   0.3 mg, Oral, 3 Times Daily      Delflex-LC/1.5% Dextrose 344 MOSM/L CAPD   2,000 mL, Intraperitoneal, As Needed      folic acid 1 MG tablet  Commonly known as:  FOLVITE   1 mg, Oral, Daily       polyethylene glycol 17 g packet  Commonly known as:  MIRALAX   17 g, Oral, Daily PRN         Stop These Medications    doxylamine-pyridoxine 10-10 MG tablet delayed-release EC tablet  Commonly known as:  DICLEGIS     ondansetron 4 MG tablet  Commonly known as:  Zofran             Additional Instructions for the Follow-ups that You Need to Schedule     Discharge Follow-up with PCP   As directed       Currently Documented PCP:    Provider, No Known    PCP Phone Number:    None     Follow Up Details:  PCP as needed.  Follow-up with nephrology per their recommendations           Follow-up Information     Provider, No Known .    Why:  PCP as needed.  Follow-up with nephrology per their recommendations  Contact information:  [facility] [id number]                 Test Results Pending at Discharge   Order Current Status    Blood Culture - Blood, Blood, Venous Line Preliminary result    Blood Culture - Blood, Blood, Venous Line Preliminary result         Román Chavez MD  05/07/20  11:21    Discharge time spent greater than 30 minutes.

## 2020-05-07 NOTE — PROGRESS NOTES
"Pharmacokinetic Consult - Vancomycin Dosing (Follow-up Note)   ESRD on PD    Sunni Mcneil is on day 3 pharmacy to dose vancomycin for Acute UTI/sepsis  Pharmacy dosing vancomycin per Renee DECKER's request.   Goal random level <20 mcg/ml     Relevant clinical data and objective history reviewed:  45 y.o. female 152.4 cm (60\") 76.6 kg (168 lb 12.8 oz)    Past Medical History:   Diagnosis Date    Acid reflux     Anemia     Arthritis     Diabetes mellitus (CMS/[facility])     ESRD on dialysis (CMS/[facility])     History of peritoneal dialysis     KIDNEY TRANSPLANT SEPT 2016 CURRENTLY DOING PERITONEAL DIALYSIS.    History of transfusion     BEEN A WHILE    Hypercalcemia     Hyperparathyroidism (CMS/[facility])     Hypertension     Kidney disease     End-stage renal disease. TRANSPLANT    Kidney transplant recipient 09/02/2016    RIGHT KIDNEY    Peritoneal dialysis catheter in situ (CMS/[facility])     LEFT ABDOMEN    Seasonal allergies     CURRENTLY      Creatinine   Date Value Ref Range Status   05/07/2020 12.32 (H) 0.57 - 1.00 mg/dL Final   05/06/2020 12.46 (H) 0.57 - 1.00 mg/dL Final   05/05/2020 12.31 (H) 0.57 - 1.00 mg/dL Final   07/01/2019 9.0 (H) 0.7 - 1.5 mg/dL Final   06/30/2019 4.5 (H) 0.7 - 1.5 mg/dL Final   06/20/2019 6.4 (H) 0.7 - 1.5 mg/dL Final     BUN   Date Value Ref Range Status   05/07/2020 66 (H) 6 - 20 mg/dL Final   07/01/2019 35 (H) 7 - 20 mg/dL Final     Estimated Creatinine Clearance: 5.3 mL/min (A) (by C-G formula based on SCr of 12.32 mg/dL (H)).    Lab Results   Component Value Date    WBC 17.08 (H) 05/07/2020     Temp Readings from Last 3 Encounters:   05/07/20 99.2 °F (37.3 °C) (Oral)   03/16/20 98.8 °F (37.1 °C) (Oral)   02/26/20 97.5 °F (36.4 °C) (Oral)     Baseline culture/source/susceptibility:    Urine Culture >100,000 CFU/mL Escherichia coliAbnormal          Anti-Infectives (From admission, onward)      Ordered     Dose/Rate Route Frequency Start Stop    05/07/20 0804  levoFLOXacin (LEVAQUIN) tablet 250 " mg     Ordering Provider:  Carlos Manuel Gonzales MD    250 mg Oral Every Other Day 05/09/20 0900 05/15/20 0859    05/07/20 0804  levoFLOXacin (LEVAQUIN) tablet 500 mg     Ordering Provider:  Carlos Manuel Gonzales MD    500 mg Oral Once 05/07/20 1030      05/06/20 0731  Vancomycin Pharmacy Intermittent Dosing  Review   Ordering Provider:  Renee Muller APRN     Does not apply Daily 05/06/20 0900 05/11/20 0859    05/06/20 0031  Pharmacy to dose vancomycin  Review   Ordering Provider:  Renee Muller APRN     Does not apply Continuous PRN 05/06/20 0031 05/11/20 0030    05/05/20 2255  vancomycin (VANCOCIN) in iso-osmotic dextrose IVPB 1 g (premix) 200 mL     Ordering Provider:  Marty Marshall MD    1,000 mg Intravenous Once 05/05/20 2257 05/06/20 0002    05/05/20 1954  cefTRIAXone (ROCEPHIN) IVPB 1 g     Ordering Provider:  Marty Marshall MD    1 g  100 mL/hr over 30 Minutes Intravenous Once 05/05/20 1956 05/05/20 2224           No results found for: MAHENDRA  Lab Results   Component Value Date    VANCORANDOM 21.30 05/07/2020     Assessment/Plan  Random vancomycin level this am at 0553 = 21.3 mcg/ml (high) Also scr = 12.32 mg/dl therefore will not dose vancomycin today. Dr. Gonzales dced the rocephin and placed patient on levaquin po 250mg q48h after seeing cultures. He did not d/c the vancomycin. Recommend to d/c the vancomycin seeing random level is high, scr is extremely high and levaquin covers the EColi.  Could get vancomycin toxicity in this scenario.     Isaiah Cortes, [facility]

## 2020-05-07 NOTE — PROGRESS NOTES
Case Management Discharge Note      Final Note: Patient was DC'd home              Transportation Services  Private: Car    Final Discharge Disposition Code: 01 - home or self-care

## 2020-05-08 ENCOUNTER — READMISSION MANAGEMENT (OUTPATIENT)
Dept: CALL CENTER | Facility: HOSPITAL | Age: 45
End: 2020-05-08

## 2020-05-08 NOTE — OUTREACH NOTE
Prep Survey      Responses   [place] facility patient discharged from?  [place]   Is LACE score < 7 ?  No   Eligibility  Readm Mgmt   Discharge diagnosis  UTI, Sepsis, ESRD, type 2 DM   COVID-19 Test Status  Not tested   Does the patient have one of the following disease processes/diagnoses(primary or secondary)?  Sepsis   Does the patient have Home health ordered?  No   Is there a DME ordered?  No   Comments regarding appointments  F/U as needed with PCP and with nephrology per their recommendations   Prep survey completed?  Yes          Tesha Brown RN

## 2020-05-10 LAB
BACTERIA SPEC AEROBE CULT: NORMAL
BACTERIA SPEC AEROBE CULT: NORMAL

## 2020-05-12 ENCOUNTER — READMISSION MANAGEMENT (OUTPATIENT)
Dept: CALL CENTER | Facility: HOSPITAL | Age: 45
End: 2020-05-12

## 2020-05-12 NOTE — OUTREACH NOTE
Sepsis Week 1 Survey      Responses   [facility]   COVID-19 Test Status  Not tested   Does the patient have one of the following disease processes/diagnoses(primary or secondary)?  Sepsis   Is there a successful TCM telephone encounter documented?  No   Week 1 attempt successful?  Yes   Call start time  1310   Call end time  1312   Discharge diagnosis  UTI, Sepsis, ESRD, type 2 DM   Meds reviewed with patient/caregiver?  Yes   Is the patient having any side effects they believe may be caused by any medication additions or changes?  No   Does the patient have all medications related to this admission filled (includes all antibiotics, inhalers, nebulizers,steroids,etc.)  Yes   Is the patient taking all medications as directed (includes completed medication regime)?  Yes   Comments regarding appointments  F/U as needed with PCP and with nephrology per their recommendations   Does the patient have a primary care provider?   Yes   Does the patient have an appointment with their PCP within 7 days of discharge?  Yes   Has the patient kept scheduled appointments due by today?  N/A   Has home health visited the patient within 72 hours of discharge?  N/A   Pulse Ox monitoring  None   Did the patient receive a copy of their discharge instructions?  Yes   Nursing interventions  Reviewed instructions with patient   What is the patient's perception of their health status since discharge?  Improving   Nursing interventions  Nurse provided patient education   Is the patient/caregiver able to teach back Sepsis?  S - Shivering,fever or very cold, E - Extreme pain or generalized discomfort (worst ever,especially abdomen), P - Pale or discolored skin, S - Sleepy, difficult to arouse,confused, I -   I feel like I might die-a feeling of hopelessness, S - Short of breath   Nursing interventions  Nurse provided reassurance to patient   Is patient/caregiver able to teach back steps to recovery at  home?  Set small, achievable goals for return to baseline health, Rest and regain strength, Talk about feelings with family/friends, Record milestones and struggles in a journal, Learn about sepsis(sepsis.org), Eat a balanced diet, Exercise as tolerated, Make a list of questions for PCP appoinment   Is the patient/caregiver able to teach back signs and symptoms of worsening condition:  Fever, Hyperthermia, Rapid heart rate (>90), Shortness of breath/rapid respiratory rate, Altered mental status(confusion/coma), Edema, High blood glucose without diabetes   Is the patient/caregiver able to teach back the hierarchy of who to call/visit for symptoms/problems? PCP, Specialist, Home health nurse, Urgent Care, ED, 911  Yes   Week 1 call completed?  Yes   Wrap up additional comments  Pt has no questions, denies any fever or chills or other signs of sepsis.  Has medications and states appt are made.           Yoselyn Robert, RN

## 2020-05-19 ENCOUNTER — READMISSION MANAGEMENT (OUTPATIENT)
Dept: CALL CENTER | Facility: HOSPITAL | Age: 45
End: 2020-05-19

## 2020-05-19 NOTE — OUTREACH NOTE
Sepsis Week 2 Survey      Responses   [facility] patient discharged from?  [place]   COVID-19 Test Status  Not tested   Does the patient have one of the following disease processes/diagnoses(primary or secondary)?  Sepsis   Week 2 attempt successful?  Yes   Call start time  1520   Call end time  1521   Discharge diagnosis  UTI, Sepsis, ESRD, type 2 DM   Meds reviewed with patient/caregiver?  Yes   Is the patient taking all medications as directed (includes completed medication regime)?  Yes   Does the patient have a primary care provider?   Yes   Comments regarding PCP  Pt reports she has to find a PCP however when asked if she would like the number to the HUB she reported she had one.   Has the patient kept scheduled appointments due by today?  Yes   Pulse Ox monitoring  None   Psychosocial issues?  No   What is the patient's perception of their health status since discharge?  Improving   Nursing interventions  Nurse provided patient education   Is the patient/caregiver able to teach back Sepsis?  S - Shivering,fever or very cold, S - Short of breath   Nursing interventions  Nurse provided patient education   Is patient/caregiver able to teach back steps to recovery at home?  Eat a balanced diet   Is the patient/caregiver able to teach back signs and symptoms of worsening condition:  Fever, Hyperthermia, Shortness of breath/rapid respiratory rate   Week 2 call completed?  Yes          Ayana Finch RN

## 2020-05-26 ENCOUNTER — READMISSION MANAGEMENT (OUTPATIENT)
Dept: CALL CENTER | Facility: HOSPITAL | Age: 45
End: 2020-05-26

## 2020-05-26 NOTE — OUTREACH NOTE
Sepsis Week 3 Survey      Responses   [facility]   C[facility]-19 Test Status  Not tested   Does the patient have one of the following disease processes/diagnoses(primary or secondary)?  Sepsis   Week 3 attempt successful?  Yes   Call start time  1549   Call end time  1551   Discharge diagnosis  UTI, Sepsis, ESRD, type 2 DM   Meds reviewed with patient/caregiver?  Yes   Is the patient having any side effects they believe may be caused by any medication additions or changes?  No   Does the patient have all medications related to this admission filled (includes all antibiotics, inhalers, nebulizers,steroids,etc.)  Yes   Is the patient taking all medications as directed (includes completed medication regime)?  Yes   Does the patient have a primary care provider?   Yes   Does the patient have an appointment with their PCP within 7 days of discharge?  Yes   Has the patient kept scheduled appointments due by today?  Yes   Has home health visited the patient within 72 hours of discharge?  N/A   Pulse Ox monitoring  None   Psychosocial issues?  No   Did the patient receive a copy of their discharge instructions?  Yes   Nursing interventions  Reviewed instructions with patient   What is the patient's perception of their health status since discharge?  Improving   Is the patient/caregiver able to teach back Sepsis?  S - Shivering,fever or very cold, S - Short of breath   Is the patient/caregiver able to teach back signs and symptoms of worsening condition:  Fever, Shortness of breath/rapid respiratory rate   Is the patient/caregiver able to teach back the hierarchy of who to call/visit for symptoms/problems? PCP, Specialist, Home health nurse, Urgent Care, ED, 911  Yes   Additional teach back comments  States her BS was fine, no fever or SOA to report, very short answers.   Week 3 call completed?  Yes   Revoked  No further contact(revokes)-requires comment   Wrap up additional comments   States she is fine.          Luzma Ventura RN

## 2020-09-15 ENCOUNTER — OFFICE VISIT (OUTPATIENT)
Dept: SURGERY | Facility: CLINIC | Age: 45
End: 2020-09-15

## 2020-09-15 ENCOUNTER — TRANSCRIBE ORDERS (OUTPATIENT)
Dept: PREADMISSION TESTING | Facility: HOSPITAL | Age: 45
End: 2020-09-15

## 2020-09-15 VITALS — WEIGHT: 177 LBS | HEIGHT: 60 IN | BODY MASS INDEX: 34.75 KG/M2

## 2020-09-15 DIAGNOSIS — Z01.818 OTHER SPECIFIED PRE-OPERATIVE EXAMINATION: Primary | ICD-10-CM

## 2020-09-15 DIAGNOSIS — Z12.11 SCREENING FOR COLON CANCER: ICD-10-CM

## 2020-09-15 DIAGNOSIS — Z99.2 PERITONEAL DIALYSIS CATHETER IN PLACE (HCC): Primary | ICD-10-CM

## 2020-09-15 PROCEDURE — 99213 OFFICE O/P EST LOW 20 MIN: CPT | Performed by: SURGERY

## 2020-09-15 RX ORDER — CEFAZOLIN SODIUM 2 G/100ML
2 INJECTION, SOLUTION INTRAVENOUS ONCE
Status: CANCELLED | OUTPATIENT
Start: 2020-09-21 | End: 2020-09-15

## 2020-09-15 RX ORDER — ALPRAZOLAM 0.25 MG/1
TABLET ORAL
COMMUNITY
Start: 2020-08-31 | End: 2020-09-15

## 2020-09-15 RX ORDER — CALCITRIOL 0.5 UG/1
0.5 CAPSULE, LIQUID FILLED ORAL DAILY
COMMUNITY
Start: 2020-07-28 | End: 2020-09-15 | Stop reason: SDUPTHER

## 2020-09-15 NOTE — PROGRESS NOTES
Chief Complaint   Patient presents with   • PD Catheter removal       Subjective      Sunni Mcneil is a 45 y.o. female who is referred by Dr. Gonzales to be evaluated for peritoneal dialysis catheter placement. Patient has ESRD  is on dialysis.  Patient does have a AV access.  Patient has not had a recent intraabdominal infection.  Patient reports not wanting the tube peritoneal dialysis anymore since it was very time-consuming for her and she was not feeling well after it.  Patient inquire also about screening colonoscopy since she will need a colonoscopy in order to qualify for kidney transplant    Past Medical History:   Diagnosis Date   • Acid reflux    • Anemia    • Anxiety    • Arthritis    • Depression    • Diabetes mellitus (CMS/HCC)    • ESRD on dialysis (CMS/HCC)    • History of peritoneal dialysis     KIDNEY TRANSPLANT SEPT 2016 CURRENTLY DOING PERITONEAL DIALYSIS.   • History of transfusion     BEEN A WHILE   • Hypercalcemia    • Hyperparathyroidism (CMS/HCC)    • Hypertension    • Kidney disease     End-stage renal disease. TRANSPLANT   • Kidney transplant recipient 09/02/2016    RIGHT KIDNEY   • Peritoneal dialysis catheter in situ (CMS/HCC)     LEFT ABDOMEN   • Seasonal allergies     CURRENTLY        Past Surgical History:   Procedure Laterality Date   • ARTERIOVENOUS FISTULA/SHUNT SURGERY Right 2/26/2020    Procedure: RIGHT ARM ARTERIAL VENOUS FISTULA;  Surgeon: Elijah Herrera MD;  Location: [facility] OR;  Service: Vascular;  Laterality: Right;   • INSERTION PERITONEAL DIALYSIS CATHETER  07/29/2014    Laparoscopic placement of Curl Cath peritoneal dialysis catheter, Dr. Vinod Goldstein   • INSERTION PERITONEAL DIALYSIS CATHETER N/A 7/24/2019    Procedure: LAPAROSCOPIC INSERTION PERITONEAL DIALYSIS CATHETER;  Surgeon: Damon Rooney MD;  Location: [facility] OR;  Service: General   • REMOVAL PERITONEAL DIALYSIS CATHETER N/A 10/17/2016    Procedure: REMOVAL PERITONEAL DIALYSIS  CATHETER;  Surgeon: Damon Rooney MD;  Location: [facility] OR;  Service:    • TRANSPLANTATION RENAL Right 2016    from  donor   • TUBAL ABDOMINAL LIGATION Bilateral          Current Outpatient Medications:   •  calcitriol (ROCALTROL) 0.25 MCG capsule, Take 2 capsules by mouth Daily., Disp: 60 capsule, Rfl: 0  •  calcium acetate (PHOSLO) 667 MG capsule, Take 2,001 mg by mouth 3 (Three) Times a Day With Meals., Disp: , Rfl:   •  CloNIDine (CATAPRES) 0.3 MG tablet, Take 0.3 mg by mouth 3 (Three) Times a Day., Disp: , Rfl:     No Known Allergies    Family History   Problem Relation Age of Onset   • Malig Hyperthermia Neg Hx        Social History     Socioeconomic History   • Marital status:      Spouse name: Not on file   • Number of children: 2   • Years of education: 12   • Highest education level: Not on file   Occupational History     Employer: [employer]   Tobacco Use   • Smoking status: Never Smoker   • Smokeless tobacco: Never Used   Substance and Sexual Activity   • Alcohol use: No   • Drug use: No   • Sexual activity: Defer         REVIEW OF SYSTEMS    Review of Systems   Constitutional: Negative for activity change and appetite change.   HENT: Negative for congestion and mouth sores.    Eyes: Negative for discharge and visual disturbance.   Respiratory: Negative for apnea and stridor.    Cardiovascular: Positive for leg swelling. Negative for chest pain.   Gastrointestinal: Negative for abdominal distention and abdominal pain.   Endocrine: Negative for cold intolerance and heat intolerance.   Genitourinary: Negative for difficulty urinating and flank pain.   Musculoskeletal: Negative for arthralgias and gait problem.   Skin: Negative for color change and pallor.   Allergic/Immunologic: Negative for environmental allergies and food allergies.   Neurological: Negative for dizziness and tremors.   Hematological: Negative for adenopathy. Does not bruise/bleed easily.  "  Psychiatric/Behavioral: Negative for agitation and decreased concentration.       Physical Examination  Ht 152.4 cm (60\")   Wt 80.3 kg (177 lb)   BMI 34.57 kg/m²   Body mass index is 34.57 kg/m².  Physical Exam  Constitutional:       Appearance: Normal appearance.   HENT:      Head: Normocephalic and atraumatic.      Nose: Nose normal.      Mouth/Throat:      Mouth: Mucous membranes are moist.   Eyes:      General: No scleral icterus.     Conjunctiva/sclera: Conjunctivae normal.   Neck:      Musculoskeletal: Normal range of motion and neck supple.   Cardiovascular:      Rate and Rhythm: Normal rate.      Pulses: Normal pulses.   Pulmonary:      Effort: Pulmonary effort is normal.      Breath sounds: Normal breath sounds.   Abdominal:      General: Abdomen is flat.      Palpations: Abdomen is soft.      Comments: Left lower abdominal peritoneal dialysis catheter in place, no signs of infection   Musculoskeletal: Normal range of motion.      Comments: Right arm AV fistula with good thrill   Skin:     General: Skin is warm and dry.   Neurological:      General: No focal deficit present.      Mental Status: She is alert.   Psychiatric:         Mood and Affect: Mood normal.         Behavior: Behavior normal.       Labs 5/7/2020:   White blood cell count 17, hemoglobin 8, platelets 294  Creatinine 12.3, sodium 135, chloride 89, BUN 66    Assessment:   Sunni Mcneil is a 45 y.o. female with end-stage renal disease that was on peritoneal dialysis and switch to peritoneal dialysis.  Patient desires peritoneal dialysis catheter removal.  She is also being worked up for kidney transplants and will need to have screening colonoscopy.  She meets criteria for screening.  I offered the patient open peritoneal dialysis catheter removal and colonoscopy to be done at the same time.  Patient preferred to have both pr  ocedures done at different times.  Discussed with her about the need to perform bowel preparation and clear liquid " diet the day before the procedure.  She understood    Indications, risks and procedure were discussed with her including but not limited to bleeding, infection, possibility of perforation and possible polypectomy. All of their questions were answered and  would like to proceed with the above recommendations.  Any additional follow-up will be discussed with Her after the results have been reviewed.    Plan:     Peritoneal dialysis catheter removal-   -Colonoscopy after catheter removal at a later time    Damon Rooney MD  General, Minimally Invasive and Endoscopic Surgery  [facility] [place], operated by [facility]    [address], [address]  [place], [place], [address]  P: [phone]  F: [phone]

## 2020-09-16 ENCOUNTER — PREP FOR SURGERY (OUTPATIENT)
Dept: OTHER | Facility: HOSPITAL | Age: 45
End: 2020-09-16

## 2020-09-16 ENCOUNTER — TELEPHONE (OUTPATIENT)
Dept: SURGERY | Facility: CLINIC | Age: 45
End: 2020-09-16

## 2020-09-16 DIAGNOSIS — Z12.11 SCREENING FOR COLON CANCER: Primary | ICD-10-CM

## 2020-09-16 DIAGNOSIS — Z99.2 PERITONEAL DIALYSIS CATHETER IN PLACE (HCC): ICD-10-CM

## 2020-09-16 RX ORDER — CEFAZOLIN SODIUM 2 G/100ML
2 INJECTION, SOLUTION INTRAVENOUS ONCE
Status: CANCELLED | OUTPATIENT
Start: 2020-10-21 | End: 2020-09-16

## 2020-09-17 ENCOUNTER — APPOINTMENT (OUTPATIENT)
Dept: PREADMISSION TESTING | Facility: HOSPITAL | Age: 45
End: 2020-09-17

## 2020-10-19 ENCOUNTER — TELEPHONE (OUTPATIENT)
Dept: SURGERY | Facility: CLINIC | Age: 45
End: 2020-10-19

## 2020-10-19 ENCOUNTER — APPOINTMENT (OUTPATIENT)
Dept: PREADMISSION TESTING | Facility: HOSPITAL | Age: 45
End: 2020-10-19

## 2020-10-19 VITALS
BODY MASS INDEX: 35.73 KG/M2 | SYSTOLIC BLOOD PRESSURE: 133 MMHG | RESPIRATION RATE: 16 BRPM | DIASTOLIC BLOOD PRESSURE: 81 MMHG | OXYGEN SATURATION: 100 % | WEIGHT: 182 LBS | HEIGHT: 60 IN | TEMPERATURE: 98.5 F | HEART RATE: 76 BPM

## 2020-10-19 LAB
ANION GAP SERPL CALCULATED.3IONS-SCNC: 16.7 MMOL/L (ref 5–15)
BUN SERPL-MCNC: 62 MG/DL (ref 6–20)
BUN/CREAT SERPL: 5.9 (ref 7–25)
CALCIUM SPEC-SCNC: 8.3 MG/DL (ref 8.6–10.5)
CHLORIDE SERPL-SCNC: 99 MMOL/L (ref 98–107)
CO2 SERPL-SCNC: 20.3 MMOL/L (ref 22–29)
CREAT SERPL-MCNC: 10.54 MG/DL (ref 0.57–1)
DEPRECATED RDW RBC AUTO: 54.9 FL (ref 37–54)
ERYTHROCYTE [DISTWIDTH] IN BLOOD BY AUTOMATED COUNT: 16.6 % (ref 12.3–15.4)
GFR SERPL CREATININE-BSD FRML MDRD: 4 ML/MIN/1.73
GFR SERPL CREATININE-BSD FRML MDRD: ABNORMAL ML/MIN/{1.73_M2}
GLUCOSE SERPL-MCNC: 179 MG/DL (ref 65–99)
HCG SERPL QL: NEGATIVE
HCT VFR BLD AUTO: 27.3 % (ref 34–46.6)
HGB BLD-MCNC: 8.9 G/DL (ref 12–15.9)
MCH RBC QN AUTO: 30.1 PG (ref 26.6–33)
MCHC RBC AUTO-ENTMCNC: 32.6 G/DL (ref 31.5–35.7)
MCV RBC AUTO: 92.2 FL (ref 79–97)
PLATELET # BLD AUTO: 233 10*3/MM3 (ref 140–450)
PMV BLD AUTO: 9.2 FL (ref 6–12)
POTASSIUM SERPL-SCNC: 4.6 MMOL/L (ref 3.5–5.2)
RBC # BLD AUTO: 2.96 10*6/MM3 (ref 3.77–5.28)
SODIUM SERPL-SCNC: 136 MMOL/L (ref 136–145)
WBC # BLD AUTO: 7.35 10*3/MM3 (ref 3.4–10.8)

## 2020-10-19 PROCEDURE — C9803 HOPD COVID-19 SPEC COLLECT: HCPCS | Performed by: NURSE PRACTITIONER

## 2020-10-19 PROCEDURE — 80048 BASIC METABOLIC PNL TOTAL CA: CPT | Performed by: SURGERY

## 2020-10-19 PROCEDURE — 93005 ELECTROCARDIOGRAM TRACING: CPT

## 2020-10-19 PROCEDURE — 36415 COLL VENOUS BLD VENIPUNCTURE: CPT

## 2020-10-19 PROCEDURE — U0004 COV-19 TEST NON-CDC HGH THRU: HCPCS | Performed by: NURSE PRACTITIONER

## 2020-10-19 PROCEDURE — 93010 ELECTROCARDIOGRAM REPORT: CPT | Performed by: INTERNAL MEDICINE

## 2020-10-19 PROCEDURE — 85027 COMPLETE CBC AUTOMATED: CPT | Performed by: SURGERY

## 2020-10-19 PROCEDURE — 84703 CHORIONIC GONADOTROPIN ASSAY: CPT | Performed by: SURGERY

## 2020-10-19 NOTE — TELEPHONE ENCOUNTER
Pt called c/o of having to take a Covid test. She states no one told her not even Dr Rooney. She said she shouldn't be made to have this test. She said that is not right!  And hung up.

## 2020-10-19 NOTE — DISCHARGE INSTRUCTIONS
Take the following medications the morning of surgery:    CLONIDINE    If you are on prescription narcotic pain medication to control your pain you may also take that medication the morning of surgery.    General Instructions:  • Do not eat solid food after midnight the night before surgery.  • You may drink clear liquids day of surgery but must stop at least one hour before your hospital arrival time.  • It is beneficial for you to have a clear drink that contains carbohydrates the day of surgery.  We suggest a 12 to 20 ounce bottle of Gatorade or Powerade for non-diabetic patients if allowed on your renal diet    Clear liquids are liquids you can see through.  Nothing red in color.     Plain water                               Sports drinks  Sodas                                   Gelatin (Jell-O)  Fruit juices without pulp such as white grape juice and apple juice  Popsicles that contain no fruit or yogurt  Tea or coffee (no cream or milk added)  Gatorade / Powerade  G2 / Powerade Zero    • Bring any papers given to you in the doctor’s office.  • Wear clean comfortable clothes.  • Do not wear contact lenses, false eyelashes or make-up.  Bring a case for your glasses.   • Remove all piercings.  Leave jewelry and any other valuables at home.  • Hair extensions with metal clips must be removed prior to surgery.  • The Pre-Admission Testing nurse will instruct you to bring medications if unable to obtain an accurate list in Pre-Admission Testing.  • REPORT TO MAIN SURGERY ON 10- AT 1030 AM          Preventing a Surgical Site Infection:  • For 2 to 3 days before surgery, avoid shaving with a razor because the razor can irritate skin and make it easier to develop an infection.    • Any areas of open skin can increase the risk of a post-operative wound infection by allowing bacteria to enter and travel throughout the body.  Notify your surgeon if you have any skin wounds / rashes even if it is not near the  expected surgical site.  The area will need assessed to determine if surgery should be delayed until it is healed.  • The night prior to surgery shower using a fresh bar of anti-bacterial soap (such as Dial) and clean washcloth.  Sleep in a clean bed with clean clothing.  Do not allow pets to sleep with you.  • Shower on the morning of surgery using a fresh bar of anti-bacterial soap (such as Dial) and clean washcloth.  Dry with a clean towel and dress in clean clothing.  • Ask your surgeon if you will be receiving antibiotics prior to surgery.  • Make sure you, your family, and all healthcare providers clean their hands with soap and water or an alcohol based hand  before caring for you or your wound.    Day of surgery:  Your arrival time is approximately two hours before your scheduled surgery time.  Upon arrival, a Pre-op nurse and Anesthesiologist will review your health history, obtain vital signs, and answer questions you may have.  The only belongings needed at this time will be a list of your home medications and if applicable your C-PAP/BI-PAP machine.  If you are staying overnight your family can leave the rest of your belongings in the car and bring them to your room later.  A Pre-op nurse will start an IV and you may receive medication in preparation for surgery, including something to help you relax.  While you are in surgery your family should notify the waiting room  if they leave the waiting room area and provide a contact phone number.    Please be aware that surgery does come with discomfort.  We want to make every effort to control your discomfort so please discuss any uncontrolled symptoms with your nurse.   Your doctor will most likely have prescribed pain medications.      If you are going home after surgery you will receive individualized written care instructions before being discharged.  A responsible adult must drive you to and from the hospital on the day of your  surgery and stay with you for 24 hours.    CHLORHEXIDINE CLOTH INSTRUCTIONS  The morning of surgery follow these instructions using the Chlorhexidine cloths you've been given.  These steps reduce bacteria on the body.  Do not use the cloths near your eyes, ears mouth, genitalia or on open wounds.  Throw the cloths away after use but do not try to flush them down a toilet.      • Open and remove one cloth at a time from the package.    • Leave the cloth unfolded and begin the bathing.  • Massage the skin with the cloths using gentle pressure to remove bacteria.  Do not scrub harshly.   • Follow the steps below with one 2% CHG cloth per area (6 total cloths).  • One cloth for neck, shoulders and chest.  • One cloth for both arms, hands, fingers and underarms (do underarms last).  • One cloth for the abdomen followed by groin.  • One cloth for right leg and foot including between the toes.  • One cloth for left leg and foot including between the toes.  • The last cloth is to be used for the back of the neck, back and buttocks.    Allow the CHG to air dry 3 minutes on the skin which will give it time to work and decrease the chance of irritation.  The skin may feel sticky until it is dry.  Do not rinse with water or any other liquid or you will lose the beneficial effects of the CHG.  If mild skin irritation occurs, do rinse the skin to remove the CHG.  Report this to the nurse at time of admission.  Do not apply lotions, creams, ointments, deodorants or perfumes after using the clothes. Dress in clean clothes before coming to the hospital.    If you have any questions please call Pre-Admission Testing at [phone].  Deductibles and co-payments are collected on the day of service. Please be prepared to pay the required co-pay, deductible or deposit on the day of service as defined by your plan.    Patient Education for Self-Quarantine Process    Following your COVID testing, we strongly recommend that you do not  leave your home after you have been tested for COVID except to get medical care. This includes not going to work, school or to public areas.  If this is not possible for you to do please limit your activities to only required outings.  Be sure to wear a mask when you are with other people, practice social distancing and wash your hands frequently.      The following items provide additional details to keep you safe.  • Wash your hands with soap and water frequently for at least 20 seconds.   • Avoid touching your eyes, nose and mouth with unwashed hands.  • Do not share anything - utensils, towels, food from the same bowl.   • Have your own utensils, drinking glass, dishes, towels and bedding.   • Do not have visitors.   • Do use FaceTime to stay in touch with family and friends.  • You should stay in a specific room away from others if possible.   • Stay at least 6 feet away from others in the home if you cannot have a dedicated room to yourself.   • Do not snuggle with your pet. While the CDC says there is no evidence that pets can spread COVID-19 or be infected from humans, it is probably best to avoid “petting, snuggling, being kissed or licked and sharing food (during self-quarantine)”, according to the CDC.   • Sanitize household surfaces daily. Include all high touch areas (door handles, light switches, phones, countertops, etc.)  • Do not share a bathroom with others, if possible.   • Wear a mask around others in your home if you are unable to stay in a separate room or 6 feet apart. If  you are unable to wear a mask, have your family member wear a mask if they must be within 6 feet of you.   Call your surgeon immediately if you experience any of the following symptoms:  • Sore Throat  • Shortness of Breath or difficulty breathing  • Cough  • Chills  • Body soreness or muscle pain  • Headache  • Fever  • New loss of taste or smell  • Do not arrive for your surgery ill.  Your procedure will need to be  rescheduled to another time.  You will need to call your physician before the day of surgery to avoid any unnecessary exposure to hospital staff as well as other patients.

## 2020-10-20 LAB — SARS-COV-2 RNA RESP QL NAA+PROBE: NOT DETECTED

## 2020-10-21 ENCOUNTER — ANESTHESIA (OUTPATIENT)
Dept: PERIOP | Facility: HOSPITAL | Age: 45
End: 2020-10-21

## 2020-10-21 ENCOUNTER — ANESTHESIA EVENT (OUTPATIENT)
Dept: PERIOP | Facility: HOSPITAL | Age: 45
End: 2020-10-21

## 2020-10-21 ENCOUNTER — HOSPITAL ENCOUNTER (OUTPATIENT)
Facility: HOSPITAL | Age: 45
Setting detail: HOSPITAL OUTPATIENT SURGERY
Discharge: HOME | End: 2020-10-21
Attending: SURGERY | Admitting: SURGERY

## 2020-10-21 VITALS
HEIGHT: 60 IN | BODY MASS INDEX: 35.58 KG/M2 | SYSTOLIC BLOOD PRESSURE: 166 MMHG | RESPIRATION RATE: 16 BRPM | HEART RATE: 77 BPM | TEMPERATURE: 97.6 F | DIASTOLIC BLOOD PRESSURE: 95 MMHG | OXYGEN SATURATION: 99 % | WEIGHT: 181.25 LBS

## 2020-10-21 DIAGNOSIS — Z12.11 SCREENING FOR COLON CANCER: ICD-10-CM

## 2020-10-21 DIAGNOSIS — Z99.2 PERITONEAL DIALYSIS CATHETER IN PLACE (HCC): ICD-10-CM

## 2020-10-21 LAB
ANION GAP SERPL CALCULATED.3IONS-SCNC: 13.8 MMOL/L (ref 5–15)
BUN SERPL-MCNC: 43 MG/DL (ref 6–20)
BUN/CREAT SERPL: 5.1 (ref 7–25)
CALCIUM SPEC-SCNC: 8.9 MG/DL (ref 8.6–10.5)
CHLORIDE SERPL-SCNC: 99 MMOL/L (ref 98–107)
CO2 SERPL-SCNC: 23.2 MMOL/L (ref 22–29)
CREAT SERPL-MCNC: 8.47 MG/DL (ref 0.57–1)
GFR SERPL CREATININE-BSD FRML MDRD: 5 ML/MIN/1.73
GFR SERPL CREATININE-BSD FRML MDRD: ABNORMAL ML/MIN/{1.73_M2}
GLUCOSE BLDC GLUCOMTR-MCNC: 99 MG/DL (ref 70–130)
GLUCOSE SERPL-MCNC: 96 MG/DL (ref 65–99)
POTASSIUM SERPL-SCNC: 5.4 MMOL/L (ref 3.5–5.2)
SODIUM SERPL-SCNC: 136 MMOL/L (ref 136–145)

## 2020-10-21 PROCEDURE — 25010000002 FENTANYL CITRATE (PF) 100 MCG/2ML SOLUTION: Performed by: NURSE ANESTHETIST, CERTIFIED REGISTERED

## 2020-10-21 PROCEDURE — 49422 REMOVE TUNNELED IP CATH: CPT | Performed by: SURGERY

## 2020-10-21 PROCEDURE — 25010000002 MIDAZOLAM PER 1 MG: Performed by: ANESTHESIOLOGY

## 2020-10-21 PROCEDURE — 82962 GLUCOSE BLOOD TEST: CPT

## 2020-10-21 PROCEDURE — 25010000002 PROPOFOL 10 MG/ML EMULSION: Performed by: NURSE ANESTHETIST, CERTIFIED REGISTERED

## 2020-10-21 PROCEDURE — 80048 BASIC METABOLIC PNL TOTAL CA: CPT | Performed by: SURGERY

## 2020-10-21 PROCEDURE — S0260 H&P FOR SURGERY: HCPCS | Performed by: SURGERY

## 2020-10-21 PROCEDURE — 25010000003 CEFAZOLIN IN DEXTROSE 2-4 GM/100ML-% SOLUTION: Performed by: SURGERY

## 2020-10-21 RX ORDER — PROPOFOL 10 MG/ML
VIAL (ML) INTRAVENOUS CONTINUOUS PRN
Status: DISCONTINUED | OUTPATIENT
Start: 2020-10-21 | End: 2020-10-21 | Stop reason: SURG

## 2020-10-21 RX ORDER — OXYCODONE AND ACETAMINOPHEN 7.5; 325 MG/1; MG/1
1 TABLET ORAL ONCE AS NEEDED
Status: DISCONTINUED | OUTPATIENT
Start: 2020-10-21 | End: 2020-10-21 | Stop reason: HOSPADM

## 2020-10-21 RX ORDER — PROMETHAZINE HYDROCHLORIDE 25 MG/1
25 SUPPOSITORY RECTAL ONCE AS NEEDED
Status: DISCONTINUED | OUTPATIENT
Start: 2020-10-21 | End: 2020-10-21 | Stop reason: HOSPADM

## 2020-10-21 RX ORDER — HYDROMORPHONE HYDROCHLORIDE 1 MG/ML
0.25 INJECTION, SOLUTION INTRAMUSCULAR; INTRAVENOUS; SUBCUTANEOUS
Status: DISCONTINUED | OUTPATIENT
Start: 2020-10-21 | End: 2020-10-21 | Stop reason: HOSPADM

## 2020-10-21 RX ORDER — LABETALOL HYDROCHLORIDE 5 MG/ML
5 INJECTION, SOLUTION INTRAVENOUS
Status: DISCONTINUED | OUTPATIENT
Start: 2020-10-21 | End: 2020-10-21 | Stop reason: HOSPADM

## 2020-10-21 RX ORDER — EPHEDRINE SULFATE 50 MG/ML
5 INJECTION, SOLUTION INTRAVENOUS ONCE AS NEEDED
Status: DISCONTINUED | OUTPATIENT
Start: 2020-10-21 | End: 2020-10-21 | Stop reason: HOSPADM

## 2020-10-21 RX ORDER — FENTANYL CITRATE 50 UG/ML
25 INJECTION, SOLUTION INTRAMUSCULAR; INTRAVENOUS
Status: DISCONTINUED | OUTPATIENT
Start: 2020-10-21 | End: 2020-10-21 | Stop reason: HOSPADM

## 2020-10-21 RX ORDER — ONDANSETRON 2 MG/ML
4 INJECTION INTRAMUSCULAR; INTRAVENOUS ONCE AS NEEDED
Status: DISCONTINUED | OUTPATIENT
Start: 2020-10-21 | End: 2020-10-21 | Stop reason: HOSPADM

## 2020-10-21 RX ORDER — FAMOTIDINE 10 MG/ML
20 INJECTION, SOLUTION INTRAVENOUS ONCE
Status: COMPLETED | OUTPATIENT
Start: 2020-10-21 | End: 2020-10-21

## 2020-10-21 RX ORDER — SODIUM CHLORIDE 0.9 % (FLUSH) 0.9 %
10 SYRINGE (ML) INJECTION AS NEEDED
Status: DISCONTINUED | OUTPATIENT
Start: 2020-10-21 | End: 2020-10-21 | Stop reason: HOSPADM

## 2020-10-21 RX ORDER — PROMETHAZINE HYDROCHLORIDE 12.5 MG/1
25 TABLET ORAL ONCE AS NEEDED
Status: DISCONTINUED | OUTPATIENT
Start: 2020-10-21 | End: 2020-10-21 | Stop reason: HOSPADM

## 2020-10-21 RX ORDER — MIDAZOLAM HYDROCHLORIDE 1 MG/ML
1 INJECTION INTRAMUSCULAR; INTRAVENOUS
Status: COMPLETED | OUTPATIENT
Start: 2020-10-21 | End: 2020-10-21

## 2020-10-21 RX ORDER — HYDROCODONE BITARTRATE AND ACETAMINOPHEN 7.5; 325 MG/1; MG/1
1 TABLET ORAL ONCE AS NEEDED
Status: COMPLETED | OUTPATIENT
Start: 2020-10-21 | End: 2020-10-21

## 2020-10-21 RX ORDER — BUPIVACAINE HYDROCHLORIDE AND EPINEPHRINE 5; 5 MG/ML; UG/ML
INJECTION, SOLUTION EPIDURAL; INTRACAUDAL; PERINEURAL AS NEEDED
Status: DISCONTINUED | OUTPATIENT
Start: 2020-10-21 | End: 2020-10-21 | Stop reason: HOSPADM

## 2020-10-21 RX ORDER — PROPOFOL 10 MG/ML
VIAL (ML) INTRAVENOUS AS NEEDED
Status: DISCONTINUED | OUTPATIENT
Start: 2020-10-21 | End: 2020-10-21 | Stop reason: SURG

## 2020-10-21 RX ORDER — NALOXONE HCL 0.4 MG/ML
0.2 VIAL (ML) INJECTION AS NEEDED
Status: DISCONTINUED | OUTPATIENT
Start: 2020-10-21 | End: 2020-10-21 | Stop reason: HOSPADM

## 2020-10-21 RX ORDER — ALBUTEROL SULFATE 2.5 MG/3ML
2.5 SOLUTION RESPIRATORY (INHALATION) ONCE AS NEEDED
Status: DISCONTINUED | OUTPATIENT
Start: 2020-10-21 | End: 2020-10-21 | Stop reason: HOSPADM

## 2020-10-21 RX ORDER — DIPHENHYDRAMINE HCL 25 MG
25 CAPSULE ORAL
Status: DISCONTINUED | OUTPATIENT
Start: 2020-10-21 | End: 2020-10-21 | Stop reason: HOSPADM

## 2020-10-21 RX ORDER — DIPHENHYDRAMINE HYDROCHLORIDE 50 MG/ML
12.5 INJECTION INTRAMUSCULAR; INTRAVENOUS
Status: DISCONTINUED | OUTPATIENT
Start: 2020-10-21 | End: 2020-10-21 | Stop reason: HOSPADM

## 2020-10-21 RX ORDER — FLUMAZENIL 0.1 MG/ML
0.2 INJECTION INTRAVENOUS AS NEEDED
Status: DISCONTINUED | OUTPATIENT
Start: 2020-10-21 | End: 2020-10-21 | Stop reason: HOSPADM

## 2020-10-21 RX ORDER — CEFAZOLIN SODIUM 2 G/100ML
2 INJECTION, SOLUTION INTRAVENOUS ONCE
Status: COMPLETED | OUTPATIENT
Start: 2020-10-21 | End: 2020-10-21

## 2020-10-21 RX ORDER — SODIUM CHLORIDE 9 MG/ML
9 INJECTION, SOLUTION INTRAVENOUS CONTINUOUS
Status: DISCONTINUED | OUTPATIENT
Start: 2020-10-21 | End: 2020-10-21 | Stop reason: HOSPADM

## 2020-10-21 RX ORDER — SODIUM CHLORIDE, SODIUM LACTATE, POTASSIUM CHLORIDE, CALCIUM CHLORIDE 600; 310; 30; 20 MG/100ML; MG/100ML; MG/100ML; MG/100ML
9 INJECTION, SOLUTION INTRAVENOUS CONTINUOUS
Status: DISCONTINUED | OUTPATIENT
Start: 2020-10-21 | End: 2020-10-21 | Stop reason: HOSPADM

## 2020-10-21 RX ORDER — LIDOCAINE HYDROCHLORIDE 20 MG/ML
INJECTION, SOLUTION INFILTRATION; PERINEURAL AS NEEDED
Status: DISCONTINUED | OUTPATIENT
Start: 2020-10-21 | End: 2020-10-21 | Stop reason: SURG

## 2020-10-21 RX ORDER — HYDROCODONE BITARTRATE AND ACETAMINOPHEN 5; 325 MG/1; MG/1
1 TABLET ORAL EVERY 6 HOURS PRN
Qty: 15 TABLET | Refills: 0 | Status: SHIPPED | OUTPATIENT
Start: 2020-10-21 | End: 2020-11-17

## 2020-10-21 RX ORDER — SODIUM CHLORIDE 0.9 % (FLUSH) 0.9 %
10 SYRINGE (ML) INJECTION EVERY 12 HOURS SCHEDULED
Status: DISCONTINUED | OUTPATIENT
Start: 2020-10-21 | End: 2020-10-21 | Stop reason: HOSPADM

## 2020-10-21 RX ADMIN — HYDROCODONE BITARTRATE AND ACETAMINOPHEN 1 TABLET: 7.5; 325 TABLET ORAL at 16:05

## 2020-10-21 RX ADMIN — FAMOTIDINE 20 MG: 10 INJECTION, SOLUTION INTRAVENOUS at 11:04

## 2020-10-21 RX ADMIN — FENTANYL CITRATE 25 MCG: 50 INJECTION, SOLUTION INTRAMUSCULAR; INTRAVENOUS at 16:06

## 2020-10-21 RX ADMIN — MIDAZOLAM 1 MG: 1 INJECTION INTRAMUSCULAR; INTRAVENOUS at 11:04

## 2020-10-21 RX ADMIN — FENTANYL CITRATE 25 MCG: 50 INJECTION, SOLUTION INTRAMUSCULAR; INTRAVENOUS at 15:09

## 2020-10-21 RX ADMIN — PROPOFOL 140 MG: 10 INJECTION, EMULSION INTRAVENOUS at 14:06

## 2020-10-21 RX ADMIN — SODIUM CHLORIDE 9 ML/HR: 9 INJECTION, SOLUTION INTRAVENOUS at 11:09

## 2020-10-21 RX ADMIN — LIDOCAINE HYDROCHLORIDE 60 MG: 20 INJECTION, SOLUTION INFILTRATION; PERINEURAL at 14:05

## 2020-10-21 RX ADMIN — CEFAZOLIN SODIUM 2 G: 2 INJECTION, SOLUTION INTRAVENOUS at 13:59

## 2020-10-21 RX ADMIN — PROPOFOL 160 MCG/KG/MIN: 10 INJECTION, EMULSION INTRAVENOUS at 14:07

## 2020-10-21 RX ADMIN — MIDAZOLAM 1 MG: 1 INJECTION INTRAMUSCULAR; INTRAVENOUS at 13:50

## 2020-10-21 NOTE — PERIOPERATIVE NURSING NOTE
Dr. Rooney notified pt did not do a bowel prep and is scheduled for a colonoscopy as well as PD cath removal. Dr. Rooney states he is not doing a colonoscopy today but at a later date. Pt is very upset. States scheduled this date specifically per his office was the only date he could do both procedures but no one instructed patient to do bowel prep. Dr. Rooney at bedside speaking with patient in Swazi. Pt understands only PD catheter removal happening today. Ronna Huertas OR RN notified.   None

## 2020-10-21 NOTE — DISCHARGE INSTRUCTIONS
Dr. Damon Rooney  [address]  [place] [address]  [phone]    Discharge Instructions for Minor Surgery      1. Go home, rest and take it easy today; however, you should get up and move about several times today to reduce the risk of developing a clot in your legs.      2. You may experience some dizziness or memory loss from the anesthesia.  This may last for the next 24 hours.  Someone should plan on staying with you for the first 24 hours for your safety.    3. Do not make any important legal decisions or sign any legal papers for the next 24 hours.      4. Eat and drink lightly today.  Start off with liquids, jello, soup, crackers or other bland foods at first. You may advance your diet tomorrow as tolerated as long as you do not experience any nausea or vomiting.     5. You may remove your outer dressings in 3-4 days.   Please leave the little white tapes known as steri-strips alone.  They usually fall off in 1-2 weeks.  Do not worry if they come off sooner.     6. You may notice some bleeding/drainage on your outer dressings. A little bloody drainage is normal. If the bleeding/drainage is such that the bandage cannot absorb it, remove the dressing, apply clean gauze and apply firm pressure for a full 15 minutes.  If the bleeding continues, please call me.    7. You may shower tomorrow.  No tub baths until your incisions are completely healed.    8. You have received a prescription for a narcotic pain medicine, as you may have some pain/discomfort following surgery.   You will not be totally pain free, but your pain medicine should make the pain tolerable.  Please take your pain medicine as prescribed and always take your pills with food to prevent nausea. If you are having severe pain that cannot be controlled by the pain medicine, please contact me.  If the pain is such that narcotic pain medicine is not required, you may take Tylenol or Ibuprofen as directed unless indicated otherwise.       9. You have also received a prescription for an anti-nausea medicine.  Please take this as prescribed for any nausea or vomiting.  Nausea could be a result of the anesthesia or a result of the narcotic pain medicine.  If you experience severe nausea and vomiting that cannot be controlled by the nausea medicine, please call me.      10. No driving for 24 hours and for as long as you are taking your prescription pain medicine.      11. Please call the office at [phone] to schedule a follow-up in about 1 week.      12. Remember to contact me for any of the following:    • Fever> 101 degrees  • Severe pain that cannot be controlled by taking your pain pills  • Severe nausea or vomiting that cannot be controlled by taking your nausea medicine  • Significant bleeding from your incision  • Drainage that has a bad smell or is yellow or green in appearance  • Any other questions or concerns

## 2020-10-21 NOTE — ANESTHESIA POSTPROCEDURE EVALUATION
Patient: Sunni Mcneil    Procedure Summary     Date: 10/21/20 Room / Location: [facility] OR  / [facility] MAIN OR    Anesthesia Start: 1356 Anesthesia Stop: 1456    Procedure: REMOVAL PERITONEAL DIALYSIS CATHETER (N/A Abdomen) Diagnosis:       Screening for colon cancer      Peritoneal dialysis catheter in place (CMS/HCC)      (Screening for colon cancer [Z12.11])      (Peritoneal dialysis catheter in place (CMS/HCC) [Z99.2])    Surgeon: Damon Rooney MD Provider: Tristin Vazquez MD    Anesthesia Type: MAC ASA Status: 3          Anesthesia Type: MAC    Vitals  Vitals Value Taken Time   /91 10/21/20 1510   Temp 36.4 °C (97.6 °F) 10/21/20 1455   Pulse 86 10/21/20 1510   Resp 16 10/21/20 1510   SpO2 96 % 10/21/20 1510           Post Anesthesia Care and Evaluation    Patient location during evaluation: PACU  Patient participation: complete - patient participated  Level of consciousness: awake and alert  Pain management: adequate  Airway patency: patent  Anesthetic complications: No anesthetic complications    Cardiovascular status: acceptable  Respiratory status: acceptable  Hydration status: acceptable    Comments: --------------------            10/21/20               1510     --------------------   BP:       142/91     Pulse:      86       Resp:       16       Temp:                SpO2:      96%      --------------------

## 2020-10-21 NOTE — BRIEF OP NOTE
REMOVAL PERITONEAL DIALYSIS CATHETER  Progress Note    Sunni Mcneil  10/21/2020    Pre-op Diagnosis:   Screening for colon cancer [Z12.11]  Peritoneal dialysis catheter in place (CMS/HCC) [Z99.2]       Post-Op Diagnosis Codes:     * Screening for colon cancer [Z12.11]     * Peritoneal dialysis catheter in place (CMS/HCC) [Z99.2]    Procedure/CPT® Codes:        Procedure(s):  REMOVAL PERITONEAL DIALYSIS CATHETER    Surgeon(s):  Damon Rooney MD    Anesthesia: Monitored Anesthesia Care    Staff:   Circulator: Serenity May RN  Scrub Person: Bo Leal; Donovan Mendoza RN  Assistant: Lorenzo De Jesus CSA  Orientee: Ronna Huertas RN  Assistant: Lorenzo De Jesus CSA      Estimated Blood Loss: minimal    Urine Voided: * No values recorded between 10/21/2020  1:55 PM and 10/21/2020  2:41 PM *    Specimens:                PD catheter, not sent for path          Drains:   [REMOVED] Peritoneal Dialysis Catheter  (Removed)       Findings: Well incorporated PD catheter    Complications: None    Assistant: Lorenzo De Jesus CSA  was responsible for performing the following activities: Retraction, Suction, Suturing and Closing and their skilled assistance was necessary for the success of this case.    Damon Rooney MD     Date: 10/21/2020  Time: 14:41 EDT

## 2020-10-21 NOTE — ANESTHESIA PREPROCEDURE EVALUATION
Anesthesia Evaluation     no history of anesthetic complications:               Airway   Mallampati: III  TM distance: >3 FB  Neck ROM: full  Small opening  Dental - normal exam     Pulmonary    (+) a smoker,   (-) shortness of breath, recent URI  Cardiovascular     (+) hypertension,   (-) dysrhythmias, angina, hyperlipidemia      Neuro/Psych  GI/Hepatic/Renal/Endo    (+) morbid obesity, GERD,  renal disease ESRD and dialysis, diabetes mellitus,     Musculoskeletal     Abdominal    Substance History      OB/GYN          Other                        Anesthesia Plan    ASA 3     MAC     intravenous induction     Anesthetic plan, all risks, benefits, and alternatives have been provided, discussed and informed consent has been obtained with: patient.

## 2020-10-21 NOTE — H&P
Cc: Peritoneal dialysis catheter in place    Subjective      Sunni Mcneil is a 45 y.o. female with end-stage renal disease on hemodialysis that was on peritoneal dialysis.  She did not have good electrolyte exchanges so she decided to switch to hemodialysis.    Past Medical History:   Diagnosis Date   • Acid reflux    • Anemia    • Anxiety    • Arthritis    • Depression    • Diabetes mellitus (CMS/HCC)     PT DENIES BEING DIABETIC   • ESRD on dialysis (CMS/HCC)    • History of peritoneal dialysis     KIDNEY TRANSPLANT 2016 CURRENTLY DOING PERITONEAL DIALYSIS.   • History of transfusion     BEEN A WHILE   • Hypercalcemia    • Hyperparathyroidism (CMS/HCC)    • Hypertension    • Kidney disease     End-stage renal disease. TRANSPLANT   • Kidney transplant recipient 2016    RIGHT KIDNEY   • Peritoneal dialysis catheter in situ (CMS/HCC)     LEFT ABDOMEN   • Seasonal allergies     CURRENTLY        Past Surgical History:   Procedure Laterality Date   • ARTERIOVENOUS FISTULA/SHUNT SURGERY Right 2020    Procedure: RIGHT ARM ARTERIAL VENOUS FISTULA;  Surgeon: Elijah Herrera MD;  Location: [facility] OR;  Service: Vascular;  Laterality: Right;   • INSERTION PERITONEAL DIALYSIS CATHETER  2014    Laparoscopic placement of Curl Cath peritoneal dialysis catheter, Dr. Vinod Goldstein   • INSERTION PERITONEAL DIALYSIS CATHETER N/A 2019    Procedure: LAPAROSCOPIC INSERTION PERITONEAL DIALYSIS CATHETER;  Surgeon: Damon Rooney MD;  Location: [facility] OR;  Service: General   • REMOVAL PERITONEAL DIALYSIS CATHETER N/A 10/17/2016    Procedure: REMOVAL PERITONEAL DIALYSIS CATHETER;  Surgeon: Damon Rooney MD;  Location: [facility] OR;  Service:    • TRANSPLANTATION RENAL Right 2016    from  donor   • TUBAL ABDOMINAL LIGATION Bilateral          Current Facility-Administered Medications:   •  ceFAZolin in dextrose (ANCEF) IVPB solution 2 g, 2 g, Intravenous, Once,  "Damon Rooney MD  •  lactated ringers infusion, 9 mL/hr, Intravenous, Continuous, Shira Clemente MD  •  midazolam (VERSED) injection 1 mg, 1 mg, Intravenous, Q10 Min PRN, Shira Clemente MD, 1 mg at 10/21/20 1104  •  sodium chloride 0.9 % flush 10 mL, 10 mL, Intravenous, Q12H, Shira Clemente MD  •  sodium chloride 0.9 % flush 10 mL, 10 mL, Intravenous, PRN, Shira Clemente MD  •  sodium chloride 0.9 % infusion, 9 mL/hr, Intravenous, Continuous, Damon Rooney MD, Last Rate: 9 mL/hr at 10/21/20 1109, 9 mL/hr at 10/21/20 1109    No Known Allergies    Family History   Problem Relation Age of Onset   • Malig Hyperthermia Neg Hx        Social History     Socioeconomic History   • Marital status:      Spouse name: Not on file   • Number of children: 2   • Years of education: 12   • Highest education level: Not on file   Occupational History     Employer: [employer]   Tobacco Use   • Smoking status: Never Smoker   • Smokeless tobacco: Never Used   Substance and Sexual Activity   • Alcohol use: No   • Drug use: No   • Sexual activity: Defer         REVIEW OF SYSTEMS r  -Reports anxiety, otherwise negative review of systems    Physical Examination  /95 (BP Location: Left arm, Patient Position: Lying)   Pulse 78   Temp 98.5 °F (36.9 °C) (Oral)   Resp 20   Ht 152.4 cm (60\")   Wt 82.2 kg (181 lb 4 oz)   LMP 09/28/2020   SpO2 100%   BMI 35.40 kg/m²   Body mass index is 35.4 kg/m².  Physical Exam  Constitutional:       Appearance: Normal appearance. She is obese.   HENT:      Head: Normocephalic.      Nose: Nose normal.      Mouth/Throat:      Pharynx: Oropharynx is clear.   Eyes:      General: No scleral icterus.     Conjunctiva/sclera: Conjunctivae normal.   Neck:      Musculoskeletal: Normal range of motion and neck supple.   Cardiovascular:      Rate and Rhythm: Normal rate and regular rhythm.   Pulmonary:      Effort: Pulmonary effort is normal.      Breath sounds: Normal breath " sounds.   Abdominal:      General: Abdomen is flat. Bowel sounds are normal. There is no distension.      Palpations: Abdomen is soft. There is no mass.      Hernia: No hernia is present.      Comments: Peritoneal dialysis catheter in place with exit site in the left mid abdomen   Musculoskeletal: Normal range of motion.   Skin:     General: Skin is warm and dry.   Neurological:      General: No focal deficit present.      Mental Status: She is alert. Mental status is at baseline.   Psychiatric:         Mood and Affect: Mood normal.         Behavior: Behavior normal.         Labs reviewed on nephrologist note    Assessment:   Sunni Mcneil is a 45 y.o. female with ESRD on hemodialysis that needs peritoneal dialysis catheter removal.  Discussed with patient about treatment options.  Recommend that she undergoes open removal and off peritoneal dialysis catheter.  Risk and benefits of procedure including bleeding, infection, intra-abdominal injuries were discussed in detail with the patient that verbalized understanding and agreed with the plan    Plan:     -Peritoneal dialysis catheter removal  Orders Placed This Encounter   Procedures   • Basic Metabolic Panel     Standing Status:   Standing     Number of Occurrences:   1   • Obtain Informed Consent     Standing Status:   Standing     Number of Occurrences:   1     Order Specific Question:   Informed Consent Given For     Answer:   Peritoneal dialysis catheter removal and colonoscopy   • SCD (Sequential Compression Device) - Place on Patient in Pre-Op     Standing Status:   Standing     Number of Occurrences:   1   • Verify / Perform Chlorhexidine Skin Prep     Chlorhexidine Skin Wipes and Instructions For All Patients Having A Procedure Requiring an Outward Incision if Not Allergic.  If Allergic, Give Antibacterial Skin Wipes and Instructions.  Do Not Use For Facial Cases or on Any Mucus Membranes.     Standing Status:   Standing     Number of Occurrences:   1   •  Verify / Perform Chlorhexidine Skin Prep if Indicated (If Not Already Completed)     Standing Status:   Standing     Number of Occurrences:   1   • Verify NPO Status     Standing Status:   Standing     Number of Occurrences:   1   • Vital Signs - Per Anesthesia Protocol     Standing Status:   Standing     Number of Occurrences:   28700   • Pulse Oximetry, Continuous     Standing Status:   Standing     Number of Occurrences:   1   • Saline Lock & Maintain IV Access     Standing Status:   Standing     Number of Occurrences:   1   • Oxygen Therapy- Nasal Cannula; Titrate for SPO2: equal to or greater than, 90%     Standing Status:   Standing     Number of Occurrences:   1     Order Specific Question:   Device     Answer:   Nasal Cannula     Order Specific Question:   Titrate for SPO2     Answer:   equal to or greater than     Order Specific Question:   Titrate for SPO2     Answer:   90%     Order Specific Question:   Indications for Oxygen Therapy     Answer:   Other (specify in comment)   • POC Glucose Once     Standing Status:   Standing     Number of Occurrences:   1   • Insert Peripheral IV     Standing Status:   Standing     Number of Occurrences:   1         Damon Rooney MD  General, Minimally Invasive and Endoscopic Surgery  [facility]    [address] [place] [address], [address]  [place], [place], [facility]  P: [phone]  F: [phone]

## 2020-10-21 NOTE — OP NOTE
PREOPERATIVE DIAGNOSIS:  Peritoneal dialysis catheter in situ  POSTOPERATIVE DIAGNOSIS:  Same  PROCEDURE:  Open removal of peritoneal dialysis catheter  SURGEON/STAFF:  JOSE MANUEL Rooney  ANESTHESIA: MAC  BLOOD LOSS: Minimal  COUNTS:  Needle and sponge count correct.  SPECIMENS:  PD Catheter    INDICATIONS FOR OPERATION:  The patient is 46 yo female with ESRD on dialysis that failed PD. The patients needs the catheter removed because patient will be switched to hemodialysis.  On physical exam, the pd catheter is well incorporated and there're no signs of infection.  I recommend that the patient undergoes peritoneal dialysis removal under  moderate sedation and local anesthesia. The risks and benefits of the procedure were discussed at length and in detail with the patient that verbalized understanding and agreed with the plan.    OPERATION:  The patient was brought to the operating room in stable condition.   Preoperative antibiotics were given and sequential compression devices were applied.  At this time, the patient was laid supine on the operating room table. Preoperative antibiotics were given. Anesthesia was induced by the Anesthesia service without difficulty.  The patient's abdomen was prepped and draped in the usual sterile fashion. The peritoneal dialysis catheter was prepped twice.      At this time, half percent Marcaine with epinephrine was injected at the left lateral aspect of the umbilicus.  With scalpel an a 2 cm incision was performed .  Dissection was carried onto the subcutaneous tissue and muscular fascia. The distalcuff was found to be below the anterior rectus sheath. This was dissected circumferentially from surrounding tissue. The distal curl end of the catheter was then removed from the patient abdomen. The fascial defect was closed with a figure of eight of 0 vicryl. The second cuff was dissected from the subcutaneous tissue and the PD catheter was removed. The catheter was complete. The catheter  was not sent for pathological analysis. The wound was then closed in 2 layers with interrupted 3-0 Vicryl suture and running 4-0 Monocryl.  The incision was covered with surgical glue. The exit site was covered with 4x4 and tegaderm.  The patient was awakened in the operating room where he was taken to recovery in stable condition.  Instrument sponge count were correct.    Damon Rooney MD, FACS  General, Minimally Invasive and Endoscopic Surgery  [facility]    [address], [address]  [place], [place], [address]  P: [phone]  F: [phone]

## 2020-11-17 ENCOUNTER — OFFICE VISIT (OUTPATIENT)
Dept: SURGERY | Facility: CLINIC | Age: 45
End: 2020-11-17

## 2020-11-17 DIAGNOSIS — Z09 POSTOP CHECK: ICD-10-CM

## 2020-11-17 DIAGNOSIS — Z51.89 VISIT FOR WOUND CHECK: Primary | ICD-10-CM

## 2020-11-17 PROCEDURE — 99212 OFFICE O/P EST SF 10 MIN: CPT | Performed by: SURGERY

## 2020-11-19 NOTE — PROGRESS NOTES
CC: Follow-up after peritoneal dialysis catheter removal    S: Patient is here today for follow-up after undergoing peritoneal dialysis catheter removal on 10/21/2020.  She is doing fine and denies any complaints    O: Alert oriented x3, no acute distress  Abdomen soft, nontender nondistended, incisions completely healed  No hernias    Assessment and plan    45-year-old female s/p peritoneal dialysis catheter removal for poor filtration.  All wounds are healed and she does not have any postoperative problems.  She will follow-up in my office as needed    Damon Rooney MD, FACS  General, Minimally Invasive and Endoscopic Surgery  [facility]    [address], [address]  [place], [place], [address]  P: [phone]  F: [phone]

## 2021-01-14 ENCOUNTER — LAB (OUTPATIENT)
Dept: LAB | Facility: HOSPITAL | Age: 46
End: 2021-01-14

## 2021-01-14 ENCOUNTER — HOSPITAL ENCOUNTER (OUTPATIENT)
Dept: CARDIOLOGY | Facility: HOSPITAL | Age: 46
Discharge: HOME | End: 2021-01-14

## 2021-01-14 ENCOUNTER — HOSPITAL ENCOUNTER (INPATIENT)
Facility: HOSPITAL | Age: 46
LOS: 1 days | Discharge: HOME | End: 2021-01-16
Attending: EMERGENCY MEDICINE | Admitting: HOSPITALIST

## 2021-01-14 ENCOUNTER — TRANSCRIBE ORDERS (OUTPATIENT)
Dept: ADMINISTRATIVE | Facility: HOSPITAL | Age: 46
End: 2021-01-14

## 2021-01-14 ENCOUNTER — HOSPITAL ENCOUNTER (OUTPATIENT)
Facility: HOSPITAL | Age: 46
Setting detail: HOSPITAL OUTPATIENT SURGERY
End: 2021-01-14
Attending: SURGERY | Admitting: SURGERY

## 2021-01-14 ENCOUNTER — HOSPITAL ENCOUNTER (OUTPATIENT)
Dept: GENERAL RADIOLOGY | Facility: HOSPITAL | Age: 46
Discharge: HOME | End: 2021-01-14

## 2021-01-14 DIAGNOSIS — Z01.818 PRE-OP TESTING: Primary | ICD-10-CM

## 2021-01-14 DIAGNOSIS — T82.868A THROMBOSIS OF ARTERIOVENOUS FISTULA, INITIAL ENCOUNTER (HCC): ICD-10-CM

## 2021-01-14 DIAGNOSIS — Z01.811 PRE-OP CHEST EXAM: ICD-10-CM

## 2021-01-14 DIAGNOSIS — Z01.818 PRE-OP TESTING: ICD-10-CM

## 2021-01-14 DIAGNOSIS — Z99.2 PERITONEAL DIALYSIS CATHETER IN PLACE (HCC): ICD-10-CM

## 2021-01-14 DIAGNOSIS — E87.5 HYPERKALEMIA: Primary | ICD-10-CM

## 2021-01-14 DIAGNOSIS — N18.6 END STAGE RENAL DISEASE (HCC): ICD-10-CM

## 2021-01-14 DIAGNOSIS — T85.618A SHUNT MALFUNCTION, INITIAL ENCOUNTER: ICD-10-CM

## 2021-01-14 DIAGNOSIS — Z01.812 ENCOUNTER FOR PRE-OPERATIVE LABORATORY TESTING: Primary | ICD-10-CM

## 2021-01-14 LAB
ALBUMIN SERPL-MCNC: 3.8 G/DL (ref 3.5–5.2)
ALBUMIN SERPL-MCNC: 3.9 G/DL (ref 3.5–5.2)
ALBUMIN/GLOB SERPL: 1 G/DL
ALBUMIN/GLOB SERPL: 1.1 G/DL
ALP SERPL-CCNC: 50 U/L (ref 39–117)
ALP SERPL-CCNC: 52 U/L (ref 39–117)
ALT SERPL W P-5'-P-CCNC: <5 U/L (ref 1–33)
ALT SERPL W P-5'-P-CCNC: <5 U/L (ref 1–33)
ANION GAP SERPL CALCULATED.3IONS-SCNC: 17.3 MMOL/L (ref 5–15)
ANION GAP SERPL CALCULATED.3IONS-SCNC: 18.3 MMOL/L (ref 5–15)
APTT PPP: 33.5 SECONDS (ref 22.7–35.4)
AST SERPL-CCNC: 11 U/L (ref 1–32)
AST SERPL-CCNC: 6 U/L (ref 1–32)
BASOPHILS # BLD AUTO: 0.05 10*3/MM3 (ref 0–0.2)
BASOPHILS NFR BLD AUTO: 0.5 % (ref 0–1.5)
BILIRUB SERPL-MCNC: 0.3 MG/DL (ref 0–1.2)
BILIRUB SERPL-MCNC: 0.4 MG/DL (ref 0–1.2)
BUN SERPL-MCNC: 60 MG/DL (ref 6–20)
BUN SERPL-MCNC: 62 MG/DL (ref 6–20)
BUN/CREAT SERPL: 5 (ref 7–25)
BUN/CREAT SERPL: 5.2 (ref 7–25)
CALCIUM SPEC-SCNC: 7.5 MG/DL (ref 8.6–10.5)
CALCIUM SPEC-SCNC: 7.5 MG/DL (ref 8.6–10.5)
CHLORIDE SERPL-SCNC: 98 MMOL/L (ref 98–107)
CHLORIDE SERPL-SCNC: 98 MMOL/L (ref 98–107)
CO2 SERPL-SCNC: 18.7 MMOL/L (ref 22–29)
CO2 SERPL-SCNC: 19.7 MMOL/L (ref 22–29)
CREAT SERPL-MCNC: 11.86 MG/DL (ref 0.57–1)
CREAT SERPL-MCNC: 11.98 MG/DL (ref 0.57–1)
DEPRECATED RDW RBC AUTO: 53.7 FL (ref 37–54)
DEPRECATED RDW RBC AUTO: 55.4 FL (ref 37–54)
EOSINOPHIL # BLD AUTO: 0.18 10*3/MM3 (ref 0–0.4)
EOSINOPHIL NFR BLD AUTO: 1.8 % (ref 0.3–6.2)
ERYTHROCYTE [DISTWIDTH] IN BLOOD BY AUTOMATED COUNT: 15.9 % (ref 12.3–15.4)
ERYTHROCYTE [DISTWIDTH] IN BLOOD BY AUTOMATED COUNT: 16.1 % (ref 12.3–15.4)
GFR SERPL CREATININE-BSD FRML MDRD: 3 ML/MIN/1.73
GFR SERPL CREATININE-BSD FRML MDRD: 3 ML/MIN/1.73
GFR SERPL CREATININE-BSD FRML MDRD: ABNORMAL ML/MIN/{1.73_M2}
GFR SERPL CREATININE-BSD FRML MDRD: ABNORMAL ML/MIN/{1.73_M2}
GLOBULIN UR ELPH-MCNC: 3.7 GM/DL
GLOBULIN UR ELPH-MCNC: 3.8 GM/DL
GLUCOSE BLDC GLUCOMTR-MCNC: 81 MG/DL (ref 70–130)
GLUCOSE BLDC GLUCOMTR-MCNC: 87 MG/DL (ref 70–130)
GLUCOSE SERPL-MCNC: 106 MG/DL (ref 65–99)
GLUCOSE SERPL-MCNC: 84 MG/DL (ref 65–99)
HCT VFR BLD AUTO: 23.8 % (ref 34–46.6)
HCT VFR BLD AUTO: 25.2 % (ref 34–46.6)
HGB BLD-MCNC: 8.2 G/DL (ref 12–15.9)
HGB BLD-MCNC: 8.3 G/DL (ref 12–15.9)
IMM GRANULOCYTES # BLD AUTO: 0.09 10*3/MM3 (ref 0–0.05)
IMM GRANULOCYTES NFR BLD AUTO: 0.9 % (ref 0–0.5)
INR PPP: 1.27 (ref 0.9–1.1)
LYMPHOCYTES # BLD AUTO: 1.29 10*3/MM3 (ref 0.7–3.1)
LYMPHOCYTES NFR BLD AUTO: 12.6 % (ref 19.6–45.3)
MCH RBC QN AUTO: 31.4 PG (ref 26.6–33)
MCH RBC QN AUTO: 32.3 PG (ref 26.6–33)
MCHC RBC AUTO-ENTMCNC: 32.9 G/DL (ref 31.5–35.7)
MCHC RBC AUTO-ENTMCNC: 34.5 G/DL (ref 31.5–35.7)
MCV RBC AUTO: 93.7 FL (ref 79–97)
MCV RBC AUTO: 95.5 FL (ref 79–97)
MONOCYTES # BLD AUTO: 0.67 10*3/MM3 (ref 0.1–0.9)
MONOCYTES NFR BLD AUTO: 6.6 % (ref 5–12)
NEUTROPHILS NFR BLD AUTO: 7.94 10*3/MM3 (ref 1.7–7)
NEUTROPHILS NFR BLD AUTO: 77.6 % (ref 42.7–76)
NRBC BLD AUTO-RTO: 0 /100 WBC (ref 0–0.2)
PLATELET # BLD AUTO: 234 10*3/MM3 (ref 140–450)
PLATELET # BLD AUTO: 252 10*3/MM3 (ref 140–450)
PMV BLD AUTO: 9.7 FL (ref 6–12)
PMV BLD AUTO: 9.9 FL (ref 6–12)
POTASSIUM SERPL-SCNC: 5.4 MMOL/L (ref 3.5–5.2)
POTASSIUM SERPL-SCNC: 6.3 MMOL/L (ref 3.5–5.2)
PROT SERPL-MCNC: 7.6 G/DL (ref 6–8.5)
PROT SERPL-MCNC: 7.6 G/DL (ref 6–8.5)
PROTHROMBIN TIME: 15.7 SECONDS (ref 11.7–14.2)
QT INTERVAL: 431 MS
RBC # BLD AUTO: 2.54 10*6/MM3 (ref 3.77–5.28)
RBC # BLD AUTO: 2.64 10*6/MM3 (ref 3.77–5.28)
SARS-COV-2 RNA RESP QL NAA+PROBE: NOT DETECTED
SODIUM SERPL-SCNC: 134 MMOL/L (ref 136–145)
SODIUM SERPL-SCNC: 136 MMOL/L (ref 136–145)
WBC # BLD AUTO: 10.22 10*3/MM3 (ref 3.4–10.8)
WBC # BLD AUTO: 12.06 10*3/MM3 (ref 3.4–10.8)

## 2021-01-14 PROCEDURE — G0378 HOSPITAL OBSERVATION PER HR: HCPCS

## 2021-01-14 PROCEDURE — 85610 PROTHROMBIN TIME: CPT

## 2021-01-14 PROCEDURE — 85730 THROMBOPLASTIN TIME PARTIAL: CPT

## 2021-01-14 PROCEDURE — 63710000001 INSULIN REGULAR HUMAN PER 5 UNITS: Performed by: EMERGENCY MEDICINE

## 2021-01-14 PROCEDURE — 82962 GLUCOSE BLOOD TEST: CPT

## 2021-01-14 PROCEDURE — 99284 EMERGENCY DEPT VISIT MOD MDM: CPT

## 2021-01-14 PROCEDURE — U0003 INFECTIOUS AGENT DETECTION BY NUCLEIC ACID (DNA OR RNA); SEVERE ACUTE RESPIRATORY SYNDROME CORONAVIRUS 2 (SARS-COV-2) (CORONAVIRUS DISEASE [COVID-19]), AMPLIFIED PROBE TECHNIQUE, MAKING USE OF HIGH THROUGHPUT TECHNOLOGIES AS DESCRIBED BY CMS-2020-01-R: HCPCS | Performed by: EMERGENCY MEDICINE

## 2021-01-14 PROCEDURE — 71046 X-RAY EXAM CHEST 2 VIEWS: CPT

## 2021-01-14 PROCEDURE — 80053 COMPREHEN METABOLIC PANEL: CPT

## 2021-01-14 PROCEDURE — 85027 COMPLETE CBC AUTOMATED: CPT

## 2021-01-14 PROCEDURE — 93010 ELECTROCARDIOGRAM REPORT: CPT | Performed by: INTERNAL MEDICINE

## 2021-01-14 PROCEDURE — 85025 COMPLETE CBC W/AUTO DIFF WBC: CPT

## 2021-01-14 PROCEDURE — 93005 ELECTROCARDIOGRAM TRACING: CPT | Performed by: SURGERY

## 2021-01-14 PROCEDURE — 93005 ELECTROCARDIOGRAM TRACING: CPT | Performed by: EMERGENCY MEDICINE

## 2021-01-14 PROCEDURE — 36415 COLL VENOUS BLD VENIPUNCTURE: CPT

## 2021-01-14 RX ORDER — ACETAMINOPHEN 650 MG/1
650 SUPPOSITORY RECTAL EVERY 4 HOURS PRN
Status: DISCONTINUED | OUTPATIENT
Start: 2021-01-14 | End: 2021-01-16 | Stop reason: HOSPADM

## 2021-01-14 RX ORDER — NITROGLYCERIN 0.4 MG/1
0.4 TABLET SUBLINGUAL
Status: DISCONTINUED | OUTPATIENT
Start: 2021-01-14 | End: 2021-01-16 | Stop reason: HOSPADM

## 2021-01-14 RX ORDER — ACETAMINOPHEN 160 MG/5ML
650 SOLUTION ORAL EVERY 4 HOURS PRN
Status: DISCONTINUED | OUTPATIENT
Start: 2021-01-14 | End: 2021-01-16 | Stop reason: HOSPADM

## 2021-01-14 RX ORDER — ALBUMIN (HUMAN) 12.5 G/50ML
12.5 SOLUTION INTRAVENOUS AS NEEDED
Status: CANCELLED | OUTPATIENT
Start: 2021-01-15 | End: 2021-01-15

## 2021-01-14 RX ORDER — SODIUM CHLORIDE 0.9 % (FLUSH) 0.9 %
10 SYRINGE (ML) INJECTION AS NEEDED
Status: DISCONTINUED | OUTPATIENT
Start: 2021-01-14 | End: 2021-01-16 | Stop reason: HOSPADM

## 2021-01-14 RX ORDER — ONDANSETRON 2 MG/ML
4 INJECTION INTRAMUSCULAR; INTRAVENOUS EVERY 6 HOURS PRN
Status: DISCONTINUED | OUTPATIENT
Start: 2021-01-14 | End: 2021-01-16 | Stop reason: HOSPADM

## 2021-01-14 RX ORDER — ACETAMINOPHEN 325 MG/1
650 TABLET ORAL EVERY 4 HOURS PRN
Status: DISCONTINUED | OUTPATIENT
Start: 2021-01-14 | End: 2021-01-16 | Stop reason: HOSPADM

## 2021-01-14 RX ORDER — DEXTROSE MONOHYDRATE 25 G/50ML
25 INJECTION, SOLUTION INTRAVENOUS ONCE
Status: COMPLETED | OUTPATIENT
Start: 2021-01-14 | End: 2021-01-14

## 2021-01-14 RX ORDER — SODIUM CHLORIDE 0.9 % (FLUSH) 0.9 %
10 SYRINGE (ML) INJECTION EVERY 12 HOURS SCHEDULED
Status: DISCONTINUED | OUTPATIENT
Start: 2021-01-14 | End: 2021-01-16 | Stop reason: HOSPADM

## 2021-01-14 RX ADMIN — DEXTROSE MONOHYDRATE 25 G: 500 INJECTION PARENTERAL at 19:56

## 2021-01-14 RX ADMIN — INSULIN HUMAN 10 UNITS: 100 INJECTION, SOLUTION PARENTERAL at 19:58

## 2021-01-14 RX ADMIN — SODIUM CHLORIDE, PRESERVATIVE FREE 10 ML: 5 INJECTION INTRAVENOUS at 22:58

## 2021-01-14 NOTE — ED TRIAGE NOTES
Pt to ER via PV. Pt states her dialysis port stopped working yesterday and was unable to receive dialysis. Pt is a MWF. Pt was told to come to ER due to elevated potassium. Pt denies any symptoms    Patient in mask. This RN in appropriate PPE - including mask, goggles, and gloves during all of patient care.

## 2021-01-14 NOTE — ED NOTES
Pt was sent by md for elevated potassium. Pt has routine dialysis but her access is not useable. Pt denies chest pain, soa or any symptoms. Pt last dialysis was Monday. When talking to md here, pt states that she does not want new access.    Pt had mask on when placed in room. RN wore goggles and surgical mask upon entering room.        Jovana Olson, RN  01/14/21 1247

## 2021-01-15 ENCOUNTER — ANESTHESIA (OUTPATIENT)
Dept: PERIOP | Facility: HOSPITAL | Age: 46
End: 2021-01-15

## 2021-01-15 ENCOUNTER — APPOINTMENT (OUTPATIENT)
Dept: GENERAL RADIOLOGY | Facility: HOSPITAL | Age: 46
End: 2021-01-15

## 2021-01-15 ENCOUNTER — ANESTHESIA EVENT (OUTPATIENT)
Dept: PERIOP | Facility: HOSPITAL | Age: 46
End: 2021-01-15

## 2021-01-15 PROBLEM — T85.618A SHUNT MALFUNCTION: Status: ACTIVE | Noted: 2021-01-15

## 2021-01-15 LAB
ALBUMIN SERPL-MCNC: 3.7 G/DL (ref 3.5–5.2)
ANION GAP SERPL CALCULATED.3IONS-SCNC: 18.1 MMOL/L (ref 5–15)
B-HCG UR QL: NEGATIVE
BUN SERPL-MCNC: 71 MG/DL (ref 6–20)
BUN/CREAT SERPL: 6 (ref 7–25)
CALCIUM SPEC-SCNC: 7.5 MG/DL (ref 8.6–10.5)
CHLORIDE SERPL-SCNC: 99 MMOL/L (ref 98–107)
CO2 SERPL-SCNC: 19.9 MMOL/L (ref 22–29)
CREAT SERPL-MCNC: 11.78 MG/DL (ref 0.57–1)
DEPRECATED RDW RBC AUTO: 57.3 FL (ref 37–54)
ERYTHROCYTE [DISTWIDTH] IN BLOOD BY AUTOMATED COUNT: 16.5 % (ref 12.3–15.4)
FERRITIN SERPL-MCNC: 1503 NG/ML (ref 13–150)
GFR SERPL CREATININE-BSD FRML MDRD: 3 ML/MIN/1.73
GFR SERPL CREATININE-BSD FRML MDRD: ABNORMAL ML/MIN/{1.73_M2}
GLUCOSE BLDC GLUCOMTR-MCNC: 101 MG/DL (ref 70–130)
GLUCOSE BLDC GLUCOMTR-MCNC: 127 MG/DL (ref 70–130)
GLUCOSE BLDC GLUCOMTR-MCNC: 166 MG/DL (ref 70–130)
GLUCOSE BLDC GLUCOMTR-MCNC: 91 MG/DL (ref 70–130)
GLUCOSE SERPL-MCNC: 90 MG/DL (ref 65–99)
HCT VFR BLD AUTO: 26.4 % (ref 34–46.6)
HGB BLD-MCNC: 8.4 G/DL (ref 12–15.9)
IRON 24H UR-MRATE: 50 MCG/DL (ref 37–145)
IRON SATN MFR SERPL: 26 % (ref 20–50)
MCH RBC QN AUTO: 30.4 PG (ref 26.6–33)
MCHC RBC AUTO-ENTMCNC: 31.8 G/DL (ref 31.5–35.7)
MCV RBC AUTO: 95.7 FL (ref 79–97)
PHOSPHATE SERPL-MCNC: 11.1 MG/DL (ref 2.5–4.5)
PLATELET # BLD AUTO: 267 10*3/MM3 (ref 140–450)
PMV BLD AUTO: 9.8 FL (ref 6–12)
POTASSIUM SERPL-SCNC: 5.8 MMOL/L (ref 3.5–5.2)
QT INTERVAL: 436 MS
RBC # BLD AUTO: 2.76 10*6/MM3 (ref 3.77–5.28)
SODIUM SERPL-SCNC: 137 MMOL/L (ref 136–145)
TIBC SERPL-MCNC: 192 MCG/DL (ref 298–536)
TRANSFERRIN SERPL-MCNC: 129 MG/DL (ref 200–360)
WBC # BLD AUTO: 9 10*3/MM3 (ref 3.4–10.8)

## 2021-01-15 PROCEDURE — 25010000002 HYDRALAZINE PER 20 MG: Performed by: ANESTHESIOLOGY

## 2021-01-15 PROCEDURE — 0 IOPAMIDOL PER 1 ML: Performed by: INTERNAL MEDICINE

## 2021-01-15 PROCEDURE — 25010000002 PROPOFOL 10 MG/ML EMULSION: Performed by: NURSE ANESTHETIST, CERTIFIED REGISTERED

## 2021-01-15 PROCEDURE — C1769 GUIDE WIRE: HCPCS | Performed by: SURGERY

## 2021-01-15 PROCEDURE — 25010000002 HYDRALAZINE PER 20 MG

## 2021-01-15 PROCEDURE — C1757 CATH, THROMBECTOMY/EMBOLECT: HCPCS | Performed by: SURGERY

## 2021-01-15 PROCEDURE — 82962 GLUCOSE BLOOD TEST: CPT

## 2021-01-15 PROCEDURE — 25010000002 ONDANSETRON PER 1 MG: Performed by: NURSE ANESTHETIST, CERTIFIED REGISTERED

## 2021-01-15 PROCEDURE — 83540 ASSAY OF IRON: CPT | Performed by: HOSPITALIST

## 2021-01-15 PROCEDURE — 77001 FLUOROGUIDE FOR VEIN DEVICE: CPT

## 2021-01-15 PROCEDURE — 25010000002 MIDAZOLAM PER 1 MG: Performed by: ANESTHESIOLOGY

## 2021-01-15 PROCEDURE — 25010000002 FENTANYL CITRATE (PF) 100 MCG/2ML SOLUTION: Performed by: ANESTHESIOLOGY

## 2021-01-15 PROCEDURE — 81025 URINE PREGNANCY TEST: CPT | Performed by: INTERNAL MEDICINE

## 2021-01-15 PROCEDURE — C1894 INTRO/SHEATH, NON-LASER: HCPCS | Performed by: SURGERY

## 2021-01-15 PROCEDURE — 25010000003 CEFAZOLIN IN DEXTROSE 2-4 GM/100ML-% SOLUTION: Performed by: SURGERY

## 2021-01-15 PROCEDURE — 82728 ASSAY OF FERRITIN: CPT | Performed by: HOSPITALIST

## 2021-01-15 PROCEDURE — B51W1ZZ FLUOROSCOPY OF DIALYSIS SHUNT/FISTULA USING LOW OSMOLAR CONTRAST: ICD-10-PCS | Performed by: SURGERY

## 2021-01-15 PROCEDURE — 25010000002 FENTANYL CITRATE (PF) 100 MCG/2ML SOLUTION: Performed by: NURSE ANESTHETIST, CERTIFIED REGISTERED

## 2021-01-15 PROCEDURE — 02H633Z INSERTION OF INFUSION DEVICE INTO RIGHT ATRIUM, PERCUTANEOUS APPROACH: ICD-10-PCS | Performed by: SURGERY

## 2021-01-15 PROCEDURE — 84466 ASSAY OF TRANSFERRIN: CPT | Performed by: HOSPITALIST

## 2021-01-15 PROCEDURE — 25010000002 NEOSTIGMINE PER 0.5 MG: Performed by: NURSE ANESTHETIST, CERTIFIED REGISTERED

## 2021-01-15 PROCEDURE — 05CF0ZZ EXTIRPATION OF MATTER FROM LEFT CEPHALIC VEIN, OPEN APPROACH: ICD-10-PCS | Performed by: SURGERY

## 2021-01-15 PROCEDURE — 25010000002 DROPERIDOL PER 5 MG: Performed by: ANESTHESIOLOGY

## 2021-01-15 PROCEDURE — 76937 US GUIDE VASCULAR ACCESS: CPT

## 2021-01-15 PROCEDURE — 94799 UNLISTED PULMONARY SVC/PX: CPT

## 2021-01-15 PROCEDURE — 0JH63XZ INSERTION OF TUNNELED VASCULAR ACCESS DEVICE INTO CHEST SUBCUTANEOUS TISSUE AND FASCIA, PERCUTANEOUS APPROACH: ICD-10-PCS | Performed by: SURGERY

## 2021-01-15 PROCEDURE — 25010000002 HEPARIN (PORCINE) PER 1000 UNITS: Performed by: SURGERY

## 2021-01-15 PROCEDURE — 25010000002 HYDROMORPHONE PER 4 MG: Performed by: NURSE ANESTHETIST, CERTIFIED REGISTERED

## 2021-01-15 PROCEDURE — 94640 AIRWAY INHALATION TREATMENT: CPT

## 2021-01-15 PROCEDURE — 85027 COMPLETE CBC AUTOMATED: CPT | Performed by: NURSE PRACTITIONER

## 2021-01-15 PROCEDURE — 25010000003 LIDOCAINE 1 % SOLUTION 20 ML VIAL: Performed by: SURGERY

## 2021-01-15 PROCEDURE — 80069 RENAL FUNCTION PANEL: CPT | Performed by: INTERNAL MEDICINE

## 2021-01-15 PROCEDURE — C1750 CATH, HEMODIALYSIS,LONG-TERM: HCPCS | Performed by: SURGERY

## 2021-01-15 DEVICE — LIGACLIP MCA MULTIPLE CLIP APPLIERS, 20 SMALL CLIPS
Type: IMPLANTABLE DEVICE | Site: ARM | Status: FUNCTIONAL
Brand: LIGACLIP

## 2021-01-15 RX ORDER — ONDANSETRON 2 MG/ML
INJECTION INTRAMUSCULAR; INTRAVENOUS AS NEEDED
Status: DISCONTINUED | OUTPATIENT
Start: 2021-01-15 | End: 2021-01-15 | Stop reason: SURG

## 2021-01-15 RX ORDER — FENTANYL CITRATE 50 UG/ML
INJECTION, SOLUTION INTRAMUSCULAR; INTRAVENOUS AS NEEDED
Status: DISCONTINUED | OUTPATIENT
Start: 2021-01-15 | End: 2021-01-15 | Stop reason: SURG

## 2021-01-15 RX ORDER — ALBUMIN (HUMAN) 12.5 G/50ML
12.5 SOLUTION INTRAVENOUS AS NEEDED
Status: DISCONTINUED | OUTPATIENT
Start: 2021-01-16 | End: 2021-01-16 | Stop reason: HOSPADM

## 2021-01-15 RX ORDER — HYDROMORPHONE HYDROCHLORIDE 1 MG/ML
0.5 INJECTION, SOLUTION INTRAMUSCULAR; INTRAVENOUS; SUBCUTANEOUS
Status: DISCONTINUED | OUTPATIENT
Start: 2021-01-15 | End: 2021-01-15 | Stop reason: HOSPADM

## 2021-01-15 RX ORDER — ROCURONIUM BROMIDE 10 MG/ML
INJECTION, SOLUTION INTRAVENOUS AS NEEDED
Status: DISCONTINUED | OUTPATIENT
Start: 2021-01-15 | End: 2021-01-15 | Stop reason: SURG

## 2021-01-15 RX ORDER — OXYCODONE AND ACETAMINOPHEN 7.5; 325 MG/1; MG/1
1 TABLET ORAL ONCE AS NEEDED
Status: DISCONTINUED | OUTPATIENT
Start: 2021-01-15 | End: 2021-01-15 | Stop reason: HOSPADM

## 2021-01-15 RX ORDER — HYDROMORPHONE HYDROCHLORIDE 1 MG/ML
0.5 INJECTION, SOLUTION INTRAMUSCULAR; INTRAVENOUS; SUBCUTANEOUS
Status: DISCONTINUED | OUTPATIENT
Start: 2021-01-15 | End: 2021-01-16 | Stop reason: HOSPADM

## 2021-01-15 RX ORDER — CALCITRIOL 0.25 UG/1
0.5 CAPSULE, LIQUID FILLED ORAL DAILY
Status: DISCONTINUED | OUTPATIENT
Start: 2021-01-15 | End: 2021-01-16 | Stop reason: HOSPADM

## 2021-01-15 RX ORDER — IPRATROPIUM BROMIDE AND ALBUTEROL SULFATE 2.5; .5 MG/3ML; MG/3ML
3 SOLUTION RESPIRATORY (INHALATION)
Status: DISCONTINUED | OUTPATIENT
Start: 2021-01-15 | End: 2021-01-16 | Stop reason: HOSPADM

## 2021-01-15 RX ORDER — FAMOTIDINE 10 MG/ML
20 INJECTION, SOLUTION INTRAVENOUS ONCE
Status: DISCONTINUED | OUTPATIENT
Start: 2021-01-15 | End: 2021-01-15 | Stop reason: HOSPADM

## 2021-01-15 RX ORDER — HYDRALAZINE HYDROCHLORIDE 20 MG/ML
10 INJECTION INTRAMUSCULAR; INTRAVENOUS
Status: COMPLETED | OUTPATIENT
Start: 2021-01-15 | End: 2021-01-15

## 2021-01-15 RX ORDER — MIDAZOLAM HYDROCHLORIDE 1 MG/ML
1 INJECTION INTRAMUSCULAR; INTRAVENOUS
Status: DISCONTINUED | OUTPATIENT
Start: 2021-01-15 | End: 2021-01-15

## 2021-01-15 RX ORDER — NITROGLYCERIN 0.4 MG/1
0.4 TABLET SUBLINGUAL
Status: DISCONTINUED | OUTPATIENT
Start: 2021-01-15 | End: 2021-01-16 | Stop reason: HOSPADM

## 2021-01-15 RX ORDER — SODIUM CHLORIDE, SODIUM LACTATE, POTASSIUM CHLORIDE, CALCIUM CHLORIDE 600; 310; 30; 20 MG/100ML; MG/100ML; MG/100ML; MG/100ML
9 INJECTION, SOLUTION INTRAVENOUS CONTINUOUS
Status: DISCONTINUED | OUTPATIENT
Start: 2021-01-15 | End: 2021-01-15

## 2021-01-15 RX ORDER — LIDOCAINE HYDROCHLORIDE 10 MG/ML
0.5 INJECTION, SOLUTION EPIDURAL; INFILTRATION; INTRACAUDAL; PERINEURAL ONCE AS NEEDED
Status: DISCONTINUED | OUTPATIENT
Start: 2021-01-15 | End: 2021-01-15 | Stop reason: HOSPADM

## 2021-01-15 RX ORDER — EPHEDRINE SULFATE 50 MG/ML
5 INJECTION, SOLUTION INTRAVENOUS ONCE AS NEEDED
Status: DISCONTINUED | OUTPATIENT
Start: 2021-01-15 | End: 2021-01-15 | Stop reason: HOSPADM

## 2021-01-15 RX ORDER — SODIUM CHLORIDE 0.9 % (FLUSH) 0.9 %
3 SYRINGE (ML) INJECTION EVERY 12 HOURS SCHEDULED
Status: DISCONTINUED | OUTPATIENT
Start: 2021-01-15 | End: 2021-01-15 | Stop reason: HOSPADM

## 2021-01-15 RX ORDER — DROPERIDOL 2.5 MG/ML
0.62 INJECTION, SOLUTION INTRAMUSCULAR; INTRAVENOUS ONCE
Status: COMPLETED | OUTPATIENT
Start: 2021-01-15 | End: 2021-01-15

## 2021-01-15 RX ORDER — LIDOCAINE HYDROCHLORIDE 40 MG/ML
SOLUTION TOPICAL AS NEEDED
Status: DISCONTINUED | OUTPATIENT
Start: 2021-01-15 | End: 2021-01-15 | Stop reason: SURG

## 2021-01-15 RX ORDER — PROMETHAZINE HYDROCHLORIDE 25 MG/1
25 TABLET ORAL ONCE AS NEEDED
Status: DISCONTINUED | OUTPATIENT
Start: 2021-01-15 | End: 2021-01-15 | Stop reason: HOSPADM

## 2021-01-15 RX ORDER — NALOXONE HCL 0.4 MG/ML
0.4 VIAL (ML) INJECTION
Status: DISCONTINUED | OUTPATIENT
Start: 2021-01-15 | End: 2021-01-16 | Stop reason: HOSPADM

## 2021-01-15 RX ORDER — ALPRAZOLAM 0.25 MG/1
0.25 TABLET ORAL 3 TIMES DAILY PRN
Status: DISCONTINUED | OUTPATIENT
Start: 2021-01-15 | End: 2021-01-16 | Stop reason: HOSPADM

## 2021-01-15 RX ORDER — SODIUM CHLORIDE 9 MG/ML
9 INJECTION, SOLUTION INTRAVENOUS CONTINUOUS
Status: DISCONTINUED | OUTPATIENT
Start: 2021-01-15 | End: 2021-01-16 | Stop reason: HOSPADM

## 2021-01-15 RX ORDER — ONDANSETRON 4 MG/1
4 TABLET, FILM COATED ORAL EVERY 6 HOURS PRN
Status: DISCONTINUED | OUTPATIENT
Start: 2021-01-15 | End: 2021-01-16 | Stop reason: HOSPADM

## 2021-01-15 RX ORDER — HYDROCODONE BITARTRATE AND ACETAMINOPHEN 7.5; 325 MG/1; MG/1
1 TABLET ORAL EVERY 4 HOURS PRN
Status: DISCONTINUED | OUTPATIENT
Start: 2021-01-15 | End: 2021-01-16 | Stop reason: HOSPADM

## 2021-01-15 RX ORDER — FENTANYL CITRATE 50 UG/ML
50 INJECTION, SOLUTION INTRAMUSCULAR; INTRAVENOUS
Status: DISCONTINUED | OUTPATIENT
Start: 2021-01-15 | End: 2021-01-15

## 2021-01-15 RX ORDER — HEPARIN SODIUM 1000 [USP'U]/ML
INJECTION, SOLUTION INTRAVENOUS; SUBCUTANEOUS AS NEEDED
Status: DISCONTINUED | OUTPATIENT
Start: 2021-01-15 | End: 2021-01-15 | Stop reason: HOSPADM

## 2021-01-15 RX ORDER — EPHEDRINE SULFATE 50 MG/ML
INJECTION, SOLUTION INTRAVENOUS AS NEEDED
Status: DISCONTINUED | OUTPATIENT
Start: 2021-01-15 | End: 2021-01-15 | Stop reason: SURG

## 2021-01-15 RX ORDER — LABETALOL HYDROCHLORIDE 5 MG/ML
5 INJECTION, SOLUTION INTRAVENOUS
Status: DISCONTINUED | OUTPATIENT
Start: 2021-01-15 | End: 2021-01-15 | Stop reason: HOSPADM

## 2021-01-15 RX ORDER — CEFAZOLIN SODIUM 2 G/100ML
2 INJECTION, SOLUTION INTRAVENOUS ONCE
Status: COMPLETED | OUTPATIENT
Start: 2021-01-15 | End: 2021-01-15

## 2021-01-15 RX ORDER — HYDROCODONE BITARTRATE AND ACETAMINOPHEN 7.5; 325 MG/1; MG/1
1 TABLET ORAL ONCE AS NEEDED
Status: DISCONTINUED | OUTPATIENT
Start: 2021-01-15 | End: 2021-01-15 | Stop reason: HOSPADM

## 2021-01-15 RX ORDER — ONDANSETRON 2 MG/ML
4 INJECTION INTRAMUSCULAR; INTRAVENOUS ONCE AS NEEDED
Status: COMPLETED | OUTPATIENT
Start: 2021-01-15 | End: 2021-01-15

## 2021-01-15 RX ORDER — SODIUM CHLORIDE 0.9 % (FLUSH) 0.9 %
3-10 SYRINGE (ML) INJECTION AS NEEDED
Status: DISCONTINUED | OUTPATIENT
Start: 2021-01-15 | End: 2021-01-15 | Stop reason: HOSPADM

## 2021-01-15 RX ORDER — PROMETHAZINE HYDROCHLORIDE 25 MG/1
25 SUPPOSITORY RECTAL ONCE AS NEEDED
Status: DISCONTINUED | OUTPATIENT
Start: 2021-01-15 | End: 2021-01-15 | Stop reason: HOSPADM

## 2021-01-15 RX ORDER — FENTANYL CITRATE 50 UG/ML
50 INJECTION, SOLUTION INTRAMUSCULAR; INTRAVENOUS
Status: DISCONTINUED | OUTPATIENT
Start: 2021-01-15 | End: 2021-01-15 | Stop reason: HOSPADM

## 2021-01-15 RX ORDER — PROPOFOL 10 MG/ML
VIAL (ML) INTRAVENOUS AS NEEDED
Status: DISCONTINUED | OUTPATIENT
Start: 2021-01-15 | End: 2021-01-15 | Stop reason: SURG

## 2021-01-15 RX ORDER — LIDOCAINE HYDROCHLORIDE 10 MG/ML
0.5 INJECTION, SOLUTION EPIDURAL; INFILTRATION; INTRACAUDAL; PERINEURAL ONCE AS NEEDED
Status: DISCONTINUED | OUTPATIENT
Start: 2021-01-15 | End: 2021-01-15

## 2021-01-15 RX ORDER — DIPHENHYDRAMINE HYDROCHLORIDE 50 MG/ML
12.5 INJECTION INTRAMUSCULAR; INTRAVENOUS
Status: DISCONTINUED | OUTPATIENT
Start: 2021-01-15 | End: 2021-01-15 | Stop reason: HOSPADM

## 2021-01-15 RX ORDER — MIDAZOLAM HYDROCHLORIDE 1 MG/ML
1 INJECTION INTRAMUSCULAR; INTRAVENOUS
Status: DISCONTINUED | OUTPATIENT
Start: 2021-01-15 | End: 2021-01-15 | Stop reason: HOSPADM

## 2021-01-15 RX ORDER — FAMOTIDINE 10 MG/ML
20 INJECTION, SOLUTION INTRAVENOUS ONCE
Status: COMPLETED | OUTPATIENT
Start: 2021-01-15 | End: 2021-01-15

## 2021-01-15 RX ORDER — DIPHENHYDRAMINE HCL 25 MG
25 CAPSULE ORAL
Status: DISCONTINUED | OUTPATIENT
Start: 2021-01-15 | End: 2021-01-15 | Stop reason: HOSPADM

## 2021-01-15 RX ORDER — CALCIUM ACETATE 667 MG/1
2001 CAPSULE ORAL
Status: DISCONTINUED | OUTPATIENT
Start: 2021-01-15 | End: 2021-01-16 | Stop reason: HOSPADM

## 2021-01-15 RX ORDER — SODIUM CHLORIDE 0.9 % (FLUSH) 0.9 %
3 SYRINGE (ML) INJECTION EVERY 12 HOURS SCHEDULED
Status: DISCONTINUED | OUTPATIENT
Start: 2021-01-15 | End: 2021-01-15

## 2021-01-15 RX ORDER — NALOXONE HCL 0.4 MG/ML
0.2 VIAL (ML) INJECTION AS NEEDED
Status: DISCONTINUED | OUTPATIENT
Start: 2021-01-15 | End: 2021-01-15 | Stop reason: HOSPADM

## 2021-01-15 RX ORDER — SODIUM CHLORIDE 0.9 % (FLUSH) 0.9 %
3-10 SYRINGE (ML) INJECTION AS NEEDED
Status: DISCONTINUED | OUTPATIENT
Start: 2021-01-15 | End: 2021-01-15

## 2021-01-15 RX ORDER — FLUMAZENIL 0.1 MG/ML
0.2 INJECTION INTRAVENOUS AS NEEDED
Status: DISCONTINUED | OUTPATIENT
Start: 2021-01-15 | End: 2021-01-15 | Stop reason: HOSPADM

## 2021-01-15 RX ORDER — LIDOCAINE HYDROCHLORIDE 20 MG/ML
INJECTION, SOLUTION INFILTRATION; PERINEURAL AS NEEDED
Status: DISCONTINUED | OUTPATIENT
Start: 2021-01-15 | End: 2021-01-15 | Stop reason: SURG

## 2021-01-15 RX ORDER — CEFAZOLIN SODIUM 2 G/100ML
2 INJECTION, SOLUTION INTRAVENOUS EVERY 8 HOURS
Status: DISCONTINUED | OUTPATIENT
Start: 2021-01-16 | End: 2021-01-16

## 2021-01-15 RX ORDER — GLYCOPYRROLATE 0.2 MG/ML
INJECTION INTRAMUSCULAR; INTRAVENOUS AS NEEDED
Status: DISCONTINUED | OUTPATIENT
Start: 2021-01-15 | End: 2021-01-15 | Stop reason: SURG

## 2021-01-15 RX ORDER — CLONIDINE HYDROCHLORIDE 0.1 MG/1
0.3 TABLET ORAL 3 TIMES DAILY
Status: DISCONTINUED | OUTPATIENT
Start: 2021-01-15 | End: 2021-01-16 | Stop reason: HOSPADM

## 2021-01-15 RX ORDER — HYDRALAZINE HYDROCHLORIDE 20 MG/ML
INJECTION INTRAMUSCULAR; INTRAVENOUS
Status: COMPLETED
Start: 2021-01-15 | End: 2021-01-15

## 2021-01-15 RX ORDER — ONDANSETRON 2 MG/ML
4 INJECTION INTRAMUSCULAR; INTRAVENOUS EVERY 6 HOURS PRN
Status: DISCONTINUED | OUTPATIENT
Start: 2021-01-15 | End: 2021-01-16 | Stop reason: HOSPADM

## 2021-01-15 RX ADMIN — ONDANSETRON 4 MG: 2 INJECTION INTRAMUSCULAR; INTRAVENOUS at 17:27

## 2021-01-15 RX ADMIN — LABETALOL HYDROCHLORIDE 5 MG: 5 INJECTION, SOLUTION INTRAVENOUS at 17:47

## 2021-01-15 RX ADMIN — SODIUM CHLORIDE, PRESERVATIVE FREE 10 ML: 5 INJECTION INTRAVENOUS at 08:51

## 2021-01-15 RX ADMIN — HYDRALAZINE HYDROCHLORIDE 10 MG: 20 INJECTION INTRAMUSCULAR; INTRAVENOUS at 17:55

## 2021-01-15 RX ADMIN — PROPOFOL 150 MG: 10 INJECTION, EMULSION INTRAVENOUS at 16:01

## 2021-01-15 RX ADMIN — ALPRAZOLAM 0.25 MG: 0.25 TABLET ORAL at 09:58

## 2021-01-15 RX ADMIN — SODIUM CHLORIDE 9 ML/HR: 9 INJECTION, SOLUTION INTRAVENOUS at 14:45

## 2021-01-15 RX ADMIN — FENTANYL CITRATE 50 MCG: 50 INJECTION, SOLUTION INTRAMUSCULAR; INTRAVENOUS at 14:44

## 2021-01-15 RX ADMIN — MIDAZOLAM 1 MG: 1 INJECTION INTRAMUSCULAR; INTRAVENOUS at 15:11

## 2021-01-15 RX ADMIN — FENTANYL CITRATE 50 MCG: 50 INJECTION, SOLUTION INTRAMUSCULAR; INTRAVENOUS at 17:56

## 2021-01-15 RX ADMIN — FENTANYL CITRATE 50 MCG: 50 INJECTION, SOLUTION INTRAMUSCULAR; INTRAVENOUS at 15:11

## 2021-01-15 RX ADMIN — GLYCOPYRROLATE 0.4 MG: 0.2 INJECTION INTRAMUSCULAR; INTRAVENOUS at 17:00

## 2021-01-15 RX ADMIN — CALCIUM ACETATE 2001 MG: 667 CAPSULE ORAL at 11:43

## 2021-01-15 RX ADMIN — ONDANSETRON HYDROCHLORIDE 4 MG: 2 SOLUTION INTRAMUSCULAR; INTRAVENOUS at 17:00

## 2021-01-15 RX ADMIN — NEOSTIGMINE METHYLSULFATE 2 MG: 1 INJECTION INTRAMUSCULAR; INTRAVENOUS; SUBCUTANEOUS at 17:00

## 2021-01-15 RX ADMIN — FENTANYL CITRATE 50 MCG: 50 INJECTION INTRAMUSCULAR; INTRAVENOUS at 16:31

## 2021-01-15 RX ADMIN — HYDROMORPHONE HYDROCHLORIDE 0.5 MG: 1 INJECTION, SOLUTION INTRAMUSCULAR; INTRAVENOUS; SUBCUTANEOUS at 18:09

## 2021-01-15 RX ADMIN — DROPERIDOL 0.62 MG: 2.5 INJECTION, SOLUTION INTRAMUSCULAR; INTRAVENOUS at 18:27

## 2021-01-15 RX ADMIN — FENTANYL CITRATE 50 MCG: 50 INJECTION, SOLUTION INTRAMUSCULAR; INTRAVENOUS at 17:28

## 2021-01-15 RX ADMIN — CLONIDINE HYDROCHLORIDE 0.3 MG: 0.1 TABLET ORAL at 08:51

## 2021-01-15 RX ADMIN — CEFAZOLIN SODIUM 2 G: 2 INJECTION, SOLUTION INTRAVENOUS at 16:06

## 2021-01-15 RX ADMIN — CLONIDINE HYDROCHLORIDE 0.3 MG: 0.1 TABLET ORAL at 21:26

## 2021-01-15 RX ADMIN — SODIUM CHLORIDE 9 ML/HR: 9 INJECTION, SOLUTION INTRAVENOUS at 15:44

## 2021-01-15 RX ADMIN — EPHEDRINE SULFATE 10 MG: 50 INJECTION INTRAVENOUS at 16:46

## 2021-01-15 RX ADMIN — LIDOCAINE HYDROCHLORIDE 1 EACH: 40 SOLUTION TOPICAL at 16:02

## 2021-01-15 RX ADMIN — ROCURONIUM BROMIDE 30 MG: 10 INJECTION INTRAVENOUS at 16:01

## 2021-01-15 RX ADMIN — FAMOTIDINE 20 MG: 10 INJECTION INTRAVENOUS at 14:45

## 2021-01-15 RX ADMIN — FENTANYL CITRATE 50 MCG: 50 INJECTION INTRAMUSCULAR; INTRAVENOUS at 16:01

## 2021-01-15 RX ADMIN — LIDOCAINE HYDROCHLORIDE 60 MG: 20 INJECTION, SOLUTION INFILTRATION; PERINEURAL at 16:01

## 2021-01-15 RX ADMIN — CALCITRIOL 0.5 MCG: 0.25 CAPSULE ORAL at 08:51

## 2021-01-15 RX ADMIN — SODIUM CHLORIDE, PRESERVATIVE FREE 10 ML: 5 INJECTION INTRAVENOUS at 21:00

## 2021-01-15 RX ADMIN — IOPAMIDOL 15 ML: 510 INJECTION, SOLUTION INTRAVASCULAR at 17:04

## 2021-01-15 RX ADMIN — MIDAZOLAM 1 MG: 1 INJECTION INTRAMUSCULAR; INTRAVENOUS at 14:43

## 2021-01-15 RX ADMIN — CALCIUM ACETATE 2001 MG: 667 CAPSULE ORAL at 08:51

## 2021-01-15 RX ADMIN — IPRATROPIUM BROMIDE AND ALBUTEROL SULFATE 3 ML: 2.5; .5 SOLUTION RESPIRATORY (INHALATION) at 20:10

## 2021-01-15 RX ADMIN — ACETAMINOPHEN 650 MG: 325 TABLET, FILM COATED ORAL at 13:19

## 2021-01-15 RX ADMIN — HYDRALAZINE HYDROCHLORIDE 10 MG: 20 INJECTION INTRAMUSCULAR; INTRAVENOUS at 18:13

## 2021-01-15 NOTE — PROGRESS NOTES
Vascular Surgery    45-year-old woman known to my partner Dr. Anya Hernandez.  Has chronic kidney disease and has been maintained on hemodialysis via right arm fistula.  Experienced fistula thrombosis, and yesterday underwent attempted percutaneous mechanical thrombectomy.  The fistula is large, and although there was some progress made, there was a large burden of mural thrombus that remained and which did not respond.  Plans were made for dialysis catheter insertion, but the patient adamantly refused and left the office without access.  Plans were made for further evaluation and treatment, and a critical potassium level was recorded.  The patient was contacted in effort to manage this, but she was upset about the prospect of undergoing catheter placement, and did not immediately comply with requests to be seen and evaluated.  Later in the evening, she presented to the emergency room, where potassium level was 5.4.  She was admitted to the hospital for further evaluation and treatment of her hyperkalemia.    This morning she is comfortable, complaining of mild upper arm soreness.  There is scant old bleeding into the dressing at the puncture site, but no active bleeding.  There is no thrill or bruit in the access.  The fistula seems large and aneurysmal, and the forearm is swollen.    It appears as if she is at the end of the line with this access and will need tunneled dialysis catheter placement pending new access.  After a difficult percutaneous mechanical thrombectomy yesterday, patency of her access was not restored, and it has already failed.  Even if fistula thrombectomy was to be attempted, the incision and suture lines may preclude immediate term hemodialysis access.  She is very upset and tearful about the prospect of having a tunneled dialysis catheter placed.  I do not think that she has an option, however.    Dr. Herrera to address today.

## 2021-01-15 NOTE — H&P
Patient Name:  Sunni Mcneil  YOB: 1975  MRN:  [id number]  Admit Date:  1/14/2021  Patient Care Team:  Provider, No Known as PCP - Bo Hummel MD as Consulting Physician (Nephrology)  Damon Rooney MD as Surgeon (General Surgery)      Chief Complaint   Patient presents with   • Abnormal Lab       Subjective     Ms. Mcneil is a 45 y.o. female with a history of ESRD on dialysis, renal transplant, DM2, HTN, anemia that presents to [facility] complaining of abnormal lab. Patient reports that she is a Monday, Wednesday, Friday dialysis patient, had dialysis last on Monday, was unable to have dialysis Wednesday due to shunt malfunction, and sent to vascular surgeon for shunt repair. Patient had pre-op labs done today, was supposed to have surgery tomorrow, and was told by MD to come to ED for hyperkalemia tonight. Per Vascular's note, patient was contacted today regarding her need for ED visit and it was explained that patient may need an urgent dialysis catheter placed tonight as opposed to thrombectomy in the morning, she stated that she did not want a dialysis catheter. Patient denies fever, chest pain, shortness of breath, abdominal pain, changes in bowel or bladder habits. Labs done tonight show hyperkalemia and she was given IV insulin and dextrose, recheck was 5.6.    History of Present Illness    Past Medical History:   Diagnosis Date   • Acid reflux    • Anemia    • Anxiety    • Arthritis    • Depression    • Diabetes mellitus (CMS/HCC)     PT DENIES BEING DIABETIC   • ESRD on dialysis (CMS/[facility])     m,w,f   • History of peritoneal dialysis     KIDNEY TRANSPLANT SEPT 2016 CURRENTLY DOING PERITONEAL DIALYSIS.   • History of transfusion     BEEN A WHILE   • Hypercalcemia    • Hyperparathyroidism (CMS/HCC)    • Hypertension    • Kidney disease     End-stage renal disease. TRANSPLANT   • Kidney transplant recipient 09/02/2016    RIGHT KIDNEY   •  Peritoneal dialysis catheter in situ (CMS/HCC)     LEFT ABDOMEN   • Seasonal allergies     CURRENTLY      Past Surgical History:   Procedure Laterality Date   • ARTERIOVENOUS FISTULA/SHUNT SURGERY Right 2020    Procedure: RIGHT ARM ARTERIAL VENOUS FISTULA;  Surgeon: Elijah Herrera MD;  Location:  [facility];  Service: Vascular;  Laterality: Right;   • INSERTION PERITONEAL DIALYSIS CATHETER  2014    Laparoscopic placement of Curl Cath peritoneal dialysis catheter, Dr. Vinod Goldstein   • INSERTION PERITONEAL DIALYSIS CATHETER N/A 2019    Procedure: LAPAROSCOPIC INSERTION PERITONEAL DIALYSIS CATHETER;  Surgeon: Damon Rooney MD;  Location:  [facility];  Service: General   • REMOVAL PERITONEAL DIALYSIS CATHETER N/A 10/17/2016    Procedure: REMOVAL PERITONEAL DIALYSIS CATHETER;  Surgeon: Damon Rooney MD;  Location:  [facility];  Service:    • REMOVAL PERITONEAL DIALYSIS CATHETER N/A 10/21/2020    Procedure: REMOVAL PERITONEAL DIALYSIS CATHETER;  Surgeon: Damon Rooney MD;  Location:  [facility];  Service: General;  Laterality: N/A;   • TRANSPLANTATION RENAL Right 2016    from  donor   • TUBAL ABDOMINAL LIGATION Bilateral      Family History   Problem Relation Age of Onset   • Malig Hyperthermia Neg Hx      Social History     Tobacco Use   • Smoking status: Never Smoker   • Smokeless tobacco: Never Used   Substance Use Topics   • Alcohol use: No   • Drug use: No     Medications Prior to Admission   Medication Sig Dispense Refill Last Dose   • calcitriol (ROCALTROL) 0.25 MCG capsule Take 2 capsules by mouth Daily. 60 capsule 0    • calcium acetate (PHOSLO) 667 MG capsule Take 2,001 mg by mouth 3 (Three) Times a Day With Meals.      • CloNIDine (CATAPRES) 0.3 MG tablet Take 0.3 mg by mouth 3 (Three) Times a Day.        Allergies:  No Known Allergies    Review of Systems   Constitutional: Negative.  Negative for chills and fever.   HENT:  Negative.  Negative for congestion and sore throat.    Eyes: Negative.    Respiratory: Negative.  Negative for cough and shortness of breath.    Cardiovascular: Negative.  Negative for chest pain and leg swelling.   Gastrointestinal: Negative.  Negative for abdominal pain, nausea and vomiting.   Endocrine: Negative.    Genitourinary: Negative.  Negative for dysuria, frequency and urgency.   Musculoskeletal: Negative.  Negative for arthralgias and myalgias.   Skin: Negative.  Negative for color change and pallor.   Allergic/Immunologic: Negative.    Neurological: Negative.  Negative for dizziness, weakness and light-headedness.   Hematological: Negative.    Psychiatric/Behavioral: Negative.  Negative for agitation, behavioral problems and confusion.        Objective    Vital Signs  Temp:  [98 °F (36.7 °C)-98.2 °F (36.8 °C)] 98.2 °F (36.8 °C)  Heart Rate:  [75-94] 94  Resp:  [18] 18  BP: (133-179)/() 177/91  SpO2:  [97 %-100 %] 98 %  on   ;   Device (Oxygen Therapy): room air  Body mass index is 36.42 kg/m².    Physical Exam  Vitals signs and nursing note reviewed.   Constitutional:       Appearance: Normal appearance. She is obese.      Comments: Fistula to FARIHA, positive thrill   HENT:      Head: Normocephalic and atraumatic.      Nose: Nose normal.      Mouth/Throat:      Mouth: Mucous membranes are moist.   Eyes:      Extraocular Movements: Extraocular movements intact.   Neck:      Musculoskeletal: Normal range of motion and neck supple.   Cardiovascular:      Rate and Rhythm: Normal rate and regular rhythm.      Pulses: Normal pulses.      Heart sounds: Normal heart sounds. No murmur.   Pulmonary:      Effort: Pulmonary effort is normal. No respiratory distress.      Breath sounds: Normal breath sounds.   Abdominal:      General: Abdomen is flat. Bowel sounds are normal.      Palpations: Abdomen is soft.   Musculoskeletal: Normal range of motion.         General: No swelling.   Skin:     General: Skin is  warm and dry.   Neurological:      General: No focal deficit present.      Mental Status: She is alert and oriented to person, place, and time. Mental status is at baseline.   Psychiatric:         Mood and Affect: Mood normal.         Behavior: Behavior normal.         Thought Content: Thought content normal.         Judgment: Judgment normal.         Results Review:   I reviewed the patient's new clinical results including all labs and xrays.    Lab Results (last 24 hours)     Procedure Component Value Units Date/Time    CBC (No Diff) [[id number]]  (Abnormal) Collected: 01/14/21 1335    Specimen: Blood Updated: 01/14/21 1447     WBC 12.06 10*3/mm3      RBC 2.64 10*6/mm3      Hemoglobin 8.3 g/dL      Hematocrit 25.2 %      MCV 95.5 fL      MCH 31.4 pg      MCHC 32.9 g/dL      RDW 16.1 %      RDW-SD 55.4 fl      MPV 9.9 fL      Platelets 234 10*3/mm3     Comprehensive Metabolic Panel [[id number]]  (Abnormal) Collected: 01/14/21 1335    Specimen: Blood Updated: 01/14/21 1549     Glucose 106 mg/dL      BUN 60 mg/dL      Creatinine 11.98 mg/dL      Sodium 134 mmol/L      Potassium 6.3 mmol/L      Chloride 98 mmol/L      CO2 18.7 mmol/L      Calcium 7.5 mg/dL      Total Protein 7.6 g/dL      Albumin 3.80 g/dL      ALT (SGPT) <5 U/L      AST (SGOT) 11 U/L      Alkaline Phosphatase 50 U/L      Total Bilirubin 0.4 mg/dL      eGFR Non African Amer 3 mL/min/1.73      Comment: <15 Indicative of kidney failure.        eGFR   Amer --     Comment: <15 Indicative of kidney failure.        Globulin 3.8 gm/dL      A/G Ratio 1.0 g/dL      BUN/Creatinine Ratio 5.0     Anion Gap 17.3 mmol/L     Narrative:      GFR Normal >60  Chronic Kidney Disease <60  Kidney Failure <15      Protime-INR [[id number]]  (Abnormal) Collected: 01/14/21 1335    Specimen: Blood Updated: 01/14/21 1500     Protime 15.7 Seconds      INR 1.27    aPTT [[id number]]  (Normal) Collected: 01/14/21 1335    Specimen: Blood Updated: 01/14/21 1500     PTT 33.5  seconds     Comprehensive Metabolic Panel [[id number]]  (Abnormal) Collected: 01/14/21 1827    Specimen: Blood Updated: 01/14/21 1924     Glucose 84 mg/dL      BUN 62 mg/dL      Creatinine 11.86 mg/dL      Sodium 136 mmol/L      Potassium 5.4 mmol/L      Chloride 98 mmol/L      CO2 19.7 mmol/L      Calcium 7.5 mg/dL      Total Protein 7.6 g/dL      Albumin 3.90 g/dL      ALT (SGPT) <5 U/L      AST (SGOT) 6 U/L      Alkaline Phosphatase 52 U/L      Total Bilirubin 0.3 mg/dL      eGFR Non African Amer 3 mL/min/1.73      Comment: <15 Indicative of kidney failure.        eGFR   Amer --     Comment: <15 Indicative of kidney failure.        Globulin 3.7 gm/dL      A/G Ratio 1.1 g/dL      BUN/Creatinine Ratio 5.2     Anion Gap 18.3 mmol/L     Narrative:      GFR Normal >60  Chronic Kidney Disease <60  Kidney Failure <15      CBC & Differential [[id number]]  (Abnormal) Collected: 01/14/21 1827    Specimen: Blood Updated: 01/14/21 1839    Narrative:      The following orders were created for panel order CBC & Differential.  Procedure                               Abnormality         Status                     ---------                               -----------         ------                     CBC Auto Differential[[id number]]        Abnormal            Final result                 Please view results for these tests on the individual orders.    CBC Auto Differential [[id number]]  (Abnormal) Collected: 01/14/21 1827    Specimen: Blood Updated: 01/14/21 1839     WBC 10.22 10*3/mm3      RBC 2.54 10*6/mm3      Hemoglobin 8.2 g/dL      Hematocrit 23.8 %      MCV 93.7 fL      MCH 32.3 pg      MCHC 34.5 g/dL      RDW 15.9 %      RDW-SD 53.7 fl      MPV 9.7 fL      Platelets 252 10*3/mm3      Neutrophil % 77.6 %      Lymphocyte % 12.6 %      Monocyte % 6.6 %      Eosinophil % 1.8 %      Basophil % 0.5 %      Immature Grans % 0.9 %      Neutrophils, Absolute 7.94 10*3/mm3      Lymphocytes, Absolute 1.29 10*3/mm3      Monocytes,  Absolute 0.67 10*3/mm3      Eosinophils, Absolute 0.18 10*3/mm3      Basophils, Absolute 0.05 10*3/mm3      Immature Grans, Absolute 0.09 10*3/mm3      nRBC 0.0 /100 WBC     COVID PRE-OP / PRE-PROCEDURE SCREENING ORDER (NO ISOLATION) - Swab, Nasopharynx [[id number]]  (Normal) Collected: 01/14/21 2047    Specimen: Swab from Nasopharynx Updated: 01/14/21 2158    Narrative:      The following orders were created for panel order COVID PRE-OP / PRE-PROCEDURE SCREENING ORDER (NO ISOLATION) - Swab, Nasopharynx.  Procedure                               Abnormality         Status                     ---------                               -----------         ------                     COVID-19,[facility]...[[id number]]  Normal              Final result                 Please view results for these tests on the individual orders.    COVID-19,[facility] CEPHEID, NP SWAB IN TRANSPORT MEDIA 8-12 HR TAT - Swab, Nasopharynx [[id number]]  (Normal) Collected: 01/14/21 2047    Specimen: Swab from Nasopharynx Updated: 01/14/21 2158     COVID19 Not Detected    Narrative:      Fact sheet for providers: https://www.fda.gov/media/800397/download     Fact sheet for patients: https://www.fda.gov/media/593415/download    POC Glucose Once [[id number]]  (Normal) Collected: 01/14/21 2052    Specimen: Blood Updated: 01/14/21 2103     Glucose 81 mg/dL     POC Glucose Once [[id number]]  (Normal) Collected: 01/14/21 2137    Specimen: Blood Updated: 01/14/21 2138     Glucose 87 mg/dL           No orders to display     Assessment/Plan      Active Hospital Problems    Diagnosis  POA   • **Hyperkalemia [E87.5]  Yes   • Shunt malfunction [T85.618A]  Yes   • Anemia, chronic disease [D63.8]  Yes   • Obesity (BMI 30-39.9) [E66.9]  Yes   • Type 2 diabetes mellitus, without long-term current use of insulin (CMS/[facility]) [E11.9]  Yes   • ESRD on hemodialysis (CMS/[facility]) [N18.6, Z99.2]  Not Applicable   • Hypertension [I10]  Yes   • Hyperparathyroidism  (CMS/[facility]) [E21.3]  Yes   • Hx of kidney transplant [Z94.0]  Not Applicable      Resolved Hospital Problems   No resolved problems to display.     Hyperkalemia/shunt malfunction/ESRD on dialysis  -hyperkalemia improved after meds given in ED  -renal consult  -vascular consult  -NPO after midnight  -recheck labs in AM  -continue to monitor on tele    DM2  -does not appear to be on DM medications at home, will monitor for now    HTN/hyperparathyroidism  -stable, will continue home medications     VTE Ppx  -SCDs    CODE status  -full    I discussed the patients findings and my recommendations with patient.    JERONIMO Prakash  [facility]  01/14/21  8:55 PM EST

## 2021-01-15 NOTE — OP NOTE
Operative Note  Location: [facility]  Date of Admission:  1/14/2021  OR Date: 1/15/2021    Pre-op Diagnosis:   Right AV fistula thrombosis with hyperkalemia    Post-op Diagnosis:     Same    Procedure:  1) Tunneled dialysis catheter insertion (32351)  2) Ultrasound guided vascular access (47996)  3) Radiologic supervision of catheter tip placement (64968)  4) Open thrombectomy right AV fistula with AV fistulogram  5) Ligation of right arm arteriovenous fistula    Surgeon: Phu Gaviria MD    Assistant: Sultana Ferrari MD, Provided critical assistance in exposure, retraction, and suction that overall decrease blood loss and operative time.    Anesthesia: General    Estimated Blood Loss: minimal    Staff:   Circulator: Melonie Boyle RN  Scrub Person: Damaris Perez  Vascular Radiology Technician: Merary Perkins    Complications: None    Specimen: None    Findings:  Tip in SVC/Atrial     Implants:   Implant Name Type Inv. Item Serial No.  Lot No. LRB No. Used Action   CLIPAPPLR M/ ENDO LIGACLIP 9 3/8IN  - [id number] Implant CLIPAPPLR M/ ENDO LIGACLIP 9 3/8IN   [employer]  [employer] J [employer] J [id number] Right 1 Implanted       Indications:    The patient is an 45 y.o. female referred for tunneled dialysis catheter placement.  The risks, benefits, and alternatives have been discussed with the patient and/or family and include but are not limited to injury to artery, vein, lung, bleeding, and infection.    Procedure:  The patient was taken to the operating room and placed supine on the operating room table. After the induction of IV sedation, the neck and chest were prepped and draped with ChloraPrep. The patient was placed in Trendelenburg position. Timeout was observed. The right  Internal Jugular  was evaluated on ultrasound and found to be easily compressible. The vessel was entered under direct ultrasound guidance and photographic documentation was recorded in the chart for the record. An angled  wire was then advanced down into the superior vena cava under fluoroscopy without any difficulty. Exit site was chosen on the chest. The tract was anesthetized with 1% lidocaine mixed with 0.25% Marcaine. A 19 cm catheter was then flushed and brought up onto the field. It was tunneled in an antegrade fashion up to the IJ insertion site. Dilator and peel-away sheath were then advanced over the wire under fluoroscopy without any significant difficulty. Dilator and wire were removed leaving the peel-away sheath in place. The distal tip of the catheter was then placed into the peel-away sheath and the peel-away sheath was removed. Under fluoroscopy, the distal tip of the catheter was positioned into the right atrium. There was no kinking at the catheter insertion site. The catheter flushed and aspirated easily. The lung fields were examined. There was no evidence of hemothorax or pneumothorax. The catheter flushed and aspirated quite easily. It was locked with concentrated heparin. The catheter was then anchored to the chest with nylon sutures. A stitch and dermal glue were used to close the neck site as well. Sterile dressings were applied. The patient tolerated the procedure well. There were no immediately apparent complications.      Right AV fistula interrogated with duplex and incised above the elbow at site of straight fistula segment.  Fistula is exposed with very thin wall that was actually pressurized with severe clot.  Incision because the entire wall to split.  After the fistula was opened we manually removed a large segments of thrombus and then passed 4 Susan thrombectomy catheter and eventually a 5 Susan over-the-wire thrombectomy catheter through a 14 Latvian sheath placed in the venous outflow segment.  Multiple fistulogram's were performed and unable to show outflow to the fistula.  Despite multiple attempts to manually thrombectomized we are not able to successfully pass either wire or thrombectomy  catheters past the biceps head.  There were several areas of extravasation from the fistula that appeared to be too damaged and friable to safely reopen.  As such the fistula was ligated with 2-0 silk ties proximally and distally.  Deep tissue was closed with 3-0 Vicryl and the skin was closed with 4-0 Vicryl subcuticular closure and skin glue.    Radiographic findings:  Fluoroscopic guidance and placement of tunneled catheter into the right atriocaval junction confirmed with contrast injection at the site.  The fistulogram confirms proximal patency after thrombectomy of the AV fistula but distally no visualization of fistula flow is seen.  2 areas of significant extravasation noted within the tortuous recanalized segment of fistula.         Active Hospital Problems    Diagnosis  POA   • **Hyperkalemia [E87.5]  Yes   • Shunt malfunction [T85.618A]  Yes   • Anemia, chronic disease [D63.8]  Yes   • Obesity (BMI 30-39.9) [E66.9]  Yes   • Type 2 diabetes mellitus, without long-term current use of insulin (CMS/[facility]) [E11.9]  Yes   • ESRD on hemodialysis (CMS/HCC) [N18.6, Z99.2]  Not Applicable   • Hypertension [I10]  Yes   • Hyperparathyroidism (CMS/HCC) [E21.3]  Yes   • Hx of kidney transplant [Z94.0]  Not Applicable      Resolved Hospital Problems   No resolved problems to display.      Phu Gaviria MD     Date: 1/15/2021  Time: 17:27 EST

## 2021-01-15 NOTE — ED PROVIDER NOTES
EMERGENCY DEPARTMENT ENCOUNTER    Room Number:  E462/1  Date of encounter:  1/15/2021  PCP: Provider, No Known  Historian: Patient      HPI:  Chief Complaint: Hyperkalemia  A complete HPI/ROS/PMH/PSH/SH/FH are unobtainable due to: None    Context: Sunni Mcneil is a 45 y.o. female who presents to the ED c/o hyperkalemia.  Patient was sent to emergency department because she had potassium of 6.3.  This was identified as an outpatient.  Patient states that she feels fine and has no symptoms.  Symptoms are not provoked by anything.  No associated fever, chest pain, shortness of breath.  Last had dialysis on Monday as she developed a thrombus to her right AV fistula.      PAST MEDICAL HISTORY  Active Ambulatory Problems     Diagnosis Date Noted   • Hyperparathyroidism (CMS/[facility]) 01/24/2018   • Acute rejection of kidney transplant 05/10/2017   • ARF (acute renal failure) (CMS/[facility]) 04/04/2014   • Hx of kidney transplant 07/06/2017   • Hypertension 01/24/2018   • Kidney transplant status, cadaveric 07/06/2017   • Nephritis 04/13/2014   • Hypercalcemia 01/24/2018   • ESRD on hemodialysis (CMS/[facility]) 07/02/2019   • Prolonged QT interval 11/07/2019   • ESRD (end stage renal disease) (CMS/[facility]) 11/07/2019   • Hyperphosphatemia 11/07/2019   • Leukocytosis 11/07/2019   • Type 2 diabetes mellitus, without long-term current use of insulin (CMS/[facility]) 11/07/2019   • Acute UTI (urinary tract infection) 03/13/2020   • Obesity (BMI 30-39.9) 03/13/2020   • A-V fistula (CMS/[facility]), right upper extremity 03/13/2020   • Anemia, chronic disease 03/13/2020   • Folate deficiency anemia 03/16/2020   • Febrile illness, acute 05/06/2020   • Sepsis (CMS/[facility]) 05/06/2020   • Screening for colon cancer 09/15/2020     Resolved Ambulatory Problems     Diagnosis Date Noted   • Peritoneal dialysis catheter in place (CMS/[facility]) 07/24/2019   • Hypokalemia 11/07/2019   • Nausea 11/07/2019   • Hypocalcemia 11/07/2019   • Diarrhea 03/13/2020   • Acute cystitis with  hematuria 2020     Past Medical History:   Diagnosis Date   • Acid reflux    • Anemia    • Anxiety    • Arthritis    • Depression    • Diabetes mellitus (CMS/[facility])    • ESRD on dialysis (CMS/[facility])    • History of peritoneal dialysis    • History of transfusion    • Kidney disease    • Kidney transplant recipient 2016   • Peritoneal dialysis catheter in situ (CMS/[facility])    • Seasonal allergies          PAST SURGICAL HISTORY  Past Surgical History:   Procedure Laterality Date   • ARTERIOVENOUS FISTULA/SHUNT SURGERY Right 2020    Procedure: RIGHT ARM ARTERIAL VENOUS FISTULA;  Surgeon: Elijah Herrera MD;  Location[facility] MAIN OR;  Service: Vascular;  Laterality: Right;   • INSERTION PERITONEAL DIALYSIS CATHETER  2014    Laparoscopic placement of Curl Cath peritoneal dialysis catheter, Dr. Vinod Goldstein   • INSERTION PERITONEAL DIALYSIS CATHETER N/A 2019    Procedure: LAPAROSCOPIC INSERTION PERITONEAL DIALYSIS CATHETER;  Surgeon: Damon Rooney MD;  Location: [facility] MAIN OR;  Service: General   • REMOVAL PERITONEAL DIALYSIS CATHETER N/A 10/17/2016    Procedure: REMOVAL PERITONEAL DIALYSIS CATHETER;  Surgeon: Damon Rooney MD;  Location: [facility] MAIN OR;  Service:    • REMOVAL PERITONEAL DIALYSIS CATHETER N/A 10/21/2020    Procedure: REMOVAL PERITONEAL DIALYSIS CATHETER;  Surgeon: Damon Rooney MD;  Location: [facility] MAIN OR;  Service: General;  Laterality: N/A;   • TRANSPLANTATION RENAL Right 2016    from  donor   • TUBAL ABDOMINAL LIGATION Bilateral          FAMILY HISTORY  Family History   Problem Relation Age of Onset   • Malig Hyperthermia Neg Hx          SOCIAL HISTORY  Social History     Socioeconomic History   • Marital status:      Spouse name: Not on file   • Number of children: 2   • Years of education: 12   • Highest education level: Not on file   Occupational History     Employer: [employer]   Tobacco Use   • Smoking status: Never  Smoker   • Smokeless tobacco: Never Used   Substance and Sexual Activity   • Alcohol use: No   • Drug use: No   • Sexual activity: Defer         ALLERGIES  Patient has no known allergies.        REVIEW OF SYSTEMS  Review of Systems     All systems reviewed and negative except for those discussed in HPI.       PHYSICAL EXAM    I have reviewed the triage vital signs and nursing notes.    ED Triage Vitals   Temp Heart Rate Resp BP SpO2   01/14/21 1801 01/14/21 1801 01/14/21 1801 01/14/21 1804 01/14/21 1801   98 °F (36.7 °C) 83 18 (!) 179/115 99 %      Temp src Heart Rate Source Patient Position BP Location FiO2 (%)   01/14/21 2212 01/14/21 2212 01/14/21 2212 01/14/21 2212 --   Oral Monitor Lying Left arm        Physical Exam  GENERAL: not distressed  HENT: nares patent  EYES: no scleral icterus  CV: regular rhythm, regular rate, no thrill to the right AV fistula  RESPIRATORY: normal effort, clear to auscultation bilaterally  ABDOMEN: soft, nontender  MUSCULOSKELETAL: no deformity  NEURO: alert, moves all extremities, follows commands  SKIN: warm, dry        LAB RESULTS  Recent Results (from the past 24 hour(s))   CBC (No Diff)    Collection Time: 01/14/21  1:35 PM    Specimen: Blood   Result Value Ref Range    WBC 12.06 (H) 3.40 - 10.80 10*3/mm3    RBC 2.64 (L) 3.77 - 5.28 10*6/mm3    Hemoglobin 8.3 (L) 12.0 - 15.9 g/dL    Hematocrit 25.2 (L) 34.0 - 46.6 %    MCV 95.5 79.0 - 97.0 fL    MCH 31.4 26.6 - 33.0 pg    MCHC 32.9 31.5 - 35.7 g/dL    RDW 16.1 (H) 12.3 - 15.4 %    RDW-SD 55.4 (H) 37.0 - 54.0 fl    MPV 9.9 6.0 - 12.0 fL    Platelets 234 140 - 450 10*3/mm3   Comprehensive Metabolic Panel    Collection Time: 01/14/21  1:35 PM    Specimen: Blood   Result Value Ref Range    Glucose 106 (H) 65 - 99 mg/dL    BUN 60 (H) 6 - 20 mg/dL    Creatinine 11.98 (H) 0.57 - 1.00 mg/dL    Sodium 134 (L) 136 - 145 mmol/L    Potassium 6.3 (C) 3.5 - 5.2 mmol/L    Chloride 98 98 - 107 mmol/L    CO2 18.7 (L) 22.0 - 29.0 mmol/L     Calcium 7.5 (L) 8.6 - 10.5 mg/dL    Total Protein 7.6 6.0 - 8.5 g/dL    Albumin 3.80 3.50 - 5.20 g/dL    ALT (SGPT) <5 1 - 33 U/L    AST (SGOT) 11 1 - 32 U/L    Alkaline Phosphatase 50 39 - 117 U/L    Total Bilirubin 0.4 0.0 - 1.2 mg/dL    eGFR Non African Amer 3 (L) >60 mL/min/1.73    eGFR  African Amer      Globulin 3.8 gm/dL    A/G Ratio 1.0 g/dL    BUN/Creatinine Ratio 5.0 (L) 7.0 - 25.0    Anion Gap 17.3 (H) 5.0 - 15.0 mmol/L   Protime-INR    Collection Time: 01/14/21  1:35 PM    Specimen: Blood   Result Value Ref Range    Protime 15.7 (H) 11.7 - 14.2 Seconds    INR 1.27 (H) 0.90 - 1.10   aPTT    Collection Time: 01/14/21  1:35 PM    Specimen: Blood   Result Value Ref Range    PTT 33.5 22.7 - 35.4 seconds   ECG 12 Lead    Collection Time: 01/14/21  1:39 PM   Result Value Ref Range    QT Interval 431 ms   Comprehensive Metabolic Panel    Collection Time: 01/14/21  6:27 PM    Specimen: Blood   Result Value Ref Range    Glucose 84 65 - 99 mg/dL    BUN 62 (H) 6 - 20 mg/dL    Creatinine 11.86 (H) 0.57 - 1.00 mg/dL    Sodium 136 136 - 145 mmol/L    Potassium 5.4 (H) 3.5 - 5.2 mmol/L    Chloride 98 98 - 107 mmol/L    CO2 19.7 (L) 22.0 - 29.0 mmol/L    Calcium 7.5 (L) 8.6 - 10.5 mg/dL    Total Protein 7.6 6.0 - 8.5 g/dL    Albumin 3.90 3.50 - 5.20 g/dL    ALT (SGPT) <5 1 - 33 U/L    AST (SGOT) 6 1 - 32 U/L    Alkaline Phosphatase 52 39 - 117 U/L    Total Bilirubin 0.3 0.0 - 1.2 mg/dL    eGFR Non African Amer 3 (L) >60 mL/min/1.73    eGFR  African Amer      Globulin 3.7 gm/dL    A/G Ratio 1.1 g/dL    BUN/Creatinine Ratio 5.2 (L) 7.0 - 25.0    Anion Gap 18.3 (H) 5.0 - 15.0 mmol/L   CBC Auto Differential    Collection Time: 01/14/21  6:27 PM    Specimen: Blood   Result Value Ref Range    WBC 10.22 3.40 - 10.80 10*3/mm3    RBC 2.54 (L) 3.77 - 5.28 10*6/mm3    Hemoglobin 8.2 (L) 12.0 - 15.9 g/dL    Hematocrit 23.8 (L) 34.0 - 46.6 %    MCV 93.7 79.0 - 97.0 fL    MCH 32.3 26.6 - 33.0 pg    MCHC 34.5 31.5 - 35.7 g/dL    RDW  15.9 (H) 12.3 - 15.4 %    RDW-SD 53.7 37.0 - 54.0 fl    MPV 9.7 6.0 - 12.0 fL    Platelets 252 140 - 450 10*3/mm3    Neutrophil % 77.6 (H) 42.7 - 76.0 %    Lymphocyte % 12.6 (L) 19.6 - 45.3 %    Monocyte % 6.6 5.0 - 12.0 %    Eosinophil % 1.8 0.3 - 6.2 %    Basophil % 0.5 0.0 - 1.5 %    Immature Grans % 0.9 (H) 0.0 - 0.5 %    Neutrophils, Absolute 7.94 (H) 1.70 - 7.00 10*3/mm3    Lymphocytes, Absolute 1.29 0.70 - 3.10 10*3/mm3    Monocytes, Absolute 0.67 0.10 - 0.90 10*3/mm3    Eosinophils, Absolute 0.18 0.00 - 0.40 10*3/mm3    Basophils, Absolute 0.05 0.00 - 0.20 10*3/mm3    Immature Grans, Absolute 0.09 (H) 0.00 - 0.05 10*3/mm3    nRBC 0.0 0.0 - 0.2 /100 WBC   ECG 12 Lead    Collection Time: 01/14/21  6:47 PM   Result Value Ref Range    QT Interval 436 ms   COVID-19, RAIZA IN-HOUSE CEPHEID, NP SWAB IN TRANSPORT MEDIA 8-12 HR TAT - Swab, Nasopharynx    Collection Time: 01/14/21  8:47 PM    Specimen: Nasopharynx; Swab   Result Value Ref Range    COVID19 Not Detected Not Detected - Ref. Range   POC Glucose Once    Collection Time: 01/14/21  8:52 PM    Specimen: Blood   Result Value Ref Range    Glucose 81 70 - 130 mg/dL   POC Glucose Once    Collection Time: 01/14/21  9:37 PM    Specimen: Blood   Result Value Ref Range    Glucose 87 70 - 130 mg/dL       Ordered the above labs and independently reviewed the results.        RADIOLOGY  Xr Chest 2 View    Result Date: 1/14/2021  XR CHEST 2 VW-  HISTORY: Female who is 45 years-old,  preoperative evaluation  TECHNIQUE: Frontal and lateral views of the chest  COMPARISON: 03/13/2020  FINDINGS: The heart is enlarged. Pulmonary vasculature is unremarkable. No focal pulmonary consolidation, pleural effusion, or pneumothorax. No acute osseous process.      No focal pulmonary consolidation. Cardiomegaly. Follow-up as clinical indications persist.  This report was finalized on 1/14/2021 4:11 PM by Dr. David Montoya M.D.        I ordered the above noted radiological studies.  Reviewed by me and discussed with radiologist.  See dictation for official radiology interpretation.      PROCEDURES    Critical Care  Performed by: Javier Perry II, MD  Authorized by: Javier Perry II, MD     Critical care provider statement:     Critical care time (minutes):  32    Critical care was necessary to treat or prevent imminent or life-threatening deterioration of the following conditions:  Endocrine crisis    Critical care was time spent personally by me on the following activities:  Ordering and performing treatments and interventions, ordering and review of laboratory studies, pulse oximetry, re-evaluation of patient's condition, obtaining history from patient or surrogate, review of old charts, discussions with consultants, evaluation of patient's response to treatment and examination of patient          MEDICATIONS GIVEN IN ER    Medications   sodium chloride 0.9 % flush 10 mL (10 mL Intravenous Given 1/14/21 2258)   sodium chloride 0.9 % flush 10 mL (has no administration in time range)   acetaminophen (TYLENOL) tablet 650 mg (has no administration in time range)     Or   acetaminophen (TYLENOL) 160 MG/5ML solution 650 mg (has no administration in time range)     Or   acetaminophen (TYLENOL) suppository 650 mg (has no administration in time range)   nitroglycerin (NITROSTAT) SL tablet 0.4 mg (has no administration in time range)   ondansetron (ZOFRAN) injection 4 mg (has no administration in time range)   calcitriol (ROCALTROL) capsule 0.5 mcg (has no administration in time range)   calcium acetate (PHOS BINDER)) capsule 2,001 mg (has no administration in time range)   cloNIDine (CATAPRES) tablet 0.3 mg (has no administration in time range)   insulin regular (humuLIN R,novoLIN R) injection 10 Units (10 Units Intravenous Given 1/14/21 1958)   dextrose (D50W) 25 g/ 50mL Intravenous Solution 25 g (25 g Intravenous Given 1/14/21 1956)         PROGRESS, DATA ANALYSIS, CONSULTS, AND  MEDICAL DECISION MAKING    All labs have been independently reviewed by me.  All radiology studies have been reviewed by me and discussed with radiologist dictating the report.   EKG's independently viewed and interpreted by me.  Discussion below represents my analysis of pertinent findings related to patient's condition, differential diagnosis, treatment plan and final disposition.    Patient presents hyperkalemia.  EKG shows slightly peaked T waves.  Otherwise no concerning electrocardiographic changes.    Patient is clinically well-appearing.  She absolutely refuses to get a temporary dialysis catheter placed.  Thankfully, her hyperkalemia is actually improved on repeat.  I believe that this can be medically managed.    ED Course as of Jamaal 15 0113   Thu Jan 14, 2021   1850 EKG interpreted by myself.  Time 1847.  Sinus rhythm.  Heart rate 73.  Normal intervals and axis.  peaked T waves.    [TD]   1916 WBC: 10.22 [TD]   1916 baseline   Hemoglobin(!): 8.2 [TD]   1941 Potassium(!): 5.4 [TD]      ED Course User Index  [TD] Javier Perry II, MD       I discussed the case with nephrology, vascular surgery (Dr. Maurer).  Patient has plan for thrombectomy tomorrow morning.    I discussed the case with the hospitalist, Dr. Simpson.  He will admit to telemetry.    PPE: Both the patient and I wore a surgical mask throughout the entire patient encounter. I wore protective goggles.     AS OF 01:13 EST VITALS:    BP - 177/91  HR - 94  TEMP - 98.2 °F (36.8 °C) (Oral)  O2 SATS - 98%        DIAGNOSIS  Final diagnoses:   Hyperkalemia   Thrombosis of arteriovenous fistula, initial encounter (CMS/[facility])         DISPOSITION  Admit       Javier Perry II, MD  01/15/21 0116

## 2021-01-15 NOTE — CONSULTS
NEPHROLOGY CONSULT NOTE    Referring Provider: Donovan Simpson MD  Reason for Consultation: Ongoing care for ESRD    Chief complaint:  Hyperkalemia, needing dialysis after AV access procedure    History of present illness:    Ms. Mcneil is a 45-year-old lady who came for access procedure today but unfortunately reportedly it was not fully successful and the plan was for vascular surgery to revisit the procedure again tomorrow.  Patient is refusing any sort of temporary access for dialysis until permanent procedure is done.  Initially she was hyperkalemic this morning at 6.3 and this was medically managed and repeat potassium this afternoon has been 5.4.  Last dialysis was done on Monday.  She goes to [facility] on a Monday Wednesday Friday schedule  Denies shortness of breath or PND orthopnea.    Past Medical History:   Diagnosis Date   • Acid reflux    • Anemia    • Anxiety    • Arthritis    • Depression    • Diabetes mellitus (CMS/HCC)     PT DENIES BEING DIABETIC   • ESRD on dialysis (CMS/H[facility])     m,w,f   • History of peritoneal dialysis     KIDNEY TRANSPLANT SEPT 2016 CURRENTLY DOING PERITONEAL DIALYSIS.   • History of transfusion     BEEN A WHILE   • Hypercalcemia    • Hyperparathyroidism (CMS/HCC)    • Hypertension    • Kidney disease     End-stage renal disease. TRANSPLANT   • Kidney transplant recipient 09/02/2016    RIGHT KIDNEY   • Peritoneal dialysis catheter in situ (CMS/[facility])     LEFT ABDOMEN   • Seasonal allergies     CURRENTLY      Past Surgical History:   Procedure Laterality Date   • ARTERIOVENOUS FISTULA/SHUNT SURGERY Right 2/26/2020    Procedure: RIGHT ARM ARTERIAL VENOUS FISTULA;  Surgeon: Elijah Herrera MD;  Location: [facility];  Service: Vascular;  Laterality: Right;   • INSERTION PERITONEAL DIALYSIS CATHETER  07/29/2014    Laparoscopic placement of Curl Cath peritoneal dialysis catheter, Dr. Vinod Goldstein   • INSERTION PERITONEAL DIALYSIS CATHETER N/A 7/24/2019    Procedure:  "LAPAROSCOPIC INSERTION PERITONEAL DIALYSIS CATHETER;  Surgeon: Damon Rooney MD;  Location: [facility] MAIN OR;  Service: General   • REMOVAL PERITONEAL DIALYSIS CATHETER N/A 10/17/2016    Procedure: REMOVAL PERITONEAL DIALYSIS CATHETER;  Surgeon: Damon Rooney MD;  Location: [facility] MAIN OR;  Service:    • REMOVAL PERITONEAL DIALYSIS CATHETER N/A 10/21/2020    Procedure: REMOVAL PERITONEAL DIALYSIS CATHETER;  Surgeon: Damon Rooney MD;  Location: [facility] MAIN OR;  Service: General;  Laterality: N/A;   • TRANSPLANTATION RENAL Right 2016    from  donor   • TUBAL ABDOMINAL LIGATION Bilateral      Family History   Problem Relation Age of Onset   • Malig Hyperthermia Neg Hx      Social History     Tobacco Use   • Smoking status: Never Smoker   • Smokeless tobacco: Never Used   Substance Use Topics   • Alcohol use: No   • Drug use: No     (Not in a hospital admission)    Allergies:  Patient has no known allergies.    Review of Systems  14 point review of systems is otherwise negative except for mentioned above on HPI    Objective     Vital Signs  Temp:  [98 °F (36.7 °C)] 98 °F (36.7 °C)  Heart Rate:  [75-88] 79  Resp:  [18] 18  BP: (133-179)/() 156/96    Flowsheet Rows      First Filed Value   Admission Height  152.4 cm (60\") Documented at 2021 1801   Admission Weight  81.6 kg (180 lb) Documented at 2021 1804           No intake/output data recorded.  No intake/output data recorded.  No intake or output data in the 24 hours ending 21 2202    Physical Exam:  General Appearance: alert, oriented x 3, no acute distress   Skin: warm and dry  HEENT: oral mucosa normal, nonicteric sclera  Neck: supple, no JVD  Lungs: CTA  Heart: RRR, normal S1 and S2  Abdomen: soft, nontender, nondistended  : no palpable bladder  Extremities: no edema, cyanosis or clubbing  Neuro: normal speech and mental status     Results Review:  WBC   Date Value Ref Range Status "   01/14/2021 10.22 3.40 - 10.80 10*3/mm3 Final     RBC   Date Value Ref Range Status   01/14/2021 2.54 (L) 3.77 - 5.28 10*6/mm3 Final     Hemoglobin   Date Value Ref Range Status   01/14/2021 8.2 (L) 12.0 - 15.9 g/dL Final     Hematocrit   Date Value Ref Range Status   01/14/2021 23.8 (L) 34.0 - 46.6 % Final     MCV   Date Value Ref Range Status   01/14/2021 93.7 79.0 - 97.0 fL Final     MCH   Date Value Ref Range Status   01/14/2021 32.3 26.6 - 33.0 pg Final     MCHC   Date Value Ref Range Status   01/14/2021 34.5 31.5 - 35.7 g/dL Final     RDW   Date Value Ref Range Status   01/14/2021 15.9 (H) 12.3 - 15.4 % Final     RDW-SD   Date Value Ref Range Status   01/14/2021 53.7 37.0 - 54.0 fl Final     MPV   Date Value Ref Range Status   01/14/2021 9.7 6.0 - 12.0 fL Final     Platelets   Date Value Ref Range Status   01/14/2021 252 140 - 450 10*3/mm3 Final     Neutrophil %   Date Value Ref Range Status   01/14/2021 77.6 (H) 42.7 - 76.0 % Final     Lymphocyte %   Date Value Ref Range Status   01/14/2021 12.6 (L) 19.6 - 45.3 % Final     Monocyte %   Date Value Ref Range Status   01/14/2021 6.6 5.0 - 12.0 % Final     Eosinophil %   Date Value Ref Range Status   01/14/2021 1.8 0.3 - 6.2 % Final     Basophil %   Date Value Ref Range Status   01/14/2021 0.5 0.0 - 1.5 % Final     Immature Grans %   Date Value Ref Range Status   01/14/2021 0.9 (H) 0.0 - 0.5 % Final     Neutrophils, Absolute   Date Value Ref Range Status   01/14/2021 7.94 (H) 1.70 - 7.00 10*3/mm3 Final     Lymphocytes, Absolute   Date Value Ref Range Status   01/14/2021 1.29 0.70 - 3.10 10*3/mm3 Final     Monocytes, Absolute   Date Value Ref Range Status   01/14/2021 0.67 0.10 - 0.90 10*3/mm3 Final     Eosinophils, Absolute   Date Value Ref Range Status   01/14/2021 0.18 0.00 - 0.40 10*3/mm3 Final     Basophils, Absolute   Date Value Ref Range Status   01/14/2021 0.05 0.00 - 0.20 10*3/mm3 Final     Immature Grans, Absolute   Date Value Ref Range Status    01/14/2021 0.09 (H) 0.00 - 0.05 10*3/mm3 Final     nRBC   Date Value Ref Range Status   01/14/2021 0.0 0.0 - 0.2 /100 WBC Final      Lab Results   Component Value Date    GLUCOSE 84 01/14/2021    BUN 62 (H) 01/14/2021    CREATININE 11.86 (H) 01/14/2021    EGFRIFNONA 3 (L) 01/14/2021    EGFRIFAFRI  01/14/2021      Comment:      <15 Indicative of kidney failure.    BCR 5.2 (L) 01/14/2021    K 5.4 (H) 01/14/2021    CO2 19.7 (L) 01/14/2021    CALCIUM 7.5 (L) 01/14/2021    PROTENTOTREF 6.6 11/07/2019    ALBUMIN 3.90 01/14/2021    LABIL2 1.0 (L) 12/08/2020    AST 6 01/14/2021    ALT <5 01/14/2021          sodium chloride, 10 mL, Intravenous, Q12H           Assessment/Plan     1.  End-stage renal disease on hemodialysis on a Monday Wednesday Friday schedule, last dialysis on Monday.  2.  Malfunctioning AV access, status post attempted repair this a.m. not fully successful, plan for vascular surgery to revisit procedure tomorrow.  3.  Hyperkalemia up to 6.3, medically managed and potassium is down to 5.4  4.  Type 2 diabetes  5.  Hypertension  6.  DJD    Plan:  1.  As patient is refusing temporary access, and given the fact that her potassium has been medically managed and down in the low fives, will be reasonable to proceed with AV access procedure tomorrow.  We will repeat renal panel prior to that to see how her potassium is doing.  Once AV access is repaired, we will plan on doing hemodialysis treatment.  2. renal panel in a.m.      I discussed the patient's findings and my recommendations with patient.    Thank you for involving us in the care of Ms. Mcneil.  We will continue to follow along.    Freda Deluna MD  01/14/21  22:02 EST      Much of this encounter note is an electronic transcription/translation of spoken language to printed text. The electronic translation of spoken language may permit erroneous, or at times, nonsensical words or phrases to be inadvertently transcribed; Although I have reviewed the  note for such errors, some may still exist.

## 2021-01-15 NOTE — PLAN OF CARE
Goal Outcome Evaluation:  Plan of Care Reviewed With: patient  Progress: improving  Outcome Summary: pt. came to [facility] from ER where she presented with a K = 6.3 - ER tx'd and now down to 5.4 - waiting for AM labs to recheck; NPO since 0000; will continue to monitor

## 2021-01-15 NOTE — ANESTHESIA PREPROCEDURE EVALUATION
Anesthesia Evaluation                  Airway   Mallampati: II  TM distance: >3 FB  Neck ROM: full  No difficulty expected  Dental - normal exam     Pulmonary - normal exam   Cardiovascular - normal exam    (+) hypertension,       Neuro/Psych  (+) psychiatric history,     GI/Hepatic/Renal/Endo    (+) obesity, morbid obesity,  renal disease dialysis, diabetes mellitus,     ROS Comment: Last dialyzed at on Monday.  Couldn't be dialyzed on wednesday    Musculoskeletal     Abdominal    Substance History      OB/GYN          Other   arthritis,                      Anesthesia Plan    ASA 3     intravenous induction     Anesthetic plan, all risks, benefits, and alternatives have been provided, discussed and informed consent has been obtained with: patient.

## 2021-01-15 NOTE — BRIEF OP NOTE
OR FISTULOGRAM WITH THROMBECTOMY, HEMODIALYSIS CATHETER INSERTION  Progress Note    Sunni Mcneil  1/15/2021    Pre-op Diagnosis:   Thrombosed AVF       Post-Op Diagnosis Codes:  same    Procedure/CPT® Codes:        Procedure(s):  OPEN THROMBECTOMY OF RIGHT ARTERIAL VENOUS FISTULA, fistulagram  TUNNELED DIAYLIS CATHETER PLACEMENT    Surgeon(s):  Phu Gaviria MD    Anesthesia: General    Staff:   Circulator: Melonie Boyle RN  Scrub Person: Damaris Perez  Vascular Radiology Technician: Merary Perkins         Estimated Blood Loss: minimal    Urine Voided: * No values recorded between 1/15/2021  3:47 PM and 1/15/2021  5:15 PM *    Specimens:                A: clot not sent          Drains: * No LDAs found *    Findings: see dict    Complications: none          Phu Gaviria MD     Date: 1/15/2021  Time: 17:15 EST

## 2021-01-15 NOTE — PROGRESS NOTES
"NEPHROLOGY PROGRESS NOTE     LOS: 0 days      Reason for visit:  Follow-up on end-stage renal disease    Interval History:   Patient is tearful this morning because it of persistent nausea and vomiting  She feels miserable  She was still refusing tunneled catheter placement    Review of Systems:   14 points review of systems is otherwise negative except for mentioned above in HPI    Objective     Vital Signs  Temp:  [98 °F (36.7 °C)-98.5 °F (36.9 °C)] 98.5 °F (36.9 °C)  Heart Rate:  [75-94] 82  Resp:  [16-18] 16  BP: (133-179)/() 134/84    Flowsheet Rows      First Filed Value   Admission Height  152.4 cm (60\") Documented at 01/14/2021 1801   Admission Weight  81.6 kg (180 lb) Documented at 01/14/2021 1804          I/O this shift:  In: 50 [P.O.:50]  Out: -   No intake/output data recorded.    Intake/Output Summary (Last 24 hours) at 1/15/2021 1239  Last data filed at 1/15/2021 1114  Gross per 24 hour   Intake 50 ml   Output --   Net 50 ml       Physical Exam:  General Appearance: alert, oriented x 3, no acute distress   Skin: warm and dry  HEENT: oral mucosa normal, nonicteric sclera  Neck: supple, no JVD  Lungs: CTA  Heart: RRR, normal S1 and S2  Abdomen: soft, nontender, nondistended  : no palpable bladder  Extremities: Mild pretibial edema bilaterally  Neuro: normal speech and mental status      Results Review:    Results from last 7 days   Lab Units 01/15/21  0425 01/14/21  1827 01/14/21  1335   SODIUM mmol/L 137 136 134*   POTASSIUM mmol/L 5.8* 5.4* 6.3*   CHLORIDE mmol/L 99 98 98   CO2 mmol/L 19.9* 19.7* 18.7*   BUN mg/dL 71* 62* 60*   CREATININE mg/dL 11.78* 11.86* 11.98*   CALCIUM mg/dL 7.5* 7.5* 7.5*   BILIRUBIN mg/dL  --  0.3 0.4   ALK PHOS U/L  --  52 50   ALT (SGPT) U/L  --  <5 <5   AST (SGOT) U/L  --  6 11   GLUCOSE mg/dL 90 84 106*       Estimated Creatinine Clearance: 5.8 mL/min (A) (by C-G formula based on SCr of 11.78 mg/dL (H)).    Results from last 7 days   Lab Units 01/15/21  0425 "   PHOSPHORUS mg/dL 11.1*             Results from last 7 days   Lab Units 01/15/21  0425 01/14/21  1827 01/14/21  1335   WBC 10*3/mm3 9.00 10.22 12.06*   HEMOGLOBIN g/dL 8.4* 8.2* 8.3*   PLATELETS 10*3/mm3 267 252 234       Results from last 7 days   Lab Units 01/14/21  1335   INR  1.27*         Imaging Results (Last 24 Hours)     ** No results found for the last 24 hours. **        calcitriol, 0.5 mcg, Oral, Daily  calcium acetate, 2,001 mg, Oral, TID With Meals  cloNIDine, 0.3 mg, Oral, TID  sodium chloride, 10 mL, Intravenous, Q12H           Medication Review:   Current Facility-Administered Medications   Medication Dose Route Frequency Provider Last Rate Last Admin   • acetaminophen (TYLENOL) tablet 650 mg  650 mg Oral Q4H PRN Mari Sevilla APRN        Or   • acetaminophen (TYLENOL) 160 MG/5ML solution 650 mg  650 mg Oral Q4H PRN Mari Sevilla APRN        Or   • acetaminophen (TYLENOL) suppository 650 mg  650 mg Rectal Q4H PRN Mari Sevilla APRN       • ALPRAZolam (XANAX) tablet 0.25 mg  0.25 mg Oral TID PRN Liang Horvath DO   0.25 mg at 01/15/21 0958   • calcitriol (ROCALTROL) capsule 0.5 mcg  0.5 mcg Oral Daily Mari Sevilla APRN   0.5 mcg at 01/15/21 0851   • calcium acetate (PHOS BINDER)) capsule 2,001 mg  2,001 mg Oral TID With Meals Mari Sevilla APRN   2,001 mg at 01/15/21 1143   • cloNIDine (CATAPRES) tablet 0.3 mg  0.3 mg Oral TID Mari Sevilla APRN   0.3 mg at 01/15/21 0851   • nitroglycerin (NITROSTAT) SL tablet 0.4 mg  0.4 mg Sublingual Q5 Min PRN Mari Sevilla APRN       • ondansetron (ZOFRAN) injection 4 mg  4 mg Intravenous Q6H PRN Mari Sevilla APRN       • sodium chloride 0.9 % flush 10 mL  10 mL Intravenous Q12H Mari Sevilla APRN   10 mL at 01/15/21 0851   • sodium chloride 0.9 % flush 10 mL  10 mL Intravenous PRN Mari Sevilla APRN           Assessment/Plan     1.  End-stage renal disease on hemodialysis on a Monday Wednesday Friday schedule,  last dialysis on Monday.  2.  Malfunctioning AV access, status post attempted repair this a.m. not fully successful, plan for vascular surgery to revisit procedure tomorrow.  3.  Hyperkalemia up to 6.3, medically managed and potassium is down to 5.4  4.  Type 2 diabetes  5.  Hypertension  6.  DJD     Plan:  1.    Had extensive discussion with patient today about need for vascular access, specifically tunneled dialysis catheter if her current AV access is not salvageable.  I explained the importance of getting this placed in a timely manner in order to be able to get dialysis hopefully today and then again tomorrow so she could feel better as she is in dire need of metabolic clearance and ultrafiltration.  I explained to her that if she is able to move on with the plan, I expect her to put be possibly discharged tomorrow after her second dialysis if she is feeling better.  Nursing staff was present during conversation and patient appears to be agreeable to the plan.  Vascular surgery will be notified.    Thank you for involving us in the care of Ms. Mcneil.  We will continue to follow along.      Freda Deluna MD  01/15/21  12:39 EST    Much of this encounter note is an electronic transcription/translation of spoken language to printed text. The electronic translation of spoken language may permit erroneous, or at times, nonsensical words or phrases to be inadvertently transcribed; Although I have reviewed the note for such errors, some may still exist.

## 2021-01-15 NOTE — NURSING NOTE
Spoke with dr bullard with anesthesia regarding hypertension and patients nausea. Dr Bullard okay with  or less bc we are wanting blood flow to keep fistula patent and working. Order for droperidol received for uncontrolled nausea

## 2021-01-15 NOTE — PROGRESS NOTES
[facility] HOSPITALIST    PROGRESS NOTE    Name:  Sunni Mcneil   Age:  45 y.o.  Sex:  female  :  1975  MRN:  [id number]   Visit Number:  [id number]  Admission Date:  2021  Date Of Service:  01/15/21  Primary Care Physician:  Provider, No Known     LOS: 0 days :  Patient Care Team:  Provider, No Known as PCP - Bo Hummel MD as Consulting Physician (Nephrology)  Damon Rooney MD as Surgeon (General Surgery):    History taken from:     patient chart    Chief Complaint:      Malfunctioning AV graft.    Subjective     Interval History:     Patient seen and evaluated today. Reviewed history and physical, lab work, x-rays, chart.    Patient is a 45-year-old with history of end-stage renal disease on hemodialysis, renal transplant, diabetes type 2, essential hypertension, anemia of chronic kidney disease who came in with abnormal lab. She is on  dialysis and had dialysis last Monday was unable to have dialysis on Wednesday due to shunt malfunction. She was sent to vascular surgery for shunt repair. She was due to have surgery but was told by vascular to come to the hospital due to hyperkalemia. Patient was resistant to getting a tunneled catheter placed, but eventually relented. Vascular surgery attempted percutaneous mechanical thrombectomy and patency of her access was not restored. They are following.    Patient is tearful as she is upset with these events. She otherwise denies any chest pressure, shortness of breath, nausea, vomiting, pain.    Review of Systems:     All systems were reviewed and negative except for:  Musculoskeletal: positive for  Right arm swelling    Objective     Vital Signs:    Temp:  [98 °F (36.7 °C)-98.7 °F (37.1 °C)] 98.4 °F (36.9 °C)  Heart Rate:  [68-94] 68  Resp:  [16-20] 20  BP: (133-179)/() 165/102    Physical Exam:    General: Alert and oriented x4, well nourished, mild distress  Heart: Regular  rate and rhythm without murmur rub or thrill  Lungs: Clear to auscultation bilaterally without use of accessory muscles of respiration.  Abdomen: Soft/nontender/nondistended. No paraspinal megaly is noted.  MSK: 5/5 strength in upper/lower extremities bilaterally.     Results Review:      I reviewed the patient's new clinical results.    Labs:    Lab Results (last 24 hours)     Procedure Component Value Units Date/Time    Pregnancy, Urine - Urine, Clean Catch [[id number]]  (Normal) Collected: 01/15/21 1314    Specimen: Urine, Clean Catch Updated: 01/15/21 1330     HCG, Urine QL Negative    POC Glucose Once [[id number]]  (Normal) Collected: 01/15/21 1115    Specimen: Blood Updated: 01/15/21 1118     Glucose 91 mg/dL     Ferritin [[id number]]  (Abnormal) Collected: 01/15/21 0425    Specimen: Blood Updated: 01/15/21 0558     Ferritin 1,503.00 ng/mL     Narrative:      Results may be falsely decreased if patient taking Biotin.      Renal Function Panel [[id number]]  (Abnormal) Collected: 01/15/21 0425    Specimen: Blood Updated: 01/15/21 0553     Glucose 90 mg/dL      BUN 71 mg/dL      Creatinine 11.78 mg/dL      Sodium 137 mmol/L      Potassium 5.8 mmol/L      Chloride 99 mmol/L      CO2 19.9 mmol/L      Calcium 7.5 mg/dL      Albumin 3.70 g/dL      Phosphorus 11.1 mg/dL      Anion Gap 18.1 mmol/L      BUN/Creatinine Ratio 6.0     eGFR Non African Amer 3 mL/min/1.73      Comment: <15 Indicative of kidney failure.        eGFR   Amer --     Comment: <15 Indicative of kidney failure.       Narrative:      GFR Normal >60  Chronic Kidney Disease <60  Kidney Failure <15      Iron Profile [[id number]]  (Abnormal) Collected: 01/15/21 0425    Specimen: Blood Updated: 01/15/21 0553     Iron 50 mcg/dL      Iron Saturation 26 %      Transferrin 129 mg/dL      TIBC 192 mcg/dL     CBC (No Diff) [[id number]]  (Abnormal) Collected: 01/15/21 0425    Specimen: Blood Updated: 01/15/21 0536     WBC 9.00 10*3/mm3      RBC 2.76 10*6/mm3       Hemoglobin 8.4 g/dL      Hematocrit 26.4 %      MCV 95.7 fL      MCH 30.4 pg      MCHC 31.8 g/dL      RDW 16.5 %      RDW-SD 57.3 fl      MPV 9.8 fL      Platelets 267 10*3/mm3     COVID PRE-OP / PRE-PROCEDURE SCREENING ORDER (NO ISOLATION) - Swab, Nasopharynx [[id number]]  (Normal) Collected: 01/14/21 2047    Specimen: Swab from Nasopharynx Updated: 01/14/21 2158    Narrative:      The following orders were created for panel order COVID PRE-OP / PRE-PROCEDURE SCREENING ORDER (NO ISOLATION) - Swab, Nasopharynx.  Procedure                               Abnormality         Status                     ---------                               -----------         ------                     COVID-19,[facility]...[[id number]]  Normal              Final result                 Please view results for these tests on the individual orders.    COVID-19,[facility] CEPHEID, NP SWAB IN TRANSPORT MEDIA 8-12 HR TAT - Swab, Nasopharynx [[id number]]  (Normal) Collected: 01/14/21 2047    Specimen: Swab from Nasopharynx Updated: 01/14/21 2158     COVID19 Not Detected    Narrative:      Fact sheet for providers: https://www.fda.gov/media/077458/download     Fact sheet for patients: https://www.fda.gov/media/048125/download    POC Glucose Once [[id number]]  (Normal) Collected: 01/14/21 2137    Specimen: Blood Updated: 01/14/21 2138     Glucose 87 mg/dL     POC Glucose Once [[id number]]  (Normal) Collected: 01/14/21 2052    Specimen: Blood Updated: 01/14/21 2103     Glucose 81 mg/dL     Comprehensive Metabolic Panel [[id number]]  (Abnormal) Collected: 01/14/21 1827    Specimen: Blood Updated: 01/14/21 1924     Glucose 84 mg/dL      BUN 62 mg/dL      Creatinine 11.86 mg/dL      Sodium 136 mmol/L      Potassium 5.4 mmol/L      Chloride 98 mmol/L      CO2 19.7 mmol/L      Calcium 7.5 mg/dL      Total Protein 7.6 g/dL      Albumin 3.90 g/dL      ALT (SGPT) <5 U/L      AST (SGOT) 6 U/L      Alkaline Phosphatase 52 U/L      Total Bilirubin  0.3 mg/dL      eGFR Non African Amer 3 mL/min/1.73      Comment: <15 Indicative of kidney failure.        eGFR   Amer --     Comment: <15 Indicative of kidney failure.        Globulin 3.7 gm/dL      A/G Ratio 1.1 g/dL      BUN/Creatinine Ratio 5.2     Anion Gap 18.3 mmol/L     Narrative:      GFR Normal >60  Chronic Kidney Disease <60  Kidney Failure <15      CBC & Differential [[id number]]  (Abnormal) Collected: 01/14/21 1827    Specimen: Blood Updated: 01/14/21 1839    Narrative:      The following orders were created for panel order CBC & Differential.  Procedure                               Abnormality         Status                     ---------                               -----------         ------                     CBC Auto Differential[[id number]]        Abnormal            Final result                 Please view results for these tests on the individual orders.    CBC Auto Differential [[id number]]  (Abnormal) Collected: 01/14/21 1827    Specimen: Blood Updated: 01/14/21 1839     WBC 10.22 10*3/mm3      RBC 2.54 10*6/mm3      Hemoglobin 8.2 g/dL      Hematocrit 23.8 %      MCV 93.7 fL      MCH 32.3 pg      MCHC 34.5 g/dL      RDW 15.9 %      RDW-SD 53.7 fl      MPV 9.7 fL      Platelets 252 10*3/mm3      Neutrophil % 77.6 %      Lymphocyte % 12.6 %      Monocyte % 6.6 %      Eosinophil % 1.8 %      Basophil % 0.5 %      Immature Grans % 0.9 %      Neutrophils, Absolute 7.94 10*3/mm3      Lymphocytes, Absolute 1.29 10*3/mm3      Monocytes, Absolute 0.67 10*3/mm3      Eosinophils, Absolute 0.18 10*3/mm3      Basophils, Absolute 0.05 10*3/mm3      Immature Grans, Absolute 0.09 10*3/mm3      nRBC 0.0 /100 WBC            Radiology:    Imaging Results (Last 24 Hours)     ** No results found for the last 24 hours. **          Medication Review:     [MAR Hold] calcitriol, 0.5 mcg, Oral, Daily  [MAR Hold] calcium acetate, 2,001 mg, Oral, TID With Meals  ceFAZolin, 2 g, Intravenous, Once  cloNIDine, 0.3 mg,  Oral, TID  famotidine, 20 mg, Intravenous, Once  [MAR Hold] sodium chloride, 10 mL, Intravenous, Q12H  sodium chloride, 3 mL, Intravenous, Q12H        sodium chloride, 9 mL/hr, Last Rate: 9 mL/hr (01/15/21 0015)        Assessment/Plan     Problem List Items Addressed This Visit        Other    * (Principal) Hyperkalemia - Primary      Other Visit Diagnoses     Thrombosis of arteriovenous fistula, initial encounter (CMS/[facility])        Peritoneal dialysis catheter in place (CMS/[facility])        Relevant Medications    ceFAZolin in dextrose (ANCEF) IVPB solution 2 g          1. End-stage renal disease on hemodialysis with AV graft malfunction.  2. Hyperkalemia  3. Diabetes mellitus type 2, diet controlled  4. Essential hypertension  5. Hyperparathyroidism  6. Anemia of chronic kidney disease    Plan:    Vascular surgery has been consulted. They plan to place tunneled catheter today. Nephrology has been consulted and plans are to do hemodialysis after that. Continue to monitor blood sugar and blood pressure. Check lab work in the a.m. Further recommendations will depend on clinical course.    Liang Horvath DO  01/15/21  14:55 EST

## 2021-01-15 NOTE — ANESTHESIA PROCEDURE NOTES
Airway  Urgency: elective    Date/Time: 1/15/2021 4:02 PM  Airway not difficult    General Information and Staff    Patient location during procedure: OR  Anesthesiologist: Bo Holcomb MD  CRNA: Esa Moss CRNA    Indications and Patient Condition  Indications for airway management: airway protection    Preoxygenated: yes  MILS maintained throughout  Mask difficulty assessment: 1 - vent by mask    Final Airway Details  Final airway type: endotracheal airway      Successful airway: ETT  Cuffed: yes   Successful intubation technique: direct laryngoscopy  Endotracheal tube insertion site: oral  Blade: Leandra  Blade size: 3  ETT size (mm): 7.0  Cormack-Lehane Classification: grade I - full view of glottis  Placement verified by: chest auscultation and capnometry   Measured from: lips  ETT/EBT  to lips (cm): 20  Number of attempts at approach: 1  Assessment: lips, teeth, and gum same as pre-op and atraumatic intubation

## 2021-01-16 ENCOUNTER — APPOINTMENT (OUTPATIENT)
Dept: CARDIOLOGY | Facility: HOSPITAL | Age: 46
End: 2021-01-16

## 2021-01-16 VITALS
BODY MASS INDEX: 36.61 KG/M2 | OXYGEN SATURATION: 99 % | SYSTOLIC BLOOD PRESSURE: 134 MMHG | TEMPERATURE: 98.5 F | WEIGHT: 186.5 LBS | RESPIRATION RATE: 18 BRPM | DIASTOLIC BLOOD PRESSURE: 97 MMHG | HEART RATE: 96 BPM | HEIGHT: 60 IN

## 2021-01-16 LAB
ALBUMIN SERPL-MCNC: 3.7 G/DL (ref 3.5–5.2)
ANION GAP SERPL CALCULATED.3IONS-SCNC: 16.9 MMOL/L (ref 5–15)
BASOPHILS # BLD AUTO: 0.04 10*3/MM3 (ref 0–0.2)
BASOPHILS NFR BLD AUTO: 0.4 % (ref 0–1.5)
BH CV VAS MEAS BASILIC FOREARM LEFT - DIST: 0.09 CM
BH CV VAS MEAS BASILIC FOREARM LEFT - MID: 0.08 CM
BH CV VAS MEAS BASILIC FOREARM LEFT - PROX: 0.1 CM
BH CV VAS MEAS BASILIC UPPER ARM LEFT - MID: 0.52 CM
BH CV VAS MEAS BASILIC UPPER ARM LEFT - PROX: 0.47 CM
BH CV VAS MEAS CEPHALIC FOREARM LEFT - DIST: 0.11 CM
BH CV VAS MEAS CEPHALIC FOREARM LEFT - MID: 0.19 CM
BH CV VAS MEAS CEPHALIC FOREARM LEFT - PROX: 0.22 CM
BH CV VAS MEAS CEPHALIC UPPER ARM LEFT - DIST: 0.15 CM
BH CV VAS MEAS CEPHALIC UPPER ARM LEFT - MID: 0.2 CM
BH CV VAS MEAS CEPHALIC UPPER ARM LEFT - PROX: 0.22 CM
BH CV VAS MEAS RADIAL UPPER ARM LEFT - DIST: 0.16 CM
BH CV VAS MEAS RADIAL UPPER ARM LEFT - MID: 0.18 CM
BH CV VAS MEAS RADIAL UPPER ARM LEFT - PROX: 0.17 CM
BUN SERPL-MCNC: 48 MG/DL (ref 6–20)
BUN/CREAT SERPL: 5.1 (ref 7–25)
CALCIUM SPEC-SCNC: 8.3 MG/DL (ref 8.6–10.5)
CHLORIDE SERPL-SCNC: 98 MMOL/L (ref 98–107)
CO2 SERPL-SCNC: 21.1 MMOL/L (ref 22–29)
CREAT SERPL-MCNC: 9.4 MG/DL (ref 0.57–1)
DEPRECATED RDW RBC AUTO: 53.4 FL (ref 37–54)
EOSINOPHIL # BLD AUTO: 0.04 10*3/MM3 (ref 0–0.4)
EOSINOPHIL NFR BLD AUTO: 0.4 % (ref 0.3–6.2)
ERYTHROCYTE [DISTWIDTH] IN BLOOD BY AUTOMATED COUNT: 15.9 % (ref 12.3–15.4)
GFR SERPL CREATININE-BSD FRML MDRD: 5 ML/MIN/1.73
GFR SERPL CREATININE-BSD FRML MDRD: ABNORMAL ML/MIN/{1.73_M2}
GLUCOSE BLDC GLUCOMTR-MCNC: 123 MG/DL (ref 70–130)
GLUCOSE BLDC GLUCOMTR-MCNC: 125 MG/DL (ref 70–130)
GLUCOSE BLDC GLUCOMTR-MCNC: 138 MG/DL (ref 70–130)
GLUCOSE SERPL-MCNC: 94 MG/DL (ref 65–99)
HCT VFR BLD AUTO: 24.8 % (ref 34–46.6)
HGB BLD-MCNC: 8.3 G/DL (ref 12–15.9)
IMM GRANULOCYTES # BLD AUTO: 0.09 10*3/MM3 (ref 0–0.05)
IMM GRANULOCYTES NFR BLD AUTO: 0.9 % (ref 0–0.5)
LYMPHOCYTES # BLD AUTO: 0.8 10*3/MM3 (ref 0.7–3.1)
LYMPHOCYTES NFR BLD AUTO: 8 % (ref 19.6–45.3)
MAGNESIUM SERPL-MCNC: 2.2 MG/DL (ref 1.6–2.6)
MCH RBC QN AUTO: 31.3 PG (ref 26.6–33)
MCHC RBC AUTO-ENTMCNC: 33.5 G/DL (ref 31.5–35.7)
MCV RBC AUTO: 93.6 FL (ref 79–97)
MONOCYTES # BLD AUTO: 0.64 10*3/MM3 (ref 0.1–0.9)
MONOCYTES NFR BLD AUTO: 6.4 % (ref 5–12)
NEUTROPHILS NFR BLD AUTO: 8.4 10*3/MM3 (ref 1.7–7)
NEUTROPHILS NFR BLD AUTO: 83.9 % (ref 42.7–76)
NRBC BLD AUTO-RTO: 0 /100 WBC (ref 0–0.2)
PHOSPHATE SERPL-MCNC: 8.9 MG/DL (ref 2.5–4.5)
PLATELET # BLD AUTO: 289 10*3/MM3 (ref 140–450)
PMV BLD AUTO: 9.6 FL (ref 6–12)
POTASSIUM SERPL-SCNC: 4.6 MMOL/L (ref 3.5–5.2)
RBC # BLD AUTO: 2.65 10*6/MM3 (ref 3.77–5.28)
SODIUM SERPL-SCNC: 136 MMOL/L (ref 136–145)
UPPER ARTERIAL LEFT ARM BRACHIAL LENGTH: 0.37 CM
WBC # BLD AUTO: 10.01 10*3/MM3 (ref 3.4–10.8)

## 2021-01-16 PROCEDURE — 94799 UNLISTED PULMONARY SVC/PX: CPT

## 2021-01-16 PROCEDURE — 80069 RENAL FUNCTION PANEL: CPT | Performed by: SURGERY

## 2021-01-16 PROCEDURE — 93986 DUP-SCAN HEMO COMPL UNI STD: CPT

## 2021-01-16 PROCEDURE — 85025 COMPLETE CBC W/AUTO DIFF WBC: CPT | Performed by: SURGERY

## 2021-01-16 PROCEDURE — 5A1D70Z PERFORMANCE OF URINARY FILTRATION, INTERMITTENT, LESS THAN 6 HOURS PER DAY: ICD-10-PCS | Performed by: INTERNAL MEDICINE

## 2021-01-16 PROCEDURE — 82962 GLUCOSE BLOOD TEST: CPT

## 2021-01-16 PROCEDURE — 25010000003 CEFAZOLIN IN DEXTROSE 2-4 GM/100ML-% SOLUTION: Performed by: SURGERY

## 2021-01-16 PROCEDURE — 83735 ASSAY OF MAGNESIUM: CPT | Performed by: SURGERY

## 2021-01-16 PROCEDURE — 76937 US GUIDE VASCULAR ACCESS: CPT

## 2021-01-16 RX ORDER — HYDROCODONE BITARTRATE AND ACETAMINOPHEN 7.5; 325 MG/1; MG/1
1 TABLET ORAL EVERY 4 HOURS PRN
Qty: 18 TABLET | Refills: 0 | Status: SHIPPED | OUTPATIENT
Start: 2021-01-16 | End: 2021-01-22

## 2021-01-16 RX ORDER — HEPARIN SODIUM 1000 [USP'U]/ML
4000 INJECTION, SOLUTION INTRAVENOUS; SUBCUTANEOUS AS NEEDED
Status: DISCONTINUED | OUTPATIENT
Start: 2021-01-16 | End: 2021-01-16 | Stop reason: HOSPADM

## 2021-01-16 RX ORDER — CEFAZOLIN SODIUM 2 G/100ML
2 INJECTION, SOLUTION INTRAVENOUS EVERY 8 HOURS
Status: COMPLETED | OUTPATIENT
Start: 2021-01-16 | End: 2021-01-16

## 2021-01-16 RX ADMIN — CALCIUM ACETATE 2001 MG: 667 CAPSULE ORAL at 17:21

## 2021-01-16 RX ADMIN — CALCIUM ACETATE 2001 MG: 667 CAPSULE ORAL at 12:11

## 2021-01-16 RX ADMIN — CALCITRIOL 0.5 MCG: 0.25 CAPSULE ORAL at 08:01

## 2021-01-16 RX ADMIN — CLONIDINE HYDROCHLORIDE 0.3 MG: 0.1 TABLET ORAL at 08:01

## 2021-01-16 RX ADMIN — CLONIDINE HYDROCHLORIDE 0.3 MG: 0.1 TABLET ORAL at 17:22

## 2021-01-16 RX ADMIN — IPRATROPIUM BROMIDE AND ALBUTEROL SULFATE 3 ML: 2.5; .5 SOLUTION RESPIRATORY (INHALATION) at 07:58

## 2021-01-16 RX ADMIN — CEFAZOLIN SODIUM 2 G: 2 INJECTION, SOLUTION INTRAVENOUS at 04:11

## 2021-01-16 RX ADMIN — CALCIUM ACETATE 2001 MG: 667 CAPSULE ORAL at 08:01

## 2021-01-16 RX ADMIN — SODIUM CHLORIDE, PRESERVATIVE FREE 10 ML: 5 INJECTION INTRAVENOUS at 08:02

## 2021-01-16 RX ADMIN — CEFAZOLIN SODIUM 2 G: 2 INJECTION, SOLUTION INTRAVENOUS at 12:11

## 2021-01-16 NOTE — PROGRESS NOTES
"NEPHROLOGY PROGRESS NOTE     LOS: 1 day      Reason for visit:  Follow-up on end-stage renal disease    Interval History:   Pt seen on dialysis  No acute complaints.  Had TDC placed yesterday.    Review of Systems:   14 points review of systems is otherwise negative except for mentioned above in HPI    Objective     Vital Signs  Temp:  [94.4 °F (34.7 °C)-98.5 °F (36.9 °C)] 98.5 °F (36.9 °C)  Heart Rate:  [67-96] 96  Resp:  [16-20] 18  BP: (120-221)/() 134/97    Flowsheet Rows      First Filed Value   Admission Height  152.4 cm (60\") Documented at 01/14/2021 1801   Admission Weight  81.6 kg (180 lb) Documented at 01/14/2021 1804          No intake/output data recorded.  I/O last 3 completed shifts:  In: 290 [P.O.:290]  Out: 1452 [Other:1452]    Intake/Output Summary (Last 24 hours) at 1/16/2021 1417  Last data filed at 1/16/2021 1342  Gross per 24 hour   Intake 240 ml   Output 1452 ml   Net -1212 ml       Physical Exam:  General Appearance: alert, oriented x 3, no acute distress   Skin: warm and dry  HEENT: oral mucosa normal, nonicteric sclera  Neck: supple, no JVD  Lungs: CTA  Heart: RRR, normal S1 and S2  Abdomen: soft, nontender, nondistended  : no palpable bladder  Extremities: Mild pretibial edema bilaterally  Neuro: normal speech and mental status      Results Review:    Results from last 7 days   Lab Units 01/16/21  0835 01/15/21  0425 01/14/21  1827 01/14/21  1335   SODIUM mmol/L 136 137 136 134*   POTASSIUM mmol/L 4.6 5.8* 5.4* 6.3*   CHLORIDE mmol/L 98 99 98 98   CO2 mmol/L 21.1* 19.9* 19.7* 18.7*   BUN mg/dL 48* 71* 62* 60*   CREATININE mg/dL 9.40* 11.78* 11.86* 11.98*   CALCIUM mg/dL 8.3* 7.5* 7.5* 7.5*   BILIRUBIN mg/dL  --   --  0.3 0.4   ALK PHOS U/L  --   --  52 50   ALT (SGPT) U/L  --   --  <5 <5   AST (SGOT) U/L  --   --  6 11   GLUCOSE mg/dL 94 90 84 106*       Estimated Creatinine Clearance: 7.3 mL/min (A) (by C-G formula based on SCr of 9.4 mg/dL (H)).    Results from last 7 days   Lab " Units 01/16/21  0835 01/15/21  0425   MAGNESIUM mg/dL 2.2  --    PHOSPHORUS mg/dL 8.9* 11.1*             Results from last 7 days   Lab Units 01/16/21  0835 01/15/21  0425 01/14/21  1827 01/14/21  1335   WBC 10*3/mm3 10.01 9.00 10.22 12.06*   HEMOGLOBIN g/dL 8.3* 8.4* 8.2* 8.3*   PLATELETS 10*3/mm3 289 267 252 234       Results from last 7 days   Lab Units 01/14/21  1335   INR  1.27*         Imaging Results (Last 24 Hours)     Procedure Component Value Units Date/Time    Arteriogram ([employer]) [[id number]] Resulted: 01/15/21 1706     Updated: 01/15/21 1706    Narrative:      This procedure was auto-finalized with no dictation required.        calcitriol, 0.5 mcg, Oral, Daily  calcium acetate, 2,001 mg, Oral, TID With Meals  cloNIDine, 0.3 mg, Oral, TID  ipratropium-albuterol, 3 mL, Nebulization, Q8H - RT  sodium chloride, 10 mL, Intravenous, Q12H      sodium chloride, 9 mL/hr, Last Rate: 400 mL/hr (01/15/21 1701)        Medication Review:   Current Facility-Administered Medications   Medication Dose Route Frequency Provider Last Rate Last Admin   • acetaminophen (TYLENOL) tablet 650 mg  650 mg Oral Q4H PRN Phu Gaviria MD   650 mg at 01/15/21 1319    Or   • acetaminophen (TYLENOL) 160 MG/5ML solution 650 mg  650 mg Oral Q4H PRN Phu Gaviria MD        Or   • acetaminophen (TYLENOL) suppository 650 mg  650 mg Rectal Q4H PRN Phu Gaviria MD       • albumin human 25 % IV SOLN 12.5 g  12.5 g Intravenous PRN Freda Deluna MD       • ALPRAZolam (XANAX) tablet 0.25 mg  0.25 mg Oral TID PRN Phu Gaviria MD   0.25 mg at 01/15/21 0958   • calcitriol (ROCALTROL) capsule 0.5 mcg  0.5 mcg Oral Daily Phu Gaviria MD   0.5 mcg at 01/16/21 0801   • calcium acetate (PHOS BINDER)) capsule 2,001 mg  2,001 mg Oral TID With Meals Phu Gaviria MD   2,001 mg at 01/16/21 1211   • cloNIDine (CATAPRES) tablet 0.3 mg  0.3 mg Oral TID Phu Gaviria MD   0.3 mg at 01/16/21 0801   • HYDROcodone-acetaminophen  (NORCO) 7.5-325 MG per tablet 1 tablet  1 tablet Oral Q4H PRN Phu Gaviria MD       • HYDROmorphone (DILAUDID) injection 0.5 mg  0.5 mg Intravenous Q2H PRN Phu Gaviria MD        And   • naloxone (NARCAN) injection 0.4 mg  0.4 mg Intravenous Q5 Min PRN Phu Gaviria MD       • ipratropium-albuterol (DUO-NEB) nebulizer solution 3 mL  3 mL Nebulization Q8H - RT Phu Gaviria MD   3 mL at 01/16/21 0758   • nitroglycerin (NITROSTAT) SL tablet 0.4 mg  0.4 mg Sublingual Q5 Min PRN Phu Gaviria MD       • nitroglycerin (NITROSTAT) SL tablet 0.4 mg  0.4 mg Sublingual Q5 Min PRN Phu Gaviria MD       • ondansetron (ZOFRAN) injection 4 mg  4 mg Intravenous Q6H PRN Phu Gaviria MD       • ondansetron (ZOFRAN) tablet 4 mg  4 mg Oral Q6H PRN Phu Gaviria MD        Or   • ondansetron (ZOFRAN) injection 4 mg  4 mg Intravenous Q6H PRN Phu Gaviria MD       • sodium chloride 0.9 % bolus 1,000 mL  1,000 mL Intravenous PRN Freda Deluna MD       • sodium chloride 0.9 % flush 10 mL  10 mL Intravenous Q12H Phu Gaviria MD   10 mL at 01/16/21 0802   • sodium chloride 0.9 % flush 10 mL  10 mL Intravenous PRN Phu Gaviria MD       • sodium chloride 0.9 % infusion  9 mL/hr Intravenous Continuous Phu Gaviria  mL/hr at 01/15/21 1701 Rate Change at 01/15/21 1701       Assessment/Plan     1.  End-stage renal disease on hemodialysis on a Monday Wednesday Friday schedule.  S/p TDC.  Seen on dialysis.  OK for d/c from renal standpoint.  Next dialysis Monday.  2.  Malfunctioning AV access, status post attempted repair,not fully successful.  3.  Hyperkalemia- improved post dialysis.  4.  Type 2 diabetes  5.  Hypertension  6.  DJD       Kimo Sexton MD  01/16/21  14:17 EST

## 2021-01-16 NOTE — PROGRESS NOTES
[facility] HOSPITALIST    PROGRESS NOTE    Name:  Sunni Mcneil   Age:  45 y.o.  Sex:  female  :  1975  MRN:  [id number]   Visit Number:  [id number]  Admission Date:  2021  Date Of Service:  21  Primary Care Physician:  Provider, No Known     LOS: 1 day :  Patient Care Team:  Provider, No Known as PCP - Bo Hummel MD as Consulting Physician (Nephrology)  Damon Rooney MD as Surgeon (General Surgery):    History taken from:     patient chart    Chief Complaint:      Malfunctioning AV graft.    Subjective     Interval History:     Patient seen and evaluated again today.    Patient is a 45-year-old with history of end-stage renal disease on hemodialysis, renal transplant, diabetes type 2, essential hypertension, anemia of chronic kidney disease who came in with abnormal lab. She is on  dialysis and had dialysis last Monday was unable to have dialysis on Wednesday due to shunt malfunction. She was sent to vascular surgery for shunt repair. She was due to have surgery but was told by vascular to come to the hospital due to hyperkalemia. Patient was resistant to getting a tunneled catheter placed, but eventually relented. Vascular surgery attempted percutaneous mechanical thrombectomy and patency of her access was not restored.  Dialysis catheter was placed yesterday.    Patient did receive hemodialysis again today.  Nephrology is cleared for discharge.  Vascular surgery is following the patient, they did order vein mapping today.  They are seeing if there is any possibility of a left-sided fistula or possibly she will need an AV graft.  Await further input from vascular surgery.    Patient today denies any chest pressure, shortness of breath, nausea, vomiting, or pain.    Review of Systems:     All systems were reviewed and negative except for:  Musculoskeletal: positive for  Right arm swelling    Objective     Vital  Signs:    Temp:  [94.4 °F (34.7 °C)-98.5 °F (36.9 °C)] 98.5 °F (36.9 °C)  Heart Rate:  [67-96] 96  Resp:  [16-20] 18  BP: (120-221)/() 134/97    Physical Exam:    General: Alert and oriented x4, well nourished, mild distress  Heart: Regular rate and rhythm without murmur rub or thrill  Lungs: Clear to auscultation bilaterally without use of accessory muscles of respiration.  Abdomen: Soft/nontender/nondistended. No paraspinal megaly is noted.  MSK: 5/5 strength in upper/lower extremities bilaterally.     Results Review:      I reviewed the patient's new clinical results.    Labs:    Lab Results (last 24 hours)     Procedure Component Value Units Date/Time    POC Glucose Once [[id number]]  (Normal) Collected: 01/16/21 1126    Specimen: Blood Updated: 01/16/21 1132     Glucose 125 mg/dL     Renal Function Panel [[id number]]  (Abnormal) Collected: 01/16/21 0835    Specimen: Blood Updated: 01/16/21 0931     Glucose 94 mg/dL      BUN 48 mg/dL      Creatinine 9.40 mg/dL      Sodium 136 mmol/L      Potassium 4.6 mmol/L      Chloride 98 mmol/L      CO2 21.1 mmol/L      Calcium 8.3 mg/dL      Albumin 3.70 g/dL      Phosphorus 8.9 mg/dL      Anion Gap 16.9 mmol/L      BUN/Creatinine Ratio 5.1     eGFR Non African Amer 5 mL/min/1.73      Comment: <15 Indicative of kidney failure.        eGFR   Amer --     Comment: <15 Indicative of kidney failure.       Narrative:      GFR Normal >60  Chronic Kidney Disease <60  Kidney Failure <15      Magnesium [[id number]]  (Normal) Collected: 01/16/21 0835    Specimen: Blood Updated: 01/16/21 0924     Magnesium 2.2 mg/dL     CBC & Differential [[id number]]  (Abnormal) Collected: 01/16/21 0835    Specimen: Blood Updated: 01/16/21 0857    Narrative:      The following orders were created for panel order CBC & Differential.  Procedure                               Abnormality         Status                     ---------                               -----------         ------                      CBC Auto Differential[[id number]]        Abnormal            Final result                 Please view results for these tests on the individual orders.    CBC Auto Differential [[id number]]  (Abnormal) Collected: 01/16/21 0835    Specimen: Blood Updated: 01/16/21 0857     WBC 10.01 10*3/mm3      RBC 2.65 10*6/mm3      Hemoglobin 8.3 g/dL      Hematocrit 24.8 %      MCV 93.6 fL      MCH 31.3 pg      MCHC 33.5 g/dL      RDW 15.9 %      RDW-SD 53.4 fl      MPV 9.6 fL      Platelets 289 10*3/mm3      Neutrophil % 83.9 %      Lymphocyte % 8.0 %      Monocyte % 6.4 %      Eosinophil % 0.4 %      Basophil % 0.4 %      Immature Grans % 0.9 %      Neutrophils, Absolute 8.40 10*3/mm3      Lymphocytes, Absolute 0.80 10*3/mm3      Monocytes, Absolute 0.64 10*3/mm3      Eosinophils, Absolute 0.04 10*3/mm3      Basophils, Absolute 0.04 10*3/mm3      Immature Grans, Absolute 0.09 10*3/mm3      nRBC 0.0 /100 WBC     POC Glucose Once [[id number]]  (Abnormal) Collected: 01/16/21 0607    Specimen: Blood Updated: 01/16/21 0608     Glucose 138 mg/dL     POC Glucose Once [[id number]]  (Abnormal) Collected: 01/15/21 2059    Specimen: Blood Updated: 01/15/21 2102     Glucose 166 mg/dL     POC Glucose Once [[id number]]  (Normal) Collected: 01/15/21 1850    Specimen: Blood Updated: 01/15/21 1852     Glucose 127 mg/dL            Radiology:    Imaging Results (Last 24 Hours)     Procedure Component Value Units Date/Time    Arteriogram (Autofinalize) [[id number]] Resulted: 01/15/21 1706     Updated: 01/15/21 1706    Narrative:      This procedure was auto-finalized with no dictation required.          Medication Review:     calcitriol, 0.5 mcg, Oral, Daily  calcium acetate, 2,001 mg, Oral, TID With Meals  cloNIDine, 0.3 mg, Oral, TID  ipratropium-albuterol, 3 mL, Nebulization, Q8H - RT  sodium chloride, 10 mL, Intravenous, Q12H        sodium chloride, 9 mL/hr, Last Rate: 400 mL/hr (01/15/21 6980)        Assessment/Plan     Problem List  Items Addressed This Visit        Other    * (Principal) Hyperkalemia - Primary      Other Visit Diagnoses     Thrombosis of arteriovenous fistula, initial encounter (CMS/[facility])        Peritoneal dialysis catheter in place (CMS/[facility])        Relevant Medications    ceFAZolin in dextrose (ANCEF) IVPB solution 2 g (Completed)          1. End-stage renal disease on hemodialysis with AV graft malfunction.  2. Hyperkalemia  3. Diabetes mellitus type 2, diet controlled  4. Essential hypertension  5. Hyperparathyroidism  6. Anemia of chronic kidney disease    Plan:    Vascular surgery is following, vein mapping was ordered today.  Nephrology is doing hemodialysis today.  Continue to monitor blood sugar and blood pressure. Check lab work in the a.m. Further recommendations will depend on clinical course.    Liang Horvath,   01/16/21  15:13 EST

## 2021-01-16 NOTE — NURSING NOTE
At 01:52 am patient asked how much longer she had of tx, then said take me off now. I explained to her that I had to call her Nephrologist and get permission. I explained to her the dangers of stopping tx early and how much excessive fluid she still had on her but she said she didn't care take her off.  Dr. Blanco said okay.

## 2021-01-16 NOTE — NURSING NOTE
Reported left arm vein mapping preliminary results, + acute SVT to left arm, to Dr Hernandez.  No new orders received at this time.

## 2021-01-16 NOTE — PLAN OF CARE
Goal Outcome Evaluation:  Plan of Care Reviewed With: patient  Progress: no change  Outcome Summary: pt had a tunned cath put in for dialysis and an open thrombectomy of R artrial venous fistula yesterday. pt had dialysis around midnight, but did no finish the whole treatment because pt insisted to be taken off dialysis, nephrologist is aware; pt is currently resting w/o having discomforts or complaints; continue to monitor.

## 2021-01-16 NOTE — DISCHARGE SUMMARY
[facility] HOSPITALIST   DISCHARGE SUMMARY      Name:  Sunni Mcneil   Age:  45 y.o.  Sex:  female  :  1975  MRN:  [id number]   Visit Number:  [id number]  Primary Care Physician:  Provider, No Known  Date of Discharge:  2021  Admission Date:  2021      Discharge Diagnosis:     1. End-stage renal disease on hemodialysis with AV graft malfunction.  2. Hyperkalemia  3. Diabetes mellitus type 2, diet controlled  4. Essential hypertension  5. Hyperparathyroidism  6. Anemia of chronic kidney disease  Active Hospital Problems    Diagnosis  POA   • **Hyperkalemia [E87.5]  Yes   • Shunt malfunction [T85.618A]  Yes   • Anemia, chronic disease [D63.8]  Yes   • Obesity (BMI 30-39.9) [E66.9]  Yes   • Type 2 diabetes mellitus, without long-term current use of insulin (CMS/[facility]) [E11.9]  Yes   • ESRD on hemodialysis (CMS/[facility]) [N18.6, Z99.2]  Not Applicable   • Hypertension [I10]  Yes   • Hyperparathyroidism (CMS/[facility]) [E21.3]  Yes   • Hx of kidney transplant [Z94.0]  Not Applicable      Resolved Hospital Problems   No resolved problems to display.         Presenting Problem/History of Present Illness:    Hyperkalemia [E87.5]  Thrombosis of arteriovenous fistula, initial encounter (CMS/[facility]) [T82.868A]  Hyperkalemia [E87.5]         Hospital Course:       Patient is a 45-year-old with history of end-stage renal disease on hemodialysis, renal transplant, diabetes type 2, essential hypertension, anemia of chronic kidney disease who came in with abnormal lab. She is on  dialysis and had dialysis last Monday was unable to have dialysis on Wednesday due to shunt malfunction. She was sent to vascular surgery for shunt repair. She was due to have surgery but was told by vascular to come to the hospital due to hyperkalemia. Patient was resistant to getting a tunneled catheter placed, but eventually relented. Vascular surgery attempted percutaneous mechanical thrombectomy and patency  of her access was not restored.  Dialysis catheter was placed yesterday.     Patient did receive hemodialysis again today.  Nephrology has cleared for discharge.  Vascular surgery is following the patient, they did order vein mapping today.  They are seeing if there is any possibility of a left-sided fistula or possibly she will need an AV graft.  They concluded that the patient would need an AV Graft and this could be done as an outpatient.     Patient today denies any chest pressure, shortness of breath, nausea, vomiting, or pain. Follow up dialysis MWF. Follow up with PCP. Follow up with vasc surgery next week for arrangement of AV graft.    Procedures Performed:    Procedure(s):  OPEN THROMBECTOMY OF RIGHT ARTERIAL VENOUS FISTULA, fistulagram  TUNNELED DIAYLIS CATHETER PLACEMENT       Consults:     Consults     Date and Time Order Name Status Description    1/15/2021 0152 Inpatient Nephrology Consult      1/14/2021 2155 Inpatient Vascular Surgery Consult Completed     1/14/2021 2155 Inpatient Nephrology Consult Completed     1/14/2021 1949 [facility] (on-call MD unless specified) Details Completed     1/14/2021 1949 Inpatient Nephrology Consult Completed     1/14/2021 1949 Inpatient Vascular Surgery Consult Completed           Pertinent Test Results:     Lab Results (all)     Procedure Component Value Units Date/Time    POC Glucose Once [[id number]]  (Normal) Collected: 01/16/21 1126    Specimen: Blood Updated: 01/16/21 1132     Glucose 125 mg/dL     Renal Function Panel [[id number]]  (Abnormal) Collected: 01/16/21 0835    Specimen: Blood Updated: 01/16/21 0931     Glucose 94 mg/dL      BUN 48 mg/dL      Creatinine 9.40 mg/dL      Sodium 136 mmol/L      Potassium 4.6 mmol/L      Chloride 98 mmol/L      CO2 21.1 mmol/L      Calcium 8.3 mg/dL      Albumin 3.70 g/dL      Phosphorus 8.9 mg/dL      Anion Gap 16.9 mmol/L      BUN/Creatinine Ratio 5.1     eGFR Non African Amer 5 mL/min/1.73      Comment: <15 Indicative of  kidney failure.        eGFR   Amer --     Comment: <15 Indicative of kidney failure.       Narrative:      GFR Normal >60  Chronic Kidney Disease <60  Kidney Failure <15      Magnesium [[id number]]  (Normal) Collected: 01/16/21 0835    Specimen: Blood Updated: 01/16/21 0924     Magnesium 2.2 mg/dL     CBC & Differential [[id number]]  (Abnormal) Collected: 01/16/21 0835    Specimen: Blood Updated: 01/16/21 0857    Narrative:      The following orders were created for panel order CBC & Differential.  Procedure                               Abnormality         Status                     ---------                               -----------         ------                     CBC Auto Differential[[id number]]        Abnormal            Final result                 Please view results for these tests on the individual orders.    CBC Auto Differential [[id number]]  (Abnormal) Collected: 01/16/21 0835    Specimen: Blood Updated: 01/16/21 0857     WBC 10.01 10*3/mm3      RBC 2.65 10*6/mm3      Hemoglobin 8.3 g/dL      Hematocrit 24.8 %      MCV 93.6 fL      MCH 31.3 pg      MCHC 33.5 g/dL      RDW 15.9 %      RDW-SD 53.4 fl      MPV 9.6 fL      Platelets 289 10*3/mm3      Neutrophil % 83.9 %      Lymphocyte % 8.0 %      Monocyte % 6.4 %      Eosinophil % 0.4 %      Basophil % 0.4 %      Immature Grans % 0.9 %      Neutrophils, Absolute 8.40 10*3/mm3      Lymphocytes, Absolute 0.80 10*3/mm3      Monocytes, Absolute 0.64 10*3/mm3      Eosinophils, Absolute 0.04 10*3/mm3      Basophils, Absolute 0.04 10*3/mm3      Immature Grans, Absolute 0.09 10*3/mm3      nRBC 0.0 /100 WBC     POC Glucose Once [[id number]]  (Abnormal) Collected: 01/16/21 0607    Specimen: Blood Updated: 01/16/21 0608     Glucose 138 mg/dL     POC Glucose Once [[id number]]  (Abnormal) Collected: 01/15/21 2059    Specimen: Blood Updated: 01/15/21 2102     Glucose 166 mg/dL     POC Glucose Once [[id number]]  (Normal) Collected: 01/15/21 1850    Specimen: Blood  Updated: 01/15/21 1852     Glucose 127 mg/dL     Pregnancy, Urine - Urine, Clean Catch [[id number]]  (Normal) Collected: 01/15/21 1314    Specimen: Urine, Clean Catch Updated: 01/15/21 1330     HCG, Urine QL Negative    POC Glucose Once [[id number]]  (Normal) Collected: 01/15/21 1115    Specimen: Blood Updated: 01/15/21 1118     Glucose 91 mg/dL     Ferritin [[id number]]  (Abnormal) Collected: 01/15/21 0425    Specimen: Blood Updated: 01/15/21 0558     Ferritin 1,503.00 ng/mL     Narrative:      Results may be falsely decreased if patient taking Biotin.      Renal Function Panel [[id number]]  (Abnormal) Collected: 01/15/21 0425    Specimen: Blood Updated: 01/15/21 0553     Glucose 90 mg/dL      BUN 71 mg/dL      Creatinine 11.78 mg/dL      Sodium 137 mmol/L      Potassium 5.8 mmol/L      Chloride 99 mmol/L      CO2 19.9 mmol/L      Calcium 7.5 mg/dL      Albumin 3.70 g/dL      Phosphorus 11.1 mg/dL      Anion Gap 18.1 mmol/L      BUN/Creatinine Ratio 6.0     eGFR Non African Amer 3 mL/min/1.73      Comment: <15 Indicative of kidney failure.        eGFR   Amer --     Comment: <15 Indicative of kidney failure.       Narrative:      GFR Normal >60  Chronic Kidney Disease <60  Kidney Failure <15      Iron Profile [[id number]]  (Abnormal) Collected: 01/15/21 0425    Specimen: Blood Updated: 01/15/21 0553     Iron 50 mcg/dL      Iron Saturation 26 %      Transferrin 129 mg/dL      TIBC 192 mcg/dL     CBC (No Diff) [[id number]]  (Abnormal) Collected: 01/15/21 0425    Specimen: Blood Updated: 01/15/21 0536     WBC 9.00 10*3/mm3      RBC 2.76 10*6/mm3      Hemoglobin 8.4 g/dL      Hematocrit 26.4 %      MCV 95.7 fL      MCH 30.4 pg      MCHC 31.8 g/dL      RDW 16.5 %      RDW-SD 57.3 fl      MPV 9.8 fL      Platelets 267 10*3/mm3     COVID PRE-OP / PRE-PROCEDURE SCREENING ORDER (NO ISOLATION) - Swab, Nasopharynx [[id number]]  (Normal) Collected: 01/14/21 2047    Specimen: Swab from Nasopharynx Updated: 01/14/21 7448     Narrative:      The following orders were created for panel order COVID PRE-OP / PRE-PROCEDURE SCREENING ORDER (NO ISOLATION) - Swab, Nasopharynx.  Procedure                               Abnormality         Status                     ---------                               -----------         ------                     COVID-19,[facility]HOUSE...[[id number]]  Normal              Final result                 Please view results for these tests on the individual orders.    COVID-19,[facility]-HOUSE CEPHEID, NP SWAB IN TRANSPORT MEDIA 8-12 HR TAT - Swab, Nasopharynx [[id number]]  (Normal) Collected: 01/14/21 2047    Specimen: Swab from Nasopharynx Updated: 01/14/21 2158     COVID19 Not Detected    Narrative:      Fact sheet for providers: https://www.fda.gov/media/419781/download     Fact sheet for patients: https://www.fda.gov/media/602130/download    POC Glucose Once [[id number]]  (Normal) Collected: 01/14/21 2137    Specimen: Blood Updated: 01/14/21 2138     Glucose 87 mg/dL     POC Glucose Once [[id number]]  (Normal) Collected: 01/14/21 2052    Specimen: Blood Updated: 01/14/21 2103     Glucose 81 mg/dL     Comprehensive Metabolic Panel [[id number]]  (Abnormal) Collected: 01/14/21 1827    Specimen: Blood Updated: 01/14/21 1924     Glucose 84 mg/dL      BUN 62 mg/dL      Creatinine 11.86 mg/dL      Sodium 136 mmol/L      Potassium 5.4 mmol/L      Chloride 98 mmol/L      CO2 19.7 mmol/L      Calcium 7.5 mg/dL      Total Protein 7.6 g/dL      Albumin 3.90 g/dL      ALT (SGPT) <5 U/L      AST (SGOT) 6 U/L      Alkaline Phosphatase 52 U/L      Total Bilirubin 0.3 mg/dL      eGFR Non African Amer 3 mL/min/1.73      Comment: <15 Indicative of kidney failure.        eGFR   Amer --     Comment: <15 Indicative of kidney failure.        Globulin 3.7 gm/dL      A/G Ratio 1.1 g/dL      BUN/Creatinine Ratio 5.2     Anion Gap 18.3 mmol/L     Narrative:      GFR Normal >60  Chronic Kidney Disease <60  Kidney Failure <15      CBC  "& Differential [[id number]]  (Abnormal) Collected: 01/14/21 1827    Specimen: Blood Updated: 01/14/21 1839    Narrative:      The following orders were created for panel order CBC & Differential.  Procedure                               Abnormality         Status                     ---------                               -----------         ------                     CBC Auto Differential[[id number]]        Abnormal            Final result                 Please view results for these tests on the individual orders.    CBC Auto Differential [[id number]]  (Abnormal) Collected: 01/14/21 1827    Specimen: Blood Updated: 01/14/21 1839     WBC 10.22 10*3/mm3      RBC 2.54 10*6/mm3      Hemoglobin 8.2 g/dL      Hematocrit 23.8 %      MCV 93.7 fL      MCH 32.3 pg      MCHC 34.5 g/dL      RDW 15.9 %      RDW-SD 53.7 fl      MPV 9.7 fL      Platelets 252 10*3/mm3      Neutrophil % 77.6 %      Lymphocyte % 12.6 %      Monocyte % 6.6 %      Eosinophil % 1.8 %      Basophil % 0.5 %      Immature Grans % 0.9 %      Neutrophils, Absolute 7.94 10*3/mm3      Lymphocytes, Absolute 1.29 10*3/mm3      Monocytes, Absolute 0.67 10*3/mm3      Eosinophils, Absolute 0.18 10*3/mm3      Basophils, Absolute 0.05 10*3/mm3      Immature Grans, Absolute 0.09 10*3/mm3      nRBC 0.0 /100 WBC           Imaging Results (All)     Procedure Component Value Units Date/Time    Arteriogram (Autofinalize) [[id number]] Resulted: 01/15/21 1706     Updated: 01/15/21 1706    Narrative:      This procedure was auto-finalized with no dictation required.          Condition on Discharge:      stable    Vital Signs:    /97 (BP Location: Left arm, Patient Position: Sitting)   Pulse 96   Temp 98.5 °F (36.9 °C) (Oral)   Resp 18   Ht 152.4 cm (60\")   Wt 84.6 kg (186 lb 8 oz)   LMP 01/14/2021   SpO2 99%   BMI 36.42 kg/m²     Physical Exam:    General: Alert and oriented x4, well nourished, mild distress  Heart: Regular rate and rhythm without murmur rub or " thrill  Lungs: Clear to auscultation bilaterally without use of accessory muscles of respiration.  Abdomen: Soft/nontender/nondistended. No paraspinal megaly is noted.  MSK: 5/5 strength in upper/lower extremities bilaterally.    Discharge Disposition:    Home or Self Care    Discharge Medication:       Discharge Medications      New Medications      Instructions Start Date   HYDROcodone-acetaminophen 7.5-325 MG per tablet  Commonly known as: NORCO   1 tablet, Oral, Every 4 Hours PRN         Continue These Medications      Instructions Start Date   calcitriol 0.25 MCG capsule  Commonly known as: ROCALTROL   0.5 mcg, Oral, Daily      calcium acetate 667 MG capsule  Commonly known as: PHOSLO   2,001 mg, Oral, 3 Times Daily With Meals      cloNIDine 0.3 MG tablet  Commonly known as: CATAPRES   0.3 mg, Oral, 3 Times Daily             Discharge Diet:     Diet Instructions     Diet: Consistent Carbohydrate, Renal, Cardiac      Discharge Diet:  Consistent Carbohydrate  Renal  Cardiac             Activity at Discharge:     Activity Instructions     Activity as Tolerated            Follow-up Appointments:    No future appointments.  Additional Instructions for the Follow-ups that You Need to Schedule     Discharge Follow-up with PCP   As directed       Currently Documented PCP:    Provider, No Known    PCP Phone Number:    None     Follow Up Details: one week pcp         Discharge Follow-up with Specified Provider: vascular surgery dr shukla; 1 Week   As directed      To: vascular surgery dr shukla    Follow Up: 1 Week               Test Results Pending at Discharge:           Liang Horvath DO  01/16/21  16:36 EST

## 2021-01-16 NOTE — PROGRESS NOTES
The patient is 1 day status post placement of tunneled dialysis catheter along with ligation of nonsalvageable right brachiocephalic AV fistula.  Dialysis was initiated around midnight and early morning but the patient insisted coming off before the treatment was completed.    No potassium level done this morning to this time.        Of note, vein mapping performed in our office prior to her fistula creation in February of last year demonstrated marginal vessels on the left.    We will recheck a vein mapping to see if there are any options for her for a left-sided fistula.  If not she would need an AV graft.      Addendum:  L cephalic vein small and inadequate, but also has acute superficial thrombophlebitis (STP). at the antecubital fossa.  L basilic ok above the elbow but also has acute STP.  AV graft only option at this time.  Could be D/C'd and done as an outpt.

## 2021-01-17 ENCOUNTER — READMISSION MANAGEMENT (OUTPATIENT)
Dept: CALL CENTER | Facility: HOSPITAL | Age: 46
End: 2021-01-17

## 2021-01-17 NOTE — OUTREACH NOTE
Prep Survey      Responses   [facility] facility patient discharged from?  [place]   Is LACE score < 7 ?  No   Emergency Room discharge w/ pulse ox?  No   Eligibility  Readm Mgmt   Discharge diagnosis  **Hyperkalemia                           1/15 OPEN THROMBECTOMY OF RIGHT ARTERIAL VENOUS FISTULA,  Fistulagram    Does the patient have one of the following disease processes/diagnoses(primary or secondary)?  Other   Does the patient have Home health ordered?  No   Is there a DME ordered?  No   Prep survey completed?  Yes          Rosemary Figueroa RN

## 2021-01-18 NOTE — PROGRESS NOTES
Case Management Discharge Note      Final Note: Home and continue HD dialysis MWF via tunneled cath placed this admit. Transport via private auto.         Selected Continued Care - Discharged on 1/16/2021 Admission date: 1/14/2021 - Discharge disposition: Home or Self Care    Destination    No services have been selected for the patient.              Durable Medical Equipment    No services have been selected for the patient.              Dialysis/Infusion    No services have been selected for the patient.              Home Medical Care    No services have been selected for the patient.              Therapy    No services have been selected for the patient.              Community Resources    No services have been selected for the patient.                  Transportation Services  Private: Car    Final Discharge Disposition Code: 01 - home or self-care

## 2021-01-19 ENCOUNTER — READMISSION MANAGEMENT (OUTPATIENT)
Dept: CALL CENTER | Facility: HOSPITAL | Age: 46
End: 2021-01-19

## 2021-01-19 NOTE — OUTREACH NOTE
Medical Week 1 Survey      Responses   [facility] patient discharged from?  [place]   Does the patient have one of the following disease processes/diagnoses(primary or secondary)?  Other   Week 1 attempt successful?  Yes   Call start time  0810   Call end time  0816   Discharge diagnosis  **Hyperkalemia                           1/15 OPEN THROMBECTOMY OF RIGHT ARTERIAL VENOUS FISTULA,  Fistulagram    Is patient permission given to speak with other caregiver?  No   Meds reviewed with patient/caregiver?  Yes   Is the patient having any side effects they believe may be caused by any medication additions or changes?  No   Does the patient have all medications ordered at discharge?  Yes   Is the patient taking all medications as directed (includes completed medication regime)?  Yes   Does the patient have a primary care provider?   Yes   Does the patient have an appointment with their PCP within 7 days of discharge?  Yes   Has the patient kept scheduled appointments due by today?  N/A   Has home health visited the patient within 72 hours of discharge?  N/A   Did the patient receive a copy of their discharge instructions?  Yes   Nursing interventions  Reviewed instructions with patient   What is the patient's perception of their health status since discharge?  Improving   Is the patient/caregiver able to teach back signs and symptoms related to disease process for when to call PCP?  Yes   Is the patient/caregiver able to teach back signs and symptoms related to disease process for when to call 911?  Yes   Is the patient/caregiver able to teach back the hierarchy of who to call/visit for symptoms/problems? PCP, Specialist, Home health nurse, Urgent Care, ED, 911  Yes   If the patient is a current smoker, are they able to teach back resources for cessation?  Not a smoker   Week 1 call completed?  Yes   Wrap up additional comments  Site is slightly swollen and tender, denies redness or being hot.  Appt upcoming.             Yoselyn Robert, RN

## 2021-01-26 ENCOUNTER — READMISSION MANAGEMENT (OUTPATIENT)
Dept: CALL CENTER | Facility: HOSPITAL | Age: 46
End: 2021-01-26

## 2021-01-26 NOTE — OUTREACH NOTE
Medical Week 2 Survey      Responses   [facility] patient discharged from?  [place]   Does the patient have one of the following disease processes/diagnoses(primary or secondary)?  Other   Week 2 attempt successful?  Yes   Call start time  1358   Discharge diagnosis  Hyperkalemia: 1/15 OPEN THROMBECTOMY OF RIGHT ARTERIAL VENOUS FISTULA,  Fistulagram    Call end time  1359   Meds reviewed with patient/caregiver?  Yes   Is the patient taking all medications as directed (includes completed medication regime)?  Yes   Comments regarding appointments  7Appt with Dr. Hernandez on 1/27/21   Has the patient kept scheduled appointments due by today?  Yes   Psychosocial issues?  No   What is the patient's perception of their health status since discharge?  Improving   Week 2 Call Completed?  Yes          Ayana Finch RN

## 2021-02-02 ENCOUNTER — READMISSION MANAGEMENT (OUTPATIENT)
Dept: CALL CENTER | Facility: HOSPITAL | Age: 46
End: 2021-02-02

## 2021-02-02 ENCOUNTER — APPOINTMENT (OUTPATIENT)
Dept: PREADMISSION TESTING | Facility: HOSPITAL | Age: 46
End: 2021-02-02

## 2021-02-02 VITALS
DIASTOLIC BLOOD PRESSURE: 81 MMHG | HEIGHT: 60 IN | TEMPERATURE: 98.2 F | OXYGEN SATURATION: 98 % | RESPIRATION RATE: 16 BRPM | WEIGHT: 181 LBS | SYSTOLIC BLOOD PRESSURE: 136 MMHG | BODY MASS INDEX: 35.53 KG/M2

## 2021-02-02 PROCEDURE — C9803 HOPD COVID-19 SPEC COLLECT: HCPCS | Performed by: NURSE PRACTITIONER

## 2021-02-02 PROCEDURE — U0004 COV-19 TEST NON-CDC HGH THRU: HCPCS | Performed by: NURSE PRACTITIONER

## 2021-02-02 RX ORDER — HYDROXYZINE 50 MG/1
50 TABLET, FILM COATED ORAL 2 TIMES DAILY
COMMUNITY
Start: 2020-12-03

## 2021-02-02 NOTE — DISCHARGE INSTRUCTIONS
Take the following medications the morning of surgery: CLONIDINE AND HYDROXYZINE AS NEEDED      If you are on prescription narcotic pain medication to control your pain you may also take that medication the morning of surgery.    General Instructions:  • Do not eat solid food after midnight the night before surgery.  • You may drink clear liquids day of surgery but must stop at least one hour before your hospital arrival time.  • It is beneficial for you to have a clear drink that contains carbohydrates the day of surgery.  We suggest a 12 to 20 ounce bottle of Gatorade or Powerade for non-diabetic patients or a 12 to 20 ounce bottle of G2 or Powerade Zero for diabetic patients.     Clear liquids are liquids you can see through.  Nothing red in color.     Plain water                               Sports drinks  Sodas                                   Gelatin (Jell-O)  Fruit juices without pulp such as white grape juice and apple juice  Popsicles that contain no fruit or yogurt  Tea or coffee (no cream or milk added)  Gatorade / Powerade  G2 / Powerade Zero    • Bring any papers given to you in the doctor’s office.  • Wear clean comfortable clothes.  • Do not wear contact lenses, false eyelashes or make-up.  Bring a case for your glasses.   • Remove all piercings.  Leave jewelry and any other valuables at home.  • The Pre-Admission Testing nurse will instruct you to bring medications if unable to obtain an accurate list in Pre-Admission Testing.            Preventing a Surgical Site Infection:  • For 2 to 3 days before surgery, avoid shaving with a razor because the razor can irritate skin and make it easier to develop an infection.    • Any areas of open skin can increase the risk of a post-operative wound infection by allowing bacteria to enter and travel throughout the body.  Notify your surgeon if you have any skin wounds / rashes even if it is not near the expected surgical site.  The area will need assessed to  determine if surgery should be delayed until it is healed.  • The night prior to surgery shower using a fresh bar of anti-bacterial soap (such as Dial) and clean washcloth.  Sleep in a clean bed with clean clothing.  Do not allow pets to sleep with you.  • Shower on the morning of surgery using a fresh bar of anti-bacterial soap (such as Dial) and clean washcloth.  Dry with a clean towel and dress in clean clothing.  • Ask your surgeon if you will be receiving antibiotics prior to surgery.  • Make sure you, your family, and all healthcare providers clean their hands with soap and water or an alcohol based hand  before caring for you or your wound.    Day of surgery: 2/4/2021. MAIN OR. ARRIVAL TIME 8 AM  Your arrival time is approximately two hours before your scheduled surgery time.  Upon arrival, a Pre-op nurse and Anesthesiologist will review your health history, obtain vital signs, and answer questions you may have.  The only belongings needed at this time will be a list of your home medications and if applicable your C-PAP/BI-PAP machine.  A Pre-op nurse will start an IV and you may receive medication in preparation for surgery, including something to help you relax.     Please be aware that surgery does come with discomfort.  We want to make every effort to control your discomfort so please discuss any uncontrolled symptoms with your nurse.   Your doctor will most likely have prescribed pain medications.      If you are going home after surgery you will receive individualized written care instructions before being discharged.  A responsible adult must drive you to and from the hospital on the day of your surgery and stay with you for 24 hours.  Discharge prescriptions can be filled by the hospital pharmacy during regular pharmacy hours.  If you are having surgery late in the day/evening your prescription may be e-prescribed to your pharmacy.  Please verify your pharmacy hours or chose a 24 hour  pharmacy to avoid not having access to your prescription because your pharmacy has closed for the day.    CHLORHEXIDINE CLOTH INSTRUCTIONS  The morning of surgery follow these instructions using the Chlorhexidine cloths you've been given.  These steps reduce bacteria on the body.  Do not use the cloths near your eyes, ears mouth, genitalia or on open wounds.  Throw the cloths away after use but do not try to flush them down a toilet.      • Open and remove one cloth at a time from the package.    • Leave the cloth unfolded and begin the bathing.  • Massage the skin with the cloths using gentle pressure to remove bacteria.  Do not scrub harshly.   • Follow the steps below with one 2% CHG cloth per area (6 total cloths).  • One cloth for neck, shoulders and chest.  • One cloth for both arms, hands, fingers and underarms (do underarms last).  • One cloth for the abdomen followed by groin.  • One cloth for right leg and foot including between the toes.  • One cloth for left leg and foot including between the toes.  • The last cloth is to be used for the back of the neck, back and buttocks.    Allow the CHG to air dry 3 minutes on the skin which will give it time to work and decrease the chance of irritation.  The skin may feel sticky until it is dry.  Do not rinse with water or any other liquid or you will lose the beneficial effects of the CHG.  If mild skin irritation occurs, do rinse the skin to remove the CHG.  Report this to the nurse at time of admission.  Do not apply lotions, creams, ointments, deodorants or perfumes after using the clothes. Dress in clean clothes before coming to the hospital.    If you have any questions please call Pre-Admission Testing at [phone].  Deductibles and co-payments are collected on the day of service. Please be prepared to pay the required co-pay, deductible or deposit on the day of service as defined by your plan.    Patient Education for Self-Quarantine  Process    Following your COVID testing, we strongly recommend that you do not leave your home after you have been tested for COVID except to get medical care. This includes not going to work, school or to public areas.  If this is not possible for you to do please limit your activities to only required outings.  Be sure to wear a mask when you are with other people, practice social distancing and wash your hands frequently.      The following items provide additional details to keep you safe.  • Wash your hands with soap and water frequently for at least 20 seconds.   • Avoid touching your eyes, nose and mouth with unwashed hands.  • Do not share anything - utensils, towels, food from the same bowl.   • Have your own utensils, drinking glass, dishes, towels and bedding.   • Do not have visitors.   • Do use FaceTime to stay in touch with family and friends.  • You should stay in a specific room away from others if possible.   • Stay at least 6 feet away from others in the home if you cannot have a dedicated room to yourself.   • Do not snuggle with your pet. While the CDC says there is no evidence that pets can spread COVID-19 or be infected from humans, it is probably best to avoid “petting, snuggling, being kissed or licked and sharing food (during self-quarantine)”, according to the CDC.   • Sanitize household surfaces daily. Include all high touch areas (door handles, light switches, phones, countertops, etc.)  • Do not share a bathroom with others, if possible.   • Wear a mask around others in your home if you are unable to stay in a separate room or 6 feet apart. If  you are unable to wear a mask, have your family member wear a mask if they must be within 6 feet of you.   Call your surgeon immediately if you experience any of the following symptoms:  • Sore Throat  • Shortness of Breath or difficulty breathing  • Cough  • Chills  • Body soreness or muscle pain  • Headache  • Fever  • New loss of taste or  smell  • Do not arrive for your surgery ill.  Your procedure will need to be rescheduled to another time.  You will need to call your physician before the day of surgery to avoid any unnecessary exposure to hospital staff as well as other patients.

## 2021-02-02 NOTE — OUTREACH NOTE
Medical Week 3 Survey      Responses   [facility] patient discharged from?  [place]   Does the patient have one of the following disease processes/diagnoses(primary or secondary)?  Other   Week 3 attempt successful?  Yes   Call start time  1136   Call end time  1138   Discharge diagnosis  Hyperkalemia: 1/15 OPEN THROMBECTOMY OF RIGHT ARTERIAL VENOUS FISTULA,  Fistulagram    Meds reviewed with patient/caregiver?  Yes   Is the patient taking all medications as directed (includes completed medication regime)?  Yes   Has the patient kept scheduled appointments due by today?  Yes   Comments  Pt have surgery on Thursday for HD catheter access   Psychosocial issues?  No   What is the patient's perception of their health status since discharge?  Improving   Is the patient/caregiver able to teach back signs and symptoms related to disease process for when to call PCP?  Yes   Is the patient/caregiver able to teach back signs and symptoms related to disease process for when to call 911?  Yes   Is the patient/caregiver able to teach back the hierarchy of who to call/visit for symptoms/problems? PCP, Specialist, Home health nurse, Urgent Care, ED, 911  Yes   Additional teach back comments  Pt states she is doing fine. Brief call. has surgery on Thursday for HD access   Week 3 Call Completed?  Yes          Deirdre Hanson RN

## 2021-02-03 LAB — SARS-COV-2 RNA RESP QL NAA+PROBE: NOT DETECTED

## 2021-02-04 ENCOUNTER — HOSPITAL ENCOUNTER (OUTPATIENT)
Facility: HOSPITAL | Age: 46
Setting detail: HOSPITAL OUTPATIENT SURGERY
Discharge: HOME | End: 2021-02-04
Attending: SURGERY | Admitting: SURGERY

## 2021-02-04 ENCOUNTER — ANESTHESIA EVENT (OUTPATIENT)
Dept: PERIOP | Facility: HOSPITAL | Age: 46
End: 2021-02-04

## 2021-02-04 ENCOUNTER — ANESTHESIA (OUTPATIENT)
Dept: PERIOP | Facility: HOSPITAL | Age: 46
End: 2021-02-04

## 2021-02-04 VITALS
HEIGHT: 60 IN | HEART RATE: 88 BPM | BODY MASS INDEX: 34.61 KG/M2 | OXYGEN SATURATION: 99 % | WEIGHT: 176.31 LBS | DIASTOLIC BLOOD PRESSURE: 94 MMHG | RESPIRATION RATE: 18 BRPM | TEMPERATURE: 98.5 F | SYSTOLIC BLOOD PRESSURE: 140 MMHG

## 2021-02-04 DIAGNOSIS — N18.6 ESRD ON HEMODIALYSIS (HCC): Primary | ICD-10-CM

## 2021-02-04 DIAGNOSIS — Z99.2 ESRD ON HEMODIALYSIS (HCC): Primary | ICD-10-CM

## 2021-02-04 LAB
ANION GAP SERPL CALCULATED.3IONS-SCNC: 15.6 MMOL/L (ref 5–15)
BUN SERPL-MCNC: 20 MG/DL (ref 6–20)
BUN/CREAT SERPL: 3.9 (ref 7–25)
CALCIUM SPEC-SCNC: 9 MG/DL (ref 8.6–10.5)
CHLORIDE SERPL-SCNC: 97 MMOL/L (ref 98–107)
CO2 SERPL-SCNC: 24.4 MMOL/L (ref 22–29)
CREAT SERPL-MCNC: 5.15 MG/DL (ref 0.57–1)
GFR SERPL CREATININE-BSD FRML MDRD: 9 ML/MIN/1.73
GFR SERPL CREATININE-BSD FRML MDRD: ABNORMAL ML/MIN/{1.73_M2}
GLUCOSE BLDC GLUCOMTR-MCNC: 100 MG/DL (ref 70–130)
GLUCOSE SERPL-MCNC: 93 MG/DL (ref 65–99)
HCG SERPL QL: NEGATIVE
POTASSIUM SERPL-SCNC: 4.5 MMOL/L (ref 3.5–5.2)
SODIUM SERPL-SCNC: 137 MMOL/L (ref 136–145)

## 2021-02-04 PROCEDURE — 25010000003 CEFAZOLIN PER 500 MG: Performed by: SURGERY

## 2021-02-04 PROCEDURE — 80048 BASIC METABOLIC PNL TOTAL CA: CPT | Performed by: SURGERY

## 2021-02-04 PROCEDURE — 25010000002 HEPARIN (PORCINE) PER 1000 UNITS: Performed by: NURSE ANESTHETIST, CERTIFIED REGISTERED

## 2021-02-04 PROCEDURE — 84703 CHORIONIC GONADOTROPIN ASSAY: CPT | Performed by: SURGERY

## 2021-02-04 PROCEDURE — 25010000003 CEFAZOLIN IN DEXTROSE 2-4 GM/100ML-% SOLUTION: Performed by: SURGERY

## 2021-02-04 PROCEDURE — 25010000002 FENTANYL CITRATE (PF) 100 MCG/2ML SOLUTION: Performed by: NURSE ANESTHETIST, CERTIFIED REGISTERED

## 2021-02-04 PROCEDURE — 25010000003 LIDOCAINE 1 % SOLUTION 20 ML VIAL: Performed by: SURGERY

## 2021-02-04 PROCEDURE — 25010000002 MIDAZOLAM PER 1 MG: Performed by: ANESTHESIOLOGY

## 2021-02-04 PROCEDURE — 25010000002 PROPOFOL 10 MG/ML EMULSION: Performed by: NURSE ANESTHETIST, CERTIFIED REGISTERED

## 2021-02-04 PROCEDURE — C1768 GRAFT, VASCULAR: HCPCS | Performed by: SURGERY

## 2021-02-04 PROCEDURE — 25010000002 PROTAMINE SULFATE PER 10 MG: Performed by: NURSE ANESTHETIST, CERTIFIED REGISTERED

## 2021-02-04 PROCEDURE — 82962 GLUCOSE BLOOD TEST: CPT

## 2021-02-04 PROCEDURE — 25010000002 HEPARIN (PORCINE) PER 1000 UNITS: Performed by: SURGERY

## 2021-02-04 DEVICE — FLOSEAL HEMOSTATIC MATRIX, 10ML
Type: IMPLANTABLE DEVICE | Site: ARM | Status: FUNCTIONAL
Brand: FLOSEAL HEMOSTATIC MATRIX

## 2021-02-04 DEVICE — LIGACLIP MCA MULTIPLE CLIP APPLIERS, 20 SMALL CLIPS
Type: IMPLANTABLE DEVICE | Site: ARM | Status: FUNCTIONAL
Brand: LIGACLIP

## 2021-02-04 DEVICE — PROPATEN VASCULAR GRAFT SW IR 4-7MMX45CM 38CM RINGS HEPARIN
Type: IMPLANTABLE DEVICE | Site: ARM | Status: FUNCTIONAL
Brand: GORE PROPATEN VASCULAR GRAFT

## 2021-02-04 DEVICE — ABSORBABLE HEMOSTAT (OXIDIZED REGENERATED CELLULOSE)
Type: IMPLANTABLE DEVICE | Site: ARM | Status: FUNCTIONAL
Brand: SURGICEL NU-KNIT

## 2021-02-04 RX ORDER — DIPHENHYDRAMINE HCL 25 MG
25 CAPSULE ORAL
Status: DISCONTINUED | OUTPATIENT
Start: 2021-02-04 | End: 2021-02-04 | Stop reason: HOSPADM

## 2021-02-04 RX ORDER — FENTANYL CITRATE 50 UG/ML
50 INJECTION, SOLUTION INTRAMUSCULAR; INTRAVENOUS
Status: DISCONTINUED | OUTPATIENT
Start: 2021-02-04 | End: 2021-02-04 | Stop reason: HOSPADM

## 2021-02-04 RX ORDER — HEPARIN SODIUM 1000 [USP'U]/ML
INJECTION, SOLUTION INTRAVENOUS; SUBCUTANEOUS AS NEEDED
Status: DISCONTINUED | OUTPATIENT
Start: 2021-02-04 | End: 2021-02-04 | Stop reason: SURG

## 2021-02-04 RX ORDER — ACETAMINOPHEN 325 MG/1
650 TABLET ORAL ONCE
Status: DISCONTINUED | OUTPATIENT
Start: 2021-02-04 | End: 2021-02-04

## 2021-02-04 RX ORDER — MIDAZOLAM HYDROCHLORIDE 1 MG/ML
1 INJECTION INTRAMUSCULAR; INTRAVENOUS
Status: DISCONTINUED | OUTPATIENT
Start: 2021-02-04 | End: 2021-02-04 | Stop reason: HOSPADM

## 2021-02-04 RX ORDER — MIDAZOLAM HYDROCHLORIDE 1 MG/ML
1 INJECTION INTRAMUSCULAR; INTRAVENOUS
Status: COMPLETED | OUTPATIENT
Start: 2021-02-04 | End: 2021-02-04

## 2021-02-04 RX ORDER — CEFAZOLIN SODIUM 2 G/100ML
2 INJECTION, SOLUTION INTRAVENOUS ONCE
Status: COMPLETED | OUTPATIENT
Start: 2021-02-04 | End: 2021-02-04

## 2021-02-04 RX ORDER — SODIUM CHLORIDE 0.9 % (FLUSH) 0.9 %
10 SYRINGE (ML) INJECTION EVERY 12 HOURS SCHEDULED
Status: DISCONTINUED | OUTPATIENT
Start: 2021-02-04 | End: 2021-02-04 | Stop reason: HOSPADM

## 2021-02-04 RX ORDER — HYDROCODONE BITARTRATE AND ACETAMINOPHEN 5; 325 MG/1; MG/1
1 TABLET ORAL EVERY 6 HOURS PRN
Qty: 20 TABLET | Refills: 0 | Status: SHIPPED | OUTPATIENT
Start: 2021-02-04 | End: 2021-03-12

## 2021-02-04 RX ORDER — HYDROMORPHONE HYDROCHLORIDE 1 MG/ML
0.5 INJECTION, SOLUTION INTRAMUSCULAR; INTRAVENOUS; SUBCUTANEOUS
Status: DISCONTINUED | OUTPATIENT
Start: 2021-02-04 | End: 2021-02-04 | Stop reason: HOSPADM

## 2021-02-04 RX ORDER — SODIUM CHLORIDE 9 MG/ML
INJECTION, SOLUTION INTRAVENOUS CONTINUOUS PRN
Status: DISCONTINUED | OUTPATIENT
Start: 2021-02-04 | End: 2021-02-04 | Stop reason: SURG

## 2021-02-04 RX ORDER — FLUMAZENIL 0.1 MG/ML
0.2 INJECTION INTRAVENOUS AS NEEDED
Status: DISCONTINUED | OUTPATIENT
Start: 2021-02-04 | End: 2021-02-04 | Stop reason: HOSPADM

## 2021-02-04 RX ORDER — HYDROCODONE BITARTRATE AND ACETAMINOPHEN 7.5; 325 MG/1; MG/1
1 TABLET ORAL ONCE AS NEEDED
Status: COMPLETED | OUTPATIENT
Start: 2021-02-04 | End: 2021-02-04

## 2021-02-04 RX ORDER — PROPOFOL 10 MG/ML
VIAL (ML) INTRAVENOUS CONTINUOUS PRN
Status: DISCONTINUED | OUTPATIENT
Start: 2021-02-04 | End: 2021-02-04 | Stop reason: SURG

## 2021-02-04 RX ORDER — FAMOTIDINE 10 MG/ML
20 INJECTION, SOLUTION INTRAVENOUS
Status: COMPLETED | OUTPATIENT
Start: 2021-02-04 | End: 2021-02-04

## 2021-02-04 RX ORDER — DIPHENHYDRAMINE HYDROCHLORIDE 50 MG/ML
12.5 INJECTION INTRAMUSCULAR; INTRAVENOUS
Status: DISCONTINUED | OUTPATIENT
Start: 2021-02-04 | End: 2021-02-04 | Stop reason: HOSPADM

## 2021-02-04 RX ORDER — SODIUM CHLORIDE 9 MG/ML
9 INJECTION, SOLUTION INTRAVENOUS CONTINUOUS PRN
Status: DISCONTINUED | OUTPATIENT
Start: 2021-02-04 | End: 2021-02-04 | Stop reason: HOSPADM

## 2021-02-04 RX ORDER — PROMETHAZINE HYDROCHLORIDE 25 MG/1
25 TABLET ORAL ONCE AS NEEDED
Status: DISCONTINUED | OUTPATIENT
Start: 2021-02-04 | End: 2021-02-04 | Stop reason: HOSPADM

## 2021-02-04 RX ORDER — LABETALOL HYDROCHLORIDE 5 MG/ML
5 INJECTION, SOLUTION INTRAVENOUS
Status: DISCONTINUED | OUTPATIENT
Start: 2021-02-04 | End: 2021-02-04 | Stop reason: HOSPADM

## 2021-02-04 RX ORDER — FENTANYL CITRATE 50 UG/ML
INJECTION, SOLUTION INTRAMUSCULAR; INTRAVENOUS AS NEEDED
Status: DISCONTINUED | OUTPATIENT
Start: 2021-02-04 | End: 2021-02-04 | Stop reason: SURG

## 2021-02-04 RX ORDER — ACETAMINOPHEN 325 MG/1
650 TABLET ORAL ONCE
Status: COMPLETED | OUTPATIENT
Start: 2021-02-04 | End: 2021-02-04

## 2021-02-04 RX ORDER — PROMETHAZINE HYDROCHLORIDE 25 MG/1
25 SUPPOSITORY RECTAL ONCE AS NEEDED
Status: DISCONTINUED | OUTPATIENT
Start: 2021-02-04 | End: 2021-02-04 | Stop reason: HOSPADM

## 2021-02-04 RX ORDER — LIDOCAINE HYDROCHLORIDE 20 MG/ML
INJECTION, SOLUTION INFILTRATION; PERINEURAL AS NEEDED
Status: DISCONTINUED | OUTPATIENT
Start: 2021-02-04 | End: 2021-02-04 | Stop reason: SURG

## 2021-02-04 RX ORDER — NALOXONE HCL 0.4 MG/ML
0.2 VIAL (ML) INJECTION AS NEEDED
Status: DISCONTINUED | OUTPATIENT
Start: 2021-02-04 | End: 2021-02-04 | Stop reason: HOSPADM

## 2021-02-04 RX ORDER — EPHEDRINE SULFATE 50 MG/ML
5 INJECTION, SOLUTION INTRAVENOUS ONCE AS NEEDED
Status: DISCONTINUED | OUTPATIENT
Start: 2021-02-04 | End: 2021-02-04 | Stop reason: HOSPADM

## 2021-02-04 RX ORDER — OXYCODONE AND ACETAMINOPHEN 7.5; 325 MG/1; MG/1
1 TABLET ORAL ONCE AS NEEDED
Status: DISCONTINUED | OUTPATIENT
Start: 2021-02-04 | End: 2021-02-04 | Stop reason: HOSPADM

## 2021-02-04 RX ORDER — ONDANSETRON 2 MG/ML
4 INJECTION INTRAMUSCULAR; INTRAVENOUS ONCE AS NEEDED
Status: DISCONTINUED | OUTPATIENT
Start: 2021-02-04 | End: 2021-02-04 | Stop reason: HOSPADM

## 2021-02-04 RX ORDER — SODIUM CHLORIDE 0.9 % (FLUSH) 0.9 %
10 SYRINGE (ML) INJECTION AS NEEDED
Status: DISCONTINUED | OUTPATIENT
Start: 2021-02-04 | End: 2021-02-04 | Stop reason: HOSPADM

## 2021-02-04 RX ORDER — PROTAMINE SULFATE 10 MG/ML
INJECTION, SOLUTION INTRAVENOUS AS NEEDED
Status: DISCONTINUED | OUTPATIENT
Start: 2021-02-04 | End: 2021-02-04 | Stop reason: SURG

## 2021-02-04 RX ORDER — MIDAZOLAM HYDROCHLORIDE 1 MG/ML
2 INJECTION INTRAMUSCULAR; INTRAVENOUS
Status: COMPLETED | OUTPATIENT
Start: 2021-02-04 | End: 2021-02-04

## 2021-02-04 RX ADMIN — HEPARIN SODIUM 7500 UNITS: 1000 INJECTION, SOLUTION INTRAVENOUS; SUBCUTANEOUS at 11:26

## 2021-02-04 RX ADMIN — MIDAZOLAM 1 MG: 1 INJECTION INTRAMUSCULAR; INTRAVENOUS at 09:06

## 2021-02-04 RX ADMIN — PROTAMINE SULFATE 30 MG: 10 INJECTION, SOLUTION INTRAVENOUS at 12:10

## 2021-02-04 RX ADMIN — FENTANYL CITRATE 50 MCG: 50 INJECTION, SOLUTION INTRAMUSCULAR; INTRAVENOUS at 13:16

## 2021-02-04 RX ADMIN — MIDAZOLAM 1 MG: 1 INJECTION INTRAMUSCULAR; INTRAVENOUS at 10:37

## 2021-02-04 RX ADMIN — FAMOTIDINE 20 MG: 10 INJECTION INTRAVENOUS at 09:06

## 2021-02-04 RX ADMIN — LIDOCAINE HYDROCHLORIDE 80 MG: 20 INJECTION, SOLUTION INFILTRATION; PERINEURAL at 10:51

## 2021-02-04 RX ADMIN — PROPOFOL 75 MCG/KG/MIN: 10 INJECTION, EMULSION INTRAVENOUS at 10:51

## 2021-02-04 RX ADMIN — SODIUM CHLORIDE 9 ML/HR: 9 INJECTION, SOLUTION INTRAVENOUS at 09:07

## 2021-02-04 RX ADMIN — ACETAMINOPHEN 650 MG: 325 TABLET, FILM COATED ORAL at 09:06

## 2021-02-04 RX ADMIN — SODIUM CHLORIDE: 9 INJECTION, SOLUTION INTRAVENOUS at 10:10

## 2021-02-04 RX ADMIN — FENTANYL CITRATE 50 MCG: 50 INJECTION INTRAMUSCULAR; INTRAVENOUS at 10:58

## 2021-02-04 RX ADMIN — CEFAZOLIN SODIUM 2 G: 2 INJECTION, SOLUTION INTRAVENOUS at 10:44

## 2021-02-04 RX ADMIN — FENTANYL CITRATE 50 MCG: 50 INJECTION INTRAMUSCULAR; INTRAVENOUS at 11:00

## 2021-02-04 RX ADMIN — FENTANYL CITRATE 50 MCG: 50 INJECTION, SOLUTION INTRAMUSCULAR; INTRAVENOUS at 12:52

## 2021-02-04 RX ADMIN — FENTANYL CITRATE 50 MCG: 50 INJECTION INTRAMUSCULAR; INTRAVENOUS at 11:26

## 2021-02-04 RX ADMIN — HYDROCODONE BITARTRATE AND ACETAMINOPHEN 1 TABLET: 7.5; 325 TABLET ORAL at 12:52

## 2021-02-04 RX ADMIN — SODIUM CHLORIDE: 9 INJECTION, SOLUTION INTRAVENOUS at 12:35

## 2021-02-04 NOTE — ANESTHESIA POSTPROCEDURE EVALUATION
Patient: Sunni Mcneil    Procedure Summary     Date: 02/04/21 Room / Location: [facility] OR 09 / [facility] MAIN OR    Anesthesia Start: 1043 Anesthesia Stop: 1247    Procedure: LEFT BRACHIOAXILLARY ARTERIOVENOUS FISTULA GRAFT (Left Head) Diagnosis:     Surgeon: Anya Hernandez Jr., MD Provider: Jan Flores MD    Anesthesia Type: MAC ASA Status: 3          Anesthesia Type: MAC    Vitals  Vitals Value Taken Time   /94 02/04/21 1300   Temp     Pulse 88 02/04/21 1300   Resp 18 02/04/21 1300   SpO2 99 % 02/04/21 1300           Post Anesthesia Care and Evaluation    Patient location during evaluation: PACU  Patient participation: complete - patient participated  Level of consciousness: awake and alert  Pain management: adequate  Airway patency: patent  Anesthetic complications: No anesthetic complications    Cardiovascular status: acceptable  Respiratory status: acceptable  Hydration status: acceptable    Comments: --------------------            02/04/21               1300     --------------------   BP:       140/94     Pulse:      88       Resp:       18       Temp:                SpO2:      99%      --------------------

## 2021-02-04 NOTE — DISCHARGE INSTRUCTIONS
YOU RECEIVED A PAIN PILL   AT 12:45PM      [facility]  David Christopher, Erasto Gaviria [place], [place]  [address], [address]  [phone]    Post-Operative Instructions for AV Fistula / Graft   Diet: Regular Diet    Medications: Take your regularly scheduled medications on the day of your surgery, unless your doctor has directed you otherwise. You may be sent home with a prescription for pain medication, follow the directions as prescribed.    Activity Restrictions / Driving: Avoid lifting more than 15 pounds or other activities that stress or compress the access area. No driving for the remainder of the day after surgery. You may drive when you no longer are taking narcotic pain medications. If a nerve block was done to numb your arm for surgery, you will be placed in an arm sling.  This numbness and inability to move the arm can last for as little as 6 hours but as many as 18.  The sling should be used during this time but can be removed when sensation and movement of your arm is normal and does not need to be used after that. Use of the arm is encouraged after the surgery.    Incision Care: Some bruising is normal. If you have drainage from the incision please notify the office. Dressing should be removed in 48 hours. After dressing is removed, it is OK to shower. Do not submerge incision until cleared by your surgeon (bath or swimming).    Bathing and Showering: You may shower after you remove your dressing.    Follow-up Appointments: You will need to return to the office for a follow-up visit within 1-3 weeks after your surgery. Please make sure you have your appointment scheduled, call [phone].    The patient (you) should:  1. Avoid wearing tight constrictive clothing over that arm.  2. Avoid wearing jewelry that is tight, such as a watch on the access arm.  3. Avoid carrying heavy objects.  4. Avoid purse straps over the fistula.  5. Avoid sleeping on the arm or keeping it bent  "for extended periods of time.  6. Each day, using your opposite hand, feel over the fistula for the \"thrill\" or vibration that is normally present.    Fistula Information / Care:  ·  It is normal to have swelling in the surgical area. To help control this swelling, you should elevate your arm on a pillow.  ·  Wiggle your fingers and clinch your fist 10 times every hour, while awake, for the first 5-7 days. Also, bend and straighten at the elbow to regain normal range of motion. These exercises are designed to promote circulation in the fingers and aid in draining away the excess fluid accumulation in the immediate area.  · No blood pressures or needle sticks in the arm with your access.    Call the office for the followin. Fever greater than 101.0  2. Uncontrolled pain. This is on a scale of 1-10 (10 being the worst pain imaginable) your pain is a level 7 or above.  3. It is important that you notify our office if you are having numbness and significant pain in the extremity in which you have just had surgery!  4. Decreased or absent thrill.  5. Nausea, diarrhea, and/or vomiting that continue for 12-24 hours.  6. Signs of an infection: redness, increased swelling, drainage, fever and/or chills.  7. Chest pain or difficulty breathing.    The fistula or graft CAN NOT be used until the MD has given written approval. Generally, a graft will be ready to use in 2 weeks, and a fistula will be ready to use in 6-8 weeks.     If you have further questions after reading this handout, the office is open from 8:30am to 5:00pm Monday through Friday. Call [phone].  "

## 2021-02-04 NOTE — ANESTHESIA PREPROCEDURE EVALUATION
Anesthesia Evaluation     Patient summary reviewed   NPO Solid Status: > 8 hours             Airway   Mallampati: II  Neck ROM: full  No difficulty expected  Dental      Pulmonary    Cardiovascular     ECG reviewed  Rhythm: regular    (+) hypertension,       Neuro/Psych  GI/Hepatic/Renal/Endo    (+) obesity,   renal disease ESRD, diabetes mellitus,     Musculoskeletal     Abdominal    Substance History      OB/GYN          Other          Other Comment: Hb 8.3                  Anesthesia Plan    ASA 3     MAC       Anesthetic plan, all risks, benefits, and alternatives have been provided, discussed and informed consent has been obtained with: patient.  Use of blood products discussed with patient .

## 2021-02-04 NOTE — OP NOTE
Operative Note  Date of Admission:  2/4/2021  OR Date: 2/4/2021    Pre-op Diagnosis:   End-stage renal disease in need of long-term access for hemodialysis    Post-op Diagnosis:     Same    Procedure:   1) creation of left brachial axillary AV graft using tapered 4 mm up to 7 mm PTFE    Surgeon: Anya Hernandez Jr, MD    Assistant: Sultana Ferrari RN     and they provided critical assistance during the case including suctioning, exposure, retraction, and reduction of blood loss.    Anesthesia: Monitored Anesthesia Care    Staff:   Circulator: Heidi Duque RN  Scrub Person: Gisella Milan  Assistant: Sultana Ferrari  Orientee: Kimo Huizar RN    Estimated Blood Loss:  cc    Specimens:   Order Name Source Comment Collection Info Order Time   BASIC METABOLIC PANEL   Collected By: Verónica Blanc RN 2/4/2021  8:04 AM   HCG, SERUM, QUALITATIVE   Collected By: Verónica Blanc RN 2/4/2021  8:17 AM       Complications: None apparent    Findings: Strong Doppler signal noted in the graft and axillary vein at the end of the procedure.    Indications:  As in preop diagnosis           Procedure: The patient was placed on the operating table in supine position.  Intravenous sedation was then given.  The patient was prepped from the fingertips to the shoulder region, including the axilla, with ChloraPrep and draped in usual sterile fashion.  Local anesthetic was infiltrated in the skin and subcutaneous tissue in the bicipital groove just above the antecubital fossa.  More local anesthetic was infiltrated into the skin and subcutaneous tissue in the lateral axilla.  A longitudinal incision was made along the bicipital groove and electrocautery was used to dissect through the subcutaneous tissue and the fascia.  The brachial artery and vein complex identified the brachial artery was isolated and Vesseloops passed around this structure.  A transverse incision was then made in the lateral axilla and the electrocautery  was used to dissected the subcutaneous tissue down to the fascia.  The fascia was incised in the axillary artery and vein complex was identified.  The axillary vein was actually quite small.  Further dissection was carried out to ensure there was no other venous structures noted and there was none.  More local anesthetic was infiltrated into the subcutaneous tissue and a curving fashion between these 2 incisions.  A tunneling device was then placed through the subcutaneous tissue.  7500 to heparin was then given intravenously.  A 4 mm up to 7 mm tapered PTFE graft was then brought through the subcutaneous tunnel with the 4 mm toward the arterial side.  The brachial artery was then clamped and a lateral arteriotomy made.  The 4 mm end was cut in  a beveled shape and an end graft to side artery anastomosis was constructed using a running 6-0 Prolene suture beginning at each end and tying on the side.  After completion of this the graft was flushed and irrigated with heparinized saline solution.  Flow was reestablished in the native arterial system and the graft was clamped just beyond the new anastomosis.  Vascular control was obtained of the axillary vein and a lateral venotomy made.  Valve leaflets were then transected and the venotomy site.  The graft was cut to the appropriate beveled shape and an end graft to side vein anastomosis was constructed using a running 6-0 Prolene suture beginning at each end and tying on the side.  Prior to completion of this the graft was flushed and the vein flushed and the lumen irrigated heparinized saline solution.  Anastomosis was again then completed.  Using a hand-held Doppler there is a strong Doppler signal noted in the graft and in the axillary vein.  30 mg protamine sulfate was then given intravenously.  The wounds were then irrigated with saline and dried.  FloSeal was sprayed around the venous anastomosis.  The subcutaneous tissue in each wound was then closed with a  running 3-0 Vicryl suture in 2 layers and the skin closed with running 4-0 Vicryl in a subcuticular fashion.  Dermabond was placed over the incisions.  Sponge, needle, and instrument counts were all reported as correct.  Patient was then transported to recovery room in satisfactory condition.        Radiographic Findings:  1) none performed      There are no hospital problems to display for this patient.     Anya Hernandez Jr., MD     Date: 2/4/2021  Time: 12:36 EST

## 2021-02-11 ENCOUNTER — READMISSION MANAGEMENT (OUTPATIENT)
Dept: CALL CENTER | Facility: HOSPITAL | Age: 46
End: 2021-02-11

## 2021-02-11 NOTE — OUTREACH NOTE
Medical Week 4 Survey      Responses   [facility] patient discharged from?  [place]   Does the patient have one of the following disease processes/diagnoses(primary or secondary)?  Other   Week 4 attempt successful?  Yes   Call start time  1547   Call end time  1547   Discharge diagnosis  Hyperkalemia: 1/15 OPEN THROMBECTOMY OF RIGHT ARTERIAL VENOUS FISTULA,  Fistulagram    Meds reviewed with patient/caregiver?  Yes   Is the patient taking all medications as directed (includes completed medication regime)?  Yes   Has the patient kept scheduled appointments due by today?  Yes   Is the patient still receiving Home Health Services?  N/A   What is the patient's perception of their health status since discharge?  Improving   Week 4 Call Completed?  Yes   Would the patient like one additional call?  No   Graduated  Yes   Is the patient interested in additional calls from an ambulatory ?  NOTE:  applies to high risk patients requiring additional follow-up.  No   Did the patient feel the follow up calls were helpful during their recovery period?  Yes   Was the number of calls appropriate?  Yes   Wrap up additional comments  Call was short. Patient gave short answers.           Ayana Finch RN

## 2021-02-13 ENCOUNTER — ANESTHESIA EVENT (OUTPATIENT)
Dept: PERIOP | Facility: HOSPITAL | Age: 46
End: 2021-02-13

## 2021-02-13 ENCOUNTER — ANESTHESIA (OUTPATIENT)
Dept: PERIOP | Facility: HOSPITAL | Age: 46
End: 2021-02-13

## 2021-02-13 ENCOUNTER — HOSPITAL ENCOUNTER (OUTPATIENT)
Facility: HOSPITAL | Age: 46
Discharge: HOME | End: 2021-02-14
Attending: EMERGENCY MEDICINE | Admitting: SURGERY

## 2021-02-13 ENCOUNTER — APPOINTMENT (OUTPATIENT)
Dept: GENERAL RADIOLOGY | Facility: HOSPITAL | Age: 46
End: 2021-02-13

## 2021-02-13 DIAGNOSIS — Z99.2 ESRD ON HEMODIALYSIS (HCC): Primary | ICD-10-CM

## 2021-02-13 DIAGNOSIS — D64.9 ANEMIA, UNSPECIFIED TYPE: ICD-10-CM

## 2021-02-13 DIAGNOSIS — N18.6 ESRD ON HEMODIALYSIS (HCC): Primary | ICD-10-CM

## 2021-02-13 DIAGNOSIS — T82.41XA HEMODIALYSIS CATHETER DYSFUNCTION, INITIAL ENCOUNTER (HCC): ICD-10-CM

## 2021-02-13 PROBLEM — T82.49XA: Status: ACTIVE | Noted: 2021-02-13

## 2021-02-13 LAB
ABO GROUP BLD: NORMAL
ALBUMIN SERPL-MCNC: 3.7 G/DL (ref 3.5–5.2)
ALBUMIN/GLOB SERPL: 0.8 G/DL
ALP SERPL-CCNC: 70 U/L (ref 39–117)
ALT SERPL W P-5'-P-CCNC: <5 U/L (ref 1–33)
ANION GAP SERPL CALCULATED.3IONS-SCNC: 13.2 MMOL/L (ref 5–15)
ANISOCYTOSIS BLD QL: ABNORMAL
AST SERPL-CCNC: 22 U/L (ref 1–32)
BILIRUB SERPL-MCNC: 0.2 MG/DL (ref 0–1.2)
BLD GP AB SCN SERPL QL: NEGATIVE
BUN SERPL-MCNC: 16 MG/DL (ref 6–20)
BUN/CREAT SERPL: 2.9 (ref 7–25)
CALCIUM SPEC-SCNC: 9.1 MG/DL (ref 8.6–10.5)
CHLORIDE SERPL-SCNC: 99 MMOL/L (ref 98–107)
CO2 SERPL-SCNC: 26.8 MMOL/L (ref 22–29)
CREAT SERPL-MCNC: 5.54 MG/DL (ref 0.57–1)
DEPRECATED RDW RBC AUTO: 54 FL (ref 37–54)
EOSINOPHIL # BLD MANUAL: 0.1 10*3/MM3 (ref 0–0.4)
EOSINOPHIL NFR BLD MANUAL: 1 % (ref 0.3–6.2)
ERYTHROCYTE [DISTWIDTH] IN BLOOD BY AUTOMATED COUNT: 15.6 % (ref 12.3–15.4)
GFR SERPL CREATININE-BSD FRML MDRD: 8 ML/MIN/1.73
GFR SERPL CREATININE-BSD FRML MDRD: ABNORMAL ML/MIN/{1.73_M2}
GLOBULIN UR ELPH-MCNC: 4.6 GM/DL
GLUCOSE BLDC GLUCOMTR-MCNC: 73 MG/DL (ref 70–130)
GLUCOSE BLDC GLUCOMTR-MCNC: 83 MG/DL (ref 70–130)
GLUCOSE SERPL-MCNC: 91 MG/DL (ref 65–99)
HCG SERPL QL: NEGATIVE
HCT VFR BLD AUTO: 20.6 % (ref 34–46.6)
HGB BLD-MCNC: 6.4 G/DL (ref 12–15.9)
HOLD SPECIMEN: NORMAL
HOLD SPECIMEN: NORMAL
LYMPHOCYTES # BLD MANUAL: 0.95 10*3/MM3 (ref 0.7–3.1)
LYMPHOCYTES NFR BLD MANUAL: 8.1 % (ref 5–12)
LYMPHOCYTES NFR BLD MANUAL: 9.1 % (ref 19.6–45.3)
MCH RBC QN AUTO: 29 PG (ref 26.6–33)
MCHC RBC AUTO-ENTMCNC: 31.1 G/DL (ref 31.5–35.7)
MCV RBC AUTO: 93.2 FL (ref 79–97)
MICROCYTES BLD QL: ABNORMAL
MONOCYTES # BLD AUTO: 0.84 10*3/MM3 (ref 0.1–0.9)
NEUTROPHILS # BLD AUTO: 8.51 10*3/MM3 (ref 1.7–7)
NEUTROPHILS NFR BLD MANUAL: 81.8 % (ref 42.7–76)
PLAT MORPH BLD: NORMAL
PLATELET # BLD AUTO: 506 10*3/MM3 (ref 140–450)
PMV BLD AUTO: 9.6 FL (ref 6–12)
POTASSIUM SERPL-SCNC: 4.2 MMOL/L (ref 3.5–5.2)
PROT SERPL-MCNC: 8.3 G/DL (ref 6–8.5)
RBC # BLD AUTO: 2.21 10*6/MM3 (ref 3.77–5.28)
RH BLD: POSITIVE
SARS-COV-2 RNA RESP QL NAA+PROBE: NOT DETECTED
SODIUM SERPL-SCNC: 139 MMOL/L (ref 136–145)
T&S EXPIRATION DATE: NORMAL
WBC # BLD AUTO: 10.4 10*3/MM3 (ref 3.4–10.8)
WBC MORPH BLD: NORMAL
WHOLE BLOOD HOLD SPECIMEN: NORMAL
WHOLE BLOOD HOLD SPECIMEN: NORMAL

## 2021-02-13 PROCEDURE — 86901 BLOOD TYPING SEROLOGIC RH(D): CPT | Performed by: PHYSICIAN ASSISTANT

## 2021-02-13 PROCEDURE — 25010000002 DEXAMETHASONE PER 1 MG: Performed by: ANESTHESIOLOGY

## 2021-02-13 PROCEDURE — C1750 CATH, HEMODIALYSIS,LONG-TERM: HCPCS | Performed by: SURGERY

## 2021-02-13 PROCEDURE — 25010000003 CEFAZOLIN IN DEXTROSE 2-4 GM/100ML-% SOLUTION: Performed by: SURGERY

## 2021-02-13 PROCEDURE — C9803 HOPD COVID-19 SPEC COLLECT: HCPCS

## 2021-02-13 PROCEDURE — 71045 X-RAY EXAM CHEST 1 VIEW: CPT

## 2021-02-13 PROCEDURE — 82962 GLUCOSE BLOOD TEST: CPT

## 2021-02-13 PROCEDURE — 25010000002 MIDAZOLAM PER 1 MG: Performed by: STUDENT IN AN ORGANIZED HEALTH CARE EDUCATION/TRAINING PROGRAM

## 2021-02-13 PROCEDURE — 84703 CHORIONIC GONADOTROPIN ASSAY: CPT | Performed by: PHYSICIAN ASSISTANT

## 2021-02-13 PROCEDURE — U0003 INFECTIOUS AGENT DETECTION BY NUCLEIC ACID (DNA OR RNA); SEVERE ACUTE RESPIRATORY SYNDROME CORONAVIRUS 2 (SARS-COV-2) (CORONAVIRUS DISEASE [COVID-19]), AMPLIFIED PROBE TECHNIQUE, MAKING USE OF HIGH THROUGHPUT TECHNOLOGIES AS DESCRIBED BY CMS-2020-01-R: HCPCS | Performed by: PHYSICIAN ASSISTANT

## 2021-02-13 PROCEDURE — 25010000002 HEPARIN (PORCINE) PER 1000 UNITS: Performed by: SURGERY

## 2021-02-13 PROCEDURE — 25010000002 PROPOFOL 10 MG/ML EMULSION: Performed by: ANESTHESIOLOGY

## 2021-02-13 PROCEDURE — P9016 RBC LEUKOCYTES REDUCED: HCPCS

## 2021-02-13 PROCEDURE — G0378 HOSPITAL OBSERVATION PER HR: HCPCS

## 2021-02-13 PROCEDURE — 99284 EMERGENCY DEPT VISIT MOD MDM: CPT

## 2021-02-13 PROCEDURE — 25010000003 LIDOCAINE 1 % SOLUTION 20 ML VIAL: Performed by: SURGERY

## 2021-02-13 PROCEDURE — 86923 COMPATIBILITY TEST ELECTRIC: CPT

## 2021-02-13 PROCEDURE — 76000 FLUOROSCOPY <1 HR PHYS/QHP: CPT

## 2021-02-13 PROCEDURE — 85007 BL SMEAR W/DIFF WBC COUNT: CPT | Performed by: PHYSICIAN ASSISTANT

## 2021-02-13 PROCEDURE — 25010000002 HYDROMORPHONE PER 4 MG: Performed by: ANESTHESIOLOGY

## 2021-02-13 PROCEDURE — 86850 RBC ANTIBODY SCREEN: CPT | Performed by: PHYSICIAN ASSISTANT

## 2021-02-13 PROCEDURE — 85025 COMPLETE CBC W/AUTO DIFF WBC: CPT | Performed by: PHYSICIAN ASSISTANT

## 2021-02-13 PROCEDURE — U0005 INFEC AGEN DETEC AMPLI PROBE: HCPCS | Performed by: PHYSICIAN ASSISTANT

## 2021-02-13 PROCEDURE — 80053 COMPREHEN METABOLIC PANEL: CPT | Performed by: PHYSICIAN ASSISTANT

## 2021-02-13 PROCEDURE — 25010000002 ONDANSETRON PER 1 MG: Performed by: ANESTHESIOLOGY

## 2021-02-13 PROCEDURE — 86900 BLOOD TYPING SEROLOGIC ABO: CPT

## 2021-02-13 PROCEDURE — 86900 BLOOD TYPING SEROLOGIC ABO: CPT | Performed by: PHYSICIAN ASSISTANT

## 2021-02-13 PROCEDURE — 36430 TRANSFUSION BLD/BLD COMPNT: CPT

## 2021-02-13 RX ORDER — MIDAZOLAM HYDROCHLORIDE 1 MG/ML
2 INJECTION INTRAMUSCULAR; INTRAVENOUS ONCE
Status: COMPLETED | OUTPATIENT
Start: 2021-02-13 | End: 2021-02-13

## 2021-02-13 RX ORDER — SODIUM CHLORIDE 9 MG/ML
9 INJECTION, SOLUTION INTRAVENOUS CONTINUOUS
Status: DISCONTINUED | OUTPATIENT
Start: 2021-02-13 | End: 2021-02-14 | Stop reason: HOSPADM

## 2021-02-13 RX ORDER — HYDROCODONE BITARTRATE AND ACETAMINOPHEN 5; 325 MG/1; MG/1
1 TABLET ORAL EVERY 6 HOURS PRN
Status: DISCONTINUED | OUTPATIENT
Start: 2021-02-13 | End: 2021-02-14 | Stop reason: SDUPTHER

## 2021-02-13 RX ORDER — ONDANSETRON 2 MG/ML
INJECTION INTRAMUSCULAR; INTRAVENOUS AS NEEDED
Status: DISCONTINUED | OUTPATIENT
Start: 2021-02-13 | End: 2021-02-13 | Stop reason: SURG

## 2021-02-13 RX ORDER — HYDRALAZINE HYDROCHLORIDE 20 MG/ML
5 INJECTION INTRAMUSCULAR; INTRAVENOUS
Status: DISCONTINUED | OUTPATIENT
Start: 2021-02-13 | End: 2021-02-13 | Stop reason: HOSPADM

## 2021-02-13 RX ORDER — PROPOFOL 10 MG/ML
VIAL (ML) INTRAVENOUS AS NEEDED
Status: DISCONTINUED | OUTPATIENT
Start: 2021-02-13 | End: 2021-02-13 | Stop reason: SURG

## 2021-02-13 RX ORDER — HYDROCODONE BITARTRATE AND ACETAMINOPHEN 7.5; 325 MG/1; MG/1
1 TABLET ORAL ONCE AS NEEDED
Status: DISCONTINUED | OUTPATIENT
Start: 2021-02-13 | End: 2021-02-13 | Stop reason: HOSPADM

## 2021-02-13 RX ORDER — PROMETHAZINE HYDROCHLORIDE 25 MG/1
25 TABLET ORAL ONCE AS NEEDED
Status: DISCONTINUED | OUTPATIENT
Start: 2021-02-13 | End: 2021-02-13 | Stop reason: HOSPADM

## 2021-02-13 RX ORDER — DIPHENHYDRAMINE HCL 25 MG
25 CAPSULE ORAL
Status: DISCONTINUED | OUTPATIENT
Start: 2021-02-13 | End: 2021-02-13 | Stop reason: HOSPADM

## 2021-02-13 RX ORDER — LABETALOL HYDROCHLORIDE 5 MG/ML
5 INJECTION, SOLUTION INTRAVENOUS
Status: DISCONTINUED | OUTPATIENT
Start: 2021-02-13 | End: 2021-02-13 | Stop reason: HOSPADM

## 2021-02-13 RX ORDER — SODIUM CHLORIDE 0.9 % (FLUSH) 0.9 %
10 SYRINGE (ML) INJECTION AS NEEDED
Status: DISCONTINUED | OUTPATIENT
Start: 2021-02-13 | End: 2021-02-14 | Stop reason: HOSPADM

## 2021-02-13 RX ORDER — CEFAZOLIN SODIUM 2 G/100ML
2 INJECTION, SOLUTION INTRAVENOUS ONCE
Status: COMPLETED | OUTPATIENT
Start: 2021-02-13 | End: 2021-02-13

## 2021-02-13 RX ORDER — SODIUM CHLORIDE 0.9 % (FLUSH) 0.9 %
3-10 SYRINGE (ML) INJECTION AS NEEDED
Status: DISCONTINUED | OUTPATIENT
Start: 2021-02-13 | End: 2021-02-13 | Stop reason: HOSPADM

## 2021-02-13 RX ORDER — PROMETHAZINE HYDROCHLORIDE 25 MG/1
25 SUPPOSITORY RECTAL ONCE AS NEEDED
Status: DISCONTINUED | OUTPATIENT
Start: 2021-02-13 | End: 2021-02-13 | Stop reason: HOSPADM

## 2021-02-13 RX ORDER — ONDANSETRON 2 MG/ML
4 INJECTION INTRAMUSCULAR; INTRAVENOUS ONCE AS NEEDED
Status: DISCONTINUED | OUTPATIENT
Start: 2021-02-13 | End: 2021-02-13 | Stop reason: HOSPADM

## 2021-02-13 RX ORDER — HYDROMORPHONE HYDROCHLORIDE 1 MG/ML
0.5 INJECTION, SOLUTION INTRAMUSCULAR; INTRAVENOUS; SUBCUTANEOUS
Status: DISCONTINUED | OUTPATIENT
Start: 2021-02-13 | End: 2021-02-13 | Stop reason: HOSPADM

## 2021-02-13 RX ORDER — OXYCODONE AND ACETAMINOPHEN 7.5; 325 MG/1; MG/1
1 TABLET ORAL ONCE AS NEEDED
Status: DISCONTINUED | OUTPATIENT
Start: 2021-02-13 | End: 2021-02-13 | Stop reason: HOSPADM

## 2021-02-13 RX ORDER — HYDROMORPHONE HCL 110MG/55ML
PATIENT CONTROLLED ANALGESIA SYRINGE INTRAVENOUS AS NEEDED
Status: DISCONTINUED | OUTPATIENT
Start: 2021-02-13 | End: 2021-02-13 | Stop reason: SURG

## 2021-02-13 RX ORDER — ONDANSETRON 4 MG/1
4 TABLET, FILM COATED ORAL EVERY 6 HOURS PRN
Status: DISCONTINUED | OUTPATIENT
Start: 2021-02-13 | End: 2021-02-14 | Stop reason: HOSPADM

## 2021-02-13 RX ORDER — NITROGLYCERIN 0.4 MG/1
0.4 TABLET SUBLINGUAL
Status: DISCONTINUED | OUTPATIENT
Start: 2021-02-13 | End: 2021-02-14 | Stop reason: HOSPADM

## 2021-02-13 RX ORDER — LIDOCAINE HYDROCHLORIDE 10 MG/ML
0.5 INJECTION, SOLUTION EPIDURAL; INFILTRATION; INTRACAUDAL; PERINEURAL ONCE AS NEEDED
Status: DISCONTINUED | OUTPATIENT
Start: 2021-02-13 | End: 2021-02-13 | Stop reason: HOSPADM

## 2021-02-13 RX ORDER — ALBUMIN (HUMAN) 12.5 G/50ML
12.5 SOLUTION INTRAVENOUS AS NEEDED
Status: ACTIVE | OUTPATIENT
Start: 2021-02-13 | End: 2021-02-13

## 2021-02-13 RX ORDER — NICOTINE POLACRILEX 4 MG
15 LOZENGE BUCCAL
Status: DISCONTINUED | OUTPATIENT
Start: 2021-02-13 | End: 2021-02-14 | Stop reason: HOSPADM

## 2021-02-13 RX ORDER — FLUMAZENIL 0.1 MG/ML
0.2 INJECTION INTRAVENOUS AS NEEDED
Status: DISCONTINUED | OUTPATIENT
Start: 2021-02-13 | End: 2021-02-13 | Stop reason: HOSPADM

## 2021-02-13 RX ORDER — SODIUM CHLORIDE, SODIUM LACTATE, POTASSIUM CHLORIDE, CALCIUM CHLORIDE 600; 310; 30; 20 MG/100ML; MG/100ML; MG/100ML; MG/100ML
9 INJECTION, SOLUTION INTRAVENOUS CONTINUOUS
Status: DISCONTINUED | OUTPATIENT
Start: 2021-02-13 | End: 2021-02-13

## 2021-02-13 RX ORDER — EPHEDRINE SULFATE 50 MG/ML
5 INJECTION, SOLUTION INTRAVENOUS ONCE AS NEEDED
Status: DISCONTINUED | OUTPATIENT
Start: 2021-02-13 | End: 2021-02-13 | Stop reason: HOSPADM

## 2021-02-13 RX ORDER — SODIUM CHLORIDE 0.9 % (FLUSH) 0.9 %
3 SYRINGE (ML) INJECTION EVERY 12 HOURS SCHEDULED
Status: DISCONTINUED | OUTPATIENT
Start: 2021-02-13 | End: 2021-02-13 | Stop reason: HOSPADM

## 2021-02-13 RX ORDER — UREA 10 %
3 LOTION (ML) TOPICAL NIGHTLY PRN
Status: DISCONTINUED | OUTPATIENT
Start: 2021-02-13 | End: 2021-02-14 | Stop reason: HOSPADM

## 2021-02-13 RX ORDER — INSULIN LISPRO 100 [IU]/ML
0-7 INJECTION, SOLUTION INTRAVENOUS; SUBCUTANEOUS
Status: DISCONTINUED | OUTPATIENT
Start: 2021-02-13 | End: 2021-02-14 | Stop reason: HOSPADM

## 2021-02-13 RX ORDER — ONDANSETRON 2 MG/ML
4 INJECTION INTRAMUSCULAR; INTRAVENOUS EVERY 6 HOURS PRN
Status: DISCONTINUED | OUTPATIENT
Start: 2021-02-13 | End: 2021-02-14 | Stop reason: HOSPADM

## 2021-02-13 RX ORDER — LIDOCAINE HYDROCHLORIDE 20 MG/ML
INJECTION, SOLUTION INFILTRATION; PERINEURAL AS NEEDED
Status: DISCONTINUED | OUTPATIENT
Start: 2021-02-13 | End: 2021-02-13 | Stop reason: SURG

## 2021-02-13 RX ORDER — DIPHENHYDRAMINE HYDROCHLORIDE 50 MG/ML
12.5 INJECTION INTRAMUSCULAR; INTRAVENOUS
Status: DISCONTINUED | OUTPATIENT
Start: 2021-02-13 | End: 2021-02-13 | Stop reason: HOSPADM

## 2021-02-13 RX ORDER — ACETAMINOPHEN 325 MG/1
650 TABLET ORAL EVERY 4 HOURS PRN
Status: DISCONTINUED | OUTPATIENT
Start: 2021-02-13 | End: 2021-02-14 | Stop reason: HOSPADM

## 2021-02-13 RX ORDER — HEPARIN SODIUM 1000 [USP'U]/ML
INJECTION, SOLUTION INTRAVENOUS; SUBCUTANEOUS AS NEEDED
Status: DISCONTINUED | OUTPATIENT
Start: 2021-02-13 | End: 2021-02-13 | Stop reason: HOSPADM

## 2021-02-13 RX ORDER — DEXTROSE MONOHYDRATE 25 G/50ML
25 INJECTION, SOLUTION INTRAVENOUS
Status: DISCONTINUED | OUTPATIENT
Start: 2021-02-13 | End: 2021-02-14 | Stop reason: HOSPADM

## 2021-02-13 RX ORDER — FENTANYL CITRATE 50 UG/ML
50 INJECTION, SOLUTION INTRAMUSCULAR; INTRAVENOUS
Status: DISCONTINUED | OUTPATIENT
Start: 2021-02-13 | End: 2021-02-13 | Stop reason: HOSPADM

## 2021-02-13 RX ORDER — NALOXONE HCL 0.4 MG/ML
0.2 VIAL (ML) INJECTION AS NEEDED
Status: DISCONTINUED | OUTPATIENT
Start: 2021-02-13 | End: 2021-02-13 | Stop reason: HOSPADM

## 2021-02-13 RX ORDER — DEXAMETHASONE SODIUM PHOSPHATE 10 MG/ML
INJECTION INTRAMUSCULAR; INTRAVENOUS AS NEEDED
Status: DISCONTINUED | OUTPATIENT
Start: 2021-02-13 | End: 2021-02-13 | Stop reason: SURG

## 2021-02-13 RX ADMIN — PROPOFOL 200 MG: 10 INJECTION, EMULSION INTRAVENOUS at 18:50

## 2021-02-13 RX ADMIN — DEXAMETHASONE SODIUM PHOSPHATE 8 MG: 10 INJECTION INTRAMUSCULAR; INTRAVENOUS at 18:50

## 2021-02-13 RX ADMIN — SODIUM CHLORIDE: 9 INJECTION, SOLUTION INTRAVENOUS at 18:50

## 2021-02-13 RX ADMIN — ONDANSETRON HYDROCHLORIDE 4 MG: 2 SOLUTION INTRAMUSCULAR; INTRAVENOUS at 18:50

## 2021-02-13 RX ADMIN — MIDAZOLAM 2 MG: 1 INJECTION INTRAMUSCULAR; INTRAVENOUS at 17:59

## 2021-02-13 RX ADMIN — HYDROMORPHONE HYDROCHLORIDE 0.5 MG: 2 INJECTION, SOLUTION INTRAMUSCULAR; INTRAVENOUS; SUBCUTANEOUS at 18:50

## 2021-02-13 RX ADMIN — CEFAZOLIN SODIUM 2 G: 2 INJECTION, SOLUTION INTRAVENOUS at 18:40

## 2021-02-13 RX ADMIN — LIDOCAINE HYDROCHLORIDE 100 MG: 20 INJECTION, SOLUTION INFILTRATION; PERINEURAL at 18:50

## 2021-02-13 NOTE — ANESTHESIA PREPROCEDURE EVALUATION
Anesthesia Evaluation     Patient summary reviewed and Nursing notes reviewed   no history of anesthetic complications:  NPO Solid Status: > 8 hours  NPO Liquid Status: > 2 hours           Airway   Mallampati: III  TM distance: >3 FB  Neck ROM: full  Large neck circumference  Dental - normal exam     Pulmonary     breath sounds clear to auscultation  Cardiovascular   Exercise tolerance: good (4-7 METS)    ECG reviewed  Rhythm: regular  Rate: normal    (+) hypertension,       Neuro/Psych  GI/Hepatic/Renal/Endo    (+) obesity,  GERD,  renal disease ESRD, diabetes mellitus,     Musculoskeletal     Abdominal    Substance History      OB/GYN          Other   blood dyscrasia anemia,                   Anesthesia Plan    ASA 3 - emergent     general     intravenous induction     Anesthetic plan, all risks, benefits, and alternatives have been provided, discussed and informed consent has been obtained with: patient.    Plan discussed with CRNA.

## 2021-02-13 NOTE — CONSULTS
Referring Provider: Dr. Angie Soriano  Reason for Consultation: ESRD management    Subjective     Chief complaint   Chief Complaint   Patient presents with   • Vascular Access Problem       History of present illness:      45-year-old woman with ESRD on MWF hemodialysis at [facility] where she is followed by Dr. Gonzales. Other pertinent medical history includes failed  donor kidney transplant in 2016 and 2019, hypertension, type II diabetes, GERD, and chronic nausea and vomiting. She was previously on peritoneal dialysis but then started hemodialysis in 2020. She was admitted in 2021 for hyperkalemia after AV access malfunctioned and blood flow was not able to be restored; TDC was placed at that time. Left brachial axillary AV graft was placed by Dr. Anya Hernandez on 21. She presented to ED today after awaking and seeing that her TDC has been pulled out 3 inches. She has been evaluated by Dr. Maurer who plans to replace TDC today; there are concerns that AVG is not patent. We've been asked to see for ESRD management.  Notable is hgb 6.4; she reports very heavy menses for past few months, with two cycles last month alone.    Past Medical History:   Diagnosis Date   • Acid reflux    • Anemia     WITH ESRD   • Anxiety    • Arthritis    • Depression    • Diabetes mellitus (CMS/[facility])     NO MEDICATIONS FOR   • ESRD on dialysis (CMS/HCC)     FRESINUS MWF   • History of peritoneal dialysis     KIDNEY TRANSPLANT 2016    • History of transfusion     BEEN A WHILE   • Hypercalcemia    • Hyperparathyroidism (CMS/HCC)    • Hypertension    • Kidney disease     End-stage renal disease. TRANSPLANT   • Kidney transplant recipient 2016    RIGHT KIDNEY. ? 2018 KIDNEY REJECTED   • PONV (postoperative nausea and vomiting)    • Seasonal allergies     CURRENTLY    • Vascular dialysis catheter in place (CMS/HCC)     RIGHT TUNNEL CATH     Past Surgical History:   Procedure Laterality  Date   • ARTERIOVENOUS FISTULA/SHUNT SURGERY Right 2020    Procedure: RIGHT ARM ARTERIAL VENOUS FISTULA;  Surgeon: Elijah Herrera MD;  Location: [facility] MAIN OR;  Service: Vascular;  Laterality: Right;   • ARTERIOVENOUS FISTULA/SHUNT SURGERY Left 2021    Procedure: LEFT BRACHIOAXILLARY ARTERIOVENOUS FISTULA GRAFT;  Surgeon: Anya Hernandez Jr., MD;  Location: [facility] MAIN OR;  Service: Vascular;  Laterality: Left;   • INSERTION HEMODIALYSIS CATHETER Right 1/15/2021    Procedure: TUNNELED DIAYLIS CATHETER PLACEMENT;  Surgeon: Phu Gaviria MD;  [facility] ;  Service: Vascular;  Laterality: Right;   • INSERTION PERITONEAL DIALYSIS CATHETER  2014    Laparoscopic placement of Curl Cath peritoneal dialysis catheter, Dr. Vinod Goldstein   • INSERTION PERITONEAL DIALYSIS CATHETER N/A 2019    Procedure: LAPAROSCOPIC INSERTION PERITONEAL DIALYSIS CATHETER;  Surgeon: Damon Rooney MD;  Location: [facility] MAIN OR;  Service: General   • REMOVAL PERITONEAL DIALYSIS CATHETER N/A 10/17/2016    Procedure: REMOVAL PERITONEAL DIALYSIS CATHETER;  Surgeon: Damon Rooney MD;  Location: [facility] MAIN OR;  Service:    • REMOVAL PERITONEAL DIALYSIS CATHETER N/A 10/21/2020    Procedure: REMOVAL PERITONEAL DIALYSIS CATHETER;  Surgeon: Damon Rooney MD;  Location: [facility] MAIN OR;  Service: General;  Laterality: N/A;   • TRANSPLANTATION RENAL Right 2016    from  donor   • TUBAL ABDOMINAL LIGATION Bilateral      Family History   Problem Relation Age of Onset   • Malig Hyperthermia Neg Hx      Social History     Tobacco Use   • Smoking status: Never Smoker   • Smokeless tobacco: Never Used   Substance Use Topics   • Alcohol use: No   • Drug use: No     Medications Prior to Admission   Medication Sig Dispense Refill Last Dose   • calcitriol (ROCALTROL) 0.25 MCG capsule Take 2 capsules by mouth Daily. 60 capsule 0    • calcium acetate (PHOSLO) 667 MG  "capsule Take 2,001 mg by mouth 3 (Three) Times a Day With Meals.      • CloNIDine (CATAPRES) 0.3 MG tablet Take 0.3 mg by mouth 3 (Three) Times a Day.      • HYDROcodone-acetaminophen (Norco) 5-325 MG per tablet Take 1 tablet by mouth Every 6 (Six) Hours As Needed for Moderate Pain . 20 tablet 0    • hydrOXYzine (ATARAX) 50 MG tablet Take 50 mg by mouth 2 (Two) Times a Day As Needed.        Allergies:  Patient has no known allergies.    Review of Systems  14-point ROS performed and all negative except for pertinent +/-'s detailed in HPI.      Objective     Vital Signs  Temp:  [96.3 °F (35.7 °C)-98.5 °F (36.9 °C)] 98.5 °F (36.9 °C)  Heart Rate:  [70-90] 70  Resp:  [16-18] 16  BP: (150-173)/(74-98) 167/98    Flowsheet Rows      First Filed Value   Admission Height  152.4 cm (60\") Documented at 02/13/2021 1024   Admission Weight  81.6 kg (180 lb) Documented at 02/13/2021 1024           No intake/output data recorded.  No intake/output data recorded.  No intake or output data in the 24 hours ending 02/13/21 1721    Physical Exam:  NAD; pleasant; oriented; tearful, looks older than stated age  MMM; AT/NC  No eye d/c; no scleral icterus  No JVD; no carotid bruits  CTA bilat; not labored  TDC right chest; cold-pack overlying it  RRR, no rub  Soft, NT, +D, BS+  No significant edema  No bruit or thrill left arm  No clubbing  No asterixis  Moves all extremities      Results Review:  Results from last 7 days   Lab Units 02/13/21  1030   SODIUM mmol/L 139   POTASSIUM mmol/L 4.2   CHLORIDE mmol/L 99   CO2 mmol/L 26.8   BUN mg/dL 16   CREATININE mg/dL 5.54*   CALCIUM mg/dL 9.1   BILIRUBIN mg/dL 0.2   ALK PHOS U/L 70   ALT (SGPT) U/L <5   AST (SGOT) U/L 22   GLUCOSE mg/dL 91       Estimated Creatinine Clearance: 12.1 mL/min (A) (by C-G formula based on SCr of 5.54 mg/dL (H)).          Results from last 7 days   Lab Units 02/13/21  1030   WBC 10*3/mm3 10.40   HEMOGLOBIN g/dL 6.4*   PLATELETS 10*3/mm3 506*             Active " "Medications  ceFAZolin, 2 g, Intravenous, Once           Assessment/Plan   Assessment    1. ESRD, MWF hemodialysis at [facility] via tunneled dialysis catheter after AV access malfunction in January 2021. Left AVG placed 2/4/21. Last hemodialysis was 2/12/21 and 1.7L fluid was removed. Electrolytes and volume are stable. Plan to replace tunneled dialysis catheter today.  2. Anemia of renal disease; hgb 6.4. Hgb was 7.6 at least check in HD and she received 150 mcg Mircera. Heavy \"menstrual cycles,\" she reports, tho unusual she would still menstruate given her many years of ESRD and current age  3. Hypertension; a little elevated but 0.3 mg clonidine TID has not yet been resumed.   4. Recent left arm AV graft:  no bruit or thrill by exam  5. GERD with chronic nausea and vomiting  6. Menorrhagia versus dysfunctional uterine bleeding  7. Type II diabetes.        Mechanical complication of hemodialysis catheter (CMS/[facility])    ESRD on hemodialysis (CMS/[facility])    Anemia      Plan  1.  PRBCs today  2.  She may benefit from gynecology evaluation to determine whether her vaginal bleeding stems from menstrual cycle versus other process.  Many women, after years of ESRD, cease to menstruate  3.  Further evaluation of recently placed (2/4/2021) left arm graft per Dr. Maurer  4.  Possible HD 2/15 if still here    I discussed the patient's findings and my recommendations with the patient    --Víctor Bee MD  02/13/21  21:05 EST             "

## 2021-02-13 NOTE — ANESTHESIA PROCEDURE NOTES
Airway  Urgency: elective    Date/Time: 2/13/2021 6:50 PM  Airway not difficult    General Information and Staff    Patient location during procedure: OR  Anesthesiologist: Javier Vang MD    Indications and Patient Condition  Indications for airway management: airway protection    Preoxygenated: yes  Mask difficulty assessment: 1 - vent by mask    Final Airway Details  Final airway type: supraglottic airway      Successful airway: classic  Size 4    Number of attempts at approach: 1  Assessment: atraumatic intubation

## 2021-02-13 NOTE — H&P
HISTORY AND PHYSICAL   [facility]        Patient Identification:  Name: Sunni Mcneil  Age: 45 y.o.  Sex: female  :  1975  MRN: [id number]                     Primary Care Physician: Provider, No Known    Chief Complaint:  45 year old female who presented to the emergency room after she noticed her tunneled dialysis catheter had pulled out several inches; she is not sure what happened; last dialysis was yesterday;     History of Present Illness:   As above    Past Medical History:  Past Medical History:   Diagnosis Date   • Acid reflux    • Anemia     WITH ESRD   • Anxiety    • Arthritis    • Depression    • Diabetes mellitus (CMS/[facility])     NO MEDICATIONS FOR   • ESRD on dialysis (CMS/[facility])     FRESINUS MWF   • History of peritoneal dialysis     KIDNEY TRANSPLANT 2016    • History of transfusion     BEEN A WHILE   • Hypercalcemia    • Hyperparathyroidism (CMS/[facility])    • Hypertension    • Kidney disease     End-stage renal disease. TRANSPLANT   • Kidney transplant recipient 2016    RIGHT KIDNEY. ? 2018 KIDNEY REJECTED   • PONV (postoperative nausea and vomiting)    • Seasonal allergies     CURRENTLY    • Vascular dialysis catheter in place (CMS/[facility])     RIGHT TUNNEL CATH     Past Surgical History:  Past Surgical History:   Procedure Laterality Date   • ARTERIOVENOUS FISTULA/SHUNT SURGERY Right 2020    Procedure: RIGHT ARM ARTERIAL VENOUS FISTULA;  Surgeon: Elijah Herrera MD;  Location: [facility] OR;  Service: Vascular;  Laterality: Right;   • ARTERIOVENOUS FISTULA/SHUNT SURGERY Left 2021    Procedure: LEFT BRACHIOAXILLARY ARTERIOVENOUS FISTULA GRAFT;  Surgeon: Anya Hernandez Jr., MD;  Location: [facility] OR;  Service: Vascular;  Laterality: Left;   • INSERTION HEMODIALYSIS CATHETER Right 1/15/2021    Procedure: TUNNELED DIAYLIS CATHETER PLACEMENT;  Surgeon: Phu Gaviria MD;  Location: [facility] OR ;  Service: Vascular;  Laterality: Right;   •  INSERTION PERITONEAL DIALYSIS CATHETER  2014    Laparoscopic placement of Curl Cath peritoneal dialysis catheter, Dr. Vinod Goldstein   • INSERTION PERITONEAL DIALYSIS CATHETER N/A 2019    Procedure: LAPAROSCOPIC INSERTION PERITONEAL DIALYSIS CATHETER;  Surgeon: Damon Rooney MD;  Location:  [facility] MAIN OR;  Service: General   • REMOVAL PERITONEAL DIALYSIS CATHETER N/A 10/17/2016    Procedure: REMOVAL PERITONEAL DIALYSIS CATHETER;  Surgeon: Damon Rooney MD;  Location:  [facility] MAIN OR;  Service:    • REMOVAL PERITONEAL DIALYSIS CATHETER N/A 10/21/2020    Procedure: REMOVAL PERITONEAL DIALYSIS CATHETER;  Surgeon: Damon Rooney MD;  Location:  [facility] MAIN OR;  Service: General;  Laterality: N/A;   • TRANSPLANTATION RENAL Right 2016    from  donor   • TUBAL ABDOMINAL LIGATION Bilateral       Home Meds:  Medications Prior to Admission   Medication Sig Dispense Refill Last Dose   • calcitriol (ROCALTROL) 0.25 MCG capsule Take 2 capsules by mouth Daily. 60 capsule 0    • calcium acetate (PHOSLO) 667 MG capsule Take 2,001 mg by mouth 3 (Three) Times a Day With Meals.      • CloNIDine (CATAPRES) 0.3 MG tablet Take 0.3 mg by mouth 3 (Three) Times a Day.      • HYDROcodone-acetaminophen (Norco) 5-325 MG per tablet Take 1 tablet by mouth Every 6 (Six) Hours As Needed for Moderate Pain . 20 tablet 0    • hydrOXYzine (ATARAX) 50 MG tablet Take 50 mg by mouth 2 (Two) Times a Day As Needed.          Allergies:  No Known Allergies  Immunizations:    There is no immunization history on file for this patient.  Social History:   Social History     Social History Narrative   • Not on file     Social History     Socioeconomic History   • Marital status:      Spouse name: Not on file   • Number of children: 2   • Years of education: 12   • Highest education level: Not on file   Occupational History     Employer: [employer]   Tobacco Use   • Smoking status: Never Smoker   • Smokeless  "tobacco: Never Used   Substance and Sexual Activity   • Alcohol use: No   • Drug use: No   • Sexual activity: Defer       Family History:  Family History   Problem Relation Age of Onset   • Malig Hyperthermia Neg Hx         Review of Systems  See history of present illness and past medical history.  Patient denies headache, dizziness, syncope, falls, trauma, change in vision, change in hearing, change in taste, changes in weight, changes in appetite, focal weakness, numbness, or paresthesia.  Patient denies chest pain, palpitations, dyspnea, orthopnea, PND, cough, sinus pressure, rhinorrhea, epistaxis, hemoptysis, nausea, vomiting,hematemesis, diarrhea, constipation or hematchezia.  Denies cold or heat intolerance, polydipsia, polyuria, polyphagia. Denies hematuria, pyuria, dysuria, hesitancy, frequency or urgency. Denies consumption of raw and under cooked meats foods or change in water source.  Denies fever, chills, sweats, night sweats.  Denies missing any routine medications. Remainder of ROS is negative.    Objective:  T Max 24 hrs: Temp (24hrs), Av.6 °F (36.4 °C), Min:96.3 °F (35.7 °C), Max:98.5 °F (36.9 °C)    Vitals Ranges:   Temp:  [96.3 °F (35.7 °C)-98.5 °F (36.9 °C)] 98.1 °F (36.7 °C)  Heart Rate:  [70-90] 73  Resp:  [16-18] 16  BP: (150-173)/(74-98) 167/93      Exam:  /93 (BP Location: Right leg, Patient Position: Lying)   Pulse 73   Temp 98.1 °F (36.7 °C) (Oral)   Resp 16   Ht 152.4 cm (60\")   Wt 81.6 kg (180 lb)   LMP 2021 Comment: negative serum qualitative 21  SpO2 100%   Breastfeeding No   BMI 35.15 kg/m²     General Appearance:    Alert, cooperative, no distress, appears stated age   Head:    Normocephalic, without obvious abnormality, atraumatic   Eyes:    PERRL, conjunctivae/corneas clear, EOM's intact, both eyes   Ears:    Normal external ear canals, both ears   Nose:   Nares normal, septum midline, mucosa normal, no drainage    or sinus tenderness   Throat:   Lips, " mucosa, and tongue normal   Neck:   Supple, symmetrical, trachea midline, no adenopathy;     thyroid:  no enlargement/tenderness/nodules; no carotid    bruit or JVD   Back:     Symmetric, no curvature, ROM normal, no CVA tenderness   Lungs:     Decreased breath sounds bilaterally, respirations unlabored   Chest Wall:    No tenderness or deformity    Heart:    Regular rate and rhythm, S1 and S2 normal, no murmur, rub   or gallop   Abdomen:     Soft, nontender, bowel sounds active all four quadrants,     no masses, no hepatomegaly, no splenomegaly   Extremities:   Extremities normal, atraumatic, no cyanosis or edema   Pulses:   2+ and symmetric all extremities   Skin:   Skin color, texture, turgor normal, no rashes or lesions   Lymph nodes:   Cervical, supraclavicular, and axillary nodes normal   Neurologic:   CNII-XII intact, normal strength, sensation intact throughout      .    Data Review:  Labs in chart were reviewed.  WBC   Date Value Ref Range Status   02/13/2021 10.40 3.40 - 10.80 10*3/mm3 Final     Hemoglobin   Date Value Ref Range Status   02/13/2021 6.4 (C) 12.0 - 15.9 g/dL Final     Hematocrit   Date Value Ref Range Status   02/13/2021 20.6 (C) 34.0 - 46.6 % Final     Platelets   Date Value Ref Range Status   02/13/2021 506 (H) 140 - 450 10*3/mm3 Final     Sodium   Date Value Ref Range Status   02/13/2021 139 136 - 145 mmol/L Final     Potassium   Date Value Ref Range Status   02/13/2021 4.2 3.5 - 5.2 mmol/L Final     Chloride   Date Value Ref Range Status   02/13/2021 99 98 - 107 mmol/L Final     CO2   Date Value Ref Range Status   02/13/2021 26.8 22.0 - 29.0 mmol/L Final     BUN   Date Value Ref Range Status   02/13/2021 16 6 - 20 mg/dL Final     Creatinine   Date Value Ref Range Status   02/13/2021 5.54 (H) 0.57 - 1.00 mg/dL Final     Glucose   Date Value Ref Range Status   02/13/2021 91 65 - 99 mg/dL Final     Calcium   Date Value Ref Range Status   02/13/2021 9.1 8.6 - 10.5 mg/dL Final     AST (SGOT)    Date Value Ref Range Status   02/13/2021 22 1 - 32 U/L Final     ALT (SGPT)   Date Value Ref Range Status   02/13/2021 <5 1 - 33 U/L Final     Alkaline Phosphatase   Date Value Ref Range Status   02/13/2021 70 39 - 117 U/L Final     No results found for: APTT, INR             Imaging Results (All)     Procedure Component Value Units Date/Time    XR Chest 1 View [[id number]] Collected: 02/13/21 1145     Updated: 02/13/21 1149    Narrative:      XR CHEST 1 VW-     HISTORY: Female who is 45 years-old,  displaced dialysis catheter     TECHNIQUE: Frontal view of the chest     COMPARISON: 01/14/2021     FINDINGS: The tip of the right-sided dialysis catheter is at the level  of the right neck base. The heart is enlarged. Pulmonary vasculature is  unremarkable. No focal pulmonary consolidation, pleural effusion, or  pneumothorax. No acute osseous process.       Impression:      Displaced dialysis catheter, as described.     This report was finalized on 2/13/2021 11:46 AM by Dr. David Montoya M.D.           Patient Active Problem List   Diagnosis Code   • Hyperparathyroidism (CMS/[facility]) E21.3   • Acute rejection of kidney transplant T86.11   • ARF (acute renal failure) (CMS/[facility]) N17.9   • Hx of kidney transplant Z94.0   • Hypertension I10   • Kidney transplant status, cadaveric Z94.0   • Nephritis N05.9   • Hypercalcemia E83.52   • ESRD on hemodialysis (CMS/[facility]) N18.6, Z99.2   • Prolonged QT interval R94.31   • ESRD (end stage renal disease) (CMS/[facility]) N18.6   • Hyperphosphatemia E83.39   • Leukocytosis D72.829   • Type 2 diabetes mellitus, without long-term current use of insulin (CMS/[facility]) E11.9   • Acute UTI (urinary tract infection) N39.0   • Obesity (BMI 30-39.9) E66.9   • A-V fistula (CMS/[facility]), right upper extremity I77.0   • Anemia, chronic disease D63.8   • Folate deficiency anemia D52.9   • Febrile illness, acute R50.9   • Sepsis (CMS/[facility]) A41.9   • Screening for colon cancer Z12.11   • Hyperkalemia E87.5   •  Shunt malfunction T85.618A   • Mechanical complication of hemodialysis catheter (CMS/HCC) T82.49XA   • Anemia D64.9       Assessment:    Mechanical complication of hemodialysis catheter (CMS/[facility])    ESRD on hemodialysis (CMS/[facility])    Anemia  diabetes  thrombocytosis    Plan:  OR to fix the tunnel cath  Ask nephrology to see her  Transfuse prbcs   accu checks, insulin sliding scale  Monitor on telemetry  [place] patient, nurse, dr schmid and ED Provider    Angie Soriano MD  2/13/2021  18:53 EST

## 2021-02-13 NOTE — ED TRIAGE NOTES
"All triage performed with this RN wearing appropriate PPE.  Pt placed in mask upon arrival to ED.  Patient reports she woke up \"and my dialysis catheter is longer than usual\". She went to the dialysis clinic and was sent to ED.   "

## 2021-02-13 NOTE — PLAN OF CARE
Problem: Pain Acute  Goal: Optimal Pain Control  Outcome: Ongoing, Progressing     Problem: Pain Acute  Goal: Optimal Pain Control  Intervention: Prevent or Manage Pain  Recent Flowsheet Documentation  Taken 2/13/2021 1439 by Sandrine Ying RN  Sensory Stimulation Regulation: quiet environment promoted     Problem: Pain Acute  Goal: Optimal Pain Control  Intervention: Optimize Psychosocial Wellbeing  Recent Flowsheet Documentation  Taken 2/13/2021 1439 by Sandrine Ying RN  Supportive Measures: active listening utilized  Diversional Activities:   smartphone   television   Goal Outcome Evaluation:  Plan of Care Reviewed With: patient  Progress: no change  Outcome Summary: VSS. ER admit for right upper chest dialysis catheter displacement and anemia. OR for catheter replacement. Consent signed. No further orders. Will continue to monitor.

## 2021-02-13 NOTE — ED PROVIDER NOTES
EMERGENCY DEPARTMENT ENCOUNTER    Room Number:  E552/1  Date seen:  2/13/2021  Time seen: 10:36 EST  PCP: Provider, No Known  Historian: Patient    HPI:  Chief complaint: Vascular access problem  A complete HPI/ROS/PMH/PSH/SH/FH are unobtainable due to: None  Context:Sunni Mcneil is a 45 y.o. female, hx of end-stage renal disease, who presents to the ED with c/o waking up this morning with her dialysis venous catheter pulled out approximately 3 inches.  Patient receives dialysis every Monday Wednesday Friday through right supraclavicular tunneled venous catheter and completed her dialysis yesterday.  Patient denies any chest pain, shortness of breath, fever.  Dr. Gonzales is patient's nephrologist and has been intermittently receiving dialysis since 2014.  Patient went to dialysis today and advised her to go to the ER for further evaluation    Patient was placed in face mask in first look. Patient was wearing facemask when I entered the room and throughout our encounter. I wore full protective equipment throughout this patient encounter including a face mask, goggles, and gloves. Hand hygiene was performed before donning protective equipment and after removal when leaving the room.    MEDICAL RECORD REVIEW  Patient had a left AV graft placed on 2/4/2021 by Dr. Anya Hernandez.    ALLERGIES  Patient has no known allergies.    PAST MEDICAL HISTORY  Active Ambulatory Problems     Diagnosis Date Noted   • Hyperparathyroidism (CMS/[facility]) 01/24/2018   • Acute rejection of kidney transplant 05/10/2017   • ARF (acute renal failure) (CMS/[facility]) 04/04/2014   • Hx of kidney transplant 07/06/2017   • Hypertension 01/24/2018   • Kidney transplant status, cadaveric 07/06/2017   • Nephritis 04/13/2014   • Hypercalcemia 01/24/2018   • ESRD on hemodialysis (CMS/[facility]) 07/02/2019   • Prolonged QT interval 11/07/2019   • ESRD (end stage renal disease) (CMS/[facility]) 11/07/2019   • Hyperphosphatemia 11/07/2019   • Leukocytosis 11/07/2019   • Type 2  diabetes mellitus, without long-term current use of insulin (CMS/HCC) 11/07/2019   • Acute UTI (urinary tract infection) 03/13/2020   • Obesity (BMI 30-39.9) 03/13/2020   • A-V fistula (CMS/HCC), right upper extremity 03/13/2020   • Anemia, chronic disease 03/13/2020   • Folate deficiency anemia 03/16/2020   • Febrile illness, acute 05/06/2020   • Sepsis (CMS/HCC) 05/06/2020   • Screening for colon cancer 09/15/2020   • Hyperkalemia 01/14/2021   • Shunt malfunction 01/15/2021     Resolved Ambulatory Problems     Diagnosis Date Noted   • Peritoneal dialysis catheter in place (CMS/HCC) 07/24/2019   • Hypokalemia 11/07/2019   • Nausea 11/07/2019   • Hypocalcemia 11/07/2019   • Diarrhea 03/13/2020   • Acute cystitis with hematuria 03/13/2020     Past Medical History:   Diagnosis Date   • Acid reflux    • Anemia    • Anxiety    • Arthritis    • Depression    • Diabetes mellitus (CMS/HCC)    • ESRD on dialysis (CMS/HCC)    • History of peritoneal dialysis    • History of transfusion    • Kidney disease    • Kidney transplant recipient 09/02/2016   • Seasonal allergies    • Vascular dialysis catheter in place (CMS/HCC)        PAST SURGICAL HISTORY  Past Surgical History:   Procedure Laterality Date   • ARTERIOVENOUS FISTULA/SHUNT SURGERY Right 2/26/2020    Procedure: RIGHT ARM ARTERIAL VENOUS FISTULA;  Surgeon: Elijah Herrera MD;  Location: [facility];  Service: Vascular;  Laterality: Right;   • ARTERIOVENOUS FISTULA/SHUNT SURGERY Left 2/4/2021    Procedure: LEFT BRACHIOAXILLARY ARTERIOVENOUS FISTULA GRAFT;  Surgeon: Anya Hernandez Jr., MD;  Location: [facility] OR;  Service: Vascular;  Laterality: Left;   • INSERTION HEMODIALYSIS CATHETER Right 1/15/2021    Procedure: TUNNELED DIAYLIS CATHETER PLACEMENT;  Surgeon: Phu Gaviria MD;  Location: [facility] OR 18/19;  Service: Vascular;  Laterality: Right;   • INSERTION PERITONEAL DIALYSIS CATHETER  07/29/2014    Laparoscopic placement of Curl Cath  peritoneal dialysis catheter, Dr. Vinod Goldstein   • INSERTION PERITONEAL DIALYSIS CATHETER N/A 2019    Procedure: LAPAROSCOPIC INSERTION PERITONEAL DIALYSIS CATHETER;  Surgeon: Damon Rooney MD;  Location: [facility] MAIN OR;  Service: General   • REMOVAL PERITONEAL DIALYSIS CATHETER N/A 10/17/2016    Procedure: REMOVAL PERITONEAL DIALYSIS CATHETER;  Surgeon: Damon Rooney MD;  Location: [facility] MAIN OR;  Service:    • REMOVAL PERITONEAL DIALYSIS CATHETER N/A 10/21/2020    Procedure: REMOVAL PERITONEAL DIALYSIS CATHETER;  Surgeon: Damon Rooney MD;  Location: [facility] MAIN OR;  Service: General;  Laterality: N/A;   • TRANSPLANTATION RENAL Right 2016    from  donor   • TUBAL ABDOMINAL LIGATION Bilateral        FAMILY HISTORY  Family History   Problem Relation Age of Onset   • Malig Hyperthermia Neg Hx        SOCIAL HISTORY  Social History     Socioeconomic History   • Marital status:      Spouse name: Not on file   • Number of children: 2   • Years of education: 12   • Highest education level: Not on file   Occupational History     Employer: [employer]   Tobacco Use   • Smoking status: Never Smoker   • Smokeless tobacco: Never Used   Substance and Sexual Activity   • Alcohol use: No   • Drug use: No   • Sexual activity: Defer       REVIEW OF SYSTEMS  Review of Systems    All systems reviewed and negative except for those discussed in HPI.     PHYSICAL EXAM    ED Triage Vitals   Temp Heart Rate Resp BP SpO2   21 1006 21 1006 21 1006 21 1024 21 1006   96.3 °F (35.7 °C) 90 18 162/74 98 %      Temp src Heart Rate Source Patient Position BP Location FiO2 (%)   21 1006 21 1006 21 1024 21 1024 --   Tympanic Monitor Sitting Right leg      Physical Exam    I have reviewed the triage vital signs and nursing notes.      GENERAL: not distressed  HENT: nares patent  EYES: no scleral icterus  NECK: no ROM limitations  CV: regular  rhythm, regular rate  RESPIRATORY: normal effort; ctab  ABDOMEN: soft  : deferred  MUSCULOSKELETAL: no deformity  NEURO: alert, moves all extremities, follows commands  SKIN: warm, dry; dialysis tunneled catheter appears acutely withdrawn from the right upper chest approximately 7 cm, no area of bleeding or cellulitis surrounding the chest wall; there is a maturing AV graft on the left upper extremity with no areas of cellulitis    LAB RESULTS  Recent Results (from the past 24 hour(s))   Light Blue Top    Collection Time: 02/13/21 10:30 AM   Result Value Ref Range    Extra Tube hold for add-on    Green Top (Gel)    Collection Time: 02/13/21 10:30 AM   Result Value Ref Range    Extra Tube Hold for add-ons.    Lavender Top    Collection Time: 02/13/21 10:30 AM   Result Value Ref Range    Extra Tube hold for add-on    Gold Top - SST    Collection Time: 02/13/21 10:30 AM   Result Value Ref Range    Extra Tube Hold for add-ons.    Comprehensive Metabolic Panel    Collection Time: 02/13/21 10:30 AM    Specimen: Blood   Result Value Ref Range    Glucose 91 65 - 99 mg/dL    BUN 16 6 - 20 mg/dL    Creatinine 5.54 (H) 0.57 - 1.00 mg/dL    Sodium 139 136 - 145 mmol/L    Potassium 4.2 3.5 - 5.2 mmol/L    Chloride 99 98 - 107 mmol/L    CO2 26.8 22.0 - 29.0 mmol/L    Calcium 9.1 8.6 - 10.5 mg/dL    Total Protein 8.3 6.0 - 8.5 g/dL    Albumin 3.70 3.50 - 5.20 g/dL    ALT (SGPT) <5 1 - 33 U/L    AST (SGOT) 22 1 - 32 U/L    Alkaline Phosphatase 70 39 - 117 U/L    Total Bilirubin 0.2 0.0 - 1.2 mg/dL    eGFR Non African Amer 8 (L) >60 mL/min/1.73    eGFR  African Amer      Globulin 4.6 gm/dL    A/G Ratio 0.8 g/dL    BUN/Creatinine Ratio 2.9 (L) 7.0 - 25.0    Anion Gap 13.2 5.0 - 15.0 mmol/L   hCG, Serum, Qualitative    Collection Time: 02/13/21 10:30 AM    Specimen: Blood   Result Value Ref Range    HCG Qualitative Negative Negative   CBC Auto Differential    Collection Time: 02/13/21 10:30 AM    Specimen: Blood   Result Value Ref  Range    WBC 10.40 3.40 - 10.80 10*3/mm3    RBC 2.21 (L) 3.77 - 5.28 10*6/mm3    Hemoglobin 6.4 (C) 12.0 - 15.9 g/dL    Hematocrit 20.6 (C) 34.0 - 46.6 %    MCV 93.2 79.0 - 97.0 fL    MCH 29.0 26.6 - 33.0 pg    MCHC 31.1 (L) 31.5 - 35.7 g/dL    RDW 15.6 (H) 12.3 - 15.4 %    RDW-SD 54.0 37.0 - 54.0 fl    MPV 9.6 6.0 - 12.0 fL    Platelets 506 (H) 140 - 450 10*3/mm3   Manual Differential    Collection Time: 02/13/21 10:30 AM    Specimen: Blood   Result Value Ref Range    Neutrophil % 81.8 (H) 42.7 - 76.0 %    Lymphocyte % 9.1 (L) 19.6 - 45.3 %    Monocyte % 8.1 5.0 - 12.0 %    Eosinophil % 1.0 0.3 - 6.2 %    Neutrophils Absolute 8.51 (H) 1.70 - 7.00 10*3/mm3    Lymphocytes Absolute 0.95 0.70 - 3.10 10*3/mm3    Monocytes Absolute 0.84 0.10 - 0.90 10*3/mm3    Eosinophils Absolute 0.10 0.00 - 0.40 10*3/mm3    Anisocytosis Mod/2+ None Seen    Microcytes Slight/1+ None Seen    WBC Morphology Normal Normal    Platelet Morphology Normal Normal   Type & Screen    Collection Time: 02/13/21 11:36 AM    Specimen: Blood   Result Value Ref Range    ABO Type B     RH type Positive     Antibody Screen Negative     T&S Expiration Date 2/16/2021 11:59:59 PM    COVID-19,[facility] IN-HOUSE [facility], NP SWAB IN TRANSPORT MEDIA 8-12 HR TAT - Swab, Nasopharynx    Collection Time: 02/13/21 11:37 AM    Specimen: Nasopharynx; Swab   Result Value Ref Range    COVID19 Not Detected Not Detected - Ref. Range         RADIOLOGY RESULTS  XR Chest 1 View   Final Result   Displaced dialysis catheter, as described.       This report was finalized on 2/13/2021 11:46 AM by Dr. David Montoya M.D.                PROGRESS, DATA ANALYSIS, CONSULTS AND MEDICAL DECISION MAKING  All labs have been independently reviewed by me.  All radiology studies have been reviewed by me and discussed with radiologist dictating the report. Discussion below represents my analysis of pertinent findings related to patient's condition, differential diagnosis, treatment plan  "and final disposition.     ED Course as of Feb 13 1658   Sat Feb 13, 2021   1212 I rechecked patient informed patient the lab and imaging results.  I have a call out to vascular surgeon at this time.  Patient agrees with plan of care.  All question addressed at this time.    [SS]   1224 I have discussed with Dr. Maurer, vascular surgeon regarding patient.  He reports that he will come down to the ER to evaluate patient at this time.    [SS]   1311 I rechecked patient and was going to do a rectal exam to rule out GI bleed.  Patient is refusing.  I informed patient of her hemoglobin of 6.4.  Baseline is 8.7.  Will admit for anemia.    [SS]   1333 I discussed with Dr. [facility] regarding patient.  Agrees to admit patient for anemia.  All question addressed at this time.    [SS]      ED Course User Index  [SS] Melinda Garner PA-C       The differential diagnosis include but are not limited to anemia, GI bleed, catheter malfunction.         Reviewed pt's history and workup with Dr. Clemente.  After a bedside evaluation, Dr. Clemente agrees with the plan of care.        (FOR ADMIT) Based on the patient's lab findings and presenting symptoms, the doctor and I feel it is appropriate to admit the patient for further management, evaluation, and treatment.  I have discussed this with the admitting team.  I have also discussed this with the patient/family.  They are in agreement with admission.          Disposition vitals:  /98 (BP Location: Right leg, Patient Position: Sitting)   Pulse 70   Temp 98.5 °F (36.9 °C) (Oral)   Resp 16   Ht 152.4 cm (60\")   Wt 81.6 kg (180 lb)   LMP 02/05/2021 Comment: negative serum qualitative 2/4/21  SpO2 100%   Breastfeeding No   BMI 35.15 kg/m²       DIAGNOSIS  Final diagnoses:   Anemia, unspecified type   Hemodialysis catheter dysfunction, initial encounter (CMS/[facility])       FOLLOW UP   No follow-up provider specified.       Melinda Garner PA-C  02/13/21 1707    "

## 2021-02-13 NOTE — ED PROVIDER NOTES
Brief history of present illness: 45-year-old female who is been intermittently dialysis dependent since 2014 is currently receiving dialysis Monday, Wednesday, Friday through right supraclavicular tunneled venous catheter.  When she awakened this morning she can tell that the venous catheter has been withdrawn by about 3 inches.  She did have a full run of dialysis yesterday.  She denies any chest pain, shortness of breath, fever, or bleeding.    Physical exam:   ED Triage Vitals   Temp Heart Rate Resp BP SpO2   02/13/21 1006 02/13/21 1006 02/13/21 1006 02/13/21 1024 02/13/21 1006   96.3 °F (35.7 °C) 90 18 162/74 98 %      Temp src Heart Rate Source Patient Position BP Location FiO2 (%)   02/13/21 1006 02/13/21 1006 02/13/21 1024 02/13/21 1024 --   Tympanic Monitor Sitting Right leg      Appropriate.  No acute distress.  No respiratory distress or tachypnea.  Pink warm well perfused.  Dialysis catheter is noted to appear to be acutely withdrawn approximately 6-8 cm.  No bleeding or cellulitic change noted to the chest wall.  Normal mood and affect.    MDM: Agree with radiologic evaluation and plan to consult with vascular surgery.    I have seen and personally evaluated this patient, discussed the case with the treating advanced practice provider, and reviewed their note. I was involved in the medical decision making during the evaluation, testing and disposition planning for this patient.     Kimo Clemente MD  02/13/21 0424

## 2021-02-14 VITALS
HEART RATE: 78 BPM | WEIGHT: 177.91 LBS | DIASTOLIC BLOOD PRESSURE: 91 MMHG | TEMPERATURE: 98.1 F | RESPIRATION RATE: 18 BRPM | HEIGHT: 60 IN | BODY MASS INDEX: 34.93 KG/M2 | SYSTOLIC BLOOD PRESSURE: 170 MMHG | OXYGEN SATURATION: 97 %

## 2021-02-14 LAB
ALBUMIN SERPL-MCNC: 3.6 G/DL (ref 3.5–5.2)
ANION GAP SERPL CALCULATED.3IONS-SCNC: 19.3 MMOL/L (ref 5–15)
BH BB BLOOD EXPIRATION DATE: NORMAL
BH BB BLOOD EXPIRATION DATE: NORMAL
BH BB BLOOD TYPE BARCODE: 7300
BH BB BLOOD TYPE BARCODE: 7300
BH BB DISPENSE STATUS: NORMAL
BH BB DISPENSE STATUS: NORMAL
BH BB PRODUCT CODE: NORMAL
BH BB PRODUCT CODE: NORMAL
BH BB UNIT NUMBER: NORMAL
BH BB UNIT NUMBER: NORMAL
BUN SERPL-MCNC: 27 MG/DL (ref 6–20)
BUN/CREAT SERPL: 3.8 (ref 7–25)
CALCIUM SPEC-SCNC: 8.2 MG/DL (ref 8.6–10.5)
CHLORIDE SERPL-SCNC: 99 MMOL/L (ref 98–107)
CO2 SERPL-SCNC: 20.7 MMOL/L (ref 22–29)
CREAT SERPL-MCNC: 7.16 MG/DL (ref 0.57–1)
CROSSMATCH INTERPRETATION: NORMAL
CROSSMATCH INTERPRETATION: NORMAL
DEPRECATED RDW RBC AUTO: 48.9 FL (ref 37–54)
ERYTHROCYTE [DISTWIDTH] IN BLOOD BY AUTOMATED COUNT: 14.6 % (ref 12.3–15.4)
GFR SERPL CREATININE-BSD FRML MDRD: 6 ML/MIN/1.73
GFR SERPL CREATININE-BSD FRML MDRD: ABNORMAL ML/MIN/{1.73_M2}
GLUCOSE BLDC GLUCOMTR-MCNC: 78 MG/DL (ref 70–130)
GLUCOSE SERPL-MCNC: 86 MG/DL (ref 65–99)
HCT VFR BLD AUTO: 27.5 % (ref 34–46.6)
HGB BLD-MCNC: 9.2 G/DL (ref 12–15.9)
MCH RBC QN AUTO: 31 PG (ref 26.6–33)
MCHC RBC AUTO-ENTMCNC: 33.5 G/DL (ref 31.5–35.7)
MCV RBC AUTO: 92.6 FL (ref 79–97)
PHOSPHATE SERPL-MCNC: 6.9 MG/DL (ref 2.5–4.5)
PLATELET # BLD AUTO: 477 10*3/MM3 (ref 140–450)
PMV BLD AUTO: 9.7 FL (ref 6–12)
POTASSIUM SERPL-SCNC: 4.5 MMOL/L (ref 3.5–5.2)
POTASSIUM SERPL-SCNC: 6.5 MMOL/L (ref 3.5–5.2)
RBC # BLD AUTO: 2.97 10*6/MM3 (ref 3.77–5.28)
SODIUM SERPL-SCNC: 139 MMOL/L (ref 136–145)
UNIT  ABO: NORMAL
UNIT  ABO: NORMAL
UNIT  RH: NORMAL
UNIT  RH: NORMAL
WBC # BLD AUTO: 11.82 10*3/MM3 (ref 3.4–10.8)

## 2021-02-14 PROCEDURE — 86900 BLOOD TYPING SEROLOGIC ABO: CPT

## 2021-02-14 PROCEDURE — 36430 TRANSFUSION BLD/BLD COMPNT: CPT

## 2021-02-14 PROCEDURE — 25010000002 ONDANSETRON PER 1 MG: Performed by: SURGERY

## 2021-02-14 PROCEDURE — 84132 ASSAY OF SERUM POTASSIUM: CPT | Performed by: INTERNAL MEDICINE

## 2021-02-14 PROCEDURE — 63710000001 CLONIDINE 0.1 MG TABLET: Performed by: HOSPITALIST

## 2021-02-14 PROCEDURE — G0378 HOSPITAL OBSERVATION PER HR: HCPCS

## 2021-02-14 PROCEDURE — A9270 NON-COVERED ITEM OR SERVICE: HCPCS | Performed by: HOSPITALIST

## 2021-02-14 PROCEDURE — A9270 NON-COVERED ITEM OR SERVICE: HCPCS | Performed by: SURGERY

## 2021-02-14 PROCEDURE — 80069 RENAL FUNCTION PANEL: CPT | Performed by: INTERNAL MEDICINE

## 2021-02-14 PROCEDURE — 82962 GLUCOSE BLOOD TEST: CPT

## 2021-02-14 PROCEDURE — 63710000001 CALCITRIOL 0.25 MCG CAPSULE: Performed by: HOSPITALIST

## 2021-02-14 PROCEDURE — P9016 RBC LEUKOCYTES REDUCED: HCPCS

## 2021-02-14 PROCEDURE — 63710000001 CALCIUM ACETATE 667 MG CAPSULE: Performed by: HOSPITALIST

## 2021-02-14 PROCEDURE — 85027 COMPLETE CBC AUTOMATED: CPT | Performed by: SURGERY

## 2021-02-14 PROCEDURE — 63710000001 HYDROCODONE-ACETAMINOPHEN 5-325 MG TABLET: Performed by: SURGERY

## 2021-02-14 PROCEDURE — 63710000001 MELATONIN 1 MG TABLET: Performed by: SURGERY

## 2021-02-14 RX ORDER — HYDROCODONE BITARTRATE AND ACETAMINOPHEN 5; 325 MG/1; MG/1
1 TABLET ORAL EVERY 6 HOURS PRN
Status: DISCONTINUED | OUTPATIENT
Start: 2021-02-14 | End: 2021-02-14 | Stop reason: HOSPADM

## 2021-02-14 RX ORDER — CALCITRIOL 0.25 UG/1
0.5 CAPSULE, LIQUID FILLED ORAL DAILY
Status: DISCONTINUED | OUTPATIENT
Start: 2021-02-14 | End: 2021-02-14 | Stop reason: HOSPADM

## 2021-02-14 RX ORDER — HYDROXYZINE 50 MG/1
50 TABLET, FILM COATED ORAL 3 TIMES DAILY PRN
Status: DISCONTINUED | OUTPATIENT
Start: 2021-02-14 | End: 2021-02-14 | Stop reason: HOSPADM

## 2021-02-14 RX ORDER — CLONIDINE HYDROCHLORIDE 0.1 MG/1
0.3 TABLET ORAL 3 TIMES DAILY
Status: DISCONTINUED | OUTPATIENT
Start: 2021-02-14 | End: 2021-02-14 | Stop reason: HOSPADM

## 2021-02-14 RX ORDER — CALCIUM ACETATE 667 MG/1
2001 CAPSULE ORAL
Status: DISCONTINUED | OUTPATIENT
Start: 2021-02-14 | End: 2021-02-14 | Stop reason: HOSPADM

## 2021-02-14 RX ADMIN — CLONIDINE HYDROCHLORIDE 0.3 MG: 0.1 TABLET ORAL at 13:05

## 2021-02-14 RX ADMIN — CALCIUM ACETATE 2001 MG: 667 CAPSULE ORAL at 13:04

## 2021-02-14 RX ADMIN — CALCITRIOL 0.5 MCG: 0.25 CAPSULE ORAL at 13:04

## 2021-02-14 RX ADMIN — Medication 3 MG: at 00:02

## 2021-02-14 RX ADMIN — ONDANSETRON 4 MG: 2 INJECTION INTRAMUSCULAR; INTRAVENOUS at 08:43

## 2021-02-14 RX ADMIN — HYDROCODONE BITARTRATE AND ACETAMINOPHEN 1 TABLET: 5; 325 TABLET ORAL at 07:58

## 2021-02-14 RX ADMIN — HYDROCODONE BITARTRATE AND ACETAMINOPHEN 1 TABLET: 5; 325 TABLET ORAL at 00:02

## 2021-02-14 NOTE — NURSING NOTE
Pt refusing glucose testing. Pt states she is not diabetic and will not allow any insulin to be administered.

## 2021-02-14 NOTE — PROGRESS NOTES
"DAILY PROGRESS NOTE  [facility]    Patient Identification:  Name: Sunni Mcneil  Age: 45 y.o.  Sex: female  :  1975  MRN: [id number]         Primary Care Physician: Provider, No Known      Subjective  No complaints.  Patient is feeling well and very anxious to return home at this point.  She is due dialysis tomorrow.    Objective:  General Appearance:  Comfortable, well-appearing, in no acute distress and not in pain.    Vital signs: (most recent): Blood pressure (!) 201/108, pulse 75, temperature 97.5 °F (36.4 °C), temperature source Oral, resp. rate 18, height 152.4 cm (60\"), weight 80.7 kg (177 lb 14.6 oz), last menstrual period 2021, SpO2 100 %, not currently breastfeeding.    Lungs:  Normal effort and normal respiratory rate.  Breath sounds clear to auscultation.    Heart: Normal rate.  Regular rhythm.    Extremities: There is no dependent edema.    Neurological: Patient is alert and oriented to person, place and time.    Skin:  Warm and dry.                Vital signs in last 24 hours:  Temp:  [97.5 °F (36.4 °C)-98.5 °F (36.9 °C)] 97.5 °F (36.4 °C)  Heart Rate:  [67-98] 75  Resp:  [14-18] 18  BP: (150-201)/() 201/108    Intake/Output:    Intake/Output Summary (Last 24 hours) at 2021 1306  Last data filed at 2021 0541  Gross per 24 hour   Intake 1260 ml   Output 0 ml   Net 1260 ml         Results from last 7 days   Lab Units 21  0914 21  1030   WBC 10*3/mm3 11.82* 10.40   HEMOGLOBIN g/dL 9.2* 6.4*   PLATELETS 10*3/mm3 477* 506*     Results from last 7 days   Lab Units 21  1125 21  0914 21  1030   SODIUM mmol/L  --  139 139   POTASSIUM mmol/L 4.5 6.5* 4.2   CHLORIDE mmol/L  --  99 99   CO2 mmol/L  --  20.7* 26.8   BUN mg/dL  --  27* 16   CREATININE mg/dL  --  7.16* 5.54*   GLUCOSE mg/dL  --  86 91   Estimated Creatinine Clearance: 9.3 mL/min (A) (by C-G formula based on SCr of 7.16 mg/dL (H)).  Results from last 7 days   Lab Units " 02/14/21  0914 02/13/21  1030   CALCIUM mg/dL 8.2* 9.1   ALBUMIN g/dL 3.60 3.70   PHOSPHORUS mg/dL 6.9*  --      Results from last 7 days   Lab Units 02/14/21  0914 02/13/21  1030   ALBUMIN g/dL 3.60 3.70   BILIRUBIN mg/dL  --  0.2   ALK PHOS U/L  --  70   AST (SGOT) U/L  --  22   ALT (SGPT) U/L  --  <5       Assessment:    Mechanical complication of hemodialysis catheter: Status post removal and placement of palindrome hemodialysis catheter.    ESRD on hemodialysis: Last dialysis was on Friday and she is due again on Monday.    Anemia: Secondary to chronic kidney disease.  Stable.    Hypertension: Blood pressure presently high secondary to missing medication doses.        Plan:  Patient presently receiving her Catapres.  RN will call me 2 hours status post dose with blood pressure reading.  If it is reasonably improved and she can be discharged remainder treatment follow-up as an outpatient.    Chris Rice MD  2/14/2021  13:06 EST

## 2021-02-14 NOTE — DISCHARGE SUMMARY
"                                                                   PHYSICIAN DISCHARGE SUMMARY                                                                        [facility]    Patient Identification:  Name: Sunni Mcneil  Age: 45 y.o.  Sex: female  :  1975  MRN: [id number]  Primary Care Physician: Provider, No Known    Admit date: 2021  Discharge date and time: 2021     Discharged Condition: good    Discharge Diagnoses:  Mechanical complication of hemodialysis catheter (CMS/HCC)    ESRD on hemodialysis (CMS/HCC)    Anemia         Hospital Course:  Pleasant 45-year-old female with end-stage renal disease, hemodialysis dependent.  She presents secondary to hemodialysis catheter being partially pulled out.  Please H&P for full details.  Patient was admitted and vascular surgery consultation obtained.  This morning she had the catheter removed and a palindrome hemodialysis catheter placed.  She tolerated procedure well.  She had her last hemodialysis on Friday and is due another one tomorrow.      Consults:     Consults     Date and Time Order Name Status Description    2021 1555 Inpatient Nephrology Consult Completed     2021 1309 [facility] (on-call MD unless specified) Details Completed     2021 1208 IP General Consult (Use specialty-specific consult if known) Completed     2021 Inpatient Vascular Surgery Consult Completed     2021 Inpatient Nephrology Consult Completed     2021 [facility] (on-call MD unless specified) Details Completed     2021 Inpatient Nephrology Consult Completed     2021 Inpatient Vascular Surgery Consult Completed             Discharge Exam:  General Appearance:  Comfortable, well-appearing, in no acute distress and not in pain.    Vital signs: (most recent): Blood pressure (!) 201/108, pulse 75, temperature 97.5 °F (36.4 °C), temperature source Oral, resp. rate 18, height 152.4 cm (60\"), weight 80.7 kg (177 " lb 14.6 oz), last menstrual period 02/05/2021, SpO2 100 %, not currently breastfeeding.    Lungs:  Normal effort and normal respiratory rate.  Breath sounds clear to auscultation.    Heart: Normal rate.  Regular rhythm.    Extremities: There is no dependent edema.    Neurological: Patient is alert and oriented to person, place and time.    Skin:  Warm and dry.       Disposition:  Home    Patient Instructions:      Discharge Medications      Continue These Medications      Instructions Start Date   calcitriol 0.25 MCG capsule  Commonly known as: ROCALTROL   0.5 mcg, Oral, Daily      calcium acetate 667 MG capsule  Commonly known as: PHOSLO   2,001 mg, Oral, 3 Times Daily With Meals      cloNIDine 0.3 MG tablet  Commonly known as: CATAPRES   0.3 mg, Oral, 3 Times Daily      HYDROcodone-acetaminophen 5-325 MG per tablet  Commonly known as: Norco   1 tablet, Oral, Every 6 Hours PRN      hydrOXYzine 50 MG tablet  Commonly known as: ATARAX   50 mg, Oral, 2 Times Daily PRN           Diet Instructions     Diet: Renal      Discharge Diet: Renal        No future appointments.  Follow-up Information     Provider, No Known .    Contact information:  [facility] [id number]                 Discharge Order (From admission, onward)     Start     Ordered    02/14/21 1531  Discharge patient  Once     Expected Discharge Date: 02/14/21    Discharge Disposition: Home or Self Care    Physician of Record for Attribution - Please select from Treatment Team: DIAMOND SORENSEN [[id number]]    Review needed by CMO to determine Physician of Record: No       Question Answer Comment   Physician of Record for Attribution - Please select from Treatment Team DIAMOND SORENSEN    Review needed by CMO to determine Physician of Record No        02/14/21 1532                  Total time spent discharging patient including evaluation,post hospitalization follow up,  medication and post hospitalization instructions and education total  time exceeds 30 minutes.    Signed:  Chris Rice MD  2/14/2021  15:33 EST    EMR Dragon/Transcription disclaimer:   Much of this encounter note is an electronic transcription/translation of spoken language to printed text. The electronic translation of spoken language may permit erroneous, or at times, nonsensical words or phrases to be inadvertently transcribed; Although I have reviewed the note for such errors, some may still exist.

## 2021-02-14 NOTE — PLAN OF CARE
VSS, POD1 tunnel cath removal and replacement, tunnel cath to R chest placed CDI, c/o pain prn norco, 2 iu PRBC given recheck hmg in AM, dialysis MWF, new AV fistula to L arm healing, thrill/bruit present. D/c pending, ctm       Problem: Adult Inpatient Plan of Care  Goal: Plan of Care Review  Outcome: Ongoing, Progressing  Flowsheets  Taken 2/14/2021 0302 by Estefania Christine RN  Progress: improving  Taken 2/13/2021 2045 by Maura Moore RN  Plan of Care Reviewed With: patient   Goal Outcome Evaluation:     Progress: improving

## 2021-02-14 NOTE — OP NOTE
Operative Note  Location: [facility]  Date of Admission:  2/13/2021  OR Date: 2/13/2021    Pre-op Diagnosis:  1.  Partial dislodgment of recently inserted tunneled dialysis catheter  2.  End-stage renal disease requiring hemodialysis    Post-op Diagnosis:  1.  Partial dislodgment of recently inserted tunneled dialysis catheter  2.  End-stage renal disease requiring hemodialysis    Procedure:   1.  Removal of partially dislodged right internal jugular vein tunneled dialysis catheter   2.  Ultrasound-guided puncture of the right internal jugular vein  3.  Placement of 19 cm Palindrome hemodialysis catheter    Surgeon: Adán Maurer MD    Assistant: None    Anesthesia: General via LMA with local    Staff:   Circulator: Coreen Willard RN; Darlene Martel RN  Scrub Person: Audie Luna  Orderly: Bud Foreman    Estimated Blood Loss: minimal    Specimen: None    Complications: None    Findings: Catheter placed with tip in superior vena cava.  No pneumothorax.  Catheter lumens aspirate easily and flushed without resistance.    Implants: 19 cm Palindrome hemodialysis catheter placed in right internal jugular vein    Indications: 45-year-old woman with end-stage renal disease on hemodialysis, recently underwent placement of tunneled dialysis catheter.  Presents with early term partial catheter dislodgment.  Submits now for removal and replacement.  Patient noted to be significantly anemic, possibly related to recent surgeries.       Procedure:  The patient was given Kefzol IV.  She was transferred to the operating room and positioned supine the operating table.  Because of marked anxiety, the anesthesia team decided to provide general anesthesia.  After the induction of general anesthesia a laryngeal mask airway was placed.  The pre-existing hemodialysis catheter was removed intact.  I washed the neck and chest carefully, and removed a subcuticular suture at the internal jugular vein puncture site.  The  patient's neck and chest were then prepared with ChloraPrep and draped in a sterile fashion.  The right internal jugular vein was interrogated with B-mode ultrasound.  Lidocaine was infiltrated at the base of the neck.  The right internal jugular vein was cannulated on the first pass and dark venous blood was aspirated.  There was a bit of a stenosis in the retroclavicular internal jugular vein, but after I straighten the wire with a push-pull technique, the wire passed into the superior vena cava where its position was confirmed with fluoroscopy.  A skin incision was made at the puncture site.  A subcutaneous tunnel was proposed, infiltrated and created using the enclosed tunneler.  The subcutaneous tract was dilated over the wire with the dilator and a dilator and introducer were passed.  The wire and dilator were removed and the catheter was then passed through the sheath and the sheath was removed.  Fluoroscopy demonstrated ideal positioning of the catheter.  Dark venous blood was aspirated from both catheter lumens and both were flushed first with heparinized saline and then with locking heparin.  The skin puncture site was closed with a 3-0 Vicryl suture.  3-0 Vicryl sutures were also placed at the catheter exit site to fix the catheter in place.  The catheter hubs were affixed to the skin with nylon sutures.  Surgical adhesive was placed at the puncture site.  A Biopatch and dressing were applied.  Final fluoroscopic survey documented satisfactory catheter position.  Careful interrogation of the lung fields under magnification imaging showed no pneumothorax.  At its conclusion the patient had tolerated the procedure well, and without apparent complications.  Sponge and needle counts were correct.  The patient was taken to the recovery area in stable condition.    Adán Maurer MD     Date: 2/13/2021  Time: 19:20 EST

## 2021-02-14 NOTE — ANESTHESIA POSTPROCEDURE EVALUATION
Patient: Sunni Mcneil    Procedure Summary     Date: 02/13/21 Room / Location: [facility] OR [address] [place] [address] MAIN OR    Anesthesia Start: 1840 Anesthesia Stop: 1933    Procedure: INSERTION AND REMOVAL OF HEMODIALYSIS CATHETER (N/A ) Diagnosis:       ESRD on hemodialysis (CMS/Formerly [facility])      (ESRD on hemodialysis (CMS/[facility]ly [facility]) [N18.6, Z99.2])    Surgeon: Adán Maurer MD Provider: Javier Vang MD    Anesthesia Type: general ASA Status: 3 - Emergent          Anesthesia Type: general    Vitals  Vitals Value Taken Time   /92 02/13/21 2005   Temp 36.8 °C (98.3 °F) 02/13/21 1950   Pulse 66 02/13/21 2013   Resp 16 02/13/21 1950   SpO2 100 % 02/13/21 2013   Vitals shown include unvalidated device data.        Post Anesthesia Care and Evaluation    Patient location during evaluation: PACU  Anesthetic complications: No anesthetic complications

## 2021-02-15 NOTE — PROGRESS NOTES
Case Management Discharge Note      Final Note: Pt discharged home, no identified needs.         Selected Continued Care - Discharged on 2/14/2021 Admission date: 2/13/2021 - Discharge disposition: Home or Self Care    Destination    No services have been selected for the patient.              Durable Medical Equipment    No services have been selected for the patient.              Dialysis/Infusion    No services have been selected for the patient.              Home Medical Care    No services have been selected for the patient.              Therapy    No services have been selected for the patient.              Community Resources    No services have been selected for the patient.                  Transportation Services  Private: Car    Final Discharge Disposition Code: 01 - home or self-care

## 2021-02-24 ENCOUNTER — HOSPITAL ENCOUNTER (OUTPATIENT)
Facility: HOSPITAL | Age: 46
Setting detail: HOSPITAL OUTPATIENT SURGERY
End: 2021-02-24
Attending: SURGERY | Admitting: SURGERY

## 2021-03-02 ENCOUNTER — APPOINTMENT (OUTPATIENT)
Dept: GENERAL RADIOLOGY | Facility: HOSPITAL | Age: 46
End: 2021-03-02

## 2021-03-02 ENCOUNTER — HOSPITAL ENCOUNTER (EMERGENCY)
Facility: HOSPITAL | Age: 46
Discharge: HOME | End: 2021-03-02
Attending: EMERGENCY MEDICINE | Admitting: EMERGENCY MEDICINE

## 2021-03-02 VITALS
BODY MASS INDEX: 34.36 KG/M2 | DIASTOLIC BLOOD PRESSURE: 96 MMHG | SYSTOLIC BLOOD PRESSURE: 174 MMHG | HEIGHT: 60 IN | HEART RATE: 80 BPM | OXYGEN SATURATION: 98 % | WEIGHT: 175 LBS | TEMPERATURE: 98.4 F | RESPIRATION RATE: 16 BRPM

## 2021-03-02 DIAGNOSIS — R07.9 NONSPECIFIC CHEST PAIN: Primary | ICD-10-CM

## 2021-03-02 DIAGNOSIS — N18.6 ESRD ON DIALYSIS (HCC): ICD-10-CM

## 2021-03-02 DIAGNOSIS — Z99.2 ESRD ON DIALYSIS (HCC): ICD-10-CM

## 2021-03-02 LAB
ALBUMIN SERPL-MCNC: 3.5 G/DL (ref 3.5–5.2)
ALBUMIN/GLOB SERPL: 0.7 G/DL
ALP SERPL-CCNC: 91 U/L (ref 39–117)
ALT SERPL W P-5'-P-CCNC: 7 U/L (ref 1–33)
ANION GAP SERPL CALCULATED.3IONS-SCNC: 13.1 MMOL/L (ref 5–15)
AST SERPL-CCNC: 20 U/L (ref 1–32)
BASOPHILS # BLD AUTO: 0.05 10*3/MM3 (ref 0–0.2)
BASOPHILS NFR BLD AUTO: 0.4 % (ref 0–1.5)
BILIRUB SERPL-MCNC: 0.3 MG/DL (ref 0–1.2)
BUN SERPL-MCNC: 21 MG/DL (ref 6–20)
BUN/CREAT SERPL: 3.6 (ref 7–25)
CALCIUM SPEC-SCNC: 8.2 MG/DL (ref 8.6–10.5)
CHLORIDE SERPL-SCNC: 100 MMOL/L (ref 98–107)
CO2 SERPL-SCNC: 22.9 MMOL/L (ref 22–29)
CREAT SERPL-MCNC: 5.77 MG/DL (ref 0.57–1)
DEPRECATED RDW RBC AUTO: 49.1 FL (ref 37–54)
EOSINOPHIL # BLD AUTO: 0.37 10*3/MM3 (ref 0–0.4)
EOSINOPHIL NFR BLD AUTO: 3 % (ref 0.3–6.2)
ERYTHROCYTE [DISTWIDTH] IN BLOOD BY AUTOMATED COUNT: 14.5 % (ref 12.3–15.4)
GFR SERPL CREATININE-BSD FRML MDRD: 8 ML/MIN/1.73
GFR SERPL CREATININE-BSD FRML MDRD: ABNORMAL ML/MIN/{1.73_M2}
GLOBULIN UR ELPH-MCNC: 4.8 GM/DL
GLUCOSE SERPL-MCNC: 156 MG/DL (ref 65–99)
HCT VFR BLD AUTO: 26.9 % (ref 34–46.6)
HGB BLD-MCNC: 8.7 G/DL (ref 12–15.9)
IMM GRANULOCYTES # BLD AUTO: 0.5 10*3/MM3 (ref 0–0.05)
IMM GRANULOCYTES NFR BLD AUTO: 4 % (ref 0–0.5)
LYMPHOCYTES # BLD AUTO: 1.06 10*3/MM3 (ref 0.7–3.1)
LYMPHOCYTES NFR BLD AUTO: 8.6 % (ref 19.6–45.3)
MCH RBC QN AUTO: 30 PG (ref 26.6–33)
MCHC RBC AUTO-ENTMCNC: 32.3 G/DL (ref 31.5–35.7)
MCV RBC AUTO: 92.8 FL (ref 79–97)
MONOCYTES # BLD AUTO: 0.48 10*3/MM3 (ref 0.1–0.9)
MONOCYTES NFR BLD AUTO: 3.9 % (ref 5–12)
NEUTROPHILS NFR BLD AUTO: 80.1 % (ref 42.7–76)
NEUTROPHILS NFR BLD AUTO: 9.92 10*3/MM3 (ref 1.7–7)
NRBC BLD AUTO-RTO: 0.2 /100 WBC (ref 0–0.2)
PLATELET # BLD AUTO: 337 10*3/MM3 (ref 140–450)
PMV BLD AUTO: 9.7 FL (ref 6–12)
POTASSIUM SERPL-SCNC: 4.6 MMOL/L (ref 3.5–5.2)
PROT SERPL-MCNC: 8.3 G/DL (ref 6–8.5)
QT INTERVAL: 429 MS
RBC # BLD AUTO: 2.9 10*6/MM3 (ref 3.77–5.28)
SODIUM SERPL-SCNC: 136 MMOL/L (ref 136–145)
TROPONIN T SERPL-MCNC: <0.01 NG/ML (ref 0–0.03)
WBC # BLD AUTO: 12.38 10*3/MM3 (ref 3.4–10.8)

## 2021-03-02 PROCEDURE — 93010 ELECTROCARDIOGRAM REPORT: CPT | Performed by: INTERNAL MEDICINE

## 2021-03-02 PROCEDURE — 85025 COMPLETE CBC W/AUTO DIFF WBC: CPT | Performed by: EMERGENCY MEDICINE

## 2021-03-02 PROCEDURE — 99283 EMERGENCY DEPT VISIT LOW MDM: CPT

## 2021-03-02 PROCEDURE — 84484 ASSAY OF TROPONIN QUANT: CPT | Performed by: EMERGENCY MEDICINE

## 2021-03-02 PROCEDURE — 80053 COMPREHEN METABOLIC PANEL: CPT | Performed by: EMERGENCY MEDICINE

## 2021-03-02 PROCEDURE — 93005 ELECTROCARDIOGRAM TRACING: CPT

## 2021-03-02 PROCEDURE — 93005 ELECTROCARDIOGRAM TRACING: CPT | Performed by: EMERGENCY MEDICINE

## 2021-03-02 PROCEDURE — 71045 X-RAY EXAM CHEST 1 VIEW: CPT

## 2021-03-02 NOTE — ED PROVIDER NOTES
EMERGENCY DEPARTMENT ENCOUNTER    Room Number:  34/34  Date of encounter:  3/2/2021  PCP: Provider, No Known  Historian: Patient      HPI:  Chief Complaint: Chest pain  A complete HPI/ROS/PMH/PSH/SH/FH are unobtainable due to: None    Context: Sunni Mcneil is a 45 y.o. female who presents to the ED via private vehicle for evaluation of intermittent chest tightness over the last 2 to 3 days.  Symptoms come and go without aggravating relieving factors, last for approximately 5 to 10 minutes and then resolve on their own.  Not associated with any dizziness, diaphoresis, shortness of breath, nausea.  Pain is nonradiating.  Has not had any fevers, cough, vomiting, diarrhea.      MEDICAL RECORD REVIEW    Transthoracic echo July 14, 2020     Summary   The ejection fraction biplane was calculated at 59%.   Global left ventricular wall motion and contractility are within normal   limits.   Left ventricle size is normal.   Mild left ventricular hypertrophy.   The visually estimated ejection fraction is 55-60%.   Mildly dilated left atrium.   Mild to moderate mitral regurgitation is present.   Mild tricuspid regurgitation.     Any valve disease noted in the report is non-rheumatic unless otherwise   specifically noted.    PAST MEDICAL HISTORY  Active Ambulatory Problems     Diagnosis Date Noted   • Hyperparathyroidism (CMS/Formerly [facility]) 01/24/2018   • Acute rejection of kidney transplant 05/10/2017   • ARF (acute renal failure) (CMS/Formerly [facility]) 04/04/2014   • Hx of kidney transplant 07/06/2017   • Hypertension 01/24/2018   • Kidney transplant status, cadaveric 07/06/2017   • Nephritis 04/13/2014   • Hypercalcemia 01/24/2018   • ESRD on hemodialysis (CMS/Formerly [facility]) 07/02/2019   • Prolonged QT interval 11/07/2019   • ESRD (end stage renal disease) (CMS/Formerly [facility]) 11/07/2019   • Hyperphosphatemia 11/07/2019   • Leukocytosis 11/07/2019   • Type 2 diabetes mellitus, without long-term current use of insulin (CMS/Formerly [facility]) 11/07/2019   • Acute UTI (urinary tract  infection) 03/13/2020   • Obesity (BMI 30-39.9) 03/13/2020   • A-V fistula (CMS/[facility]), right upper extremity 03/13/2020   • Anemia, chronic disease 03/13/2020   • Folate deficiency anemia 03/16/2020   • Febrile illness, acute 05/06/2020   • Sepsis (CMS/[facility]) 05/06/2020   • Screening for colon cancer 09/15/2020   • Hyperkalemia 01/14/2021   • Shunt malfunction 01/15/2021   • Mechanical complication of hemodialysis catheter (CMS/[facility]) 02/13/2021   • Anemia 02/13/2021     Resolved Ambulatory Problems     Diagnosis Date Noted   • Peritoneal dialysis catheter in place (CMS/HCC) 07/24/2019   • Hypokalemia 11/07/2019   • Nausea 11/07/2019   • Hypocalcemia 11/07/2019   • Diarrhea 03/13/2020   • Acute cystitis with hematuria 03/13/2020     Past Medical History:   Diagnosis Date   • Acid reflux    • Anxiety    • Arthritis    • Depression    • Diabetes mellitus (CMS/[facility])    • ESRD on dialysis (CMS/HCC)    • History of peritoneal dialysis    • History of transfusion    • Kidney disease    • Kidney transplant recipient 09/02/2016   • PONV (postoperative nausea and vomiting)    • Seasonal allergies    • Vascular dialysis catheter in place (CMS/[facility])          PAST SURGICAL HISTORY  Past Surgical History:   Procedure Laterality Date   • ARTERIOVENOUS FISTULA/SHUNT SURGERY Right 2/26/2020    Procedure: RIGHT ARM ARTERIAL VENOUS FISTULA;  Surgeon: Elijah Herrera MD;  Location: [facility];  Service: Vascular;  Laterality: Right;   • ARTERIOVENOUS FISTULA/SHUNT SURGERY Left 2/4/2021    Procedure: LEFT BRACHIOAXILLARY ARTERIOVENOUS FISTULA GRAFT;  Surgeon: Anya Hernandez Jr., MD;  Location: [facility] OR;  Service: Vascular;  Laterality: Left;   • INSERTION AND REMOVAL HEMODIALYSIS CATHETER N/A 2/13/2021    Procedure: INSERTION AND REMOVAL OF HEMODIALYSIS CATHETER;  Surgeon: Adán Maurer MD;  Location: [facility] OR;  Service: Vascular;  Laterality: N/A;   • INSERTION HEMODIALYSIS CATHETER Right 1/15/2021     Procedure: TUNNELED DIAYLIS CATHETER PLACEMENT;  Surgeon: Phu Gaviria MD;  Location: [facility] HYBRID OR ;  Service: Vascular;  Laterality: Right;   • INSERTION PERITONEAL DIALYSIS CATHETER  2014    Laparoscopic placement of Curl Cath peritoneal dialysis catheter, Dr. Vinod Goldstein   • INSERTION PERITONEAL DIALYSIS CATHETER N/A 2019    Procedure: LAPAROSCOPIC INSERTION PERITONEAL DIALYSIS CATHETER;  Surgeon: Damon Rooney MD;  Location: [facility] MAIN OR;  Service: General   • REMOVAL PERITONEAL DIALYSIS CATHETER N/A 10/17/2016    Procedure: REMOVAL PERITONEAL DIALYSIS CATHETER;  Surgeon: Damon Rooney MD;  Location: [facility] MAIN OR;  Service:    • REMOVAL PERITONEAL DIALYSIS CATHETER N/A 10/21/2020    Procedure: REMOVAL PERITONEAL DIALYSIS CATHETER;  Surgeon: Damon Rooney MD;  Location: [facility] MAIN OR;  Service: General;  Laterality: N/A;   • TRANSPLANTATION RENAL Right 2016    from  donor   • TUBAL ABDOMINAL LIGATION Bilateral          FAMILY HISTORY  Family History   Problem Relation Age of Onset   • Malig Hyperthermia Neg Hx          SOCIAL HISTORY  Social History     Socioeconomic History   • Marital status:      Spouse name: Not on file   • Number of children: 2   • Years of education: 12   • Highest education level: Not on file   Occupational History     Employer: [employer]   Tobacco Use   • Smoking status: Never Smoker   • Smokeless tobacco: Never Used   Substance and Sexual Activity   • Alcohol use: No   • Drug use: No   • Sexual activity: Defer         ALLERGIES  Patient has no known allergies.        REVIEW OF SYSTEMS  Review of Systems     All systems reviewed and negative except for those discussed in HPI.       PHYSICAL EXAM    I have reviewed the triage vital signs and nursing notes.    ED Triage Vitals   Temp Heart Rate Resp BP SpO2   21 1801 21 1801 21 1801 21 1812 21 1801   98.4 °F (36.9 °C) 106 18 (!)  187/104 97 %      Temp src Heart Rate Source Patient Position BP Location FiO2 (%)   03/02/21 1801 03/02/21 1801 -- 03/02/21 1812 --   Tympanic Monitor  Left arm        Physical Exam  General: Awake, alert, no acute distress, nontoxic, nondiaphoretic  HEENT: Mucous membranes moist, atraumatic, EOMI  Neck: Full ROM  Pulm: Symmetric chest rise, nonlabored, lungs CTAB  Cardiovascular: Regular rate and rhythm, intact distal pulses, no peripheral edema, right anterior chest wall dialysis access site  GI: Soft, nontender, nondistended, no rebound, no guarding, bowel sounds present  MSK: Full ROM, no deformity  Skin: Warm, dry  Neuro: Alert and oriented x 3, GCS 15, moving all extremities, no focal deficits  Psych: Calm, cooperative      Surgical mask, protective eye goggles, and gloves used during this encounter. Patient in surgical mask.      LAB RESULTS  Recent Results (from the past 24 hour(s))   ECG 12 Lead    Collection Time: 03/02/21  6:08 PM   Result Value Ref Range    QT Interval 429 ms   Comprehensive Metabolic Panel    Collection Time: 03/02/21  6:26 PM    Specimen: Blood   Result Value Ref Range    Glucose 156 (H) 65 - 99 mg/dL    BUN 21 (H) 6 - 20 mg/dL    Creatinine 5.77 (H) 0.57 - 1.00 mg/dL    Sodium 136 136 - 145 mmol/L    Potassium 4.6 3.5 - 5.2 mmol/L    Chloride 100 98 - 107 mmol/L    CO2 22.9 22.0 - 29.0 mmol/L    Calcium 8.2 (L) 8.6 - 10.5 mg/dL    Total Protein 8.3 6.0 - 8.5 g/dL    Albumin 3.50 3.50 - 5.20 g/dL    ALT (SGPT) 7 1 - 33 U/L    AST (SGOT) 20 1 - 32 U/L    Alkaline Phosphatase 91 39 - 117 U/L    Total Bilirubin 0.3 0.0 - 1.2 mg/dL    eGFR Non African Amer 8 (L) >60 mL/min/1.73    eGFR  African Amer      Globulin 4.8 gm/dL    A/G Ratio 0.7 g/dL    BUN/Creatinine Ratio 3.6 (L) 7.0 - 25.0    Anion Gap 13.1 5.0 - 15.0 mmol/L   Troponin    Collection Time: 03/02/21  6:26 PM    Specimen: Blood   Result Value Ref Range    Troponin T <0.010 0.000 - 0.030 ng/mL   CBC Auto Differential     Collection Time: 03/02/21  6:26 PM    Specimen: Blood   Result Value Ref Range    WBC 12.38 (H) 3.40 - 10.80 10*3/mm3    RBC 2.90 (L) 3.77 - 5.28 10*6/mm3    Hemoglobin 8.7 (L) 12.0 - 15.9 g/dL    Hematocrit 26.9 (L) 34.0 - 46.6 %    MCV 92.8 79.0 - 97.0 fL    MCH 30.0 26.6 - 33.0 pg    MCHC 32.3 31.5 - 35.7 g/dL    RDW 14.5 12.3 - 15.4 %    RDW-SD 49.1 37.0 - 54.0 fl    MPV 9.7 6.0 - 12.0 fL    Platelets 337 140 - 450 10*3/mm3    Neutrophil % 80.1 (H) 42.7 - 76.0 %    Lymphocyte % 8.6 (L) 19.6 - 45.3 %    Monocyte % 3.9 (L) 5.0 - 12.0 %    Eosinophil % 3.0 0.3 - 6.2 %    Basophil % 0.4 0.0 - 1.5 %    Immature Grans % 4.0 (H) 0.0 - 0.5 %    Neutrophils, Absolute 9.92 (H) 1.70 - 7.00 10*3/mm3    Lymphocytes, Absolute 1.06 0.70 - 3.10 10*3/mm3    Monocytes, Absolute 0.48 0.10 - 0.90 10*3/mm3    Eosinophils, Absolute 0.37 0.00 - 0.40 10*3/mm3    Basophils, Absolute 0.05 0.00 - 0.20 10*3/mm3    Immature Grans, Absolute 0.50 (H) 0.00 - 0.05 10*3/mm3    nRBC 0.2 0.0 - 0.2 /100 WBC       Ordered the above labs and independently reviewed the results.        RADIOLOGY  Xr Chest 1 View    Result Date: 3/2/2021  PORTABLE CHEST 03/02/2021 AT 6:16 PM  CLINICAL HISTORY: Chest  The lungs are well-expanded and appear free of acute infiltrates. There are no pleural effusions. The heart is mildly enlarged. The pulmonary vasculature is within normal limits. A right internal jugular dialysis catheter is in place in satisfactory position.  IMPRESSIONS: Mild cardiomegaly. No acute process is identified.  This report was finalized on 3/2/2021 6:39 PM by Dr. Adán Clemens M.D.        I ordered the above noted radiological studies. Reviewed by me.  See dictation for official radiology interpretation.      PROCEDURES    Procedures  EKG    EKG Time: 1808  Rhythm/Rate: Sinus rhythm with rate of 82  Normal axis  Borderline prolonged QTC of 501  No acute ischemic changes  No STEMI     Interpreted Contemporaneously by me, independently  viewed  No emergent changes with slightly increased QTC as compared to January 14, 2021      MEDICATIONS GIVEN IN ER    Medications - No data to display      PROGRESS, DATA ANALYSIS, CONSULTS, AND MEDICAL DECISION MAKING    All labs have been independently reviewed by me.  All radiology studies have been reviewed by me and discussed with radiologist dictating the report.   EKG's independently viewed and interpreted by me.  Discussion below represents my analysis of pertinent findings related to patient's condition, differential diagnosis, treatment plan and final disposition.    HEART score 3. Will evaluate for ACS, pneumothorax, pneumonia, with consideration for gastritis, GERD, among others.    ED Course as of Mar 02 2329   Tue Mar 02, 2021   1906 Nonemergent work-up at this time, no evidence of ACS, patient well-appearing, chest pain is atypical.  Continue current medications, PCP follow-up, ED return for worsening symptoms as needed.    [DC]   1912 Patient updated on the reassuring findings today, she understands the need to follow-up with her cardiologist as previously scheduled on the 16th, PCP follow-up as needed, continue with dialysis, ED return for worsening symptoms as needed.    [DC]      ED Course User Index  [DC] Tor Reddy MD       AS OF 23:29 EST VITALS:    BP - 174/96  HR - 80  TEMP - 98.4 °F (36.9 °C) (Tympanic)  02 SATS - 98%        DIAGNOSIS  Final diagnoses:   Nonspecific chest pain   ESRD on dialysis (CMS/[facility])         DISPOSITION  DISCHARGE    Patient discharged in stable condition.    Reviewed implications of results, diagnosis, meds, responsibility to follow up, warning signs and symptoms of possible worsening, potential complications and reasons to return to ER.    Patient/Family voiced understanding of above instructions.    Discussed plan for discharge, as there is no emergent indication for admission. Patient referred to primary care provider for BP management due to today's BP.  Pt/family is agreeable and understands need for follow up and repeat testing.  Pt is aware that discharge does not mean that nothing is wrong but it indicates no emergency is present that requires admission and they must continue care with follow-up as given below or physician of their choice.     FOLLOW-UP  [facility] Emergency Department  [address] [place] [facility] [address] [phone]    As needed, If symptoms worsen    [facility] CARDIOLOGY  [address]  [place] [place] [facility] [address] [phone]  Schedule an appointment as soon as possible for a visit   To establish PCP as needed         Medication List      No changes were made to your prescriptions during this visit.                    Tor Reddy MD  03/02/21 [id number]

## 2021-03-02 NOTE — ED TRIAGE NOTES
Pt states that for the last 2 days she has had intermittent chest discomfort. Denies N/V, SOA, or dizziness. Patient masked at arrival and triage staff wore all appropriate PPE during entire encounter with patient.

## 2021-03-03 NOTE — DISCHARGE INSTRUCTIONS
Continue current medications, Tylenol for pain control, PCP follow-up, cardiology follow-up as needed, ED return for worsening symptoms as needed.

## 2021-03-06 ENCOUNTER — HOSPITAL ENCOUNTER (EMERGENCY)
Facility: HOSPITAL | Age: 46
Discharge: HOME | End: 2021-03-06
Attending: EMERGENCY MEDICINE | Admitting: EMERGENCY MEDICINE

## 2021-03-06 ENCOUNTER — APPOINTMENT (OUTPATIENT)
Dept: CT IMAGING | Facility: HOSPITAL | Age: 46
End: 2021-03-06

## 2021-03-06 VITALS
RESPIRATION RATE: 18 BRPM | DIASTOLIC BLOOD PRESSURE: 93 MMHG | SYSTOLIC BLOOD PRESSURE: 188 MMHG | HEART RATE: 98 BPM | OXYGEN SATURATION: 100 % | TEMPERATURE: 97.1 F

## 2021-03-06 DIAGNOSIS — R51.9 ACUTE NONINTRACTABLE HEADACHE, UNSPECIFIED HEADACHE TYPE: Primary | ICD-10-CM

## 2021-03-06 DIAGNOSIS — N18.6 END STAGE RENAL DISEASE ON DIALYSIS (HCC): ICD-10-CM

## 2021-03-06 DIAGNOSIS — I10 ESSENTIAL HYPERTENSION: ICD-10-CM

## 2021-03-06 DIAGNOSIS — Z99.2 END STAGE RENAL DISEASE ON DIALYSIS (HCC): ICD-10-CM

## 2021-03-06 PROCEDURE — 70450 CT HEAD/BRAIN W/O DYE: CPT

## 2021-03-06 PROCEDURE — 63710000001 PROCHLORPERAZINE MALEATE PER 10 MG: Performed by: EMERGENCY MEDICINE

## 2021-03-06 PROCEDURE — 99283 EMERGENCY DEPT VISIT LOW MDM: CPT

## 2021-03-06 RX ORDER — LORAZEPAM 1 MG/1
1 TABLET ORAL EVERY 6 HOURS PRN
Status: DISCONTINUED | OUTPATIENT
Start: 2021-03-06 | End: 2021-03-06 | Stop reason: HOSPADM

## 2021-03-06 RX ORDER — ACETAMINOPHEN 500 MG
1000 TABLET ORAL ONCE
Status: COMPLETED | OUTPATIENT
Start: 2021-03-06 | End: 2021-03-06

## 2021-03-06 RX ORDER — PROCHLORPERAZINE MALEATE 10 MG
10 TABLET ORAL EVERY 6 HOURS PRN
Qty: 10 TABLET | Refills: 0 | Status: SHIPPED | OUTPATIENT
Start: 2021-03-06

## 2021-03-06 RX ORDER — PROCHLORPERAZINE MALEATE 10 MG
10 TABLET ORAL ONCE
Status: COMPLETED | OUTPATIENT
Start: 2021-03-06 | End: 2021-03-06

## 2021-03-06 RX ADMIN — LORAZEPAM 1 MG: 1 TABLET ORAL at 09:32

## 2021-03-06 RX ADMIN — PROCHLORPERAZINE MALEATE 10 MG: 10 TABLET ORAL at 10:18

## 2021-03-06 RX ADMIN — ACETAMINOPHEN 1000 MG: 500 TABLET ORAL at 09:31

## 2021-03-06 NOTE — ED TRIAGE NOTES
Pt complains of a generalized headache that started last night and has not improved with tylenol. Pt denies N/V and hx of migraines. Patient masked at arrival and triage staff wore all appropriate PPE during entire encounter with patient.

## 2021-03-06 NOTE — ED PROVIDER NOTES
EMERGENCY DEPARTMENT ENCOUNTER    Room Number:  12/12  Date of encounter:  3/6/2021  PCP: Provider, No Known  Historian: Patient     I used full protective equipment while examining this patient.  This includes face mask, gloves and protective eyewear.  I washed my hands before entering the room and immediately upon leaving the room      HPI:  Chief Complaint: Headache  A complete HPI/ROS/PMH/PSH/SH/FH are unobtainable due to: None    Context: Sunni Mcneil is a 45 y.o. female who presents to the ED c/o headache.  Patient states she had gradual onset of headache yesterday.  Headache is frontal and sharp at times.  It is worsened by nothing, improved by nothing.  Headache tends to wax and wane it was better for a while through the day yesterday and then got worse again last night.  Patient took 325 mg of Tylenol at home earlier this morning.  She states the pain is currently mild.  She has had some dizziness associated with a headache but no nausea or vomiting.  Patient denies any head injury.  She denies any recent fever.  Patient does go to dialysis and goes on Monday, Wednesday and Friday.  She had a full and normal dialysis yesterday.      MEDICAL RECORD REVIEW  I have reviewed prior medical records note the patient was hospitalized in February due to problems with dialysis catheter.  Patient has history of end-stage renal disease and is on dialysis.    PAST MEDICAL HISTORY  Active Ambulatory Problems     Diagnosis Date Noted   • Hyperparathyroidism (CMS/[facility]) 01/24/2018   • Acute rejection of kidney transplant 05/10/2017   • ARF (acute renal failure) (CMS/[facility]) 04/04/2014   • Hx of kidney transplant 07/06/2017   • Hypertension 01/24/2018   • Kidney transplant status, cadaveric 07/06/2017   • Nephritis 04/13/2014   • Hypercalcemia 01/24/2018   • ESRD on hemodialysis (CMS/[facility]) 07/02/2019   • Prolonged QT interval 11/07/2019   • ESRD (end stage renal disease) (CMS/[facility]) 11/07/2019   • Hyperphosphatemia 11/07/2019   •  Leukocytosis 11/07/2019   • Type 2 diabetes mellitus, without long-term current use of insulin (CMS/HCC) 11/07/2019   • Acute UTI (urinary tract infection) 03/13/2020   • Obesity (BMI 30-39.9) 03/13/2020   • A-V fistula (CMS/HCC), right upper extremity 03/13/2020   • Anemia, chronic disease 03/13/2020   • Folate deficiency anemia 03/16/2020   • Febrile illness, acute 05/06/2020   • Sepsis (CMS/HCC) 05/06/2020   • Screening for colon cancer 09/15/2020   • Hyperkalemia 01/14/2021   • Shunt malfunction 01/15/2021   • Mechanical complication of hemodialysis catheter (CMS/HCC) 02/13/2021   • Anemia 02/13/2021     Resolved Ambulatory Problems     Diagnosis Date Noted   • Peritoneal dialysis catheter in place (CMS/HCC) 07/24/2019   • Hypokalemia 11/07/2019   • Nausea 11/07/2019   • Hypocalcemia 11/07/2019   • Diarrhea 03/13/2020   • Acute cystitis with hematuria 03/13/2020     Past Medical History:   Diagnosis Date   • Acid reflux    • Anxiety    • Arthritis    • Depression    • Diabetes mellitus (CMS/HCC)    • ESRD on dialysis (CMS/HCC)    • History of peritoneal dialysis    • History of transfusion    • Kidney disease    • Kidney transplant recipient 09/02/2016   • PONV (postoperative nausea and vomiting)    • Seasonal allergies    • Vascular dialysis catheter in place (CMS/HCC)          PAST SURGICAL HISTORY  Past Surgical History:   Procedure Laterality Date   • ARTERIOVENOUS FISTULA/SHUNT SURGERY Right 2/26/2020    Procedure: RIGHT ARM ARTERIAL VENOUS FISTULA;  Surgeon: Elijah Herrera MD;  Location: [facility] OR;  Service: Vascular;  Laterality: Right;   • ARTERIOVENOUS FISTULA/SHUNT SURGERY Left 2/4/2021    Procedure: LEFT BRACHIOAXILLARY ARTERIOVENOUS FISTULA GRAFT;  Surgeon: Anya Hernandez Jr., MD;  Location: [facility] OR;  Service: Vascular;  Laterality: Left;   • INSERTION AND REMOVAL HEMODIALYSIS CATHETER N/A 2/13/2021    Procedure: INSERTION AND REMOVAL OF HEMODIALYSIS CATHETER;  Surgeon:  Adán Maurer MD;  Location: [facility] MAIN OR;  Service: Vascular;  Laterality: N/A;   • INSERTION HEMODIALYSIS CATHETER Right 1/15/2021    Procedure: TUNNELED DIAYLIS CATHETER PLACEMENT;  Surgeon: Phu Gaviria MD;  Location: [facility] HYBRID OR ;  Service: Vascular;  Laterality: Right;   • INSERTION PERITONEAL DIALYSIS CATHETER  2014    Laparoscopic placement of Curl Cath peritoneal dialysis catheter, Dr. Vinod Goldstein   • INSERTION PERITONEAL DIALYSIS CATHETER N/A 2019    Procedure: LAPAROSCOPIC INSERTION PERITONEAL DIALYSIS CATHETER;  Surgeon: Damon Rooney MD;  Location: [facility] MAIN OR;  Service: General   • REMOVAL PERITONEAL DIALYSIS CATHETER N/A 10/17/2016    Procedure: REMOVAL PERITONEAL DIALYSIS CATHETER;  Surgeon: Damon Rooney MD;  Location: [facility] MAIN OR;  Service:    • REMOVAL PERITONEAL DIALYSIS CATHETER N/A 10/21/2020    Procedure: REMOVAL PERITONEAL DIALYSIS CATHETER;  Surgeon: Damon Rooney MD;  Location: [facility] MAIN OR;  Service: General;  Laterality: N/A;   • TRANSPLANTATION RENAL Right 2016    from  donor   • TUBAL ABDOMINAL LIGATION Bilateral          FAMILY HISTORY  Family History   Problem Relation Age of Onset   • Malig Hyperthermia Neg Hx          SOCIAL HISTORY  Social History     Socioeconomic History   • Marital status:      Spouse name: Not on file   • Number of children: 2   • Years of education: 12   • Highest education level: Not on file   Tobacco Use   • Smoking status: Never Smoker   • Smokeless tobacco: Never Used   Vaping Use   • Vaping Use: Never used   Substance and Sexual Activity   • Alcohol use: No   • Drug use: No   • Sexual activity: Defer         ALLERGIES  Patient has no known allergies.       REVIEW OF SYSTEMS  Review of Systems   Constitutional: Negative.  Negative for fever.   HENT: Negative for sore throat.    Eyes: Negative.    Respiratory: Negative.  Negative for cough.     Cardiovascular: Negative.  Negative for chest pain.   Gastrointestinal: Negative.    Genitourinary: Negative.  Negative for dysuria.        Menstrual period about 1 month ago   Musculoskeletal: Negative.  Negative for back pain.   Skin: Negative.  Negative for rash.   Neurological: Positive for dizziness and headaches.   All other systems reviewed and are negative.          PHYSICAL EXAM    I have reviewed the triage vital signs and nursing notes.    ED Triage Vitals [03/06/21 0907]   Temp Heart Rate Resp BP SpO2   97.1 °F (36.2 °C) 98 18 -- 100 %      Temp src Heart Rate Source Patient Position BP Location FiO2 (%)   Tympanic Monitor -- -- --       Physical Exam  GENERAL: Alert pleasant female in no apparent distress.  Vitals reviewed and are fairly unremarkable.  Blood pressure has not been taken yet and needs to be performed.  HENT: nares patent, atraumatic-no significant tenderness to palpation  EYES: no scleral icterus  CV: regular rhythm, regular rate-no murmur  RESPIRATORY: normal effort, clear to auscultation bilaterally  ABDOMEN: soft, nontender to palpation  MUSCULOSKELETAL: no deformity  NEURO: Strength, sensation, and coordination are grossly intact.  Speech and mentation are unremarkable  SKIN: warm, dry      LAB RESULTS  No results found for this or any previous visit (from the past 24 hour(s)).    Ordered the above labs and independently reviewed the results.      RADIOLOGY  CT Head Without Contrast    Result Date: 3/6/2021  HEAD CT WITHOUT CONTRAST  HISTORY: 45-year-old female with a headache. End-stage renal disease on dialysis. Renal transplant in the past.  TECHNIQUE: Radiation dose reduction techniques were utilized, including automated exposure control and exposure modulation based on body size. 3 mm images were obtained from the skull base to vertex without IV contrast. There is no previous head CT for comparison.  FINDINGS: There is no evidence for acute intracranial hemorrhage or  "infarction. There is no midline shift. Ventricles appear within normal limits. Small vessel ischemic changes are noted at the basal ganglia. No acute abnormality is seen. If symptoms persist or worsen, further evaluation with MRI is recommended.  CHECK CHECK          I ordered the above noted radiological studies. Reviewed by me and discussed with radiologist.  See dictation for official radiology interpretation.      PROCEDURES  Procedures      MEDICATIONS GIVEN IN ER    Medications   LORazepam (ATIVAN) tablet 1 mg (1 mg Oral Given 3/6/21 0932)   acetaminophen (TYLENOL) tablet 1,000 mg (1,000 mg Oral Given 3/6/21 0931)   prochlorperazine (COMPAZINE) tablet 10 mg (10 mg Oral Given 3/6/21 1018)         PROGRESS, DATA ANALYSIS, CONSULTS, AND MEDICAL DECISION MAKING    All labs have been independently reviewed by me.  All radiology studies have been reviewed by me and discussed with radiologist dictating the report.   EKG's independently viewed and interpreted by me.  Discussion below represents my analysis of pertinent findings related to patient's condition, differential diagnosis, treatment plan and final disposition.      ED Course as of Mar 06 1141   Sat Mar 06, 2021   0926 [id number] female with headache and dizziness onset yesterday.  She does have complicated medical history with history of renal disease and is on dialysis.  I see no \"red flags\" to suggest infection or traumatic injury.  Patient has no history of known migraines or frequent headaches and will therefore get a scan to help make sure we are not missing an aneurysm or tumor or other pathology causing new onset of headache.  Patient is very claustrophobic and is not sure that she can do a CT without sedation.  We will go ahead and give Ativan for sedation.  Patient is a very difficult IV stick and would prefer oral medications to IV so we will also give Compazine and Tylenol as she has already taken 325 mg Tylenol at home will give 500 mg.    " [DB]   1131 CT scan of the brain shows no acute pathology as discussed with Dr. Valarie Holly.    [DB]   1131 Headache is much improved and in fact gone completely.  Blood pressure is also improved running in the 180s over 90s.  Patient states her blood pressure is often variable related to her dialysis.  I did ask her to do a 5-day blood pressure check and share results with her primary care provider or her nephrologist, Dr. Brown.    [DB]      ED Course User Index  [DB] Adán Everett MD       AS OF 11:41 EST VITALS:    BP - (!) 188/93  HR - 98  TEMP - 97.1 °F (36.2 °C) (Tympanic)  O2 SATS - 100%      DIAGNOSIS  Final diagnoses:   Acute nonintractable headache, unspecified headache type   Essential hypertension   End stage renal disease on dialysis (CMS/[facility])         DISPOSITION  DISCHARGE    Patient discharged in stable condition.    Reviewed implications of results, diagnosis, meds, responsibility to follow up, warning signs and symptoms of possible worsening, potential complications and reasons to return to ER, including increased pain, fever or as needed.    Patient/Family voiced understanding of above instructions.    Discussed plan for discharge, as there is no emergent indication for admission. Patient referred to primary care provider for BP management due to today's BP. Pt/family is agreeable and understands need for follow up and repeat testing.  Pt is aware that discharge does not mean that nothing is wrong but it indicates no emergency is present that requires admission and they must continue care with follow-up as given below or physician of their choice.     FOLLOW-UP  Carlos Manuel Gonzales MD  [address]  [place] [address]  [phone]    In 1 week  If Not Better         Medication List      New Prescriptions    prochlorperazine 10 MG tablet  Commonly known as: COMPAZINE  Take 1 tablet by mouth Every 6 (Six) Hours As Needed for Nausea or Vomiting.           Where to Get Your  Medications      These medications were sent to [facility] [address] [place] [address] ([place]), [place] - [address] [facility] - [phone]  - [phone] [address] ([place]) [place] [address]    Phone: [phone]   · prochlorperazine 10 MG tablet                Marguerite, Adán ESPINOZA MD  03/06/21 6484

## 2021-03-10 ENCOUNTER — OFFICE VISIT (OUTPATIENT)
Dept: GASTROENTEROLOGY | Facility: CLINIC | Age: 46
End: 2021-03-10

## 2021-03-10 VITALS — WEIGHT: 179.8 LBS | HEIGHT: 60 IN | BODY MASS INDEX: 35.3 KG/M2

## 2021-03-10 DIAGNOSIS — K59.00 CONSTIPATION, UNSPECIFIED CONSTIPATION TYPE: ICD-10-CM

## 2021-03-10 DIAGNOSIS — Z12.11 SCREENING FOR COLORECTAL CANCER: ICD-10-CM

## 2021-03-10 DIAGNOSIS — Z12.12 SCREENING FOR COLORECTAL CANCER: ICD-10-CM

## 2021-03-10 DIAGNOSIS — D64.9 ANEMIA, UNSPECIFIED TYPE: Primary | ICD-10-CM

## 2021-03-10 PROCEDURE — 99213 OFFICE O/P EST LOW 20 MIN: CPT | Performed by: INTERNAL MEDICINE

## 2021-03-10 NOTE — PROGRESS NOTES
Chief Complaint   Patient presents with   • Constipation   • Anemia        Sunni Mcneil is a  45 y.o. female here for an initial visit for chronic anemia, constipation    HPI this 45-year-old  female patient from [facility] who is followed by Dr. Carlos Manuel Gonzales for her end-stage renal disease presents with a history of anemia as well as recent bout of constipation.  She recalls having recent surgeries and subsequent analgesics which precipitated her constipation.  She is treating this with Dulcolax as well as MiraLAX.  She denies any melena or bright red blood per rectum.  She has minimal urinary output but no hematuria noted.  She denies any upper GI complaints of reflux or dysphagia.  No reported abdominal pain.  She has never undergone previous GI evaluation although she has had history of chronic anemia presumed secondary to end-stage renal disease.  She is on hemodialysis 3 days a week.  We discussed work-up for her anemia which would include upper endoscopy and colonoscopy.  She voiced understanding and is amenable to this.    Past Medical History:   Diagnosis Date   • Acid reflux    • Anemia     WITH ESRD   • Anxiety    • Arthritis    • Depression    • Diabetes mellitus (CMS/[facility])     NO MEDICATIONS FOR   • ESRD on dialysis (CMS/[facility])     SUNG MW   • History of peritoneal dialysis     KIDNEY TRANSPLANT SEPT 2016    • History of transfusion     BEEN A WHILE   • Hypercalcemia    • Hyperparathyroidism (CMS/[facility])    • Hypertension    • Kidney disease     End-stage renal disease. TRANSPLANT   • Kidney transplant recipient 09/02/2016    RIGHT KIDNEY. ? 2018 KIDNEY REJECTED   • PONV (postoperative nausea and vomiting)    • Seasonal allergies     CURRENTLY    • Vascular dialysis catheter in place (CMS/[facility])     RIGHT TUNNEL CATH       Current Outpatient Medications   Medication Sig Dispense Refill   • calcitriol (ROCALTROL) 0.25 MCG capsule Take 2 capsules by mouth Daily. 60 capsule 0   • calcium acetate  (PHOSLO) 667 MG capsule Take 2,001 mg by mouth 3 (Three) Times a Day With Meals.     • CloNIDine (CATAPRES) 0.3 MG tablet Take 0.3 mg by mouth 3 (Three) Times a Day.     • hydrOXYzine (ATARAX) 50 MG tablet Take 50 mg by mouth 2 (Two) Times a Day As Needed.     • Methoxy PEG-Epoetin Beta (MIRCERA IJ) 225 mcg Every 14 (Fourteen) Days.     • prochlorperazine (COMPAZINE) 10 MG tablet Take 1 tablet by mouth Every 6 (Six) Hours As Needed for Nausea or Vomiting. 10 tablet 0   • HYDROcodone-acetaminophen (Norco) 5-325 MG per tablet Take 1 tablet by mouth Every 6 (Six) Hours As Needed for Moderate Pain . 20 tablet 0     No current facility-administered medications for this visit.       PRN Meds:.    No Known Allergies    Social History     Socioeconomic History   • Marital status:      Spouse name: Not on file   • Number of children: 2   • Years of education: 12   • Highest education level: Not on file   Tobacco Use   • Smoking status: Never Smoker   • Smokeless tobacco: Never Used   Vaping Use   • Vaping Use: Never used   Substance and Sexual Activity   • Alcohol use: No   • Drug use: No   • Sexual activity: Defer       Family History   Problem Relation Age of Onset   • Malig Hyperthermia Neg Hx        Review of Systems   Constitutional: Negative for activity change, appetite change, fatigue and unexpected weight change.   HENT: Negative for congestion, facial swelling, sore throat, trouble swallowing and voice change.    Eyes: Negative for photophobia and visual disturbance.   Respiratory: Negative for cough and choking.    Cardiovascular: Negative for chest pain.   Gastrointestinal: Negative for abdominal distention, abdominal pain, anal bleeding, blood in stool, constipation, diarrhea, nausea, rectal pain and vomiting.   Endocrine: Negative for polyphagia.   Musculoskeletal: Negative for arthralgias, gait problem and joint swelling.   Skin: Negative for color change, pallor and rash.   Allergic/Immunologic:  Negative for food allergies.   Neurological: Negative for speech difficulty and headaches.   Hematological: Does not bruise/bleed easily.   Psychiatric/Behavioral: Negative for agitation, confusion and sleep disturbance.       There were no vitals filed for this visit.    Physical Exam  Vitals reviewed.   Constitutional:       General: She is not in acute distress.     Appearance: She is well-developed. She is not diaphoretic.   HENT:      Head: Normocephalic.      Mouth/Throat:      Pharynx: No oropharyngeal exudate.   Eyes:      General: No scleral icterus.     Conjunctiva/sclera: Conjunctivae normal.   Neck:      Thyroid: No thyromegaly.   Cardiovascular:      Rate and Rhythm: Normal rate and regular rhythm.      Heart sounds: No murmur.   Pulmonary:      Effort: No respiratory distress.      Breath sounds: Normal breath sounds. No wheezing or rales.   Abdominal:      General: Bowel sounds are normal. There is no distension.      Palpations: Abdomen is soft. There is no mass.      Tenderness: There is no abdominal tenderness.   Genitourinary:     Rectum: Guaiac result negative.   Musculoskeletal:         General: No tenderness. Normal range of motion.      Cervical back: Normal range of motion.   Lymphadenopathy:      Cervical: No cervical adenopathy.   Skin:     General: Skin is warm and dry.      Findings: No erythema or rash.   Neurological:      Mental Status: She is alert and oriented to person, place, and time.   Psychiatric:         Behavior: Behavior normal.         ASSESSMENT   #1 anemia: Chronic secondary to end-stage renal disease, cannot rule out other possible sources.  #2 constipation: Recent event associated with analgesics  #3 screening for colorectal cancer      PLAN  Schedule EGD and colonoscopy      ICD-10-CM ICD-9-CM   1. Anemia, unspecified type  D64.9 285.9   2. Constipation, unspecified constipation type  K59.00 564.00

## 2021-03-12 ENCOUNTER — APPOINTMENT (OUTPATIENT)
Dept: PREADMISSION TESTING | Facility: HOSPITAL | Age: 46
End: 2021-03-12

## 2021-03-12 VITALS
DIASTOLIC BLOOD PRESSURE: 100 MMHG | WEIGHT: 180 LBS | OXYGEN SATURATION: 100 % | HEIGHT: 60 IN | BODY MASS INDEX: 35.34 KG/M2 | SYSTOLIC BLOOD PRESSURE: 169 MMHG | HEART RATE: 96 BPM | RESPIRATION RATE: 20 BRPM | TEMPERATURE: 98.1 F

## 2021-03-12 LAB
ANION GAP SERPL CALCULATED.3IONS-SCNC: 9.3 MMOL/L (ref 5–15)
BUN SERPL-MCNC: 7 MG/DL (ref 6–20)
BUN/CREAT SERPL: 2.3 (ref 7–25)
CALCIUM SPEC-SCNC: 8.8 MG/DL (ref 8.6–10.5)
CHLORIDE SERPL-SCNC: 100 MMOL/L (ref 98–107)
CO2 SERPL-SCNC: 28.7 MMOL/L (ref 22–29)
CREAT SERPL-MCNC: 3.07 MG/DL (ref 0.57–1)
DEPRECATED RDW RBC AUTO: 45.9 FL (ref 37–54)
ERYTHROCYTE [DISTWIDTH] IN BLOOD BY AUTOMATED COUNT: 14.5 % (ref 12.3–15.4)
GFR SERPL CREATININE-BSD FRML MDRD: 16 ML/MIN/1.73
GLUCOSE SERPL-MCNC: 191 MG/DL (ref 65–99)
HCG SERPL QL: NEGATIVE
HCT VFR BLD AUTO: 25.1 % (ref 34–46.6)
HGB BLD-MCNC: 7.8 G/DL (ref 12–15.9)
MCH RBC QN AUTO: 27.6 PG (ref 26.6–33)
MCHC RBC AUTO-ENTMCNC: 31.1 G/DL (ref 31.5–35.7)
MCV RBC AUTO: 88.7 FL (ref 79–97)
PLATELET # BLD AUTO: 367 10*3/MM3 (ref 140–450)
PMV BLD AUTO: 9.2 FL (ref 6–12)
POTASSIUM SERPL-SCNC: 4 MMOL/L (ref 3.5–5.2)
RBC # BLD AUTO: 2.83 10*6/MM3 (ref 3.77–5.28)
SODIUM SERPL-SCNC: 138 MMOL/L (ref 136–145)
WBC # BLD AUTO: 9.5 10*3/MM3 (ref 3.4–10.8)

## 2021-03-12 PROCEDURE — U0004 COV-19 TEST NON-CDC HGH THRU: HCPCS | Performed by: NURSE PRACTITIONER

## 2021-03-12 PROCEDURE — C9803 HOPD COVID-19 SPEC COLLECT: HCPCS | Performed by: NURSE PRACTITIONER

## 2021-03-12 PROCEDURE — 85027 COMPLETE CBC AUTOMATED: CPT

## 2021-03-12 PROCEDURE — 36415 COLL VENOUS BLD VENIPUNCTURE: CPT

## 2021-03-12 PROCEDURE — 84703 CHORIONIC GONADOTROPIN ASSAY: CPT

## 2021-03-12 PROCEDURE — U0005 INFEC AGEN DETEC AMPLI PROBE: HCPCS | Performed by: NURSE PRACTITIONER

## 2021-03-12 PROCEDURE — 80048 BASIC METABOLIC PNL TOTAL CA: CPT

## 2021-03-12 RX ORDER — HYDRALAZINE HYDROCHLORIDE 25 MG/1
25 TABLET, FILM COATED ORAL 2 TIMES DAILY
COMMUNITY
End: 2021-03-18 | Stop reason: HOSPADM

## 2021-03-12 RX ORDER — ACETAMINOPHEN 325 MG/1
650 TABLET ORAL EVERY 6 HOURS PRN
COMMUNITY

## 2021-03-12 NOTE — DISCHARGE INSTRUCTIONS
Take the following medications the morning of surgery:    Hydralazine   clonidine    If you are on prescription narcotic pain medication to control your pain you may also take that medication the morning of surgery.    General Instructions:  • Do not eat solid food after midnight the night before surgery.  • You may drink clear liquids day of surgery but must stop at least one hour before your hospital arrival time.  • It is beneficial for you to have a clear drink that contains carbohydrates the day of surgery.  We suggest a 12 to 20 ounce bottle of Gatorade or Powerade for non-diabetic patients or a 12 to 20 ounce bottle of G2 or Powerade Zero for diabetic patients. (Pediatric patients, are not advised to drink a 12 to 20 ounce carbohydrate drink)    Clear liquids are liquids you can see through.  Nothing red in color.     Plain water                               Sports drinks  Sodas                                   Gelatin (Jell-O)  Fruit juices without pulp such as white grape juice and apple juice  Popsicles that contain no fruit or yogurt  Tea or coffee (no cream or milk added)  Gatorade / Powerade  G2 / Powerade Zero    • Infants may have breast milk up to four hours before surgery.  • Infants drinking formula may drink formula up to six hours before surgery.   • Patients who avoid smoking, chewing tobacco and alcohol for 4 weeks prior to surgery have a reduced risk of post-operative complications.  Quit smoking as many days before surgery as you can.  • Do not smoke, use chewing tobacco or drink alcohol the day of surgery.   • If applicable bring your C-PAP/ BI-PAP machine.  • Bring any papers given to you in the doctor’s office.  • Wear clean comfortable clothes.  • Do not wear contact lenses, false eyelashes or make-up.  Bring a case for your glasses.   • Bring crutches or walker if applicable.  • Remove all piercings.  Leave jewelry and any other valuables at home.  • Hair extensions with metal clips  must be removed prior to surgery.  • The Pre-Admission Testing nurse will instruct you to bring medications if unable to obtain an accurate list in Pre-Admission Testing.        If you were given a blood bank ID arm band remember to bring it with you the day of surgery.    Preventing a Surgical Site Infection:  • For 2 to 3 days before surgery, avoid shaving with a razor because the razor can irritate skin and make it easier to develop an infection.    • Any areas of open skin can increase the risk of a post-operative wound infection by allowing bacteria to enter and travel throughout the body.  Notify your surgeon if you have any skin wounds / rashes even if it is not near the expected surgical site.  The area will need assessed to determine if surgery should be delayed until it is healed.  • The night prior to surgery shower using a fresh bar of anti-bacterial soap (such as Dial) and clean washcloth.  Sleep in a clean bed with clean clothing.  Do not allow pets to sleep with you.  • Shower on the morning of surgery using a fresh bar of anti-bacterial soap (such as Dial) and clean washcloth.  Dry with a clean towel and dress in clean clothing.  • Ask your surgeon if you will be receiving antibiotics prior to surgery.  • Make sure you, your family, and all healthcare providers clean their hands with soap and water or an alcohol based hand  before caring for you or your wound.    Day of surgery:3/15/2021   1000  Your arrival time is approximately two hours before your scheduled surgery time.  Upon arrival, a Pre-op nurse and Anesthesiologist will review your health history, obtain vital signs, and answer questions you may have.  The only belongings needed at this time will be a list of your home medications and if applicable your C-PAP/BI-PAP machine.  A Pre-op nurse will start an IV and you may receive medication in preparation for surgery, including something to help you relax.     Please be aware that  surgery does come with discomfort.  We want to make every effort to control your discomfort so please discuss any uncontrolled symptoms with your nurse.   Your doctor will most likely have prescribed pain medications.      If you are going home after surgery you will receive individualized written care instructions before being discharged.  A responsible adult must drive you to and from the hospital on the day of your surgery and stay with you for 24 hours.  Discharge prescriptions can be filled by the hospital pharmacy during regular pharmacy hours.  If you are having surgery late in the day/evening your prescription may be e-prescribed to your pharmacy.  Please verify your pharmacy hours or chose a 24 hour pharmacy to avoid not having access to your prescription because your pharmacy has closed for the day.    If you are staying overnight following surgery, you will be transported to your hospital room following the recovery period.  [facility] has [facility] rooms.    If you have any questions please call Pre-Admission Testing at [phone].  Deductibles and co-payments are collected on the day of service. Please be prepared to pay the required co-pay, deductible or deposit on the day of service as defined by your plan.    Patient Education for Self-Quarantine Process    Following your COVID testing, we strongly recommend that you do not leave your home after you have been tested for COVID except to get medical care. This includes not going to work, school or to public areas.  If this is not possible for you to do please limit your activities to only required outings.  Be sure to wear a mask when you are with other people, practice social distancing and wash your hands frequently.      The following items provide additional details to keep you safe.  • Wash your hands with soap and water frequently for at least 20 seconds.   • Avoid touching your eyes, nose and mouth with unwashed hands.  • Do  not share anything - utensils, towels, food from the same bowl.   • Have your own utensils, drinking glass, dishes, towels and bedding.   • Do not have visitors.   • Do use FaceTime to stay in touch with family and friends.  • You should stay in a specific room away from others if possible.   • Stay at least 6 feet away from others in the home if you cannot have a dedicated room to yourself.   • Do not snuggle with your pet. While the CDC says there is no evidence that pets can spread COVID-19 or be infected from humans, it is probably best to avoid “petting, snuggling, being kissed or licked and sharing food (during self-quarantine)”, according to the CDC.   • Sanitize household surfaces daily. Include all high touch areas (door handles, light switches, phones, countertops, etc.)  • Do not share a bathroom with others, if possible.   • Wear a mask around others in your home if you are unable to stay in a separate room or 6 feet apart. If  you are unable to wear a mask, have your family member wear a mask if they must be within 6 feet of you.   Call your surgeon immediately if you experience any of the following symptoms:  • Sore Throat  • Shortness of Breath or difficulty breathing  • Cough  • Chills  • Body soreness or muscle pain  • Headache  • Fever  • New loss of taste or smell  • Do not arrive for your surgery ill.  Your procedure will need to be rescheduled to another time.  You will need to call your physician before the day of surgery to avoid any unnecessary exposure to hospital staff as well as other patients.    CHLORHEXIDINE CLOTH INSTRUCTIONS  The morning of surgery follow these instructions using the Chlorhexidine cloths you've been given.  These steps reduce bacteria on the body.  Do not use the cloths near your eyes, ears mouth, genitalia or on open wounds.  Throw the cloths away after use but do not try to flush them down a toilet.      • Open and remove one cloth at a time from the package.     • Leave the cloth unfolded and begin the bathing.  • Massage the skin with the cloths using gentle pressure to remove bacteria.  Do not scrub harshly.   • Follow the steps below with one 2% CHG cloth per area (6 total cloths).  • One cloth for neck, shoulders and chest.  • One cloth for both arms, hands, fingers and underarms (do underarms last).  • One cloth for the abdomen followed by groin.  • One cloth for right leg and foot including between the toes.  • One cloth for left leg and foot including between the toes.  • The last cloth is to be used for the back of the neck, back and buttocks.    Allow the CHG to air dry 3 minutes on the skin which will give it time to work and decrease the chance of irritation.  The skin may feel sticky until it is dry.  Do not rinse with water or any other liquid or you will lose the beneficial effects of the CHG.  If mild skin irritation occurs, do rinse the skin to remove the CHG.  Report this to the nurse at time of admission.  Do not apply lotions, creams, ointments, deodorants or perfumes after using the clothes. Dress in clean clothes before coming to the hospital.

## 2021-03-13 LAB — SARS-COV-2 ORF1AB RESP QL NAA+PROBE: NOT DETECTED

## 2021-03-15 ENCOUNTER — APPOINTMENT (OUTPATIENT)
Dept: GENERAL RADIOLOGY | Facility: HOSPITAL | Age: 46
End: 2021-03-15

## 2021-03-15 ENCOUNTER — APPOINTMENT (OUTPATIENT)
Dept: CT IMAGING | Facility: HOSPITAL | Age: 46
End: 2021-03-15

## 2021-03-15 ENCOUNTER — HOSPITAL ENCOUNTER (INPATIENT)
Facility: HOSPITAL | Age: 46
LOS: 2 days | Discharge: HOME | End: 2021-03-18
Attending: EMERGENCY MEDICINE | Admitting: SURGERY

## 2021-03-15 DIAGNOSIS — E87.70 HYPERVOLEMIA, UNSPECIFIED HYPERVOLEMIA TYPE: Primary | ICD-10-CM

## 2021-03-15 DIAGNOSIS — I77.0 A-V FISTULA (HCC): ICD-10-CM

## 2021-03-15 DIAGNOSIS — N18.6 ESRD (END STAGE RENAL DISEASE) (HCC): ICD-10-CM

## 2021-03-15 LAB
ALBUMIN SERPL-MCNC: 3.6 G/DL (ref 3.5–5.2)
ALBUMIN/GLOB SERPL: 0.8 G/DL
ALP SERPL-CCNC: 90 U/L (ref 39–117)
ALT SERPL W P-5'-P-CCNC: 6 U/L (ref 1–33)
ANION GAP SERPL CALCULATED.3IONS-SCNC: 13.1 MMOL/L (ref 5–15)
ANION GAP SERPL CALCULATED.3IONS-SCNC: 15.3 MMOL/L (ref 5–15)
AST SERPL-CCNC: 13 U/L (ref 1–32)
BASOPHILS # BLD AUTO: 0.06 10*3/MM3 (ref 0–0.2)
BASOPHILS NFR BLD AUTO: 0.5 % (ref 0–1.5)
BILIRUB SERPL-MCNC: 0.3 MG/DL (ref 0–1.2)
BUN SERPL-MCNC: 37 MG/DL (ref 6–20)
BUN SERPL-MCNC: 40 MG/DL (ref 6–20)
BUN/CREAT SERPL: 5.1 (ref 7–25)
BUN/CREAT SERPL: 5.4 (ref 7–25)
CALCIUM SPEC-SCNC: 8.5 MG/DL (ref 8.6–10.5)
CALCIUM SPEC-SCNC: 8.6 MG/DL (ref 8.6–10.5)
CHLORIDE SERPL-SCNC: 103 MMOL/L (ref 98–107)
CHLORIDE SERPL-SCNC: 103 MMOL/L (ref 98–107)
CO2 SERPL-SCNC: 21.7 MMOL/L (ref 22–29)
CO2 SERPL-SCNC: 24.9 MMOL/L (ref 22–29)
CREAT SERPL-MCNC: 7.19 MG/DL (ref 0.57–1)
CREAT SERPL-MCNC: 7.41 MG/DL (ref 0.57–1)
DEPRECATED RDW RBC AUTO: 49.1 FL (ref 37–54)
DEPRECATED RDW RBC AUTO: 49.6 FL (ref 37–54)
EOSINOPHIL # BLD AUTO: 0.47 10*3/MM3 (ref 0–0.4)
EOSINOPHIL NFR BLD AUTO: 4.1 % (ref 0.3–6.2)
ERYTHROCYTE [DISTWIDTH] IN BLOOD BY AUTOMATED COUNT: 15.4 % (ref 12.3–15.4)
ERYTHROCYTE [DISTWIDTH] IN BLOOD BY AUTOMATED COUNT: 15.4 % (ref 12.3–15.4)
GFR SERPL CREATININE-BSD FRML MDRD: 6 ML/MIN/1.73
GFR SERPL CREATININE-BSD FRML MDRD: 6 ML/MIN/1.73
GFR SERPL CREATININE-BSD FRML MDRD: ABNORMAL ML/MIN/{1.73_M2}
GFR SERPL CREATININE-BSD FRML MDRD: ABNORMAL ML/MIN/{1.73_M2}
GLOBULIN UR ELPH-MCNC: 4.5 GM/DL
GLUCOSE BLDC GLUCOMTR-MCNC: 124 MG/DL (ref 70–130)
GLUCOSE BLDC GLUCOMTR-MCNC: 80 MG/DL (ref 70–130)
GLUCOSE BLDC GLUCOMTR-MCNC: 92 MG/DL (ref 70–130)
GLUCOSE SERPL-MCNC: 108 MG/DL (ref 65–99)
GLUCOSE SERPL-MCNC: 109 MG/DL (ref 65–99)
HCG SERPL QL: NEGATIVE
HCT VFR BLD AUTO: 25.5 % (ref 34–46.6)
HCT VFR BLD AUTO: 26.2 % (ref 34–46.6)
HGB BLD-MCNC: 8.1 G/DL (ref 12–15.9)
HGB BLD-MCNC: 8.4 G/DL (ref 12–15.9)
IMM GRANULOCYTES # BLD AUTO: 0.31 10*3/MM3 (ref 0–0.05)
IMM GRANULOCYTES NFR BLD AUTO: 2.7 % (ref 0–0.5)
LIPASE SERPL-CCNC: 43 U/L (ref 13–60)
LYMPHOCYTES # BLD AUTO: 1.3 10*3/MM3 (ref 0.7–3.1)
LYMPHOCYTES NFR BLD AUTO: 11.4 % (ref 19.6–45.3)
MCH RBC QN AUTO: 28.7 PG (ref 26.6–33)
MCH RBC QN AUTO: 28.8 PG (ref 26.6–33)
MCHC RBC AUTO-ENTMCNC: 31.8 G/DL (ref 31.5–35.7)
MCHC RBC AUTO-ENTMCNC: 32.1 G/DL (ref 31.5–35.7)
MCV RBC AUTO: 89.4 FL (ref 79–97)
MCV RBC AUTO: 90.7 FL (ref 79–97)
MONOCYTES # BLD AUTO: 0.58 10*3/MM3 (ref 0.1–0.9)
MONOCYTES NFR BLD AUTO: 5.1 % (ref 5–12)
NEUTROPHILS NFR BLD AUTO: 76.2 % (ref 42.7–76)
NEUTROPHILS NFR BLD AUTO: 8.67 10*3/MM3 (ref 1.7–7)
NRBC BLD AUTO-RTO: 0.2 /100 WBC (ref 0–0.2)
PLATELET # BLD AUTO: 363 10*3/MM3 (ref 140–450)
PLATELET # BLD AUTO: 385 10*3/MM3 (ref 140–450)
PMV BLD AUTO: 9 FL (ref 6–12)
PMV BLD AUTO: 9.1 FL (ref 6–12)
POTASSIUM SERPL-SCNC: 4.9 MMOL/L (ref 3.5–5.2)
POTASSIUM SERPL-SCNC: 5.5 MMOL/L (ref 3.5–5.2)
PROT SERPL-MCNC: 8.1 G/DL (ref 6–8.5)
QT INTERVAL: 415 MS
RBC # BLD AUTO: 2.81 10*6/MM3 (ref 3.77–5.28)
RBC # BLD AUTO: 2.93 10*6/MM3 (ref 3.77–5.28)
SARS-COV-2 ORF1AB RESP QL NAA+PROBE: NOT DETECTED
SODIUM SERPL-SCNC: 140 MMOL/L (ref 136–145)
SODIUM SERPL-SCNC: 141 MMOL/L (ref 136–145)
TROPONIN T SERPL-MCNC: <0.01 NG/ML (ref 0–0.03)
TROPONIN T SERPL-MCNC: <0.01 NG/ML (ref 0–0.03)
WBC # BLD AUTO: 11.03 10*3/MM3 (ref 3.4–10.8)
WBC # BLD AUTO: 11.39 10*3/MM3 (ref 3.4–10.8)

## 2021-03-15 PROCEDURE — U0005 INFEC AGEN DETEC AMPLI PROBE: HCPCS | Performed by: EMERGENCY MEDICINE

## 2021-03-15 PROCEDURE — 74019 RADEX ABDOMEN 2 VIEWS: CPT

## 2021-03-15 PROCEDURE — 25010000002 HYDRALAZINE PER 20 MG: Performed by: EMERGENCY MEDICINE

## 2021-03-15 PROCEDURE — 25010000002 FUROSEMIDE PER 20 MG: Performed by: INTERNAL MEDICINE

## 2021-03-15 PROCEDURE — U0004 COV-19 TEST NON-CDC HGH THRU: HCPCS | Performed by: EMERGENCY MEDICINE

## 2021-03-15 PROCEDURE — G0378 HOSPITAL OBSERVATION PER HR: HCPCS

## 2021-03-15 PROCEDURE — 85025 COMPLETE CBC W/AUTO DIFF WBC: CPT | Performed by: EMERGENCY MEDICINE

## 2021-03-15 PROCEDURE — 82962 GLUCOSE BLOOD TEST: CPT

## 2021-03-15 PROCEDURE — 93010 ELECTROCARDIOGRAM REPORT: CPT | Performed by: INTERNAL MEDICINE

## 2021-03-15 PROCEDURE — 84703 CHORIONIC GONADOTROPIN ASSAY: CPT | Performed by: EMERGENCY MEDICINE

## 2021-03-15 PROCEDURE — 71045 X-RAY EXAM CHEST 1 VIEW: CPT

## 2021-03-15 PROCEDURE — 25010000002 HEPARIN (PORCINE) PER 1000 UNITS: Performed by: INTERNAL MEDICINE

## 2021-03-15 PROCEDURE — 80053 COMPREHEN METABOLIC PANEL: CPT | Performed by: EMERGENCY MEDICINE

## 2021-03-15 PROCEDURE — 99284 EMERGENCY DEPT VISIT MOD MDM: CPT

## 2021-03-15 PROCEDURE — 36415 COLL VENOUS BLD VENIPUNCTURE: CPT | Performed by: NURSE PRACTITIONER

## 2021-03-15 PROCEDURE — 93005 ELECTROCARDIOGRAM TRACING: CPT | Performed by: EMERGENCY MEDICINE

## 2021-03-15 PROCEDURE — 84484 ASSAY OF TROPONIN QUANT: CPT | Performed by: EMERGENCY MEDICINE

## 2021-03-15 PROCEDURE — 85027 COMPLETE CBC AUTOMATED: CPT | Performed by: NURSE PRACTITIONER

## 2021-03-15 PROCEDURE — 83690 ASSAY OF LIPASE: CPT | Performed by: EMERGENCY MEDICINE

## 2021-03-15 PROCEDURE — 5A1D70Z PERFORMANCE OF URINARY FILTRATION, INTERMITTENT, LESS THAN 6 HOURS PER DAY: ICD-10-PCS | Performed by: INTERNAL MEDICINE

## 2021-03-15 RX ORDER — AMOXICILLIN 250 MG
2 CAPSULE ORAL 2 TIMES DAILY
Status: DISCONTINUED | OUTPATIENT
Start: 2021-03-15 | End: 2021-03-18 | Stop reason: HOSPADM

## 2021-03-15 RX ORDER — POLYETHYLENE GLYCOL 3350 17 G/17G
17 POWDER, FOR SOLUTION ORAL DAILY
Status: DISCONTINUED | OUTPATIENT
Start: 2021-03-15 | End: 2021-03-18 | Stop reason: HOSPADM

## 2021-03-15 RX ORDER — CALCITRIOL 0.25 UG/1
0.5 CAPSULE, LIQUID FILLED ORAL DAILY
Status: DISCONTINUED | OUTPATIENT
Start: 2021-03-15 | End: 2021-03-18 | Stop reason: HOSPADM

## 2021-03-15 RX ORDER — ACETAMINOPHEN 325 MG/1
650 TABLET ORAL EVERY 4 HOURS PRN
Status: DISCONTINUED | OUTPATIENT
Start: 2021-03-15 | End: 2021-03-18 | Stop reason: HOSPADM

## 2021-03-15 RX ORDER — CLONIDINE HYDROCHLORIDE 0.1 MG/1
0.3 TABLET ORAL EVERY 8 HOURS SCHEDULED
Status: DISCONTINUED | OUTPATIENT
Start: 2021-03-15 | End: 2021-03-18

## 2021-03-15 RX ORDER — PROCHLORPERAZINE MALEATE 10 MG
10 TABLET ORAL EVERY 6 HOURS PRN
Status: DISCONTINUED | OUTPATIENT
Start: 2021-03-15 | End: 2021-03-18 | Stop reason: HOSPADM

## 2021-03-15 RX ORDER — HEPARIN SODIUM 1000 [USP'U]/ML
3800 INJECTION, SOLUTION INTRAVENOUS; SUBCUTANEOUS AS NEEDED
Status: DISCONTINUED | OUTPATIENT
Start: 2021-03-15 | End: 2021-03-18 | Stop reason: HOSPADM

## 2021-03-15 RX ORDER — BISACODYL 10 MG
10 SUPPOSITORY, RECTAL RECTAL DAILY
Status: DISCONTINUED | OUTPATIENT
Start: 2021-03-15 | End: 2021-03-18 | Stop reason: HOSPADM

## 2021-03-15 RX ORDER — CALCIUM CARBONATE 200(500)MG
2 TABLET,CHEWABLE ORAL 2 TIMES DAILY PRN
Status: DISCONTINUED | OUTPATIENT
Start: 2021-03-15 | End: 2021-03-18 | Stop reason: HOSPADM

## 2021-03-15 RX ORDER — NICOTINE POLACRILEX 4 MG
15 LOZENGE BUCCAL
Status: DISCONTINUED | OUTPATIENT
Start: 2021-03-15 | End: 2021-03-18 | Stop reason: HOSPADM

## 2021-03-15 RX ORDER — CALCIUM ACETATE 667 MG/1
2001 CAPSULE ORAL
Status: DISCONTINUED | OUTPATIENT
Start: 2021-03-15 | End: 2021-03-18 | Stop reason: HOSPADM

## 2021-03-15 RX ORDER — HYDRALAZINE HYDROCHLORIDE 20 MG/ML
20 INJECTION INTRAMUSCULAR; INTRAVENOUS ONCE
Status: COMPLETED | OUTPATIENT
Start: 2021-03-15 | End: 2021-03-15

## 2021-03-15 RX ORDER — FUROSEMIDE 10 MG/ML
80 INJECTION INTRAMUSCULAR; INTRAVENOUS ONCE
Status: COMPLETED | OUTPATIENT
Start: 2021-03-15 | End: 2021-03-15

## 2021-03-15 RX ORDER — NITROGLYCERIN 0.4 MG/1
0.4 TABLET SUBLINGUAL
Status: DISCONTINUED | OUTPATIENT
Start: 2021-03-15 | End: 2021-03-18 | Stop reason: HOSPADM

## 2021-03-15 RX ORDER — ONDANSETRON 4 MG/1
4 TABLET, FILM COATED ORAL EVERY 6 HOURS PRN
Status: DISCONTINUED | OUTPATIENT
Start: 2021-03-15 | End: 2021-03-18 | Stop reason: HOSPADM

## 2021-03-15 RX ORDER — INSULIN LISPRO 100 [IU]/ML
0-9 INJECTION, SOLUTION INTRAVENOUS; SUBCUTANEOUS
Status: DISCONTINUED | OUTPATIENT
Start: 2021-03-15 | End: 2021-03-18 | Stop reason: HOSPADM

## 2021-03-15 RX ORDER — DEXTROSE MONOHYDRATE 25 G/50ML
25 INJECTION, SOLUTION INTRAVENOUS
Status: DISCONTINUED | OUTPATIENT
Start: 2021-03-15 | End: 2021-03-18 | Stop reason: HOSPADM

## 2021-03-15 RX ORDER — ACETAMINOPHEN 160 MG/5ML
650 SOLUTION ORAL EVERY 4 HOURS PRN
Status: DISCONTINUED | OUTPATIENT
Start: 2021-03-15 | End: 2021-03-18 | Stop reason: HOSPADM

## 2021-03-15 RX ORDER — ONDANSETRON 2 MG/ML
4 INJECTION INTRAMUSCULAR; INTRAVENOUS EVERY 6 HOURS PRN
Status: DISCONTINUED | OUTPATIENT
Start: 2021-03-15 | End: 2021-03-18 | Stop reason: HOSPADM

## 2021-03-15 RX ORDER — ACETAMINOPHEN 650 MG/1
650 SUPPOSITORY RECTAL EVERY 4 HOURS PRN
Status: DISCONTINUED | OUTPATIENT
Start: 2021-03-15 | End: 2021-03-18 | Stop reason: HOSPADM

## 2021-03-15 RX ORDER — BISACODYL 5 MG/1
5 TABLET, DELAYED RELEASE ORAL DAILY PRN
Status: DISCONTINUED | OUTPATIENT
Start: 2021-03-15 | End: 2021-03-18 | Stop reason: HOSPADM

## 2021-03-15 RX ORDER — HYDROXYZINE 50 MG/1
50 TABLET, FILM COATED ORAL 2 TIMES DAILY PRN
Status: DISCONTINUED | OUTPATIENT
Start: 2021-03-15 | End: 2021-03-18 | Stop reason: HOSPADM

## 2021-03-15 RX ORDER — DILTIAZEM HYDROCHLORIDE 5 MG/ML
10 INJECTION INTRAVENOUS ONCE
Status: DISCONTINUED | OUTPATIENT
Start: 2021-03-15 | End: 2021-03-15

## 2021-03-15 RX ORDER — SODIUM CHLORIDE 0.9 % (FLUSH) 0.9 %
10 SYRINGE (ML) INJECTION AS NEEDED
Status: DISCONTINUED | OUTPATIENT
Start: 2021-03-15 | End: 2021-03-18 | Stop reason: HOSPADM

## 2021-03-15 RX ORDER — LABETALOL HYDROCHLORIDE 5 MG/ML
10 INJECTION, SOLUTION INTRAVENOUS ONCE
Status: COMPLETED | OUTPATIENT
Start: 2021-03-15 | End: 2021-03-15

## 2021-03-15 RX ORDER — HYDRALAZINE HYDROCHLORIDE 25 MG/1
25 TABLET, FILM COATED ORAL EVERY 12 HOURS SCHEDULED
Status: DISCONTINUED | OUTPATIENT
Start: 2021-03-15 | End: 2021-03-18

## 2021-03-15 RX ORDER — SODIUM CHLORIDE 0.9 % (FLUSH) 0.9 %
10 SYRINGE (ML) INJECTION EVERY 12 HOURS SCHEDULED
Status: DISCONTINUED | OUTPATIENT
Start: 2021-03-15 | End: 2021-03-18 | Stop reason: HOSPADM

## 2021-03-15 RX ORDER — LACTULOSE 10 G/15ML
20 SOLUTION ORAL 3 TIMES DAILY PRN
Status: DISCONTINUED | OUTPATIENT
Start: 2021-03-15 | End: 2021-03-18 | Stop reason: HOSPADM

## 2021-03-15 RX ADMIN — CALCIUM ACETATE 2001 MG: 667 CAPSULE ORAL at 19:11

## 2021-03-15 RX ADMIN — ACETAMINOPHEN 650 MG: 325 TABLET, FILM COATED ORAL at 10:44

## 2021-03-15 RX ADMIN — SODIUM CHLORIDE, PRESERVATIVE FREE 10 ML: 5 INJECTION INTRAVENOUS at 21:14

## 2021-03-15 RX ADMIN — HEPARIN SODIUM 3800 UNITS: 1000 INJECTION INTRAVENOUS; SUBCUTANEOUS at 18:37

## 2021-03-15 RX ADMIN — DOCUSATE SODIUM 50MG AND SENNOSIDES 8.6MG 2 TABLET: 8.6; 5 TABLET, FILM COATED ORAL at 09:02

## 2021-03-15 RX ADMIN — DOCUSATE SODIUM 50MG AND SENNOSIDES 8.6MG 2 TABLET: 8.6; 5 TABLET, FILM COATED ORAL at 21:12

## 2021-03-15 RX ADMIN — HYDRALAZINE HYDROCHLORIDE 25 MG: 25 TABLET, FILM COATED ORAL at 21:12

## 2021-03-15 RX ADMIN — ACETAMINOPHEN 650 MG: 325 TABLET, FILM COATED ORAL at 17:09

## 2021-03-15 RX ADMIN — ACETAMINOPHEN 650 MG: 325 TABLET, FILM COATED ORAL at 21:14

## 2021-03-15 RX ADMIN — BISACODYL 5 MG: 5 TABLET ORAL at 19:11

## 2021-03-15 RX ADMIN — SODIUM CHLORIDE, PRESERVATIVE FREE 10 ML: 5 INJECTION INTRAVENOUS at 09:02

## 2021-03-15 RX ADMIN — CLONIDINE HYDROCHLORIDE 0.3 MG: 0.1 TABLET ORAL at 08:07

## 2021-03-15 RX ADMIN — HYDRALAZINE HYDROCHLORIDE 25 MG: 25 TABLET, FILM COATED ORAL at 08:07

## 2021-03-15 RX ADMIN — CALCIUM ACETATE 2001 MG: 667 CAPSULE ORAL at 12:09

## 2021-03-15 RX ADMIN — FUROSEMIDE 80 MG: 10 INJECTION, SOLUTION INTRAMUSCULAR; INTRAVENOUS at 12:10

## 2021-03-15 RX ADMIN — HYDRALAZINE HYDROCHLORIDE 20 MG: 20 INJECTION, SOLUTION INTRAMUSCULAR; INTRAVENOUS at 03:41

## 2021-03-15 RX ADMIN — POLYETHYLENE GLYCOL 3350 17 G: 17 POWDER, FOR SOLUTION ORAL at 05:08

## 2021-03-15 RX ADMIN — CLONIDINE HYDROCHLORIDE 0.3 MG: 0.1 TABLET ORAL at 14:57

## 2021-03-15 RX ADMIN — CALCITRIOL 0.5 MCG: 0.25 CAPSULE ORAL at 09:02

## 2021-03-15 RX ADMIN — LABETALOL HYDROCHLORIDE 10 MG: 5 INJECTION, SOLUTION INTRAVENOUS at 03:08

## 2021-03-15 RX ADMIN — HYDROXYZINE HYDROCHLORIDE 50 MG: 50 TABLET ORAL at 14:27

## 2021-03-15 RX ADMIN — CLONIDINE HYDROCHLORIDE 0.3 MG: 0.1 TABLET ORAL at 21:13

## 2021-03-15 RX ADMIN — SODIUM CHLORIDE, PRESERVATIVE FREE 10 ML: 5 INJECTION INTRAVENOUS at 04:43

## 2021-03-16 ENCOUNTER — ANESTHESIA (OUTPATIENT)
Dept: PERIOP | Facility: HOSPITAL | Age: 46
End: 2021-03-16

## 2021-03-16 ENCOUNTER — ANESTHESIA EVENT (OUTPATIENT)
Dept: PERIOP | Facility: HOSPITAL | Age: 46
End: 2021-03-16

## 2021-03-16 LAB
ALBUMIN SERPL-MCNC: 3.6 G/DL (ref 3.5–5.2)
ANION GAP SERPL CALCULATED.3IONS-SCNC: 13 MMOL/L (ref 5–15)
BUN SERPL-MCNC: 27 MG/DL (ref 6–20)
BUN/CREAT SERPL: 5 (ref 7–25)
CALCIUM SPEC-SCNC: 8.8 MG/DL (ref 8.6–10.5)
CHLORIDE SERPL-SCNC: 97 MMOL/L (ref 98–107)
CO2 SERPL-SCNC: 25 MMOL/L (ref 22–29)
CREAT SERPL-MCNC: 5.35 MG/DL (ref 0.57–1)
DEPRECATED RDW RBC AUTO: 50.1 FL (ref 37–54)
ERYTHROCYTE [DISTWIDTH] IN BLOOD BY AUTOMATED COUNT: 15.6 % (ref 12.3–15.4)
GFR SERPL CREATININE-BSD FRML MDRD: 9 ML/MIN/1.73
GFR SERPL CREATININE-BSD FRML MDRD: ABNORMAL ML/MIN/{1.73_M2}
GLUCOSE BLDC GLUCOMTR-MCNC: 104 MG/DL (ref 70–130)
GLUCOSE BLDC GLUCOMTR-MCNC: 95 MG/DL (ref 70–130)
GLUCOSE SERPL-MCNC: 96 MG/DL (ref 65–99)
HCT VFR BLD AUTO: 25.2 % (ref 34–46.6)
HGB BLD-MCNC: 8.1 G/DL (ref 12–15.9)
MAGNESIUM SERPL-MCNC: 1.9 MG/DL (ref 1.6–2.6)
MCH RBC QN AUTO: 29 PG (ref 26.6–33)
MCHC RBC AUTO-ENTMCNC: 32.1 G/DL (ref 31.5–35.7)
MCV RBC AUTO: 90.3 FL (ref 79–97)
PHOSPHATE SERPL-MCNC: 4.6 MG/DL (ref 2.5–4.5)
PLATELET # BLD AUTO: 363 10*3/MM3 (ref 140–450)
PMV BLD AUTO: 9.3 FL (ref 6–12)
POTASSIUM SERPL-SCNC: 6 MMOL/L (ref 3.5–5.2)
RBC # BLD AUTO: 2.79 10*6/MM3 (ref 3.77–5.28)
SODIUM SERPL-SCNC: 135 MMOL/L (ref 136–145)
WBC # BLD AUTO: 8.13 10*3/MM3 (ref 3.4–10.8)

## 2021-03-16 PROCEDURE — 25010000002 PROTAMINE SULFATE PER 10 MG: Performed by: ANESTHESIOLOGY

## 2021-03-16 PROCEDURE — 25010000003 CEFAZOLIN IN DEXTROSE 2-4 GM/100ML-% SOLUTION: Performed by: SURGERY

## 2021-03-16 PROCEDURE — 25010000002 MIDAZOLAM PER 1 MG: Performed by: ANESTHESIOLOGY

## 2021-03-16 PROCEDURE — 83735 ASSAY OF MAGNESIUM: CPT | Performed by: INTERNAL MEDICINE

## 2021-03-16 PROCEDURE — 25010000003 LIDOCAINE 1 % SOLUTION 20 ML VIAL: Performed by: SURGERY

## 2021-03-16 PROCEDURE — 25010000002 HYDROMORPHONE PER 4 MG: Performed by: ANESTHESIOLOGY

## 2021-03-16 PROCEDURE — 25010000002 FENTANYL CITRATE (PF) 100 MCG/2ML SOLUTION: Performed by: ANESTHESIOLOGY

## 2021-03-16 PROCEDURE — 25010000002 HEPARIN (PORCINE) PER 1000 UNITS: Performed by: SURGERY

## 2021-03-16 PROCEDURE — 25010000002 PHENYLEPHRINE PER 1 ML: Performed by: ANESTHESIOLOGY

## 2021-03-16 PROCEDURE — 85027 COMPLETE CBC AUTOMATED: CPT | Performed by: INTERNAL MEDICINE

## 2021-03-16 PROCEDURE — 25010000002 ONDANSETRON PER 1 MG: Performed by: SURGERY

## 2021-03-16 PROCEDURE — 25010000002 HYDROMORPHONE PER 4 MG: Performed by: SURGERY

## 2021-03-16 PROCEDURE — 82962 GLUCOSE BLOOD TEST: CPT

## 2021-03-16 PROCEDURE — 80069 RENAL FUNCTION PANEL: CPT | Performed by: INTERNAL MEDICINE

## 2021-03-16 PROCEDURE — 03170JD BYPASS RIGHT BRACHIAL ARTERY TO UPPER ARM VEIN WITH SYNTHETIC SUBSTITUTE, OPEN APPROACH: ICD-10-PCS | Performed by: SURGERY

## 2021-03-16 PROCEDURE — 25010000002 PROPOFOL 10 MG/ML EMULSION: Performed by: ANESTHESIOLOGY

## 2021-03-16 PROCEDURE — 25010000002 HEPARIN (PORCINE) PER 1000 UNITS: Performed by: ANESTHESIOLOGY

## 2021-03-16 PROCEDURE — C1768 GRAFT, VASCULAR: HCPCS | Performed by: SURGERY

## 2021-03-16 PROCEDURE — 25010000002 HEPARIN (PORCINE) PER 1000 UNITS: Performed by: INTERNAL MEDICINE

## 2021-03-16 PROCEDURE — 25010000002 ONDANSETRON PER 1 MG: Performed by: NURSE PRACTITIONER

## 2021-03-16 PROCEDURE — 25010000002 CEFOXITIN PER 1 G: Performed by: SURGERY

## 2021-03-16 DEVICE — PROPATEN VASCULAR GRAFT SW 4-7MMX45CM TAPERED HEPARIN
Type: IMPLANTABLE DEVICE | Site: ARTERIAL | Status: FUNCTIONAL
Brand: GORE PROPATEN VASCULAR GRAFT

## 2021-03-16 RX ORDER — PROTAMINE SULFATE 10 MG/ML
INJECTION, SOLUTION INTRAVENOUS AS NEEDED
Status: DISCONTINUED | OUTPATIENT
Start: 2021-03-16 | End: 2021-03-16 | Stop reason: SURG

## 2021-03-16 RX ORDER — NALOXONE HCL 0.4 MG/ML
0.2 VIAL (ML) INJECTION AS NEEDED
Status: DISCONTINUED | OUTPATIENT
Start: 2021-03-16 | End: 2021-03-16 | Stop reason: HOSPADM

## 2021-03-16 RX ORDER — HYDROCODONE BITARTRATE AND ACETAMINOPHEN 7.5; 325 MG/1; MG/1
1 TABLET ORAL ONCE AS NEEDED
Status: DISCONTINUED | OUTPATIENT
Start: 2021-03-16 | End: 2021-03-16 | Stop reason: HOSPADM

## 2021-03-16 RX ORDER — MIDAZOLAM HYDROCHLORIDE 1 MG/ML
2 INJECTION INTRAMUSCULAR; INTRAVENOUS
Status: DISCONTINUED | OUTPATIENT
Start: 2021-03-16 | End: 2021-03-16 | Stop reason: HOSPADM

## 2021-03-16 RX ORDER — FLUMAZENIL 0.1 MG/ML
0.2 INJECTION INTRAVENOUS AS NEEDED
Status: DISCONTINUED | OUTPATIENT
Start: 2021-03-16 | End: 2021-03-16 | Stop reason: HOSPADM

## 2021-03-16 RX ORDER — CEFAZOLIN SODIUM 2 G/100ML
2 INJECTION, SOLUTION INTRAVENOUS EVERY 12 HOURS
Status: COMPLETED | OUTPATIENT
Start: 2021-03-17 | End: 2021-03-17

## 2021-03-16 RX ORDER — DIPHENHYDRAMINE HCL 25 MG
25 CAPSULE ORAL
Status: DISCONTINUED | OUTPATIENT
Start: 2021-03-16 | End: 2021-03-16 | Stop reason: HOSPADM

## 2021-03-16 RX ORDER — HYDROCODONE BITARTRATE AND ACETAMINOPHEN 7.5; 325 MG/1; MG/1
1 TABLET ORAL EVERY 4 HOURS PRN
Status: DISCONTINUED | OUTPATIENT
Start: 2021-03-16 | End: 2021-03-18 | Stop reason: HOSPADM

## 2021-03-16 RX ORDER — HYDROMORPHONE HYDROCHLORIDE 1 MG/ML
0.5 INJECTION, SOLUTION INTRAMUSCULAR; INTRAVENOUS; SUBCUTANEOUS
Status: DISCONTINUED | OUTPATIENT
Start: 2021-03-16 | End: 2021-03-16 | Stop reason: HOSPADM

## 2021-03-16 RX ORDER — LIDOCAINE HYDROCHLORIDE 20 MG/ML
INJECTION, SOLUTION INFILTRATION; PERINEURAL AS NEEDED
Status: DISCONTINUED | OUTPATIENT
Start: 2021-03-16 | End: 2021-03-16 | Stop reason: SURG

## 2021-03-16 RX ORDER — SODIUM CHLORIDE 0.9 % (FLUSH) 0.9 %
3-10 SYRINGE (ML) INJECTION AS NEEDED
Status: DISCONTINUED | OUTPATIENT
Start: 2021-03-16 | End: 2021-03-16 | Stop reason: HOSPADM

## 2021-03-16 RX ORDER — HEPARIN SODIUM 1000 [USP'U]/ML
INJECTION, SOLUTION INTRAVENOUS; SUBCUTANEOUS AS NEEDED
Status: DISCONTINUED | OUTPATIENT
Start: 2021-03-16 | End: 2021-03-16 | Stop reason: SURG

## 2021-03-16 RX ORDER — LABETALOL HYDROCHLORIDE 5 MG/ML
10 INJECTION, SOLUTION INTRAVENOUS ONCE
Status: COMPLETED | OUTPATIENT
Start: 2021-03-16 | End: 2021-03-16

## 2021-03-16 RX ORDER — HYDROMORPHONE HYDROCHLORIDE 1 MG/ML
0.5 INJECTION, SOLUTION INTRAMUSCULAR; INTRAVENOUS; SUBCUTANEOUS
Status: DISCONTINUED | OUTPATIENT
Start: 2021-03-16 | End: 2021-03-18 | Stop reason: HOSPADM

## 2021-03-16 RX ORDER — FENTANYL CITRATE 50 UG/ML
50 INJECTION, SOLUTION INTRAMUSCULAR; INTRAVENOUS
Status: DISCONTINUED | OUTPATIENT
Start: 2021-03-16 | End: 2021-03-16 | Stop reason: HOSPADM

## 2021-03-16 RX ORDER — SODIUM CHLORIDE 9 MG/ML
9 INJECTION, SOLUTION INTRAVENOUS CONTINUOUS PRN
Status: DISCONTINUED | OUTPATIENT
Start: 2021-03-16 | End: 2021-03-18 | Stop reason: HOSPADM

## 2021-03-16 RX ORDER — MIDAZOLAM HYDROCHLORIDE 1 MG/ML
1 INJECTION INTRAMUSCULAR; INTRAVENOUS
Status: DISCONTINUED | OUTPATIENT
Start: 2021-03-16 | End: 2021-03-16 | Stop reason: HOSPADM

## 2021-03-16 RX ORDER — ALBUMIN (HUMAN) 12.5 G/50ML
12.5 SOLUTION INTRAVENOUS AS NEEDED
Status: ACTIVE | OUTPATIENT
Start: 2021-03-16 | End: 2021-03-17

## 2021-03-16 RX ORDER — ONDANSETRON 2 MG/ML
4 INJECTION INTRAMUSCULAR; INTRAVENOUS ONCE AS NEEDED
Status: DISCONTINUED | OUTPATIENT
Start: 2021-03-16 | End: 2021-03-16 | Stop reason: HOSPADM

## 2021-03-16 RX ORDER — DIPHENHYDRAMINE HYDROCHLORIDE 50 MG/ML
12.5 INJECTION INTRAMUSCULAR; INTRAVENOUS
Status: DISCONTINUED | OUTPATIENT
Start: 2021-03-16 | End: 2021-03-16 | Stop reason: HOSPADM

## 2021-03-16 RX ORDER — CEFAZOLIN SODIUM 2 G/100ML
2 INJECTION, SOLUTION INTRAVENOUS ONCE
Status: COMPLETED | OUTPATIENT
Start: 2021-03-16 | End: 2021-03-16

## 2021-03-16 RX ORDER — HYDRALAZINE HYDROCHLORIDE 20 MG/ML
5 INJECTION INTRAMUSCULAR; INTRAVENOUS
Status: DISCONTINUED | OUTPATIENT
Start: 2021-03-16 | End: 2021-03-16 | Stop reason: HOSPADM

## 2021-03-16 RX ORDER — OXYCODONE AND ACETAMINOPHEN 7.5; 325 MG/1; MG/1
1 TABLET ORAL ONCE AS NEEDED
Status: DISCONTINUED | OUTPATIENT
Start: 2021-03-16 | End: 2021-03-16 | Stop reason: HOSPADM

## 2021-03-16 RX ORDER — LABETALOL HYDROCHLORIDE 5 MG/ML
5 INJECTION, SOLUTION INTRAVENOUS
Status: DISCONTINUED | OUTPATIENT
Start: 2021-03-16 | End: 2021-03-16 | Stop reason: HOSPADM

## 2021-03-16 RX ORDER — LIDOCAINE HYDROCHLORIDE 10 MG/ML
0.5 INJECTION, SOLUTION EPIDURAL; INFILTRATION; INTRACAUDAL; PERINEURAL ONCE AS NEEDED
Status: DISCONTINUED | OUTPATIENT
Start: 2021-03-16 | End: 2021-03-16 | Stop reason: HOSPADM

## 2021-03-16 RX ORDER — FAMOTIDINE 10 MG/ML
20 INJECTION, SOLUTION INTRAVENOUS ONCE
Status: COMPLETED | OUTPATIENT
Start: 2021-03-16 | End: 2021-03-16

## 2021-03-16 RX ORDER — SODIUM CHLORIDE, SODIUM LACTATE, POTASSIUM CHLORIDE, CALCIUM CHLORIDE 600; 310; 30; 20 MG/100ML; MG/100ML; MG/100ML; MG/100ML
INJECTION, SOLUTION INTRAVENOUS CONTINUOUS PRN
Status: DISCONTINUED | OUTPATIENT
Start: 2021-03-16 | End: 2021-03-16 | Stop reason: SURG

## 2021-03-16 RX ORDER — PROMETHAZINE HYDROCHLORIDE 25 MG/1
25 SUPPOSITORY RECTAL ONCE AS NEEDED
Status: DISCONTINUED | OUTPATIENT
Start: 2021-03-16 | End: 2021-03-16 | Stop reason: HOSPADM

## 2021-03-16 RX ORDER — ALBUMIN (HUMAN) 12.5 G/50ML
12.5 SOLUTION INTRAVENOUS AS NEEDED
Status: ACTIVE | OUTPATIENT
Start: 2021-03-16 | End: 2021-03-16

## 2021-03-16 RX ORDER — EPHEDRINE SULFATE 50 MG/ML
5 INJECTION, SOLUTION INTRAVENOUS ONCE AS NEEDED
Status: DISCONTINUED | OUTPATIENT
Start: 2021-03-16 | End: 2021-03-16 | Stop reason: HOSPADM

## 2021-03-16 RX ORDER — PROMETHAZINE HYDROCHLORIDE 25 MG/1
25 TABLET ORAL ONCE AS NEEDED
Status: DISCONTINUED | OUTPATIENT
Start: 2021-03-16 | End: 2021-03-16 | Stop reason: HOSPADM

## 2021-03-16 RX ORDER — SODIUM CHLORIDE 0.9 % (FLUSH) 0.9 %
3 SYRINGE (ML) INJECTION EVERY 12 HOURS SCHEDULED
Status: DISCONTINUED | OUTPATIENT
Start: 2021-03-16 | End: 2021-03-16 | Stop reason: HOSPADM

## 2021-03-16 RX ORDER — PROPOFOL 10 MG/ML
VIAL (ML) INTRAVENOUS AS NEEDED
Status: DISCONTINUED | OUTPATIENT
Start: 2021-03-16 | End: 2021-03-16 | Stop reason: SURG

## 2021-03-16 RX ADMIN — FENTANYL CITRATE 50 MCG: 50 INJECTION, SOLUTION INTRAMUSCULAR; INTRAVENOUS at 18:12

## 2021-03-16 RX ADMIN — SODIUM CHLORIDE, PRESERVATIVE FREE 10 ML: 5 INJECTION INTRAVENOUS at 08:10

## 2021-03-16 RX ADMIN — HYDROXYZINE HYDROCHLORIDE 50 MG: 50 TABLET ORAL at 02:17

## 2021-03-16 RX ADMIN — HYDROCODONE BITARTRATE AND ACETAMINOPHEN 1 TABLET: 7.5; 325 TABLET ORAL at 22:15

## 2021-03-16 RX ADMIN — FENTANYL CITRATE 50 MCG: 50 INJECTION, SOLUTION INTRAMUSCULAR; INTRAVENOUS at 15:33

## 2021-03-16 RX ADMIN — LABETALOL HYDROCHLORIDE 5 MG: 5 INJECTION, SOLUTION INTRAVENOUS at 18:46

## 2021-03-16 RX ADMIN — POLYETHYLENE GLYCOL 3350 17 G: 17 POWDER, FOR SOLUTION ORAL at 08:10

## 2021-03-16 RX ADMIN — FENTANYL CITRATE 50 MCG: 50 INJECTION, SOLUTION INTRAMUSCULAR; INTRAVENOUS at 18:20

## 2021-03-16 RX ADMIN — HYDRALAZINE HYDROCHLORIDE 25 MG: 25 TABLET, FILM COATED ORAL at 12:43

## 2021-03-16 RX ADMIN — CALCITRIOL 0.5 MCG: 0.25 CAPSULE ORAL at 08:09

## 2021-03-16 RX ADMIN — HEPARIN SODIUM 7500 UNITS: 1000 INJECTION INTRAVENOUS; SUBCUTANEOUS at 16:51

## 2021-03-16 RX ADMIN — HEPARIN SODIUM 3200 UNITS: 1000 INJECTION INTRAVENOUS; SUBCUTANEOUS at 12:43

## 2021-03-16 RX ADMIN — PROPOFOL 200 MG: 10 INJECTION, EMULSION INTRAVENOUS at 16:04

## 2021-03-16 RX ADMIN — MIDAZOLAM 2 MG: 1 INJECTION INTRAMUSCULAR; INTRAVENOUS at 15:13

## 2021-03-16 RX ADMIN — PROTAMINE SULFATE 30 MG: 10 INJECTION, SOLUTION INTRAVENOUS at 17:30

## 2021-03-16 RX ADMIN — HYDROXYZINE HYDROCHLORIDE 50 MG: 50 TABLET ORAL at 20:24

## 2021-03-16 RX ADMIN — PHENYLEPHRINE HYDROCHLORIDE 200 MCG: 10 INJECTION INTRAVENOUS at 17:21

## 2021-03-16 RX ADMIN — FAMOTIDINE 20 MG: 10 INJECTION INTRAVENOUS at 15:13

## 2021-03-16 RX ADMIN — CEFAZOLIN SODIUM 2 G: 2 INJECTION, SOLUTION INTRAVENOUS at 16:07

## 2021-03-16 RX ADMIN — SODIUM CHLORIDE, PRESERVATIVE FREE 10 ML: 5 INJECTION INTRAVENOUS at 20:20

## 2021-03-16 RX ADMIN — HYDROMORPHONE HYDROCHLORIDE 0.5 MG: 1 INJECTION, SOLUTION INTRAMUSCULAR; INTRAVENOUS; SUBCUTANEOUS at 18:49

## 2021-03-16 RX ADMIN — PHENYLEPHRINE HYDROCHLORIDE 200 MCG: 10 INJECTION INTRAVENOUS at 17:04

## 2021-03-16 RX ADMIN — ONDANSETRON 4 MG: 2 INJECTION INTRAMUSCULAR; INTRAVENOUS at 12:42

## 2021-03-16 RX ADMIN — LABETALOL HYDROCHLORIDE 10 MG: 5 INJECTION, SOLUTION INTRAVENOUS at 00:21

## 2021-03-16 RX ADMIN — PHENYLEPHRINE HYDROCHLORIDE 300 MCG: 10 INJECTION INTRAVENOUS at 17:12

## 2021-03-16 RX ADMIN — ONDANSETRON 4 MG: 2 INJECTION INTRAMUSCULAR; INTRAVENOUS at 20:27

## 2021-03-16 RX ADMIN — SODIUM CHLORIDE, POTASSIUM CHLORIDE, SODIUM LACTATE AND CALCIUM CHLORIDE: 600; 310; 30; 20 INJECTION, SOLUTION INTRAVENOUS at 15:58

## 2021-03-16 RX ADMIN — ACETAMINOPHEN 650 MG: 325 TABLET, FILM COATED ORAL at 05:05

## 2021-03-16 RX ADMIN — LIDOCAINE HYDROCHLORIDE 60 MG: 20 INJECTION, SOLUTION INFILTRATION; PERINEURAL at 16:04

## 2021-03-16 RX ADMIN — PHENYLEPHRINE HYDROCHLORIDE 200 MCG: 10 INJECTION INTRAVENOUS at 17:30

## 2021-03-16 RX ADMIN — HYDROXYZINE HYDROCHLORIDE 50 MG: 50 TABLET ORAL at 20:27

## 2021-03-16 RX ADMIN — LABETALOL HYDROCHLORIDE 5 MG: 5 INJECTION, SOLUTION INTRAVENOUS at 18:53

## 2021-03-16 RX ADMIN — ONDANSETRON 4 MG: 2 INJECTION INTRAMUSCULAR; INTRAVENOUS at 20:24

## 2021-03-16 RX ADMIN — DOCUSATE SODIUM 50MG AND SENNOSIDES 8.6MG 2 TABLET: 8.6; 5 TABLET, FILM COATED ORAL at 22:15

## 2021-03-16 RX ADMIN — SODIUM CHLORIDE 9 ML/HR: 9 INJECTION, SOLUTION INTRAVENOUS at 14:35

## 2021-03-16 RX ADMIN — DOCUSATE SODIUM 50MG AND SENNOSIDES 8.6MG 2 TABLET: 8.6; 5 TABLET, FILM COATED ORAL at 08:09

## 2021-03-16 RX ADMIN — FENTANYL CITRATE 50 MCG: 50 INJECTION, SOLUTION INTRAMUSCULAR; INTRAVENOUS at 15:13

## 2021-03-16 RX ADMIN — HYDRALAZINE HYDROCHLORIDE 25 MG: 25 TABLET, FILM COATED ORAL at 20:20

## 2021-03-16 RX ADMIN — LABETALOL HYDROCHLORIDE 5 MG: 5 INJECTION, SOLUTION INTRAVENOUS at 18:33

## 2021-03-16 RX ADMIN — CLONIDINE HYDROCHLORIDE 0.3 MG: 0.1 TABLET ORAL at 22:00

## 2021-03-16 RX ADMIN — HYDROMORPHONE HYDROCHLORIDE 0.5 MG: 1 INJECTION, SOLUTION INTRAMUSCULAR; INTRAVENOUS; SUBCUTANEOUS at 19:13

## 2021-03-16 RX ADMIN — CLONIDINE HYDROCHLORIDE 0.3 MG: 0.1 TABLET ORAL at 05:05

## 2021-03-16 NOTE — ANESTHESIA PROCEDURE NOTES
Airway  Urgency: elective    Date/Time: 3/16/2021 4:05 PM  End Time:3/16/2021 4:05 PM    General Information and Staff    Patient location during procedure: OR  Anesthesiologist: Tristin Steinberg MD    Indications and Patient Condition  Indications for airway management: airway protection    Preoxygenated: yes  Mask difficulty assessment: 1 - vent by mask    Final Airway Details  Final airway type: supraglottic airway      Successful airway: classic  Size 4    Number of attempts at approach: 1  Assessment: lips, teeth, and gum same as pre-op and atraumatic intubation

## 2021-03-16 NOTE — ANESTHESIA POSTPROCEDURE EVALUATION
Patient: Sunni Mcneil    Procedure Summary     Date: 03/16/21 Room / Location: [facility] OR [address] [place] [address] MAIN OR    Anesthesia Start: 1602 Anesthesia Stop: 1806    Procedure: RIGHT BRACHIOAXILLARY ARTERVENIOUS GRAFT PLACEMENT (Right Head) Diagnosis:     Surgeons: Adán Maurer MD Provider: Tristin Steinberg MD    Anesthesia Type: general ASA Status: 3          Anesthesia Type: general    Vitals  Vitals Value Taken Time   /107 03/16/21 1930   Temp 36.5 °C (97.7 °F) 03/16/21 1908   Pulse 87 03/16/21 1940   Resp 16 03/16/21 1930   SpO2 99 % 03/16/21 1940   Vitals shown include unvalidated device data.        Post Anesthesia Care and Evaluation    Patient location during evaluation: bedside  Patient participation: complete - patient participated  Level of consciousness: awake  Pain management: adequate  Airway patency: patent  Anesthetic complications: No anesthetic complications    Cardiovascular status: acceptable  Respiratory status: acceptable  Hydration status: acceptable

## 2021-03-16 NOTE — ANESTHESIA PREPROCEDURE EVALUATION
Anesthesia Evaluation     Patient summary reviewed and Nursing notes reviewed   no history of anesthetic complications:  NPO Solid Status: > 8 hours  NPO Liquid Status: > 2 hours           Airway   Mallampati: II  TM distance: >3 FB  Neck ROM: full  No difficulty expected and Large neck circumference  Dental - normal exam     Pulmonary     breath sounds clear to auscultation  (+) shortness of breath (prior to HD, fluid overload),   (-) recent URI, not a smoker  Cardiovascular   Exercise tolerance: good (4-7 METS)    ECG reviewed  Rhythm: regular  Rate: normal    (+) hypertension 2 medications or greater,       Neuro/Psych  (+) psychiatric history Anxiety and Depression,     (-) seizures, neuromuscular disease, TIA, CVA  GI/Hepatic/Renal/Endo    (+) obesity,  GERD,  renal disease (Last HD this am, usually MWF) ESRD and dialysis, diabetes mellitus type 2 using insulin,     Musculoskeletal     (-) neck stiffness  Abdominal    Substance History      OB/GYN          Other   arthritis, blood dyscrasia (Hgb 8.1) anemia,                       Anesthesia Plan    ASA 3     general   (Pt with significant concern for pain following ETT, LMA as clinically appropriate)  intravenous induction     Anesthetic plan, all risks, benefits, and alternatives have been provided, discussed and informed consent has been obtained with: patient.    Plan discussed with CRNA.

## 2021-03-17 LAB
ALBUMIN SERPL-MCNC: 4.1 G/DL (ref 3.5–5.2)
ANION GAP SERPL CALCULATED.3IONS-SCNC: 16.8 MMOL/L (ref 5–15)
BUN SERPL-MCNC: 19 MG/DL (ref 6–20)
BUN/CREAT SERPL: 3.8 (ref 7–25)
CALCIUM SPEC-SCNC: 8.1 MG/DL (ref 8.6–10.5)
CHLORIDE SERPL-SCNC: 97 MMOL/L (ref 98–107)
CO2 SERPL-SCNC: 20.2 MMOL/L (ref 22–29)
CREAT SERPL-MCNC: 5.03 MG/DL (ref 0.57–1)
DEPRECATED RDW RBC AUTO: 51.9 FL (ref 37–54)
ERYTHROCYTE [DISTWIDTH] IN BLOOD BY AUTOMATED COUNT: 15.8 % (ref 12.3–15.4)
GFR SERPL CREATININE-BSD FRML MDRD: 9 ML/MIN/1.73
GFR SERPL CREATININE-BSD FRML MDRD: ABNORMAL ML/MIN/{1.73_M2}
GLUCOSE BLDC GLUCOMTR-MCNC: 121 MG/DL (ref 70–130)
GLUCOSE BLDC GLUCOMTR-MCNC: 92 MG/DL (ref 70–130)
GLUCOSE BLDC GLUCOMTR-MCNC: 95 MG/DL (ref 70–130)
GLUCOSE BLDC GLUCOMTR-MCNC: 98 MG/DL (ref 70–130)
GLUCOSE SERPL-MCNC: 82 MG/DL (ref 65–99)
HCT VFR BLD AUTO: 29.4 % (ref 34–46.6)
HGB BLD-MCNC: 9.1 G/DL (ref 12–15.9)
MCH RBC QN AUTO: 28.1 PG (ref 26.6–33)
MCHC RBC AUTO-ENTMCNC: 31 G/DL (ref 31.5–35.7)
MCV RBC AUTO: 90.7 FL (ref 79–97)
PHOSPHATE SERPL-MCNC: 4.4 MG/DL (ref 2.5–4.5)
PLATELET # BLD AUTO: 324 10*3/MM3 (ref 140–450)
PMV BLD AUTO: 9.5 FL (ref 6–12)
POTASSIUM SERPL-SCNC: 5.5 MMOL/L (ref 3.5–5.2)
RBC # BLD AUTO: 3.24 10*6/MM3 (ref 3.77–5.28)
SODIUM SERPL-SCNC: 134 MMOL/L (ref 136–145)
WBC # BLD AUTO: 12.82 10*3/MM3 (ref 3.4–10.8)

## 2021-03-17 PROCEDURE — 25010000002 ONDANSETRON PER 1 MG: Performed by: SURGERY

## 2021-03-17 PROCEDURE — 85027 COMPLETE CBC AUTOMATED: CPT | Performed by: SURGERY

## 2021-03-17 PROCEDURE — 82962 GLUCOSE BLOOD TEST: CPT

## 2021-03-17 PROCEDURE — 80069 RENAL FUNCTION PANEL: CPT | Performed by: SURGERY

## 2021-03-17 PROCEDURE — 25010000003 CEFAZOLIN IN DEXTROSE 2-4 GM/100ML-% SOLUTION: Performed by: SURGERY

## 2021-03-17 RX ADMIN — CLONIDINE HYDROCHLORIDE 0.3 MG: 0.1 TABLET ORAL at 05:02

## 2021-03-17 RX ADMIN — CLONIDINE HYDROCHLORIDE 0.3 MG: 0.1 TABLET ORAL at 21:00

## 2021-03-17 RX ADMIN — DOCUSATE SODIUM 50MG AND SENNOSIDES 8.6MG 2 TABLET: 8.6; 5 TABLET, FILM COATED ORAL at 20:12

## 2021-03-17 RX ADMIN — CALCITRIOL 0.5 MCG: 0.25 CAPSULE ORAL at 09:31

## 2021-03-17 RX ADMIN — SODIUM CHLORIDE 9 ML/HR: 9 INJECTION, SOLUTION INTRAVENOUS at 01:00

## 2021-03-17 RX ADMIN — CEFAZOLIN SODIUM 2 G: 2 INJECTION, SOLUTION INTRAVENOUS at 00:59

## 2021-03-17 RX ADMIN — SODIUM CHLORIDE, PRESERVATIVE FREE 10 ML: 5 INJECTION INTRAVENOUS at 20:12

## 2021-03-17 RX ADMIN — CEFAZOLIN SODIUM 2 G: 2 INJECTION, SOLUTION INTRAVENOUS at 12:14

## 2021-03-17 RX ADMIN — CLONIDINE HYDROCHLORIDE 0.3 MG: 0.1 TABLET ORAL at 15:47

## 2021-03-17 RX ADMIN — HYDROCODONE BITARTRATE AND ACETAMINOPHEN 1 TABLET: 7.5; 325 TABLET ORAL at 13:42

## 2021-03-17 RX ADMIN — DOCUSATE SODIUM 50MG AND SENNOSIDES 8.6MG 2 TABLET: 8.6; 5 TABLET, FILM COATED ORAL at 09:31

## 2021-03-17 RX ADMIN — ONDANSETRON 4 MG: 2 INJECTION INTRAMUSCULAR; INTRAVENOUS at 00:57

## 2021-03-17 RX ADMIN — SODIUM CHLORIDE, PRESERVATIVE FREE 10 ML: 5 INJECTION INTRAVENOUS at 09:31

## 2021-03-17 RX ADMIN — CALCIUM ACETATE 2001 MG: 667 CAPSULE ORAL at 12:14

## 2021-03-17 RX ADMIN — HYDRALAZINE HYDROCHLORIDE 25 MG: 25 TABLET, FILM COATED ORAL at 09:31

## 2021-03-17 RX ADMIN — HYDRALAZINE HYDROCHLORIDE 25 MG: 25 TABLET, FILM COATED ORAL at 20:12

## 2021-03-17 RX ADMIN — POLYETHYLENE GLYCOL 3350 17 G: 17 POWDER, FOR SOLUTION ORAL at 09:31

## 2021-03-18 VITALS
OXYGEN SATURATION: 100 % | RESPIRATION RATE: 18 BRPM | HEIGHT: 60 IN | DIASTOLIC BLOOD PRESSURE: 62 MMHG | WEIGHT: 182.4 LBS | TEMPERATURE: 97.1 F | BODY MASS INDEX: 35.81 KG/M2 | SYSTOLIC BLOOD PRESSURE: 98 MMHG | HEART RATE: 92 BPM

## 2021-03-18 LAB — GLUCOSE BLDC GLUCOMTR-MCNC: 98 MG/DL (ref 70–130)

## 2021-03-18 PROCEDURE — 82962 GLUCOSE BLOOD TEST: CPT

## 2021-03-18 RX ORDER — AMOXICILLIN 250 MG
2 CAPSULE ORAL 2 TIMES DAILY
Qty: 120 TABLET | Refills: 0 | Status: SHIPPED | OUTPATIENT
Start: 2021-03-18

## 2021-03-18 RX ORDER — HYDROCODONE BITARTRATE AND ACETAMINOPHEN 7.5; 325 MG/1; MG/1
1 TABLET ORAL EVERY 6 HOURS PRN
Qty: 12 TABLET | Refills: 0 | Status: SHIPPED | OUTPATIENT
Start: 2021-03-18 | End: 2021-03-23

## 2021-03-18 RX ORDER — CLONIDINE HYDROCHLORIDE 0.1 MG/1
0.1 TABLET ORAL EVERY 8 HOURS SCHEDULED
Status: DISCONTINUED | OUTPATIENT
Start: 2021-03-18 | End: 2021-03-18

## 2021-03-18 RX ORDER — CLONIDINE HYDROCHLORIDE 0.1 MG/1
0.1 TABLET ORAL EVERY 12 HOURS SCHEDULED
Qty: 60 TABLET | Refills: 0 | Status: SHIPPED | OUTPATIENT
Start: 2021-03-18 | End: 2023-01-24 | Stop reason: HOSPADM

## 2021-03-18 RX ORDER — CLONIDINE HYDROCHLORIDE 0.1 MG/1
0.1 TABLET ORAL EVERY 8 HOURS SCHEDULED
Qty: 90 TABLET | Refills: 0 | Status: SHIPPED | OUTPATIENT
Start: 2021-03-18 | End: 2021-03-18 | Stop reason: HOSPADM

## 2021-03-18 RX ORDER — CLONIDINE HYDROCHLORIDE 0.1 MG/1
0.1 TABLET ORAL EVERY 12 HOURS SCHEDULED
Status: DISCONTINUED | OUTPATIENT
Start: 2021-03-18 | End: 2021-03-18 | Stop reason: HOSPADM

## 2021-03-18 RX ADMIN — SODIUM CHLORIDE, PRESERVATIVE FREE 10 ML: 5 INJECTION INTRAVENOUS at 09:13

## 2021-03-18 RX ADMIN — CALCITRIOL 0.5 MCG: 0.25 CAPSULE ORAL at 09:12

## 2021-03-18 RX ADMIN — CALCIUM ACETATE 2001 MG: 667 CAPSULE ORAL at 09:12

## 2021-03-18 RX ADMIN — HYDROCODONE BITARTRATE AND ACETAMINOPHEN 1 TABLET: 7.5; 325 TABLET ORAL at 05:29

## 2021-03-18 RX ADMIN — HYDROCODONE BITARTRATE AND ACETAMINOPHEN 1 TABLET: 7.5; 325 TABLET ORAL at 01:10

## 2021-03-18 RX ADMIN — CLONIDINE HYDROCHLORIDE 0.3 MG: 0.1 TABLET ORAL at 05:26

## 2021-03-19 ENCOUNTER — READMISSION MANAGEMENT (OUTPATIENT)
Dept: CALL CENTER | Facility: HOSPITAL | Age: 46
End: 2021-03-19

## 2021-03-19 NOTE — OUTREACH NOTE
Prep Survey      Responses   [facility] facility patient discharged from?  [place]   Is LACE score < 7 ?  No   Emergency Room discharge w/ pulse ox?  No   Eligibility  Readm Mgmt   Discharge diagnosis  Hypervolemia, anemia, type 2 diabetes, ESRD on hemodialysis   Does the patient have one of the following disease processes/diagnoses(primary or secondary)?  General Surgery [s/p RIGHT BRACHIOAXILLARY ARTERVENIOUS GRAFT PLACEMENT]   Does the patient have Home health ordered?  No   Is there a DME ordered?  No   Comments regarding appointments  Needs f/u scheduled   Medication alerts for this patient  Per AVS   Prep survey completed?  Yes          Tesha Brown RN

## 2021-03-22 ENCOUNTER — READMISSION MANAGEMENT (OUTPATIENT)
Dept: CALL CENTER | Facility: HOSPITAL | Age: 46
End: 2021-03-22

## 2021-03-22 NOTE — OUTREACH NOTE
General Surgery Week 1 Survey      Responses   [facility] patient discharged from?  [place]   Does the patient have one of the following disease processes/diagnoses(primary or secondary)?  General Surgery   Week 1 attempt successful?  Yes   Call end time  0937   Discharge diagnosis  Hypervolemia, anemia, type 2 diabetes, ESRD on hemodialysis   Meds reviewed with patient/caregiver?  Yes   Is the patient having any side effects they believe may be caused by any medication additions or changes?  No   Does the patient have all medications related to this admission filled (includes all antibiotics, pain medications, etc.)  Yes   Is the patient taking all medications as directed (includes completed medication regime)?  Yes   Does the patient have a follow up appointment scheduled with their surgeon?  Yes   Has the patient kept scheduled appointments due by today?  N/A   Did the patient receive a copy of their discharge instructions?  Yes   Nursing interventions  Reviewed instructions with patient   What is the patient's perception of their health status since discharge?  Improving   Nursing interventions  Nurse provided patient education   Is the patient /caregiver able to teach back basic post-op care?  Lifting as instructed by MD in discharge instructions, Continue use of incentive spirometry at least 1 week post discharge, Practice 'cough and deep breath'   Is the patient/caregiver able to teach back signs and symptoms of incisional infection?  Increased redness, swelling or pain at the incisonal site, Increased drainage or bleeding   Is the patient/caregiver able to teach back steps to recovery at home?  Make a list of questions for surgeon's appointment, Set small, achievable goals for return to baseline health   If the patient is a current smoker, are they able to teach back resources for cessation?  Not a smoker   Is the patient/caregiver able to teach back the hierarchy of who to call/visit for  symptoms/problems? PCP, Specialist, Home health nurse, Urgent Care, ED, 911  Yes   Week 1 call completed?  Yes   Wrap up additional comments  Call was short. Patient gave short answers.           Awa Gonzalez RN

## 2021-03-31 ENCOUNTER — READMISSION MANAGEMENT (OUTPATIENT)
Dept: CALL CENTER | Facility: HOSPITAL | Age: 46
End: 2021-03-31

## 2021-03-31 NOTE — OUTREACH NOTE
General Surgery Week 2 Survey      Responses   [facility] patient discharged from?  [place]   Does the patient have one of the following disease processes/diagnoses(primary or secondary)?  General Surgery   Week 2 attempt successful?  Yes   Call start time  1218   Call end time  1220   Discharge diagnosis  Hypervolemia, anemia, type 2 diabetes, ESRD on hemodialysis   Comments  Pt reports her BP has been high but refuses to say how high when asked. Pt reports she is fine that HD manages everything.    What is the patient's perception of their health status since discharge?  Same   Week 2 call completed?  Yes   Revoked  No further contact(revokes)-requires comment   Is the patient interested in additional calls from an ambulatory ?  NOTE:  applies to high risk patients requiring additional follow-up.  No   Wrap up additional comments  Call was short. Patient did not give answers          Taisha Morley RN

## 2021-04-26 ENCOUNTER — TRANSCRIBE ORDERS (OUTPATIENT)
Dept: SLEEP MEDICINE | Facility: HOSPITAL | Age: 46
End: 2021-04-26

## 2021-04-26 DIAGNOSIS — Z01.818 OTHER SPECIFIED PRE-OPERATIVE EXAMINATION: Primary | ICD-10-CM

## 2021-05-25 ENCOUNTER — TELEPHONE (OUTPATIENT)
Dept: GASTROENTEROLOGY | Facility: CLINIC | Age: 46
End: 2021-05-25

## 2021-05-25 NOTE — ANESTHESIA POSTPROCEDURE EVALUATION
"Patient: Sunni Mcneil    Procedure Summary     Date: 01/15/21 Room / Location:  [facility] OR [facility]    Anesthesia Start: 1550 Anesthesia Stop: 1720    Procedures:       OPEN THROMBECTOMY OF RIGHT ARTERIAL VENOUS FISTULA, fistulagram (Right )      TUNNELED DIAYLIS CATHETER PLACEMENT (Right ) Diagnosis:     Surgeon: Phu Gaviria MD Provider: Bo Holcomb MD    Anesthesia Type: Not recorded ASA Status: 3          Anesthesia Type: No value filed.    Vitals  Vitals Value Taken Time   /81 01/15/21 1830   Temp 36.9 °C (98.5 °F) 01/15/21 1725   Pulse 78 01/15/21 1841   Resp 16 01/15/21 1830   SpO2 99 % 01/15/21 1843   Vitals shown include unvalidated device data.        Post Anesthesia Care and Evaluation    Patient location during evaluation: bedside  Patient participation: complete - patient participated  Level of consciousness: awake and alert  Pain management: adequate  Airway patency: patent  Anesthetic complications: No anesthetic complications  PONV Status: controlled  Cardiovascular status: acceptable, hemodynamically stable and hypertensive  Respiratory status: acceptable  Hydration status: acceptable    Comments: BP (!) 184/99 (BP Location: Left arm, Patient Position: Lying)   Pulse 80   Temp 36.9 °C (98.5 °F) (Oral)   Resp 16   Ht 152.4 cm (60\")   Wt 84.6 kg (186 lb 8 oz)   LMP 01/14/2021   SpO2 99%   BMI 36.42 kg/m²         " Moisés Pham; [phone]

## 2021-06-03 ENCOUNTER — LAB REQUISITION (OUTPATIENT)
Dept: LAB | Facility: HOSPITAL | Age: 46
End: 2021-06-03

## 2021-06-03 DIAGNOSIS — Z00.00 ENCOUNTER FOR GENERAL ADULT MEDICAL EXAMINATION WITHOUT ABNORMAL FINDINGS: ICD-10-CM

## 2021-06-03 PROCEDURE — U0005 INFEC AGEN DETEC AMPLI PROBE: HCPCS | Performed by: INTERNAL MEDICINE

## 2021-06-03 PROCEDURE — U0004 COV-19 TEST NON-CDC HGH THRU: HCPCS | Performed by: INTERNAL MEDICINE

## 2021-06-04 LAB — SARS-COV-2 ORF1AB RESP QL NAA+PROBE: NOT DETECTED

## 2021-06-07 ENCOUNTER — TELEPHONE (OUTPATIENT)
Dept: GASTROENTEROLOGY | Facility: CLINIC | Age: 46
End: 2021-06-07

## 2021-06-07 ENCOUNTER — OUTSIDE FACILITY SERVICE (OUTPATIENT)
Dept: GASTROENTEROLOGY | Facility: CLINIC | Age: 46
End: 2021-06-07

## 2022-07-26 ENCOUNTER — TRANSCRIBE ORDERS (OUTPATIENT)
Dept: ADMINISTRATIVE | Facility: HOSPITAL | Age: 47
End: 2022-07-26

## 2022-07-26 ENCOUNTER — HOSPITAL ENCOUNTER (OUTPATIENT)
Dept: GENERAL RADIOLOGY | Facility: HOSPITAL | Age: 47
Discharge: HOME | End: 2022-07-26

## 2022-07-26 ENCOUNTER — HOSPITAL ENCOUNTER (OUTPATIENT)
Dept: CARDIOLOGY | Facility: HOSPITAL | Age: 47
Discharge: HOME | End: 2022-07-26

## 2022-07-26 ENCOUNTER — LAB (OUTPATIENT)
Dept: LAB | Facility: HOSPITAL | Age: 47
End: 2022-07-26

## 2022-07-26 DIAGNOSIS — Z79.01 LONG TERM (CURRENT) USE OF ANTICOAGULANTS: ICD-10-CM

## 2022-07-26 DIAGNOSIS — Z01.818 PRE-OP EXAM: ICD-10-CM

## 2022-07-26 DIAGNOSIS — Z01.818 PREOP EXAMINATION: Primary | ICD-10-CM

## 2022-07-26 DIAGNOSIS — Z01.818 PREOP EXAMINATION: ICD-10-CM

## 2022-07-26 LAB
ALBUMIN SERPL-MCNC: 4 G/DL (ref 3.5–5.2)
ALBUMIN/GLOB SERPL: 1.1 G/DL
ALP SERPL-CCNC: 51 U/L (ref 39–117)
ALT SERPL W P-5'-P-CCNC: 6 U/L (ref 1–33)
ANION GAP SERPL CALCULATED.3IONS-SCNC: 17.8 MMOL/L (ref 5–15)
APTT PPP: 36.8 SECONDS (ref 22.7–35.4)
AST SERPL-CCNC: 10 U/L (ref 1–32)
BILIRUB SERPL-MCNC: 0.2 MG/DL (ref 0–1.2)
BUN SERPL-MCNC: 30 MG/DL (ref 6–20)
BUN/CREAT SERPL: 3.6 (ref 7–25)
CALCIUM SPEC-SCNC: 9.3 MG/DL (ref 8.6–10.5)
CHLORIDE SERPL-SCNC: 95 MMOL/L (ref 98–107)
CO2 SERPL-SCNC: 27.2 MMOL/L (ref 22–29)
CREAT SERPL-MCNC: 8.31 MG/DL (ref 0.57–1)
DEPRECATED RDW RBC AUTO: 61.4 FL (ref 37–54)
EGFRCR SERPLBLD CKD-EPI 2021: 5.5 ML/MIN/1.73
ERYTHROCYTE [DISTWIDTH] IN BLOOD BY AUTOMATED COUNT: 18.4 % (ref 12.3–15.4)
GLOBULIN UR ELPH-MCNC: 3.7 GM/DL
GLUCOSE SERPL-MCNC: 88 MG/DL (ref 65–99)
HCT VFR BLD AUTO: 30.8 % (ref 34–46.6)
HGB BLD-MCNC: 9.9 G/DL (ref 12–15.9)
INR PPP: 1.18 (ref 0.9–1.1)
MCH RBC QN AUTO: 30.2 PG (ref 26.6–33)
MCHC RBC AUTO-ENTMCNC: 32.1 G/DL (ref 31.5–35.7)
MCV RBC AUTO: 93.9 FL (ref 79–97)
PLATELET # BLD AUTO: 256 10*3/MM3 (ref 140–450)
PMV BLD AUTO: 9.5 FL (ref 6–12)
POTASSIUM SERPL-SCNC: 5.8 MMOL/L (ref 3.5–5.2)
PROT SERPL-MCNC: 7.7 G/DL (ref 6–8.5)
PROTHROMBIN TIME: 14.9 SECONDS (ref 11.7–14.2)
QT INTERVAL: 475 MS
RBC # BLD AUTO: 3.28 10*6/MM3 (ref 3.77–5.28)
SARS-COV-2 ORF1AB RESP QL NAA+PROBE: NOT DETECTED
SODIUM SERPL-SCNC: 140 MMOL/L (ref 136–145)
WBC NRBC COR # BLD: 7.28 10*3/MM3 (ref 3.4–10.8)

## 2022-07-26 PROCEDURE — 85730 THROMBOPLASTIN TIME PARTIAL: CPT

## 2022-07-26 PROCEDURE — 93010 ELECTROCARDIOGRAM REPORT: CPT | Performed by: INTERNAL MEDICINE

## 2022-07-26 PROCEDURE — 71046 X-RAY EXAM CHEST 2 VIEWS: CPT

## 2022-07-26 PROCEDURE — 36415 COLL VENOUS BLD VENIPUNCTURE: CPT

## 2022-07-26 PROCEDURE — U0004 COV-19 TEST NON-CDC HGH THRU: HCPCS | Performed by: SURGERY

## 2022-07-26 PROCEDURE — 93005 ELECTROCARDIOGRAM TRACING: CPT | Performed by: SURGERY

## 2022-07-26 PROCEDURE — U0005 INFEC AGEN DETEC AMPLI PROBE: HCPCS | Performed by: SURGERY

## 2022-07-26 PROCEDURE — 85027 COMPLETE CBC AUTOMATED: CPT

## 2022-07-26 PROCEDURE — 80053 COMPREHEN METABOLIC PANEL: CPT

## 2022-07-26 PROCEDURE — 85610 PROTHROMBIN TIME: CPT

## 2022-07-26 PROCEDURE — C9803 HOPD COVID-19 SPEC COLLECT: HCPCS

## 2022-07-27 RX ORDER — TRAZODONE HYDROCHLORIDE 50 MG/1
50 TABLET ORAL
COMMUNITY
Start: 2022-06-17 | End: 2022-09-22 | Stop reason: ALTCHOICE

## 2022-07-27 RX ORDER — TRAZODONE HYDROCHLORIDE 100 MG/1
100 TABLET ORAL
COMMUNITY
Start: 2022-06-17

## 2022-07-27 RX ORDER — LIDOCAINE AND PRILOCAINE 25; 25 MG/G; MG/G
CREAM TOPICAL
COMMUNITY
Start: 2022-07-08 | End: 2023-01-24 | Stop reason: HOSPADM

## 2022-07-27 RX ORDER — ASCORBIC ACID, THIAMINE, RIBOFLAVIN, NIACINAMIDE, PYRIDOXINE, FOLIC ACID, COBALAMIN, BIOTIN, PANTOTHENIC ACID, ZINC 100; 1.5; 1.7; 20; 10; 1; 6; 300; 10; 5 MG/1; MG/1; MG/1; MG/1; MG/1; MG/1; UG/1; UG/1; MG/1; MG/1
1 TABLET, COATED ORAL DAILY
Status: ON HOLD | COMMUNITY
Start: 2022-05-04 | End: 2022-07-28

## 2022-07-28 ENCOUNTER — ANESTHESIA EVENT (OUTPATIENT)
Dept: PERIOP | Facility: HOSPITAL | Age: 47
End: 2022-07-28

## 2022-07-28 ENCOUNTER — APPOINTMENT (OUTPATIENT)
Dept: GENERAL RADIOLOGY | Facility: HOSPITAL | Age: 47
End: 2022-07-28

## 2022-07-28 ENCOUNTER — HOSPITAL ENCOUNTER (OUTPATIENT)
Facility: HOSPITAL | Age: 47
Setting detail: HOSPITAL OUTPATIENT SURGERY
Discharge: HOME | End: 2022-07-28
Attending: SURGERY | Admitting: SURGERY

## 2022-07-28 ENCOUNTER — ANESTHESIA (OUTPATIENT)
Dept: PERIOP | Facility: HOSPITAL | Age: 47
End: 2022-07-28

## 2022-07-28 VITALS
OXYGEN SATURATION: 100 % | BODY MASS INDEX: 34.34 KG/M2 | HEIGHT: 60 IN | HEART RATE: 83 BPM | RESPIRATION RATE: 16 BRPM | SYSTOLIC BLOOD PRESSURE: 129 MMHG | DIASTOLIC BLOOD PRESSURE: 83 MMHG | TEMPERATURE: 97.8 F | WEIGHT: 174.9 LBS

## 2022-07-28 LAB
ANION GAP SERPL CALCULATED.3IONS-SCNC: 14.3 MMOL/L (ref 5–15)
BUN SERPL-MCNC: 30 MG/DL (ref 6–20)
BUN/CREAT SERPL: 3.9 (ref 7–25)
CALCIUM SPEC-SCNC: 9 MG/DL (ref 8.6–10.5)
CHLORIDE SERPL-SCNC: 97 MMOL/L (ref 98–107)
CO2 SERPL-SCNC: 26.7 MMOL/L (ref 22–29)
CREAT SERPL-MCNC: 7.74 MG/DL (ref 0.57–1)
EGFRCR SERPLBLD CKD-EPI 2021: 6 ML/MIN/1.73
GLUCOSE BLDC GLUCOMTR-MCNC: 110 MG/DL (ref 70–130)
GLUCOSE BLDC GLUCOMTR-MCNC: 84 MG/DL (ref 70–130)
GLUCOSE SERPL-MCNC: 99 MG/DL (ref 65–99)
HCG SERPL QL: NEGATIVE
POTASSIUM SERPL-SCNC: 6.8 MMOL/L (ref 3.5–5.2)
SODIUM SERPL-SCNC: 138 MMOL/L (ref 136–145)

## 2022-07-28 PROCEDURE — 63710000001 INSULIN REGULAR HUMAN PER 5 UNITS: Performed by: NURSE ANESTHETIST, CERTIFIED REGISTERED

## 2022-07-28 PROCEDURE — C1769 GUIDE WIRE: HCPCS | Performed by: SURGERY

## 2022-07-28 PROCEDURE — 0 IOPAMIDOL PER 1 ML: Performed by: SURGERY

## 2022-07-28 PROCEDURE — 25010000002 MIDAZOLAM PER 1 MG: Performed by: ANESTHESIOLOGY

## 2022-07-28 PROCEDURE — 25010000002 PROPOFOL 10 MG/ML EMULSION: Performed by: NURSE ANESTHETIST, CERTIFIED REGISTERED

## 2022-07-28 PROCEDURE — 82803 BLOOD GASES ANY COMBINATION: CPT

## 2022-07-28 PROCEDURE — 25010000002 MIDAZOLAM PER 1 MG: Performed by: NURSE ANESTHETIST, CERTIFIED REGISTERED

## 2022-07-28 PROCEDURE — 84703 CHORIONIC GONADOTROPIN ASSAY: CPT | Performed by: SURGERY

## 2022-07-28 PROCEDURE — C1725 CATH, TRANSLUMIN NON-LASER: HCPCS | Performed by: SURGERY

## 2022-07-28 PROCEDURE — 85018 HEMOGLOBIN: CPT

## 2022-07-28 PROCEDURE — 25010000002 HEPARIN (PORCINE) PER 1000 UNITS: Performed by: SURGERY

## 2022-07-28 PROCEDURE — 82947 ASSAY GLUCOSE BLOOD QUANT: CPT

## 2022-07-28 PROCEDURE — C1894 INTRO/SHEATH, NON-LASER: HCPCS | Performed by: SURGERY

## 2022-07-28 PROCEDURE — 25010000002 PHENYLEPHRINE 10 MG/ML SOLUTION: Performed by: NURSE ANESTHETIST, CERTIFIED REGISTERED

## 2022-07-28 PROCEDURE — 25010000002 CEFAZOLIN PER 500 MG: Performed by: SURGERY

## 2022-07-28 PROCEDURE — 85014 HEMATOCRIT: CPT

## 2022-07-28 PROCEDURE — 82962 GLUCOSE BLOOD TEST: CPT

## 2022-07-28 PROCEDURE — 0 LIDOCAINE 1 % SOLUTION 20 ML VIAL: Performed by: SURGERY

## 2022-07-28 PROCEDURE — 80048 BASIC METABOLIC PNL TOTAL CA: CPT | Performed by: SURGERY

## 2022-07-28 PROCEDURE — 25010000002 ONDANSETRON PER 1 MG: Performed by: NURSE ANESTHETIST, CERTIFIED REGISTERED

## 2022-07-28 PROCEDURE — 25010000002 FENTANYL CITRATE (PF) 50 MCG/ML SOLUTION: Performed by: NURSE ANESTHETIST, CERTIFIED REGISTERED

## 2022-07-28 RX ORDER — PROMETHAZINE HYDROCHLORIDE 25 MG/1
25 TABLET ORAL ONCE AS NEEDED
Status: DISCONTINUED | OUTPATIENT
Start: 2022-07-28 | End: 2022-07-28 | Stop reason: HOSPADM

## 2022-07-28 RX ORDER — LIDOCAINE HYDROCHLORIDE 20 MG/ML
INJECTION, SOLUTION INFILTRATION; PERINEURAL AS NEEDED
Status: DISCONTINUED | OUTPATIENT
Start: 2022-07-28 | End: 2022-07-28 | Stop reason: SURG

## 2022-07-28 RX ORDER — PROPOFOL 10 MG/ML
VIAL (ML) INTRAVENOUS AS NEEDED
Status: DISCONTINUED | OUTPATIENT
Start: 2022-07-28 | End: 2022-07-28 | Stop reason: SURG

## 2022-07-28 RX ORDER — PROMETHAZINE HYDROCHLORIDE 25 MG/1
25 SUPPOSITORY RECTAL ONCE AS NEEDED
Status: DISCONTINUED | OUTPATIENT
Start: 2022-07-28 | End: 2022-07-28 | Stop reason: HOSPADM

## 2022-07-28 RX ORDER — SODIUM CHLORIDE 0.9 % (FLUSH) 0.9 %
3 SYRINGE (ML) INJECTION EVERY 12 HOURS SCHEDULED
Status: DISCONTINUED | OUTPATIENT
Start: 2022-07-28 | End: 2022-07-28 | Stop reason: HOSPADM

## 2022-07-28 RX ORDER — HYDROCODONE BITARTRATE AND ACETAMINOPHEN 7.5; 325 MG/1; MG/1
1 TABLET ORAL ONCE AS NEEDED
Status: COMPLETED | OUTPATIENT
Start: 2022-07-28 | End: 2022-07-28

## 2022-07-28 RX ORDER — FENTANYL CITRATE 50 UG/ML
INJECTION, SOLUTION INTRAMUSCULAR; INTRAVENOUS AS NEEDED
Status: DISCONTINUED | OUTPATIENT
Start: 2022-07-28 | End: 2022-07-28 | Stop reason: SURG

## 2022-07-28 RX ORDER — DIPHENHYDRAMINE HYDROCHLORIDE 50 MG/ML
12.5 INJECTION INTRAMUSCULAR; INTRAVENOUS
Status: DISCONTINUED | OUTPATIENT
Start: 2022-07-28 | End: 2022-07-28 | Stop reason: HOSPADM

## 2022-07-28 RX ORDER — FAMOTIDINE 10 MG/ML
20 INJECTION, SOLUTION INTRAVENOUS ONCE
Status: COMPLETED | OUTPATIENT
Start: 2022-07-28 | End: 2022-07-28

## 2022-07-28 RX ORDER — OXYCODONE AND ACETAMINOPHEN 7.5; 325 MG/1; MG/1
1 TABLET ORAL EVERY 4 HOURS PRN
Status: DISCONTINUED | OUTPATIENT
Start: 2022-07-28 | End: 2022-07-28 | Stop reason: HOSPADM

## 2022-07-28 RX ORDER — LIDOCAINE HYDROCHLORIDE 10 MG/ML
0.5 INJECTION, SOLUTION EPIDURAL; INFILTRATION; INTRACAUDAL; PERINEURAL ONCE AS NEEDED
Status: DISCONTINUED | OUTPATIENT
Start: 2022-07-28 | End: 2022-07-28 | Stop reason: HOSPADM

## 2022-07-28 RX ORDER — HYDROMORPHONE HYDROCHLORIDE 1 MG/ML
0.5 INJECTION, SOLUTION INTRAMUSCULAR; INTRAVENOUS; SUBCUTANEOUS
Status: DISCONTINUED | OUTPATIENT
Start: 2022-07-28 | End: 2022-07-28 | Stop reason: HOSPADM

## 2022-07-28 RX ORDER — CALCIUM CHLORIDE 100 MG/ML
INJECTION INTRAVENOUS; INTRAVENTRICULAR AS NEEDED
Status: DISCONTINUED | OUTPATIENT
Start: 2022-07-28 | End: 2022-07-28 | Stop reason: SURG

## 2022-07-28 RX ORDER — HYDRALAZINE HYDROCHLORIDE 20 MG/ML
5 INJECTION INTRAMUSCULAR; INTRAVENOUS
Status: DISCONTINUED | OUTPATIENT
Start: 2022-07-28 | End: 2022-07-28 | Stop reason: HOSPADM

## 2022-07-28 RX ORDER — PHENYLEPHRINE HYDROCHLORIDE 10 MG/ML
INJECTION INTRAVENOUS AS NEEDED
Status: DISCONTINUED | OUTPATIENT
Start: 2022-07-28 | End: 2022-07-28 | Stop reason: SURG

## 2022-07-28 RX ORDER — EPHEDRINE SULFATE 50 MG/ML
INJECTION, SOLUTION INTRAVENOUS AS NEEDED
Status: DISCONTINUED | OUTPATIENT
Start: 2022-07-28 | End: 2022-07-28 | Stop reason: SURG

## 2022-07-28 RX ORDER — MIDAZOLAM HYDROCHLORIDE 1 MG/ML
1 INJECTION INTRAMUSCULAR; INTRAVENOUS
Status: DISCONTINUED | OUTPATIENT
Start: 2022-07-28 | End: 2022-07-28 | Stop reason: HOSPADM

## 2022-07-28 RX ORDER — IBUPROFEN 600 MG/1
600 TABLET ORAL ONCE AS NEEDED
Status: DISCONTINUED | OUTPATIENT
Start: 2022-07-28 | End: 2022-07-28 | Stop reason: HOSPADM

## 2022-07-28 RX ORDER — FENTANYL CITRATE 50 UG/ML
50 INJECTION, SOLUTION INTRAMUSCULAR; INTRAVENOUS
Status: DISCONTINUED | OUTPATIENT
Start: 2022-07-28 | End: 2022-07-28 | Stop reason: HOSPADM

## 2022-07-28 RX ORDER — SODIUM CHLORIDE 9 MG/ML
9 INJECTION, SOLUTION INTRAVENOUS CONTINUOUS PRN
Status: DISCONTINUED | OUTPATIENT
Start: 2022-07-28 | End: 2022-07-28 | Stop reason: HOSPADM

## 2022-07-28 RX ORDER — ONDANSETRON 2 MG/ML
4 INJECTION INTRAMUSCULAR; INTRAVENOUS ONCE AS NEEDED
Status: COMPLETED | OUTPATIENT
Start: 2022-07-28 | End: 2022-07-28

## 2022-07-28 RX ORDER — LABETALOL HYDROCHLORIDE 5 MG/ML
5 INJECTION, SOLUTION INTRAVENOUS
Status: DISCONTINUED | OUTPATIENT
Start: 2022-07-28 | End: 2022-07-28 | Stop reason: HOSPADM

## 2022-07-28 RX ORDER — DIPHENHYDRAMINE HCL 25 MG
25 CAPSULE ORAL
Status: DISCONTINUED | OUTPATIENT
Start: 2022-07-28 | End: 2022-07-28 | Stop reason: HOSPADM

## 2022-07-28 RX ORDER — MIDAZOLAM HYDROCHLORIDE 1 MG/ML
INJECTION INTRAMUSCULAR; INTRAVENOUS AS NEEDED
Status: DISCONTINUED | OUTPATIENT
Start: 2022-07-28 | End: 2022-07-28 | Stop reason: SURG

## 2022-07-28 RX ORDER — CEFAZOLIN SODIUM 2 G/100ML
2 INJECTION, SOLUTION INTRAVENOUS ONCE
Status: COMPLETED | OUTPATIENT
Start: 2022-07-28 | End: 2022-07-28

## 2022-07-28 RX ORDER — NALOXONE HCL 0.4 MG/ML
0.2 VIAL (ML) INJECTION AS NEEDED
Status: DISCONTINUED | OUTPATIENT
Start: 2022-07-28 | End: 2022-07-28 | Stop reason: HOSPADM

## 2022-07-28 RX ORDER — SODIUM CHLORIDE 0.9 % (FLUSH) 0.9 %
3-10 SYRINGE (ML) INJECTION AS NEEDED
Status: DISCONTINUED | OUTPATIENT
Start: 2022-07-28 | End: 2022-07-28 | Stop reason: HOSPADM

## 2022-07-28 RX ORDER — HYDRALAZINE HYDROCHLORIDE 50 MG/1
50 TABLET, FILM COATED ORAL 3 TIMES DAILY
COMMUNITY
End: 2023-01-24 | Stop reason: HOSPADM

## 2022-07-28 RX ORDER — DEXTROSE AND SODIUM CHLORIDE 5; .9 G/100ML; G/100ML
INJECTION, SOLUTION INTRAVENOUS CONTINUOUS PRN
Status: DISCONTINUED | OUTPATIENT
Start: 2022-07-28 | End: 2022-07-28 | Stop reason: SURG

## 2022-07-28 RX ORDER — EPHEDRINE SULFATE 50 MG/ML
5 INJECTION, SOLUTION INTRAVENOUS ONCE AS NEEDED
Status: DISCONTINUED | OUTPATIENT
Start: 2022-07-28 | End: 2022-07-28 | Stop reason: HOSPADM

## 2022-07-28 RX ORDER — FLUMAZENIL 0.1 MG/ML
0.2 INJECTION INTRAVENOUS AS NEEDED
Status: DISCONTINUED | OUTPATIENT
Start: 2022-07-28 | End: 2022-07-28 | Stop reason: HOSPADM

## 2022-07-28 RX ADMIN — PHENYLEPHRINE HYDROCHLORIDE 200 MCG: 10 INJECTION, SOLUTION INTRAVENOUS at 12:51

## 2022-07-28 RX ADMIN — FAMOTIDINE 20 MG: 10 INJECTION INTRAVENOUS at 11:24

## 2022-07-28 RX ADMIN — PHENYLEPHRINE HYDROCHLORIDE 100 MCG: 10 INJECTION, SOLUTION INTRAVENOUS at 12:46

## 2022-07-28 RX ADMIN — MIDAZOLAM 1 MG: 1 INJECTION INTRAMUSCULAR; INTRAVENOUS at 12:03

## 2022-07-28 RX ADMIN — IOPAMIDOL 35 ML: 510 INJECTION, SOLUTION INTRAVASCULAR at 13:00

## 2022-07-28 RX ADMIN — LIDOCAINE HYDROCHLORIDE 60 MG: 20 INJECTION, SOLUTION INFILTRATION; PERINEURAL at 12:28

## 2022-07-28 RX ADMIN — CEFAZOLIN 2 G: 10 INJECTION, POWDER, FOR SOLUTION INTRAVENOUS at 12:19

## 2022-07-28 RX ADMIN — FENTANYL CITRATE 25 MCG: 50 INJECTION INTRAMUSCULAR; INTRAVENOUS at 12:42

## 2022-07-28 RX ADMIN — DEXTROSE AND SODIUM CHLORIDE: 5; 900 INJECTION, SOLUTION INTRAVENOUS at 12:59

## 2022-07-28 RX ADMIN — SODIUM CHLORIDE 9 ML/HR: 9 INJECTION, SOLUTION INTRAVENOUS at 12:06

## 2022-07-28 RX ADMIN — PROPOFOL 200 MG: 10 INJECTION, EMULSION INTRAVENOUS at 12:28

## 2022-07-28 RX ADMIN — INSULIN HUMAN 5 UNITS: 100 INJECTION, SOLUTION PARENTERAL at 12:53

## 2022-07-28 RX ADMIN — SODIUM BICARBONATE 25 MEQ: 84 INJECTION, SOLUTION INTRAVENOUS at 13:00

## 2022-07-28 RX ADMIN — HYDROCODONE BITARTRATE AND ACETAMINOPHEN 1 TABLET: 7.5; 325 TABLET ORAL at 13:50

## 2022-07-28 RX ADMIN — EPHEDRINE SULFATE 10 MG: 50 INJECTION INTRAVENOUS at 13:01

## 2022-07-28 RX ADMIN — MIDAZOLAM 2 MG: 1 INJECTION INTRAMUSCULAR; INTRAVENOUS at 12:28

## 2022-07-28 RX ADMIN — CALCIUM CHLORIDE 1 G: 100 INJECTION, SOLUTION INTRAVENOUS at 12:54

## 2022-07-28 RX ADMIN — ONDANSETRON 4 MG: 2 INJECTION INTRAMUSCULAR; INTRAVENOUS at 13:57

## 2022-07-28 NOTE — ANESTHESIA PROCEDURE NOTES
Airway  Urgency: elective    Date/Time: 7/28/2022 12:30 PM  Airway not difficult    General Information and Staff    Patient location during procedure: OR  Anesthesiologist: Vinod Hsu MD  CRNA/CAA: Daylin Sapp CRNA    Indications and Patient Condition  Indications for airway management: airway protection    Preoxygenated: yes  Mask difficulty assessment: 1 - vent by mask    Final Airway Details  Final airway type: supraglottic airway      Successful airway: LMA  Size 4    Number of attempts at approach: 1  Assessment: lips, teeth, and gum same as pre-op and atraumatic intubation    Additional Comments  Atraumatic LMA placement, LMA to MOP, good seal and air exchange.

## 2022-07-28 NOTE — ANESTHESIA POSTPROCEDURE EVALUATION
Patient: Sunni Mcneil    Procedure Summary     Date: 07/28/22 Room / Location: [facility] OR 18 [facility] / [facility] HYBRID OR 18/19    Anesthesia Start: 1226 Anesthesia Stop: 1319    Procedure: RIGHT  ARM SHUNT O GRAM , ANGIOPLASTY OF AXILARY VEIN AND SUBCLAVIAN VEIN AT SVC JUNCTION (Right ) Diagnosis:     Surgeons: Anya Hernandez Jr., MD Provider: Vinod Hsu MD    Anesthesia Type: general ASA Status: 3          Anesthesia Type: general    Vitals  Vitals Value Taken Time   /83 07/28/22 1416   Temp 36.6 °C (97.8 °F) 07/28/22 1315   Pulse 77 07/28/22 1423   Resp 16 07/28/22 1415   SpO2 93 % 07/28/22 1423   Vitals shown include unvalidated device data.        Post Anesthesia Care and Evaluation    Patient location during evaluation: PACU  Patient participation: complete - patient participated  Level of consciousness: awake and alert  Pain management: adequate    Airway patency: patent  Anesthetic complications: No anesthetic complications    Cardiovascular status: acceptable  Respiratory status: acceptable  Hydration status: acceptable    Comments: --------------------            07/28/22               1500     --------------------   BP:       129/83     Pulse:      83       Resp:       16       Temp:                SpO2:      100%     Dialysis tomorrow PS.   --------------------

## 2022-07-28 NOTE — ANESTHESIA POSTPROCEDURE EVALUATION
Patient: Sunni Mcneil    Procedure Summary     Date: 07/28/22 Room / Location: [facility] OR 18 [facility] / [facility] HYBRID OR 18/19    Anesthesia Start: 1226 Anesthesia Stop: 1319    Procedure: RIGHT  ARM SHUNT O GRAM , ANGIOPLASTY OF AXILARY VEIN AND SUBCLAVIAN VEIN AT SVC JUNCTION (Right ) Diagnosis:     Surgeons: Anya Hernandez Jr., MD Provider: Vinod Hsu MD    Anesthesia Type: general ASA Status: 3          Anesthesia Type: general    Vitals  Vitals Value Taken Time   /83 07/28/22 1416   Temp 36.6 °C (97.8 °F) 07/28/22 1315   Pulse 77 07/28/22 1423   Resp 16 07/28/22 1415   SpO2 93 % 07/28/22 1423   Vitals shown include unvalidated device data.        Post Anesthesia Care and Evaluation    Patient location during evaluation: PACU  Patient participation: complete - patient participated  Level of consciousness: awake and alert  Pain management: adequate    Airway patency: patent  Anesthetic complications: No anesthetic complications    Cardiovascular status: acceptable  Respiratory status: acceptable  Hydration status: acceptable    Comments: --------------------            07/28/22               1500     --------------------   BP:       129/83     Pulse:      83       Resp:       16       Temp:                SpO2:      100%     Dialysis tomorrow PSR--------------------

## 2022-07-28 NOTE — ANESTHESIA PREPROCEDURE EVALUATION
Anesthesia Evaluation     Patient summary reviewed and Nursing notes reviewed   history of anesthetic complications: PONV               Airway   Mallampati: II  TM distance: >3 FB  Neck ROM: full  No difficulty expected  Dental      Pulmonary - negative pulmonary ROS   Cardiovascular     ECG reviewed  Rate: normal    (+) hypertension,       Neuro/Psych  (+) psychiatric history Anxiety,    GI/Hepatic/Renal/Endo    (+) obesity,  GERD,  renal disease dialysis and ESRD, diabetes mellitus type 2,     Musculoskeletal     Abdominal    Substance History - negative use     OB/GYN negative ob/gyn ROS         Other   arthritis,                      Anesthesia Plan    ASA 3     general     (GA  K 6.8  Will repeat intra op and treat as needed    I have reviewed the patient's history with the patient and the chart, including all pertinent laboratory results and imaging. I have explained the risks of anesthesia including but not limited to dental damage, corneal abrasion, nerve injury, MI, stroke, and death. Questions asked and answered. Anesthetic plan discussed with patient and team as indicated. Patient expressed understanding of the above.  )  intravenous induction     Anesthetic plan, risks, benefits, and alternatives have been provided, discussed and informed consent has been obtained with: patient.        CODE STATUS:

## 2022-07-29 LAB
BASE EXCESS BLDA CALC-SCNC: 5 MMOL/L (ref -5–5)
CA-I BLDA-SCNC: ABNORMAL MMOL/L
CO2 BLDA-SCNC: 33 MMOL/L (ref 24–29)
GLUCOSE BLDC GLUCOMTR-MCNC: 133 MG/DL (ref 70–130)
HCO3 BLDA-SCNC: 31.3 MMOL/L (ref 22–26)
HCT VFR BLDA CALC: 32 % (ref 38–51)
HGB BLDA-MCNC: 10.9 G/DL (ref 12–17)
PCO2 BLDA: 59.8 MM HG (ref 35–45)
PH BLDA: 7.35 PH UNITS (ref 7.35–7.6)
PO2 BLDA: 305 MMHG (ref 80–105)
POTASSIUM BLDA-SCNC: 6.3 MMOL/L (ref 3.5–4.9)
SAO2 % BLDA: 100 % (ref 95–98)

## 2022-09-20 ENCOUNTER — TRANSCRIBE ORDERS (OUTPATIENT)
Dept: ADMINISTRATIVE | Facility: HOSPITAL | Age: 47
End: 2022-09-20

## 2022-09-20 DIAGNOSIS — Z12.31 VISIT FOR SCREENING MAMMOGRAM: Primary | ICD-10-CM

## 2022-09-22 ENCOUNTER — OFFICE VISIT (OUTPATIENT)
Dept: GASTROENTEROLOGY | Facility: CLINIC | Age: 47
End: 2022-09-22

## 2022-09-22 VITALS
HEART RATE: 95 BPM | HEIGHT: 60 IN | SYSTOLIC BLOOD PRESSURE: 128 MMHG | OXYGEN SATURATION: 95 % | BODY MASS INDEX: 34.67 KG/M2 | TEMPERATURE: 96.9 F | WEIGHT: 176.6 LBS | DIASTOLIC BLOOD PRESSURE: 78 MMHG

## 2022-09-22 DIAGNOSIS — R10.9 ABDOMINAL PAIN, UNSPECIFIED ABDOMINAL LOCATION: Primary | ICD-10-CM

## 2022-09-22 DIAGNOSIS — Z12.12 SCREENING FOR COLORECTAL CANCER: ICD-10-CM

## 2022-09-22 DIAGNOSIS — Z12.11 SCREENING FOR COLORECTAL CANCER: ICD-10-CM

## 2022-09-22 DIAGNOSIS — K59.00 CONSTIPATION, UNSPECIFIED CONSTIPATION TYPE: ICD-10-CM

## 2022-09-22 PROCEDURE — 99213 OFFICE O/P EST LOW 20 MIN: CPT | Performed by: INTERNAL MEDICINE

## 2022-09-22 RX ORDER — ASCORBIC ACID, TOCOPHERYL ACID SUCCINATE, THIAMINE, RIBOFLAVIN, NIACINAMIDE, PYRIDOXINE, FOLIC ACID, COBALAMIN, BIOTIN, PANTOTHENIC ACID, ZINC, SELENIUM 100; 1.5; 1.7; 20; 25; 3; 1; 300; 10; 15; 30; 7 MG/1; MG/1; MG/1; MG/1; MG/1; MG/1; MG/1; UG/1; MG/1; MG/1; [IU]/1; UG/1
1 TABLET, COATED ORAL DAILY
COMMUNITY
End: 2023-01-20 | Stop reason: HOSPADM

## 2022-09-22 RX ORDER — CLONIDINE HYDROCHLORIDE 0.2 MG/1
0.2 TABLET ORAL 3 TIMES DAILY
COMMUNITY
Start: 2022-09-02 | End: 2022-09-22

## 2022-09-22 NOTE — PROGRESS NOTES
Chief Complaint   Patient presents with   • Abdominal Pain   • Constipation   • Nausea   • Discuss needed colonoscopy        Sunni Mcneil is a  47 y.o. female here for a follow up visit for abdominal pain, constipation, nausea, screening for colorectal cancer.    HPI this 47-year-old  female patient of JERONIMO Darby presents for colonoscopic evaluation.  She is a kidney transplant candidate and warrants screening prior to consideration.  She states she does have issues with constipation and has been using some form of laxative as needed.  She also describes some epigastric discomfort with ingestion of caffeine and encouraged her to consider use of a proton pump inhibitor to see if this affords symptomatic relief.  No prior GI evaluation of record except for radiographic studies.  She had been seen by our service when hospitalized in November 2019 and again in March 2020.    Past Medical History:   Diagnosis Date   • Acid reflux    • Anemia     WITH ESRD   • Anxiety    • Arthritis    • Cough     related to fluid overload   • Depression    • Diabetes mellitus ([facility])     NO MEDICATIONS FOR   • ESRD on dialysis ([facility])     FRESINUS MW   • History of peritoneal dialysis     KIDNEY TRANSPLANT SEPT 2016    • History of transfusion     BEEN A WHILE no reaction   • Hypercalcemia    • Hyperparathyroidism (HCC)    • Hypertension    • Kidney disease     End-stage renal disease. TRANSPLANT   • Kidney transplant recipient 09/02/2016    RIGHT KIDNEY. ? 2018 KIDNEY REJECTED   • PONV (postoperative nausea and vomiting)    • Seasonal allergies     CURRENTLY    • Vascular dialysis catheter in place ([facility])     RIGHT TUNNEL CATH       Current Outpatient Medications   Medication Sig Dispense Refill   • acetaminophen (TYLENOL) 325 MG tablet Take 650 mg by mouth Every 6 (Six) Hours As Needed for Mild Pain .     • calcitriol (ROCALTROL) 0.25 MCG capsule Take 2 capsules by mouth Daily. 60 capsule 0   • calcium acetate (PHOSLO)  667 MG capsule Take 2,001 mg by mouth 3 (Three) Times a Day With Meals.     • cloNIDine (CATAPRES) 0.1 MG tablet Take 1 tablet by mouth Every 12 (Twelve) Hours. 60 tablet 0   • folic acid-vitamin b complex-vitamin c-selenium-zinc (DIALYVITE) 3 MG tablet Take 1 tablet by mouth Daily.     • heparin 1,000 UNITS/ML BOLUS EVERY TREATMENT     • hydrALAZINE (APRESOLINE) 50 MG tablet Take 50 mg by mouth 3 (Three) Times a Day.     • hydrOXYzine (ATARAX) 50 MG tablet Take 50 mg by mouth 2 (Two) Times a Day As Needed.     • iron sucrose in sodium chloride 0.9 % 100 mL IVPB 50 mg 1 (One) Time Per Week.     • lidocaine-prilocaine (EMLA) 2.5-2.5 % cream APPLY SMALL AMOUNT TO ACCESS SITE (AVF) 1 TO 2 HOURS BEFORE DIALYSIS. COVER WITH OCCLUSIVE DRESSING (SARAN WRAP)     • Methoxy PEG-Epoetin Beta (MIRCERA IJ) 100 mcg Every 14 (Fourteen) Days.     • Polyethylene Glycol 3350 (MIRALAX PO) Take  by mouth Daily.     • Probiotic Product (PROBIOTIC PO) Take  by mouth.     • prochlorperazine (COMPAZINE) 10 MG tablet Take 1 tablet by mouth Every 6 (Six) Hours As Needed for Nausea or Vomiting. 10 tablet 0   • sennosides-docusate (PERICOLACE) 8.6-50 MG per tablet Take 2 tablets by mouth 2 (Two) Times a Day. 120 tablet 0   • traZODone (DESYREL) 100 MG tablet Take 100 mg by mouth every night at bedtime.       No current facility-administered medications for this visit.       PRN Meds:.    No Known Allergies    Social History     Socioeconomic History   • Marital status:    • Number of children: 2   • Years of education: 12   Tobacco Use   • Smoking status: Never Smoker   • Smokeless tobacco: Never Used   Vaping Use   • Vaping Use: Never used   Substance and Sexual Activity   • Alcohol use: No   • Drug use: Never       Family History   Problem Relation Age of Onset   • Malig Hyperthermia Neg Hx        Review of Systems   Constitutional: Negative for activity change, appetite change, fatigue and unexpected weight change.   HENT: Negative  for congestion, facial swelling, sore throat, trouble swallowing and voice change.    Eyes: Negative for photophobia and visual disturbance.   Respiratory: Negative for cough and choking.    Cardiovascular: Negative for chest pain.   Gastrointestinal: Positive for abdominal pain and constipation. Negative for abdominal distention, anal bleeding, blood in stool, diarrhea, nausea, rectal pain and vomiting.   Endocrine: Negative for polyphagia.   Musculoskeletal: Negative for arthralgias, gait problem and joint swelling.   Skin: Negative for color change, pallor and rash.   Allergic/Immunologic: Negative for food allergies.   Neurological: Negative for speech difficulty and headaches.   Hematological: Does not bruise/bleed easily.   Psychiatric/Behavioral: Negative for agitation, confusion and sleep disturbance.       Vitals:    09/22/22 1434   BP: 128/78   Pulse: 95   Temp: 96.9 °F (36.1 °C)   SpO2: 95%       Physical Exam  Constitutional:       Appearance: She is well-developed.   HENT:      Head: Normocephalic.   Eyes:      Conjunctiva/sclera: Conjunctivae normal.   Cardiovascular:      Rate and Rhythm: Normal rate and regular rhythm.   Pulmonary:      Breath sounds: Normal breath sounds.   Abdominal:      General: Bowel sounds are normal.      Palpations: Abdomen is soft.   Musculoskeletal:         General: Normal range of motion.      Cervical back: Normal range of motion.   Skin:     General: Skin is warm and dry.   Neurological:      Mental Status: She is alert and oriented to person, place, and time.   Psychiatric:         Behavior: Behavior normal.         ASSESSMENT  #1 screening for colorectal cancer: Patient warrants colonoscopic examination prior to undergoing possible kidney transplant  #2 epigastric pain: Appears consistent with reflux  #3 constipation      PLAN  Trial of over-the-counter PPI  Schedule colonoscopy      ICD-10-CM ICD-9-CM   1. Abdominal pain, unspecified abdominal location  R10.9 789.00    2. Screening for colorectal cancer  Z12.11 V76.51    Z12.12 V76.41

## 2022-10-11 ENCOUNTER — TELEPHONE (OUTPATIENT)
Dept: GASTROENTEROLOGY | Facility: CLINIC | Age: 47
End: 2022-10-11

## 2022-10-11 NOTE — TELEPHONE ENCOUNTER
Caller: Sunni Mcneil    Relationship to patient: Self    Best call back number: [phone]     Type of visit: COLONOSCOPY    Requested date: AS SOON AS POSSIBLE      Additional notes:PATIENT CALLED IN ASKING TO SCHEDULE COLONOSCOPY. HAS NOT RECEIVED A CALL BACK AND IS REQUESTING CONTACT SINCE HAS NOT HEARD FROM OUR TEAM. PATIENT NEEDS COLONOSCOPY TO BE ABLE TO GET A KIDNEY TRANSPLANT

## 2022-10-13 NOTE — CONSULTS
Name: Sunni Mcneil ADMIT: 2021   : 1975  PCP: Provider, No Known    MRN: [id number] LOS: 0 days   AGE/SEX: 45 y.o. female  ROOM:    [facility]    Patient Care Team:  Provider, No Known as PCP - Bo Hummel MD as Consulting Physician (Nephrology)  Damon Rooney MD as Surgeon (General Surgery)  Chief Complaint   Patient presents with   • Vascular Access Problem     CC: Dislodged tunneled hemodialysis catheter    45-year-old woman with history of end-stage renal disease who has been on and off hemodialysis since .  She underwent kidney transplantation in  but experienced developed recurrent renal failure, and once again is dialysis dependent.  She underwent placement of a tunneled right internal jugular vein hemodialysis catheter about 3 weeks ago, and on 2021 underwent placement of a left brachial axillary dialysis shunt.  Somewhere along the line the sutures for her tunneled dialysis catheter were removed.  She presents today to the emergency room with dislodgment of her catheter with exposed cuff.  There has been no bleeding.  No fevers chills or sweats.  Hemodialysis Monday, Wednesday and Friday, last dialyzed yesterday.  Due for hemodialysis on Monday.  Has been n.p.o. since last night.    Review of Systems   Constitutional: Negative.    HENT: Negative.    Eyes: Negative.    Respiratory: Negative.    Cardiovascular: Negative.    Gastrointestinal: Negative.      Past Medical History:   Diagnosis Date   • Acid reflux    • Anemia     WITH ESRD   • Anxiety    • Arthritis    • Depression    • Diabetes mellitus (CMS/HCC)     NO MEDICATIONS FOR   • [facility]HCC[facility]   • History of peritoneal dialysis     KIDNEY TRANSPLANT 2016    • History of transfusion     BEEN A WHILE   • Hypercalcemia    • Hyperparathyroidism (CMS/HCC)    • Hypertension    • Kidney disease     End-stage renal disease. TRANSPLANT   • Kidney transplant  recipient 2016    RIGHT KIDNEY. ? 2018 KIDNEY REJECTED   • Seasonal allergies     CURRENTLY    • Vascular dialysis catheter in place (CMS/[facility])     RIGHT TUNNEL CATH     Past Surgical History:   Procedure Laterality Date   • ARTERIOVENOUS FISTULA/SHUNT SURGERY Right 2020    Procedure: RIGHT ARM ARTERIAL VENOUS FISTULA;  Surgeon: Elijah Herrera MD;  Location: [facility] MAIN OR;  Service: Vascular;  Laterality: Right;   • ARTERIOVENOUS FISTULA/SHUNT SURGERY Left 2021    Procedure: LEFT BRACHIOAXILLARY ARTERIOVENOUS FISTULA GRAFT;  Surgeon: Anya Hernandez Jr., MD;  Location: [facility] MAIN OR;  Service: Vascular;  Laterality: Left;   • INSERTION HEMODIALYSIS CATHETER Right 1/15/2021    Procedure: TUNNELED DIAYLIS CATHETER PLACEMENT;  Surgeon: Phu Gaviria MD;  Location: [facility] HYBRID OR ;  Service: Vascular;  Laterality: Right;   • INSERTION PERITONEAL DIALYSIS CATHETER  2014    Laparoscopic placement of Curl Cath peritoneal dialysis catheter, Dr. Vinod Goldstein   • INSERTION PERITONEAL DIALYSIS CATHETER N/A 2019    Procedure: LAPAROSCOPIC INSERTION PERITONEAL DIALYSIS CATHETER;  Surgeon: Damon Rooney MD;  Location: [facility] MAIN OR;  Service: General   • REMOVAL PERITONEAL DIALYSIS CATHETER N/A 10/17/2016    Procedure: REMOVAL PERITONEAL DIALYSIS CATHETER;  Surgeon: Damon Rooney MD;  Location: [facility] MAIN OR;  Service:    • REMOVAL PERITONEAL DIALYSIS CATHETER N/A 10/21/2020    Procedure: REMOVAL PERITONEAL DIALYSIS CATHETER;  Surgeon: Damon Rooney MD;  Location: [facility] MAIN OR;  Service: General;  Laterality: N/A;   • TRANSPLANTATION RENAL Right 2016    from  donor   • TUBAL ABDOMINAL LIGATION Bilateral      Family History   Problem Relation Age of Onset   • Malig Hyperthermia Neg Hx        Social History     Tobacco Use   • Smoking status: Never Smoker   • Smokeless tobacco: Never Used   Substance Use Topics   • Alcohol use: No  "  • Drug use: No     (Not in a hospital admission)          •  [COMPLETED] Insert peripheral IV **AND** sodium chloride  Patient has no known allergies.    Physical Exam:  Physical Exam  Constitutional:       General: She is not in acute distress.     Appearance: She is obese. She is not ill-appearing, toxic-appearing or diaphoretic.   HENT:      Head: Normocephalic.      Right Ear: External ear normal.      Left Ear: External ear normal.      Nose:      Comments: Patient wearing mask     Mouth/Throat:      Comments: Patient wearing mask  Eyes:      Extraocular Movements: Extraocular movements intact.      Pupils: Pupils are equal, round, and reactive to light.   Neck:      Musculoskeletal: Normal range of motion and neck supple.   Cardiovascular:      Rate and Rhythm: Normal rate and regular rhythm.      Comments: There is a mostly dislodged right internal jugular vein tunneled dialysis catheter.  The cuff is out of the skin by at least 10 cm.  No thrill or bruit in left arm shunt.  Pulmonary:      Effort: Pulmonary effort is normal.      Breath sounds: Normal breath sounds.   Neurological:      Mental Status: She is alert.     Vital Signs and Labs:  Vital Signs Patient Vitals for the past 24 hrs:   BP Temp Temp src Pulse Resp SpO2 Height Weight   02/13/21 1214 -- -- -- -- -- 100 % -- --   02/13/21 1212 -- -- -- -- -- 100 % -- --   02/13/21 1150 -- -- -- -- -- 99 % -- --   02/13/21 1140 -- -- -- -- -- 100 % -- --   02/13/21 1137 -- -- -- -- -- 100 % -- --   02/13/21 1130 169/92 -- -- -- -- 98 % -- --   02/13/21 1113 -- -- -- 70 -- 99 % -- --   02/13/21 1110 -- -- -- 71 -- 98 % -- --   02/13/21 1104 -- -- -- 71 -- 99 % -- --   02/13/21 1100 173/83 -- -- 77 -- 99 % -- --   02/13/21 1032 -- -- -- 71 -- 95 % -- --   02/13/21 1030 169/82 -- -- 70 -- (!) 87 % -- --   02/13/21 1024 162/74 -- -- -- -- -- 152.4 cm (60\") 81.6 kg (180 lb)   02/13/21 1006 -- 96.3 °F (35.7 °C) Tympanic 90 18 98 % -- --     BMI:  Body mass index " is 35.15 kg/m².    CBC    Results from last 7 days   Lab Units 02/13/21  1030   WBC 10*3/mm3 10.40   HEMOGLOBIN g/dL 6.4*   PLATELETS 10*3/mm3 506*     BMP   Results from last 7 days   Lab Units 02/13/21  1030   SODIUM mmol/L 139   POTASSIUM mmol/L 4.2   CHLORIDE mmol/L 99   CO2 mmol/L 26.8   BUN mg/dL 16   CREATININE mg/dL 5.54*   GLUCOSE mg/dL 91     Coag     Infection     Radiology(recent) Xr Chest 1 View    Result Date: 2/13/2021  Displaced dialysis catheter, as described.  This report was finalized on 2/13/2021 11:46 AM by Dr. David Montoya M.D.        Active Hospital Problems    Diagnosis  POA   • **Mechanical complication of hemodialysis catheter (CMS/[facility]) [T82.49XA]  Yes   • ESRD on hemodialysis (CMS/[facility]) [N18.6, Z99.2]  Not Applicable      Resolved Hospital Problems   No resolved problems to display.     Problem Points:  3:  Patient has a new problem, with no additional work-up planned (max of 1)  Total problem points:3    Data Points:  1:  I have reviewed or order clinical lab test  1:  I have reviewed or order radiology test (except heart catheterization or echo)  2:  I have personally and independently review of image, tracing, or specimen  Total data points:4 or more    Risk Points:  Moderate:  Minor surgery with risk factors, major elective surgery without risk factors, perscription medications, or IVF with additives    MDM requires 2/3 (Problem points, Data points and Risk)  MDM Prob point Data point Risk   SF 1 1 Minimal   Low 2 2 Low   Mod 3 3 Moderate   High 4 4 High     Code requires 3/3 (MDM, History and Exam)  Code MDM History Exam Time   63560 SF/Low Detailed Detailed 30   62363 Mod Comprehensive Comprehensive 50   13451 High Comprehensive Comprehensive 70     Detailed history:  4 elements HPI or status of 3 chronic problems; 2-9 system ROS  Comprehensive:  4 elements HPI or status of 3 chronic problems;  10 system ROS    Detailed Exam:  12 findings from any organ system  Comprehensive  Exam:  2 findings from each of 9 systems.   [id number]    Assessment/Plan       Mechanical complication of hemodialysis catheter (CMS/HCC)    ESRD on hemodialysis (CMS/[facility])    45 y.o. female with end-stage renal disease requiring hemodialysis who presents with dislodgment of tunneled dialysis catheter.  The catheter is completely extravascular.  The patient has recently undergone placement of a left brachial axillary dialysis shunt, but I do not feel a thrill or hear a bruit, and so am not confident that it is patent.  If the peripheral access was patent, we could simply pull the catheter and use her shunt for dialysis on Monday.  Because I am not confident that the shunt is patent, much less usable, she will require tunneled catheter removal and replacement.  I suspect that her sutures were removed prematurely, before the cuff was healed in.  This allowed catheter dislodgment.  I recommended that in the future she ought to leave her sutures in place.    Personal protective equipment used for this patient encounter:  Patient wearing surgical mask [x]    Provider wearing a surgical mask [x]    Gloves []    Eye protection []    Face Shield []    Gown []    N 95 respirator or CAPR/PAPR []   Duration of interaction about 10 minutes    I discussed the patients findings and my recommendations with patient.    Adán Maurer MD  02/13/21  13:02 EST    Please call my office with any question: [phone]       no

## 2022-10-18 ENCOUNTER — OFFICE VISIT (OUTPATIENT)
Dept: CARDIOLOGY | Facility: CLINIC | Age: 47
End: 2022-10-18

## 2022-10-18 VITALS
BODY MASS INDEX: 34.95 KG/M2 | HEIGHT: 60 IN | DIASTOLIC BLOOD PRESSURE: 70 MMHG | HEART RATE: 79 BPM | SYSTOLIC BLOOD PRESSURE: 122 MMHG | WEIGHT: 178 LBS

## 2022-10-18 DIAGNOSIS — Z01.810 PREOPERATIVE CARDIOVASCULAR EXAMINATION: Primary | ICD-10-CM

## 2022-10-18 PROCEDURE — 93000 ELECTROCARDIOGRAM COMPLETE: CPT | Performed by: STUDENT IN AN ORGANIZED HEALTH CARE EDUCATION/TRAINING PROGRAM

## 2022-10-18 PROCEDURE — 99203 OFFICE O/P NEW LOW 30 MIN: CPT | Performed by: STUDENT IN AN ORGANIZED HEALTH CARE EDUCATION/TRAINING PROGRAM

## 2022-10-18 NOTE — PROGRESS NOTES
Subjective:     Encounter Date:10/18/2022      Patient ID: Sunni Mcneil is a 47 y.o. female.    Chief Complaint:  Preoperative cardiovascular examination    HPI:   47 y.o. female with a past medical history significant for ESRD on HD status post kidney transplant in 2016(failed), hypertension, hyperlipidemia, diabetes who presents for evaluation prior to being placed on kidney transplant list.  Patient states prior to her transplant in 2016 she underwent stress testing which was normal.  She had a stress test done in [place] in 2020 that was concerning for possible 3 times daily and RV uptake.  She saw a cardiologist there who ordered a PET stress however this was not performed.  She notes that when she went for follow-up she was told that they believed that the abnormality seen on the stress test was artifact.  She states during the test she was very anxious due to claustrophobia.      The following portions of the patient's history were reviewed and updated as appropriate: allergies, current medications, past family history, past medical history, past social history, past surgical history and problem list.     REVIEW OF SYSTEMS:   All systems reviewed.  Pertinent positives identified in HPI.  All other systems are negative.    Past Medical History:   Diagnosis Date   • Acid reflux    • Anemia     WITH ESRD   • Anxiety    • Arthritis    • Cough     related to fluid overload   • Depression    • Diabetes mellitus (HCC)     NO MEDICATIONS FOR   • ESRD on dialysis (HCC)     FRESINUS MWF   • History of peritoneal dialysis     KIDNEY TRANSPLANT SEPT 2016    • History of transfusion     BEEN A WHILE no reaction   • Hypercalcemia    • Hyperparathyroidism (HCC)    • Hypertension    • Kidney disease     End-stage renal disease. TRANSPLANT   • Kidney transplant recipient 09/02/2016    RIGHT KIDNEY. ? 2018 KIDNEY REJECTED   • PONV (postoperative nausea and vomiting)    • Seasonal allergies     CURRENTLY    • Vascular  dialysis catheter in place (HCC)     RIGHT TUNNEL CATH       Family History   Problem Relation Age of Onset   • Hypertension Mother    • Hypertension Father    • Heart attack Maternal Grandfather    • Malig Hyperthermia Neg Hx        Social History     Socioeconomic History   • Marital status:    • Number of children: 2   • Years of education: 12   Tobacco Use   • Smoking status: Never   • Smokeless tobacco: Never   Vaping Use   • Vaping Use: Never used   Substance and Sexual Activity   • Alcohol use: No     Comment: caffeine use   • Drug use: Never       No Known Allergies    Past Surgical History:   Procedure Laterality Date   • ARTERIOVENOUS FISTULA/SHUNT SURGERY Right 02/26/2020    Procedure: RIGHT ARM ARTERIAL VENOUS FISTULA;  Surgeon: Elijah Herrera MD;  Location: [facility] OR;  Service: Vascular;  Laterality: Right;   • ARTERIOVENOUS FISTULA/SHUNT SURGERY Left 02/04/2021    Procedure: LEFT BRACHIOAXILLARY ARTERIOVENOUS FISTULA GRAFT;  Surgeon: Anya Hernandez Jr., MD;  Location: [facility] OR;  Service: Vascular;  Laterality: Left;   • ARTERIOVENOUS FISTULA/SHUNT SURGERY Right 03/16/2021    Procedure: RIGHT BRACHIOAXILLARY ARTERVENIOUS GRAFT PLACEMENT;  Surgeon: Adán Maurer MD;  Location: [facility] OR;  Service: Vascular;  Laterality: Right;   • INSERTION AND REMOVAL HEMODIALYSIS CATHETER N/A 02/13/2021    Procedure: INSERTION AND REMOVAL OF HEMODIALYSIS CATHETER;  Surgeon: Adán Maurer MD;  Location: [facility] MAIN OR;  Service: Vascular;  Laterality: N/A;   • INSERTION HEMODIALYSIS CATHETER Right 01/15/2021    Procedure: TUNNELED DIAYLIS CATHETER PLACEMENT;  Surgeon: Phu Gaviria MD;  Location: [facility] OR 18/19;  Service: Vascular;  Laterality: Right;   • INSERTION PERITONEAL DIALYSIS CATHETER  07/29/2014    Laparoscopic placement of Curl Cath peritoneal dialysis catheter, Dr. Vinod Goldstein   • INSERTION PERITONEAL DIALYSIS CATHETER N/A 07/24/2019     Procedure: LAPAROSCOPIC INSERTION PERITONEAL DIALYSIS CATHETER;  Surgeon: Damon Rooney MD;  Location: [facility] MAIN OR;  Service: General   • REMOVAL PERITONEAL DIALYSIS CATHETER N/A 10/17/2016    Procedure: REMOVAL PERITONEAL DIALYSIS CATHETER;  Surgeon: Damon Rooney MD;  Location: [facility] MAIN OR;  Service:    • REMOVAL PERITONEAL DIALYSIS CATHETER N/A 10/21/2020    Procedure: REMOVAL PERITONEAL DIALYSIS CATHETER;  Surgeon: Damon Rooney MD;  Location: [facility] MAIN OR;  Service: General;  Laterality: N/A;   • SHUNT O GRAM Right 2022    Procedure: RIGHT  ARM SHUNT O GRAM , ANGIOPLASTY OF AXILARY VEIN AND SUBCLAVIAN VEIN AT SVC JUNCTION;  Surgeon: Anya Hernandez Jr., MD;  Location: [facility] OR ;  Service: Vascular;  Laterality: Right;   • TRANSPLANTATION RENAL Right 2016    from  donor   • TUBAL ABDOMINAL LIGATION Bilateral          ECG 12 Lead    Date/Time: 10/18/2022 2:49 PM  Performed by: Luis Mayorga MD  Authorized by: Luis Mayorga MD   Comparison: compared with previous ECG from 2022  Similar to previous ECG  Rhythm: sinus rhythm  Rate: normal  QRS axis: normal  Other findings: non-specific ST-T wave changes    Clinical impression: non-specific ECG               Objective:         Vitals:    10/18/22 1350   BP: 122/70   Pulse: 79       PHYSICAL EXAM:  GEN: well appearing, in NAD   HEENT: NCAT, EOMI, moist mucus membranes   Respiratory: CTAB, no wheezes, rales or rhonchi  CV: normal rate, regular rhythm, normal S1, S2, no murmurs, rubs, gallops, +2 radial pulses b/l  GI: soft, nontender, nondistended  MSK: no edema  Skin: no rash, warm, dry  Heme/Lymph: no bruising or bleeding  Psych: organized thought, normal behavior and affect  Neuro: Alert and Oriented x 3, grossly normal motor function        Assessment:         (Z01.810) Preoperative cardiovascular examination - Plan: Treadmill Stress Test       Plan:       #Preoperative  cardiovascular examination  Patient very claustrophobic and would prefer to not do repeat nuclear stress test.  Attempted to order a stress echo for further evaluation given her prior abnormal nuclear stress test with 3 times daily however insurance would not cover.  Will perform treadmill stress test at this time.  If abnormal will decide on further noninvasive testing versus left heart catheterization.    Addie Aguilar, thank you very much for referring this kind patient to me. Please call me with any questions or concerns. I will see the patient again pending stress test results.    Addendum 11/22/22:  Exercise stress test with no repolarization abnormality consistent with ischemia however patient only able to achieve 78% of maximal age-predicted heart rate.  Despite submaximal test, patient asymptomatic.  She can proceed with transplant with no further testing.         Luis Mayorga MD  10/18/22  [facility]    Outpatient Encounter Medications as of 10/18/2022   Medication Sig Dispense Refill   • acetaminophen (TYLENOL) 325 MG tablet Take 650 mg by mouth Every 6 (Six) Hours As Needed for Mild Pain .     • calcium acetate (PHOSLO) 667 MG capsule Take 2,001 mg by mouth 3 (Three) Times a Day With Meals.     • cloNIDine (CATAPRES) 0.1 MG tablet Take 1 tablet by mouth Every 12 (Twelve) Hours. 60 tablet 0   • folic acid-vitamin b complex-vitamin c-selenium-zinc (DIALYVITE) 3 MG tablet Take 1 tablet by mouth Daily.     • heparin 1,000 UNITS/ML BOLUS EVERY TREATMENT     • hydrALAZINE (APRESOLINE) 50 MG tablet Take 50 mg by mouth 3 (Three) Times a Day.     • hydrOXYzine (ATARAX) 50 MG tablet Take 1 tablet by mouth 2 (Two) Times a Day.     • iron sucrose in sodium chloride 0.9 % 100 mL IVPB 50 mg 1 (One) Time Per Week.     • lidocaine-prilocaine (EMLA) 2.5-2.5 % cream APPLY SMALL AMOUNT TO ACCESS SITE (AVF) 1 TO 2 HOURS BEFORE DIALYSIS. COVER WITH OCCLUSIVE DRESSING (SARAN WRAP)     • Methoxy PEG-Epoetin Beta  (MIRCERA IJ) 100 mcg Every 14 (Fourteen) Days.     • Polyethylene Glycol 3350 (MIRALAX PO) Take  by mouth Daily.     • Probiotic Product (PROBIOTIC PO) Take  by mouth.     • prochlorperazine (COMPAZINE) 10 MG tablet Take 1 tablet by mouth Every 6 (Six) Hours As Needed for Nausea or Vomiting. 10 tablet 0   • sennosides-docusate (PERICOLACE) 8.6-50 MG per tablet Take 2 tablets by mouth 2 (Two) Times a Day. 120 tablet 0   • traZODone (DESYREL) 100 MG tablet Take 100 mg by mouth every night at bedtime.     • [DISCONTINUED] calcitriol (ROCALTROL) 0.25 MCG capsule Take 2 capsules by mouth Daily. 60 capsule 0     No facility-administered encounter medications on file as of 10/18/2022.

## 2022-10-20 ENCOUNTER — TELEPHONE (OUTPATIENT)
Dept: GASTROENTEROLOGY | Facility: CLINIC | Age: 47
End: 2022-10-20

## 2022-10-20 ENCOUNTER — HOSPITAL ENCOUNTER (OUTPATIENT)
Dept: MAMMOGRAPHY | Facility: HOSPITAL | Age: 47
Discharge: HOME | End: 2022-10-20
Admitting: OBSTETRICS & GYNECOLOGY

## 2022-10-20 DIAGNOSIS — Z12.31 VISIT FOR SCREENING MAMMOGRAM: ICD-10-CM

## 2022-10-20 PROCEDURE — 77063 BREAST TOMOSYNTHESIS BI: CPT

## 2022-10-20 PROCEDURE — 77067 SCR MAMMO BI INCL CAD: CPT

## 2022-10-20 NOTE — TELEPHONE ENCOUNTER
Hub staff attempted to follow warm transfer process and was unsuccessful     Caller: [facility]    Relationship to patient:     Best call back number: [phone]    Patient is needing: WAITING FOR URGENT APPT FOR SCHEDULE COLONOSCOPY FOR KIDNEY TRANSPLANT ELIGIBILITY. ONLY HAS A 3 MONTH WINDOW OR WILL HAVE TO REPEAT WHOLE PROCESS. ADREA IS NOT HERE EVERY DAY. APPT COULD BE MADE WITH PT ALSO. URGENT

## 2022-10-25 ENCOUNTER — TELEPHONE (OUTPATIENT)
Dept: GASTROENTEROLOGY | Facility: CLINIC | Age: 47
End: 2022-10-25

## 2022-10-25 NOTE — TELEPHONE ENCOUNTER
JOSÉ MIGUEL Thrasher for COLONOSCOPY on 11/30/22 arrive at 12pm.Prep instructions mailed to address on file.

## 2022-10-25 NOTE — TELEPHONE ENCOUNTER
Caller: Sunni Mcneil    Relationship: Self    Best call back number: [phone]    What is the best time to reach you: ANYTIME    Who are you requesting to speak with (clinical staff, provider,  specific staff member): CLINICAL STAFF    What was the call regarding: PATIENT WOULD LIKE TO SPEAK WITH  REGARDING PROCEDURE DATE. WOULD LIKE FOR SOMEONE TO GIVE HER CALL BACK.    Do you require a callback: YES

## 2022-11-07 ENCOUNTER — HOSPITAL ENCOUNTER (OUTPATIENT)
Dept: CARDIOLOGY | Facility: HOSPITAL | Age: 47
Discharge: HOME | End: 2022-11-07
Admitting: STUDENT IN AN ORGANIZED HEALTH CARE EDUCATION/TRAINING PROGRAM

## 2022-11-07 DIAGNOSIS — Z01.810 PREOPERATIVE CARDIOVASCULAR EXAMINATION: ICD-10-CM

## 2022-11-07 LAB
BH CV STRESS BP STAGE 1: NORMAL
BH CV STRESS BP STAGE 2: NORMAL
BH CV STRESS DURATION MIN STAGE 1: 3
BH CV STRESS DURATION MIN STAGE 2: 3
BH CV STRESS DURATION SEC STAGE 1: 0
BH CV STRESS DURATION SEC STAGE 2: 0
BH CV STRESS GRADE STAGE 1: 10
BH CV STRESS GRADE STAGE 2: 12
BH CV STRESS HR STAGE 1: 104
BH CV STRESS HR STAGE 2: 135
BH CV STRESS METS STAGE 1: 5
BH CV STRESS METS STAGE 2: 7.5
BH CV STRESS PROTOCOL 1: NORMAL
BH CV STRESS RECOVERY BP: NORMAL MMHG
BH CV STRESS RECOVERY HR: 90 BPM
BH CV STRESS SPEED STAGE 1: 1.7
BH CV STRESS SPEED STAGE 2: 2.5
BH CV STRESS STAGE 1: 1
BH CV STRESS STAGE 2: 2
MAXIMAL PREDICTED HEART RATE: 173 BPM
PERCENT MAX PREDICTED HR: 78.03 %
STRESS BASELINE BP: NORMAL MMHG
STRESS BASELINE HR: 70 BPM
STRESS PERCENT HR: 92 %
STRESS POST ESTIMATED WORKLOAD: 7.5 METS
STRESS POST EXERCISE DUR MIN: 6 MIN
STRESS POST EXERCISE DUR SEC: 0 SEC
STRESS POST PEAK BP: NORMAL MMHG
STRESS POST PEAK HR: 135 BPM
STRESS TARGET HR: 147 BPM

## 2022-11-07 PROCEDURE — 93017 CV STRESS TEST TRACING ONLY: CPT

## 2022-11-07 PROCEDURE — 93016 CV STRESS TEST SUPVJ ONLY: CPT | Performed by: INTERNAL MEDICINE

## 2022-11-07 PROCEDURE — 93018 CV STRESS TEST I&R ONLY: CPT | Performed by: INTERNAL MEDICINE

## 2022-11-16 NOTE — PROGRESS NOTES
Spoke to patient on the phone regarding her stress results. Although, she did not meet 85% of maximum predicted heart rate, there were no findings consistent with ischemia.

## 2022-11-21 ENCOUNTER — OFFICE VISIT (OUTPATIENT)
Dept: GASTROENTEROLOGY | Facility: CLINIC | Age: 47
End: 2022-11-21

## 2022-11-21 ENCOUNTER — TELEPHONE (OUTPATIENT)
Dept: CARDIOLOGY | Facility: CLINIC | Age: 47
End: 2022-11-21

## 2022-11-21 VITALS
BODY MASS INDEX: 34.47 KG/M2 | HEIGHT: 60 IN | DIASTOLIC BLOOD PRESSURE: 84 MMHG | OXYGEN SATURATION: 98 % | SYSTOLIC BLOOD PRESSURE: 129 MMHG | WEIGHT: 175.6 LBS | TEMPERATURE: 97.5 F | HEART RATE: 97 BPM

## 2022-11-21 DIAGNOSIS — K62.89 ANAL OR RECTAL PAIN: Primary | ICD-10-CM

## 2022-11-21 DIAGNOSIS — K64.8 INTERNAL HEMORRHOIDS: ICD-10-CM

## 2022-11-21 PROCEDURE — 99214 OFFICE O/P EST MOD 30 MIN: CPT | Performed by: NURSE PRACTITIONER

## 2022-11-21 NOTE — PROGRESS NOTES
Chief Complaint   Patient presents with   • cyst inside rectum       Sunni Mcneil is a  47 y.o. female here for a follow up visit for rectal pain.    HPI  47-year-old female presents today for a follow-up visit for rectal pain.  She is a patient of Dr. Perkins.  She is new to me today.  She was last seen in the office by him on 9/20/2022.  She tells me for the past month she has felt a bump on the inside of her rectum.  She thinks it is an internal hemorrhoid but she is not sure.  She tells me its not really what she considers painful but she says it is uncomfortable especially when she sits.  She has not put any cream on it.  She reports her bowels move well every day.  She denies any straining or hard stools.  She denies any rectal bleeding.  She is scheduled for a screening colonoscopy at the end of this month.  She denies any dysphagia, reflux, nausea and vomiting, diarrhea, constipation, rectal bleeding or melena.  She admits her appetite is okay and her weight is stable. She does have history of hyperparathyroidism.  She also has a history of end-stage renal disease and was a right kidney transplant recipient in 2016.  In 2018 the right kidney was rejected.  Past Medical History:   Diagnosis Date   • Acid reflux    • Anemia     WITH ESRD   • Anxiety    • Arthritis    • Cough     related to fluid overload   • Depression    • Diabetes mellitus ([facility])     NO MEDICATIONS FOR   • ESRD on dialysis ([facility])     FRESINUS MWF   • History of peritoneal dialysis     KIDNEY TRANSPLANT SEPT 2016    • History of transfusion     BEEN A WHILE no reaction   • Hypercalcemia    • Hyperparathyroidism ([facility])    • Hypertension    • Kidney disease     End-stage renal disease. TRANSPLANT   • Kidney transplant recipient 09/02/2016    RIGHT KIDNEY. ? 2018 KIDNEY REJECTED   • PONV (postoperative nausea and vomiting)    • Seasonal allergies     CURRENTLY    • Vascular dialysis catheter in place (HCC)     RIGHT TUNNEL CATH       Past  Surgical History:   Procedure Laterality Date   • ARTERIOVENOUS FISTULA/SHUNT SURGERY Right 02/26/2020    Procedure: RIGHT ARM ARTERIAL VENOUS FISTULA;  Surgeon: Elijah Herrera MD;  Location: [facility] MAIN OR;  Service: Vascular;  Laterality: Right;   • ARTERIOVENOUS FISTULA/SHUNT SURGERY Left 02/04/2021    Procedure: LEFT BRACHIOAXILLARY ARTERIOVENOUS FISTULA GRAFT;  Surgeon: Anya Hernandez Jr., MD;  Location: [facility] MAIN OR;  Service: Vascular;  Laterality: Left;   • ARTERIOVENOUS FISTULA/SHUNT SURGERY Right 03/16/2021    Procedure: RIGHT BRACHIOAXILLARY ARTERVENIOUS GRAFT PLACEMENT;  Surgeon: Adán Maurer MD;  Location: [facility] MAIN OR;  Service: Vascular;  Laterality: Right;   • INSERTION AND REMOVAL HEMODIALYSIS CATHETER N/A 02/13/2021    Procedure: INSERTION AND REMOVAL OF HEMODIALYSIS CATHETER;  Surgeon: Adán Maurer MD;  Location: [facility] MAIN OR;  Service: Vascular;  Laterality: N/A;   • INSERTION HEMODIALYSIS CATHETER Right 01/15/2021    Procedure: TUNNELED DIAYLIS CATHETER PLACEMENT;  Surgeon: Phu Gaviria MD;  Location: [facility] HYBRID OR 18/19;  Service: Vascular;  Laterality: Right;   • INSERTION PERITONEAL DIALYSIS CATHETER  07/29/2014    Laparoscopic placement of Curl Cath peritoneal dialysis catheter, Dr. Vinod Goldstein   • INSERTION PERITONEAL DIALYSIS CATHETER N/A 07/24/2019    Procedure: LAPAROSCOPIC INSERTION PERITONEAL DIALYSIS CATHETER;  Surgeon: Damon Rooney MD;  Location: [facility] MAIN OR;  Service: General   • REMOVAL PERITONEAL DIALYSIS CATHETER N/A 10/17/2016    Procedure: REMOVAL PERITONEAL DIALYSIS CATHETER;  Surgeon: Damon Rooney MD;  Location: [facility] MAIN OR;  Service:    • REMOVAL PERITONEAL DIALYSIS CATHETER N/A 10/21/2020    Procedure: REMOVAL PERITONEAL DIALYSIS CATHETER;  Surgeon: Damon Rooney MD;  Location: [facility] MAIN OR;  Service: General;  Laterality: N/A;   • SHUNT O GRAM Right 07/28/2022    Procedure: RIGHT   ARM SHUNT O GRAM , ANGIOPLASTY OF AXILARY VEIN AND SUBCLAVIAN VEIN AT SVC JUNCTION;  Surgeon: Anya Hernandez Jr., MD;  Location: [facility] OR ;  Service: Vascular;  Laterality: Right;   • TRANSPLANTATION RENAL Right 2016    from  donor   • TUBAL ABDOMINAL LIGATION Bilateral        Scheduled Meds:    Continuous Infusions:No current facility-administered medications for this visit.      PRN Meds:.    No Known Allergies    Social History     Socioeconomic History   • Marital status:    • Number of children: 2   • Years of education: 12   Tobacco Use   • Smoking status: Never   • Smokeless tobacco: Never   Vaping Use   • Vaping Use: Never used   Substance and Sexual Activity   • Alcohol use: No     Comment: caffeine use   • Drug use: Never       Family History   Problem Relation Age of Onset   • Hypertension Mother    • Hypertension Father    • Heart attack Maternal Grandfather    • Malig Hyperthermia Neg Hx        Review of Systems   Constitutional: Negative for appetite change, chills, diaphoresis, fatigue, fever and unexpected weight change.   HENT: Negative for nosebleeds, postnasal drip, sore throat, trouble swallowing and voice change.    Respiratory: Negative for cough, choking, chest tightness, shortness of breath, wheezing and stridor.    Cardiovascular: Negative for chest pain, palpitations and leg swelling.   Gastrointestinal: Positive for rectal pain. Negative for abdominal distention, abdominal pain, anal bleeding, blood in stool, constipation, diarrhea, nausea and vomiting.   Endocrine: Negative for polydipsia, polyphagia and polyuria.   Musculoskeletal: Negative for gait problem.   Skin: Negative for rash and wound.   Allergic/Immunologic: Negative for food allergies.   Neurological: Negative for dizziness, speech difficulty and light-headedness.   Psychiatric/Behavioral: Negative for confusion, self-injury, sleep disturbance and suicidal ideas.       Vitals:     11/21/22 1534   BP: 129/84   Pulse: 97   Temp: 97.5 °F (36.4 °C)   SpO2: 98%       Physical Exam  Constitutional:       General: She is not in acute distress.     Appearance: She is well-developed. She is not ill-appearing.   HENT:      Head: Normocephalic.   Eyes:      Pupils: Pupils are equal, round, and reactive to light.   Cardiovascular:      Rate and Rhythm: Normal rate and regular rhythm.      Heart sounds: Normal heart sounds.   Pulmonary:      Effort: Pulmonary effort is normal.      Breath sounds: Normal breath sounds.   Abdominal:      General: Bowel sounds are normal. There is no distension.      Palpations: Abdomen is soft. There is no mass.      Tenderness: There is no abdominal tenderness. There is no guarding or rebound.      Hernia: No hernia is present.      Comments: I did palpate an internal hemorrhoid right along the inside of the anal opening.  No bleeding noted.  Otherwise rectal exam normal.   Musculoskeletal:         General: Normal range of motion.   Skin:     General: Skin is warm and dry.   Neurological:      Mental Status: She is alert and oriented to person, place, and time.   Psychiatric:         Speech: Speech normal.         Behavior: Behavior normal.         Judgment: Judgment normal.         No radiology results for the last 7 days     Diagnoses and all orders for this visit:    1. Anal or rectal pain (Primary)    2. Internal hemorrhoids    Rectal exam did reveal a palpable internal hemorrhoid along the anal opening.  Tender to exam.  We will give her some Analpram cream samples today.  She can apply rectally 2-3 times a day as needed.  I do agree she needs to keep her scheduled colonoscopy with Dr. Perkins for further evaluation.  That way he can get a better look at things.  For now I would like her to continue a high-fiber diet.  Call the office with any issues.  Follow-up with me after her scope.

## 2022-11-21 NOTE — TELEPHONE ENCOUNTER
Allyson called from [facility]. They are needing cardiac clearance. She is a potential candidate for a kidney transplant.    Jackelin can be reached at [phone]    Fax number: [phone]

## 2022-11-28 ENCOUNTER — TELEPHONE (OUTPATIENT)
Dept: GASTROENTEROLOGY | Facility: CLINIC | Age: 47
End: 2022-11-28

## 2022-11-28 NOTE — TELEPHONE ENCOUNTER
Emailed to patient per her request      ----- Message from Lisa Patrick MD sent at 11/26/2022  5:49 PM EST -----  Pt called in, needs prep instructions for 11/30 colonoscopy

## 2022-11-30 ENCOUNTER — ANESTHESIA EVENT (OUTPATIENT)
Dept: GASTROENTEROLOGY | Facility: HOSPITAL | Age: 47
End: 2022-11-30

## 2022-11-30 ENCOUNTER — HOSPITAL ENCOUNTER (OUTPATIENT)
Facility: HOSPITAL | Age: 47
Setting detail: HOSPITAL OUTPATIENT SURGERY
Discharge: HOME | End: 2022-11-30
Attending: INTERNAL MEDICINE | Admitting: INTERNAL MEDICINE

## 2022-11-30 ENCOUNTER — ANESTHESIA (OUTPATIENT)
Dept: GASTROENTEROLOGY | Facility: HOSPITAL | Age: 47
End: 2022-11-30

## 2022-11-30 VITALS
HEIGHT: 60 IN | WEIGHT: 180.2 LBS | DIASTOLIC BLOOD PRESSURE: 70 MMHG | TEMPERATURE: 97.8 F | BODY MASS INDEX: 35.38 KG/M2 | HEART RATE: 84 BPM | OXYGEN SATURATION: 99 % | RESPIRATION RATE: 16 BRPM | SYSTOLIC BLOOD PRESSURE: 119 MMHG

## 2022-11-30 LAB — GLUCOSE BLDC GLUCOMTR-MCNC: 95 MG/DL (ref 70–130)

## 2022-11-30 PROCEDURE — 25010000002 PROPOFOL 10 MG/ML EMULSION: Performed by: ANESTHESIOLOGY

## 2022-11-30 PROCEDURE — 82962 GLUCOSE BLOOD TEST: CPT

## 2022-11-30 PROCEDURE — S0260 H&P FOR SURGERY: HCPCS | Performed by: INTERNAL MEDICINE

## 2022-11-30 PROCEDURE — G0121 COLON CA SCRN NOT HI RSK IND: HCPCS | Performed by: INTERNAL MEDICINE

## 2022-11-30 RX ORDER — SODIUM CHLORIDE 9 MG/ML
1000 INJECTION, SOLUTION INTRAVENOUS CONTINUOUS
Status: DISCONTINUED | OUTPATIENT
Start: 2022-11-30 | End: 2022-11-30 | Stop reason: HOSPADM

## 2022-11-30 RX ORDER — PROPOFOL 10 MG/ML
VIAL (ML) INTRAVENOUS AS NEEDED
Status: DISCONTINUED | OUTPATIENT
Start: 2022-11-30 | End: 2022-11-30 | Stop reason: SURG

## 2022-11-30 RX ORDER — SODIUM CHLORIDE 0.9 % (FLUSH) 0.9 %
10 SYRINGE (ML) INJECTION AS NEEDED
Status: DISCONTINUED | OUTPATIENT
Start: 2022-11-30 | End: 2022-11-30 | Stop reason: HOSPADM

## 2022-11-30 RX ORDER — LIDOCAINE HYDROCHLORIDE 20 MG/ML
INJECTION, SOLUTION INFILTRATION; PERINEURAL AS NEEDED
Status: DISCONTINUED | OUTPATIENT
Start: 2022-11-30 | End: 2022-11-30 | Stop reason: SURG

## 2022-11-30 RX ORDER — PROPOFOL 10 MG/ML
VIAL (ML) INTRAVENOUS CONTINUOUS PRN
Status: DISCONTINUED | OUTPATIENT
Start: 2022-11-30 | End: 2022-11-30 | Stop reason: SURG

## 2022-11-30 RX ADMIN — PROPOFOL 200 MG: 10 INJECTION, EMULSION INTRAVENOUS at 13:17

## 2022-11-30 RX ADMIN — SODIUM CHLORIDE 1000 ML: 9 INJECTION, SOLUTION INTRAVENOUS at 13:06

## 2022-11-30 RX ADMIN — LIDOCAINE HYDROCHLORIDE 100 MG: 20 INJECTION, SOLUTION INFILTRATION; PERINEURAL at 13:16

## 2022-11-30 RX ADMIN — PROPOFOL 200 MG: 10 INJECTION, EMULSION INTRAVENOUS at 13:19

## 2022-11-30 RX ADMIN — Medication 300 MCG/KG/MIN: at 13:21

## 2022-11-30 NOTE — ANESTHESIA PREPROCEDURE EVALUATION
Anesthesia Evaluation     Patient summary reviewed and Nursing notes reviewed   history of anesthetic complications: PONV  NPO Solid Status: > 8 hours  NPO Liquid Status: > 4 hours           Airway   Mallampati: II  Neck ROM: full  No difficulty expected  Dental - normal exam     Pulmonary     breath sounds clear to auscultation  Cardiovascular     Rhythm: regular    (+) hypertension,       Neuro/Psych  (+) psychiatric history Depression,    GI/Hepatic/Renal/Endo    (+) obesity,   renal disease dialysis, diabetes mellitus,     Musculoskeletal     Abdominal   (+) obese,    Substance History      OB/GYN          Other   arthritis,                      Anesthesia Plan    ASA 3     MAC     intravenous induction     Anesthetic plan, risks, benefits, and alternatives have been provided, discussed and informed consent has been obtained with: patient.        CODE STATUS:

## 2022-11-30 NOTE — ANESTHESIA POSTPROCEDURE EVALUATION
"Patient: Sunni Mcneil    Procedure Summary     Date: 11/30/22 Room / Location:  [facility]    Anesthesia Start: 1312 Anesthesia Stop: 1336    Procedure: COLONOSCOPY TO CECUM AND TERM. ILEUM Diagnosis:       Diverticulosis      Hemorrhoids      (Screening for colorectal cancer [Z12.11, Z12.12])      (Constipation, unspecified constipation type [K59.00])    Surgeons: Casimiro Perkins MD Provider: Tristin Steinberg MD    Anesthesia Type: MAC ASA Status: 3          Anesthesia Type: MAC    Vitals  Vitals Value Taken Time   /70 11/30/22 1400   Temp 36.6 °C (97.8 °F) 11/30/22 1344   Pulse 84 11/30/22 1400   Resp 16 11/30/22 1400   SpO2 99 % 11/30/22 1400           Post Anesthesia Care and Evaluation    Patient location during evaluation: bedside  Patient participation: complete - patient participated  Level of consciousness: sleepy but conscious  Pain score: 0  Pain management: adequate    Airway patency: patent  Anesthetic complications: No anesthetic complications    Cardiovascular status: acceptable  Respiratory status: acceptable  Hydration status: acceptable    Comments: /70 (BP Location: Right leg, Patient Position: Lying)   Pulse 84   Temp 36.6 °C (97.8 °F) (Oral)   Resp 16   Ht 152.4 cm (60\")   Wt 81.7 kg (180 lb 3.2 oz)   LMP 11/27/2022 (Exact Date)   SpO2 99%   BMI 35.19 kg/m²         "

## 2022-12-07 DIAGNOSIS — R06.09 DYSPNEA ON EXERTION: Primary | ICD-10-CM

## 2022-12-27 ENCOUNTER — HOSPITAL ENCOUNTER (OUTPATIENT)
Dept: CARDIOLOGY | Facility: HOSPITAL | Age: 47
Discharge: HOME | End: 2022-12-27
Admitting: STUDENT IN AN ORGANIZED HEALTH CARE EDUCATION/TRAINING PROGRAM

## 2022-12-27 VITALS
DIASTOLIC BLOOD PRESSURE: 80 MMHG | HEIGHT: 60 IN | HEART RATE: 80 BPM | BODY MASS INDEX: 35.34 KG/M2 | WEIGHT: 180 LBS | SYSTOLIC BLOOD PRESSURE: 122 MMHG

## 2022-12-27 DIAGNOSIS — R06.09 DYSPNEA ON EXERTION: ICD-10-CM

## 2022-12-27 LAB
AORTIC ARCH: 2.5 CM
ASCENDING AORTA: 3 CM
BH CV ECHO MEAS - ACS: 2 CM
BH CV ECHO MEAS - AO MAX PG: 9.6 MMHG
BH CV ECHO MEAS - AO MEAN PG: 5.7 MMHG
BH CV ECHO MEAS - AO ROOT DIAM: 3.1 CM
BH CV ECHO MEAS - AO V2 MAX: 154.7 CM/SEC
BH CV ECHO MEAS - AO V2 VTI: 32.6 CM
BH CV ECHO MEAS - AVA(I,D): 2.9 CM2
BH CV ECHO MEAS - EDV(CUBED): 64.8 ML
BH CV ECHO MEAS - EDV(MOD-SP2): 99 ML
BH CV ECHO MEAS - EDV(MOD-SP4): 121 ML
BH CV ECHO MEAS - EF(MOD-BP): 64.7 %
BH CV ECHO MEAS - EF(MOD-SP2): 61.6 %
BH CV ECHO MEAS - EF(MOD-SP4): 64.5 %
BH CV ECHO MEAS - ESV(CUBED): 12.3 ML
BH CV ECHO MEAS - ESV(MOD-SP2): 38 ML
BH CV ECHO MEAS - ESV(MOD-SP4): 43 ML
BH CV ECHO MEAS - FS: 42.5 %
BH CV ECHO MEAS - IVS/LVPW: 0.89 CM
BH CV ECHO MEAS - IVSD: 1 CM
BH CV ECHO MEAS - LAT PEAK E' VEL: 9.4 CM/SEC
BH CV ECHO MEAS - LV DIASTOLIC VOL/BSA (35-75): 67.8 CM2
BH CV ECHO MEAS - LV MASS(C)D: 139.4 GRAMS
BH CV ECHO MEAS - LV MAX PG: 6.9 MMHG
BH CV ECHO MEAS - LV MEAN PG: 4.4 MMHG
BH CV ECHO MEAS - LV SYSTOLIC VOL/BSA (12-30): 24.1 CM2
BH CV ECHO MEAS - LV V1 MAX: 131.1 CM/SEC
BH CV ECHO MEAS - LV V1 VTI: 29.8 CM
BH CV ECHO MEAS - LVIDD: 4 CM
BH CV ECHO MEAS - LVIDS: 2.31 CM
BH CV ECHO MEAS - LVOT AREA: 3.1 CM2
BH CV ECHO MEAS - LVOT DIAM: 1.99 CM
BH CV ECHO MEAS - LVPWD: 1.12 CM
BH CV ECHO MEAS - MED PEAK E' VEL: 8.6 CM/SEC
BH CV ECHO MEAS - MV A DUR: 0.19 SEC
BH CV ECHO MEAS - MV A MAX VEL: 114 CM/SEC
BH CV ECHO MEAS - MV DEC SLOPE: 520.2 CM/SEC2
BH CV ECHO MEAS - MV DEC TIME: 0.21 MSEC
BH CV ECHO MEAS - MV E MAX VEL: 113 CM/SEC
BH CV ECHO MEAS - MV E/A: 0.99
BH CV ECHO MEAS - MV MAX PG: 6.1 MMHG
BH CV ECHO MEAS - MV MEAN PG: 2.7 MMHG
BH CV ECHO MEAS - MV P1/2T: 70.3 MSEC
BH CV ECHO MEAS - MV V2 VTI: 39.1 CM
BH CV ECHO MEAS - MVA(P1/2T): 3.1 CM2
BH CV ECHO MEAS - MVA(VTI): 2.38 CM2
BH CV ECHO MEAS - PA ACC TIME: 0.12 SEC
BH CV ECHO MEAS - PA PR(ACCEL): 26.7 MMHG
BH CV ECHO MEAS - PA V2 MAX: 119.4 CM/SEC
BH CV ECHO MEAS - PULM A REVS DUR: 0.17 SEC
BH CV ECHO MEAS - PULM A REVS VEL: 28.6 CM/SEC
BH CV ECHO MEAS - PULM DIAS VEL: 34.3 CM/SEC
BH CV ECHO MEAS - PULM S/D: 1.76
BH CV ECHO MEAS - PULM SYS VEL: 60.5 CM/SEC
BH CV ECHO MEAS - QP/QS: 0.63
BH CV ECHO MEAS - RAP SYSTOLE: 3 MMHG
BH CV ECHO MEAS - RV MAX PG: 1.52 MMHG
BH CV ECHO MEAS - RV V1 MAX: 61.7 CM/SEC
BH CV ECHO MEAS - RV V1 VTI: 14 CM
BH CV ECHO MEAS - RVOT DIAM: 2.3 CM
BH CV ECHO MEAS - SI(MOD-SP2): 34.2 ML/M2
BH CV ECHO MEAS - SI(MOD-SP4): 43.7 ML/M2
BH CV ECHO MEAS - SUP REN AO DIAM: 2 CM
BH CV ECHO MEAS - SV(LVOT): 93.1 ML
BH CV ECHO MEAS - SV(MOD-SP2): 61 ML
BH CV ECHO MEAS - SV(MOD-SP4): 78 ML
BH CV ECHO MEAS - SV(RVOT): 58.2 ML
BH CV ECHO MEAS - TAPSE (>1.6): 2.6 CM
BH CV ECHO MEASUREMENTS AVERAGE E/E' RATIO: 12.56
BH CV XLRA - RV BASE: 3.1 CM
BH CV XLRA - RV LENGTH: 7.6 CM
BH CV XLRA - RV MID: 2.45 CM
BH CV XLRA - TDI S': 14.3 CM/SEC
LEFT ATRIUM VOLUME INDEX: 24.3 ML/M2
MAXIMAL PREDICTED HEART RATE: 173 BPM
SINUS: 2.6 CM
STJ: 2.6 CM
STRESS TARGET HR: 147 BPM

## 2022-12-27 PROCEDURE — 93306 TTE W/DOPPLER COMPLETE: CPT | Performed by: INTERNAL MEDICINE

## 2022-12-27 PROCEDURE — 25010000002 PERFLUTREN (DEFINITY) 8.476 MG IN SODIUM CHLORIDE (PF) 0.9 % 10 ML INJECTION: Performed by: STUDENT IN AN ORGANIZED HEALTH CARE EDUCATION/TRAINING PROGRAM

## 2022-12-27 PROCEDURE — 93306 TTE W/DOPPLER COMPLETE: CPT

## 2022-12-27 RX ADMIN — PERFLUTREN 2 ML: 6.52 INJECTION, SUSPENSION INTRAVENOUS at 15:13

## 2023-01-20 ENCOUNTER — APPOINTMENT (OUTPATIENT)
Dept: GENERAL RADIOLOGY | Facility: HOSPITAL | Age: 48
DRG: 252 | End: 2023-01-20
Payer: MEDICARE

## 2023-01-20 ENCOUNTER — ANESTHESIA (OUTPATIENT)
Dept: PERIOP | Facility: HOSPITAL | Age: 48
DRG: 252 | End: 2023-01-20
Payer: MEDICARE

## 2023-01-20 ENCOUNTER — HOSPITAL ENCOUNTER (OUTPATIENT)
Facility: HOSPITAL | Age: 48
Setting detail: HOSPITAL OUTPATIENT SURGERY
Discharge: HOME | DRG: 252 | End: 2023-01-20
Attending: SURGERY | Admitting: SURGERY
Payer: MEDICARE

## 2023-01-20 ENCOUNTER — ANESTHESIA EVENT (OUTPATIENT)
Dept: PERIOP | Facility: HOSPITAL | Age: 48
DRG: 252 | End: 2023-01-20
Payer: MEDICARE

## 2023-01-20 VITALS
OXYGEN SATURATION: 99 % | RESPIRATION RATE: 16 BRPM | DIASTOLIC BLOOD PRESSURE: 78 MMHG | HEART RATE: 97 BPM | BODY MASS INDEX: 35.28 KG/M2 | SYSTOLIC BLOOD PRESSURE: 128 MMHG | HEIGHT: 60 IN | WEIGHT: 179.68 LBS | TEMPERATURE: 98 F

## 2023-01-20 DIAGNOSIS — T82.868A THROMBOSIS OF RENAL DIALYSIS ARTERIOVENOUS GRAFT, INITIAL ENCOUNTER: Primary | ICD-10-CM

## 2023-01-20 LAB
ALBUMIN SERPL-MCNC: 4.1 G/DL (ref 3.5–5.2)
ALBUMIN/GLOB SERPL: 1 G/DL
ALP SERPL-CCNC: 54 U/L (ref 39–117)
ALT SERPL W P-5'-P-CCNC: <5 U/L (ref 1–33)
ANION GAP SERPL CALCULATED.3IONS-SCNC: 17.7 MMOL/L (ref 5–15)
AST SERPL-CCNC: 10 U/L (ref 1–32)
BILIRUB SERPL-MCNC: 0.3 MG/DL (ref 0–1.2)
BUN SERPL-MCNC: 53 MG/DL (ref 6–20)
BUN/CREAT SERPL: 5 (ref 7–25)
CALCIUM SPEC-SCNC: 8.7 MG/DL (ref 8.6–10.5)
CHLORIDE SERPL-SCNC: 101 MMOL/L (ref 98–107)
CO2 SERPL-SCNC: 21.3 MMOL/L (ref 22–29)
CREAT SERPL-MCNC: 10.66 MG/DL (ref 0.57–1)
DEPRECATED RDW RBC AUTO: 64.2 FL (ref 37–54)
EGFRCR SERPLBLD CKD-EPI 2021: 4.1 ML/MIN/1.73
ERYTHROCYTE [DISTWIDTH] IN BLOOD BY AUTOMATED COUNT: 18.7 % (ref 12.3–15.4)
GLOBULIN UR ELPH-MCNC: 4.1 GM/DL
GLUCOSE BLDC GLUCOMTR-MCNC: 80 MG/DL (ref 70–130)
GLUCOSE SERPL-MCNC: 81 MG/DL (ref 65–99)
HCG SERPL QL: NEGATIVE
HCT VFR BLD AUTO: 33.4 % (ref 34–46.6)
HGB BLD-MCNC: 10.6 G/DL (ref 12–15.9)
MCH RBC QN AUTO: 29.9 PG (ref 26.6–33)
MCHC RBC AUTO-ENTMCNC: 31.7 G/DL (ref 31.5–35.7)
MCV RBC AUTO: 94.4 FL (ref 79–97)
PLATELET # BLD AUTO: 186 10*3/MM3 (ref 140–450)
PMV BLD AUTO: 9.6 FL (ref 6–12)
POTASSIUM SERPL-SCNC: 5 MMOL/L (ref 3.5–5.2)
PROT SERPL-MCNC: 8.2 G/DL (ref 6–8.5)
RBC # BLD AUTO: 3.54 10*6/MM3 (ref 3.77–5.28)
SODIUM SERPL-SCNC: 140 MMOL/L (ref 136–145)
WBC NRBC COR # BLD: 7.8 10*3/MM3 (ref 3.4–10.8)

## 2023-01-20 PROCEDURE — 85027 COMPLETE CBC AUTOMATED: CPT | Performed by: SURGERY

## 2023-01-20 PROCEDURE — 0 IOPAMIDOL PER 1 ML: Performed by: SURGERY

## 2023-01-20 PROCEDURE — 25010000002 CEFAZOLIN IN DEXTROSE 2-4 GM/100ML-% SOLUTION: Performed by: SURGERY

## 2023-01-20 PROCEDURE — 25010000002 DROPERIDOL PER 5 MG: Performed by: ANESTHESIOLOGY

## 2023-01-20 PROCEDURE — C1725 CATH, TRANSLUMIN NON-LASER: HCPCS | Performed by: SURGERY

## 2023-01-20 PROCEDURE — 25010000002 PROPOFOL 10 MG/ML EMULSION: Performed by: NURSE ANESTHETIST, CERTIFIED REGISTERED

## 2023-01-20 PROCEDURE — 25010000002 PROTAMINE SULFATE PER 10 MG: Performed by: ANESTHESIOLOGY

## 2023-01-20 PROCEDURE — 82962 GLUCOSE BLOOD TEST: CPT

## 2023-01-20 PROCEDURE — 25010000002 ONDANSETRON PER 1 MG: Performed by: NURSE ANESTHETIST, CERTIFIED REGISTERED

## 2023-01-20 PROCEDURE — C1894 INTRO/SHEATH, NON-LASER: HCPCS | Performed by: SURGERY

## 2023-01-20 PROCEDURE — C1757 CATH, THROMBECTOMY/EMBOLECT: HCPCS | Performed by: SURGERY

## 2023-01-20 PROCEDURE — 84703 CHORIONIC GONADOTROPIN ASSAY: CPT | Performed by: SURGERY

## 2023-01-20 PROCEDURE — 25010000002 NEOSTIGMINE 5 MG/10ML SOLUTION: Performed by: ANESTHESIOLOGY

## 2023-01-20 PROCEDURE — 25010000002 MIDAZOLAM PER 1 MG: Performed by: ANESTHESIOLOGY

## 2023-01-20 PROCEDURE — 25010000002 HEPARIN (PORCINE) PER 1000 UNITS: Performed by: NURSE ANESTHETIST, CERTIFIED REGISTERED

## 2023-01-20 PROCEDURE — 25010000002 HEPARIN (PORCINE) PER 1000 UNITS: Performed by: SURGERY

## 2023-01-20 PROCEDURE — 80053 COMPREHEN METABOLIC PANEL: CPT | Performed by: SURGERY

## 2023-01-20 PROCEDURE — 25010000002 ONDANSETRON PER 1 MG: Performed by: ANESTHESIOLOGY

## 2023-01-20 PROCEDURE — 25010000002 CEFAZOLIN PER 500 MG: Performed by: SURGERY

## 2023-01-20 PROCEDURE — 25010000002 CEFAZOLIN IN DEXTROSE 2-4 GM/100ML-% SOLUTION: Performed by: ANESTHESIOLOGY

## 2023-01-20 PROCEDURE — 25010000002 FENTANYL CITRATE (PF) 50 MCG/ML SOLUTION: Performed by: ANESTHESIOLOGY

## 2023-01-20 PROCEDURE — C1769 GUIDE WIRE: HCPCS | Performed by: SURGERY

## 2023-01-20 PROCEDURE — 25010000002 MIDAZOLAM PER 1 MG: Performed by: NURSE ANESTHETIST, CERTIFIED REGISTERED

## 2023-01-20 PROCEDURE — 0 LIDOCAINE 1 % SOLUTION 20 ML VIAL: Performed by: SURGERY

## 2023-01-20 DEVICE — LIGACLIP MCA MULTIPLE CLIP APPLIERS, 20 SMALL CLIPS
Type: IMPLANTABLE DEVICE | Site: ARM | Status: FUNCTIONAL
Brand: LIGACLIP

## 2023-01-20 RX ORDER — FENTANYL CITRATE 50 UG/ML
50 INJECTION, SOLUTION INTRAMUSCULAR; INTRAVENOUS
Status: DISCONTINUED | OUTPATIENT
Start: 2023-01-20 | End: 2023-01-20 | Stop reason: HOSPADM

## 2023-01-20 RX ORDER — CEFAZOLIN SODIUM 2 G/100ML
2 INJECTION, SOLUTION INTRAVENOUS ONCE
Status: COMPLETED | OUTPATIENT
Start: 2023-01-20 | End: 2023-01-20

## 2023-01-20 RX ORDER — SODIUM CHLORIDE 0.9 % (FLUSH) 0.9 %
3 SYRINGE (ML) INJECTION EVERY 12 HOURS SCHEDULED
Status: DISCONTINUED | OUTPATIENT
Start: 2023-01-20 | End: 2023-01-20 | Stop reason: HOSPADM

## 2023-01-20 RX ORDER — SODIUM CHLORIDE, SODIUM LACTATE, POTASSIUM CHLORIDE, CALCIUM CHLORIDE 600; 310; 30; 20 MG/100ML; MG/100ML; MG/100ML; MG/100ML
9 INJECTION, SOLUTION INTRAVENOUS CONTINUOUS
Status: DISCONTINUED | OUTPATIENT
Start: 2023-01-20 | End: 2023-01-20 | Stop reason: HOSPADM

## 2023-01-20 RX ORDER — SODIUM CHLORIDE 0.9 % (FLUSH) 0.9 %
3-10 SYRINGE (ML) INJECTION AS NEEDED
Status: DISCONTINUED | OUTPATIENT
Start: 2023-01-20 | End: 2023-01-20 | Stop reason: HOSPADM

## 2023-01-20 RX ORDER — CEFAZOLIN SODIUM 2 G/100ML
INJECTION, SOLUTION INTRAVENOUS AS NEEDED
Status: DISCONTINUED | OUTPATIENT
Start: 2023-01-20 | End: 2023-01-20 | Stop reason: SURG

## 2023-01-20 RX ORDER — SODIUM CHLORIDE, SODIUM LACTATE, POTASSIUM CHLORIDE, CALCIUM CHLORIDE 600; 310; 30; 20 MG/100ML; MG/100ML; MG/100ML; MG/100ML
100 INJECTION, SOLUTION INTRAVENOUS CONTINUOUS
Status: DISCONTINUED | OUTPATIENT
Start: 2023-01-20 | End: 2023-01-20 | Stop reason: HOSPADM

## 2023-01-20 RX ORDER — SODIUM CHLORIDE 9 MG/ML
9 INJECTION, SOLUTION INTRAVENOUS CONTINUOUS
Status: DISCONTINUED | OUTPATIENT
Start: 2023-01-20 | End: 2023-01-20 | Stop reason: HOSPADM

## 2023-01-20 RX ORDER — SCOLOPAMINE TRANSDERMAL SYSTEM 1 MG/1
1 PATCH, EXTENDED RELEASE TRANSDERMAL ONCE
Status: DISCONTINUED | OUTPATIENT
Start: 2023-01-20 | End: 2023-01-20 | Stop reason: HOSPADM

## 2023-01-20 RX ORDER — LIDOCAINE HYDROCHLORIDE 20 MG/ML
INJECTION, SOLUTION INTRAVENOUS AS NEEDED
Status: DISCONTINUED | OUTPATIENT
Start: 2023-01-20 | End: 2023-01-20 | Stop reason: SURG

## 2023-01-20 RX ORDER — DROPERIDOL 2.5 MG/ML
0.62 INJECTION, SOLUTION INTRAMUSCULAR; INTRAVENOUS ONCE
Status: COMPLETED | OUTPATIENT
Start: 2023-01-20 | End: 2023-01-20

## 2023-01-20 RX ORDER — MIDAZOLAM HYDROCHLORIDE 1 MG/ML
1 INJECTION INTRAMUSCULAR; INTRAVENOUS
Status: DISCONTINUED | OUTPATIENT
Start: 2023-01-20 | End: 2023-01-20 | Stop reason: HOSPADM

## 2023-01-20 RX ORDER — DROPERIDOL 2.5 MG/ML
0.62 INJECTION, SOLUTION INTRAMUSCULAR; INTRAVENOUS
Status: DISCONTINUED | OUTPATIENT
Start: 2023-01-20 | End: 2023-01-20 | Stop reason: HOSPADM

## 2023-01-20 RX ORDER — LABETALOL HYDROCHLORIDE 5 MG/ML
5 INJECTION, SOLUTION INTRAVENOUS
Status: DISCONTINUED | OUTPATIENT
Start: 2023-01-20 | End: 2023-01-20 | Stop reason: HOSPADM

## 2023-01-20 RX ORDER — HYDROCODONE BITARTRATE AND ACETAMINOPHEN 5; 325 MG/1; MG/1
1 TABLET ORAL ONCE AS NEEDED
Status: DISCONTINUED | OUTPATIENT
Start: 2023-01-20 | End: 2023-01-20 | Stop reason: HOSPADM

## 2023-01-20 RX ORDER — HYDROCODONE BITARTRATE AND ACETAMINOPHEN 5; 325 MG/1; MG/1
2 TABLET ORAL EVERY 6 HOURS PRN
Qty: 10 TABLET | Refills: 0 | Status: SHIPPED | OUTPATIENT
Start: 2023-01-20

## 2023-01-20 RX ORDER — FENTANYL CITRATE 50 UG/ML
25 INJECTION, SOLUTION INTRAMUSCULAR; INTRAVENOUS
Status: DISCONTINUED | OUTPATIENT
Start: 2023-01-20 | End: 2023-01-20 | Stop reason: HOSPADM

## 2023-01-20 RX ORDER — HEPARIN SODIUM 1000 [USP'U]/ML
INJECTION, SOLUTION INTRAVENOUS; SUBCUTANEOUS AS NEEDED
Status: DISCONTINUED | OUTPATIENT
Start: 2023-01-20 | End: 2023-01-20 | Stop reason: SURG

## 2023-01-20 RX ORDER — DROPERIDOL 2.5 MG/ML
0.62 INJECTION, SOLUTION INTRAMUSCULAR; INTRAVENOUS ONCE AS NEEDED
Status: DISCONTINUED | OUTPATIENT
Start: 2023-01-20 | End: 2023-01-20 | Stop reason: HOSPADM

## 2023-01-20 RX ORDER — HYDRALAZINE HYDROCHLORIDE 20 MG/ML
5 INJECTION INTRAMUSCULAR; INTRAVENOUS
Status: DISCONTINUED | OUTPATIENT
Start: 2023-01-20 | End: 2023-01-20 | Stop reason: HOSPADM

## 2023-01-20 RX ORDER — ONDANSETRON 2 MG/ML
4 INJECTION INTRAMUSCULAR; INTRAVENOUS ONCE AS NEEDED
Status: COMPLETED | OUTPATIENT
Start: 2023-01-20 | End: 2023-01-20

## 2023-01-20 RX ORDER — GLYCOPYRROLATE 0.2 MG/ML
INJECTION INTRAMUSCULAR; INTRAVENOUS AS NEEDED
Status: DISCONTINUED | OUTPATIENT
Start: 2023-01-20 | End: 2023-01-20 | Stop reason: SURG

## 2023-01-20 RX ORDER — IPRATROPIUM BROMIDE AND ALBUTEROL SULFATE 2.5; .5 MG/3ML; MG/3ML
3 SOLUTION RESPIRATORY (INHALATION) ONCE AS NEEDED
Status: DISCONTINUED | OUTPATIENT
Start: 2023-01-20 | End: 2023-01-20 | Stop reason: HOSPADM

## 2023-01-20 RX ORDER — PROTAMINE SULFATE 10 MG/ML
INJECTION, SOLUTION INTRAVENOUS AS NEEDED
Status: DISCONTINUED | OUTPATIENT
Start: 2023-01-20 | End: 2023-01-20 | Stop reason: SURG

## 2023-01-20 RX ORDER — NALOXONE HCL 0.4 MG/ML
0.2 VIAL (ML) INJECTION AS NEEDED
Status: DISCONTINUED | OUTPATIENT
Start: 2023-01-20 | End: 2023-01-20 | Stop reason: HOSPADM

## 2023-01-20 RX ORDER — NEOSTIGMINE METHYLSULFATE 0.5 MG/ML
INJECTION, SOLUTION INTRAVENOUS AS NEEDED
Status: DISCONTINUED | OUTPATIENT
Start: 2023-01-20 | End: 2023-01-20 | Stop reason: SURG

## 2023-01-20 RX ORDER — MIDAZOLAM HYDROCHLORIDE 1 MG/ML
INJECTION INTRAMUSCULAR; INTRAVENOUS AS NEEDED
Status: DISCONTINUED | OUTPATIENT
Start: 2023-01-20 | End: 2023-01-20 | Stop reason: SURG

## 2023-01-20 RX ORDER — EPHEDRINE SULFATE 50 MG/ML
5 INJECTION, SOLUTION INTRAVENOUS ONCE AS NEEDED
Status: DISCONTINUED | OUTPATIENT
Start: 2023-01-20 | End: 2023-01-20 | Stop reason: HOSPADM

## 2023-01-20 RX ORDER — PROPOFOL 10 MG/ML
VIAL (ML) INTRAVENOUS AS NEEDED
Status: DISCONTINUED | OUTPATIENT
Start: 2023-01-20 | End: 2023-01-20 | Stop reason: SURG

## 2023-01-20 RX ORDER — HYDROMORPHONE HYDROCHLORIDE 1 MG/ML
0.25 INJECTION, SOLUTION INTRAMUSCULAR; INTRAVENOUS; SUBCUTANEOUS
Status: DISCONTINUED | OUTPATIENT
Start: 2023-01-20 | End: 2023-01-20 | Stop reason: HOSPADM

## 2023-01-20 RX ORDER — ONDANSETRON 2 MG/ML
INJECTION INTRAMUSCULAR; INTRAVENOUS AS NEEDED
Status: DISCONTINUED | OUTPATIENT
Start: 2023-01-20 | End: 2023-01-20 | Stop reason: SURG

## 2023-01-20 RX ORDER — FAMOTIDINE 10 MG/ML
20 INJECTION, SOLUTION INTRAVENOUS ONCE
Status: COMPLETED | OUTPATIENT
Start: 2023-01-20 | End: 2023-01-20

## 2023-01-20 RX ORDER — PHENYLEPHRINE HCL IN 0.9% NACL 1 MG/10 ML
SYRINGE (ML) INTRAVENOUS AS NEEDED
Status: DISCONTINUED | OUTPATIENT
Start: 2023-01-20 | End: 2023-01-20 | Stop reason: SURG

## 2023-01-20 RX ORDER — LIDOCAINE HYDROCHLORIDE 10 MG/ML
0.5 INJECTION, SOLUTION EPIDURAL; INFILTRATION; INTRACAUDAL; PERINEURAL ONCE AS NEEDED
Status: DISCONTINUED | OUTPATIENT
Start: 2023-01-20 | End: 2023-01-20 | Stop reason: HOSPADM

## 2023-01-20 RX ORDER — FLUMAZENIL 0.1 MG/ML
0.2 INJECTION INTRAVENOUS AS NEEDED
Status: DISCONTINUED | OUTPATIENT
Start: 2023-01-20 | End: 2023-01-20 | Stop reason: HOSPADM

## 2023-01-20 RX ORDER — DIPHENHYDRAMINE HYDROCHLORIDE 50 MG/ML
6 INJECTION INTRAMUSCULAR; INTRAVENOUS
Status: DISCONTINUED | OUTPATIENT
Start: 2023-01-20 | End: 2023-01-20 | Stop reason: HOSPADM

## 2023-01-20 RX ORDER — ROCURONIUM BROMIDE 10 MG/ML
INJECTION, SOLUTION INTRAVENOUS AS NEEDED
Status: DISCONTINUED | OUTPATIENT
Start: 2023-01-20 | End: 2023-01-20 | Stop reason: SURG

## 2023-01-20 RX ADMIN — NEOSTIGMINE METHYLSULFATE 4 MG: 0.5 INJECTION INTRAVENOUS at 16:05

## 2023-01-20 RX ADMIN — FAMOTIDINE 20 MG: 10 INJECTION INTRAVENOUS at 14:11

## 2023-01-20 RX ADMIN — GLYCOPYRROLATE 0.8 MCG: 1 INJECTION INTRAMUSCULAR; INTRAVENOUS at 16:05

## 2023-01-20 RX ADMIN — SODIUM CHLORIDE, POTASSIUM CHLORIDE, SODIUM LACTATE AND CALCIUM CHLORIDE: 600; 310; 30; 20 INJECTION, SOLUTION INTRAVENOUS at 14:42

## 2023-01-20 RX ADMIN — Medication 100 MCG: at 15:16

## 2023-01-20 RX ADMIN — FENTANYL CITRATE 50 MCG: 0.05 INJECTION, SOLUTION INTRAMUSCULAR; INTRAVENOUS at 15:55

## 2023-01-20 RX ADMIN — MIDAZOLAM 1 MG: 1 INJECTION INTRAMUSCULAR; INTRAVENOUS at 14:34

## 2023-01-20 RX ADMIN — Medication 100 MCG: at 15:21

## 2023-01-20 RX ADMIN — ROCURONIUM BROMIDE 50 MG: 10 INJECTION, SOLUTION INTRAVENOUS at 14:48

## 2023-01-20 RX ADMIN — Medication 200 MCG: at 15:40

## 2023-01-20 RX ADMIN — PROPOFOL 200 MG: 10 INJECTION, EMULSION INTRAVENOUS at 14:48

## 2023-01-20 RX ADMIN — CEFAZOLIN SODIUM 2 G: 2 INJECTION, SOLUTION INTRAVENOUS at 14:40

## 2023-01-20 RX ADMIN — CEFAZOLIN SODIUM 2 G: 2 INJECTION, SOLUTION INTRAVENOUS at 14:33

## 2023-01-20 RX ADMIN — MIDAZOLAM HYDROCHLORIDE 2 MG: 1 INJECTION, SOLUTION INTRAMUSCULAR; INTRAVENOUS at 14:42

## 2023-01-20 RX ADMIN — ROCURONIUM BROMIDE 5 MG: 10 INJECTION, SOLUTION INTRAVENOUS at 14:47

## 2023-01-20 RX ADMIN — DROPERIDOL 0.62 MG: 2.5 INJECTION, SOLUTION INTRAMUSCULAR; INTRAVENOUS at 17:11

## 2023-01-20 RX ADMIN — ONDANSETRON 4 MG: 2 INJECTION INTRAMUSCULAR; INTRAVENOUS at 16:26

## 2023-01-20 RX ADMIN — ONDANSETRON 4 MG: 2 INJECTION INTRAMUSCULAR; INTRAVENOUS at 14:11

## 2023-01-20 RX ADMIN — PROPOFOL 100 MG: 10 INJECTION, EMULSION INTRAVENOUS at 14:49

## 2023-01-20 RX ADMIN — ONDANSETRON 4 MG: 2 INJECTION INTRAMUSCULAR; INTRAVENOUS at 16:05

## 2023-01-20 RX ADMIN — PROTAMINE SULFATE 40 MG: 10 INJECTION, SOLUTION INTRAVENOUS at 15:52

## 2023-01-20 RX ADMIN — SODIUM CHLORIDE 9 ML/HR: 9 INJECTION, SOLUTION INTRAVENOUS at 14:32

## 2023-01-20 RX ADMIN — LIDOCAINE HYDROCHLORIDE 60 MG: 20 INJECTION, SOLUTION INTRAVENOUS at 14:47

## 2023-01-20 RX ADMIN — HEPARIN SODIUM 7500 UNITS: 1000 INJECTION INTRAVENOUS; SUBCUTANEOUS at 15:12

## 2023-01-20 RX ADMIN — SCOPALAMINE 1 PATCH: 1 PATCH, EXTENDED RELEASE TRANSDERMAL at 14:11

## 2023-01-20 RX ADMIN — IOPAMIDOL 40 ML: 510 INJECTION, SOLUTION INTRAVASCULAR at 16:07

## 2023-01-20 NOTE — OP NOTE
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    Post-op Diagnosis: Same    Procedure:   1) open thrombectomy with peripheral venous angioplasty (7 x 6)    Surgeon: Elijah Herrera MD    Assistant: Sultana Ferrari RN    Anesthesia: General    Staff:   Circulator: Heidi Duque RN  Radiology Technologist: Estefania Cortes, RRT  Scrub Person: Nicole Driver  Assistant: Sultana Ferrari    Estimated Blood Loss: 100ml    Specimen:   Order Name Source Comment Collection Info Order Time   CBC (NO DIFF)   Collected By: Anna Marie Hollingsworth RN 1/20/2023  1:10 PM   COMPREHENSIVE METABOLIC PANEL   Collected By: Anna Marie Hollingsworth RN 1/20/2023  1:10 PM   HCG, SERUM, QUALITATIVE   Collected By: Anna Marie Hollingsworth RN 1/20/2023  1:10 PM     Release to patient   Routine Release            Complications: None    Findings: The patient had a severe venous outflow stenosis that responded well to venous angioplasty.  This was treated with a 7 x 6 balloon with 2 inflations.  I examined it and multiple views and did not see significant restenosis so I elected not to stent this.  Palpable thrill at the completion of the case.    Implants:   Implant Name Type Inv. Item Serial No.  Lot No. LRB No. Used Action   CLIPAPPLR M/ ENDO LIGACLIP 9 3/8IN  - [id number] Implant CLIPAPPLR M/ ENDO LIGACLIP 9 3/8IN   [employer]  DIV OF J AND J [id number] Right 1 Implanted       Indications:    The patient is an 47 y.o. female seen for evaluation right arm clotted AV graft.  Patient has extreme anxiety and is not adequately treated in the office-based setting.       Procedure:    The patient was taken to the operating placed supine the operative table.  After induction of IV sedation general anesthesia the right arm was prepped and draped with ChloraPrep.  Transverse incision was made a few  centimeters from the arterial anastomosis and the graft was exposed.  This was done without any significant difficulty.  Arteriotomy was made with 11 blade and then opened up with Vela scissors.  Susan embolectomy balloon was passed towards the venous limb.  First, it was difficult to get through the venous anastomosis between 15 and 20 cm but ultimately I was able to do this.  I crossed it with a wire and checked the central system and did not see a significant stenosis.  Balloon angioplasty was done of the venous anastomosis with a 7 x 6 balloon for 2 minutes.  This was done twice.  Balloon maceration was also used throughout the entire graft.  Follow-up shuntogram and multiple views failed to show any significant residual stenosis so I elected not to stent this although this lesion was similarly treated in July.  Balloon was used to pull the arterial plug without difficulty and there was a nice meniscus on the plug and some very strong pulsatile flow.  Everything was flushed and then I closed with Prolene sutures.  There was a nice thrill in the graft.  The wound was closed in 2 layers with Vicryl sutures.  Dermal glue was used for the skin.    Plan: The patient had not had follow-up in our office since the last fistulogram in July.  I would recommend close follow-up in about a month with a follow-up ultrasound in the office and have a low threshold to repeat her fistulogram that would unfortunately need to done in the hospital setting.    Active Hospital Problems    Diagnosis  POA   • **Thrombosis of renal dialysis arteriovenous graft, initial encounter ([facility]) [T82.868A]  Yes      Resolved Hospital Problems   No resolved problems to display.      Elijah Herrera MD     Date: 1/20/2023  Time: 16:08 EST

## 2023-01-20 NOTE — H&P
[facility]   PREOPERATIVE HISTORY AND PHYSICAL    Patient Name:Sunni Mcneil  : 1975  MRN: [id number]  Primary Care Physician: David, Bibi Known  Date of admission: 2023    Subjective   Subjective     Chief Complaint: preoperative evaluation    History of Present Illness  Sunni Mcneil is a 47 y.o. female who presents for preoperative evaluation. She is scheduled for OR SHUNTOGRAM WITH THROMBECTOMY (Right)    Review of Systems   Patient is anxious and tearful    Personal History     Past Medical History:   Diagnosis Date   • Acid reflux    • Anemia     WITH ESRD   • Anxiety    • Arthritis    • Cough     related to fluid overload   • Depression    • Diabetes mellitus (HCC)     NO MEDICATIONS FOR   • ESRD on dialysis (HCC)     FRESINUS MWF   • History of peritoneal dialysis     KIDNEY TRANSPLANT 2016    • History of transfusion     BEEN A WHILE no reaction   • Hypercalcemia    • Hyperparathyroidism ([facility])    • Hypertension    • Kidney disease     End-stage renal disease. TRANSPLANT   • Kidney transplant recipient 2016    RIGHT KIDNEY. ? 2018 KIDNEY REJECTED   • PONV (postoperative nausea and vomiting)    • Seasonal allergies     CURRENTLY    • Vascular dialysis catheter in place ([facility])     RIGHT TUNNEL CATH       Past Surgical History:   Procedure Laterality Date   • ARTERIOVENOUS FISTULA/SHUNT SURGERY Right 2020    Procedure: RIGHT ARM ARTERIAL VENOUS FISTULA;  Surgeon: Elijah Herrera MD;  Location: [facility] OR;  Service: Vascular;  Laterality: Right;   • ARTERIOVENOUS FISTULA/SHUNT SURGERY Left 2021    Procedure: LEFT BRACHIOAXILLARY ARTERIOVENOUS FISTULA GRAFT;  Surgeon: Anya Hernandez Jr., MD;  Location: [facility] OR;  Service: Vascular;  Laterality: Left;   • ARTERIOVENOUS FISTULA/SHUNT SURGERY Right 2021    Procedure: RIGHT BRACHIOAXILLARY ARTERVENIOUS GRAFT PLACEMENT;  Surgeon: Adán Maurer MD;  Location: [facility] OR;  Service:  Vascular;  Laterality: Right;   • COLONOSCOPY N/A 2022    Procedure: COLONOSCOPY TO CECUM AND TERM. ILEUM;  Surgeon: Casimiro Perkins MD;  Location: [facility] ENDOSCOPY;  Service: Gastroenterology;  Laterality: N/A;  PRE OP - SCREENING  POST OP - DIVERTICULOSIS, SM. HEMORRHOIDS   • INSERTION AND REMOVAL HEMODIALYSIS CATHETER N/A 2021    Procedure: INSERTION AND REMOVAL OF HEMODIALYSIS CATHETER;  Surgeon: Adán Maurer MD;  Location: [facility] MAIN OR;  Service: Vascular;  Laterality: N/A;   • INSERTION HEMODIALYSIS CATHETER Right 01/15/2021    Procedure: TUNNELED DIAYLIS CATHETER PLACEMENT;  Surgeon: Phu Gaviria MD;  Location: [facility] HYBRID OR ;  Service: Vascular;  Laterality: Right;   • INSERTION PERITONEAL DIALYSIS CATHETER  2014    Laparoscopic placement of Curl Cath peritoneal dialysis catheter, Dr. Vinod Goldstein   • INSERTION PERITONEAL DIALYSIS CATHETER N/A 2019    Procedure: LAPAROSCOPIC INSERTION PERITONEAL DIALYSIS CATHETER;  Surgeon: Damon Rooney MD;  Location: [facility] MAIN OR;  Service: General   • REMOVAL PERITONEAL DIALYSIS CATHETER N/A 10/17/2016    Procedure: REMOVAL PERITONEAL DIALYSIS CATHETER;  Surgeon: Damon Rooney MD;  Location: [facility] MAIN OR;  Service:    • REMOVAL PERITONEAL DIALYSIS CATHETER N/A 10/21/2020    Procedure: REMOVAL PERITONEAL DIALYSIS CATHETER;  Surgeon: Damon Rooney MD;  Location: [facility] MAIN OR;  Service: General;  Laterality: N/A;   • SHUNT O GRAM Right 2022    Procedure: RIGHT  ARM SHUNT O GRAM , ANGIOPLASTY OF AXILARY VEIN AND SUBCLAVIAN VEIN AT SVC JUNCTION;  Surgeon: Anya Hernandez Jr., MD;  Location: [facility] HYBRID OR ;  Service: Vascular;  Laterality: Right;   • TRANSPLANTATION RENAL Right 2016    from  donor   • TUBAL ABDOMINAL LIGATION Bilateral        Family History: Her family history includes Heart attack in her maternal grandfather; Hypertension in her  father and mother.     Social History: She  reports that she has never smoked. She has never used smokeless tobacco. She reports that she does not drink alcohol and does not use drugs.    Home Medications:  Methoxy PEG-Epoetin Beta, Polyethylene Glycol 3350, Probiotic Product, acetaminophen, calcium acetate, cloNIDine, folic acid-vitamin b complex-vitamin c-selenium-zinc, heparin, hydrALAZINE, hydrOXYzine, iron sucrose in sodium chloride 0.9 % 100 mL IVPB, lidocaine-prilocaine, prochlorperazine, sennosides-docusate, and traZODone    Allergies:  She has No Known Allergies.    Objective    Objective     Vitals:    Temp:  [97.9 °F (36.6 °C)] 97.9 °F (36.6 °C)  Heart Rate:  [71] 71  Resp:  [22] 22  BP: (113)/(66) 113/66    Physical Exam  Vitals reviewed.   Constitutional:       Appearance: She is well-developed.   Eyes:      Conjunctiva/sclera: Conjunctivae normal.   Cardiovascular:      Rate and Rhythm: Normal rate.   Pulmonary:      Effort: Pulmonary effort is normal.   Abdominal:      Palpations: Abdomen is soft.      Tenderness: There is no abdominal tenderness.   Neurological:      Mental Status: She is alert and oriented to person, place, and time.     Multiple incisions in the bilateral arms.  The right arm AV graft is without thrill or bruit.    Assessment & Plan   Assessment / Plan     Brief Patient Summary:  Sunni Mcneil is a 47 y.o. female who presents for preoperative evaluation.    * No Diagnosis Codes entered *    Active Hospital Problems:  There are no active hospital problems to display for this patient.    Plan:   Procedure(s):  OR SHUNTOGRAM WITH THROMBECTOMY    The risks, benefits, and alternatives of the procedure including but not limited to failure to be able to reopen the graft, distal or proximal embolization, and need for possible tunneled catheter placement.  Given patient's extreme anxiety, may best be treated with a general anesthetic.  Patient questions were answered. No guarantees were  made or implied. Informed consent was obtained.    Elijah Herrera MD

## 2023-01-20 NOTE — ANESTHESIA POSTPROCEDURE EVALUATION
Patient: Sunni Mcneil    Procedure Summary     Date: 01/20/23 Room / Location: [facility] OR 19 INV / [facility] HYBRID OR 18/19    Anesthesia Start: 1440 Anesthesia Stop: 1622    Procedure: OPEN THROMBECTOMY AND ANGIOPLASTY (Right) Diagnosis:     Surgeons: Elijah Herrera MD Provider: Ant Jiang MD    Anesthesia Type: general ASA Status: 3          Anesthesia Type: general    Vitals  Vitals Value Taken Time   /77 01/20/23 1731   Temp 36.7 °C (98 °F) 01/20/23 1730   Pulse 82 01/20/23 1736   Resp 16 01/20/23 1730   SpO2 96 % 01/20/23 1736   Vitals shown include unvalidated device data.        Post Anesthesia Care and Evaluation    Patient location during evaluation: PHASE II  Patient participation: complete - patient participated  Level of consciousness: awake  Pain score: 1  Pain management: satisfactory to patient  Anesthetic complications: No anesthetic complications  PONV Status: none  Cardiovascular status: acceptable  Respiratory status: acceptable  Hydration status: acceptable

## 2023-01-20 NOTE — PERIOPERATIVE NURSING NOTE
Dr. Webb  to bedside to assess fistula site; thrill/positive blood flow noted to fistula graft via doppler.

## 2023-01-20 NOTE — DISCHARGE INSTRUCTIONS
[facility]  David Christopher, Erasto Gaviria Scherrer ,[place], [place]  [address], [address]  [phone]    Post-Operative Instructions for AV Fistula / Graft   Diet: Regular Diet    Medications: Take your regularly scheduled medications on the day of your surgery, unless your doctor has directed you otherwise. You may be sent home with a prescription for pain medication, follow the directions as prescribed.    Activity Restrictions / Driving: Avoid lifting more than 15 pounds or other activities that stress or compress the access area. No driving for the remainder of the day after surgery. You may drive when you no longer are taking narcotic pain medications. If a nerve block was done to numb your arm for surgery, you will be placed in an arm sling.  This numbness and inability to move the arm can last for as little as 6 hours but as many as 18.  The sling should be used during this time but can be removed when sensation and movement of your arm is normal and does not need to be used after that. Use of the arm is encouraged after the surgery.    Incision Care: Some bruising is normal. If you have drainage from the incision please notify the office. Dressing should be removed in 48 hours. After dressing is removed, it is OK to shower. Do not submerge incision until cleared by your surgeon (bath or swimming).    Bathing and Showering: You may shower after you remove your dressing.    Follow-up Appointments: You will need to return to the office for a follow-up visit within 1-3 weeks after your surgery. Please make sure you have your appointment scheduled, call [phone].    The patient (you) should:  1. Avoid wearing tight constrictive clothing over that arm.  2. Avoid wearing jewelry that is tight, such as a watch on the access arm.  3. Avoid carrying heavy objects.  4. Avoid purse straps over the fistula.  5. Avoid sleeping on the arm or keeping it bent for extended periods of time.  6.  "Each day, using your opposite hand, feel over the fistula for the \"thrill\" or vibration that is normally present.    Fistula Information / Care:   It is normal to have swelling in the surgical area. To help control this swelling, you should elevate your arm on a pillow.   Wiggle your fingers and clinch your fist 10 times every hour, while awake, for the first 5-7 days. Also, bend and straighten at the elbow to regain normal range of motion. These exercises are designed to promote circulation in the fingers and aid in draining away the excess fluid accumulation in the immediate area.  No blood pressures or needle sticks in the arm with your access.    Call the office for the followin. Fever greater than 101.0  2. Uncontrolled pain. This is on a scale of 1-10 (10 being the worst pain imaginable) your pain is a level 7 or above.  3. It is important that you notify our office if you are having numbness and significant pain in the extremity in which you have just had surgery!  4. Decreased or absent thrill.  5. Nausea, diarrhea, and/or vomiting that continue for 12-24 hours.  6. Signs of an infection: redness, increased swelling, drainage, fever and/or chills.  7. Chest pain or difficulty breathing.    The fistula or graft CAN be used for next.    If you have further questions after reading this handout, the office is open from 8:30am to 5:00pm Monday through Friday. Call [phone].   "

## 2023-01-20 NOTE — ANESTHESIA PROCEDURE NOTES
Airway  Urgency: elective    Date/Time: 1/20/2023 2:49 PM  Airway not difficult    General Information and Staff    Patient location during procedure: OR  CRNA/CAA: Beatris Strong CRNA    Indications and Patient Condition  Indications for airway management: airway protection    Preoxygenated: yes  MILS maintained throughout  Mask difficulty assessment: 0 - not attempted    Final Airway Details  Final airway type: endotracheal airway      Successful airway: ETT  Cuffed: yes   Successful intubation technique: direct laryngoscopy and RSI  Facilitating devices/methods: cricoid pressure  Endotracheal tube insertion site: oral  Blade: Leandra  Blade size: 3  ETT size (mm): 7.0  Cormack-Lehane Classification: grade IIa - partial view of glottis  Placement verified by: chest auscultation and capnometry   Cuff volume (mL): 6  Measured from: lips  ETT/EBT  to lips (cm): 20  Number of attempts at approach: 1  Assessment: lips, teeth, and gum same as pre-op and atraumatic intubation    Additional Comments  Atraumatic ET Tube placement.  Teeth as pre-op. BLEBS.  -ABD sounds.  +ET CO2.  Secured to face

## 2023-01-20 NOTE — ANESTHESIA PREPROCEDURE EVALUATION
Anesthesia Evaluation     Patient summary reviewed and Nursing notes reviewed   history of anesthetic complications: PONV  NPO Solid Status: > 8 hours  NPO Liquid Status: > 8 hours           Airway   Mallampati: II  TM distance: >3 FB  Neck ROM: full  No difficulty expected  Dental - normal exam     Pulmonary    (-) rhonchi, decreased breath sounds, wheezes, not a smoker  Cardiovascular   Exercise tolerance: good (4-7 METS)    Rhythm: regular  Rate: normal    (+) hypertension,   (-) CAD, angina, BLACKMON, murmur      Neuro/Psych  (+) psychiatric history Anxiety,    (-) CVA  GI/Hepatic/Renal/Endo    (+) obesity,  GERD well controlled,  renal disease (last dialysis wednesday does not make urine ) ESRD and dialysis, diabetes mellitus (no meds BS 80 ) well controlled,     Musculoskeletal     Abdominal     Abdomen: soft.   Substance History      OB/GYN          Other   arthritis,      ROS/Med Hx Other: Hist hyperkalemia blood sample obtained                   Anesthesia Plan    ASA 3     general     (Scop patch   subtherapeutic propofol for PONV prevention )  intravenous induction     Anesthetic plan, risks, benefits, and alternatives have been provided, discussed and informed consent has been obtained with: patient.        CODE STATUS:

## 2023-01-21 ENCOUNTER — ANESTHESIA (OUTPATIENT)
Dept: PERIOP | Facility: HOSPITAL | Age: 48
DRG: 252 | End: 2023-01-21
Payer: MEDICARE

## 2023-01-21 ENCOUNTER — APPOINTMENT (OUTPATIENT)
Dept: GENERAL RADIOLOGY | Facility: HOSPITAL | Age: 48
DRG: 252 | End: 2023-01-21
Payer: MEDICARE

## 2023-01-21 ENCOUNTER — ANESTHESIA EVENT (OUTPATIENT)
Dept: PERIOP | Facility: HOSPITAL | Age: 48
DRG: 252 | End: 2023-01-21
Payer: MEDICARE

## 2023-01-21 ENCOUNTER — HOSPITAL ENCOUNTER (INPATIENT)
Facility: HOSPITAL | Age: 48
LOS: 3 days | Discharge: HOME | DRG: 252 | End: 2023-01-24
Attending: EMERGENCY MEDICINE | Admitting: INTERNAL MEDICINE
Payer: MEDICARE

## 2023-01-21 DIAGNOSIS — N18.6 CHRONIC KIDNEY DISEASE WITH END STAGE RENAL FAILURE ON DIALYSIS: ICD-10-CM

## 2023-01-21 DIAGNOSIS — T82.898A ARTERIOVENOUS FISTULA OCCLUSION, INITIAL ENCOUNTER: ICD-10-CM

## 2023-01-21 DIAGNOSIS — T82.49XA: ICD-10-CM

## 2023-01-21 DIAGNOSIS — T82.868A THROMBOSIS OF RENAL DIALYSIS ARTERIOVENOUS GRAFT, INITIAL ENCOUNTER: Primary | ICD-10-CM

## 2023-01-21 DIAGNOSIS — Z99.2 CHRONIC KIDNEY DISEASE WITH END STAGE RENAL FAILURE ON DIALYSIS: ICD-10-CM

## 2023-01-21 DIAGNOSIS — E87.5 HYPERKALEMIA: ICD-10-CM

## 2023-01-21 PROBLEM — K21.9 GERD WITHOUT ESOPHAGITIS: Status: ACTIVE | Noted: 2023-01-21

## 2023-01-21 PROBLEM — E83.39 HYPERPHOSPHATEMIA: Status: ACTIVE | Noted: 2023-01-21

## 2023-01-21 LAB
ALBUMIN SERPL-MCNC: 4.2 G/DL (ref 3.5–5.2)
ALBUMIN/GLOB SERPL: 1.2 G/DL
ALP SERPL-CCNC: 52 U/L (ref 39–117)
ALT SERPL W P-5'-P-CCNC: <5 U/L (ref 1–33)
ANION GAP SERPL CALCULATED.3IONS-SCNC: 19 MMOL/L (ref 5–15)
AST SERPL-CCNC: 11 U/L (ref 1–32)
BASOPHILS # BLD AUTO: 0.02 10*3/MM3 (ref 0–0.2)
BASOPHILS NFR BLD AUTO: 0.2 % (ref 0–1.5)
BILIRUB SERPL-MCNC: 0.3 MG/DL (ref 0–1.2)
BUN SERPL-MCNC: 62 MG/DL (ref 6–20)
BUN/CREAT SERPL: 5.1 (ref 7–25)
CALCIUM SPEC-SCNC: 8.2 MG/DL (ref 8.6–10.5)
CHLORIDE SERPL-SCNC: 99 MMOL/L (ref 98–107)
CO2 SERPL-SCNC: 19 MMOL/L (ref 22–29)
CREAT SERPL-MCNC: 12.26 MG/DL (ref 0.57–1)
DEPRECATED RDW RBC AUTO: 63.7 FL (ref 37–54)
EGFRCR SERPLBLD CKD-EPI 2021: 3.5 ML/MIN/1.73
EOSINOPHIL # BLD AUTO: 0.04 10*3/MM3 (ref 0–0.4)
EOSINOPHIL NFR BLD AUTO: 0.5 % (ref 0.3–6.2)
ERYTHROCYTE [DISTWIDTH] IN BLOOD BY AUTOMATED COUNT: 18.9 % (ref 12.3–15.4)
GLOBULIN UR ELPH-MCNC: 3.4 GM/DL
GLUCOSE BLDC GLUCOMTR-MCNC: 110 MG/DL (ref 70–130)
GLUCOSE BLDC GLUCOMTR-MCNC: 83 MG/DL (ref 70–130)
GLUCOSE BLDC GLUCOMTR-MCNC: 89 MG/DL (ref 70–130)
GLUCOSE SERPL-MCNC: 88 MG/DL (ref 65–99)
HBA1C MFR BLD: 4.8 % (ref 4.8–5.6)
HBV SURFACE AG SERPL QL IA: NORMAL
HCT VFR BLD AUTO: 31.7 % (ref 34–46.6)
HGB BLD-MCNC: 9.9 G/DL (ref 12–15.9)
IMM GRANULOCYTES # BLD AUTO: 0.05 10*3/MM3 (ref 0–0.05)
IMM GRANULOCYTES NFR BLD AUTO: 0.6 % (ref 0–0.5)
INR PPP: 1.2 (ref 0.9–1.1)
LYMPHOCYTES # BLD AUTO: 0.54 10*3/MM3 (ref 0.7–3.1)
LYMPHOCYTES NFR BLD AUTO: 6.6 % (ref 19.6–45.3)
MAGNESIUM SERPL-MCNC: 2 MG/DL (ref 1.6–2.6)
MAGNESIUM SERPL-MCNC: 2.5 MG/DL (ref 1.6–2.6)
MCH RBC QN AUTO: 29.4 PG (ref 26.6–33)
MCHC RBC AUTO-ENTMCNC: 31.2 G/DL (ref 31.5–35.7)
MCV RBC AUTO: 94.1 FL (ref 79–97)
MONOCYTES # BLD AUTO: 0.31 10*3/MM3 (ref 0.1–0.9)
MONOCYTES NFR BLD AUTO: 3.8 % (ref 5–12)
NEUTROPHILS NFR BLD AUTO: 7.25 10*3/MM3 (ref 1.7–7)
NEUTROPHILS NFR BLD AUTO: 88.3 % (ref 42.7–76)
NRBC BLD AUTO-RTO: 0 /100 WBC (ref 0–0.2)
PLATELET # BLD AUTO: 189 10*3/MM3 (ref 140–450)
PMV BLD AUTO: 9.8 FL (ref 6–12)
POTASSIUM SERPL-SCNC: 5.5 MMOL/L (ref 3.5–5.2)
PROT SERPL-MCNC: 7.6 G/DL (ref 6–8.5)
PROTHROMBIN TIME: 15.4 SECONDS (ref 11.7–14.2)
QT INTERVAL: 443 MS
RBC # BLD AUTO: 3.37 10*6/MM3 (ref 3.77–5.28)
SODIUM SERPL-SCNC: 137 MMOL/L (ref 136–145)
WBC NRBC COR # BLD: 8.21 10*3/MM3 (ref 3.4–10.8)

## 2023-01-21 PROCEDURE — 0JH63XZ INSERTION OF TUNNELED VASCULAR ACCESS DEVICE INTO CHEST SUBCUTANEOUS TISSUE AND FASCIA, PERCUTANEOUS APPROACH: ICD-10-PCS | Performed by: SURGERY

## 2023-01-21 PROCEDURE — 80053 COMPREHEN METABOLIC PANEL: CPT | Performed by: EMERGENCY MEDICINE

## 2023-01-21 PROCEDURE — 85025 COMPLETE CBC W/AUTO DIFF WBC: CPT | Performed by: EMERGENCY MEDICINE

## 2023-01-21 PROCEDURE — 36415 COLL VENOUS BLD VENIPUNCTURE: CPT

## 2023-01-21 PROCEDURE — 25010000002 MIDAZOLAM PER 1 MG: Performed by: ANESTHESIOLOGY

## 2023-01-21 PROCEDURE — 83735 ASSAY OF MAGNESIUM: CPT | Performed by: INTERNAL MEDICINE

## 2023-01-21 PROCEDURE — C1894 INTRO/SHEATH, NON-LASER: HCPCS | Performed by: SURGERY

## 2023-01-21 PROCEDURE — 93005 ELECTROCARDIOGRAM TRACING: CPT | Performed by: EMERGENCY MEDICINE

## 2023-01-21 PROCEDURE — 85610 PROTHROMBIN TIME: CPT | Performed by: EMERGENCY MEDICINE

## 2023-01-21 PROCEDURE — 25010000002 FENTANYL CITRATE (PF) 50 MCG/ML SOLUTION: Performed by: NURSE ANESTHETIST, CERTIFIED REGISTERED

## 2023-01-21 PROCEDURE — 83036 HEMOGLOBIN GLYCOSYLATED A1C: CPT | Performed by: INTERNAL MEDICINE

## 2023-01-21 PROCEDURE — 83735 ASSAY OF MAGNESIUM: CPT | Performed by: EMERGENCY MEDICINE

## 2023-01-21 PROCEDURE — 76000 FLUOROSCOPY <1 HR PHYS/QHP: CPT

## 2023-01-21 PROCEDURE — 02H633Z INSERTION OF INFUSION DEVICE INTO RIGHT ATRIUM, PERCUTANEOUS APPROACH: ICD-10-PCS | Performed by: SURGERY

## 2023-01-21 PROCEDURE — C1750 CATH, HEMODIALYSIS,LONG-TERM: HCPCS

## 2023-01-21 PROCEDURE — 99284 EMERGENCY DEPT VISIT MOD MDM: CPT

## 2023-01-21 PROCEDURE — 25010000002 DROPERIDOL PER 5 MG: Performed by: NURSE ANESTHETIST, CERTIFIED REGISTERED

## 2023-01-21 PROCEDURE — 93010 ELECTROCARDIOGRAM REPORT: CPT | Performed by: INTERNAL MEDICINE

## 2023-01-21 PROCEDURE — B51W1ZZ FLUOROSCOPY OF DIALYSIS SHUNT/FISTULA USING LOW OSMOLAR CONTRAST: ICD-10-PCS | Performed by: SURGERY

## 2023-01-21 PROCEDURE — 25010000002 HEPARIN (PORCINE) PER 1000 UNITS: Performed by: INTERNAL MEDICINE

## 2023-01-21 PROCEDURE — 87340 HEPATITIS B SURFACE AG IA: CPT | Performed by: EMERGENCY MEDICINE

## 2023-01-21 PROCEDURE — 82962 GLUCOSE BLOOD TEST: CPT

## 2023-01-21 PROCEDURE — 25010000002 PROPOFOL 10 MG/ML EMULSION: Performed by: NURSE ANESTHETIST, CERTIFIED REGISTERED

## 2023-01-21 PROCEDURE — 76937 US GUIDE VASCULAR ACCESS: CPT

## 2023-01-21 PROCEDURE — 25010000002 MIDAZOLAM PER 1 MG: Performed by: NURSE ANESTHETIST, CERTIFIED REGISTERED

## 2023-01-21 PROCEDURE — 0 LIDOCAINE 1 % SOLUTION 20 ML VIAL: Performed by: SURGERY

## 2023-01-21 PROCEDURE — 77001 FLUOROGUIDE FOR VEIN DEVICE: CPT

## 2023-01-21 PROCEDURE — 5A1D70Z PERFORMANCE OF URINARY FILTRATION, INTERMITTENT, LESS THAN 6 HOURS PER DAY: ICD-10-PCS | Performed by: STUDENT IN AN ORGANIZED HEALTH CARE EDUCATION/TRAINING PROGRAM

## 2023-01-21 PROCEDURE — 25010000002 MORPHINE PER 10 MG: Performed by: INTERNAL MEDICINE

## 2023-01-21 PROCEDURE — 25010000002 CEFAZOLIN IN DEXTROSE 2-4 GM/100ML-% SOLUTION: Performed by: SURGERY

## 2023-01-21 PROCEDURE — 25010000002 HEPARIN (PORCINE) PER 1000 UNITS: Performed by: SURGERY

## 2023-01-21 PROCEDURE — 25010000002 CALCIUM GLUCONATE-NACL 1-0.675 GM/50ML-% SOLUTION: Performed by: EMERGENCY MEDICINE

## 2023-01-21 DEVICE — PRECISION CHRONIC CATHETER KIT,SYMMETRICAL TIP,14.5 FR/CH (4.8 MM) X 23 CM
Type: IMPLANTABLE DEVICE | Site: INTERNAL JUGULAR | Status: FUNCTIONAL
Brand: PALINDROME

## 2023-01-21 RX ORDER — LIDOCAINE HYDROCHLORIDE 20 MG/ML
INJECTION, SOLUTION INFILTRATION; PERINEURAL AS NEEDED
Status: DISCONTINUED | OUTPATIENT
Start: 2023-01-21 | End: 2023-01-21 | Stop reason: SURG

## 2023-01-21 RX ORDER — SODIUM CHLORIDE 0.9 % (FLUSH) 0.9 %
10 SYRINGE (ML) INJECTION AS NEEDED
Status: DISCONTINUED | OUTPATIENT
Start: 2023-01-21 | End: 2023-01-24 | Stop reason: HOSPADM

## 2023-01-21 RX ORDER — DIPHENHYDRAMINE HCL 25 MG
25 CAPSULE ORAL
Status: DISCONTINUED | OUTPATIENT
Start: 2023-01-21 | End: 2023-01-21

## 2023-01-21 RX ORDER — ROCURONIUM BROMIDE 10 MG/ML
INJECTION, SOLUTION INTRAVENOUS AS NEEDED
Status: DISCONTINUED | OUTPATIENT
Start: 2023-01-21 | End: 2023-01-21 | Stop reason: SURG

## 2023-01-21 RX ORDER — HYDRALAZINE HYDROCHLORIDE 50 MG/1
50 TABLET, FILM COATED ORAL 3 TIMES DAILY
Status: DISCONTINUED | OUTPATIENT
Start: 2023-01-21 | End: 2023-01-22

## 2023-01-21 RX ORDER — LIDOCAINE HYDROCHLORIDE 10 MG/ML
0.5 INJECTION, SOLUTION EPIDURAL; INFILTRATION; INTRACAUDAL; PERINEURAL ONCE AS NEEDED
Status: DISCONTINUED | OUTPATIENT
Start: 2023-01-21 | End: 2023-01-21 | Stop reason: HOSPADM

## 2023-01-21 RX ORDER — MORPHINE SULFATE 1 MG/ML
1 INJECTION, SOLUTION EPIDURAL; INTRATHECAL; INTRAVENOUS ONCE
Status: COMPLETED | OUTPATIENT
Start: 2023-01-21 | End: 2023-01-21

## 2023-01-21 RX ORDER — FAMOTIDINE 20 MG/1
40 TABLET, FILM COATED ORAL DAILY
Status: DISCONTINUED | OUTPATIENT
Start: 2023-01-21 | End: 2023-01-22

## 2023-01-21 RX ORDER — CHOLECALCIFEROL (VITAMIN D3) 125 MCG
5 CAPSULE ORAL NIGHTLY PRN
Status: DISCONTINUED | OUTPATIENT
Start: 2023-01-21 | End: 2023-01-24 | Stop reason: HOSPADM

## 2023-01-21 RX ORDER — SODIUM CHLORIDE 9 MG/ML
9 INJECTION, SOLUTION INTRAVENOUS CONTINUOUS
Status: DISCONTINUED | OUTPATIENT
Start: 2023-01-21 | End: 2023-01-21

## 2023-01-21 RX ORDER — FLUMAZENIL 0.1 MG/ML
0.2 INJECTION INTRAVENOUS AS NEEDED
Status: DISCONTINUED | OUTPATIENT
Start: 2023-01-21 | End: 2023-01-21

## 2023-01-21 RX ORDER — NALOXONE HCL 0.4 MG/ML
0.2 VIAL (ML) INJECTION AS NEEDED
Status: DISCONTINUED | OUTPATIENT
Start: 2023-01-21 | End: 2023-01-21

## 2023-01-21 RX ORDER — FENTANYL CITRATE 50 UG/ML
50 INJECTION, SOLUTION INTRAMUSCULAR; INTRAVENOUS
Status: DISCONTINUED | OUTPATIENT
Start: 2023-01-21 | End: 2023-01-21

## 2023-01-21 RX ORDER — ONDANSETRON 2 MG/ML
4 INJECTION INTRAMUSCULAR; INTRAVENOUS ONCE AS NEEDED
Status: DISCONTINUED | OUTPATIENT
Start: 2023-01-21 | End: 2023-01-21

## 2023-01-21 RX ORDER — INSULIN LISPRO 100 [IU]/ML
0-7 INJECTION, SOLUTION INTRAVENOUS; SUBCUTANEOUS
Status: DISCONTINUED | OUTPATIENT
Start: 2023-01-21 | End: 2023-01-24 | Stop reason: HOSPADM

## 2023-01-21 RX ORDER — DROPERIDOL 2.5 MG/ML
INJECTION, SOLUTION INTRAMUSCULAR; INTRAVENOUS AS NEEDED
Status: DISCONTINUED | OUTPATIENT
Start: 2023-01-21 | End: 2023-01-21 | Stop reason: SURG

## 2023-01-21 RX ORDER — CLONIDINE HYDROCHLORIDE 0.1 MG/1
0.1 TABLET ORAL EVERY 12 HOURS SCHEDULED
Status: DISCONTINUED | OUTPATIENT
Start: 2023-01-21 | End: 2023-01-24

## 2023-01-21 RX ORDER — ACETAMINOPHEN 325 MG/1
650 TABLET ORAL EVERY 4 HOURS PRN
Status: DISCONTINUED | OUTPATIENT
Start: 2023-01-21 | End: 2023-01-24 | Stop reason: HOSPADM

## 2023-01-21 RX ORDER — ONDANSETRON 4 MG/1
4 TABLET, FILM COATED ORAL EVERY 6 HOURS PRN
Status: DISCONTINUED | OUTPATIENT
Start: 2023-01-21 | End: 2023-01-24 | Stop reason: HOSPADM

## 2023-01-21 RX ORDER — PROMETHAZINE HYDROCHLORIDE 25 MG/1
25 SUPPOSITORY RECTAL ONCE AS NEEDED
Status: DISCONTINUED | OUTPATIENT
Start: 2023-01-21 | End: 2023-01-21

## 2023-01-21 RX ORDER — DEXTROSE MONOHYDRATE 25 G/50ML
25 INJECTION, SOLUTION INTRAVENOUS
Status: DISCONTINUED | OUTPATIENT
Start: 2023-01-21 | End: 2023-01-24 | Stop reason: HOSPADM

## 2023-01-21 RX ORDER — CALCIUM GLUCONATE 20 MG/ML
1 INJECTION, SOLUTION INTRAVENOUS ONCE
Status: COMPLETED | OUTPATIENT
Start: 2023-01-21 | End: 2023-01-21

## 2023-01-21 RX ORDER — NITROGLYCERIN 0.4 MG/1
0.4 TABLET SUBLINGUAL
Status: DISCONTINUED | OUTPATIENT
Start: 2023-01-21 | End: 2023-01-24 | Stop reason: HOSPADM

## 2023-01-21 RX ORDER — HEPARIN SODIUM 1000 [USP'U]/ML
INJECTION, SOLUTION INTRAVENOUS; SUBCUTANEOUS AS NEEDED
Status: DISCONTINUED | OUTPATIENT
Start: 2023-01-21 | End: 2023-01-21 | Stop reason: HOSPADM

## 2023-01-21 RX ORDER — LABETALOL HYDROCHLORIDE 5 MG/ML
5 INJECTION, SOLUTION INTRAVENOUS
Status: DISCONTINUED | OUTPATIENT
Start: 2023-01-21 | End: 2023-01-21

## 2023-01-21 RX ORDER — SODIUM CHLORIDE, SODIUM LACTATE, POTASSIUM CHLORIDE, CALCIUM CHLORIDE 600; 310; 30; 20 MG/100ML; MG/100ML; MG/100ML; MG/100ML
9 INJECTION, SOLUTION INTRAVENOUS CONTINUOUS
Status: DISCONTINUED | OUTPATIENT
Start: 2023-01-21 | End: 2023-01-21

## 2023-01-21 RX ORDER — ACETAMINOPHEN 650 MG/1
650 SUPPOSITORY RECTAL EVERY 4 HOURS PRN
Status: DISCONTINUED | OUTPATIENT
Start: 2023-01-21 | End: 2023-01-24 | Stop reason: HOSPADM

## 2023-01-21 RX ORDER — SODIUM CHLORIDE 0.9 % (FLUSH) 0.9 %
10 SYRINGE (ML) INJECTION EVERY 12 HOURS SCHEDULED
Status: DISCONTINUED | OUTPATIENT
Start: 2023-01-21 | End: 2023-01-24 | Stop reason: HOSPADM

## 2023-01-21 RX ORDER — NICOTINE POLACRILEX 4 MG
15 LOZENGE BUCCAL
Status: DISCONTINUED | OUTPATIENT
Start: 2023-01-21 | End: 2023-01-24 | Stop reason: HOSPADM

## 2023-01-21 RX ORDER — HYDRALAZINE HYDROCHLORIDE 20 MG/ML
5 INJECTION INTRAMUSCULAR; INTRAVENOUS
Status: DISCONTINUED | OUTPATIENT
Start: 2023-01-21 | End: 2023-01-21

## 2023-01-21 RX ORDER — SODIUM CHLORIDE 9 MG/ML
40 INJECTION, SOLUTION INTRAVENOUS AS NEEDED
Status: DISCONTINUED | OUTPATIENT
Start: 2023-01-21 | End: 2023-01-24 | Stop reason: HOSPADM

## 2023-01-21 RX ORDER — SODIUM CHLORIDE 0.9 % (FLUSH) 0.9 %
3 SYRINGE (ML) INJECTION EVERY 12 HOURS SCHEDULED
Status: DISCONTINUED | OUTPATIENT
Start: 2023-01-21 | End: 2023-01-21 | Stop reason: HOSPADM

## 2023-01-21 RX ORDER — PROPOFOL 10 MG/ML
VIAL (ML) INTRAVENOUS AS NEEDED
Status: DISCONTINUED | OUTPATIENT
Start: 2023-01-21 | End: 2023-01-21 | Stop reason: SURG

## 2023-01-21 RX ORDER — ACETAMINOPHEN 160 MG/5ML
650 SOLUTION ORAL EVERY 4 HOURS PRN
Status: DISCONTINUED | OUTPATIENT
Start: 2023-01-21 | End: 2023-01-24 | Stop reason: HOSPADM

## 2023-01-21 RX ORDER — PROCHLORPERAZINE MALEATE 10 MG
10 TABLET ORAL EVERY 6 HOURS PRN
Status: DISCONTINUED | OUTPATIENT
Start: 2023-01-21 | End: 2023-01-24 | Stop reason: HOSPADM

## 2023-01-21 RX ORDER — EPHEDRINE SULFATE 50 MG/ML
5 INJECTION, SOLUTION INTRAVENOUS ONCE AS NEEDED
Status: DISCONTINUED | OUTPATIENT
Start: 2023-01-21 | End: 2023-01-21

## 2023-01-21 RX ORDER — PROMETHAZINE HYDROCHLORIDE 25 MG/1
25 TABLET ORAL ONCE AS NEEDED
Status: DISCONTINUED | OUTPATIENT
Start: 2023-01-21 | End: 2023-01-21

## 2023-01-21 RX ORDER — OXYCODONE AND ACETAMINOPHEN 7.5; 325 MG/1; MG/1
1 TABLET ORAL EVERY 4 HOURS PRN
Status: DISCONTINUED | OUTPATIENT
Start: 2023-01-21 | End: 2023-01-21

## 2023-01-21 RX ORDER — NICOTINE POLACRILEX 4 MG
15 LOZENGE BUCCAL
Status: DISCONTINUED | OUTPATIENT
Start: 2023-01-21 | End: 2023-01-23 | Stop reason: SDUPTHER

## 2023-01-21 RX ORDER — HYDROXYZINE 50 MG/1
50 TABLET, FILM COATED ORAL 2 TIMES DAILY
Status: DISCONTINUED | OUTPATIENT
Start: 2023-01-21 | End: 2023-01-24 | Stop reason: HOSPADM

## 2023-01-21 RX ORDER — HYDROCODONE BITARTRATE AND ACETAMINOPHEN 5; 325 MG/1; MG/1
2 TABLET ORAL EVERY 6 HOURS PRN
Status: DISCONTINUED | OUTPATIENT
Start: 2023-01-21 | End: 2023-01-24 | Stop reason: HOSPADM

## 2023-01-21 RX ORDER — FENTANYL CITRATE 50 UG/ML
50 INJECTION, SOLUTION INTRAMUSCULAR; INTRAVENOUS
Status: DISCONTINUED | OUTPATIENT
Start: 2023-01-21 | End: 2023-01-21 | Stop reason: HOSPADM

## 2023-01-21 RX ORDER — AMOXICILLIN 250 MG
2 CAPSULE ORAL 2 TIMES DAILY
Status: DISCONTINUED | OUTPATIENT
Start: 2023-01-21 | End: 2023-01-24 | Stop reason: HOSPADM

## 2023-01-21 RX ORDER — CEFAZOLIN SODIUM 2 G/100ML
2 INJECTION, SOLUTION INTRAVENOUS ONCE
Status: COMPLETED | OUTPATIENT
Start: 2023-01-21 | End: 2023-01-21

## 2023-01-21 RX ORDER — TRAZODONE HYDROCHLORIDE 100 MG/1
100 TABLET ORAL NIGHTLY
Status: DISCONTINUED | OUTPATIENT
Start: 2023-01-21 | End: 2023-01-24 | Stop reason: HOSPADM

## 2023-01-21 RX ORDER — DIPHENHYDRAMINE HYDROCHLORIDE 50 MG/ML
12.5 INJECTION INTRAMUSCULAR; INTRAVENOUS
Status: DISCONTINUED | OUTPATIENT
Start: 2023-01-21 | End: 2023-01-21

## 2023-01-21 RX ORDER — MIDAZOLAM HYDROCHLORIDE 1 MG/ML
INJECTION INTRAMUSCULAR; INTRAVENOUS AS NEEDED
Status: DISCONTINUED | OUTPATIENT
Start: 2023-01-21 | End: 2023-01-21 | Stop reason: SURG

## 2023-01-21 RX ORDER — CALCIUM ACETATE 667 MG/1
2001 CAPSULE ORAL
Status: DISCONTINUED | OUTPATIENT
Start: 2023-01-21 | End: 2023-01-24 | Stop reason: HOSPADM

## 2023-01-21 RX ORDER — HYDROCODONE BITARTRATE AND ACETAMINOPHEN 5; 325 MG/1; MG/1
1 TABLET ORAL EVERY 4 HOURS PRN
Status: DISCONTINUED | OUTPATIENT
Start: 2023-01-21 | End: 2023-01-21

## 2023-01-21 RX ORDER — SODIUM CHLORIDE 0.9 % (FLUSH) 0.9 %
3-10 SYRINGE (ML) INJECTION AS NEEDED
Status: DISCONTINUED | OUTPATIENT
Start: 2023-01-21 | End: 2023-01-21 | Stop reason: HOSPADM

## 2023-01-21 RX ORDER — FENTANYL CITRATE 50 UG/ML
INJECTION, SOLUTION INTRAMUSCULAR; INTRAVENOUS AS NEEDED
Status: DISCONTINUED | OUTPATIENT
Start: 2023-01-21 | End: 2023-01-21 | Stop reason: SURG

## 2023-01-21 RX ORDER — HYDROCODONE BITARTRATE AND ACETAMINOPHEN 7.5; 325 MG/1; MG/1
1 TABLET ORAL ONCE AS NEEDED
Status: DISCONTINUED | OUTPATIENT
Start: 2023-01-21 | End: 2023-01-21

## 2023-01-21 RX ORDER — ENOXAPARIN SODIUM 100 MG/ML
30 INJECTION SUBCUTANEOUS DAILY
Status: DISCONTINUED | OUTPATIENT
Start: 2023-01-21 | End: 2023-01-21

## 2023-01-21 RX ORDER — MAGNESIUM HYDROXIDE 1200 MG/15ML
LIQUID ORAL AS NEEDED
Status: DISCONTINUED | OUTPATIENT
Start: 2023-01-21 | End: 2023-01-21 | Stop reason: HOSPADM

## 2023-01-21 RX ORDER — ONDANSETRON 2 MG/ML
4 INJECTION INTRAMUSCULAR; INTRAVENOUS EVERY 6 HOURS PRN
Status: DISCONTINUED | OUTPATIENT
Start: 2023-01-21 | End: 2023-01-24 | Stop reason: HOSPADM

## 2023-01-21 RX ORDER — DEXTROSE MONOHYDRATE 25 G/50ML
25 INJECTION, SOLUTION INTRAVENOUS
Status: DISCONTINUED | OUTPATIENT
Start: 2023-01-21 | End: 2023-01-23 | Stop reason: SDUPTHER

## 2023-01-21 RX ORDER — LORAZEPAM 0.5 MG/1
0.5 TABLET ORAL EVERY 8 HOURS PRN
Status: DISCONTINUED | OUTPATIENT
Start: 2023-01-21 | End: 2023-01-24 | Stop reason: HOSPADM

## 2023-01-21 RX ORDER — PROPOFOL 10 MG/ML
VIAL (ML) INTRAVENOUS CONTINUOUS PRN
Status: DISCONTINUED | OUTPATIENT
Start: 2023-01-21 | End: 2023-01-21 | Stop reason: SURG

## 2023-01-21 RX ORDER — MIDAZOLAM HYDROCHLORIDE 1 MG/ML
1 INJECTION INTRAMUSCULAR; INTRAVENOUS
Status: DISCONTINUED | OUTPATIENT
Start: 2023-01-21 | End: 2023-01-21 | Stop reason: HOSPADM

## 2023-01-21 RX ORDER — FAMOTIDINE 10 MG/ML
20 INJECTION, SOLUTION INTRAVENOUS ONCE
Status: COMPLETED | OUTPATIENT
Start: 2023-01-21 | End: 2023-01-21

## 2023-01-21 RX ORDER — ESMOLOL HYDROCHLORIDE 10 MG/ML
INJECTION INTRAVENOUS AS NEEDED
Status: DISCONTINUED | OUTPATIENT
Start: 2023-01-21 | End: 2023-01-21 | Stop reason: SURG

## 2023-01-21 RX ORDER — HEPARIN SODIUM 5000 [USP'U]/ML
5000 INJECTION, SOLUTION INTRAVENOUS; SUBCUTANEOUS EVERY 12 HOURS SCHEDULED
Status: DISCONTINUED | OUTPATIENT
Start: 2023-01-21 | End: 2023-01-23

## 2023-01-21 RX ADMIN — CALCIUM ACETATE 2001 MG: 667 CAPSULE ORAL at 18:22

## 2023-01-21 RX ADMIN — SUGAMMADEX 400 MG: 100 INJECTION, SOLUTION INTRAVENOUS at 12:52

## 2023-01-21 RX ADMIN — FENTANYL CITRATE 50 MCG: 50 INJECTION, SOLUTION INTRAMUSCULAR; INTRAVENOUS at 11:43

## 2023-01-21 RX ADMIN — FAMOTIDINE 20 MG: 10 INJECTION INTRAVENOUS at 11:32

## 2023-01-21 RX ADMIN — ESMOLOL HYDROCHLORIDE 10 MG: 100 INJECTION, SOLUTION INTRAVENOUS at 12:28

## 2023-01-21 RX ADMIN — CEFAZOLIN SODIUM 2 G: 2 INJECTION, SOLUTION INTRAVENOUS at 11:33

## 2023-01-21 RX ADMIN — SODIUM CHLORIDE: 9 INJECTION, SOLUTION INTRAVENOUS at 11:39

## 2023-01-21 RX ADMIN — PROPOFOL 120 MCG/KG/MIN: 10 INJECTION, EMULSION INTRAVENOUS at 12:12

## 2023-01-21 RX ADMIN — CALCIUM GLUCONATE 1 G: 20 INJECTION, SOLUTION INTRAVENOUS at 10:40

## 2023-01-21 RX ADMIN — DROPERIDOL 0.62 MG: 2.5 INJECTION, SOLUTION INTRAMUSCULAR; INTRAVENOUS at 11:41

## 2023-01-21 RX ADMIN — TRAZODONE HYDROCHLORIDE 100 MG: 100 TABLET ORAL at 20:37

## 2023-01-21 RX ADMIN — FAMOTIDINE 40 MG: 20 TABLET, FILM COATED ORAL at 18:29

## 2023-01-21 RX ADMIN — HYDROXYZINE HYDROCHLORIDE 50 MG: 50 TABLET, FILM COATED ORAL at 20:37

## 2023-01-21 RX ADMIN — HEPARIN SODIUM 5000 UNITS: 5000 INJECTION INTRAVENOUS; SUBCUTANEOUS at 20:38

## 2023-01-21 RX ADMIN — MIDAZOLAM 1 MG: 1 INJECTION INTRAMUSCULAR; INTRAVENOUS at 11:32

## 2023-01-21 RX ADMIN — ROCURONIUM BROMIDE 90 MG: 50 INJECTION INTRAVENOUS at 11:47

## 2023-01-21 RX ADMIN — Medication 200 MG: at 11:47

## 2023-01-21 RX ADMIN — Medication 10 ML: at 22:32

## 2023-01-21 RX ADMIN — LIDOCAINE HYDROCHLORIDE 80 MG: 20 INJECTION, SOLUTION INFILTRATION; PERINEURAL at 11:47

## 2023-01-21 RX ADMIN — HYDRALAZINE HYDROCHLORIDE 50 MG: 50 TABLET, FILM COATED ORAL at 18:22

## 2023-01-21 RX ADMIN — MORPHINE SULFATE 1 MG: 1 INJECTION, SOLUTION EPIDURAL; INTRATHECAL; INTRAVENOUS at 16:52

## 2023-01-21 RX ADMIN — HYDROCODONE BITARTRATE AND ACETAMINOPHEN 2 TABLET: 5; 325 TABLET ORAL at 20:38

## 2023-01-21 RX ADMIN — MIDAZOLAM 2 MG: 1 INJECTION INTRAMUSCULAR; INTRAVENOUS at 11:42

## 2023-01-21 NOTE — ANESTHESIA PROCEDURE NOTES
Airway  Urgency: elective    Date/Time: 1/21/2023 11:48 AM  Airway not difficult    General Information and Staff    Patient location during procedure: OR  CRNA/CAA: Rochelle Peterson CRNA    Indications and Patient Condition  Indications for airway management: airway protection    Preoxygenated: yes  Mask difficulty assessment: 1 - vent by mask    Final Airway Details  Final airway type: endotracheal airway      Successful airway: ETT  Cuffed: yes   Successful intubation technique: video laryngoscopy and RSI  Facilitating devices/methods: intubating stylet  Endotracheal tube insertion site: oral  Blade: CMAC  Blade size: D  ETT size (mm): 7.0  Cormack-Lehane Classification: grade I - full view of glottis  Placement verified by: chest auscultation and capnometry   Measured from: lips  ETT/EBT  to lips (cm): 21  Number of attempts at approach: 1  Assessment: lips, teeth, and gum same as pre-op and atraumatic intubation    Additional Comments   ett cuff up at MOP

## 2023-01-21 NOTE — ADDENDUM NOTE
Addendum  created 01/20/23 1918 by Bo Marks MD    Attestation recorded in Intraprocedure, Intraprocedure Attestations filed

## 2023-01-21 NOTE — ANESTHESIA POSTPROCEDURE EVALUATION
Patient: Sunni Mcneil    Procedure Summary     Date: 01/21/23 Room / Location: [facility] OR  / [facility] MAIN OR    Anesthesia Start: 1139 Anesthesia Stop: 1307    Procedure: HEMODIALYSIS CATHETER INSERTION (Left) Diagnosis:       Thrombosis of renal dialysis arteriovenous graft, initial encounter ([facility])      (Thrombosis of renal dialysis arteriovenous graft, initial encounter ([facility]) [T82.868A])    Surgeons: Elijah Herrera MD Provider: Isaiah Lord MD    Anesthesia Type: general ASA Status: 3 - Emergent          Anesthesia Type: general    Vitals  Vitals Value Taken Time   /81 01/21/23 1331   Temp 36.4 °C (97.6 °F) 01/21/23 1340   Pulse 81 01/21/23 1346   Resp     SpO2 99 % 01/21/23 1347   Vitals shown include unvalidated device data.        Post Anesthesia Care and Evaluation    Patient location during evaluation: PACU  Patient participation: complete - patient participated  Level of consciousness: awake  Pain score: 0  Pain management: adequate  Anesthetic complications: No anesthetic complications  PONV Status: none  Cardiovascular status: acceptable  Respiratory status: acceptable  Hydration status: acceptable

## 2023-01-21 NOTE — ANESTHESIA PREPROCEDURE EVALUATION
Anesthesia Evaluation     Patient summary reviewed and Nursing notes reviewed   history of anesthetic complications: PONV    NPO Liquid Status: Waived due to emergency           Airway   Mallampati: II  TM distance: >3 FB  Neck ROM: full  No difficulty expected  Dental - normal exam     Pulmonary - normal exam   Cardiovascular - normal exam    ECG reviewed    (+) hypertension poorly controlled,   (-) angina, BLACKMON    ROS comment: Prolonged QT    Neuro/Psych  (+) psychiatric history Anxiety and Depression,    GI/Hepatic/Renal/Endo    (+) obesity,  GERD well controlled,  renal disease dialysis, diabetes mellitus type 2 poorly controlled,     ROS Comment: On HD since 2014  Hx of kidney transplant    K+--5.5 today up from 5 yesterday  Last HD several days ago    Musculoskeletal     Abdominal  - normal exam    Bowel sounds: normal.   Substance History      OB/GYN          Other   arthritis,                      Anesthesia Plan    ASA 3 - emergent     general   Rapid sequence    Anesthetic plan, risks, benefits, and alternatives have been provided, discussed and informed consent has been obtained with: patient.        CODE STATUS:

## 2023-01-22 LAB
ALBUMIN SERPL-MCNC: 3.8 G/DL (ref 3.5–5.2)
ANION GAP SERPL CALCULATED.3IONS-SCNC: 14.3 MMOL/L (ref 5–15)
BASOPHILS # BLD AUTO: 0.03 10*3/MM3 (ref 0–0.2)
BASOPHILS NFR BLD AUTO: 0.4 % (ref 0–1.5)
BUN SERPL-MCNC: 34 MG/DL (ref 6–20)
BUN/CREAT SERPL: 4.4 (ref 7–25)
CALCIUM SPEC-SCNC: 8.2 MG/DL (ref 8.6–10.5)
CHLORIDE SERPL-SCNC: 95 MMOL/L (ref 98–107)
CO2 SERPL-SCNC: 24.7 MMOL/L (ref 22–29)
CREAT SERPL-MCNC: 7.8 MG/DL (ref 0.57–1)
DEPRECATED RDW RBC AUTO: 62.7 FL (ref 37–54)
EGFRCR SERPLBLD CKD-EPI 2021: 6 ML/MIN/1.73
EOSINOPHIL # BLD AUTO: 0.21 10*3/MM3 (ref 0–0.4)
EOSINOPHIL NFR BLD AUTO: 3.1 % (ref 0.3–6.2)
ERYTHROCYTE [DISTWIDTH] IN BLOOD BY AUTOMATED COUNT: 18.9 % (ref 12.3–15.4)
GLUCOSE BLDC GLUCOMTR-MCNC: 113 MG/DL (ref 70–130)
GLUCOSE BLDC GLUCOMTR-MCNC: 83 MG/DL (ref 70–130)
GLUCOSE BLDC GLUCOMTR-MCNC: 92 MG/DL (ref 70–130)
GLUCOSE BLDC GLUCOMTR-MCNC: 96 MG/DL (ref 70–130)
GLUCOSE SERPL-MCNC: 94 MG/DL (ref 65–99)
HCT VFR BLD AUTO: 32.8 % (ref 34–46.6)
HGB BLD-MCNC: 10.3 G/DL (ref 12–15.9)
IMM GRANULOCYTES # BLD AUTO: 0.04 10*3/MM3 (ref 0–0.05)
IMM GRANULOCYTES NFR BLD AUTO: 0.6 % (ref 0–0.5)
LYMPHOCYTES # BLD AUTO: 0.88 10*3/MM3 (ref 0.7–3.1)
LYMPHOCYTES NFR BLD AUTO: 13 % (ref 19.6–45.3)
MAGNESIUM SERPL-MCNC: 2.5 MG/DL (ref 1.6–2.6)
MCH RBC QN AUTO: 29.6 PG (ref 26.6–33)
MCHC RBC AUTO-ENTMCNC: 31.4 G/DL (ref 31.5–35.7)
MCV RBC AUTO: 94.3 FL (ref 79–97)
MONOCYTES # BLD AUTO: 0.54 10*3/MM3 (ref 0.1–0.9)
MONOCYTES NFR BLD AUTO: 8 % (ref 5–12)
NEUTROPHILS NFR BLD AUTO: 5.09 10*3/MM3 (ref 1.7–7)
NEUTROPHILS NFR BLD AUTO: 74.9 % (ref 42.7–76)
NRBC BLD AUTO-RTO: 0.1 /100 WBC (ref 0–0.2)
PHOSPHATE SERPL-MCNC: 6.2 MG/DL (ref 2.5–4.5)
PLATELET # BLD AUTO: 205 10*3/MM3 (ref 140–450)
PMV BLD AUTO: 10.1 FL (ref 6–12)
POTASSIUM SERPL-SCNC: 3.9 MMOL/L (ref 3.5–5.2)
RBC # BLD AUTO: 3.48 10*6/MM3 (ref 3.77–5.28)
SODIUM SERPL-SCNC: 134 MMOL/L (ref 136–145)
WBC NRBC COR # BLD: 6.79 10*3/MM3 (ref 3.4–10.8)

## 2023-01-22 PROCEDURE — 83735 ASSAY OF MAGNESIUM: CPT | Performed by: INTERNAL MEDICINE

## 2023-01-22 PROCEDURE — 80069 RENAL FUNCTION PANEL: CPT | Performed by: INTERNAL MEDICINE

## 2023-01-22 PROCEDURE — 82962 GLUCOSE BLOOD TEST: CPT

## 2023-01-22 PROCEDURE — 25010000002 HEPARIN (PORCINE) PER 1000 UNITS: Performed by: INTERNAL MEDICINE

## 2023-01-22 PROCEDURE — 85025 COMPLETE CBC W/AUTO DIFF WBC: CPT | Performed by: INTERNAL MEDICINE

## 2023-01-22 RX ORDER — FAMOTIDINE 20 MG/1
20 TABLET, FILM COATED ORAL DAILY
Status: DISCONTINUED | OUTPATIENT
Start: 2023-01-23 | End: 2023-01-24 | Stop reason: HOSPADM

## 2023-01-22 RX ADMIN — Medication 10 ML: at 09:43

## 2023-01-22 RX ADMIN — HEPARIN SODIUM 5000 UNITS: 5000 INJECTION INTRAVENOUS; SUBCUTANEOUS at 09:45

## 2023-01-22 RX ADMIN — CALCIUM ACETATE 2001 MG: 667 CAPSULE ORAL at 12:04

## 2023-01-22 RX ADMIN — DOCUSATE SODIUM 50MG AND SENNOSIDES 8.6MG 2 TABLET: 8.6; 5 TABLET, FILM COATED ORAL at 21:10

## 2023-01-22 RX ADMIN — Medication 10 ML: at 21:11

## 2023-01-22 RX ADMIN — CALCIUM ACETATE 2001 MG: 667 CAPSULE ORAL at 18:07

## 2023-01-22 RX ADMIN — CALCIUM ACETATE 2001 MG: 667 CAPSULE ORAL at 09:45

## 2023-01-22 RX ADMIN — HYDROCODONE BITARTRATE AND ACETAMINOPHEN 2 TABLET: 5; 325 TABLET ORAL at 18:55

## 2023-01-22 RX ADMIN — HYDROXYZINE HYDROCHLORIDE 50 MG: 50 TABLET, FILM COATED ORAL at 09:44

## 2023-01-22 RX ADMIN — TRAZODONE HYDROCHLORIDE 100 MG: 100 TABLET ORAL at 21:10

## 2023-01-22 RX ADMIN — HYDROXYZINE HYDROCHLORIDE 50 MG: 50 TABLET, FILM COATED ORAL at 21:10

## 2023-01-22 RX ADMIN — HEPARIN SODIUM 5000 UNITS: 5000 INJECTION INTRAVENOUS; SUBCUTANEOUS at 21:10

## 2023-01-22 RX ADMIN — HYDROCODONE BITARTRATE AND ACETAMINOPHEN 2 TABLET: 5; 325 TABLET ORAL at 07:56

## 2023-01-22 RX ADMIN — FAMOTIDINE 40 MG: 20 TABLET, FILM COATED ORAL at 09:57

## 2023-01-22 RX ADMIN — CLONIDINE HYDROCHLORIDE 0.1 MG: 0.1 TABLET ORAL at 21:10

## 2023-01-23 ENCOUNTER — APPOINTMENT (OUTPATIENT)
Dept: GENERAL RADIOLOGY | Facility: HOSPITAL | Age: 48
DRG: 252 | End: 2023-01-23
Payer: MEDICARE

## 2023-01-23 ENCOUNTER — ANESTHESIA (OUTPATIENT)
Dept: PERIOP | Facility: HOSPITAL | Age: 48
DRG: 252 | End: 2023-01-23
Payer: MEDICARE

## 2023-01-23 ENCOUNTER — ANESTHESIA EVENT (OUTPATIENT)
Dept: PERIOP | Facility: HOSPITAL | Age: 48
DRG: 252 | End: 2023-01-23
Payer: MEDICARE

## 2023-01-23 LAB
ALBUMIN SERPL-MCNC: 3.6 G/DL (ref 3.5–5.2)
ANION GAP SERPL CALCULATED.3IONS-SCNC: 15 MMOL/L (ref 5–15)
BASOPHILS # BLD AUTO: 0.02 10*3/MM3 (ref 0–0.2)
BASOPHILS NFR BLD AUTO: 0.3 % (ref 0–1.5)
BUN SERPL-MCNC: 49 MG/DL (ref 6–20)
BUN/CREAT SERPL: 4.4 (ref 7–25)
CALCIUM SPEC-SCNC: 8.7 MG/DL (ref 8.6–10.5)
CHLORIDE SERPL-SCNC: 99 MMOL/L (ref 98–107)
CO2 SERPL-SCNC: 23 MMOL/L (ref 22–29)
CREAT SERPL-MCNC: 11.06 MG/DL (ref 0.57–1)
DEPRECATED RDW RBC AUTO: 60.2 FL (ref 37–54)
EGFRCR SERPLBLD CKD-EPI 2021: 3.9 ML/MIN/1.73
EOSINOPHIL # BLD AUTO: 0.29 10*3/MM3 (ref 0–0.4)
EOSINOPHIL NFR BLD AUTO: 4.4 % (ref 0.3–6.2)
ERYTHROCYTE [DISTWIDTH] IN BLOOD BY AUTOMATED COUNT: 18.3 % (ref 12.3–15.4)
GLUCOSE BLDC GLUCOMTR-MCNC: 100 MG/DL (ref 70–130)
GLUCOSE BLDC GLUCOMTR-MCNC: 104 MG/DL (ref 70–130)
GLUCOSE BLDC GLUCOMTR-MCNC: 130 MG/DL (ref 70–130)
GLUCOSE BLDC GLUCOMTR-MCNC: 196 MG/DL (ref 70–130)
GLUCOSE SERPL-MCNC: 82 MG/DL (ref 65–99)
HCT VFR BLD AUTO: 32.8 % (ref 34–46.6)
HGB BLD-MCNC: 10.7 G/DL (ref 12–15.9)
IMM GRANULOCYTES # BLD AUTO: 0.04 10*3/MM3 (ref 0–0.05)
IMM GRANULOCYTES NFR BLD AUTO: 0.6 % (ref 0–0.5)
LYMPHOCYTES # BLD AUTO: 0.89 10*3/MM3 (ref 0.7–3.1)
LYMPHOCYTES NFR BLD AUTO: 13.5 % (ref 19.6–45.3)
MAGNESIUM SERPL-MCNC: 3 MG/DL (ref 1.6–2.6)
MCH RBC QN AUTO: 30 PG (ref 26.6–33)
MCHC RBC AUTO-ENTMCNC: 32.6 G/DL (ref 31.5–35.7)
MCV RBC AUTO: 91.9 FL (ref 79–97)
MONOCYTES # BLD AUTO: 0.5 10*3/MM3 (ref 0.1–0.9)
MONOCYTES NFR BLD AUTO: 7.6 % (ref 5–12)
NEUTROPHILS NFR BLD AUTO: 4.87 10*3/MM3 (ref 1.7–7)
NEUTROPHILS NFR BLD AUTO: 73.6 % (ref 42.7–76)
NRBC BLD AUTO-RTO: 0 /100 WBC (ref 0–0.2)
PHOSPHATE SERPL-MCNC: 9.2 MG/DL (ref 2.5–4.5)
PLATELET # BLD AUTO: 188 10*3/MM3 (ref 140–450)
PMV BLD AUTO: 9.5 FL (ref 6–12)
POTASSIUM SERPL-SCNC: 4.7 MMOL/L (ref 3.5–5.2)
RBC # BLD AUTO: 3.57 10*6/MM3 (ref 3.77–5.28)
SODIUM SERPL-SCNC: 137 MMOL/L (ref 136–145)
WBC NRBC COR # BLD: 6.61 10*3/MM3 (ref 3.4–10.8)

## 2023-01-23 PROCEDURE — C1894 INTRO/SHEATH, NON-LASER: HCPCS | Performed by: STUDENT IN AN ORGANIZED HEALTH CARE EDUCATION/TRAINING PROGRAM

## 2023-01-23 PROCEDURE — 83735 ASSAY OF MAGNESIUM: CPT | Performed by: INTERNAL MEDICINE

## 2023-01-23 PROCEDURE — 25010000002 PROPOFOL 10 MG/ML EMULSION: Performed by: NURSE ANESTHETIST, CERTIFIED REGISTERED

## 2023-01-23 PROCEDURE — C1725 CATH, TRANSLUMIN NON-LASER: HCPCS | Performed by: STUDENT IN AN ORGANIZED HEALTH CARE EDUCATION/TRAINING PROGRAM

## 2023-01-23 PROCEDURE — 82962 GLUCOSE BLOOD TEST: CPT

## 2023-01-23 PROCEDURE — 25010000002 HEPARIN (PORCINE) PER 1000 UNITS: Performed by: INTERNAL MEDICINE

## 2023-01-23 PROCEDURE — 25010000002 MIDAZOLAM PER 1 MG: Performed by: ANESTHESIOLOGY

## 2023-01-23 PROCEDURE — 85025 COMPLETE CBC W/AUTO DIFF WBC: CPT | Performed by: INTERNAL MEDICINE

## 2023-01-23 PROCEDURE — 25010000002 CEFAZOLIN IN DEXTROSE 2-4 GM/100ML-% SOLUTION: Performed by: SURGERY

## 2023-01-23 PROCEDURE — 88304 TISSUE EXAM BY PATHOLOGIST: CPT | Performed by: STUDENT IN AN ORGANIZED HEALTH CARE EDUCATION/TRAINING PROGRAM

## 2023-01-23 PROCEDURE — 0 IOPAMIDOL PER 1 ML: Performed by: STUDENT IN AN ORGANIZED HEALTH CARE EDUCATION/TRAINING PROGRAM

## 2023-01-23 PROCEDURE — 25010000002 FENTANYL CITRATE (PF) 50 MCG/ML SOLUTION: Performed by: NURSE ANESTHETIST, CERTIFIED REGISTERED

## 2023-01-23 PROCEDURE — 05C70ZZ EXTIRPATION OF MATTER FROM RIGHT AXILLARY VEIN, OPEN APPROACH: ICD-10-PCS | Performed by: STUDENT IN AN ORGANIZED HEALTH CARE EDUCATION/TRAINING PROGRAM

## 2023-01-23 PROCEDURE — 80069 RENAL FUNCTION PANEL: CPT | Performed by: INTERNAL MEDICINE

## 2023-01-23 PROCEDURE — 03753DZ DILATION OF RIGHT AXILLARY ARTERY WITH INTRALUMINAL DEVICE, PERCUTANEOUS APPROACH: ICD-10-PCS | Performed by: STUDENT IN AN ORGANIZED HEALTH CARE EDUCATION/TRAINING PROGRAM

## 2023-01-23 PROCEDURE — 25010000002 ONDANSETRON PER 1 MG: Performed by: NURSE ANESTHETIST, CERTIFIED REGISTERED

## 2023-01-23 PROCEDURE — C1757 CATH, THROMBECTOMY/EMBOLECT: HCPCS | Performed by: STUDENT IN AN ORGANIZED HEALTH CARE EDUCATION/TRAINING PROGRAM

## 2023-01-23 PROCEDURE — C1769 GUIDE WIRE: HCPCS | Performed by: STUDENT IN AN ORGANIZED HEALTH CARE EDUCATION/TRAINING PROGRAM

## 2023-01-23 PROCEDURE — 25010000002 MIDAZOLAM PER 1 MG: Performed by: NURSE ANESTHETIST, CERTIFIED REGISTERED

## 2023-01-23 PROCEDURE — 25010000002 NEOSTIGMINE 5 MG/10ML SOLUTION: Performed by: NURSE ANESTHETIST, CERTIFIED REGISTERED

## 2023-01-23 PROCEDURE — 25010000002 HEPARIN (PORCINE) PER 1000 UNITS: Performed by: NURSE ANESTHETIST, CERTIFIED REGISTERED

## 2023-01-23 PROCEDURE — 05770ZZ DILATION OF RIGHT AXILLARY VEIN, OPEN APPROACH: ICD-10-PCS | Performed by: STUDENT IN AN ORGANIZED HEALTH CARE EDUCATION/TRAINING PROGRAM

## 2023-01-23 PROCEDURE — 25010000002 DEXAMETHASONE SODIUM PHOSPHATE 20 MG/5ML SOLUTION: Performed by: NURSE ANESTHETIST, CERTIFIED REGISTERED

## 2023-01-23 PROCEDURE — 027V3ZZ DILATION OF SUPERIOR VENA CAVA, PERCUTANEOUS APPROACH: ICD-10-PCS | Performed by: STUDENT IN AN ORGANIZED HEALTH CARE EDUCATION/TRAINING PROGRAM

## 2023-01-23 PROCEDURE — C1874 STENT, COATED/COV W/DEL SYS: HCPCS | Performed by: STUDENT IN AN ORGANIZED HEALTH CARE EDUCATION/TRAINING PROGRAM

## 2023-01-23 PROCEDURE — 25010000002 HEPARIN (PORCINE) PER 1000 UNITS: Performed by: STUDENT IN AN ORGANIZED HEALTH CARE EDUCATION/TRAINING PROGRAM

## 2023-01-23 DEVICE — GORE VIABAHN ENDOPROSTHESIS WITH HEPARIN 9MMX5CM 8FR 120CMCATH
Type: IMPLANTABLE DEVICE | Site: ARM | Status: FUNCTIONAL
Brand: GORE VIABAHN ENDOPROSTHESIS WITH HEPARIN

## 2023-01-23 RX ORDER — SODIUM CHLORIDE, SODIUM LACTATE, POTASSIUM CHLORIDE, CALCIUM CHLORIDE 600; 310; 30; 20 MG/100ML; MG/100ML; MG/100ML; MG/100ML
9 INJECTION, SOLUTION INTRAVENOUS CONTINUOUS
Status: DISCONTINUED | OUTPATIENT
Start: 2023-01-23 | End: 2023-01-23

## 2023-01-23 RX ORDER — HEPARIN SODIUM 1000 [USP'U]/ML
4000 INJECTION, SOLUTION INTRAVENOUS; SUBCUTANEOUS AS NEEDED
Status: DISCONTINUED | OUTPATIENT
Start: 2023-01-23 | End: 2023-01-24 | Stop reason: HOSPADM

## 2023-01-23 RX ORDER — PROPOFOL 10 MG/ML
VIAL (ML) INTRAVENOUS AS NEEDED
Status: DISCONTINUED | OUTPATIENT
Start: 2023-01-23 | End: 2023-01-23 | Stop reason: SURG

## 2023-01-23 RX ORDER — FLUMAZENIL 0.1 MG/ML
0.2 INJECTION INTRAVENOUS AS NEEDED
Status: DISCONTINUED | OUTPATIENT
Start: 2023-01-23 | End: 2023-01-23

## 2023-01-23 RX ORDER — DIPHENHYDRAMINE HCL 25 MG
25 CAPSULE ORAL
Status: DISCONTINUED | OUTPATIENT
Start: 2023-01-23 | End: 2023-01-23

## 2023-01-23 RX ORDER — FENTANYL CITRATE 50 UG/ML
50 INJECTION, SOLUTION INTRAMUSCULAR; INTRAVENOUS
Status: DISCONTINUED | OUTPATIENT
Start: 2023-01-23 | End: 2023-01-23

## 2023-01-23 RX ORDER — GLYCOPYRROLATE 0.2 MG/ML
INJECTION INTRAMUSCULAR; INTRAVENOUS AS NEEDED
Status: DISCONTINUED | OUTPATIENT
Start: 2023-01-23 | End: 2023-01-23 | Stop reason: SURG

## 2023-01-23 RX ORDER — FAMOTIDINE 10 MG/ML
20 INJECTION, SOLUTION INTRAVENOUS ONCE
Status: COMPLETED | OUTPATIENT
Start: 2023-01-23 | End: 2023-01-23

## 2023-01-23 RX ORDER — LABETALOL HYDROCHLORIDE 5 MG/ML
5 INJECTION, SOLUTION INTRAVENOUS
Status: DISCONTINUED | OUTPATIENT
Start: 2023-01-23 | End: 2023-01-23

## 2023-01-23 RX ORDER — MIDAZOLAM HYDROCHLORIDE 1 MG/ML
INJECTION INTRAMUSCULAR; INTRAVENOUS AS NEEDED
Status: DISCONTINUED | OUTPATIENT
Start: 2023-01-23 | End: 2023-01-23 | Stop reason: SURG

## 2023-01-23 RX ORDER — SODIUM CHLORIDE 0.9 % (FLUSH) 0.9 %
3-10 SYRINGE (ML) INJECTION AS NEEDED
Status: DISCONTINUED | OUTPATIENT
Start: 2023-01-23 | End: 2023-01-23 | Stop reason: HOSPADM

## 2023-01-23 RX ORDER — ONDANSETRON 2 MG/ML
4 INJECTION INTRAMUSCULAR; INTRAVENOUS ONCE AS NEEDED
Status: DISCONTINUED | OUTPATIENT
Start: 2023-01-23 | End: 2023-01-23

## 2023-01-23 RX ORDER — NALOXONE HCL 0.4 MG/ML
0.2 VIAL (ML) INJECTION AS NEEDED
Status: DISCONTINUED | OUTPATIENT
Start: 2023-01-23 | End: 2023-01-23

## 2023-01-23 RX ORDER — MIDAZOLAM HYDROCHLORIDE 1 MG/ML
1 INJECTION INTRAMUSCULAR; INTRAVENOUS
Status: DISCONTINUED | OUTPATIENT
Start: 2023-01-23 | End: 2023-01-23

## 2023-01-23 RX ORDER — KETAMINE HCL IN NACL, ISO-OSM 100MG/10ML
SYRINGE (ML) INJECTION AS NEEDED
Status: DISCONTINUED | OUTPATIENT
Start: 2023-01-23 | End: 2023-01-23 | Stop reason: SURG

## 2023-01-23 RX ORDER — ONDANSETRON 2 MG/ML
INJECTION INTRAMUSCULAR; INTRAVENOUS AS NEEDED
Status: DISCONTINUED | OUTPATIENT
Start: 2023-01-23 | End: 2023-01-23 | Stop reason: SURG

## 2023-01-23 RX ORDER — CEFAZOLIN SODIUM 2 G/100ML
2 INJECTION, SOLUTION INTRAVENOUS ONCE
Status: COMPLETED | OUTPATIENT
Start: 2023-01-23 | End: 2023-01-23

## 2023-01-23 RX ORDER — NEOSTIGMINE METHYLSULFATE 0.5 MG/ML
INJECTION, SOLUTION INTRAVENOUS AS NEEDED
Status: DISCONTINUED | OUTPATIENT
Start: 2023-01-23 | End: 2023-01-23 | Stop reason: SURG

## 2023-01-23 RX ORDER — PHENYLEPHRINE HCL IN 0.9% NACL 1 MG/10 ML
SYRINGE (ML) INTRAVENOUS AS NEEDED
Status: DISCONTINUED | OUTPATIENT
Start: 2023-01-23 | End: 2023-01-23 | Stop reason: SURG

## 2023-01-23 RX ORDER — ROCURONIUM BROMIDE 10 MG/ML
INJECTION, SOLUTION INTRAVENOUS AS NEEDED
Status: DISCONTINUED | OUTPATIENT
Start: 2023-01-23 | End: 2023-01-23 | Stop reason: SURG

## 2023-01-23 RX ORDER — SODIUM CHLORIDE 9 MG/ML
500 INJECTION, SOLUTION INTRAVENOUS CONTINUOUS
Status: ACTIVE | OUTPATIENT
Start: 2023-01-23 | End: 2023-01-24

## 2023-01-23 RX ORDER — EPHEDRINE SULFATE 50 MG/ML
5 INJECTION, SOLUTION INTRAVENOUS ONCE AS NEEDED
Status: DISCONTINUED | OUTPATIENT
Start: 2023-01-23 | End: 2023-01-23

## 2023-01-23 RX ORDER — LIDOCAINE HYDROCHLORIDE 10 MG/ML
0.5 INJECTION, SOLUTION EPIDURAL; INFILTRATION; INTRACAUDAL; PERINEURAL ONCE AS NEEDED
Status: DISCONTINUED | OUTPATIENT
Start: 2023-01-23 | End: 2023-01-23 | Stop reason: HOSPADM

## 2023-01-23 RX ORDER — MIDAZOLAM HYDROCHLORIDE 1 MG/ML
1 INJECTION INTRAMUSCULAR; INTRAVENOUS
Status: DISCONTINUED | OUTPATIENT
Start: 2023-01-23 | End: 2023-01-23 | Stop reason: HOSPADM

## 2023-01-23 RX ORDER — HYDRALAZINE HYDROCHLORIDE 20 MG/ML
5 INJECTION INTRAMUSCULAR; INTRAVENOUS
Status: DISCONTINUED | OUTPATIENT
Start: 2023-01-23 | End: 2023-01-23

## 2023-01-23 RX ORDER — PROMETHAZINE HYDROCHLORIDE 25 MG/1
25 TABLET ORAL ONCE AS NEEDED
Status: DISCONTINUED | OUTPATIENT
Start: 2023-01-23 | End: 2023-01-23

## 2023-01-23 RX ORDER — HEPARIN SODIUM 1000 [USP'U]/ML
INJECTION, SOLUTION INTRAVENOUS; SUBCUTANEOUS AS NEEDED
Status: DISCONTINUED | OUTPATIENT
Start: 2023-01-23 | End: 2023-01-23 | Stop reason: SURG

## 2023-01-23 RX ORDER — VASOPRESSIN 20 U/ML
INJECTION PARENTERAL AS NEEDED
Status: DISCONTINUED | OUTPATIENT
Start: 2023-01-23 | End: 2023-01-23 | Stop reason: SURG

## 2023-01-23 RX ORDER — SODIUM CHLORIDE 0.9 % (FLUSH) 0.9 %
3 SYRINGE (ML) INJECTION EVERY 12 HOURS SCHEDULED
Status: DISCONTINUED | OUTPATIENT
Start: 2023-01-23 | End: 2023-01-23 | Stop reason: HOSPADM

## 2023-01-23 RX ORDER — ESMOLOL HYDROCHLORIDE 10 MG/ML
INJECTION INTRAVENOUS AS NEEDED
Status: DISCONTINUED | OUTPATIENT
Start: 2023-01-23 | End: 2023-01-23 | Stop reason: SURG

## 2023-01-23 RX ORDER — OXYCODONE AND ACETAMINOPHEN 7.5; 325 MG/1; MG/1
1 TABLET ORAL EVERY 4 HOURS PRN
Status: DISCONTINUED | OUTPATIENT
Start: 2023-01-23 | End: 2023-01-23

## 2023-01-23 RX ORDER — DEXAMETHASONE SODIUM PHOSPHATE 4 MG/ML
INJECTION, SOLUTION INTRA-ARTICULAR; INTRALESIONAL; INTRAMUSCULAR; INTRAVENOUS; SOFT TISSUE AS NEEDED
Status: DISCONTINUED | OUTPATIENT
Start: 2023-01-23 | End: 2023-01-23 | Stop reason: SURG

## 2023-01-23 RX ORDER — FENTANYL CITRATE 50 UG/ML
50 INJECTION, SOLUTION INTRAMUSCULAR; INTRAVENOUS
Status: DISCONTINUED | OUTPATIENT
Start: 2023-01-23 | End: 2023-01-23 | Stop reason: HOSPADM

## 2023-01-23 RX ORDER — HYDROCODONE BITARTRATE AND ACETAMINOPHEN 7.5; 325 MG/1; MG/1
1 TABLET ORAL ONCE AS NEEDED
Status: COMPLETED | OUTPATIENT
Start: 2023-01-23 | End: 2023-01-23

## 2023-01-23 RX ORDER — DIPHENHYDRAMINE HYDROCHLORIDE 50 MG/ML
12.5 INJECTION INTRAMUSCULAR; INTRAVENOUS
Status: DISCONTINUED | OUTPATIENT
Start: 2023-01-23 | End: 2023-01-23

## 2023-01-23 RX ORDER — PROMETHAZINE HYDROCHLORIDE 25 MG/1
25 SUPPOSITORY RECTAL ONCE AS NEEDED
Status: DISCONTINUED | OUTPATIENT
Start: 2023-01-23 | End: 2023-01-23

## 2023-01-23 RX ORDER — LIDOCAINE HYDROCHLORIDE 20 MG/ML
INJECTION, SOLUTION INFILTRATION; PERINEURAL AS NEEDED
Status: DISCONTINUED | OUTPATIENT
Start: 2023-01-23 | End: 2023-01-23 | Stop reason: SURG

## 2023-01-23 RX ADMIN — DOCUSATE SODIUM 50MG AND SENNOSIDES 8.6MG 2 TABLET: 8.6; 5 TABLET, FILM COATED ORAL at 19:43

## 2023-01-23 RX ADMIN — SODIUM CHLORIDE 500 ML: 0.9 INJECTION, SOLUTION INTRAVENOUS at 10:08

## 2023-01-23 RX ADMIN — ROCURONIUM BROMIDE 30 MG: 10 INJECTION, SOLUTION INTRAVENOUS at 10:25

## 2023-01-23 RX ADMIN — TRAZODONE HYDROCHLORIDE 100 MG: 100 TABLET ORAL at 19:43

## 2023-01-23 RX ADMIN — HYDROCODONE BITARTRATE AND ACETAMINOPHEN 1 TABLET: 7.5; 325 TABLET ORAL at 14:27

## 2023-01-23 RX ADMIN — CLONIDINE HYDROCHLORIDE 0.1 MG: 0.1 TABLET ORAL at 19:43

## 2023-01-23 RX ADMIN — Medication 100 MCG: at 11:09

## 2023-01-23 RX ADMIN — Medication 30 MG: at 10:50

## 2023-01-23 RX ADMIN — CALCIUM ACETATE 2001 MG: 667 CAPSULE ORAL at 18:28

## 2023-01-23 RX ADMIN — Medication 100 MCG: at 11:19

## 2023-01-23 RX ADMIN — IOPAMIDOL 40 ML: 510 INJECTION, SOLUTION INTRAVASCULAR at 12:35

## 2023-01-23 RX ADMIN — Medication 10 MG: at 12:18

## 2023-01-23 RX ADMIN — DEXAMETHASONE SODIUM PHOSPHATE 8 MG: 4 INJECTION, SOLUTION INTRAMUSCULAR; INTRAVENOUS at 10:28

## 2023-01-23 RX ADMIN — MIDAZOLAM 1 MG: 1 INJECTION INTRAMUSCULAR; INTRAVENOUS at 09:58

## 2023-01-23 RX ADMIN — ACETAMINOPHEN 650 MG: 325 TABLET, FILM COATED ORAL at 21:33

## 2023-01-23 RX ADMIN — GLYCOPYRROLATE 0.4 MG: 1 INJECTION INTRAMUSCULAR; INTRAVENOUS at 11:15

## 2023-01-23 RX ADMIN — VASOPRESSIN 1 UNITS: 20 INJECTION, SOLUTION INTRAMUSCULAR; SUBCUTANEOUS at 11:21

## 2023-01-23 RX ADMIN — HEPARIN SODIUM 5000 UNITS: 1000 INJECTION INTRAVENOUS; SUBCUTANEOUS at 11:55

## 2023-01-23 RX ADMIN — HYDROXYZINE HYDROCHLORIDE 50 MG: 50 TABLET, FILM COATED ORAL at 19:43

## 2023-01-23 RX ADMIN — LIDOCAINE HYDROCHLORIDE 100 MG: 20 INJECTION, SOLUTION INFILTRATION; PERINEURAL at 10:16

## 2023-01-23 RX ADMIN — HYDROCODONE BITARTRATE AND ACETAMINOPHEN 2 TABLET: 5; 325 TABLET ORAL at 18:28

## 2023-01-23 RX ADMIN — VASOPRESSIN 1 UNITS: 20 INJECTION, SOLUTION INTRAMUSCULAR; SUBCUTANEOUS at 12:04

## 2023-01-23 RX ADMIN — HEPARIN SODIUM 8500 UNITS: 1000 INJECTION INTRAVENOUS; SUBCUTANEOUS at 10:59

## 2023-01-23 RX ADMIN — HEPARIN SODIUM 4000 UNITS: 1000 INJECTION INTRAVENOUS; SUBCUTANEOUS at 16:55

## 2023-01-23 RX ADMIN — MIDAZOLAM 2 MG: 1 INJECTION INTRAMUSCULAR; INTRAVENOUS at 10:14

## 2023-01-23 RX ADMIN — Medication 10 ML: at 19:43

## 2023-01-23 RX ADMIN — VASOPRESSIN 1 UNITS: 20 INJECTION, SOLUTION INTRAMUSCULAR; SUBCUTANEOUS at 11:34

## 2023-01-23 RX ADMIN — ROCURONIUM BROMIDE 20 MG: 10 INJECTION, SOLUTION INTRAVENOUS at 10:45

## 2023-01-23 RX ADMIN — Medication 100 MCG: at 10:38

## 2023-01-23 RX ADMIN — ONDANSETRON 4 MG: 2 INJECTION INTRAMUSCULAR; INTRAVENOUS at 12:19

## 2023-01-23 RX ADMIN — FAMOTIDINE 20 MG: 10 INJECTION INTRAVENOUS at 09:43

## 2023-01-23 RX ADMIN — ESMOLOL HYDROCHLORIDE 20 MG: 100 INJECTION, SOLUTION INTRAVENOUS at 10:56

## 2023-01-23 RX ADMIN — NEOSTIGMINE METHYLSULFATE 4 MG: 0.5 INJECTION INTRAVENOUS at 11:15

## 2023-01-23 RX ADMIN — PROPOFOL 50 MG: 10 INJECTION, EMULSION INTRAVENOUS at 10:21

## 2023-01-23 RX ADMIN — FENTANYL CITRATE 50 MCG: 50 INJECTION, SOLUTION INTRAMUSCULAR; INTRAVENOUS at 13:14

## 2023-01-23 RX ADMIN — PROPOFOL 200 MG: 10 INJECTION, EMULSION INTRAVENOUS at 10:16

## 2023-01-23 RX ADMIN — APIXABAN 2.5 MG: 2.5 TABLET, FILM COATED ORAL at 19:43

## 2023-01-23 RX ADMIN — Medication 10 MG: at 11:46

## 2023-01-23 RX ADMIN — CEFAZOLIN SODIUM 2 G: 2 INJECTION, SOLUTION INTRAVENOUS at 10:01

## 2023-01-23 NOTE — ANESTHESIA POSTPROCEDURE EVALUATION
"Patient: Sunni Mcneil    Procedure Summary     Date: 01/23/23 Room / Location:  [facility]V [facility]    Anesthesia Start: 1008 Anesthesia Stop: 1246    Procedure: Right arm dialysis graft open thrombectomy, shuntogram, angioplasty and stent (Right) Diagnosis:       Mechanical complication of hemodialysis catheter (HCC)      (Mechanical complication of hemodialysis catheter (HCC) [T82.49XA])    Surgeons: Karuna Villalba MD Provider: Kelly Cormier MD    Anesthesia Type: general ASA Status: 3          Anesthesia Type: general    Vitals  Vitals Value Taken Time   /61 01/23/23 1321   Temp 36.8 °C (98.3 °F) 01/23/23 1241   Pulse 87 01/23/23 1326   Resp 16 01/23/23 1315   SpO2 97 % 01/23/23 1326   Vitals shown include unvalidated device data.        Post Anesthesia Care and Evaluation    Patient location during evaluation: bedside  Patient participation: complete - patient participated  Level of consciousness: sleepy but conscious  Pain score: 0  Pain management: adequate    Airway patency: patent  Anesthetic complications: No anesthetic complications    Cardiovascular status: acceptable  Respiratory status: acceptable  Hydration status: acceptable    Comments: /79   Pulse 87   Temp 36.8 °C (98.3 °F) (Oral)   Resp 16   Ht 152.4 cm (60\")   Wt 79.8 kg (175 lb 14.8 oz)   LMP 01/16/2023 (Approximate) Comment: neg hcg 1/20/23  SpO2 99%   BMI 34.36 kg/m²         "

## 2023-01-23 NOTE — ANESTHESIA PROCEDURE NOTES
Airway  Urgency: elective    Date/Time: 1/23/2023 10:17 AM  Airway not difficult    General Information and Staff    Patient location during procedure: OR  Anesthesiologist: Kelly Cormier MD  CRNA/CAA: Sasha Faulkner CRNA    Indications and Patient Condition  Indications for airway management: airway protection    Preoxygenated: yes  MILS not maintained throughout  Mask difficulty assessment: 0 - not attempted    Final Airway Details  Final airway type: supraglottic airway      Successful airway: unique  Size 4     Number of attempts at approach: 1  Assessment: lips, teeth, and gum same as pre-op and atraumatic intubation    Additional Comments  LMA inserted with ease, assist to SV

## 2023-01-23 NOTE — ANESTHESIA PREPROCEDURE EVALUATION
" Anesthesia Evaluation     Patient summary reviewed and Nursing notes reviewed   history of anesthetic complications: PONV  NPO Solid Status: > 8 hours  NPO Liquid Status: > 2 hours           Airway   Mallampati: II  Dental - normal exam     Pulmonary - negative pulmonary ROS   Cardiovascular   Exercise tolerance: good (4-7 METS)    ECG reviewed  Rhythm: regular    (+) hypertension,     ROS comment: ABNORMAL ECG -  Sinus rhythm  Borderline left axis deviation  Prolonged QT interval  No change from previous tracing  Electronically Signed By: Vini Zavala ([facility]) 21-Jan-2023 11:04:11     Left ventricular systolic function is normal. Left ventricular ejection fraction appears to be 61 - 65%.  •  Left ventricular diastolic function was normal.      Neuro/Psych  (+) psychiatric history,    GI/Hepatic/Renal/Endo    (+) obesity,  GERD,  renal disease ESRD, diabetes mellitus,     ROS Comment: H/o renal tx    Musculoskeletal     Abdominal    Substance History - negative use     OB/GYN negative ob/gyn ROS         Other   arthritis,                      Anesthesia Plan    ASA 3     general     (/81 (BP Location: Left arm, Patient Position: Lying)   Pulse 107   Temp 36.8 °C (98.3 °F) (Oral)   Resp 18   Ht 152.4 cm (60\")   Wt 79.8 kg (175 lb 14.8 oz)   LMP 01/16/2023 (Approximate) Comment: neg hcg 1/20/23  SpO2 98%   BMI 34.36 kg/m²     I have reviewed the patient's history with the patient and the chart, including all pertinent laboratory results and imaging. I have explained the risks of anesthesia including but not limited to dental damage, corneal abrasion, nerve injury, MI, stroke, and death.    )  intravenous induction     Anesthetic plan, risks, benefits, and alternatives have been provided, discussed and informed consent has been obtained with: patient.        CODE STATUS:    Code Status (Patient has no pulse and is not breathing): CPR (Attempt to Resuscitate)  Medical Interventions (Patient has pulse or is " breathing): Full Support

## 2023-01-23 NOTE — ADDENDUM NOTE
Addendum  created 01/23/23 1359 by Kelly Cormier MD    Attestation recorded in Intraprocedure, Intraprocedure Attestations filed

## 2023-01-24 ENCOUNTER — READMISSION MANAGEMENT (OUTPATIENT)
Dept: CALL CENTER | Facility: HOSPITAL | Age: 48
End: 2023-01-24
Payer: MEDICARE

## 2023-01-24 VITALS
BODY MASS INDEX: 34.46 KG/M2 | HEIGHT: 60 IN | TEMPERATURE: 98.5 F | WEIGHT: 175.5 LBS | SYSTOLIC BLOOD PRESSURE: 111 MMHG | DIASTOLIC BLOOD PRESSURE: 79 MMHG | HEART RATE: 83 BPM | RESPIRATION RATE: 16 BRPM | OXYGEN SATURATION: 96 %

## 2023-01-24 PROBLEM — E83.39 HYPERPHOSPHATEMIA: Status: RESOLVED | Noted: 2023-01-21 | Resolved: 2023-01-24

## 2023-01-24 PROBLEM — T82.49XA: Status: RESOLVED | Noted: 2021-02-13 | Resolved: 2023-01-24

## 2023-01-24 PROBLEM — E87.5 HYPERKALEMIA: Status: RESOLVED | Noted: 2023-01-21 | Resolved: 2023-01-24

## 2023-01-24 PROBLEM — T82.898A: Status: RESOLVED | Noted: 2023-01-21 | Resolved: 2023-01-24

## 2023-01-24 PROBLEM — T82.868A THROMBOSIS OF RENAL DIALYSIS ARTERIOVENOUS GRAFT, INITIAL ENCOUNTER (HCC): Status: RESOLVED | Noted: 2023-01-20 | Resolved: 2023-01-24

## 2023-01-24 LAB
ALBUMIN SERPL-MCNC: 3.7 G/DL (ref 3.5–5.2)
ANION GAP SERPL CALCULATED.3IONS-SCNC: 16.5 MMOL/L (ref 5–15)
ANISOCYTOSIS BLD QL: ABNORMAL
BUN SERPL-MCNC: 39 MG/DL (ref 6–20)
BUN/CREAT SERPL: 4.6 (ref 7–25)
CALCIUM SPEC-SCNC: 8.5 MG/DL (ref 8.6–10.5)
CHLORIDE SERPL-SCNC: 98 MMOL/L (ref 98–107)
CO2 SERPL-SCNC: 23.5 MMOL/L (ref 22–29)
CREAT SERPL-MCNC: 8.49 MG/DL (ref 0.57–1)
DEPRECATED RDW RBC AUTO: 60.8 FL (ref 37–54)
EGFRCR SERPLBLD CKD-EPI 2021: 5.4 ML/MIN/1.73
ERYTHROCYTE [DISTWIDTH] IN BLOOD BY AUTOMATED COUNT: 17.8 % (ref 12.3–15.4)
GLUCOSE BLDC GLUCOMTR-MCNC: 116 MG/DL (ref 70–130)
GLUCOSE BLDC GLUCOMTR-MCNC: 127 MG/DL (ref 70–130)
GLUCOSE BLDC GLUCOMTR-MCNC: 156 MG/DL (ref 70–130)
GLUCOSE SERPL-MCNC: 148 MG/DL (ref 65–99)
HCT VFR BLD AUTO: 30.5 % (ref 34–46.6)
HGB BLD-MCNC: 9.9 G/DL (ref 12–15.9)
LAB AP CASE REPORT: NORMAL
LYMPHOCYTES # BLD MANUAL: 0.44 10*3/MM3 (ref 0.7–3.1)
LYMPHOCYTES NFR BLD MANUAL: 6.1 % (ref 5–12)
MAGNESIUM SERPL-MCNC: 2.5 MG/DL (ref 1.6–2.6)
MCH RBC QN AUTO: 30.5 PG (ref 26.6–33)
MCHC RBC AUTO-ENTMCNC: 32.5 G/DL (ref 31.5–35.7)
MCV RBC AUTO: 93.8 FL (ref 79–97)
MICROCYTES BLD QL: ABNORMAL
MONOCYTES # BLD: 0.65 10*3/MM3 (ref 0.1–0.9)
NEUTROPHILS # BLD AUTO: 9.63 10*3/MM3 (ref 1.7–7)
NEUTROPHILS NFR BLD MANUAL: 89.8 % (ref 42.7–76)
PATH REPORT.FINAL DX SPEC: NORMAL
PATH REPORT.GROSS SPEC: NORMAL
PHOSPHATE SERPL-MCNC: 3.6 MG/DL (ref 2.5–4.5)
PLAT MORPH BLD: NORMAL
PLATELET # BLD AUTO: 209 10*3/MM3 (ref 140–450)
PMV BLD AUTO: 10.2 FL (ref 6–12)
POTASSIUM SERPL-SCNC: 5.1 MMOL/L (ref 3.5–5.2)
RBC # BLD AUTO: 3.25 10*6/MM3 (ref 3.77–5.28)
SODIUM SERPL-SCNC: 138 MMOL/L (ref 136–145)
VARIANT LYMPHS NFR BLD MANUAL: 4.1 % (ref 19.6–45.3)
WBC MORPH BLD: NORMAL
WBC NRBC COR # BLD: 10.72 10*3/MM3 (ref 3.4–10.8)

## 2023-01-24 PROCEDURE — 85025 COMPLETE CBC W/AUTO DIFF WBC: CPT | Performed by: STUDENT IN AN ORGANIZED HEALTH CARE EDUCATION/TRAINING PROGRAM

## 2023-01-24 PROCEDURE — 85007 BL SMEAR W/DIFF WBC COUNT: CPT | Performed by: STUDENT IN AN ORGANIZED HEALTH CARE EDUCATION/TRAINING PROGRAM

## 2023-01-24 PROCEDURE — 82962 GLUCOSE BLOOD TEST: CPT

## 2023-01-24 PROCEDURE — 80069 RENAL FUNCTION PANEL: CPT | Performed by: STUDENT IN AN ORGANIZED HEALTH CARE EDUCATION/TRAINING PROGRAM

## 2023-01-24 PROCEDURE — 83735 ASSAY OF MAGNESIUM: CPT | Performed by: STUDENT IN AN ORGANIZED HEALTH CARE EDUCATION/TRAINING PROGRAM

## 2023-01-24 RX ORDER — CLONIDINE HYDROCHLORIDE 0.1 MG/1
0.1 TABLET ORAL DAILY
Status: DISCONTINUED | OUTPATIENT
Start: 2023-01-24 | End: 2023-01-24 | Stop reason: HOSPADM

## 2023-01-24 RX ORDER — CLONIDINE HYDROCHLORIDE 0.1 MG/1
0.1 TABLET ORAL DAILY
Qty: 30 TABLET | Refills: 3 | Status: SHIPPED | OUTPATIENT
Start: 2023-01-25

## 2023-01-24 RX ORDER — FLUCONAZOLE 150 MG/1
150 TABLET ORAL ONCE
Status: COMPLETED | OUTPATIENT
Start: 2023-01-24 | End: 2023-01-24

## 2023-01-24 RX ADMIN — CLONIDINE HYDROCHLORIDE 0.1 MG: 0.1 TABLET ORAL at 08:33

## 2023-01-24 RX ADMIN — Medication 10 ML: at 08:41

## 2023-01-24 RX ADMIN — HYDROXYZINE HYDROCHLORIDE 50 MG: 50 TABLET, FILM COATED ORAL at 08:33

## 2023-01-24 RX ADMIN — CALCIUM ACETATE 2001 MG: 667 CAPSULE ORAL at 08:33

## 2023-01-24 RX ADMIN — CALCIUM ACETATE 2001 MG: 667 CAPSULE ORAL at 13:12

## 2023-01-24 RX ADMIN — APIXABAN 2.5 MG: 2.5 TABLET, FILM COATED ORAL at 08:33

## 2023-01-24 RX ADMIN — FAMOTIDINE 20 MG: 20 TABLET, FILM COATED ORAL at 08:33

## 2023-01-24 RX ADMIN — FLUCONAZOLE 150 MG: 150 TABLET ORAL at 13:12

## 2023-01-25 NOTE — PROGRESS NOTES
January 30, 2023      Pebbles Powell  [address]    [place] [address]              Dear Pebbles,    We have been trying to reach you by phone with no success.  Our system has indicated that you have overdue lab work, awaiting collection.  Also, a Return Appointment with Dr. Collado.    Please call at your earliest convenience to schedule [phone]. If you have transferred care to an outside nephrology group, please let us know, as we can update your medical record.     If you have any additional questions or concerns regarding this message, please inform your nephrology clinic.         Best Regards,     [facility]                     Office Consultation    Dr. Gonzales    Chief Complaint   Patient presents with   • Lump on Neck       HPI:       Sunni Mcneil is a 44 y.o. female who presents for evaluation of a subcutaneous nodule located over the neck.  This has been present for 2 weeks.  There has been no associated symptoms of discharge, tenderness, sudden swelling, or pain.  Patient does not have a history of epidermal inclusion cysts.    Past Medical History:   Diagnosis Date   • Acid reflux    • Anemia    • Arthritis    • ESRD on dialysis (CMS/[facility])     TUESDAY, THURSDAY AND SATURDAY   • History of peritoneal dialysis     KIDNEY TRANSPLANT 2016   • History of transfusion     BEEN A WHILE   • Hypercalcemia    • Hyperparathyroidism (CMS/[facility])    • Hypertension    • Kidney disease     End-stage renal disease. TRANSPLANT   • Kidney transplant recipient 2016    RIGHT KIDNEY   • Seasonal allergies     CURRENTLY    • UTI (urinary tract infection)     CURRENTLY BEING TREATED. WAS HOSPITALIZED AT [facility] - 2018       Past Surgical History:   Procedure Laterality Date   • INSERTION PERITONEAL DIALYSIS CATHETER  2014    Laparoscopic placement of Curl Cath peritoneal dialysis catheter, Dr. Vinod Goldstein   • INSERTION PERITONEAL DIALYSIS CATHETER N/A 2019    Procedure: LAPAROSCOPIC INSERTION PERITONEAL DIALYSIS CATHETER;  Surgeon: Damon Rooney MD;  Location: [facility] OR;  Service: General   • REMOVAL PERITONEAL DIALYSIS CATHETER N/A 10/17/2016    Procedure: REMOVAL PERITONEAL DIALYSIS CATHETER;  Surgeon: Damon Rooney MD;  Location: [facility] OR;  Service:    • TRANSPLANTATION RENAL Right 2016    from  donor   • TUBAL ABDOMINAL LIGATION Bilateral        Current Outpatient Medications on File Prior to Visit   Medication Sig Dispense Refill   • carvedilol (COREG) 12.5 MG tablet Take 12.5 mg by mouth 2 (Two) Times a Day With Meals.     • CloNIDine (CATAPRES) 0.3 MG tablet Take 0.3  "mg by mouth 3 (Three) Times a Day.     • GLIMEPIRIDE PO Take 1 tablet by mouth Daily. Pt unsure of dose.     • iron polysaccharides (NIFEREX) 150 MG capsule Take 150 mg by mouth 2 (Two) Times a Day.     • minoxidil (LONITEN) 10 MG tablet Take 10 mg by mouth Daily.     • polyethylene glycol (MIRALAX) packet Take 17 g by mouth Daily. (Patient taking differently: Take 17 g by mouth Daily As Needed.) 30 each 2   • [DISCONTINUED] senna-docusate (PERICOLACE) 8.6-50 MG per tablet Take 2 tablets by mouth Daily As Needed for Constipation. 30 tablet 1     No current facility-administered medications on file prior to visit.        No Known Allergies    Social History     Socioeconomic History   • Marital status:      Spouse name: Not on file   • Number of children: 2   • Years of education: 12   • Highest education level: Not on file   Occupational History     Employer: [employer]   Tobacco Use   • Smoking status: Never Smoker   • Smokeless tobacco: Never Used   Substance and Sexual Activity   • Alcohol use: No   • Drug use: No   • Sexual activity: Defer       Family History   Problem Relation Age of Onset   • Malig Hyperthermia Neg Hx        REVIEW OF SYSTEMS    CONSTITUTIONAL: Denies fevers, chills, unintentional weight loss or weight gain  RESPIRATORY: Denies chronic cough or sob.   CARDIAC: Denies chest pain, palpitations, edema  GI: Denies dyspepsia, reflux, heartburn, nausea, vomiting, diarrhea or constipation    Physical Examination  Pulse 111   Ht 152.4 cm (60\")   Wt 83.4 kg (183 lb 12.8 oz)   SpO2 98%   BMI 35.90 kg/m²   Body mass index is 35.9 kg/m².  GENERAL:alert, well appearing, and in no distress and oriented to person, place, and time  HEENT: normochephalic, atraumatic, no scleral icterus moist mucous membranes.  NECK: Supple there is no thyromegaly or lymphadenopathy.  Over the right side at the base of the neck there is a 1 x 1/2 cm soft tissue mass that is mobile and not attached to deep planes.  There " is no tenderness.  There is a small blackhead in the middle of it      Assessment:     The patient is a very pleasant 44 y.o. female with a subcutaneous mass located at  neck consistent with a sebaceous cyst.        Plan:          1. I have discussed treatment options consisting of observation or excision under local anesthesia.    2. Patient is inclined to proceed with excision.  3. Verbal patient instruction given.    Procedure:  1) Excisional biopsy of 1.5 x 1 cm right neck sebaceous cyst  2) layered closer    Anesthesia: Local  EBL: Minimal  Findings: Sebaceous cyst    Description:  the patient was taken to the procedure room where she was placed: On her left lateral decubitus.  The right neck area was then prepped and draped in the usual sterile fashion.  With Xylocaine 2% with epinephrine the right neck area was infiltrated after the area was cleaned with Betadine.  An elliptical incision was performed over the mass.  Dissection was carried into the subcutaneous tissue and around the sebaceous cyst.  The cyst was inadvertently enter and fluid was drained.  The mass was completely dissected from the subcutaneous tissue and it was sent for pathological analysis.  Hemostasis was achieved with electrocautery.  The wound was closed in 2 layers with 3-0 Vicryl and 4-0 Monocryl.  The wound was covered with Steri-Strips, 4 x 4 and Tegaderm.  The patient tolerated well the procedure and she was discharged home in stable condition    Instructions:   -Patient to remove dressing in 48 hours, can shower after  -Call my office if having severe pain, bleeding, any signs of infection  -Follow-up in my office in 2 weeks as needed for wound problem    Damon Rooney MD  General, Minimally Invasive and Endoscopic Surgery  [facility]    [address] [place] [address], [address]  [place], [place], [address]  P: [phone]  F: [phone]

## 2023-01-25 NOTE — OUTREACH NOTE
Prep Survey    Flowsheet Row Responses   [facility] facility patient discharged from? [place]   Is LACE score < 7 ? No   Eligibility Readm Mgmt   Discharge diagnosis end stage renal diseaseThrombosis of renal dialysis arteriovenous graft, initial encounter ([facility]) T82.353X   Does the patient have one of the following disease processes/diagnoses(primary or secondary)? Other   Does the patient have Home health ordered? No   Is there a DME ordered? No   Prep survey completed? Yes          WOODY NELSON - Registered Nurse

## 2023-01-26 ENCOUNTER — READMISSION MANAGEMENT (OUTPATIENT)
Dept: CALL CENTER | Facility: HOSPITAL | Age: 48
End: 2023-01-26
Payer: MEDICARE

## 2023-01-26 NOTE — OUTREACH NOTE
Medical Week 1 Survey    Flowsheet Row Responses   [facility] patient discharged from? [place]   Does the patient have one of the following disease processes/diagnoses(primary or secondary)? Other   Week 1 attempt successful? Yes   Call start time 0902   Call end time 0905   Discharge diagnosis end stage renal diseaseThrombosis of renal dialysis arteriovenous graft, initial encounter (HCC) T82.743G   Medication alerts for this patient ELIQUIS--   Meds reviewed with patient/caregiver? Yes   Does the patient have all medications ordered at discharge? No   What is keeping the patient from filling the prescriptions? --  [Pt reports she plans on picking up new prescriptions today]   Nursing Interventions Nurse provided patient education   Is the patient taking all medications as directed (includes completed medication regime)? Yes   Comments regarding appointments Reminded of need to schedule vascular and nephrology f/u appts   Does the patient have a primary care provider?  Yes   Does the patient have an appointment with their PCP within 7 days of discharge? Yes   Comments regarding PCP Pt reports she follows with PCP at [facility] but unable to provide name--appt on 1/30/23    Comments Reports she has been compliant with HD    Has home health visited the patient within 72 hours of discharge? N/A   Psychosocial issues? No   Did the patient receive a copy of their discharge instructions? Yes   Nursing interventions Reviewed instructions with patient   What is the patient's perception of their health status since discharge? Improving  [Reports she is doing better--new cathether functioning w/o issue.  Encouraged to check BP and BG as AVS indicated as she reports she does not but HD takes care of this concern.  NO s/s infection to new site.  ]   Additional teach back comments Encouraged to schedule her f/u appts,  her medications---pt brief in responses and had no additonal questions   Week 1 call  completed? Yes          FALGUNI TRAYLOR - Registered Nurse

## 2023-02-03 ENCOUNTER — READMISSION MANAGEMENT (OUTPATIENT)
Dept: CALL CENTER | Facility: HOSPITAL | Age: 48
End: 2023-02-03
Payer: MEDICARE

## 2023-02-03 NOTE — OUTREACH NOTE
Medical Week 2 Survey    Flowsheet Row Responses   [facility] patient discharged from? [place]   Does the patient have one of the following disease processes/diagnoses(primary or secondary)? Other   Week 2 attempt successful? Yes   Call start time 1647   Discharge diagnosis end stage renal diseaseThrombosis of renal dialysis arteriovenous graft, initial encounter ([facility]) T82.973K   Call end time 1648   Meds reviewed with patient/caregiver? Yes   Is the patient having any side effects they believe may be caused by any medication additions or changes? No   Does the patient have all medications ordered at discharge? Yes   Is the patient taking all medications as directed (includes completed medication regime)? Yes   Does the patient have a primary care provider?  Yes   Does the patient have an appointment with their PCP within 7 days of discharge? Yes   Has the patient kept scheduled appointments due by today? Yes   Has home health visited the patient within 72 hours of discharge? N/A   Psychosocial issues? No   Did the patient receive a copy of their discharge instructions? Yes   Nursing interventions Reviewed instructions with patient   What is the patient's perception of their health status since discharge? Improving   Is the patient/caregiver able to teach back signs and symptoms related to disease process for when to call PCP? Yes   Is the patient/caregiver able to teach back signs and symptoms related to disease process for when to call 911? Yes   Is the patient/caregiver able to teach back the hierarchy of who to call/visit for symptoms/problems? PCP, Specialist, Home health nurse, Urgent Care, ED, 911 Yes   If the patient is a current smoker, are they able to teach back resources for cessation? Not a smoker   Additional teach back comments States she has been to dialysis and has appts scheduled.   Week 2 Call Completed? Yes   Is the patient interested in additional calls from an ambulatory ?   NOTE:  applies to high risk patients requiring additional follow-up. No   Wrap up additional comments Improving.          TISHA BROWNE - Registered Nurse

## 2023-02-15 ENCOUNTER — ANESTHESIA (OUTPATIENT)
Dept: PERIOP | Facility: HOSPITAL | Age: 48
End: 2023-02-15
Payer: MEDICARE

## 2023-02-15 ENCOUNTER — HOSPITAL ENCOUNTER (OUTPATIENT)
Facility: HOSPITAL | Age: 48
Setting detail: HOSPITAL OUTPATIENT SURGERY
Discharge: HOME | End: 2023-02-15
Attending: SURGERY | Admitting: SURGERY
Payer: MEDICARE

## 2023-02-15 ENCOUNTER — ANESTHESIA EVENT (OUTPATIENT)
Dept: PERIOP | Facility: HOSPITAL | Age: 48
End: 2023-02-15
Payer: MEDICARE

## 2023-02-15 VITALS
BODY MASS INDEX: 34.06 KG/M2 | TEMPERATURE: 98.2 F | OXYGEN SATURATION: 96 % | HEIGHT: 60 IN | SYSTOLIC BLOOD PRESSURE: 123 MMHG | DIASTOLIC BLOOD PRESSURE: 80 MMHG | HEART RATE: 90 BPM | WEIGHT: 173.5 LBS | RESPIRATION RATE: 16 BRPM

## 2023-02-15 LAB
GLUCOSE BLDC GLUCOMTR-MCNC: 74 MG/DL (ref 70–130)
HCG SERPL QL: NEGATIVE

## 2023-02-15 PROCEDURE — 84703 CHORIONIC GONADOTROPIN ASSAY: CPT | Performed by: SURGERY

## 2023-02-15 PROCEDURE — 25010000002 MIDAZOLAM PER 1 MG: Performed by: ANESTHESIOLOGY

## 2023-02-15 PROCEDURE — 25010000002 PROPOFOL 10 MG/ML EMULSION: Performed by: NURSE ANESTHETIST, CERTIFIED REGISTERED

## 2023-02-15 PROCEDURE — 82962 GLUCOSE BLOOD TEST: CPT

## 2023-02-15 PROCEDURE — 0 LIDOCAINE 1 % SOLUTION 20 ML VIAL: Performed by: SURGERY

## 2023-02-15 PROCEDURE — 25010000002 CEFAZOLIN IN DEXTROSE 2-4 GM/100ML-% SOLUTION: Performed by: SURGERY

## 2023-02-15 RX ORDER — SODIUM CHLORIDE 9 MG/ML
9 INJECTION, SOLUTION INTRAVENOUS CONTINUOUS PRN
Status: DISCONTINUED | OUTPATIENT
Start: 2023-02-15 | End: 2023-02-15 | Stop reason: HOSPADM

## 2023-02-15 RX ORDER — HYDROMORPHONE HYDROCHLORIDE 1 MG/ML
0.5 INJECTION, SOLUTION INTRAMUSCULAR; INTRAVENOUS; SUBCUTANEOUS
Status: DISCONTINUED | OUTPATIENT
Start: 2023-02-15 | End: 2023-02-15 | Stop reason: HOSPADM

## 2023-02-15 RX ORDER — FAMOTIDINE 10 MG/ML
20 INJECTION, SOLUTION INTRAVENOUS ONCE
Status: COMPLETED | OUTPATIENT
Start: 2023-02-15 | End: 2023-02-15

## 2023-02-15 RX ORDER — IPRATROPIUM BROMIDE AND ALBUTEROL SULFATE 2.5; .5 MG/3ML; MG/3ML
3 SOLUTION RESPIRATORY (INHALATION) ONCE AS NEEDED
Status: DISCONTINUED | OUTPATIENT
Start: 2023-02-15 | End: 2023-02-15 | Stop reason: HOSPADM

## 2023-02-15 RX ORDER — LIDOCAINE HYDROCHLORIDE 10 MG/ML
0.5 INJECTION, SOLUTION EPIDURAL; INFILTRATION; INTRACAUDAL; PERINEURAL ONCE AS NEEDED
Status: DISCONTINUED | OUTPATIENT
Start: 2023-02-15 | End: 2023-02-15 | Stop reason: HOSPADM

## 2023-02-15 RX ORDER — PROPOFOL 10 MG/ML
VIAL (ML) INTRAVENOUS AS NEEDED
Status: DISCONTINUED | OUTPATIENT
Start: 2023-02-15 | End: 2023-02-15 | Stop reason: SURG

## 2023-02-15 RX ORDER — NALOXONE HCL 0.4 MG/ML
0.2 VIAL (ML) INJECTION AS NEEDED
Status: DISCONTINUED | OUTPATIENT
Start: 2023-02-15 | End: 2023-02-15 | Stop reason: HOSPADM

## 2023-02-15 RX ORDER — HYDRALAZINE HYDROCHLORIDE 20 MG/ML
5 INJECTION INTRAMUSCULAR; INTRAVENOUS
Status: DISCONTINUED | OUTPATIENT
Start: 2023-02-15 | End: 2023-02-15 | Stop reason: HOSPADM

## 2023-02-15 RX ORDER — DIPHENHYDRAMINE HCL 25 MG
25 CAPSULE ORAL
Status: DISCONTINUED | OUTPATIENT
Start: 2023-02-15 | End: 2023-02-15 | Stop reason: HOSPADM

## 2023-02-15 RX ORDER — EPHEDRINE SULFATE 50 MG/ML
5 INJECTION, SOLUTION INTRAVENOUS ONCE AS NEEDED
Status: DISCONTINUED | OUTPATIENT
Start: 2023-02-15 | End: 2023-02-15 | Stop reason: HOSPADM

## 2023-02-15 RX ORDER — PROMETHAZINE HYDROCHLORIDE 25 MG/1
25 SUPPOSITORY RECTAL ONCE AS NEEDED
Status: DISCONTINUED | OUTPATIENT
Start: 2023-02-15 | End: 2023-02-15 | Stop reason: HOSPADM

## 2023-02-15 RX ORDER — CEFAZOLIN SODIUM 2 G/100ML
2 INJECTION, SOLUTION INTRAVENOUS ONCE
Status: COMPLETED | OUTPATIENT
Start: 2023-02-15 | End: 2023-02-15

## 2023-02-15 RX ORDER — ONDANSETRON 2 MG/ML
4 INJECTION INTRAMUSCULAR; INTRAVENOUS ONCE AS NEEDED
Status: DISCONTINUED | OUTPATIENT
Start: 2023-02-15 | End: 2023-02-15 | Stop reason: HOSPADM

## 2023-02-15 RX ORDER — LIDOCAINE HYDROCHLORIDE 20 MG/ML
INJECTION, SOLUTION INFILTRATION; PERINEURAL AS NEEDED
Status: DISCONTINUED | OUTPATIENT
Start: 2023-02-15 | End: 2023-02-15 | Stop reason: SURG

## 2023-02-15 RX ORDER — PROMETHAZINE HYDROCHLORIDE 25 MG/1
25 TABLET ORAL ONCE AS NEEDED
Status: DISCONTINUED | OUTPATIENT
Start: 2023-02-15 | End: 2023-02-15 | Stop reason: HOSPADM

## 2023-02-15 RX ORDER — MIDAZOLAM HYDROCHLORIDE 1 MG/ML
1 INJECTION INTRAMUSCULAR; INTRAVENOUS
Status: DISCONTINUED | OUTPATIENT
Start: 2023-02-15 | End: 2023-02-15 | Stop reason: HOSPADM

## 2023-02-15 RX ORDER — FENTANYL CITRATE 50 UG/ML
50 INJECTION, SOLUTION INTRAMUSCULAR; INTRAVENOUS
Status: DISCONTINUED | OUTPATIENT
Start: 2023-02-15 | End: 2023-02-15 | Stop reason: HOSPADM

## 2023-02-15 RX ORDER — FLUMAZENIL 0.1 MG/ML
0.2 INJECTION INTRAVENOUS AS NEEDED
Status: DISCONTINUED | OUTPATIENT
Start: 2023-02-15 | End: 2023-02-15 | Stop reason: HOSPADM

## 2023-02-15 RX ORDER — SODIUM CHLORIDE 0.9 % (FLUSH) 0.9 %
3 SYRINGE (ML) INJECTION EVERY 12 HOURS SCHEDULED
Status: DISCONTINUED | OUTPATIENT
Start: 2023-02-15 | End: 2023-02-15 | Stop reason: HOSPADM

## 2023-02-15 RX ORDER — SODIUM CHLORIDE 0.9 % (FLUSH) 0.9 %
3-10 SYRINGE (ML) INJECTION AS NEEDED
Status: DISCONTINUED | OUTPATIENT
Start: 2023-02-15 | End: 2023-02-15 | Stop reason: HOSPADM

## 2023-02-15 RX ORDER — HYDROCODONE BITARTRATE AND ACETAMINOPHEN 7.5; 325 MG/1; MG/1
1 TABLET ORAL ONCE AS NEEDED
Status: DISCONTINUED | OUTPATIENT
Start: 2023-02-15 | End: 2023-02-15 | Stop reason: HOSPADM

## 2023-02-15 RX ORDER — OXYCODONE AND ACETAMINOPHEN 7.5; 325 MG/1; MG/1
1 TABLET ORAL EVERY 4 HOURS PRN
Status: DISCONTINUED | OUTPATIENT
Start: 2023-02-15 | End: 2023-02-15 | Stop reason: HOSPADM

## 2023-02-15 RX ORDER — DIPHENHYDRAMINE HYDROCHLORIDE 50 MG/ML
12.5 INJECTION INTRAMUSCULAR; INTRAVENOUS
Status: DISCONTINUED | OUTPATIENT
Start: 2023-02-15 | End: 2023-02-15 | Stop reason: HOSPADM

## 2023-02-15 RX ORDER — LABETALOL HYDROCHLORIDE 5 MG/ML
5 INJECTION, SOLUTION INTRAVENOUS
Status: DISCONTINUED | OUTPATIENT
Start: 2023-02-15 | End: 2023-02-15 | Stop reason: HOSPADM

## 2023-02-15 RX ADMIN — SODIUM CHLORIDE 9 ML/HR: 9 INJECTION, SOLUTION INTRAVENOUS at 12:11

## 2023-02-15 RX ADMIN — FAMOTIDINE 20 MG: 10 INJECTION INTRAVENOUS at 12:11

## 2023-02-15 RX ADMIN — CEFAZOLIN SODIUM 2 G: 2 INJECTION, SOLUTION INTRAVENOUS at 12:48

## 2023-02-15 RX ADMIN — PROPOFOL 140 MCG/KG/MIN: 10 INJECTION, EMULSION INTRAVENOUS at 13:01

## 2023-02-15 RX ADMIN — MIDAZOLAM 1 MG: 1 INJECTION INTRAMUSCULAR; INTRAVENOUS at 12:11

## 2023-02-15 RX ADMIN — PROPOFOL 80 MG: 10 INJECTION, EMULSION INTRAVENOUS at 13:01

## 2023-02-15 RX ADMIN — LIDOCAINE HYDROCHLORIDE 100 MG: 20 INJECTION, SOLUTION INFILTRATION; PERINEURAL at 13:01

## 2023-02-15 NOTE — ANESTHESIA POSTPROCEDURE EVALUATION
Patient: Sunni Mcneil    Procedure Summary     Date: 02/15/23 Room / Location: [facility] OR [address] [place] [address] MAIN OR    Anesthesia Start: 1252 Anesthesia Stop: 1334    Procedure: PALINDROME REMOVAL Diagnosis:     Surgeons: Elijah Herrera MD Provider: Bo Marks MD    Anesthesia Type: MAC ASA Status: 3          Anesthesia Type: MAC    Vitals  Vitals Value Taken Time   /80 02/15/23 1400   Temp 36.8 °C (98.2 °F) 02/15/23 1330   Pulse 91 02/15/23 1408   Resp 16 02/15/23 1330   SpO2 98 % 02/15/23 1408   Vitals shown include unvalidated device data.        Post Anesthesia Care and Evaluation    Patient location during evaluation: PACU  Patient participation: complete - patient participated  Level of consciousness: awake and alert  Pain management: adequate    Airway patency: patent  Anesthetic complications: No anesthetic complications    Cardiovascular status: acceptable  Respiratory status: acceptable  Hydration status: acceptable    Comments: --------------------            02/15/23               1400     --------------------   BP:       123/80     Pulse:      90       Resp:                Temp:                SpO2:      96%    VSS--------------------

## 2023-02-15 NOTE — OP NOTE
Operative Note  Location: [facility]  Date of Admission:  2/15/2023  OR Date: 2/15/2023    Pre-op Diagnosis: Functioning right arm AV graft    Post-op Diagnosis: Same    Procedure:   1) removal of tunneled central venous catheter    Surgeon: Elijah Herrera MD    Assistant: Addie MONTES DE OCA    Anesthesia: Monitored Anesthesia Care    Staff:   Circulator: Kaylee Bland RN  Scrub Person: Geoff Means  Assistant: Addie Dimas CSA    Estimated Blood Loss: minimal    Specimen:   Order Name Source Comment Collection Info Order Time   HCG, SERUM, QUALITATIVE   Collected By: Addie Kraft RN 2/15/2023 11:37 AM     Release to patient   Routine Release            Complications: None    Findings: Catheter removed intact    Implants: Nothing was implanted during the procedure    Indications:    The patient is an 47 y.o. female seen for evaluation for clotted AV graft requiring open thrombectomy on 2 subsequent days.  Ultimately her outflow was stented and her graft has remained open.  Her right IJ was occluded so she had to have a left IJ tunneled dialysis catheter.  Because of the patient's extreme anxiety and relatively high cost, I did not think she could tolerate this being done in the office-based setting.       Procedure:    The patient was taken to the operating room placed supine on the operating table.  After the induction of IV sedation the left neck and chest were prepped and draped with ChloraPrep including the catheter.  The sutures holding this the skin had already been cut.  I removed the Vicryl stitch at the skin exit site.  Local anesthetic was used to anesthetize the tract.  Blunt dissection was done with mosquitoes in order to free up the cuff and surrounding tissue.  Manual pressure was held in the neck and the catheter was removed intact.  Manual pressure alone was used for hemostasis.  Dressing was applied.  Patient tolerated the procedure well.      Active Hospital Problems     Diagnosis  POA   • Shunt malfunction [T85.618A]  Yes   • ESRD on hemodialysis (HCC) [N18.6, Z99.2]  Not Applicable      Resolved Hospital Problems    Diagnosis Date Resolved POA   • Mechanical complication of hemodialysis catheter (HCC) [T82.49XA] 01/24/2023 Yes      Elijah Herrera MD     Date: 2/15/2023  Time: 13:22 EST

## 2023-02-15 NOTE — ANESTHESIA PREPROCEDURE EVALUATION
Anesthesia Evaluation     history of anesthetic complications: PONV               Airway   Mallampati: II  TM distance: >3 FB  Neck ROM: full  Dental      Pulmonary    Cardiovascular     Rhythm: regular  Rate: normal    (+) hypertension,       Neuro/Psych  (+) psychiatric history Anxiety,    GI/Hepatic/Renal/Endo    (+) obesity,  GERD,  renal disease dialysis and ESRD, diabetes mellitus,     Musculoskeletal     Abdominal    Substance History      OB/GYN          Other   arthritis,                      Anesthesia Plan    ASA 3     MAC     (I have reviewed the patient's history with the patient and the chart, including all pertinent laboratory results and imaging. I have explained the risks of anesthesia including but not limited to dental damage, corneal abrasion, nerve injury, MI, stroke, and death. Questions asked and answered. Anesthetic plan discussed with patient and team as indicated. Patient expressed understanding of the above.  )  intravenous induction     Anesthetic plan, risks, benefits, and alternatives have been provided, discussed and informed consent has been obtained with: patient.        CODE STATUS:

## 2023-04-17 NOTE — INTERVAL H&P NOTE
H&P reviewed.  The patient was examined and there are no changes to the H&P. Felt better to do here.   17-Apr-2023 14:22

## 2023-05-08 ENCOUNTER — LAB (OUTPATIENT)
Dept: LAB | Facility: HOSPITAL | Age: 48
End: 2023-05-08
Payer: MEDICARE

## 2023-05-08 ENCOUNTER — HOSPITAL ENCOUNTER (OUTPATIENT)
Dept: GENERAL RADIOLOGY | Facility: HOSPITAL | Age: 48
Discharge: HOME | End: 2023-05-08
Payer: MEDICARE

## 2023-05-08 ENCOUNTER — TRANSCRIBE ORDERS (OUTPATIENT)
Dept: ADMINISTRATIVE | Facility: HOSPITAL | Age: 48
End: 2023-05-08
Payer: MEDICARE

## 2023-05-08 DIAGNOSIS — R04.2 COUGHING UP BLOOD: ICD-10-CM

## 2023-05-08 DIAGNOSIS — R04.2 COUGHING UP BLOOD: Primary | ICD-10-CM

## 2023-05-08 LAB
APTT PPP: 30.7 SECONDS (ref 22.7–35.4)
HOLD SPECIMEN: NORMAL
INR PPP: 1.1 (ref 0.9–1.1)
PROTHROMBIN TIME: 14.3 SECONDS (ref 11.7–14.2)

## 2023-05-08 PROCEDURE — 85730 THROMBOPLASTIN TIME PARTIAL: CPT

## 2023-05-08 PROCEDURE — 86480 TB TEST CELL IMMUN MEASURE: CPT

## 2023-05-08 PROCEDURE — 85610 PROTHROMBIN TIME: CPT

## 2023-05-08 PROCEDURE — 71046 X-RAY EXAM CHEST 2 VIEWS: CPT

## 2023-05-08 PROCEDURE — 36415 COLL VENOUS BLD VENIPUNCTURE: CPT

## 2023-05-10 LAB
GAMMA INTERFERON BACKGROUND BLD IA-ACNC: 0 IU/ML
M TB IFN-G BLD-IMP: NEGATIVE
M TB IFN-G CD4+ T-CELLS BLD-ACNC: 0.02 IU/ML
M TBIFN-G CD4+ CD8+T-CELLS BLD-ACNC: 0.02 IU/ML
MITOGEN IGNF BLD-ACNC: >10 IU/ML
QUANTIFERON INCUBATION: NORMAL
SERVICE CMNT-IMP: NORMAL

## 2023-07-28 ENCOUNTER — PRE-ADMISSION TESTING (OUTPATIENT)
Dept: PREADMISSION TESTING | Facility: HOSPITAL | Age: 48
End: 2023-07-28
Payer: MEDICARE

## 2023-07-28 VITALS
TEMPERATURE: 98.3 F | OXYGEN SATURATION: 98 % | BODY MASS INDEX: 30.82 KG/M2 | SYSTOLIC BLOOD PRESSURE: 120 MMHG | HEIGHT: 60 IN | DIASTOLIC BLOOD PRESSURE: 79 MMHG | RESPIRATION RATE: 16 BRPM | WEIGHT: 157 LBS | HEART RATE: 87 BPM

## 2023-07-28 LAB
ANION GAP SERPL CALCULATED.3IONS-SCNC: 10 MMOL/L (ref 5–15)
BUN SERPL-MCNC: 10 MG/DL (ref 6–20)
BUN/CREAT SERPL: 2.5 (ref 7–25)
CALCIUM SPEC-SCNC: 10.1 MG/DL (ref 8.6–10.5)
CHLORIDE SERPL-SCNC: 102 MMOL/L (ref 98–107)
CO2 SERPL-SCNC: 29 MMOL/L (ref 22–29)
CREAT SERPL-MCNC: 4.04 MG/DL (ref 0.57–1)
DEPRECATED RDW RBC AUTO: 56.9 FL (ref 37–54)
EGFRCR SERPLBLD CKD-EPI 2021: 13 ML/MIN/1.73
ERYTHROCYTE [DISTWIDTH] IN BLOOD BY AUTOMATED COUNT: 17.4 % (ref 12.3–15.4)
GLUCOSE SERPL-MCNC: 85 MG/DL (ref 65–99)
HCT VFR BLD AUTO: 36.3 % (ref 34–46.6)
HGB BLD-MCNC: 11.5 G/DL (ref 12–15.9)
MCH RBC QN AUTO: 28.6 PG (ref 26.6–33)
MCHC RBC AUTO-ENTMCNC: 31.7 G/DL (ref 31.5–35.7)
MCV RBC AUTO: 90.3 FL (ref 79–97)
PLATELET # BLD AUTO: 219 10*3/MM3 (ref 140–450)
PMV BLD AUTO: 10.2 FL (ref 6–12)
POTASSIUM SERPL-SCNC: 3.4 MMOL/L (ref 3.5–5.2)
QT INTERVAL: 423 MS
RBC # BLD AUTO: 4.02 10*6/MM3 (ref 3.77–5.28)
SODIUM SERPL-SCNC: 141 MMOL/L (ref 136–145)
WBC NRBC COR # BLD: 6.55 10*3/MM3 (ref 3.4–10.8)

## 2023-07-28 PROCEDURE — 36415 COLL VENOUS BLD VENIPUNCTURE: CPT

## 2023-07-28 PROCEDURE — 93005 ELECTROCARDIOGRAM TRACING: CPT

## 2023-07-28 PROCEDURE — 85027 COMPLETE CBC AUTOMATED: CPT

## 2023-07-28 PROCEDURE — 93010 ELECTROCARDIOGRAM REPORT: CPT | Performed by: INTERNAL MEDICINE

## 2023-07-28 PROCEDURE — 80048 BASIC METABOLIC PNL TOTAL CA: CPT

## 2023-07-28 RX ORDER — CHLORHEXIDINE GLUCONATE 500 MG/1
CLOTH TOPICAL
COMMUNITY

## 2023-07-28 NOTE — DISCHARGE INSTRUCTIONS
Take the following medications the morning of surgery:    CLONIDINE  HYDROXYZINE    ARRIVE AT 1200.    If you are on prescription narcotic pain medication to control your pain you may also take that medication the morning of surgery.    General Instructions:  Do not eat solid food after midnight the night before surgery.  You may drink clear liquids day of surgery but must stop at least one hour before your hospital arrival time.  It is beneficial for you to have a clear drink that contains carbohydrates the day of surgery.  We suggest a 12 to 20 ounce bottle of Gatorade or Powerade for non-diabetic patients or a 12 to 20 ounce bottle of G2 or Powerade Zero for diabetic patients. (Pediatric patients, are not advised to drink a 12 to 20 ounce carbohydrate drink)    Clear liquids are liquids you can see through.  Nothing red in color.     Plain water                               Sports drinks  Sodas                                   Gelatin (Jell-O)  Fruit juices without pulp such as white grape juice and apple juice  Popsicles that contain no fruit or yogurt  Tea or coffee (no cream or milk added)  Gatorade / Powerade  G2 / Powerade Zero    Infants may have breast milk up to four hours before surgery.  Infants drinking formula may drink formula up to six hours before surgery.   Patients who avoid smoking, chewing tobacco and alcohol for 4 weeks prior to surgery have a reduced risk of post-operative complications.  Quit smoking as many days before surgery as you can.  Do not smoke, use chewing tobacco or drink alcohol the day of surgery.   If applicable bring your C-PAP/ BI-PAP machine in with you to preop day of surgery.  Bring any papers given to you in the doctor’s office.  Wear clean comfortable clothes.  Do not wear contact lenses, false eyelashes or make-up.  Bring a case for your glasses.   Bring crutches or walker if applicable.  Remove all piercings.  Leave jewelry and any other valuables at home.  Hair  extensions with metal clips must be removed prior to surgery.  The Pre-Admission Testing nurse will instruct you to bring medications if unable to obtain an accurate list in Pre-Admission Testing.        If you were given a blood bank ID arm band remember to bring it with you the day of surgery.    Preventing a Surgical Site Infection:  For 2 to 3 days before surgery, avoid shaving with a razor because the razor can irritate skin and make it easier to develop an infection.    Any areas of open skin can increase the risk of a post-operative wound infection by allowing bacteria to enter and travel throughout the body.  Notify your surgeon if you have any skin wounds / rashes even if it is not near the expected surgical site.  The area will need assessed to determine if surgery should be delayed until it is healed.  The night prior to surgery shower using a fresh bar of anti-bacterial soap (such as Dial) and clean washcloth.  Sleep in a clean bed with clean clothing.  Do not allow pets to sleep with you.  Shower on the morning of surgery using a fresh bar of anti-bacterial soap (such as Dial) and clean washcloth.  Dry with a clean towel and dress in clean clothing.  Ask your surgeon if you will be receiving antibiotics prior to surgery.  Make sure you, your family, and all healthcare providers clean their hands with soap and water or an alcohol based hand  before caring for you or your wound.    Day of surgery:  Your arrival time is approximately two hours before your scheduled surgery time.  Upon arrival, a Pre-op nurse and Anesthesiologist will review your health history, obtain vital signs, and answer questions you may have.  The only belongings needed at this time will be a list of your home medications and if applicable your C-PAP/BI-PAP machine.  A Pre-op nurse will start an IV and you may receive medication in preparation for surgery, including something to help you relax.     Please be aware that  surgery does come with discomfort.  We want to make every effort to control your discomfort so please discuss any uncontrolled symptoms with your nurse.   Your doctor will most likely have prescribed pain medications.      If you are going home after surgery you will receive individualized written care instructions before being discharged.  A responsible adult must drive you to and from the hospital on the day of your surgery and stay with you for 24 hours.  Discharge prescriptions can be filled by the hospital pharmacy during regular pharmacy hours.  If you are having surgery late in the day/evening your prescription may be e-prescribed to your pharmacy.  Please verify your pharmacy hours or chose a 24 hour pharmacy to avoid not having access to your prescription because your pharmacy has closed for the day.    If you are staying overnight following surgery, you will be transported to your hospital room following the recovery period.  [facility] has all private rooms.    If you have any questions please call Pre-Admission Testing at [phone].  Deductibles and co-payments are collected on the day of service. Please be prepared to pay the required co-pay, deductible or deposit on the day of service as defined by your plan.    Call your surgeon immediately if you experience any of the following symptoms:  Sore Throat  Shortness of Breath or difficulty breathing  Cough  Chills  Body soreness or muscle pain  Headache  Fever  New loss of taste or smell  Do not arrive for your surgery ill.  Your procedure will need to be rescheduled to another time.  You will need to call your physician before the day of surgery to avoid any unnecessary exposure to hospital staff as well as other patients.    CHLORHEXIDINE CLOTH INSTRUCTIONS  The morning of surgery follow these instructions using the Chlorhexidine cloths you've been given.  These steps reduce bacteria on the body.  Do not use the cloths near your eyes,  ears mouth, genitalia or on open wounds.  Throw the cloths away after use but do not try to flush them down a toilet.      Open and remove one cloth at a time from the package.    Leave the cloth unfolded and begin the bathing.  Massage the skin with the cloths using gentle pressure to remove bacteria.  Do not scrub harshly.   Follow the steps below with one 2% CHG cloth per area (6 total cloths).  One cloth for neck, shoulders and chest.  One cloth for both arms, hands, fingers and underarms (do underarms last).  One cloth for the abdomen followed by groin.  One cloth for right leg and foot including between the toes.  One cloth for left leg and foot including between the toes.  The last cloth is to be used for the back of the neck, back and buttocks.    Allow the CHG to air dry 3 minutes on the skin which will give it time to work and decrease the chance of irritation.  The skin may feel sticky until it is dry.  Do not rinse with water or any other liquid or you will lose the beneficial effects of the CHG.  If mild skin irritation occurs, do rinse the skin to remove the CHG.  Report this to the nurse at time of admission.  Do not apply lotions, creams, ointments, deodorants or perfumes after using the clothes. Dress in clean clothes before coming to the hospital.

## 2023-08-04 ENCOUNTER — HOSPITAL ENCOUNTER (OUTPATIENT)
Facility: HOSPITAL | Age: 48
Setting detail: HOSPITAL OUTPATIENT SURGERY
Discharge: HOME | End: 2023-08-04
Attending: SURGERY | Admitting: SURGERY
Payer: MEDICARE

## 2023-08-04 ENCOUNTER — APPOINTMENT (OUTPATIENT)
Dept: GENERAL RADIOLOGY | Facility: HOSPITAL | Age: 48
End: 2023-08-04
Payer: MEDICARE

## 2023-08-04 ENCOUNTER — ANESTHESIA (OUTPATIENT)
Dept: PERIOP | Facility: HOSPITAL | Age: 48
End: 2023-08-04
Payer: MEDICARE

## 2023-08-04 ENCOUNTER — ANESTHESIA EVENT (OUTPATIENT)
Dept: PERIOP | Facility: HOSPITAL | Age: 48
End: 2023-08-04
Payer: MEDICARE

## 2023-08-04 VITALS
SYSTOLIC BLOOD PRESSURE: 146 MMHG | DIASTOLIC BLOOD PRESSURE: 96 MMHG | OXYGEN SATURATION: 98 % | HEART RATE: 70 BPM | TEMPERATURE: 98.3 F | RESPIRATION RATE: 16 BRPM

## 2023-08-04 LAB
ANION GAP SERPL CALCULATED.3IONS-SCNC: 13.3 MMOL/L (ref 5–15)
BUN SERPL-MCNC: 22 MG/DL (ref 6–20)
BUN/CREAT SERPL: 3.5 (ref 7–25)
CALCIUM SPEC-SCNC: 10.5 MG/DL (ref 8.6–10.5)
CHLORIDE SERPL-SCNC: 98 MMOL/L (ref 98–107)
CO2 SERPL-SCNC: 28.7 MMOL/L (ref 22–29)
CREAT SERPL-MCNC: 6.33 MG/DL (ref 0.57–1)
EGFRCR SERPLBLD CKD-EPI 2021: 7.6 ML/MIN/1.73
GLUCOSE BLDC GLUCOMTR-MCNC: 79 MG/DL (ref 70–130)
GLUCOSE BLDC GLUCOMTR-MCNC: 97 MG/DL (ref 70–130)
GLUCOSE SERPL-MCNC: 81 MG/DL (ref 65–99)
HCG SERPL QL: NEGATIVE
POTASSIUM SERPL-SCNC: 4.3 MMOL/L (ref 3.5–5.2)
SODIUM SERPL-SCNC: 140 MMOL/L (ref 136–145)

## 2023-08-04 PROCEDURE — 25010000002 HEPARIN (PORCINE) PER 1000 UNITS: Performed by: SURGERY

## 2023-08-04 PROCEDURE — 84703 CHORIONIC GONADOTROPIN ASSAY: CPT | Performed by: SURGERY

## 2023-08-04 PROCEDURE — C1769 GUIDE WIRE: HCPCS | Performed by: SURGERY

## 2023-08-04 PROCEDURE — 25010000002 MIDAZOLAM PER 1 MG: Performed by: REGISTERED NURSE

## 2023-08-04 PROCEDURE — 82948 REAGENT STRIP/BLOOD GLUCOSE: CPT

## 2023-08-04 PROCEDURE — 25010000002 PROPOFOL 10 MG/ML EMULSION: Performed by: REGISTERED NURSE

## 2023-08-04 PROCEDURE — 25010000002 CEFAZOLIN IN DEXTROSE 2-4 GM/100ML-% SOLUTION: Performed by: SURGERY

## 2023-08-04 PROCEDURE — 25010000002 DROPERIDOL PER 5 MG: Performed by: REGISTERED NURSE

## 2023-08-04 PROCEDURE — C1725 CATH, TRANSLUMIN NON-LASER: HCPCS | Performed by: SURGERY

## 2023-08-04 PROCEDURE — 25010000002 DEXAMETHASONE SODIUM PHOSPHATE 20 MG/5ML SOLUTION: Performed by: REGISTERED NURSE

## 2023-08-04 PROCEDURE — C1894 INTRO/SHEATH, NON-LASER: HCPCS | Performed by: SURGERY

## 2023-08-04 PROCEDURE — 80048 BASIC METABOLIC PNL TOTAL CA: CPT | Performed by: SURGERY

## 2023-08-04 PROCEDURE — 25510000001 IOPAMIDOL PER 1 ML: Performed by: SURGERY

## 2023-08-04 PROCEDURE — 0 LIDOCAINE 1 % SOLUTION 20 ML VIAL: Performed by: SURGERY

## 2023-08-04 PROCEDURE — 25010000002 FENTANYL CITRATE (PF) 50 MCG/ML SOLUTION: Performed by: REGISTERED NURSE

## 2023-08-04 PROCEDURE — 25010000002 BUPIVACAINE (PF) 0.25 % SOLUTION 30 ML VIAL: Performed by: SURGERY

## 2023-08-04 PROCEDURE — 25010000002 MIDAZOLAM PER 1 MG: Performed by: ANESTHESIOLOGY

## 2023-08-04 PROCEDURE — 25010000002 ONDANSETRON PER 1 MG: Performed by: REGISTERED NURSE

## 2023-08-04 RX ORDER — DROPERIDOL 2.5 MG/ML
0.62 INJECTION, SOLUTION INTRAMUSCULAR; INTRAVENOUS
Status: DISCONTINUED | OUTPATIENT
Start: 2023-08-04 | End: 2023-08-04 | Stop reason: HOSPADM

## 2023-08-04 RX ORDER — MIDAZOLAM HYDROCHLORIDE 1 MG/ML
1 INJECTION INTRAMUSCULAR; INTRAVENOUS
Status: CANCELLED | OUTPATIENT
Start: 2023-08-04

## 2023-08-04 RX ORDER — PROMETHAZINE HYDROCHLORIDE 25 MG/1
25 TABLET ORAL ONCE AS NEEDED
Status: DISCONTINUED | OUTPATIENT
Start: 2023-08-04 | End: 2023-08-04 | Stop reason: HOSPADM

## 2023-08-04 RX ORDER — HYDROCODONE BITARTRATE AND ACETAMINOPHEN 5; 325 MG/1; MG/1
1 TABLET ORAL ONCE AS NEEDED
Status: DISCONTINUED | OUTPATIENT
Start: 2023-08-04 | End: 2023-08-04 | Stop reason: HOSPADM

## 2023-08-04 RX ORDER — SODIUM CHLORIDE 0.9 % (FLUSH) 0.9 %
3 SYRINGE (ML) INJECTION EVERY 12 HOURS SCHEDULED
Status: DISCONTINUED | OUTPATIENT
Start: 2023-08-04 | End: 2023-08-04 | Stop reason: HOSPADM

## 2023-08-04 RX ORDER — FENTANYL CITRATE 50 UG/ML
50 INJECTION, SOLUTION INTRAMUSCULAR; INTRAVENOUS ONCE AS NEEDED
Status: DISCONTINUED | OUTPATIENT
Start: 2023-08-04 | End: 2023-08-04 | Stop reason: HOSPADM

## 2023-08-04 RX ORDER — MIDAZOLAM HYDROCHLORIDE 1 MG/ML
1 INJECTION INTRAMUSCULAR; INTRAVENOUS
Status: DISCONTINUED | OUTPATIENT
Start: 2023-08-04 | End: 2023-08-04 | Stop reason: HOSPADM

## 2023-08-04 RX ORDER — DEXAMETHASONE SODIUM PHOSPHATE 4 MG/ML
INJECTION, SOLUTION INTRA-ARTICULAR; INTRALESIONAL; INTRAMUSCULAR; INTRAVENOUS; SOFT TISSUE AS NEEDED
Status: DISCONTINUED | OUTPATIENT
Start: 2023-08-04 | End: 2023-08-04 | Stop reason: SURG

## 2023-08-04 RX ORDER — SODIUM CHLORIDE 9 MG/ML
INJECTION, SOLUTION INTRAVENOUS CONTINUOUS PRN
Status: DISCONTINUED | OUTPATIENT
Start: 2023-08-04 | End: 2023-08-04 | Stop reason: SURG

## 2023-08-04 RX ORDER — PROMETHAZINE HYDROCHLORIDE 25 MG/1
25 SUPPOSITORY RECTAL ONCE AS NEEDED
Status: DISCONTINUED | OUTPATIENT
Start: 2023-08-04 | End: 2023-08-04 | Stop reason: HOSPADM

## 2023-08-04 RX ORDER — SODIUM CHLORIDE 0.9 % (FLUSH) 0.9 %
3-10 SYRINGE (ML) INJECTION AS NEEDED
Status: CANCELLED | OUTPATIENT
Start: 2023-08-04

## 2023-08-04 RX ORDER — MIDAZOLAM HYDROCHLORIDE 1 MG/ML
0.5 INJECTION INTRAMUSCULAR; INTRAVENOUS
Status: DISCONTINUED | OUTPATIENT
Start: 2023-08-04 | End: 2023-08-04

## 2023-08-04 RX ORDER — SODIUM CHLORIDE, SODIUM LACTATE, POTASSIUM CHLORIDE, CALCIUM CHLORIDE 600; 310; 30; 20 MG/100ML; MG/100ML; MG/100ML; MG/100ML
9 INJECTION, SOLUTION INTRAVENOUS CONTINUOUS
Status: DISCONTINUED | OUTPATIENT
Start: 2023-08-04 | End: 2023-08-04 | Stop reason: HOSPADM

## 2023-08-04 RX ORDER — FENTANYL CITRATE 50 UG/ML
25 INJECTION, SOLUTION INTRAMUSCULAR; INTRAVENOUS
Status: DISCONTINUED | OUTPATIENT
Start: 2023-08-04 | End: 2023-08-04 | Stop reason: HOSPADM

## 2023-08-04 RX ORDER — LABETALOL HYDROCHLORIDE 5 MG/ML
5 INJECTION, SOLUTION INTRAVENOUS
Status: DISCONTINUED | OUTPATIENT
Start: 2023-08-04 | End: 2023-08-04 | Stop reason: HOSPADM

## 2023-08-04 RX ORDER — NALOXONE HCL 0.4 MG/ML
0.2 VIAL (ML) INJECTION AS NEEDED
Status: DISCONTINUED | OUTPATIENT
Start: 2023-08-04 | End: 2023-08-04 | Stop reason: HOSPADM

## 2023-08-04 RX ORDER — HYDROCODONE BITARTRATE AND ACETAMINOPHEN 7.5; 325 MG/1; MG/1
1 TABLET ORAL EVERY 4 HOURS PRN
Status: DISCONTINUED | OUTPATIENT
Start: 2023-08-04 | End: 2023-08-04 | Stop reason: HOSPADM

## 2023-08-04 RX ORDER — SODIUM CHLORIDE 0.9 % (FLUSH) 0.9 %
3 SYRINGE (ML) INJECTION EVERY 12 HOURS SCHEDULED
Status: CANCELLED | OUTPATIENT
Start: 2023-08-04

## 2023-08-04 RX ORDER — HYDROMORPHONE HYDROCHLORIDE 1 MG/ML
0.25 INJECTION, SOLUTION INTRAMUSCULAR; INTRAVENOUS; SUBCUTANEOUS
Status: DISCONTINUED | OUTPATIENT
Start: 2023-08-04 | End: 2023-08-04 | Stop reason: HOSPADM

## 2023-08-04 RX ORDER — LIDOCAINE HYDROCHLORIDE 20 MG/ML
INJECTION, SOLUTION INFILTRATION; PERINEURAL AS NEEDED
Status: DISCONTINUED | OUTPATIENT
Start: 2023-08-04 | End: 2023-08-04 | Stop reason: SURG

## 2023-08-04 RX ORDER — SODIUM CHLORIDE, SODIUM LACTATE, POTASSIUM CHLORIDE, CALCIUM CHLORIDE 600; 310; 30; 20 MG/100ML; MG/100ML; MG/100ML; MG/100ML
9 INJECTION, SOLUTION INTRAVENOUS CONTINUOUS
Status: CANCELLED | OUTPATIENT
Start: 2023-08-04

## 2023-08-04 RX ORDER — SODIUM CHLORIDE 0.9 % (FLUSH) 0.9 %
3-10 SYRINGE (ML) INJECTION AS NEEDED
Status: DISCONTINUED | OUTPATIENT
Start: 2023-08-04 | End: 2023-08-04 | Stop reason: HOSPADM

## 2023-08-04 RX ORDER — IPRATROPIUM BROMIDE AND ALBUTEROL SULFATE 2.5; .5 MG/3ML; MG/3ML
3 SOLUTION RESPIRATORY (INHALATION) ONCE AS NEEDED
Status: DISCONTINUED | OUTPATIENT
Start: 2023-08-04 | End: 2023-08-04 | Stop reason: HOSPADM

## 2023-08-04 RX ORDER — ONDANSETRON 2 MG/ML
INJECTION INTRAMUSCULAR; INTRAVENOUS AS NEEDED
Status: DISCONTINUED | OUTPATIENT
Start: 2023-08-04 | End: 2023-08-04 | Stop reason: SURG

## 2023-08-04 RX ORDER — FAMOTIDINE 10 MG/ML
20 INJECTION, SOLUTION INTRAVENOUS ONCE
Status: CANCELLED | OUTPATIENT
Start: 2023-08-04 | End: 2023-08-04

## 2023-08-04 RX ORDER — ONDANSETRON 2 MG/ML
4 INJECTION INTRAMUSCULAR; INTRAVENOUS ONCE AS NEEDED
Status: COMPLETED | OUTPATIENT
Start: 2023-08-04 | End: 2023-08-04

## 2023-08-04 RX ORDER — EPHEDRINE SULFATE 50 MG/ML
INJECTION INTRAVENOUS AS NEEDED
Status: DISCONTINUED | OUTPATIENT
Start: 2023-08-04 | End: 2023-08-04 | Stop reason: SURG

## 2023-08-04 RX ORDER — FENTANYL CITRATE 50 UG/ML
INJECTION, SOLUTION INTRAMUSCULAR; INTRAVENOUS AS NEEDED
Status: DISCONTINUED | OUTPATIENT
Start: 2023-08-04 | End: 2023-08-04 | Stop reason: SURG

## 2023-08-04 RX ORDER — PHENYLEPHRINE HCL IN 0.9% NACL 0.5 MG/5ML
SYRINGE (ML) INTRAVENOUS AS NEEDED
Status: DISCONTINUED | OUTPATIENT
Start: 2023-08-04 | End: 2023-08-04 | Stop reason: SURG

## 2023-08-04 RX ORDER — MIDAZOLAM HYDROCHLORIDE 1 MG/ML
1 INJECTION INTRAMUSCULAR; INTRAVENOUS
Status: COMPLETED | OUTPATIENT
Start: 2023-08-04 | End: 2023-08-04

## 2023-08-04 RX ORDER — CEFAZOLIN SODIUM 2 G/100ML
2000 INJECTION, SOLUTION INTRAVENOUS ONCE
Status: COMPLETED | OUTPATIENT
Start: 2023-08-04 | End: 2023-08-04

## 2023-08-04 RX ORDER — FENTANYL CITRATE 50 UG/ML
50 INJECTION, SOLUTION INTRAMUSCULAR; INTRAVENOUS ONCE AS NEEDED
Status: CANCELLED | OUTPATIENT
Start: 2023-08-04

## 2023-08-04 RX ORDER — FLUMAZENIL 0.1 MG/ML
0.2 INJECTION INTRAVENOUS AS NEEDED
Status: DISCONTINUED | OUTPATIENT
Start: 2023-08-04 | End: 2023-08-04 | Stop reason: HOSPADM

## 2023-08-04 RX ORDER — DIPHENHYDRAMINE HYDROCHLORIDE 50 MG/ML
12.5 INJECTION INTRAMUSCULAR; INTRAVENOUS
Status: DISCONTINUED | OUTPATIENT
Start: 2023-08-04 | End: 2023-08-04 | Stop reason: HOSPADM

## 2023-08-04 RX ORDER — HYDRALAZINE HYDROCHLORIDE 20 MG/ML
5 INJECTION INTRAMUSCULAR; INTRAVENOUS
Status: DISCONTINUED | OUTPATIENT
Start: 2023-08-04 | End: 2023-08-04 | Stop reason: HOSPADM

## 2023-08-04 RX ORDER — FAMOTIDINE 10 MG/ML
20 INJECTION, SOLUTION INTRAVENOUS ONCE
Status: COMPLETED | OUTPATIENT
Start: 2023-08-04 | End: 2023-08-04

## 2023-08-04 RX ORDER — EPHEDRINE SULFATE 50 MG/ML
5 INJECTION, SOLUTION INTRAVENOUS ONCE AS NEEDED
Status: DISCONTINUED | OUTPATIENT
Start: 2023-08-04 | End: 2023-08-04 | Stop reason: HOSPADM

## 2023-08-04 RX ORDER — PROPOFOL 10 MG/ML
VIAL (ML) INTRAVENOUS AS NEEDED
Status: DISCONTINUED | OUTPATIENT
Start: 2023-08-04 | End: 2023-08-04 | Stop reason: SURG

## 2023-08-04 RX ADMIN — IOPAMIDOL 20 ML: 510 INJECTION, SOLUTION INTRAVASCULAR at 15:01

## 2023-08-04 RX ADMIN — SODIUM CHLORIDE: 9 INJECTION, SOLUTION INTRAVENOUS at 14:00

## 2023-08-04 RX ADMIN — DEXAMETHASONE SODIUM PHOSPHATE 8 MG: 4 INJECTION, SOLUTION INTRAMUSCULAR; INTRAVENOUS at 14:08

## 2023-08-04 RX ADMIN — CEFAZOLIN SODIUM 2000 MG: 2 INJECTION, SOLUTION INTRAVENOUS at 13:56

## 2023-08-04 RX ADMIN — Medication 100 MCG: at 14:54

## 2023-08-04 RX ADMIN — FAMOTIDINE 20 MG: 10 INJECTION INTRAVENOUS at 13:30

## 2023-08-04 RX ADMIN — PROPOFOL 200 MG: 10 INJECTION, EMULSION INTRAVENOUS at 14:05

## 2023-08-04 RX ADMIN — LIDOCAINE HYDROCHLORIDE 100 MG: 20 INJECTION, SOLUTION INFILTRATION; PERINEURAL at 14:05

## 2023-08-04 RX ADMIN — DROPERIDOL 0.62 MG: 2.5 INJECTION, SOLUTION INTRAMUSCULAR; INTRAVENOUS at 15:58

## 2023-08-04 RX ADMIN — MIDAZOLAM 1 MG: 1 INJECTION INTRAMUSCULAR; INTRAVENOUS at 13:56

## 2023-08-04 RX ADMIN — ONDANSETRON 4 MG: 2 INJECTION INTRAMUSCULAR; INTRAVENOUS at 15:13

## 2023-08-04 RX ADMIN — ONDANSETRON 4 MG: 2 INJECTION INTRAMUSCULAR; INTRAVENOUS at 14:08

## 2023-08-04 RX ADMIN — EPHEDRINE SULFATE 5 MG: 50 INJECTION INTRAVENOUS at 14:42

## 2023-08-04 RX ADMIN — EPHEDRINE SULFATE 5 MG: 50 INJECTION INTRAVENOUS at 14:45

## 2023-08-04 RX ADMIN — FENTANYL CITRATE 25 MCG: 50 INJECTION, SOLUTION INTRAMUSCULAR; INTRAVENOUS at 14:30

## 2023-08-04 RX ADMIN — MIDAZOLAM 1 MG: 1 INJECTION INTRAMUSCULAR; INTRAVENOUS at 15:22

## 2023-08-04 RX ADMIN — MIDAZOLAM 1 MG: 1 INJECTION INTRAMUSCULAR; INTRAVENOUS at 13:29

## 2023-08-04 NOTE — OP NOTE
Operative Note  Location: [facility]  Date of Admission:  8/4/2023  OR Date: 8/4/2023    Pre-op Diagnosis: Abnormal ultrasound with stenosis of right arm AV graft    Post-op Diagnosis: Same    Procedure:   1) left arm shuntogram with peripheral venous angioplasty (7 x 4)    Surgeon: Elijah Herrera MD    Assistant: Sultana Ferrari RN    Anesthesia: General    Staff:   Circulator: Kaylee Bland RN  Radiology Technologist: Blanquita Moss  Scrub Person: Alisha Nava; Jovana Gomez  Assistant: Sultana Ferrari CST    Estimated Blood Loss: minimal    Specimen:   Order Name Source Comment Collection Info Order Time   BASIC METABOLIC PANEL   Collected By: Alana Patrick RN 8/4/2023 12:44 PM   HCG, SERUM, QUALITATIVE   Collected By: Alana Patrick RN 8/4/2023 12:44 PM     Release to patient   Routine Release            Complications: None    Findings: Graft somewhat difficult to access because of scar tissue.  She did have an 80% stenosis in the mid to distal portion of the graft that responded well with a 7 mm balloon.  No residual stenosis.  Moderate central stenosis.  Not treated.    Implants: Nothing was implanted during the procedure    Indications:    The patient is an 48 y.o. 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  She had multiple thrombotic episodes in her venous outflow was stented.  She had an abnormal ultrasound in the office with a mid to distal stenosis seen.  Patient has an extremely high anxiety level and has been intolerant of office-based procedures and actually has required general anesthesia at the hospital.       Procedure:    The patient was taken the operating placed supine on the operating table    Induction of IV sedation general anesthesia the right arm was prepped and draped with ChloraPrep.  I ultrasounded the arm on the table.  She has a dense scar tissue from multiple cutdown sites.  I used ultrasound to  access the graft with a moderate amount of difficulty.  Micropuncture sheath was placed.  A shuntogram from the arterial anastomosis to the central veins was obtained.  The patient had an 80% stenosis in the mid to distal portion of the access prior to the venous anastomotic stent.  The stent itself is widely patent.  I upsized to a 6 [place] sheath and treated this area with a 7 x 4 mm balloon with a prolonged inflation.  While the balloon was inflated we did a retrograde sheath shot.  There was a 30% stenosis at a previous cutdown site of the graft.  No other inflow stenosis seen.  After angioplasty of the mid to distal stenosis there was no residual stenosis.  Central venogram showed moderate narrowing of the central veins but I elected not to treat this as it emptied quickly.  Bolster stitch was used for hemostasis.  Patient tolerated the seizure well.  I told her daughter that she can start her anticoagulation again today.      There are no hospital problems to display for this patient.     Elijah Herrera MD     Date: 8/4/2023  Time: 15:03 EDT

## 2023-08-04 NOTE — DISCHARGE INSTRUCTIONS
[facility]  David Gardiner, Erasto, Daniel Gaviria,Javier, Orly  [address]  [place] [id number] [phone]        Discharge Instructions for Fistulogram/ Shuntogram      Go home, rest and take it easy today.       You may experience some dizziness or memory loss from the anesthesia.  This may last for the next 24 hours.  Someone should plan on staying with you for the first 24 hours for your safety.    Do not make any important legal decisions or sign any legal papers for the next 24 hours.      Eat and drink lightly today.  Start off with liquids, jello, soup, crackers or other bland foods at first. You may advance your diet tomorrow as tolerated as long as you do not experience any nausea or vomiting.    No driving for the remainder of the day.  You may resume limited driving tomorrow, if necessary.     You may resume routine medications including blood thinners. However, Glucophage should not be started until 72 hours after the dye is given.      No lifting over 10 pounds and no strenuous activity for the next 2-3 days with the involved arm.        Limit going up and down steps for 2 days.    Leave the dressing on until tomorrow morning.  You may then take this off, as well as the small see through dressing with gauze, tomorrow.  If this does not come off easily, do not pull this off.  If it is stuck, shower and let the warm water loosen it before removal.     Check your groin/ arm dressing regularly for bleeding through the dressing (under the pressure dressing).  A small amount of blood contained by the gauze is normal: the whole dressing should not be filled with blood, or leaking out under the sides.  If the bandage is saturated, you may remove it and replace it with a new dressing.      If you experience bleeding through the gauze/ clear dressing, lay flat and have someone apply direct pressure for 15 minutes.  If bleeding has stopped after this, you may put a clean  gauze and tape over the area.  Continue to lie flat for 1-2 hours.  If bleeding continues after 15 minutes of pressure, call us at [phone]. There is an MD available after hours for urgent matters.            If you experience heavy bleeding continue to hold firm pressure and call 911.    Do not call the MD on call.     If your doctor gave you a prescription for a narcotic pill (Tylenol #3, Vicodin, Hydrocodone, Oxycodone or Percocet), you may not drive a vehicle or operate machinery while taking this type of medication.     13. Severe pain is not expected after this procedure.  If you experience severe pain, please call our office at [phone].          Remember to contact our office for any of the following:    Fever > 101 degrees  Severe pain   Severe nausea or vomiting   Significant bleeding of your incisions  Drainage that has a bad smell or is yellow or green in appearance  Any other questions or concerns

## 2023-10-06 ENCOUNTER — APPOINTMENT (OUTPATIENT)
Dept: GENERAL RADIOLOGY | Facility: HOSPITAL | Age: 48
End: 2023-10-06
Payer: MEDICARE

## 2023-10-06 ENCOUNTER — HOSPITAL ENCOUNTER (OUTPATIENT)
Facility: HOSPITAL | Age: 48
Setting detail: HOSPITAL OUTPATIENT SURGERY
Discharge: HOME | End: 2023-10-06
Attending: SURGERY | Admitting: SURGERY
Payer: MEDICARE

## 2023-10-06 ENCOUNTER — ANESTHESIA (OUTPATIENT)
Dept: PERIOP | Facility: HOSPITAL | Age: 48
End: 2023-10-06
Payer: MEDICARE

## 2023-10-06 ENCOUNTER — ANESTHESIA EVENT (OUTPATIENT)
Dept: PERIOP | Facility: HOSPITAL | Age: 48
End: 2023-10-06
Payer: MEDICARE

## 2023-10-06 VITALS
HEIGHT: 60 IN | TEMPERATURE: 98.2 F | WEIGHT: 159 LBS | DIASTOLIC BLOOD PRESSURE: 91 MMHG | OXYGEN SATURATION: 100 % | HEART RATE: 85 BPM | BODY MASS INDEX: 31.22 KG/M2 | SYSTOLIC BLOOD PRESSURE: 145 MMHG | RESPIRATION RATE: 19 BRPM

## 2023-10-06 DIAGNOSIS — N18.6 ESRD (END STAGE RENAL DISEASE): Primary | ICD-10-CM

## 2023-10-06 DIAGNOSIS — T82.868A AV GRAFT THROMBOSIS, INITIAL ENCOUNTER: ICD-10-CM

## 2023-10-06 LAB
DEPRECATED RDW RBC AUTO: 57.3 FL (ref 37–54)
ERYTHROCYTE [DISTWIDTH] IN BLOOD BY AUTOMATED COUNT: 17 % (ref 12.3–15.4)
GLUCOSE BLDC GLUCOMTR-MCNC: 122 MG/DL (ref 70–130)
GLUCOSE BLDC GLUCOMTR-MCNC: 67 MG/DL (ref 70–130)
GLUCOSE BLDC GLUCOMTR-MCNC: 87 MG/DL (ref 70–130)
HCG SERPL QL: NEGATIVE
HCT VFR BLD AUTO: 36.5 % (ref 34–46.6)
HGB BLD-MCNC: 11.2 G/DL (ref 12–15.9)
MCH RBC QN AUTO: 28.9 PG (ref 26.6–33)
MCHC RBC AUTO-ENTMCNC: 30.7 G/DL (ref 31.5–35.7)
MCV RBC AUTO: 94.1 FL (ref 79–97)
PLATELET # BLD AUTO: 335 10*3/MM3 (ref 140–450)
PMV BLD AUTO: 9.6 FL (ref 6–12)
RBC # BLD AUTO: 3.88 10*6/MM3 (ref 3.77–5.28)
WBC NRBC COR # BLD: 9.99 10*3/MM3 (ref 3.4–10.8)

## 2023-10-06 PROCEDURE — C1769 GUIDE WIRE: HCPCS | Performed by: SURGERY

## 2023-10-06 PROCEDURE — 25010000002 BUPIVACAINE (PF) 0.25 % SOLUTION 30 ML VIAL: Performed by: SURGERY

## 2023-10-06 PROCEDURE — C1757 CATH, THROMBECTOMY/EMBOLECT: HCPCS | Performed by: SURGERY

## 2023-10-06 PROCEDURE — C1725 CATH, TRANSLUMIN NON-LASER: HCPCS | Performed by: SURGERY

## 2023-10-06 PROCEDURE — 0 LIDOCAINE 1 % SOLUTION 20 ML VIAL: Performed by: SURGERY

## 2023-10-06 PROCEDURE — 25010000002 MIDAZOLAM PER 1 MG: Performed by: NURSE ANESTHETIST, CERTIFIED REGISTERED

## 2023-10-06 PROCEDURE — 25810000003 LACTATED RINGERS PER 1000 ML: Performed by: STUDENT IN AN ORGANIZED HEALTH CARE EDUCATION/TRAINING PROGRAM

## 2023-10-06 PROCEDURE — 25010000002 MIDAZOLAM PER 1 MG: Performed by: STUDENT IN AN ORGANIZED HEALTH CARE EDUCATION/TRAINING PROGRAM

## 2023-10-06 PROCEDURE — 25010000002 HEPARIN (PORCINE) PER 1000 UNITS: Performed by: NURSE ANESTHETIST, CERTIFIED REGISTERED

## 2023-10-06 PROCEDURE — 25510000001 IOPAMIDOL PER 1 ML: Performed by: SURGERY

## 2023-10-06 PROCEDURE — 25010000002 FENTANYL CITRATE (PF) 50 MCG/ML SOLUTION: Performed by: NURSE ANESTHETIST, CERTIFIED REGISTERED

## 2023-10-06 PROCEDURE — 25010000002 CEFAZOLIN IN DEXTROSE 2-4 GM/100ML-% SOLUTION: Performed by: SURGERY

## 2023-10-06 PROCEDURE — C1894 INTRO/SHEATH, NON-LASER: HCPCS | Performed by: SURGERY

## 2023-10-06 PROCEDURE — 85027 COMPLETE CBC AUTOMATED: CPT | Performed by: ANESTHESIOLOGY

## 2023-10-06 PROCEDURE — 25010000002 ONDANSETRON PER 1 MG: Performed by: NURSE ANESTHETIST, CERTIFIED REGISTERED

## 2023-10-06 PROCEDURE — 84703 CHORIONIC GONADOTROPIN ASSAY: CPT | Performed by: ANESTHESIOLOGY

## 2023-10-06 PROCEDURE — 25010000002 CEFAZOLIN PER 500 MG: Performed by: SURGERY

## 2023-10-06 PROCEDURE — 25010000002 PROPOFOL 10 MG/ML EMULSION: Performed by: NURSE ANESTHETIST, CERTIFIED REGISTERED

## 2023-10-06 PROCEDURE — 82948 REAGENT STRIP/BLOOD GLUCOSE: CPT

## 2023-10-06 RX ORDER — CEFAZOLIN SODIUM 2 G/100ML
2000 INJECTION, SOLUTION INTRAVENOUS ONCE
Status: COMPLETED | OUTPATIENT
Start: 2023-10-06 | End: 2023-10-06

## 2023-10-06 RX ORDER — DIPHENHYDRAMINE HYDROCHLORIDE 50 MG/ML
12.5 INJECTION INTRAMUSCULAR; INTRAVENOUS
Status: DISCONTINUED | OUTPATIENT
Start: 2023-10-06 | End: 2023-10-06 | Stop reason: HOSPADM

## 2023-10-06 RX ORDER — PROPOFOL 10 MG/ML
VIAL (ML) INTRAVENOUS AS NEEDED
Status: DISCONTINUED | OUTPATIENT
Start: 2023-10-06 | End: 2023-10-06 | Stop reason: SURG

## 2023-10-06 RX ORDER — MIDAZOLAM HYDROCHLORIDE 1 MG/ML
1 INJECTION INTRAMUSCULAR; INTRAVENOUS
Status: COMPLETED | OUTPATIENT
Start: 2023-10-06 | End: 2023-10-06

## 2023-10-06 RX ORDER — EPHEDRINE SULFATE 50 MG/ML
5 INJECTION, SOLUTION INTRAVENOUS ONCE AS NEEDED
Status: DISCONTINUED | OUTPATIENT
Start: 2023-10-06 | End: 2023-10-06 | Stop reason: HOSPADM

## 2023-10-06 RX ORDER — SODIUM CHLORIDE 0.9 % (FLUSH) 0.9 %
3 SYRINGE (ML) INJECTION EVERY 12 HOURS SCHEDULED
Status: DISCONTINUED | OUTPATIENT
Start: 2023-10-06 | End: 2023-10-06 | Stop reason: HOSPADM

## 2023-10-06 RX ORDER — FENTANYL CITRATE 50 UG/ML
50 INJECTION, SOLUTION INTRAMUSCULAR; INTRAVENOUS
Status: DISCONTINUED | OUTPATIENT
Start: 2023-10-06 | End: 2023-10-06 | Stop reason: HOSPADM

## 2023-10-06 RX ORDER — ONDANSETRON 2 MG/ML
4 INJECTION INTRAMUSCULAR; INTRAVENOUS ONCE AS NEEDED
Status: COMPLETED | OUTPATIENT
Start: 2023-10-06 | End: 2023-10-06

## 2023-10-06 RX ORDER — DROPERIDOL 2.5 MG/ML
0.62 INJECTION, SOLUTION INTRAMUSCULAR; INTRAVENOUS
Status: DISCONTINUED | OUTPATIENT
Start: 2023-10-06 | End: 2023-10-06 | Stop reason: HOSPADM

## 2023-10-06 RX ORDER — OXYCODONE AND ACETAMINOPHEN 7.5; 325 MG/1; MG/1
1 TABLET ORAL EVERY 4 HOURS PRN
Status: DISCONTINUED | OUTPATIENT
Start: 2023-10-06 | End: 2023-10-06 | Stop reason: HOSPADM

## 2023-10-06 RX ORDER — PROMETHAZINE HYDROCHLORIDE 25 MG/1
25 SUPPOSITORY RECTAL ONCE AS NEEDED
Status: DISCONTINUED | OUTPATIENT
Start: 2023-10-06 | End: 2023-10-06 | Stop reason: HOSPADM

## 2023-10-06 RX ORDER — HYDRALAZINE HYDROCHLORIDE 20 MG/ML
5 INJECTION INTRAMUSCULAR; INTRAVENOUS
Status: DISCONTINUED | OUTPATIENT
Start: 2023-10-06 | End: 2023-10-06 | Stop reason: HOSPADM

## 2023-10-06 RX ORDER — SODIUM CHLORIDE, SODIUM LACTATE, POTASSIUM CHLORIDE, CALCIUM CHLORIDE 600; 310; 30; 20 MG/100ML; MG/100ML; MG/100ML; MG/100ML
9 INJECTION, SOLUTION INTRAVENOUS CONTINUOUS
Status: DISCONTINUED | OUTPATIENT
Start: 2023-10-06 | End: 2023-10-06 | Stop reason: HOSPADM

## 2023-10-06 RX ORDER — EPHEDRINE SULFATE 50 MG/ML
INJECTION INTRAVENOUS AS NEEDED
Status: DISCONTINUED | OUTPATIENT
Start: 2023-10-06 | End: 2023-10-06 | Stop reason: SURG

## 2023-10-06 RX ORDER — SODIUM CHLORIDE 0.9 % (FLUSH) 0.9 %
3-10 SYRINGE (ML) INJECTION AS NEEDED
Status: DISCONTINUED | OUTPATIENT
Start: 2023-10-06 | End: 2023-10-06 | Stop reason: HOSPADM

## 2023-10-06 RX ORDER — HEPARIN SODIUM 1000 [USP'U]/ML
INJECTION, SOLUTION INTRAVENOUS; SUBCUTANEOUS AS NEEDED
Status: DISCONTINUED | OUTPATIENT
Start: 2023-10-06 | End: 2023-10-06 | Stop reason: SURG

## 2023-10-06 RX ORDER — MIDAZOLAM HYDROCHLORIDE 1 MG/ML
INJECTION INTRAMUSCULAR; INTRAVENOUS AS NEEDED
Status: DISCONTINUED | OUTPATIENT
Start: 2023-10-06 | End: 2023-10-06 | Stop reason: SURG

## 2023-10-06 RX ORDER — IPRATROPIUM BROMIDE AND ALBUTEROL SULFATE 2.5; .5 MG/3ML; MG/3ML
3 SOLUTION RESPIRATORY (INHALATION) ONCE AS NEEDED
Status: DISCONTINUED | OUTPATIENT
Start: 2023-10-06 | End: 2023-10-06 | Stop reason: HOSPADM

## 2023-10-06 RX ORDER — SODIUM CHLORIDE 9 MG/ML
9 INJECTION, SOLUTION INTRAVENOUS CONTINUOUS
Status: DISCONTINUED | OUTPATIENT
Start: 2023-10-06 | End: 2023-10-06 | Stop reason: HOSPADM

## 2023-10-06 RX ORDER — HYDROMORPHONE HYDROCHLORIDE 1 MG/ML
0.5 INJECTION, SOLUTION INTRAMUSCULAR; INTRAVENOUS; SUBCUTANEOUS
Status: DISCONTINUED | OUTPATIENT
Start: 2023-10-06 | End: 2023-10-06 | Stop reason: HOSPADM

## 2023-10-06 RX ORDER — LIDOCAINE HYDROCHLORIDE 10 MG/ML
0.5 INJECTION, SOLUTION INFILTRATION; PERINEURAL ONCE AS NEEDED
Status: DISCONTINUED | OUTPATIENT
Start: 2023-10-06 | End: 2023-10-06 | Stop reason: HOSPADM

## 2023-10-06 RX ORDER — PROPOFOL 10 MG/ML
VIAL (ML) INTRAVENOUS AS NEEDED
Status: DISCONTINUED | OUTPATIENT
Start: 2023-10-06 | End: 2023-10-06

## 2023-10-06 RX ORDER — DEXTROSE MONOHYDRATE 25 G/50ML
25 INJECTION, SOLUTION INTRAVENOUS
Status: DISCONTINUED | OUTPATIENT
Start: 2023-10-06 | End: 2023-10-06 | Stop reason: HOSPADM

## 2023-10-06 RX ORDER — LIDOCAINE HYDROCHLORIDE 20 MG/ML
INJECTION, SOLUTION INFILTRATION; PERINEURAL AS NEEDED
Status: DISCONTINUED | OUTPATIENT
Start: 2023-10-06 | End: 2023-10-06 | Stop reason: SURG

## 2023-10-06 RX ORDER — HYDROCODONE BITARTRATE AND ACETAMINOPHEN 5; 325 MG/1; MG/1
1 TABLET ORAL ONCE AS NEEDED
Status: COMPLETED | OUTPATIENT
Start: 2023-10-06 | End: 2023-10-06

## 2023-10-06 RX ORDER — MAGNESIUM HYDROXIDE 1200 MG/15ML
LIQUID ORAL AS NEEDED
Status: DISCONTINUED | OUTPATIENT
Start: 2023-10-06 | End: 2023-10-06 | Stop reason: HOSPADM

## 2023-10-06 RX ORDER — LABETALOL HYDROCHLORIDE 5 MG/ML
5 INJECTION, SOLUTION INTRAVENOUS
Status: DISCONTINUED | OUTPATIENT
Start: 2023-10-06 | End: 2023-10-06 | Stop reason: HOSPADM

## 2023-10-06 RX ORDER — HYDROCODONE BITARTRATE AND ACETAMINOPHEN 5; 325 MG/1; MG/1
1 TABLET ORAL EVERY 4 HOURS PRN
Qty: 20 TABLET | Refills: 0 | Status: SHIPPED | OUTPATIENT
Start: 2023-10-06

## 2023-10-06 RX ORDER — PHENYLEPHRINE HCL IN 0.9% NACL 0.5 MG/5ML
SYRINGE (ML) INTRAVENOUS AS NEEDED
Status: DISCONTINUED | OUTPATIENT
Start: 2023-10-06 | End: 2023-10-06 | Stop reason: SURG

## 2023-10-06 RX ORDER — NALOXONE HCL 0.4 MG/ML
0.2 VIAL (ML) INJECTION AS NEEDED
Status: DISCONTINUED | OUTPATIENT
Start: 2023-10-06 | End: 2023-10-06 | Stop reason: HOSPADM

## 2023-10-06 RX ORDER — PROMETHAZINE HYDROCHLORIDE 25 MG/1
25 TABLET ORAL ONCE AS NEEDED
Status: DISCONTINUED | OUTPATIENT
Start: 2023-10-06 | End: 2023-10-06 | Stop reason: HOSPADM

## 2023-10-06 RX ORDER — DEXMEDETOMIDINE HYDROCHLORIDE 4 UG/ML
.2-1.5 INJECTION, SOLUTION INTRAVENOUS
Status: DISCONTINUED | OUTPATIENT
Start: 2023-10-06 | End: 2023-10-06 | Stop reason: HOSPADM

## 2023-10-06 RX ORDER — FLUMAZENIL 0.1 MG/ML
0.2 INJECTION INTRAVENOUS AS NEEDED
Status: DISCONTINUED | OUTPATIENT
Start: 2023-10-06 | End: 2023-10-06 | Stop reason: HOSPADM

## 2023-10-06 RX ORDER — DEXTROSE MONOHYDRATE 25 G/50ML
50 INJECTION, SOLUTION INTRAVENOUS
Status: DISCONTINUED | OUTPATIENT
Start: 2023-10-06 | End: 2023-10-06

## 2023-10-06 RX ORDER — MIDAZOLAM HYDROCHLORIDE 1 MG/ML
0.5 INJECTION INTRAMUSCULAR; INTRAVENOUS
Status: DISCONTINUED | OUTPATIENT
Start: 2023-10-06 | End: 2023-10-06 | Stop reason: HOSPADM

## 2023-10-06 RX ORDER — FENTANYL CITRATE 50 UG/ML
INJECTION, SOLUTION INTRAMUSCULAR; INTRAVENOUS AS NEEDED
Status: DISCONTINUED | OUTPATIENT
Start: 2023-10-06 | End: 2023-10-06 | Stop reason: SURG

## 2023-10-06 RX ADMIN — Medication 3 ML: at 11:41

## 2023-10-06 RX ADMIN — SODIUM CHLORIDE, POTASSIUM CHLORIDE, SODIUM LACTATE AND CALCIUM CHLORIDE: 600; 310; 30; 20 INJECTION, SOLUTION INTRAVENOUS at 13:57

## 2023-10-06 RX ADMIN — LIDOCAINE HYDROCHLORIDE 60 MG: 20 INJECTION, SOLUTION INFILTRATION; PERINEURAL at 14:33

## 2023-10-06 RX ADMIN — DEXTROSE MONOHYDRATE 25 ML: 25 INJECTION, SOLUTION INTRAVENOUS at 11:36

## 2023-10-06 RX ADMIN — PROPOFOL 50 MG: 10 INJECTION, EMULSION INTRAVENOUS at 14:56

## 2023-10-06 RX ADMIN — ONDANSETRON 4 MG: 2 INJECTION INTRAMUSCULAR; INTRAVENOUS at 17:18

## 2023-10-06 RX ADMIN — CEFAZOLIN SODIUM 2000 MG: 2 INJECTION, SOLUTION INTRAVENOUS at 14:07

## 2023-10-06 RX ADMIN — MIDAZOLAM 1 MG: 1 INJECTION INTRAMUSCULAR; INTRAVENOUS at 11:36

## 2023-10-06 RX ADMIN — HEPARIN SODIUM 5000 UNITS: 1000 INJECTION, SOLUTION INTRAVENOUS; SUBCUTANEOUS at 15:03

## 2023-10-06 RX ADMIN — MIDAZOLAM 2 MG: 1 INJECTION INTRAMUSCULAR; INTRAVENOUS at 14:31

## 2023-10-06 RX ADMIN — IOPAMIDOL 38 ML: 510 INJECTION, SOLUTION INTRAVASCULAR at 15:43

## 2023-10-06 RX ADMIN — EPHEDRINE SULFATE 10 MG: 50 INJECTION INTRAVENOUS at 15:26

## 2023-10-06 RX ADMIN — PROPOFOL 200 MG: 10 INJECTION, EMULSION INTRAVENOUS at 14:33

## 2023-10-06 RX ADMIN — MIDAZOLAM 1 MG: 1 INJECTION INTRAMUSCULAR; INTRAVENOUS at 14:17

## 2023-10-06 RX ADMIN — FENTANYL CITRATE 50 MCG: 50 INJECTION, SOLUTION INTRAMUSCULAR; INTRAVENOUS at 16:25

## 2023-10-06 RX ADMIN — PROPOFOL 50 MG: 10 INJECTION, EMULSION INTRAVENOUS at 14:31

## 2023-10-06 RX ADMIN — PROPOFOL 50 MG: 10 INJECTION, EMULSION INTRAVENOUS at 14:34

## 2023-10-06 RX ADMIN — FENTANYL CITRATE 50 MCG: 50 INJECTION, SOLUTION INTRAMUSCULAR; INTRAVENOUS at 14:48

## 2023-10-06 RX ADMIN — HYDROCODONE BITARTRATE AND ACETAMINOPHEN 1 TABLET: 5; 325 TABLET ORAL at 17:03

## 2023-10-06 RX ADMIN — Medication 200 MCG: at 15:19

## 2023-10-06 RX ADMIN — Medication 200 MCG: at 15:09

## 2023-10-06 NOTE — NURSING NOTE
Pt awake and alert and states that she wishes to go home. RN attempted to transfer pt in stretcher to next phase of care, pt did not tolerate r/t claustrophobia. Pt instructed to deep breathe, given sips of drink. Somewhat improved but unable to tolerate stretcher moving. Pt transferred to wheelchair and slowly transferred to phase II.

## 2023-10-06 NOTE — ANESTHESIA PREPROCEDURE EVALUATION
Anesthesia Evaluation     Patient summary reviewed and Nursing notes reviewed   history of anesthetic complications:  PONV  NPO Solid Status: > 8 hours  NPO Liquid Status: > 2 hours           Airway   Mallampati: II  TM distance: >3 FB  Neck ROM: full  Dental      Pulmonary    Cardiovascular     ECG reviewed    (+) hypertension    ROS comment: Echo 12/2022 unremarkable    Normal EKG    Neuro/Psych  GI/Hepatic/Renal/Endo    (+) obesity, GERD, renal disease ESRD and dialysis, diabetes mellitus    Musculoskeletal     Abdominal   (+) obese   Substance History      OB/GYN          Other                        Anesthesia Plan    ASA 3     MAC     intravenous induction     Anesthetic plan, risks, benefits, and alternatives have been provided, discussed and informed consent has been obtained with: patient.      CODE STATUS:

## 2023-10-06 NOTE — H&P
[facility]   HISTORY AND PHYSICAL    Patient Name:Sunni Mcneil  : 1975  MRN: [id number]  Primary Care Physician: Regla Ying APRN  Date of admission: 10/6/2023    Subjective   Subjective     Chief Complaint: Thrombosed right AV graft    History of Present Illness   Sunni Mcneil is a 48 y.o. female with thrombosed right AV graft in need of thrombectomy and possible tunneled dialysis catheter.  Patient is an agreement plan.  Patient has panic attacks and needs general anesthetic.    Review of Systems crying and holding    Personal History     Past Medical History:   Diagnosis Date    Acid reflux     Anemia     WITH ESRD    Anxiety     Arthritis     Cough     related to fluid overload    Depression     Diabetes mellitus     NO MEDICATIONS FOR    ESRD on dialysis     FRESINUS MWF    History of peritoneal dialysis     KIDNEY TRANSPLANT 2016     History of transfusion     BEEN A WHILE no reaction    Hypercalcemia     Hyperparathyroidism     Hypertension     Kidney disease     End-stage renal disease. TRANSPLANT    Kidney transplant recipient 2016    RIGHT KIDNEY. ? 2018 KIDNEY REJECTED    PONV (postoperative nausea and vomiting)     Seasonal allergies     CURRENTLY     Vascular dialysis catheter in place     RIGHT TUNNEL CATH       Past Surgical History:   Procedure Laterality Date    ARTERIOVENOUS FISTULA/SHUNT SURGERY Right 2020    Procedure: RIGHT ARM ARTERIAL VENOUS FISTULA;  Surgeon: Elijah Herrera MD;  Location: [facility];  Service: Vascular;  Laterality: Right;    ARTERIOVENOUS FISTULA/SHUNT SURGERY Left 2021    Procedure: LEFT BRACHIOAXILLARY ARTERIOVENOUS FISTULA GRAFT;  Surgeon: Anya Hernandez Jr., MD;  Location: [facility];  Service: Vascular;  Laterality: Left;    ARTERIOVENOUS FISTULA/SHUNT SURGERY Right 2021    Procedure: RIGHT BRACHIOAXILLARY ARTERVENIOUS GRAFT PLACEMENT;  Surgeon: Adán Maurer MD;  Location: [facility] OR;   Service: Vascular;  Laterality: Right;    ARTERIOVENOUS FISTULA/SHUNT SURGERY Right 8/4/2023    Procedure: RIGHT ARM FISTULOGRAM PEUCUTANEOUS AND PERIPHERAL TRANSLUMINAL ANGIOPLSTY/STENT;  Surgeon: Elijah Herrera MD;  Location: [facility] HYBRID OR 18/19;  Service: Vascular;  Laterality: Right;    COLONOSCOPY N/A 11/30/2022    Procedure: COLONOSCOPY TO CECUM AND TERM. ILEUM;  Surgeon: Casimiro Perkins MD;  Location: [facility] ENDOSCOPY;  Service: Gastroenterology;  Laterality: N/A;  PRE OP - SCREENING  POST OP - DIVERTICULOSIS, SM. HEMORRHOIDS    INSERTION AND REMOVAL HEMODIALYSIS CATHETER N/A 02/13/2021    Procedure: INSERTION AND REMOVAL OF HEMODIALYSIS CATHETER;  Surgeon: Adán Maurer MD;  Location: [facility] MAIN OR;  Service: Vascular;  Laterality: N/A;    INSERTION HEMODIALYSIS CATHETER Right 01/15/2021    Procedure: TUNNELED DIAYLIS CATHETER PLACEMENT;  Surgeon: Phu Gaviria MD;  Location: [facility] OR 18/19;  Service: Vascular;  Laterality: Right;    INSERTION HEMODIALYSIS CATHETER Left 1/21/2023    Procedure: HEMODIALYSIS CATHETER INSERTION;  Surgeon: Elijah Herrera MD;  Location: [facility] MAIN OR;  Service: Vascular;  Laterality: Left;    INSERTION PERITONEAL DIALYSIS CATHETER  07/29/2014    Laparoscopic placement of Curl Cath peritoneal dialysis catheter, Dr. Vinod Goldstein    INSERTION PERITONEAL DIALYSIS CATHETER N/A 07/24/2019    Procedure: LAPAROSCOPIC INSERTION PERITONEAL DIALYSIS CATHETER;  Surgeon: Damon Rooney MD;  Location: [facility] MAIN OR;  Service: General    REMOVAL HEMODIALYSIS CATHETER N/A 2/15/2023    Procedure: PALINDROME REMOVAL;  Surgeon: Elijah Herrera MD;  Location: [facility] MAIN OR;  Service: Vascular;  Laterality: N/A;    REMOVAL PERITONEAL DIALYSIS CATHETER N/A 10/17/2016    Procedure: REMOVAL PERITONEAL DIALYSIS CATHETER;  Surgeon: Damon Rooney MD;  Location: [facility] MAIN OR;  Service:     REMOVAL PERITONEAL DIALYSIS CATHETER N/A  10/21/2020    Procedure: REMOVAL PERITONEAL DIALYSIS CATHETER;  Surgeon: Damon Rooney MD;  Location: [facility] OR;  Service: General;  Laterality: N/A;    SHUNT O GRAM Right 2022    Procedure: RIGHT  ARM SHUNT O GRAM , ANGIOPLASTY OF AXILARY VEIN AND SUBCLAVIAN VEIN AT SVC JUNCTION;  Surgeon: Anya Hernandez Jr., MD;  Location: [facility] OR ;  Service: Vascular;  Laterality: Right;    SHUNT O GRAM Right 2023    Procedure: OPEN THROMBECTOMY AND ANGIOPLASTY;  Surgeon: Elijah Herrera MD;  Location: [facility] OR ;  Service: Vascular;  Laterality: Right;    SHUNT O GRAM Right 2023    Procedure: Right arm dialysis graft open thrombectomy, shuntogram, angioplasty and stent;  Surgeon: Karuna Villalba MD;  Location: [facility] OR ;  Service: Vascular;  Laterality: Right;    TRANSPLANTATION RENAL Right 2016    from  donor    TUBAL ABDOMINAL LIGATION Bilateral        Family History: Her family history includes Heart attack in her maternal grandfather; Hypertension in her father and mother.     Social History: She  reports that she has never smoked. She has never used smokeless tobacco. She reports that she does not drink alcohol and does not use drugs.    Home Medications:  Polyethylene Glycol 3350, acetaminophen, calcium acetate, cloNIDine, hydrOXYzine, prochlorperazine, and traZODone    Allergies:  She has No Known Allergies.    Objective    Objective     Vitals:    Temp:  [98.2 °F (36.8 °C)] 98.2 °F (36.8 °C)  Heart Rate:  [78] 78  Resp:  [20] 20  BP: (150)/(96) 150/96    Physical Exam   Lungs coarse but equal  Heart regular rate and rhythm  Abdomen benign  Right AV graft thrombosed without thrill  Result Review    Result Review:  I have personally reviewed the results from the time of this admission to 10/6/2023 13:57 EDT and agree with these findings:  []  Laboratory list / accordion  []  Microbiology  []  Radiology  []   EKG/Telemetry   []  Cardiology/Vascular   []  Pathology  []  Old records  []  Other:        Assessment & Plan   Assessment / Plan     Brief Patient Summary:  Sunni Mcneil is a 48 y.o. female with right AV graft thrombosis now in need of thrombectomy.  Patient requires general anesthetic due to major panic attacks with minor procedures.  Patient undergoing thrombectomy and possible catheter placement.  She understands and agrees  Active Hospital Problems:  Active Hospital Problems    Diagnosis     AV graft thrombosis, initial encounter      Plan:   AV graft thrombectomy with possible tunneled dialysis catheter placement    DVT prophylaxis:  intraoperative anticoagulation    Phu Gaviria MD

## 2023-10-06 NOTE — DISCHARGE INSTRUCTIONS
[facility]  David Gardiner, Erasto, Daniel Gaviria,Javier, Orly  [address]  [place] [id number] [phone]      Discharge Instructions     Go home, rest and take it easy today.      You may experience some dizziness or memory loss from the anesthesia.  This may last for the next 24 hours.  Someone should plan on staying with you for the first 24 hours for your safety.    Do not make any important legal decisions or sign any legal papers for the next 24 hours.      Eat and drink lightly today.  Start off with liquids, jello, soup, crackers or other bland foods at first. You may advance your diet tomorrow as tolerated as long as you do not experience any nausea or vomiting.    You may resume routine medications including blood thinners. However, Glucophage should be not be started for 72 hours after the dye is given.      No lifting over 10 pounds and no strenuous activity for the next 2-3 days.    Try not to strain or bear down when your bowels move or when you empty your bladder.    Limit going up and down steps for 2 days.    No driving for the remainder of the day.  You may resume limited driving tomorrow if necessary.      You may shower tomorrow.  No bath or hot tubs for at least 2 days after the procedure.          Leave the pressure dressing on until tomorrow morning.  You may then take this off, as well as the small see through dressing with gauze tomorrow.  If it doesn’t come off easily, do not pull this off.  If it is stuck, shower and let the warm water loosen it before removal.       Check your groin dressing regularly for bleeding through the dressing (under the pressure dressing).  A small amount of blood contained by the gauze is normal; the whole dressing should not be filled with blood or leaking out under the sides.     If you experience bleeding through the gauze/clear sticky dressing, lay flat and have someone apply direct pressure for 15 minutes.  If  bleeding has stopped after this, you may put a clean gauze and tape over the area.  Continue to lie flat for 1-2 hours.  If bleeding continues after 15 minutes of pressure, call us at the number listed above.  There is an MD available after hours.      If you experience heavy bleeding or large swelling, continue to hold firm pressure and              call 911.  Do not call the MD on call.      If you experience pain or discomfort you may take Tylenol or Ibuprofen, whichever you normally use for minor discomfort, unless otherwise instructed.        If the MD gives you a prescription for narcotic pain pills (Tylenol #3, Vicodin, Hydrocodone, Oxycodone or Percocet), you cannot drive a vehicle or operate machinery while taking these.     Severe pain is not expected after this procedure.  If you experience severe pain, please call our office at [phone].     Remember to contact our office for any of the following:    Fever > 101 degrees  Severe pain that cannot be controlled by taking your pain pills  Severe nausea or vomiting   Significant bleeding of your incisions  Drainage that has a bad smell or is yellow or green in appearance  Any other questions or concerns

## 2023-10-06 NOTE — OP NOTE
Operative Note  Date of Admission:  10/6/2023  OR Date: 10/6/2023    Pre-op Diagnosis:   Thrombosed right arteriovenous graft    Post-Op Diagnosis Codes:  Same    Procedure:   1) open right AV graft thrombectomy with AV shuntogram upper extremity venogram and arteriogram, right AV shunt angioplasty, right brachial artery angioplasty, right central venous angioplasty    Surgeon: Chas Gaviria MD    Assistant:  Addie SELBY CSA and they provided critical assistance during the case including suctioning, exposure, retraction, and reduction of blood loss.    Anesthesia: Monitored Anesthesia Care    Staff:   Circulator: Anum Martines RN  Radiology Technologist: Ct Zavala  Scrub Person: Dannielle Gonzalez; Audi Bojorquez; Janine Martin    Estimated Blood Loss: 100ml    Specimens:   Order Name Source Comment Collection Info Order Time   CBC (NO DIFF)   Collected By: Rima Myles RN 10/6/2023 10:39 AM     Release to patient   Routine Release        COMPREHENSIVE METABOLIC PANEL    10/6/2023 10:39 AM     Release to patient   Routine Release        HCG, SERUM, QUALITATIVE   Collected By: Rima Myles RN 10/6/2023 10:39 AM     Release to patient   Routine Release             Complications: None    Findings: No significant culprit lesion seen.  All angioplasties performed without severe high-grade stenosis found.  Because of this recommendations for long-term anticoagulation are being made with Eliquis 2.5 mg twice daily.    Indications:    The patient is an 48 y.o. female seen for evaluation thrombosed right AV graft.  Patient is undergoing open thrombectomy 100 general anesthetic per her request due to her panic attacks.  Tunnel catheter will be placed if he cannot open the graft.  Patient and family understand risk benefits complications and agreed to the procedure       Procedure:    Patient was prepped and draped in a sterile manner under general anesthetic we incised the skin over the upper part of the AV  graft where we performed thrombectomy 3 and 4 Susan embolectomy catheters after transverse graftotomy was performed.  5000 units of heparin was given.  Large amounts of thrombus in arterial plug were removed.  The patient had angiograms performed centrally and of the AV graft and of the upper extremity artery with catheter placed in the brachial artery selectively where angiograms were performed a 12 mm x 40 mm balloon was used to angioplasty the central venous portion of the subclavian innominate junction as well as the axillary vein stent.  An 8 mm x 60 mm balloon was used to angioplasty the AV shunt segment.  A 6 mm x 2 cm balloon was used to angioplasty the brachial artery in the inflow.  With these completed and no significant culprit lesion seen the patient had flow reestablished after closing the graftotomy with a 6-0 Prolene suture.  With this completed there is excellent flow through the graft with good thrill returning.  It was felt that catheter placement was not needed.  We did not reverse the heparin effect.  We closed the deep tissue with 3-0 Vicryl deep stitches and 4-0 Vicryl subcuticular closure.  Skin glue was applied.  Patient tolerated the procedure well    Radiographic Findings:  Angiographic portion of the procedure 1 Central venogram shows stenosis at the subclavian innominate junction angioplasty with 12 mm balloon with total resolution this is approximately 70% stenosis.  There is a 60 to 70% stenosis within the outflow limb of the AV graft at the site of a prior stent.  This was overall fairly wide open and a 12 and an 8 mm balloon angioplasty resolved any evidence of stenosis throughout the segment and the proximal portion of the AV graft which had multiple segmental 50 to 70% stenoses.  Proximal inflow was opened with a 6 x 20 balloon and balloon angioplasty of the brachial artery proper after selective angiogram of the brachial artery was performed.  Final angiogram showed wide patency  of the arterial limb arterial inflow to the arterial segment of the graft and the venous outflow segment of the graft and central venous segments after intervention tachycardia      Active Hospital Problems    Diagnosis  POA    AV graft thrombosis, initial encounter [T82.147W]  Yes      Resolved Hospital Problems   No resolved problems to display.      Phu Gaviria MD     Date: 10/6/2023  Time: 15:57 EDT

## 2023-10-06 NOTE — ANESTHESIA PROCEDURE NOTES
Airway  Urgency: elective    Date/Time: 10/6/2023 2:37 PM  Airway not difficult    General Information and Staff    Patient location during procedure: OR  CRNA/CAA: Renée Titus CRNA    Indications and Patient Condition    Preoxygenated: yes      Final Airway Details  Final airway type: supraglottic airway      Successful airway: LMA  Size 4     Number of attempts at approach: 1  Assessment: lips, teeth, and gum same as pre-op and atraumatic intubation

## 2023-10-07 NOTE — ANESTHESIA POSTPROCEDURE EVALUATION
Patient: Sunni Mcneil    Procedure Summary       Date: 10/06/23 [facility] / [facility]    Anesthesia Start: 1428 Anesthesia Stop: 1607    Procedure: RIGHT AV GRAFT WITH ANGIOPLASTY AND CENTROVENOUS ANGIOPLASTY (Right) Diagnosis:     Surgeons: Phu Gaviria MD Provider: Tristin Steinberg MD    Anesthesia Type: MAC ASA Status: 3            Anesthesia Type: MAC    Vitals  Vitals Value Taken Time   /86 10/06/23 1715   Temp     Pulse 89 10/06/23 1725   Resp 18 10/06/23 1715   SpO2 98 % 10/06/23 1721   Vitals shown include unvalidated device data.        Anesthesia Post Evaluation

## 2023-12-18 ENCOUNTER — PRE-ADMISSION TESTING (OUTPATIENT)
Dept: PREADMISSION TESTING | Facility: HOSPITAL | Age: 48
End: 2023-12-18
Payer: MEDICARE

## 2023-12-18 VITALS
WEIGHT: 158 LBS | SYSTOLIC BLOOD PRESSURE: 115 MMHG | RESPIRATION RATE: 16 BRPM | OXYGEN SATURATION: 97 % | BODY MASS INDEX: 31.02 KG/M2 | HEIGHT: 60 IN | HEART RATE: 98 BPM | DIASTOLIC BLOOD PRESSURE: 80 MMHG | TEMPERATURE: 98.8 F

## 2023-12-18 LAB
ANION GAP SERPL CALCULATED.3IONS-SCNC: 16.5 MMOL/L (ref 5–15)
BUN SERPL-MCNC: 22 MG/DL (ref 6–20)
BUN/CREAT SERPL: 3.8 (ref 7–25)
CALCIUM SPEC-SCNC: 9.8 MG/DL (ref 8.6–10.5)
CHLORIDE SERPL-SCNC: 95 MMOL/L (ref 98–107)
CO2 SERPL-SCNC: 27.5 MMOL/L (ref 22–29)
CREAT SERPL-MCNC: 5.85 MG/DL (ref 0.57–1)
DEPRECATED RDW RBC AUTO: 58.7 FL (ref 37–54)
EGFRCR SERPLBLD CKD-EPI 2021: 8.4 ML/MIN/1.73
ERYTHROCYTE [DISTWIDTH] IN BLOOD BY AUTOMATED COUNT: 18.8 % (ref 12.3–15.4)
GLUCOSE SERPL-MCNC: 141 MG/DL (ref 65–99)
HCG SERPL QL: NEGATIVE
HCT VFR BLD AUTO: 38.9 % (ref 34–46.6)
HGB BLD-MCNC: 12.5 G/DL (ref 12–15.9)
MCH RBC QN AUTO: 28.3 PG (ref 26.6–33)
MCHC RBC AUTO-ENTMCNC: 32.1 G/DL (ref 31.5–35.7)
MCV RBC AUTO: 88.2 FL (ref 79–97)
PLATELET # BLD AUTO: 261 10*3/MM3 (ref 140–450)
PMV BLD AUTO: 9.4 FL (ref 6–12)
POTASSIUM SERPL-SCNC: 4.1 MMOL/L (ref 3.5–5.2)
RBC # BLD AUTO: 4.41 10*6/MM3 (ref 3.77–5.28)
SODIUM SERPL-SCNC: 139 MMOL/L (ref 136–145)
WBC NRBC COR # BLD AUTO: 9.65 10*3/MM3 (ref 3.4–10.8)

## 2023-12-18 PROCEDURE — 80048 BASIC METABOLIC PNL TOTAL CA: CPT

## 2023-12-18 PROCEDURE — 36415 COLL VENOUS BLD VENIPUNCTURE: CPT

## 2023-12-18 PROCEDURE — 84703 CHORIONIC GONADOTROPIN ASSAY: CPT

## 2023-12-18 PROCEDURE — 85027 COMPLETE CBC AUTOMATED: CPT

## 2023-12-18 NOTE — DISCHARGE INSTRUCTIONS
Take the following medications the morning of surgery:    CLONIDINE    If you are on prescription narcotic pain medication to control your pain you may also take that medication the morning of surgery.    General Instructions:  Do not eat solid food after midnight the night before surgery.  You may drink clear liquids day of surgery but must stop at least one hour before your hospital arrival time.  It is beneficial for you to have a clear drink that contains carbohydrates the day of surgery.  We suggest a 12 to 20 ounce bottle of Gatorade or Powerade for non-diabetic patients . LIQUIDS ACCORDING TO YOUR RENAL DIET    Clear liquids are liquids you can see through.  Nothing red in color.     Plain water                               Sports drinks  Sodas                                   Gelatin (Jell-O)  Fruit juices without pulp such as white grape juice and apple juice  Popsicles that contain no fruit or yogurt  Tea or coffee (no cream or milk added)  Gatorade / Powerade  G2 / Powerade Zero    I  Bring any papers given to you in the doctor’s office.  Wear clean comfortable clothes.  Do not wear contact lenses, false eyelashes or make-up.  Bring a case for your glasses.   Remove all piercings.  Leave jewelry and any other valuables at home.  Hair extensions with metal clips must be removed prior to surgery.  The Pre-Admission Testing nurse will instruct you to bring medications if unable to obtain an accurate list in Pre-Admission Testing.   REPORT TO SURGERY ENTRANCE ON 12-20-23  AM        Preventing a Surgical Site Infection:  For 2 to 3 days before surgery, avoid shaving with a razor because the razor can irritate skin and make it easier to develop an infection.    Any areas of open skin can increase the risk of a post-operative wound infection by allowing bacteria to enter and travel throughout the body.  Notify your surgeon if you have any skin wounds / rashes even if it is not near the expected surgical  site.  The area will need assessed to determine if surgery should be delayed until it is healed.  The night prior to surgery shower using a fresh bar of anti-bacterial soap (such as Dial) and clean washcloth.  Sleep in a clean bed with clean clothing.  Do not allow pets to sleep with you.  Shower on the morning of surgery using a fresh bar of anti-bacterial soap (such as Dial) and clean washcloth.  Dry with a clean towel and dress in clean clothing.  Ask your surgeon if you will be receiving antibiotics prior to surgery.  Make sure you, your family, and all healthcare providers clean their hands with soap and water or an alcohol based hand  before caring for you or your wound.    Day of surgery:  Your arrival time is approximately two hours before your scheduled surgery time.  Upon arrival, a Pre-op nurse and Anesthesiologist will review your health history, obtain vital signs, and answer questions you may have.  The only belongings needed at this time will be a list of your home medications and if applicable your C-PAP/BI-PAP machine.  A Pre-op nurse will start an IV and you may receive medication in preparation for surgery, including something to help you relax.     Please be aware that surgery does come with discomfort.  We want to make every effort to control your discomfort so please discuss any uncontrolled symptoms with your nurse.   Your doctor will most likely have prescribed pain medications.      If you are going home after surgery you will receive individualized written care instructions before being discharged.  A responsible adult must drive you to and from the hospital on the day of your surgery and stay with you for 24 hours.  Discharge prescriptions can be filled by the hospital pharmacy during regular pharmacy hours.  If you are having surgery late in the day/evening your prescription may be e-prescribed to your pharmacy.  Please verify your pharmacy hours or chose a 24 hour pharmacy to  avoid not having access to your prescription because your pharmacy has closed for the day.    CHLORHEXIDINE CLOTH INSTRUCTIONS  The morning of surgery follow these instructions using the Chlorhexidine cloths you've been given.  These steps reduce bacteria on the body.  Do not use the cloths near your eyes, ears mouth, genitalia or on open wounds.  Throw the cloths away after use but do not try to flush them down a toilet.      Open and remove one cloth at a time from the package.    Leave the cloth unfolded and begin the bathing.  Massage the skin with the cloths using gentle pressure to remove bacteria.  Do not scrub harshly.   Follow the steps below with one 2% CHG cloth per area (6 total cloths).  One cloth for neck, shoulders and chest.  One cloth for both arms, hands, fingers and underarms (do underarms last).  One cloth for the abdomen followed by groin.  One cloth for right leg and foot including between the toes.  One cloth for left leg and foot including between the toes.  The last cloth is to be used for the back of the neck, back and buttocks.    Allow the CHG to air dry 3 minutes on the skin which will give it time to work and decrease the chance of irritation.  The skin may feel sticky until it is dry.  Do not rinse with water or any other liquid or you will lose the beneficial effects of the CHG.  If mild skin irritation occurs, do rinse the skin to remove the CHG.  Report this to the nurse at time of admission.  Do not apply lotions, creams, ointments, deodorants or perfumes after using the clothes. Dress in clean clothes before coming to the hospital.    If you have any questions please call Pre-Admission Testing at [phone].  Deductibles and co-payments are collected on the day of service. Please be prepared to pay the required co-pay, deductible or deposit on the day of service as defined by your plan.    Call your surgeon immediately if you experience any of the following symptoms:  Sore  Throat  Shortness of Breath or difficulty breathing  Cough  Chills  Body soreness or muscle pain  Headache  Fever  New loss of taste or smell  Do not arrive for your surgery ill.  Your procedure will need to be rescheduled to another time.  You will need to call your physician before the day of surgery to avoid any unnecessary exposure to hospital staff as well as other patients.

## 2023-12-20 ENCOUNTER — APPOINTMENT (OUTPATIENT)
Dept: GENERAL RADIOLOGY | Facility: HOSPITAL | Age: 48
End: 2023-12-20
Payer: MEDICARE

## 2023-12-20 ENCOUNTER — ANESTHESIA (OUTPATIENT)
Dept: PERIOP | Facility: HOSPITAL | Age: 48
End: 2023-12-20
Payer: MEDICARE

## 2023-12-20 ENCOUNTER — ANESTHESIA EVENT (OUTPATIENT)
Dept: PERIOP | Facility: HOSPITAL | Age: 48
End: 2023-12-20
Payer: MEDICARE

## 2023-12-20 ENCOUNTER — HOSPITAL ENCOUNTER (OUTPATIENT)
Facility: HOSPITAL | Age: 48
Setting detail: HOSPITAL OUTPATIENT SURGERY
Discharge: HOME | End: 2023-12-20
Attending: SURGERY | Admitting: SURGERY
Payer: MEDICARE

## 2023-12-20 VITALS
BODY MASS INDEX: 30.86 KG/M2 | DIASTOLIC BLOOD PRESSURE: 86 MMHG | TEMPERATURE: 97.7 F | HEART RATE: 85 BPM | OXYGEN SATURATION: 100 % | SYSTOLIC BLOOD PRESSURE: 132 MMHG | RESPIRATION RATE: 18 BRPM | HEIGHT: 60 IN

## 2023-12-20 PROBLEM — T82.858A: Status: ACTIVE | Noted: 2023-12-20

## 2023-12-20 LAB
ANION GAP SERPL CALCULATED.3IONS-SCNC: 17.4 MMOL/L (ref 5–15)
BUN SERPL-MCNC: 53 MG/DL (ref 6–20)
BUN/CREAT SERPL: 4.8 (ref 7–25)
CALCIUM SPEC-SCNC: 8.8 MG/DL (ref 8.6–10.5)
CHLORIDE SERPL-SCNC: 96 MMOL/L (ref 98–107)
CO2 SERPL-SCNC: 21.6 MMOL/L (ref 22–29)
CREAT SERPL-MCNC: 11.02 MG/DL (ref 0.57–1)
EGFRCR SERPLBLD CKD-EPI 2021: 3.9 ML/MIN/1.73
GLUCOSE BLDC GLUCOMTR-MCNC: 78 MG/DL (ref 70–130)
GLUCOSE SERPL-MCNC: 79 MG/DL (ref 65–99)
POTASSIUM SERPL-SCNC: 5.2 MMOL/L (ref 3.5–5.2)
SODIUM SERPL-SCNC: 135 MMOL/L (ref 136–145)

## 2023-12-20 PROCEDURE — C1894 INTRO/SHEATH, NON-LASER: HCPCS | Performed by: SURGERY

## 2023-12-20 PROCEDURE — 25010000002 BUPIVACAINE (PF) 0.25 % SOLUTION 30 ML VIAL: Performed by: SURGERY

## 2023-12-20 PROCEDURE — 25010000002 ONDANSETRON PER 1 MG: Performed by: NURSE ANESTHETIST, CERTIFIED REGISTERED

## 2023-12-20 PROCEDURE — 25010000002 HEPARIN (PORCINE) PER 1000 UNITS: Performed by: SURGERY

## 2023-12-20 PROCEDURE — 25810000003 SODIUM CHLORIDE 0.9 % SOLUTION: Performed by: ANESTHESIOLOGY

## 2023-12-20 PROCEDURE — 25010000002 PROPOFOL 200 MG/20ML EMULSION: Performed by: NURSE ANESTHETIST, CERTIFIED REGISTERED

## 2023-12-20 PROCEDURE — 25010000002 DEXAMETHASONE SODIUM PHOSPHATE 20 MG/5ML SOLUTION: Performed by: NURSE ANESTHETIST, CERTIFIED REGISTERED

## 2023-12-20 PROCEDURE — 25010000002 MIDAZOLAM PER 1 MG: Performed by: ANESTHESIOLOGY

## 2023-12-20 PROCEDURE — 25510000001 IOPAMIDOL PER 1 ML: Performed by: SURGERY

## 2023-12-20 PROCEDURE — C1725 CATH, TRANSLUMIN NON-LASER: HCPCS | Performed by: SURGERY

## 2023-12-20 PROCEDURE — 25010000002 FENTANYL CITRATE (PF) 50 MCG/ML SOLUTION: Performed by: NURSE ANESTHETIST, CERTIFIED REGISTERED

## 2023-12-20 PROCEDURE — 80048 BASIC METABOLIC PNL TOTAL CA: CPT | Performed by: ANESTHESIOLOGY

## 2023-12-20 PROCEDURE — 25010000002 CEFAZOLIN IN DEXTROSE 2-4 GM/100ML-% SOLUTION: Performed by: SURGERY

## 2023-12-20 PROCEDURE — 25010000002 LIDOCAINE 1 % SOLUTION 20 ML VIAL: Performed by: SURGERY

## 2023-12-20 PROCEDURE — C1769 GUIDE WIRE: HCPCS | Performed by: SURGERY

## 2023-12-20 PROCEDURE — 82948 REAGENT STRIP/BLOOD GLUCOSE: CPT

## 2023-12-20 RX ORDER — IPRATROPIUM BROMIDE AND ALBUTEROL SULFATE 2.5; .5 MG/3ML; MG/3ML
3 SOLUTION RESPIRATORY (INHALATION) ONCE AS NEEDED
Status: DISCONTINUED | OUTPATIENT
Start: 2023-12-20 | End: 2023-12-20 | Stop reason: HOSPADM

## 2023-12-20 RX ORDER — PROPOFOL 10 MG/ML
INJECTION, EMULSION INTRAVENOUS AS NEEDED
Status: DISCONTINUED | OUTPATIENT
Start: 2023-12-20 | End: 2023-12-20 | Stop reason: SURG

## 2023-12-20 RX ORDER — SODIUM CHLORIDE, SODIUM LACTATE, POTASSIUM CHLORIDE, CALCIUM CHLORIDE 600; 310; 30; 20 MG/100ML; MG/100ML; MG/100ML; MG/100ML
9 INJECTION, SOLUTION INTRAVENOUS CONTINUOUS
Status: DISCONTINUED | OUTPATIENT
Start: 2023-12-20 | End: 2023-12-20 | Stop reason: HOSPADM

## 2023-12-20 RX ORDER — FLUMAZENIL 0.1 MG/ML
0.2 INJECTION INTRAVENOUS AS NEEDED
Status: DISCONTINUED | OUTPATIENT
Start: 2023-12-20 | End: 2023-12-20 | Stop reason: HOSPADM

## 2023-12-20 RX ORDER — FENTANYL CITRATE 50 UG/ML
INJECTION, SOLUTION INTRAMUSCULAR; INTRAVENOUS AS NEEDED
Status: DISCONTINUED | OUTPATIENT
Start: 2023-12-20 | End: 2023-12-20 | Stop reason: SURG

## 2023-12-20 RX ORDER — FAMOTIDINE 10 MG/ML
20 INJECTION, SOLUTION INTRAVENOUS ONCE
Status: COMPLETED | OUTPATIENT
Start: 2023-12-20 | End: 2023-12-20

## 2023-12-20 RX ORDER — SODIUM CHLORIDE 9 MG/ML
40 INJECTION, SOLUTION INTRAVENOUS AS NEEDED
Status: DISCONTINUED | OUTPATIENT
Start: 2023-12-20 | End: 2023-12-20 | Stop reason: HOSPADM

## 2023-12-20 RX ORDER — OXYCODONE AND ACETAMINOPHEN 7.5; 325 MG/1; MG/1
1 TABLET ORAL EVERY 4 HOURS PRN
Status: DISCONTINUED | OUTPATIENT
Start: 2023-12-20 | End: 2023-12-20 | Stop reason: HOSPADM

## 2023-12-20 RX ORDER — CEFAZOLIN SODIUM 2 G/100ML
2000 INJECTION, SOLUTION INTRAVENOUS ONCE
Status: COMPLETED | OUTPATIENT
Start: 2023-12-20 | End: 2023-12-20

## 2023-12-20 RX ORDER — PHENYLEPHRINE HCL IN 0.9% NACL 1 MG/10 ML
SYRINGE (ML) INTRAVENOUS AS NEEDED
Status: DISCONTINUED | OUTPATIENT
Start: 2023-12-20 | End: 2023-12-20 | Stop reason: SURG

## 2023-12-20 RX ORDER — LIDOCAINE HYDROCHLORIDE 20 MG/ML
INJECTION, SOLUTION INFILTRATION; PERINEURAL AS NEEDED
Status: DISCONTINUED | OUTPATIENT
Start: 2023-12-20 | End: 2023-12-20 | Stop reason: SURG

## 2023-12-20 RX ORDER — SODIUM CHLORIDE 0.9 % (FLUSH) 0.9 %
3 SYRINGE (ML) INJECTION EVERY 12 HOURS SCHEDULED
Status: DISCONTINUED | OUTPATIENT
Start: 2023-12-20 | End: 2023-12-20 | Stop reason: HOSPADM

## 2023-12-20 RX ORDER — ONDANSETRON 2 MG/ML
INJECTION INTRAMUSCULAR; INTRAVENOUS AS NEEDED
Status: DISCONTINUED | OUTPATIENT
Start: 2023-12-20 | End: 2023-12-20 | Stop reason: SURG

## 2023-12-20 RX ORDER — FENTANYL CITRATE 50 UG/ML
50 INJECTION, SOLUTION INTRAMUSCULAR; INTRAVENOUS
Status: DISCONTINUED | OUTPATIENT
Start: 2023-12-20 | End: 2023-12-20 | Stop reason: HOSPADM

## 2023-12-20 RX ORDER — NALOXONE HCL 0.4 MG/ML
0.2 VIAL (ML) INJECTION AS NEEDED
Status: DISCONTINUED | OUTPATIENT
Start: 2023-12-20 | End: 2023-12-20 | Stop reason: HOSPADM

## 2023-12-20 RX ORDER — MIDAZOLAM HYDROCHLORIDE 1 MG/ML
1 INJECTION INTRAMUSCULAR; INTRAVENOUS
Status: DISCONTINUED | OUTPATIENT
Start: 2023-12-20 | End: 2023-12-20 | Stop reason: HOSPADM

## 2023-12-20 RX ORDER — HYDROMORPHONE HYDROCHLORIDE 1 MG/ML
0.5 INJECTION, SOLUTION INTRAMUSCULAR; INTRAVENOUS; SUBCUTANEOUS
Status: DISCONTINUED | OUTPATIENT
Start: 2023-12-20 | End: 2023-12-20 | Stop reason: HOSPADM

## 2023-12-20 RX ORDER — PROMETHAZINE HYDROCHLORIDE 25 MG/1
25 SUPPOSITORY RECTAL ONCE AS NEEDED
Status: DISCONTINUED | OUTPATIENT
Start: 2023-12-20 | End: 2023-12-20 | Stop reason: HOSPADM

## 2023-12-20 RX ORDER — HYDRALAZINE HYDROCHLORIDE 20 MG/ML
5 INJECTION INTRAMUSCULAR; INTRAVENOUS
Status: DISCONTINUED | OUTPATIENT
Start: 2023-12-20 | End: 2023-12-20 | Stop reason: HOSPADM

## 2023-12-20 RX ORDER — EPHEDRINE SULFATE 50 MG/ML
5 INJECTION, SOLUTION INTRAVENOUS ONCE AS NEEDED
Status: DISCONTINUED | OUTPATIENT
Start: 2023-12-20 | End: 2023-12-20 | Stop reason: HOSPADM

## 2023-12-20 RX ORDER — DROPERIDOL 2.5 MG/ML
0.62 INJECTION, SOLUTION INTRAMUSCULAR; INTRAVENOUS
Status: DISCONTINUED | OUTPATIENT
Start: 2023-12-20 | End: 2023-12-20 | Stop reason: HOSPADM

## 2023-12-20 RX ORDER — HYDROCODONE BITARTRATE AND ACETAMINOPHEN 5; 325 MG/1; MG/1
1 TABLET ORAL ONCE AS NEEDED
Status: DISCONTINUED | OUTPATIENT
Start: 2023-12-20 | End: 2023-12-20 | Stop reason: HOSPADM

## 2023-12-20 RX ORDER — ACETAMINOPHEN 500 MG
1000 TABLET ORAL ONCE
Status: COMPLETED | OUTPATIENT
Start: 2023-12-20 | End: 2023-12-20

## 2023-12-20 RX ORDER — LABETALOL HYDROCHLORIDE 5 MG/ML
5 INJECTION, SOLUTION INTRAVENOUS
Status: DISCONTINUED | OUTPATIENT
Start: 2023-12-20 | End: 2023-12-20 | Stop reason: HOSPADM

## 2023-12-20 RX ORDER — SODIUM CHLORIDE 9 MG/ML
9 INJECTION, SOLUTION INTRAVENOUS CONTINUOUS
Status: DISCONTINUED | OUTPATIENT
Start: 2023-12-20 | End: 2023-12-20 | Stop reason: HOSPADM

## 2023-12-20 RX ORDER — DEXAMETHASONE SODIUM PHOSPHATE 4 MG/ML
INJECTION, SOLUTION INTRA-ARTICULAR; INTRALESIONAL; INTRAMUSCULAR; INTRAVENOUS; SOFT TISSUE AS NEEDED
Status: DISCONTINUED | OUTPATIENT
Start: 2023-12-20 | End: 2023-12-20 | Stop reason: SURG

## 2023-12-20 RX ORDER — EPHEDRINE SULFATE 50 MG/ML
INJECTION INTRAVENOUS AS NEEDED
Status: DISCONTINUED | OUTPATIENT
Start: 2023-12-20 | End: 2023-12-20 | Stop reason: SURG

## 2023-12-20 RX ORDER — PROPRANOLOL HYDROCHLORIDE 10 MG/1
10 TABLET ORAL 2 TIMES DAILY
COMMUNITY

## 2023-12-20 RX ORDER — DIPHENHYDRAMINE HYDROCHLORIDE 50 MG/ML
12.5 INJECTION INTRAMUSCULAR; INTRAVENOUS
Status: DISCONTINUED | OUTPATIENT
Start: 2023-12-20 | End: 2023-12-20 | Stop reason: HOSPADM

## 2023-12-20 RX ORDER — ONDANSETRON 2 MG/ML
4 INJECTION INTRAMUSCULAR; INTRAVENOUS ONCE AS NEEDED
Status: COMPLETED | OUTPATIENT
Start: 2023-12-20 | End: 2023-12-20

## 2023-12-20 RX ORDER — SODIUM CHLORIDE 0.9 % (FLUSH) 0.9 %
3-10 SYRINGE (ML) INJECTION AS NEEDED
Status: DISCONTINUED | OUTPATIENT
Start: 2023-12-20 | End: 2023-12-20 | Stop reason: HOSPADM

## 2023-12-20 RX ORDER — PROMETHAZINE HYDROCHLORIDE 25 MG/1
25 TABLET ORAL ONCE AS NEEDED
Status: DISCONTINUED | OUTPATIENT
Start: 2023-12-20 | End: 2023-12-20 | Stop reason: HOSPADM

## 2023-12-20 RX ADMIN — FENTANYL CITRATE 50 MCG: 50 INJECTION, SOLUTION INTRAMUSCULAR; INTRAVENOUS at 10:36

## 2023-12-20 RX ADMIN — CEFAZOLIN SODIUM 2000 MG: 2 INJECTION, SOLUTION INTRAVENOUS at 10:28

## 2023-12-20 RX ADMIN — DEXAMETHASONE SODIUM PHOSPHATE 8 MG: 4 INJECTION, SOLUTION INTRAMUSCULAR; INTRAVENOUS at 10:41

## 2023-12-20 RX ADMIN — Medication 100 MCG: at 11:06

## 2023-12-20 RX ADMIN — ONDANSETRON 4 MG: 2 INJECTION INTRAMUSCULAR; INTRAVENOUS at 12:06

## 2023-12-20 RX ADMIN — Medication 100 MCG: at 10:56

## 2023-12-20 RX ADMIN — ACETAMINOPHEN 1000 MG: 500 TABLET ORAL at 08:49

## 2023-12-20 RX ADMIN — FAMOTIDINE 20 MG: 10 INJECTION INTRAVENOUS at 09:08

## 2023-12-20 RX ADMIN — ONDANSETRON 4 MG: 2 INJECTION INTRAMUSCULAR; INTRAVENOUS at 11:18

## 2023-12-20 RX ADMIN — MIDAZOLAM HYDROCHLORIDE 1 MG: 2 INJECTION, SOLUTION INTRAMUSCULAR; INTRAVENOUS at 10:24

## 2023-12-20 RX ADMIN — IOPAMIDOL 35 ML: 510 INJECTION, SOLUTION INTRAVASCULAR at 11:27

## 2023-12-20 RX ADMIN — Medication 100 MCG: at 10:58

## 2023-12-20 RX ADMIN — EPHEDRINE SULFATE 10 MG: 50 INJECTION INTRAVENOUS at 11:10

## 2023-12-20 RX ADMIN — SODIUM CHLORIDE 9 ML/HR: 9 INJECTION, SOLUTION INTRAVENOUS at 09:08

## 2023-12-20 RX ADMIN — PROPOFOL 230 MG: 10 INJECTION, EMULSION INTRAVENOUS at 10:37

## 2023-12-20 RX ADMIN — Medication 100 MCG: at 11:11

## 2023-12-20 RX ADMIN — Medication 100 MCG: at 11:14

## 2023-12-20 RX ADMIN — LIDOCAINE HYDROCHLORIDE 70 MG: 20 INJECTION, SOLUTION INFILTRATION; PERINEURAL at 10:37

## 2023-12-20 NOTE — ANESTHESIA PROCEDURE NOTES
Airway  Urgency: elective    Date/Time: 12/20/2023 10:40 AM  Airway not difficult    General Information and Staff    Patient location during procedure: OR  Anesthesiologist: Doc Bartlett MD  CRNA/CAA: Cookie Nance CRNA    Indications and Patient Condition  Indications for airway management: airway protection    Preoxygenated: yes  MILS not maintained throughout  Mask difficulty assessment: 0 - not attempted    Final Airway Details  Final airway type: supraglottic airway      Successful airway: unique  Size 4     Number of attempts at approach: 1  Assessment: lips, teeth, and gum same as pre-op and atraumatic intubation

## 2023-12-20 NOTE — OP NOTE
Operative Note  Location: [facility]  Date of Admission:  12/20/2023  OR Date: 12/20/2023    Pre-op Diagnosis: AV graft stenosis with abnormal ultrasound    Post-op Diagnosis: Same    Procedure:   1) fistulogram with peripheral angioplasty  2) central venoplasty    Surgeon: Elijah Herrera MD    Assistant: Addie MONTES DE OCA    Anesthesia: Monitored Anesthesia Care    Staff:   Circulator: Emperatriz Goodwin RN; Kaylee Bland RN  Radiology Technologist: Ana Miller  Scrub Person: Nicole Driver  Assistant: Addie Dimas CSA    Estimated Blood Loss: minimal    Specimen:   Order Name Source Comment Collection Info Order Time   BASIC METABOLIC PANEL Arm, Left  Collected By: Luz Ventura RN 12/20/2023  8:29 AM     Release to patient   Routine Release            Complications: None    Findings: Patient had a mid graft stenosis of about 70% that responded well to an 8 mm balloon angioplasty with less than 10% residual stenosis.  Also had a central venous stenosis at the subclavian/thoracic outlet that I treated with an 8 mm balloon and minimal residual stenosis.  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  No inflow stenosis noted.    Implants: Nothing was implanted during the procedure    Indications:    The patient is an 48 y.o. female seen for evaluation AV graft stenosis and abnormal ultrasound.  Patient has an anxiety disorder and has required general anesthesia for any procedures.       Procedure:    The patient was taken to the operating room and placed supine on the operating table. After induction of IV sedation and general anesthesia, the right arm was prepped and draped with Chloraprep. She has a brachial artery to axial ran AV graft. The graft was accessed near the arterial anastomosis with a micropuncture needle without difficulty. A 4 Tajik microsheath was placed and a fistulogram from the  arterial anastomosis to the central veins was obtained. The patient has 70% stenosis in the mid graft at cannulation zones and a small pseudoaneurysm in this area. There was no significant restenosis of the venous anastomotic stent. She did have a stenosis at the axial subclavian junction due to the thoracic outlet. Review of previous notes show that this was angioplastied in the past at her last thrombectomy. I upsized to a 6 Romansh sheath and and then crossed all of these lesions easily. An 8 mm balloon was used to angioplasty the peripheral stenosis and there was a minimal residual stenosis. The same 8 mm balloon was used to treat the central stenosis and again there was minimal residual stenosis other than the extrinsic compression. The collaterals decreased in the chest and there was brisk flow of contrast. While the balloon was inflated, I did a retrograde sheath shot showing no inflow stenosis. Sheath was removed. Bolster stitch was used for hemostasis. The patient was already anticoagulated.           Active Hospital Problems    Diagnosis  POA    AV graft stenosis, initial encounter [T82.678A]  Yes    ESRD on hemodialysis [N18.6, Z99.2]  Not Applicable      Resolved Hospital Problems   No resolved problems to display.      Elijah Herrera MD     Date: 12/20/2023  Time: 11:32 EST

## 2023-12-20 NOTE — ANESTHESIA POSTPROCEDURE EVALUATION
"Patient: Sunni Mcneil    Procedure Summary       Date: 12/20/23 [facility] / [facility]    Anesthesia Start: 1032 Anesthesia Stop: 1133    Procedure: RIGHT ARM FISTULOGRAM, ANGIOPLASTY (Right) Diagnosis:     Surgeons: Elijah Herrera MD Provider: Doc Bartlett MD    Anesthesia Type: MAC ASA Status: 3            Anesthesia Type: MAC    Vitals  Vitals Value Taken Time   /92 12/20/23 1245   Temp 36.4 °C (97.5 °F) 12/20/23 1230   Pulse 80 12/20/23 1249   Resp 16 12/20/23 1230   SpO2 100 % 12/20/23 1249   Vitals shown include unfiled device data.        Post Anesthesia Care and Evaluation    Patient location during evaluation: PACU  Patient participation: complete - patient participated  Level of consciousness: awake and alert  Pain management: adequate    Airway patency: patent  Anesthetic complications: No anesthetic complications  PONV Status: controlled  Cardiovascular status: acceptable  Respiratory status: acceptable  Hydration status: acceptable    Comments: /86   Pulse 85   Temp 36.5 °C (97.7 °F)   Resp 18   Ht 152.4 cm (60\")   LMP 12/05/2023 (Approximate)   SpO2 100%   BMI 30.86 kg/m²       "

## 2023-12-20 NOTE — DISCHARGE INSTRUCTIONS
[facility]  David Gardiner, Erasto, Daniel Gaviria,Javier, Orly  [address]  [place] [id number] [phone]        Discharge Instructions for Fistulogram/ Shuntogram      Go home, rest and take it easy today.       You may experience some dizziness or memory loss from the anesthesia.  This may last for the next 24 hours.  Someone should plan on staying with you for the first 24 hours for your safety.    Do not make any important legal decisions or sign any legal papers for the next 24 hours.      Eat and drink lightly today.  Start off with liquids, jello, soup, crackers or other bland foods at first. You may advance your diet tomorrow as tolerated as long as you do not experience any nausea or vomiting.    No driving for the remainder of the day.  You may resume limited driving tomorrow, if necessary.     You may resume routine medications including blood thinners. However, Glucophage should not be started until 72 hours after the dye is given.      No lifting over 10 pounds and no strenuous activity for the next 2-3 days with the involved arm.        Limit going up and down steps for 2 days.    Leave the dressing on until tomorrow morning.  You may then take this off, as well as the small see through dressing with gauze, tomorrow.  If this does not come off easily, do not pull this off.  If it is stuck, shower and let the warm water loosen it before removal.     Check your  arm dressing regularly for bleeding through the dressing (under the pressure dressing).  A small amount of blood contained by the gauze is normal: the whole dressing should not be filled with blood, or leaking out under the sides.  If the bandage is saturated, you may remove it and replace it with a new dressing.     Your dialysis center can remove your bolster stitch.  Do not access the area marked as this is a pseudoaneurysm.         If you experience heavy bleeding continue to hold firm pressure and  call 911.    Do not call the MD on call.     If your doctor gave you a prescription for a narcotic pill (Tylenol #3, Vicodin, Hydrocodone, Oxycodone or Percocet), you may not drive a vehicle or operate machinery while taking this type of medication.     13. Severe pain is not expected after this procedure.  If you experience severe pain, please call our office at [phone].          Remember to contact our office for any of the following:    Fever > 101 degrees  Severe pain   Severe nausea or vomiting   Significant bleeding of your incisions  Drainage that has a bad smell or is yellow or green in appearance  Any other questions or concerns

## 2023-12-20 NOTE — H&P
[facility]   PREOPERATIVE HISTORY AND PHYSICAL    Patient Name:Sunni Mcneil  : 1975  MRN: [id number]  Primary Care Physician: Regla Ying APRN  Date of admission: 2023    Subjective   Subjective     Chief Complaint: preoperative evaluation    History of Present Illness  Sunni Mcneil is a 48 y.o. female who presents for preoperative evaluation. She is scheduled for RIGHT ARM SHUNT O GRAM, POSSIBLE ANGIOPLASTY (Right)    Review of Systems   Cardiovascular:  Negative for chest pain.        Personal History     Past Medical History:   Diagnosis Date    Acid reflux     Anemia     WITH ESRD    Anxiety     Arthritis     Cough     related to fluid overload    Depression     Diabetes mellitus     NO MEDICATIONS FOR    ESRD on dialysis     FRESINUS MWF    History of peritoneal dialysis     KIDNEY TRANSPLANT 2016     History of transfusion     BEEN A WHILE no reaction    Hypercalcemia     Hyperparathyroidism     Hypertension     Kidney disease     End-stage renal disease. TRANSPLANT    Kidney transplant recipient 2016    RIGHT KIDNEY. ? 2018 KIDNEY REJECTED    PONV (postoperative nausea and vomiting)     Seasonal allergies     CURRENTLY     Vascular dialysis catheter in place     RIGHT TUNNEL CATH       Past Surgical History:   Procedure Laterality Date    ARTERIOVENOUS FISTULA/SHUNT SURGERY Right 2020    Procedure: RIGHT ARM ARTERIAL VENOUS FISTULA;  Surgeon: Elijah Herrera MD;  Location: [facility];  Service: Vascular;  Laterality: Right;    ARTERIOVENOUS FISTULA/SHUNT SURGERY Left 2021    Procedure: LEFT BRACHIOAXILLARY ARTERIOVENOUS FISTULA GRAFT;  Surgeon: Anya Hernandez Jr., MD;  Location: [facility] OR;  Service: Vascular;  Laterality: Left;    ARTERIOVENOUS FISTULA/SHUNT SURGERY Right 2021    Procedure: RIGHT BRACHIOAXILLARY ARTERVENIOUS GRAFT PLACEMENT;  Surgeon: Adán Maurer MD;  Location: [facility] OR;  Service: Vascular;   Laterality: Right;    ARTERIOVENOUS FISTULA/SHUNT SURGERY Right 8/4/2023    Procedure: RIGHT ARM FISTULOGRAM PEUCUTANEOUS AND PERIPHERAL TRANSLUMINAL ANGIOPLSTY/STENT;  Surgeon: Elijah Herrera MD;  Location: [facility] OR 18/19;  Service: Vascular;  Laterality: Right;    BRACHIAL EMBOLECTOMY Right 10/6/2023    Procedure: RIGHT AV GRAFT WITH ANGIOPLASTY AND CENTROVENOUS ANGIOPLASTY;  Surgeon: Phu Gaviria MD;  Location: [facility] OR 18/19;  Service: Vascular;  Laterality: Right;    COLONOSCOPY N/A 11/30/2022    Procedure: COLONOSCOPY TO CECUM AND TERM. ILEUM;  Surgeon: Casimiro Perkins MD;  Location: [facility] ENDOSCOPY;  Service: Gastroenterology;  Laterality: N/A;  PRE OP - SCREENING  POST OP - DIVERTICULOSIS, SM. HEMORRHOIDS    INSERTION AND REMOVAL HEMODIALYSIS CATHETER N/A 02/13/2021    Procedure: INSERTION AND REMOVAL OF HEMODIALYSIS CATHETER;  Surgeon: Adán Maruer MD;  Location: [facility] MAIN OR;  Service: Vascular;  Laterality: N/A;    INSERTION HEMODIALYSIS CATHETER Right 01/15/2021    Procedure: TUNNELED DIAYLIS CATHETER PLACEMENT;  Surgeon: Phu Gaviria MD;  Location: [facility] OR 18/19;  Service: Vascular;  Laterality: Right;    INSERTION HEMODIALYSIS CATHETER Left 1/21/2023    Procedure: HEMODIALYSIS CATHETER INSERTION;  Surgeon: Elijah Herrera MD;  Location: [facility] MAIN OR;  Service: Vascular;  Laterality: Left;    INSERTION PERITONEAL DIALYSIS CATHETER  07/29/2014    Laparoscopic placement of Curl Cath peritoneal dialysis catheter, Dr. Vinod Goldstein    INSERTION PERITONEAL DIALYSIS CATHETER N/A 07/24/2019    Procedure: LAPAROSCOPIC INSERTION PERITONEAL DIALYSIS CATHETER;  Surgeon: Damon Rooney MD;  Location: [facility] MAIN OR;  Service: General    REMOVAL HEMODIALYSIS CATHETER N/A 2/15/2023    Procedure: PALINDROME REMOVAL;  Surgeon: Elijah Herrera MD;  Location: [facility] MAIN OR;  Service: Vascular;  Laterality: N/A;    REMOVAL PERITONEAL  DIALYSIS CATHETER N/A 10/17/2016    Procedure: REMOVAL PERITONEAL DIALYSIS CATHETER;  Surgeon: Damon Rooney MD;  Location: [facility] MAIN OR;  Service:     REMOVAL PERITONEAL DIALYSIS CATHETER N/A 10/21/2020    Procedure: REMOVAL PERITONEAL DIALYSIS CATHETER;  Surgeon: Damon Rooney MD;  Location: [facility] MAIN OR;  Service: General;  Laterality: N/A;    SHUNT O GRAM Right 2022    Procedure: RIGHT  ARM SHUNT O GRAM , ANGIOPLASTY OF AXILARY VEIN AND SUBCLAVIAN VEIN AT SVC JUNCTION;  Surgeon: Anya Hernandez Jr., MD;  Location: [facility] OR ;  Service: Vascular;  Laterality: Right;    SHUNT O GRAM Right 2023    Procedure: OPEN THROMBECTOMY AND ANGIOPLASTY;  Surgeon: Elijah Herrera MD;  Location: [facility] OR ;  Service: Vascular;  Laterality: Right;    SHUNT O GRAM Right 2023    Procedure: Right arm dialysis graft open thrombectomy, shuntogram, angioplasty and stent;  Surgeon: Karuna Villalba MD;  Location: [facility] OR ;  Service: Vascular;  Laterality: Right;    TRANSPLANTATION RENAL Right 2016    from  donor    TUBAL ABDOMINAL LIGATION Bilateral        Family History: Her family history includes Heart attack in her maternal grandfather; Hypertension in her father and mother.     Social History: She  reports that she has never smoked. She has never used smokeless tobacco. She reports that she does not drink alcohol and does not use drugs.    Home Medications:  HYDROcodone-acetaminophen, Polyethylene Glycol 3350, acetaminophen, apixaban, calcium acetate, cloNIDine, hydrOXYzine, prochlorperazine, propranolol, and traZODone    Allergies:  She has No Known Allergies.    Objective    Objective     Vitals:    Temp:  [98.6 °F (37 °C)] 98.6 °F (37 °C)  Heart Rate:  [77] 77  Resp:  [16] 16  BP: (140)/(92) 140/92    Physical Exam  Vitals reviewed.   Constitutional:       Appearance: She is well-developed.   Eyes:       Conjunctiva/sclera: Conjunctivae normal.   Cardiovascular:      Rate and Rhythm: Normal rate.   Pulmonary:      Effort: Pulmonary effort is normal.   Abdominal:      Palpations: Abdomen is soft.      Tenderness: There is no abdominal tenderness.   Neurological:      Mental Status: She is alert and oriented to person, place, and time.     Palpable thrill and audible bruit in right arm AV graft.  Multiple incisions from prior revisions and thrombectomies.    Assessment & Plan   Assessment / Plan     Brief Patient Summary:  Sunni Mcneil is a 48 y.o. female who presents for preoperative evaluation.  Patient had an abnormal ultrasound suggesting outflow stenosis in the office.  She has panic attacks and cannot tolerate even minor procedures in the office-based setting.  For this reason, recommended anesthesia and hospital outpatient procedure.  Patient was instructed to continue her Eliquis but held it anyway for 2 days prior to the procedure.  Thankfully, access remains patent.    * No Diagnosis Codes entered *    Active Hospital Problems:  Active Hospital Problems    Diagnosis     AV graft stenosis, initial encounter     ESRD on hemodialysis      Plan:   Procedure(s):  RIGHT ARM SHUNT O GRAM, POSSIBLE ANGIOPLASTY    The risks, benefits, and alternatives of the procedure including but not limited to bleeding, infection, graft thrombosis, steal syndrome and risks of the anesthesia were discussed in detail with the patient and questions were answered. No guarantees were made or implied. Informed consent was obtained.    Elijah Herrera MD

## 2023-12-20 NOTE — ANESTHESIA PREPROCEDURE EVALUATION
Anesthesia Evaluation     Patient summary reviewed and Nursing notes reviewed   history of anesthetic complications:  PONV  NPO Solid Status: > 8 hours  NPO Liquid Status: > 2 hours           Airway   Mallampati: II  TM distance: >3 FB  Neck ROM: full  No difficulty expected  Dental      Pulmonary    Cardiovascular     ECG reviewed    (+) hypertension    ROS comment: Echo 12/2022 unremarkable    Normal EKG    Neuro/Psych  GI/Hepatic/Renal/Endo    (+) obesity, GERD, renal disease- ESRD and dialysis, diabetes mellitus    Musculoskeletal     Abdominal   (+) obese   Substance History      OB/GYN          Other        ROS/Med Hx Other: Last  dialysis was on 12/18.                Anesthesia Plan    ASA 3     MAC     intravenous induction     Anesthetic plan, risks, benefits, and alternatives have been provided, discussed and informed consent has been obtained with: patient.  Pre-procedure education provided      CODE STATUS:

## 2024-04-23 DIAGNOSIS — N18.6 ESRD (END STAGE RENAL DISEASE): Primary | ICD-10-CM

## 2024-04-24 ENCOUNTER — TELEPHONE (OUTPATIENT)
Age: 49
End: 2024-04-24
Payer: MEDICARE

## 2024-05-02 ENCOUNTER — HOSPITAL ENCOUNTER (OUTPATIENT)
Facility: HOSPITAL | Age: 49
Setting detail: OBSERVATION
Discharge: HOME | End: 2024-05-04
Attending: EMERGENCY MEDICINE | Admitting: INTERNAL MEDICINE
Payer: MEDICARE

## 2024-05-02 DIAGNOSIS — T82.858A AV GRAFT STENOSIS, INITIAL ENCOUNTER: ICD-10-CM

## 2024-05-02 DIAGNOSIS — Z99.2 CHRONIC KIDNEY DISEASE WITH END STAGE RENAL FAILURE ON DIALYSIS: ICD-10-CM

## 2024-05-02 DIAGNOSIS — G89.18 POST-OP PAIN: ICD-10-CM

## 2024-05-02 DIAGNOSIS — N18.6 CHRONIC KIDNEY DISEASE WITH END STAGE RENAL FAILURE ON DIALYSIS: ICD-10-CM

## 2024-05-02 DIAGNOSIS — F11.90 CHRONIC, CONTINUOUS USE OF OPIOIDS: ICD-10-CM

## 2024-05-02 DIAGNOSIS — T82.898A ARTERIOVENOUS FISTULA OCCLUSION, INITIAL ENCOUNTER: Primary | ICD-10-CM

## 2024-05-02 DIAGNOSIS — N18.6 ESRD (END STAGE RENAL DISEASE): ICD-10-CM

## 2024-05-02 LAB
ALBUMIN SERPL-MCNC: 4.1 G/DL (ref 3.5–5.2)
ALBUMIN/GLOB SERPL: 1.1 G/DL
ALP SERPL-CCNC: 62 U/L (ref 39–117)
ALT SERPL W P-5'-P-CCNC: <5 U/L (ref 1–33)
ANION GAP SERPL CALCULATED.3IONS-SCNC: 19.5 MMOL/L (ref 5–15)
AST SERPL-CCNC: 11 U/L (ref 1–32)
BASOPHILS # BLD AUTO: 0.05 10*3/MM3 (ref 0–0.2)
BASOPHILS NFR BLD AUTO: 0.7 % (ref 0–1.5)
BILIRUB SERPL-MCNC: 0.3 MG/DL (ref 0–1.2)
BUN SERPL-MCNC: 43 MG/DL (ref 6–20)
BUN/CREAT SERPL: 5 (ref 7–25)
CALCIUM SPEC-SCNC: 9.3 MG/DL (ref 8.6–10.5)
CHLORIDE SERPL-SCNC: 94 MMOL/L (ref 98–107)
CO2 SERPL-SCNC: 24.5 MMOL/L (ref 22–29)
CREAT SERPL-MCNC: 8.57 MG/DL (ref 0.57–1)
DEPRECATED RDW RBC AUTO: 60.7 FL (ref 37–54)
EGFRCR SERPLBLD CKD-EPI 2021: 5.3 ML/MIN/1.73
EOSINOPHIL # BLD AUTO: 0.28 10*3/MM3 (ref 0–0.4)
EOSINOPHIL NFR BLD AUTO: 3.7 % (ref 0.3–6.2)
ERYTHROCYTE [DISTWIDTH] IN BLOOD BY AUTOMATED COUNT: 18 % (ref 12.3–15.4)
GLOBULIN UR ELPH-MCNC: 3.9 GM/DL
GLUCOSE BLDC GLUCOMTR-MCNC: 197 MG/DL (ref 70–130)
GLUCOSE SERPL-MCNC: 122 MG/DL (ref 65–99)
HCT VFR BLD AUTO: 37.6 % (ref 34–46.6)
HGB BLD-MCNC: 11.9 G/DL (ref 12–15.9)
IMM GRANULOCYTES # BLD AUTO: 0.03 10*3/MM3 (ref 0–0.05)
IMM GRANULOCYTES NFR BLD AUTO: 0.4 % (ref 0–0.5)
LYMPHOCYTES # BLD AUTO: 1.08 10*3/MM3 (ref 0.7–3.1)
LYMPHOCYTES NFR BLD AUTO: 14.4 % (ref 19.6–45.3)
MCH RBC QN AUTO: 29.4 PG (ref 26.6–33)
MCHC RBC AUTO-ENTMCNC: 31.6 G/DL (ref 31.5–35.7)
MCV RBC AUTO: 92.8 FL (ref 79–97)
MONOCYTES # BLD AUTO: 0.59 10*3/MM3 (ref 0.1–0.9)
MONOCYTES NFR BLD AUTO: 7.9 % (ref 5–12)
NEUTROPHILS NFR BLD AUTO: 5.46 10*3/MM3 (ref 1.7–7)
NEUTROPHILS NFR BLD AUTO: 72.9 % (ref 42.7–76)
NRBC BLD AUTO-RTO: 0 /100 WBC (ref 0–0.2)
PLATELET # BLD AUTO: 229 10*3/MM3 (ref 140–450)
PMV BLD AUTO: 9.4 FL (ref 6–12)
POTASSIUM SERPL-SCNC: 4.3 MMOL/L (ref 3.5–5.2)
PROT SERPL-MCNC: 8 G/DL (ref 6–8.5)
RBC # BLD AUTO: 4.05 10*6/MM3 (ref 3.77–5.28)
SODIUM SERPL-SCNC: 138 MMOL/L (ref 136–145)
WBC NRBC COR # BLD AUTO: 7.49 10*3/MM3 (ref 3.4–10.8)

## 2024-05-02 PROCEDURE — 36415 COLL VENOUS BLD VENIPUNCTURE: CPT

## 2024-05-02 PROCEDURE — 80053 COMPREHEN METABOLIC PANEL: CPT | Performed by: EMERGENCY MEDICINE

## 2024-05-02 PROCEDURE — G0378 HOSPITAL OBSERVATION PER HR: HCPCS

## 2024-05-02 PROCEDURE — 99205 OFFICE O/P NEW HI 60 MIN: CPT | Performed by: SURGERY

## 2024-05-02 PROCEDURE — 99285 EMERGENCY DEPT VISIT HI MDM: CPT

## 2024-05-02 PROCEDURE — 85025 COMPLETE CBC W/AUTO DIFF WBC: CPT | Performed by: EMERGENCY MEDICINE

## 2024-05-02 PROCEDURE — 82948 REAGENT STRIP/BLOOD GLUCOSE: CPT

## 2024-05-02 RX ORDER — ONDANSETRON 4 MG/1
4 TABLET, ORALLY DISINTEGRATING ORAL EVERY 6 HOURS PRN
Status: DISCONTINUED | OUTPATIENT
Start: 2024-05-02 | End: 2024-05-04 | Stop reason: HOSPADM

## 2024-05-02 RX ORDER — ONDANSETRON 2 MG/ML
4 INJECTION INTRAMUSCULAR; INTRAVENOUS EVERY 6 HOURS PRN
Status: DISCONTINUED | OUTPATIENT
Start: 2024-05-02 | End: 2024-05-04 | Stop reason: HOSPADM

## 2024-05-02 RX ORDER — PROCHLORPERAZINE MALEATE 10 MG
10 TABLET ORAL EVERY 6 HOURS PRN
Status: DISCONTINUED | OUTPATIENT
Start: 2024-05-02 | End: 2024-05-04 | Stop reason: HOSPADM

## 2024-05-02 RX ORDER — DEXTROSE MONOHYDRATE 25 G/50ML
25 INJECTION, SOLUTION INTRAVENOUS
Status: DISCONTINUED | OUTPATIENT
Start: 2024-05-02 | End: 2024-05-04 | Stop reason: HOSPADM

## 2024-05-02 RX ORDER — UREA 10 %
3 LOTION (ML) TOPICAL NIGHTLY PRN
Status: DISCONTINUED | OUTPATIENT
Start: 2024-05-02 | End: 2024-05-04 | Stop reason: HOSPADM

## 2024-05-02 RX ORDER — POLYETHYLENE GLYCOL 3350 17 G/17G
17 POWDER, FOR SOLUTION ORAL DAILY
Status: DISCONTINUED | OUTPATIENT
Start: 2024-05-03 | End: 2024-05-04 | Stop reason: HOSPADM

## 2024-05-02 RX ORDER — POLYMYXIN B SULFATE AND TRIMETHOPRIM 1; 10000 MG/ML; [USP'U]/ML
1 SOLUTION OPHTHALMIC EVERY 4 HOURS
Status: DISCONTINUED | OUTPATIENT
Start: 2024-05-02 | End: 2024-05-04 | Stop reason: HOSPADM

## 2024-05-02 RX ORDER — IBUPROFEN 600 MG/1
1 TABLET ORAL
Status: DISCONTINUED | OUTPATIENT
Start: 2024-05-02 | End: 2024-05-04 | Stop reason: HOSPADM

## 2024-05-02 RX ORDER — NICOTINE POLACRILEX 4 MG
15 LOZENGE BUCCAL
Status: DISCONTINUED | OUTPATIENT
Start: 2024-05-02 | End: 2024-05-04 | Stop reason: HOSPADM

## 2024-05-02 RX ORDER — ACETAMINOPHEN 325 MG/1
650 TABLET ORAL EVERY 4 HOURS PRN
Status: DISCONTINUED | OUTPATIENT
Start: 2024-05-02 | End: 2024-05-04 | Stop reason: HOSPADM

## 2024-05-02 RX ORDER — WARFARIN SODIUM 5 MG/1
1 TABLET ORAL DAILY
COMMUNITY
Start: 2024-02-21

## 2024-05-02 RX ORDER — CLONIDINE HYDROCHLORIDE 0.1 MG/1
0.1 TABLET ORAL DAILY
Status: DISCONTINUED | OUTPATIENT
Start: 2024-05-03 | End: 2024-05-04 | Stop reason: HOSPADM

## 2024-05-02 RX ORDER — BISACODYL 10 MG
10 SUPPOSITORY, RECTAL RECTAL DAILY PRN
Status: DISCONTINUED | OUTPATIENT
Start: 2024-05-02 | End: 2024-05-04 | Stop reason: HOSPADM

## 2024-05-02 RX ORDER — CALCITRIOL 0.25 UG/1
0.5 CAPSULE, LIQUID FILLED ORAL DAILY
Status: DISCONTINUED | OUTPATIENT
Start: 2024-05-03 | End: 2024-05-04 | Stop reason: HOSPADM

## 2024-05-02 RX ORDER — HYDROXYZINE HYDROCHLORIDE 25 MG/1
50 TABLET, FILM COATED ORAL DAILY PRN
Status: DISCONTINUED | OUTPATIENT
Start: 2024-05-02 | End: 2024-05-04 | Stop reason: HOSPADM

## 2024-05-02 RX ORDER — INSULIN LISPRO 100 [IU]/ML
2-7 INJECTION, SOLUTION INTRAVENOUS; SUBCUTANEOUS
Status: DISCONTINUED | OUTPATIENT
Start: 2024-05-02 | End: 2024-05-03

## 2024-05-02 RX ORDER — CALCIUM ACETATE 667 MG/1
2001 CAPSULE ORAL
Status: DISCONTINUED | OUTPATIENT
Start: 2024-05-03 | End: 2024-05-04 | Stop reason: HOSPADM

## 2024-05-02 RX ORDER — POLYETHYLENE GLYCOL 3350 17 G/17G
17 POWDER, FOR SOLUTION ORAL DAILY PRN
Status: DISCONTINUED | OUTPATIENT
Start: 2024-05-02 | End: 2024-05-04 | Stop reason: HOSPADM

## 2024-05-02 RX ORDER — POLYMYXIN B SULFATE AND TRIMETHOPRIM 1; 10000 MG/ML; [USP'U]/ML
1 SOLUTION OPHTHALMIC EVERY 4 HOURS
COMMUNITY
Start: 2024-04-27 | End: 2024-05-07

## 2024-05-02 RX ORDER — PROPRANOLOL HYDROCHLORIDE 10 MG/1
10 TABLET ORAL 2 TIMES DAILY
Status: DISCONTINUED | OUTPATIENT
Start: 2024-05-02 | End: 2024-05-04 | Stop reason: HOSPADM

## 2024-05-02 RX ORDER — HYDRALAZINE HYDROCHLORIDE 25 MG/1
25 TABLET, FILM COATED ORAL DAILY
Status: DISCONTINUED | OUTPATIENT
Start: 2024-05-03 | End: 2024-05-04 | Stop reason: HOSPADM

## 2024-05-02 RX ORDER — HYDRALAZINE HYDROCHLORIDE 25 MG/1
25 TABLET, FILM COATED ORAL DAILY
COMMUNITY

## 2024-05-02 RX ORDER — AMOXICILLIN 250 MG
2 CAPSULE ORAL 2 TIMES DAILY PRN
Status: DISCONTINUED | OUTPATIENT
Start: 2024-05-02 | End: 2024-05-04 | Stop reason: HOSPADM

## 2024-05-02 RX ORDER — ONDANSETRON 4 MG/1
4 TABLET, ORALLY DISINTEGRATING ORAL AS NEEDED
COMMUNITY
Start: 2024-03-01

## 2024-05-02 RX ORDER — BISACODYL 5 MG/1
5 TABLET, DELAYED RELEASE ORAL DAILY PRN
Status: DISCONTINUED | OUTPATIENT
Start: 2024-05-02 | End: 2024-05-04 | Stop reason: HOSPADM

## 2024-05-02 RX ADMIN — POLYMYXIN B SULFATE AND TRIMETHOPRIM 1 DROP: 10000; 1 SOLUTION OPHTHALMIC at 22:39

## 2024-05-02 RX ADMIN — TRAZODONE HYDROCHLORIDE 150 MG: 100 TABLET ORAL at 22:38

## 2024-05-02 RX ADMIN — PROPRANOLOL HYDROCHLORIDE 10 MG: 10 TABLET ORAL at 22:39

## 2024-05-02 NOTE — ED NOTES
Patient receives dialysis on M,W,F. Last dialysis treatment was on 5/1/24. Patient states no complications during dialysis treatment.

## 2024-05-02 NOTE — ED NOTES
Nursing report ED to floor  Sunni Mcneil  49 y.o.  female    HPI :  HPI (Adult)  Stated Reason for Visit: clotted fistula  History Obtained From: patient    Chief Complaint  Chief Complaint   Patient presents with    Vascular Access Problem       Admitting doctor:   Angie Soriano MD    Admitting diagnosis:   The primary encounter diagnosis was Arteriovenous fistula occlusion, initial encounter. Diagnoses of Chronic kidney disease with end stage renal failure on dialysis and AV graft stenosis, initial encounter were also pertinent to this visit.    Code status:   Current Code Status       Date Active Code Status Order ID Comments User Context       Prior            Allergies:   Patient has no known allergies.    Isolation:   No active isolations    Intake and Output  No intake or output data in the 24 hours ending 05/02/24 1557    Weight:       05/02/24  1232   Weight: 74.8 kg (165 lb)       Most recent vitals:   Vitals:    05/02/24 1237 05/02/24 1410 05/02/24 1431 05/02/24 1531   BP: 144/96 133/96 127/82 139/93   BP Location:  Left arm     Patient Position:  Lying     Pulse:  89 83 81   Resp:  16     Temp:       TempSrc:       SpO2:  100% 100% 99%   Weight:       Height:           Active LDAs/IV Access:   Lines, Drains & Airways       Active LDAs       None                    Labs (abnormal labs have a star):   Labs Reviewed   COMPREHENSIVE METABOLIC PANEL - Abnormal; Notable for the following components:       Result Value    Glucose 122 (*)     BUN 43 (*)     Creatinine 8.57 (*)     Chloride 94 (*)     BUN/Creatinine Ratio 5.0 (*)     Anion Gap 19.5 (*)     eGFR 5.3 (*)     All other components within normal limits    Narrative:     GFR Normal >60  Chronic Kidney Disease <60  Kidney Failure <15     CBC WITH AUTO DIFFERENTIAL - Abnormal; Notable for the following components:    Hemoglobin 11.9 (*)     RDW 18.0 (*)     RDW-SD 60.7 (*)     Lymphocyte % 14.4 (*)     All other components within normal limits    CBC AND DIFFERENTIAL    Narrative:     The following orders were created for panel order CBC & Differential.  Procedure                               Abnormality         Status                     ---------                               -----------         ------                     CBC Auto Differential[[id number]]        Abnormal            Final result                 Please view results for these tests on the individual orders.       EKG:   No orders to display       Meds given in ED:   Medications - No data to display    Imaging results:  No radiology results for the last day    Ambulatory status:   - up ad karl    Social issues:   Social History     Socioeconomic History    Marital status:     Number of children: 2    Years of education: 12   Tobacco Use    Smoking status: Never    Smokeless tobacco: Never    Tobacco comments:     Patient does not smoke   Vaping Use    Vaping status: Never Used   Substance and Sexual Activity    Alcohol use: No     Comment: caffeine use; Patient is unknown if ever drinks    Drug use: Never     Comment: Drug Abuse: none    Sexual activity: Defer       Peripheral Neurovascular       Neuro Cognitive       Learning       Respiratory       Abdominal Pain       Pain Assessments       NIH Stroke Scale       Radha Jaime RN  05/02/24 15:57 EDT

## 2024-05-02 NOTE — ED PROVIDER NOTES
" EMERGENCY DEPARTMENT ENCOUNTER    Room Number:  38/38  Date of encounter:  5/2/2024  PCP: Regla Ying APRN  Historian: Patient  Relevant information and history provided by sources other than the patient will be included below and in the ED Course.  Review of pertinent past medical records may also be included in record below and ED Course.    HPI:  Chief Complaint: \"My dialysis catheter is not working\"  A complete HPI/ROS/PMH/PSH/SH/FH are unobtainable due to: Not applicable  Context: Sunni Mcneil is a 49 y.o. female who presents to the ED c/o patient has dialysis on Monday Wednesday and Friday.  She went to dialysis yesterday and had complete dialysis and no problems.  She then noticed that she was not feeling the thrill that she typically feels under dialysis catheter in her right arm.  She was concerned that it was not working.  She called the dialysis center who communicated with her vascular surgeon Dr. Herrera who recommended that she come here to the emergency department.  She otherwise is 100% asymptomatic.  Has no chest pain, shortness of breath, fevers or chills.  She does also have a nonfunctioning AV fistula in her left arm as well.        Previous Episodes: Yes  Current Symptoms: See above    MEDICAL HISTORY REVIEWED  Patient has a history of end-stage renal disease on hemodialysis.  History of kidney transplant in the distant past.  I can see that she is on Eliquis reviewed her other medicine list as well.      PAST MEDICAL HISTORY  Active Ambulatory Problems     Diagnosis Date Noted    Hyperparathyroidism 01/24/2018    Acute rejection of kidney transplant 05/10/2017    ARF (acute renal failure) 04/04/2014    Hx of kidney transplant 07/06/2017    Hypertension 01/24/2018    Kidney transplant status, cadaveric 07/06/2017    Nephritis 04/13/2014    Hypercalcemia 01/24/2018    ESRD on hemodialysis 07/02/2019    Prolonged QT interval 11/07/2019    ESRD (end stage renal disease) 11/07/2019    " Hyperphosphatemia 11/07/2019    Leukocytosis 11/07/2019    Type 2 diabetes mellitus 11/07/2019    Acute UTI (urinary tract infection) 03/13/2020    Obesity (BMI 30-39.9) 03/13/2020    A-V fistula 03/13/2020    Anemia, chronic disease 03/13/2020    Folate deficiency anemia 03/16/2020    Febrile illness, acute 05/06/2020    Sepsis 05/06/2020    Screening for colon cancer 09/15/2020    Hyperkalemia 01/14/2021    Shunt malfunction 01/15/2021    Anemia 02/13/2021    Constipation 03/10/2021    Screening for colorectal cancer 03/10/2021    Hypervolemia 03/15/2021    AV graft thrombosis, initial encounter 01/20/2023    ESRD (end stage renal disease) 01/21/2023    GERD without esophagitis 01/21/2023    AV graft stenosis, initial encounter 12/20/2023     Resolved Ambulatory Problems     Diagnosis Date Noted    Peritoneal dialysis catheter in place 07/24/2019    Hypokalemia 11/07/2019    Nausea 11/07/2019    Hypocalcemia 11/07/2019    Diarrhea 03/13/2020    Acute cystitis with hematuria 03/13/2020    Mechanical complication of hemodialysis catheter 02/13/2021    Arteriovenous fistula occlusion, initial encounter 01/21/2023    Hyperkalemia 01/21/2023    Hyperphosphatemia 01/21/2023     Past Medical History:   Diagnosis Date    Acid reflux     Anxiety     Arthritis     Cough     Dependence on renal dialysis 05/17/2021    Depression     Diabetes mellitus     End stage renal disease 05/17/2021    ESRD on dialysis     History of peritoneal dialysis     History of transfusion     Kidney disease     Kidney transplant recipient 09/02/2016    PONV (postoperative nausea and vomiting)     Seasonal allergies     Vascular dialysis catheter in place          PAST SURGICAL HISTORY  Past Surgical History:   Procedure Laterality Date    ARTERIOVENOUS FISTULA/SHUNT SURGERY Right 02/26/2020    Procedure: RIGHT ARM ARTERIAL VENOUS FISTULA;  Surgeon: Elijah Herrera MD;  Location: [facility] OR;  Service: Vascular;  Laterality: Right;     ARTERIOVENOUS FISTULA/SHUNT SURGERY Left 02/04/2021    Procedure: LEFT BRACHIOAXILLARY ARTERIOVENOUS FISTULA GRAFT;  Surgeon: Anya Hernandez Jr., MD;  Location: [facility] MAIN OR;  Service: Vascular;  Laterality: Left;    ARTERIOVENOUS FISTULA/SHUNT SURGERY Right 03/16/2021    Procedure: RIGHT BRACHIOAXILLARY ARTERVENIOUS GRAFT PLACEMENT;  Surgeon: Adán Maurer MD;  Location: [facility] MAIN OR;  Service: Vascular;  Laterality: Right;    ARTERIOVENOUS FISTULA/SHUNT SURGERY Right 8/4/2023    Procedure: RIGHT ARM FISTULOGRAM PEUCUTANEOUS AND PERIPHERAL TRANSLUMINAL ANGIOPLSTY/STENT;  Surgeon: Elijah Herrera MD;  Location: [facility] OR 18/19;  Service: Vascular;  Laterality: Right;    BRACHIAL EMBOLECTOMY Right 10/6/2023    Procedure: RIGHT AV GRAFT WITH ANGIOPLASTY AND CENTROVENOUS ANGIOPLASTY;  Surgeon: Phu Gaviria MD;  Location: [facility] OR 18/19;  Service: Vascular;  Laterality: Right;    COLONOSCOPY N/A 11/30/2022    Procedure: COLONOSCOPY TO CECUM AND TERM. ILEUM;  Surgeon: Casimiro Perkins MD;  Location: [facility] ENDOSCOPY;  Service: Gastroenterology;  Laterality: N/A;  PRE OP - SCREENING  POST OP - DIVERTICULOSIS, SM. HEMORRHOIDS    INSERTION AND REMOVAL HEMODIALYSIS CATHETER N/A 02/13/2021    Procedure: INSERTION AND REMOVAL OF HEMODIALYSIS CATHETER;  Surgeon: Adán Maurer MD;  Location: [facility] OR;  Service: Vascular;  Laterality: N/A;    INSERTION HEMODIALYSIS CATHETER Right 01/15/2021    Procedure: TUNNELED DIAYLIS CATHETER PLACEMENT;  Surgeon: Phu Gaviria MD;  Location: [facility] OR 18/19;  Service: Vascular;  Laterality: Right;    INSERTION HEMODIALYSIS CATHETER Left 1/21/2023    Procedure: HEMODIALYSIS CATHETER INSERTION;  Surgeon: Elijah Herrera MD;  Location: [facility] MAIN OR;  Service: Vascular;  Laterality: Left;    INSERTION PERITONEAL DIALYSIS CATHETER  07/29/2014    Laparoscopic placement of Curl Cath peritoneal dialysis catheter,   Vinod Goldstein    INSERTION PERITONEAL DIALYSIS CATHETER N/A 2019    Procedure: LAPAROSCOPIC INSERTION PERITONEAL DIALYSIS CATHETER;  Surgeon: Damon Rooney MD;  Location: [facility] MAIN OR;  Service: General    REMOVAL HEMODIALYSIS CATHETER N/A 2/15/2023    Procedure: PALINDROME REMOVAL;  Surgeon: Elijah Herrera MD;  Location: [facility] MAIN OR;  Service: Vascular;  Laterality: N/A;    REMOVAL PERITONEAL DIALYSIS CATHETER N/A 10/17/2016    Procedure: REMOVAL PERITONEAL DIALYSIS CATHETER;  Surgeon: Damon Rooney MD;  Location: [facility] MAIN OR;  Service:     REMOVAL PERITONEAL DIALYSIS CATHETER N/A 10/21/2020    Procedure: REMOVAL PERITONEAL DIALYSIS CATHETER;  Surgeon: Damon Rooney MD;  Location: [facility] MAIN OR;  Service: General;  Laterality: N/A;    SHUNT O GRAM Right 2022    Procedure: RIGHT  ARM SHUNT O GRAM , ANGIOPLASTY OF AXILARY VEIN AND SUBCLAVIAN VEIN AT SVC JUNCTION;  Surgeon: Anya Hernandez Jr., MD;  Location: [facility] OR ;  Service: Vascular;  Laterality: Right;    SHUNT O GRAM Right 2023    Procedure: OPEN THROMBECTOMY AND ANGIOPLASTY;  Surgeon: Elijah Herrera MD;  Location: [facility] OR ;  Service: Vascular;  Laterality: Right;    SHUNT O GRAM Right 2023    Procedure: Right arm dialysis graft open thrombectomy, shuntogram, angioplasty and stent;  Surgeon: Karuna Villalba MD;  Location: [facility] OR ;  Service: Vascular;  Laterality: Right;    SHUNT O GRAM Right 2023    Procedure: RIGHT ARM FISTULOGRAM, ANGIOPLASTY;  Surgeon: Elijah Herrera MD;  Location: [facility] OR ;  Service: Vascular;  Laterality: Right;    TRANSPLANTATION RENAL Right 2016    from  donor    TUBAL ABDOMINAL LIGATION Bilateral          FAMILY HISTORY  Family History   Problem Relation Age of Onset    Hypertension Mother     Hypertension Father     Heart attack Maternal Grandfather     Gris  Hyperthermia Neg Hx          SOCIAL HISTORY  Social History     Socioeconomic History    Marital status:     Number of children: 2    Years of education: 12   Tobacco Use    Smoking status: Never    Smokeless tobacco: Never    Tobacco comments:     Patient does not smoke   Vaping Use    Vaping status: Never Used   Substance and Sexual Activity    Alcohol use: No     Comment: caffeine use; Patient is unknown if ever drinks    Drug use: Never     Comment: Drug Abuse: none    Sexual activity: Defer         ALLERGIES  Patient has no known allergies.        REVIEW OF SYSTEMS  Review of Systems     All systems reviewed and negative except for those discussed in HPI.       PHYSICAL EXAM    I have reviewed the triage vital signs and nursing notes.    ED Triage Vitals   Temp Heart Rate Resp BP SpO2   05/02/24 1233 05/02/24 1233 05/02/24 1233 05/02/24 1237 05/02/24 1233   98.4 °F (36.9 °C) 105 18 144/96 95 %      Temp src Heart Rate Source Patient Position BP Location FiO2 (%)   05/02/24 1233 -- -- -- --   Tympanic           GENERAL: Young female.  No acute distress.Vital signs on my initial evaluation have been reviewed  HENT: nares patent  Head/neck/ face are symmetric without gross deformity, signs of trauma, or swelling  EYES: no scleral icterus, no conjunctival pallor.  NECK: Supple, no meningismus  CV: regular rhythm, regular rate with intact distal pulses.  Systolic murmur 2 out of 6  RESPIRATORY: normal effort and no respiratory distress.  To auscultation bilaterally  ABDOMEN: soft and nontender.  Obese  MUSCULOSKELETAL: Patient has an AV fistula to her right upper extremity I do not feel any thrill.  She also has an AV fistula in her left upper extremity and I do not feel any thrill.  She has intact distal pulses no coolness or cyanosis.  Patient is neurovascularly intact  NEURO: alert and appropriate, moves all extremities, follows commands.  No focal motor or sensory changes  SKIN: warm, dry    Vital signs  and nursing notes reviewed.        LAB RESULTS  Recent Results (from the past 24 hour(s))   Comprehensive Metabolic Panel    Collection Time: 05/02/24  2:10 PM    Specimen: Arm, Left; Blood   Result Value Ref Range    Glucose 122 (H) 65 - 99 mg/dL    BUN 43 (H) 6 - 20 mg/dL    Creatinine 8.57 (H) 0.57 - 1.00 mg/dL    Sodium 138 136 - 145 mmol/L    Potassium 4.3 3.5 - 5.2 mmol/L    Chloride 94 (L) 98 - 107 mmol/L    CO2 24.5 22.0 - 29.0 mmol/L    Calcium 9.3 8.6 - 10.5 mg/dL    Total Protein 8.0 6.0 - 8.5 g/dL    Albumin 4.1 3.5 - 5.2 g/dL    ALT (SGPT) <5 1 - 33 U/L    AST (SGOT) 11 1 - 32 U/L    Alkaline Phosphatase 62 39 - 117 U/L    Total Bilirubin 0.3 0.0 - 1.2 mg/dL    Globulin 3.9 gm/dL    A/G Ratio 1.1 g/dL    BUN/Creatinine Ratio 5.0 (L) 7.0 - 25.0    Anion Gap 19.5 (H) 5.0 - 15.0 mmol/L    eGFR 5.3 (L) >60.0 mL/min/1.73   CBC Auto Differential    Collection Time: 05/02/24  2:10 PM    Specimen: Arm, Left; Blood   Result Value Ref Range    WBC 7.49 3.40 - 10.80 10*3/mm3    RBC 4.05 3.77 - 5.28 10*6/mm3    Hemoglobin 11.9 (L) 12.0 - 15.9 g/dL    Hematocrit 37.6 34.0 - 46.6 %    MCV 92.8 79.0 - 97.0 fL    MCH 29.4 26.6 - 33.0 pg    MCHC 31.6 31.5 - 35.7 g/dL    RDW 18.0 (H) 12.3 - 15.4 %    RDW-SD 60.7 (H) 37.0 - 54.0 fl    MPV 9.4 6.0 - 12.0 fL    Platelets 229 140 - 450 10*3/mm3    Neutrophil % 72.9 42.7 - 76.0 %    Lymphocyte % 14.4 (L) 19.6 - 45.3 %    Monocyte % 7.9 5.0 - 12.0 %    Eosinophil % 3.7 0.3 - 6.2 %    Basophil % 0.7 0.0 - 1.5 %    Immature Grans % 0.4 0.0 - 0.5 %    Neutrophils, Absolute 5.46 1.70 - 7.00 10*3/mm3    Lymphocytes, Absolute 1.08 0.70 - 3.10 10*3/mm3    Monocytes, Absolute 0.59 0.10 - 0.90 10*3/mm3    Eosinophils, Absolute 0.28 0.00 - 0.40 10*3/mm3    Basophils, Absolute 0.05 0.00 - 0.20 10*3/mm3    Immature Grans, Absolute 0.03 0.00 - 0.05 10*3/mm3    nRBC 0.0 0.0 - 0.2 /100 WBC       Ordered the above labs and independently reviewed the results.        RADIOLOGY  No Radiology  Exams Resulted Within Past 24 Hours    I ordered the above noted radiological studies. Reviewed by me and discussed with radiologist.  See dictation for official radiology interpretation.      PROCEDURES    Procedures      MEDICATIONS GIVEN IN ER    Medications - No data to display      All labs have been independently reviewed by me.  All radiology studies have been reviewed by me and I discussed with radiologist dictating the report when indicated below.  All EKG's independently viewed and interpreted by me.  Discussion below represents my analysis of pertinent findings related to patient's condition, differential diagnosis, treatment plan and final disposition.        PROGRESS, DATA ANALYSIS, CONSULTS, AND MEDICAL DECISION MAKING    This is a patient has an AV fistula to her right upper extremity where she gets dialysis that is not functioning.  She is otherwise asymptomatic.  I will put a call out to vascular surgery and follow the recommendations.  All questions answered.      ED Course as of 05/02/24 1622   Thu May 02, 2024   1439 I did discuss the case with vascular surgeon Dr. Sanchez and he is going to come and see and evaluate the patient. [MM]   1515 The vascular surgeon came and saw the patient.  The plan is for admission to medicine and he is kayce do a procedure to try to relieve the blocked AV fistula tomorrow morning [MM]   1516 On reevaluation of the patient she is stable in no acute distress. [MM]   1516 Potassium: 4.3 [MM]   1516 Creatinine(!): 8.57  Patient with chronic renal failure on dialysis. [MM]   1516 Hemoglobin(!): 11.9  No change [MM]   1546 Did discuss the case with Dr. Soriano and agrees to admit the patient to the hospital.  All questions answered. [MM]      ED Course User Index  [MM] Everardo Gerard MD       AS OF 16:22 EDT VITALS:    BP - 139/93  HR - 81  TEMP - 98.4 °F (36.9 °C) (Tympanic)  02 SATS - 99%    SOCIAL DETERMINANTS OF HEALTH THAT IMPACT OR LIMIT CARE (For  example..Homelessness,safe discharge, inability to obtain care, follow up, or prescriptions):      DIAGNOSIS  Final diagnoses:   Arteriovenous fistula occlusion, initial encounter   Chronic kidney disease with end stage renal failure on dialysis         DISPOSITION  I have reviewed the test results with my patient and explained the current treatment plan.  I answered all the patient's questions and concerns.  The patient is hemodynamically stable and is in no distress and is appropriate to be admitted to a medical/surgical floor bed at this time.            DICTATED UTILIZING DRAGON DICTATION    Note Disclaimer: At [facility], we believe that sharing information builds trust and better relationships. You are receiving this note because you recently visited [facility]. It is possible you will see health information before a provider has talked with you about it. This kind of information can be easy to misunderstand. To help you fully understand what it means for your health, we urge you to discuss this note with your provider.       Everardo Gerard MD  05/02/24 [id number]

## 2024-05-02 NOTE — ED NOTES
Unable to obtain IV in ER. IV team called to meet pt in her inpatient room to have IV placed there. Odette IVEY aware of and agrees to plan.

## 2024-05-02 NOTE — CONSULTS
Psychiatric   Consult Note    Patient Name: Sunni Mcneil  : 1975  MRN: [id number]  Primary Care Physician:  Regla Ying APRN  Referring Physician: No ref. provider found  Date of admission: 2024    Consults  Subjective   Subjective     Reason for Consult/ Chief Complaint: Clotted right arm dialysis shunt.    History of Present Illness  Sunni Mcneil is a 49 y.o. female well-known to the vascular service with multiple dialysis access procedures.  Currently has a right arm brachial artery to axillary vein graft.  This is suffered at least 1 thrombotic complication.  She noticed no thrill or bruit in her shunt today.    Review of Systems     Personal History     Past Medical History:   Diagnosis Date    Acid reflux     Anemia     WITH ESRD    Anxiety     Arthritis     Cough     related to fluid overload    Dependence on renal dialysis 2021    Depression     Diabetes mellitus     NO MEDICATIONS FOR    End stage renal disease 2021    ESRD on dialysis     FRESINUS MWF    History of peritoneal dialysis     KIDNEY TRANSPLANT 2016     History of transfusion     BEEN A WHILE no reaction    Hypercalcemia     Hyperparathyroidism     Hypertension     Kidney disease     End-stage renal disease. TRANSPLANT    Kidney transplant recipient 2016    RIGHT KIDNEY. ? 2018 KIDNEY REJECTED    PONV (postoperative nausea and vomiting)     Seasonal allergies     CURRENTLY     Vascular dialysis catheter in place     RIGHT TUNNEL CATH       Past Surgical History:   Procedure Laterality Date    ARTERIOVENOUS FISTULA/SHUNT SURGERY Right 2020    Procedure: RIGHT ARM ARTERIAL VENOUS FISTULA;  Surgeon: Elijah Herrera MD;  Location: [facility];  Service: Vascular;  Laterality: Right;    ARTERIOVENOUS FISTULA/SHUNT SURGERY Left 2021    Procedure: LEFT BRACHIOAXILLARY ARTERIOVENOUS FISTULA GRAFT;  Surgeon: Anya Hernandez Jr., MD;  Location: [facility];  Service: Vascular;   Laterality: Left;    ARTERIOVENOUS FISTULA/SHUNT SURGERY Right 03/16/2021    Procedure: RIGHT BRACHIOAXILLARY ARTERVENIOUS GRAFT PLACEMENT;  Surgeon: Adán Maurer MD;  Location: [facility] MAIN OR;  Service: Vascular;  Laterality: Right;    ARTERIOVENOUS FISTULA/SHUNT SURGERY Right 8/4/2023    Procedure: RIGHT ARM FISTULOGRAM PEUCUTANEOUS AND PERIPHERAL TRANSLUMINAL ANGIOPLSTY/STENT;  Surgeon: Elijah Herrera MD;  Location: [facility] OR 18/19;  Service: Vascular;  Laterality: Right;    BRACHIAL EMBOLECTOMY Right 10/6/2023    Procedure: RIGHT AV GRAFT WITH ANGIOPLASTY AND CENTROVENOUS ANGIOPLASTY;  Surgeon: Phu Gaviria MD;  Location: [facility] OR 18/19;  Service: Vascular;  Laterality: Right;    COLONOSCOPY N/A 11/30/2022    Procedure: COLONOSCOPY TO CECUM AND TERM. ILEUM;  Surgeon: Casimiro Perkins MD;  Location: [facility] ENDOSCOPY;  Service: Gastroenterology;  Laterality: N/A;  PRE OP - SCREENING  POST OP - DIVERTICULOSIS, SM. HEMORRHOIDS    INSERTION AND REMOVAL HEMODIALYSIS CATHETER N/A 02/13/2021    Procedure: INSERTION AND REMOVAL OF HEMODIALYSIS CATHETER;  Surgeon: Adán Maurer MD;  Location: [facility] OR;  Service: Vascular;  Laterality: N/A;    INSERTION HEMODIALYSIS CATHETER Right 01/15/2021    Procedure: TUNNELED DIAYLIS CATHETER PLACEMENT;  Surgeon: Phu Gaviria MD;  Location: [facility] OR 18/19;  Service: Vascular;  Laterality: Right;    INSERTION HEMODIALYSIS CATHETER Left 1/21/2023    Procedure: HEMODIALYSIS CATHETER INSERTION;  Surgeon: Elijah Herrera MD;  Location: [facility] OR;  Service: Vascular;  Laterality: Left;    INSERTION PERITONEAL DIALYSIS CATHETER  07/29/2014    Laparoscopic placement of Curl Cath peritoneal dialysis catheter, Dr. Vinod Goldstein    INSERTION PERITONEAL DIALYSIS CATHETER N/A 07/24/2019    Procedure: LAPAROSCOPIC INSERTION PERITONEAL DIALYSIS CATHETER;  Surgeon: Damon Rooney MD;  Location: [facility] OR;   Service: General    REMOVAL HEMODIALYSIS CATHETER N/A 2/15/2023    Procedure: PALINDROME REMOVAL;  Surgeon: Elijah Herrera MD;  Location: [facility] OR;  Service: Vascular;  Laterality: N/A;    REMOVAL PERITONEAL DIALYSIS CATHETER N/A 10/17/2016    Procedure: REMOVAL PERITONEAL DIALYSIS CATHETER;  Surgeon: Damon Rooney MD;  Location: [facility] MAIN OR;  Service:     REMOVAL PERITONEAL DIALYSIS CATHETER N/A 10/21/2020    Procedure: REMOVAL PERITONEAL DIALYSIS CATHETER;  Surgeon: Damon Rooney MD;  Location: [facility] MAIN OR;  Service: General;  Laterality: N/A;    SHUNT O GRAM Right 2022    Procedure: RIGHT  ARM SHUNT O GRAM , ANGIOPLASTY OF AXILARY VEIN AND SUBCLAVIAN VEIN AT SVC JUNCTION;  Surgeon: Anya Hernandez Jr., MD;  Location: [facility] ;  Service: Vascular;  Laterality: Right;    SHUNT O GRAM Right 2023    Procedure: OPEN THROMBECTOMY AND ANGIOPLASTY;  Surgeon: Elijah Herrera MD;  Location: [facility] ;  Service: Vascular;  Laterality: Right;    SHUNT O GRAM Right 2023    Procedure: Right arm dialysis graft open thrombectomy, shuntogram, angioplasty and stent;  Surgeon: Karuna Villalba MD;  Location: [facility] OR ;  Service: Vascular;  Laterality: Right;    SHUNT O GRAM Right 2023    Procedure: RIGHT ARM FISTULOGRAM, ANGIOPLASTY;  Surgeon: Elijah Herrera MD;  Location: [facility] ;  Service: Vascular;  Laterality: Right;    TRANSPLANTATION RENAL Right 2016    from  donor    TUBAL ABDOMINAL LIGATION Bilateral        Family History: Her family history includes Heart attack in her maternal grandfather; Hypertension in her father and mother.     Social History: She  reports that she has never smoked. She has never used smokeless tobacco. She reports that she does not drink alcohol and does not use drugs.    Home Medications:  HYDROcodone-acetaminophen, Polyethylene Glycol 3350,  acetaminophen, apixaban, calcitriol, calcium acetate, cloNIDine, hydrOXYzine, polyethylene glycol, prochlorperazine, propranolol, sennosides-docusate, and traZODone    Allergies:  She has No Known Allergies.    Objective    Objective     Vitals:    Temp:  [98.4 °F (36.9 °C)] 98.4 °F (36.9 °C)  Heart Rate:  [] 83  Resp:  [16-18] 16  BP: (127-144)/(82-96) 127/82    Physical Exam  Constitutional:       Appearance: She is well-developed.   Pulmonary:      Effort: Pulmonary effort is normal. No respiratory distress.   Abdominal:      General: There is no distension.      Palpations: Abdomen is soft.   Neurological:      Mental Status: She is alert and oriented to person, place, and time.     No thrill or bruit in right arm dialysis shunt.    Result Review    Result Review:  I have personally reviewed the results from the time of this admission to 5/2/2024 15:05 EDT and agree with these findings:  [x]  Laboratory list / accordion  []  Microbiology  []  Radiology  []  EKG/Telemetry   []  Cardiology/Vascular   []  Pathology  []  Old records  []  Other:  Most notable findings include: Potassium 4.3 white count 7.49 platelet count 229.  Prior shunt thrombectomy images reviewed.    Assessment & Plan   Assessment / Plan     Brief Patient Summary:  Sunni Mcneil is a 49 y.o. female clotted right arm brachial artery to axillary vein shunt.    Active Hospital Problems:  Active Hospital Problems    Diagnosis     **AV graft stenosis, initial encounter     ESRD (end stage renal disease)        Plan:   Will plan open shunt thrombectomy with possible graft revision, possible tunneled catheter placement tomorrow at 7:00 in the morning in the operating room.    Patient has been recommended for hemodialysis access thrombectomy/revision surgery.  This is a major surgery.  Patient understands the inherent risks associated with hemodialysis access surgery.  These include but not are not limited to stroke, heart attack, bleeding,  infection, need for secondary surgery/intervention, hemodialysis access steal phenomenon, failure to mature, and even death.  Patient also understands the nature of hemodialysis access and chronic kidney disease/end-stage renal disease(a chronic illness that has progressed to requiring surgical dialysis access creation).      DVT prophylaxis:  No DVT prophylaxis order currently exists.         Alex Sanchez MD

## 2024-05-03 ENCOUNTER — APPOINTMENT (OUTPATIENT)
Dept: GENERAL RADIOLOGY | Facility: HOSPITAL | Age: 49
End: 2024-05-03
Payer: MEDICARE

## 2024-05-03 ENCOUNTER — ANESTHESIA (OUTPATIENT)
Dept: PERIOP | Facility: HOSPITAL | Age: 49
End: 2024-05-03
Payer: MEDICARE

## 2024-05-03 ENCOUNTER — ANESTHESIA EVENT (OUTPATIENT)
Dept: PERIOP | Facility: HOSPITAL | Age: 49
End: 2024-05-03
Payer: MEDICARE

## 2024-05-03 LAB
ANION GAP SERPL CALCULATED.3IONS-SCNC: 19.8 MMOL/L (ref 5–15)
BUN SERPL-MCNC: 59 MG/DL (ref 6–20)
BUN/CREAT SERPL: 5.9 (ref 7–25)
CALCIUM SPEC-SCNC: 8.5 MG/DL (ref 8.6–10.5)
CHLORIDE SERPL-SCNC: 96 MMOL/L (ref 98–107)
CO2 SERPL-SCNC: 23.2 MMOL/L (ref 22–29)
CREAT SERPL-MCNC: 9.95 MG/DL (ref 0.57–1)
DEPRECATED RDW RBC AUTO: 57.7 FL (ref 37–54)
EGFRCR SERPLBLD CKD-EPI 2021: 4.4 ML/MIN/1.73
ERYTHROCYTE [DISTWIDTH] IN BLOOD BY AUTOMATED COUNT: 17.6 % (ref 12.3–15.4)
GLUCOSE BLDC GLUCOMTR-MCNC: 111 MG/DL (ref 70–130)
GLUCOSE BLDC GLUCOMTR-MCNC: 93 MG/DL (ref 70–130)
GLUCOSE SERPL-MCNC: 78 MG/DL (ref 65–99)
HCG SERPL QL: NEGATIVE
HCT VFR BLD AUTO: 34.2 % (ref 34–46.6)
HGB BLD-MCNC: 10.8 G/DL (ref 12–15.9)
MCH RBC QN AUTO: 28.3 PG (ref 26.6–33)
MCHC RBC AUTO-ENTMCNC: 31.6 G/DL (ref 31.5–35.7)
MCV RBC AUTO: 89.5 FL (ref 79–97)
PLATELET # BLD AUTO: 199 10*3/MM3 (ref 140–450)
PMV BLD AUTO: 9.5 FL (ref 6–12)
POTASSIUM SERPL-SCNC: 4.3 MMOL/L (ref 3.5–5.2)
RBC # BLD AUTO: 3.82 10*6/MM3 (ref 3.77–5.28)
SODIUM SERPL-SCNC: 139 MMOL/L (ref 136–145)
WBC NRBC COR # BLD AUTO: 7.09 10*3/MM3 (ref 3.4–10.8)

## 2024-05-03 PROCEDURE — 63710000001 CALCITRIOL 0.25 MCG CAPSULE: Performed by: SURGERY

## 2024-05-03 PROCEDURE — 36833 AV FISTULA REVISION: CPT | Performed by: SURGERY

## 2024-05-03 PROCEDURE — 36907 BALO ANGIOP CTR DIALYSIS SEG: CPT | Performed by: SURGERY

## 2024-05-03 PROCEDURE — 63710000001 TRAZODONE 100 MG TABLET: Performed by: SURGERY

## 2024-05-03 PROCEDURE — 25010000002 PHENYLEPHRINE 10 MG/ML SOLUTION

## 2024-05-03 PROCEDURE — 63710000001 TRAZODONE 50 MG TABLET: Performed by: SURGERY

## 2024-05-03 PROCEDURE — A9270 NON-COVERED ITEM OR SERVICE: HCPCS | Performed by: SURGERY

## 2024-05-03 PROCEDURE — 63710000001 HYDRALAZINE 25 MG TABLET: Performed by: SURGERY

## 2024-05-03 PROCEDURE — 63710000001 CLONIDINE 0.1 MG TABLET: Performed by: SURGERY

## 2024-05-03 PROCEDURE — 36833 AV FISTULA REVISION: CPT

## 2024-05-03 PROCEDURE — C1876 STENT, NON-COA/NON-COV W/DEL: HCPCS | Performed by: SURGERY

## 2024-05-03 PROCEDURE — 82948 REAGENT STRIP/BLOOD GLUCOSE: CPT

## 2024-05-03 PROCEDURE — 25510000001 IOPAMIDOL PER 1 ML: Performed by: STUDENT IN AN ORGANIZED HEALTH CARE EDUCATION/TRAINING PROGRAM

## 2024-05-03 PROCEDURE — G0378 HOSPITAL OBSERVATION PER HR: HCPCS

## 2024-05-03 PROCEDURE — 25010000002 ONDANSETRON PER 1 MG

## 2024-05-03 PROCEDURE — C1769 GUIDE WIRE: HCPCS | Performed by: SURGERY

## 2024-05-03 PROCEDURE — 25010000002 VASOPRESSIN 20 UNIT/ML SOLUTION

## 2024-05-03 PROCEDURE — 63710000001 CALCIUM ACETATE 667 MG CAPSULE: Performed by: SURGERY

## 2024-05-03 PROCEDURE — C1757 CATH, THROMBECTOMY/EMBOLECT: HCPCS | Performed by: SURGERY

## 2024-05-03 PROCEDURE — G0257 UNSCHED DIALYSIS ESRD PT HOS: HCPCS

## 2024-05-03 PROCEDURE — 25010000002 PROTAMINE SULFATE PER 10 MG

## 2024-05-03 PROCEDURE — 63710000001 PROPRANOLOL 10 MG TABLET: Performed by: SURGERY

## 2024-05-03 PROCEDURE — 63710000001 LIDOCAINE 4 % CREAM 5 G TUBE: Performed by: INTERNAL MEDICINE

## 2024-05-03 PROCEDURE — 25010000002 HEPARIN (PORCINE) PER 1000 UNITS

## 2024-05-03 PROCEDURE — 25810000003 SODIUM CHLORIDE 0.9 % SOLUTION: Performed by: ANESTHESIOLOGY

## 2024-05-03 PROCEDURE — 25010000002 MIDAZOLAM PER 1 MG: Performed by: ANESTHESIOLOGY

## 2024-05-03 PROCEDURE — 25010000002 DROPERIDOL PER 5 MG

## 2024-05-03 PROCEDURE — 25010000002 HEPARIN (PORCINE) PER 1000 UNITS: Performed by: SURGERY

## 2024-05-03 PROCEDURE — 25010000002 CEFAZOLIN PER 500 MG: Performed by: SURGERY

## 2024-05-03 PROCEDURE — C1894 INTRO/SHEATH, NON-LASER: HCPCS | Performed by: SURGERY

## 2024-05-03 PROCEDURE — 25010000002 FENTANYL CITRATE (PF) 50 MCG/ML SOLUTION

## 2024-05-03 PROCEDURE — 80048 BASIC METABOLIC PNL TOTAL CA: CPT | Performed by: INTERNAL MEDICINE

## 2024-05-03 PROCEDURE — 85027 COMPLETE CBC AUTOMATED: CPT | Performed by: INTERNAL MEDICINE

## 2024-05-03 PROCEDURE — C1725 CATH, TRANSLUMIN NON-LASER: HCPCS | Performed by: SURGERY

## 2024-05-03 PROCEDURE — A9270 NON-COVERED ITEM OR SERVICE: HCPCS | Performed by: INTERNAL MEDICINE

## 2024-05-03 PROCEDURE — 25010000002 DEXAMETHASONE SODIUM PHOSPHATE 20 MG/5ML SOLUTION

## 2024-05-03 PROCEDURE — 25010000002 PROPOFOL 200 MG/20ML EMULSION

## 2024-05-03 PROCEDURE — 84703 CHORIONIC GONADOTROPIN ASSAY: CPT | Performed by: SURGERY

## 2024-05-03 PROCEDURE — 63710000001 HYDROXYZINE 25 MG TABLET: Performed by: SURGERY

## 2024-05-03 DEVICE — STENT SERB65-10-40-80 PROTEGE GPS V06
Type: IMPLANTABLE DEVICE | Site: ARM | Status: FUNCTIONAL
Brand: PROTÉGÉ™ GPS™

## 2024-05-03 DEVICE — FLOSEAL WITH RECOTHROM - 10ML.
Type: IMPLANTABLE DEVICE | Site: ARM | Status: FUNCTIONAL
Brand: FLOSEAL HEMOSTATIC MATRIX

## 2024-05-03 RX ORDER — FAMOTIDINE 10 MG/ML
20 INJECTION, SOLUTION INTRAVENOUS ONCE
Status: COMPLETED | OUTPATIENT
Start: 2024-05-03 | End: 2024-05-03

## 2024-05-03 RX ORDER — DROPERIDOL 2.5 MG/ML
0.62 INJECTION, SOLUTION INTRAMUSCULAR; INTRAVENOUS
Status: DISCONTINUED | OUTPATIENT
Start: 2024-05-03 | End: 2024-05-03 | Stop reason: HOSPADM

## 2024-05-03 RX ORDER — SODIUM CHLORIDE 9 MG/ML
500 INJECTION, SOLUTION INTRAVENOUS CONTINUOUS
Status: DISCONTINUED | OUTPATIENT
Start: 2024-05-03 | End: 2024-05-03

## 2024-05-03 RX ORDER — SODIUM CHLORIDE, SODIUM LACTATE, POTASSIUM CHLORIDE, CALCIUM CHLORIDE 600; 310; 30; 20 MG/100ML; MG/100ML; MG/100ML; MG/100ML
9 INJECTION, SOLUTION INTRAVENOUS CONTINUOUS
Status: DISCONTINUED | OUTPATIENT
Start: 2024-05-03 | End: 2024-05-03

## 2024-05-03 RX ORDER — NALOXONE HCL 0.4 MG/ML
0.2 VIAL (ML) INJECTION AS NEEDED
Status: DISCONTINUED | OUTPATIENT
Start: 2024-05-03 | End: 2024-05-03 | Stop reason: HOSPADM

## 2024-05-03 RX ORDER — LIDOCAINE HYDROCHLORIDE 20 MG/ML
INJECTION, SOLUTION INFILTRATION; PERINEURAL AS NEEDED
Status: DISCONTINUED | OUTPATIENT
Start: 2024-05-03 | End: 2024-05-03 | Stop reason: SURG

## 2024-05-03 RX ORDER — PROMETHAZINE HYDROCHLORIDE 25 MG/1
25 TABLET ORAL ONCE AS NEEDED
Status: DISCONTINUED | OUTPATIENT
Start: 2024-05-03 | End: 2024-05-03 | Stop reason: HOSPADM

## 2024-05-03 RX ORDER — HYDROCODONE BITARTRATE AND ACETAMINOPHEN 7.5; 325 MG/1; MG/1
1 TABLET ORAL EVERY 4 HOURS PRN
Status: DISCONTINUED | OUTPATIENT
Start: 2024-05-03 | End: 2024-05-03 | Stop reason: HOSPADM

## 2024-05-03 RX ORDER — SODIUM CHLORIDE 0.9 % (FLUSH) 0.9 %
3-10 SYRINGE (ML) INJECTION AS NEEDED
Status: DISCONTINUED | OUTPATIENT
Start: 2024-05-03 | End: 2024-05-03 | Stop reason: HOSPADM

## 2024-05-03 RX ORDER — FLUMAZENIL 0.1 MG/ML
0.2 INJECTION INTRAVENOUS AS NEEDED
Status: DISCONTINUED | OUTPATIENT
Start: 2024-05-03 | End: 2024-05-03 | Stop reason: HOSPADM

## 2024-05-03 RX ORDER — FENTANYL CITRATE 50 UG/ML
25 INJECTION, SOLUTION INTRAMUSCULAR; INTRAVENOUS
Status: DISCONTINUED | OUTPATIENT
Start: 2024-05-03 | End: 2024-05-03 | Stop reason: HOSPADM

## 2024-05-03 RX ORDER — LIDOCAINE 40 MG/G
1 CREAM TOPICAL AS NEEDED
Status: DISCONTINUED | OUTPATIENT
Start: 2024-05-03 | End: 2024-05-04 | Stop reason: HOSPADM

## 2024-05-03 RX ORDER — PROMETHAZINE HYDROCHLORIDE 25 MG/1
25 SUPPOSITORY RECTAL ONCE AS NEEDED
Status: DISCONTINUED | OUTPATIENT
Start: 2024-05-03 | End: 2024-05-03 | Stop reason: HOSPADM

## 2024-05-03 RX ORDER — HYDROCODONE BITARTRATE AND ACETAMINOPHEN 5; 325 MG/1; MG/1
1 TABLET ORAL ONCE AS NEEDED
Status: DISCONTINUED | OUTPATIENT
Start: 2024-05-03 | End: 2024-05-03 | Stop reason: HOSPADM

## 2024-05-03 RX ORDER — PROPOFOL 10 MG/ML
INJECTION, EMULSION INTRAVENOUS AS NEEDED
Status: DISCONTINUED | OUTPATIENT
Start: 2024-05-03 | End: 2024-05-03 | Stop reason: SURG

## 2024-05-03 RX ORDER — EPHEDRINE SULFATE 50 MG/ML
5 INJECTION, SOLUTION INTRAVENOUS ONCE AS NEEDED
Status: DISCONTINUED | OUTPATIENT
Start: 2024-05-03 | End: 2024-05-03 | Stop reason: HOSPADM

## 2024-05-03 RX ORDER — FENTANYL CITRATE 50 UG/ML
50 INJECTION, SOLUTION INTRAMUSCULAR; INTRAVENOUS ONCE AS NEEDED
Status: DISCONTINUED | OUTPATIENT
Start: 2024-05-03 | End: 2024-05-03 | Stop reason: HOSPADM

## 2024-05-03 RX ORDER — DEXAMETHASONE SODIUM PHOSPHATE 4 MG/ML
INJECTION, SOLUTION INTRA-ARTICULAR; INTRALESIONAL; INTRAMUSCULAR; INTRAVENOUS; SOFT TISSUE AS NEEDED
Status: DISCONTINUED | OUTPATIENT
Start: 2024-05-03 | End: 2024-05-03 | Stop reason: SURG

## 2024-05-03 RX ORDER — MIDAZOLAM HYDROCHLORIDE 1 MG/ML
1 INJECTION INTRAMUSCULAR; INTRAVENOUS
Status: DISCONTINUED | OUTPATIENT
Start: 2024-05-03 | End: 2024-05-03 | Stop reason: HOSPADM

## 2024-05-03 RX ORDER — SODIUM CHLORIDE 0.9 % (FLUSH) 0.9 %
3 SYRINGE (ML) INJECTION EVERY 12 HOURS SCHEDULED
Status: DISCONTINUED | OUTPATIENT
Start: 2024-05-03 | End: 2024-05-03 | Stop reason: HOSPADM

## 2024-05-03 RX ORDER — PROTAMINE SULFATE 10 MG/ML
INJECTION, SOLUTION INTRAVENOUS AS NEEDED
Status: DISCONTINUED | OUTPATIENT
Start: 2024-05-03 | End: 2024-05-03 | Stop reason: SURG

## 2024-05-03 RX ORDER — HYDROMORPHONE HYDROCHLORIDE 1 MG/ML
0.25 INJECTION, SOLUTION INTRAMUSCULAR; INTRAVENOUS; SUBCUTANEOUS
Status: DISCONTINUED | OUTPATIENT
Start: 2024-05-03 | End: 2024-05-03 | Stop reason: HOSPADM

## 2024-05-03 RX ORDER — HEPARIN SODIUM 1000 [USP'U]/ML
INJECTION, SOLUTION INTRAVENOUS; SUBCUTANEOUS AS NEEDED
Status: DISCONTINUED | OUTPATIENT
Start: 2024-05-03 | End: 2024-05-03 | Stop reason: SURG

## 2024-05-03 RX ORDER — HYDRALAZINE HYDROCHLORIDE 20 MG/ML
5 INJECTION INTRAMUSCULAR; INTRAVENOUS
Status: DISCONTINUED | OUTPATIENT
Start: 2024-05-03 | End: 2024-05-03 | Stop reason: HOSPADM

## 2024-05-03 RX ORDER — DIPHENHYDRAMINE HYDROCHLORIDE 50 MG/ML
12.5 INJECTION INTRAMUSCULAR; INTRAVENOUS
Status: DISCONTINUED | OUTPATIENT
Start: 2024-05-03 | End: 2024-05-03 | Stop reason: HOSPADM

## 2024-05-03 RX ORDER — VASOPRESSIN 20 U/ML
INJECTION PARENTERAL AS NEEDED
Status: DISCONTINUED | OUTPATIENT
Start: 2024-05-03 | End: 2024-05-03 | Stop reason: SURG

## 2024-05-03 RX ORDER — LABETALOL HYDROCHLORIDE 5 MG/ML
5 INJECTION, SOLUTION INTRAVENOUS
Status: DISCONTINUED | OUTPATIENT
Start: 2024-05-03 | End: 2024-05-03 | Stop reason: HOSPADM

## 2024-05-03 RX ORDER — PHENYLEPHRINE HYDROCHLORIDE 10 MG/ML
INJECTION INTRAVENOUS AS NEEDED
Status: DISCONTINUED | OUTPATIENT
Start: 2024-05-03 | End: 2024-05-03 | Stop reason: SURG

## 2024-05-03 RX ORDER — HYDROCODONE BITARTRATE AND ACETAMINOPHEN 5; 325 MG/1; MG/1
1 TABLET ORAL EVERY 6 HOURS PRN
Status: DISCONTINUED | OUTPATIENT
Start: 2024-05-03 | End: 2024-05-04 | Stop reason: HOSPADM

## 2024-05-03 RX ORDER — LIDOCAINE HYDROCHLORIDE 10 MG/ML
0.5 INJECTION, SOLUTION INFILTRATION; PERINEURAL ONCE AS NEEDED
Status: DISCONTINUED | OUTPATIENT
Start: 2024-05-03 | End: 2024-05-03 | Stop reason: HOSPADM

## 2024-05-03 RX ORDER — EPHEDRINE SULFATE 50 MG/ML
INJECTION, SOLUTION INTRAVENOUS AS NEEDED
Status: DISCONTINUED | OUTPATIENT
Start: 2024-05-03 | End: 2024-05-03 | Stop reason: SURG

## 2024-05-03 RX ORDER — ONDANSETRON 2 MG/ML
4 INJECTION INTRAMUSCULAR; INTRAVENOUS ONCE AS NEEDED
Status: COMPLETED | OUTPATIENT
Start: 2024-05-03 | End: 2024-05-03

## 2024-05-03 RX ORDER — ONDANSETRON 2 MG/ML
INJECTION INTRAMUSCULAR; INTRAVENOUS AS NEEDED
Status: DISCONTINUED | OUTPATIENT
Start: 2024-05-03 | End: 2024-05-03 | Stop reason: SURG

## 2024-05-03 RX ORDER — SODIUM CHLORIDE 0.9 % (FLUSH) 0.9 %
10 SYRINGE (ML) INJECTION AS NEEDED
Status: DISCONTINUED | OUTPATIENT
Start: 2024-05-03 | End: 2024-05-03 | Stop reason: HOSPADM

## 2024-05-03 RX ORDER — FENTANYL CITRATE 50 UG/ML
INJECTION, SOLUTION INTRAMUSCULAR; INTRAVENOUS AS NEEDED
Status: DISCONTINUED | OUTPATIENT
Start: 2024-05-03 | End: 2024-05-03 | Stop reason: SURG

## 2024-05-03 RX ORDER — IPRATROPIUM BROMIDE AND ALBUTEROL SULFATE 2.5; .5 MG/3ML; MG/3ML
3 SOLUTION RESPIRATORY (INHALATION) ONCE AS NEEDED
Status: DISCONTINUED | OUTPATIENT
Start: 2024-05-03 | End: 2024-05-03 | Stop reason: HOSPADM

## 2024-05-03 RX ADMIN — EPHEDRINE SULFATE 5 MG: 50 INJECTION INTRAVENOUS at 08:46

## 2024-05-03 RX ADMIN — CALCIUM ACETATE 2001 MG: 667 CAPSULE ORAL at 18:50

## 2024-05-03 RX ADMIN — VASOPRESSIN 1 UNITS: 20 INJECTION INTRAVENOUS at 08:06

## 2024-05-03 RX ADMIN — POLYMYXIN B SULFATE AND TRIMETHOPRIM 1 DROP: 10000; 1 SOLUTION OPHTHALMIC at 20:17

## 2024-05-03 RX ADMIN — CALCITRIOL 0.5 MCG: 0.25 CAPSULE ORAL at 11:58

## 2024-05-03 RX ADMIN — LIDOCAINE 4% 1 APPLICATION: 4 CREAM TOPICAL at 12:08

## 2024-05-03 RX ADMIN — VASOPRESSIN 0.5 UNITS: 20 INJECTION INTRAVENOUS at 07:54

## 2024-05-03 RX ADMIN — ONDANSETRON 4 MG: 2 INJECTION INTRAMUSCULAR; INTRAVENOUS at 09:15

## 2024-05-03 RX ADMIN — EPHEDRINE SULFATE 10 MG: 50 INJECTION INTRAVENOUS at 07:34

## 2024-05-03 RX ADMIN — PHENYLEPHRINE HYDROCHLORIDE 100 MCG: 10 INJECTION INTRAVENOUS at 08:46

## 2024-05-03 RX ADMIN — Medication 3 ML: at 07:03

## 2024-05-03 RX ADMIN — EPHEDRINE SULFATE 10 MG: 50 INJECTION INTRAVENOUS at 08:09

## 2024-05-03 RX ADMIN — PHENYLEPHRINE HYDROCHLORIDE 100 MCG: 10 INJECTION INTRAVENOUS at 08:00

## 2024-05-03 RX ADMIN — SODIUM CHLORIDE 500 ML: 9 INJECTION, SOLUTION INTRAVENOUS at 06:35

## 2024-05-03 RX ADMIN — POLYMYXIN B SULFATE AND TRIMETHOPRIM 1 DROP: 10000; 1 SOLUTION OPHTHALMIC at 18:50

## 2024-05-03 RX ADMIN — VASOPRESSIN 1 UNITS: 20 INJECTION INTRAVENOUS at 07:39

## 2024-05-03 RX ADMIN — PROPRANOLOL HYDROCHLORIDE 10 MG: 10 TABLET ORAL at 05:20

## 2024-05-03 RX ADMIN — CLONIDINE HYDROCHLORIDE 0.1 MG: 0.1 TABLET ORAL at 11:16

## 2024-05-03 RX ADMIN — ONDANSETRON 4 MG: 2 INJECTION INTRAMUSCULAR; INTRAVENOUS at 07:44

## 2024-05-03 RX ADMIN — PROPOFOL 50 MG: 10 INJECTION, EMULSION INTRAVENOUS at 07:19

## 2024-05-03 RX ADMIN — PROTAMINE SULFATE 30 MG: 10 INJECTION, SOLUTION INTRAVENOUS at 08:40

## 2024-05-03 RX ADMIN — FENTANYL CITRATE 50 MCG: 50 INJECTION, SOLUTION INTRAMUSCULAR; INTRAVENOUS at 08:22

## 2024-05-03 RX ADMIN — PROPOFOL 50 MG: 10 INJECTION, EMULSION INTRAVENOUS at 07:15

## 2024-05-03 RX ADMIN — PROPRANOLOL HYDROCHLORIDE 10 MG: 10 TABLET ORAL at 20:16

## 2024-05-03 RX ADMIN — IOPAMIDOL 35 ML: 510 INJECTION, SOLUTION INTRAVASCULAR at 08:57

## 2024-05-03 RX ADMIN — LIDOCAINE HYDROCHLORIDE 80 MG: 20 INJECTION, SOLUTION INFILTRATION; PERINEURAL at 07:14

## 2024-05-03 RX ADMIN — FENTANYL CITRATE 50 MCG: 50 INJECTION, SOLUTION INTRAMUSCULAR; INTRAVENOUS at 07:21

## 2024-05-03 RX ADMIN — FAMOTIDINE 20 MG: 10 INJECTION INTRAVENOUS at 06:34

## 2024-05-03 RX ADMIN — EPHEDRINE SULFATE 5 MG: 50 INJECTION INTRAVENOUS at 08:34

## 2024-05-03 RX ADMIN — DEXAMETHASONE SODIUM PHOSPHATE 4 MG: 4 INJECTION, SOLUTION INTRAMUSCULAR; INTRAVENOUS at 07:44

## 2024-05-03 RX ADMIN — HEPARIN SODIUM 5000 UNITS: 1000 INJECTION, SOLUTION INTRAVENOUS; SUBCUTANEOUS at 07:40

## 2024-05-03 RX ADMIN — PROPOFOL 150 MG: 10 INJECTION, EMULSION INTRAVENOUS at 07:14

## 2024-05-03 RX ADMIN — TRAZODONE HYDROCHLORIDE 150 MG: 100 TABLET ORAL at 20:16

## 2024-05-03 RX ADMIN — HYDRALAZINE HYDROCHLORIDE 25 MG: 25 TABLET ORAL at 11:16

## 2024-05-03 RX ADMIN — EPHEDRINE SULFATE 5 MG: 50 INJECTION INTRAVENOUS at 08:42

## 2024-05-03 RX ADMIN — POLYMYXIN B SULFATE AND TRIMETHOPRIM 1 DROP: 10000; 1 SOLUTION OPHTHALMIC at 05:20

## 2024-05-03 RX ADMIN — PHENYLEPHRINE HYDROCHLORIDE 200 MCG: 10 INJECTION INTRAVENOUS at 08:36

## 2024-05-03 RX ADMIN — PHENYLEPHRINE HYDROCHLORIDE 200 MCG: 10 INJECTION INTRAVENOUS at 07:30

## 2024-05-03 RX ADMIN — SODIUM CHLORIDE 2000 MG: 900 INJECTION INTRAVENOUS at 07:01

## 2024-05-03 RX ADMIN — HYDROXYZINE HYDROCHLORIDE 50 MG: 25 TABLET, FILM COATED ORAL at 12:09

## 2024-05-03 RX ADMIN — DROPERIDOL 0.62 MG: 2.5 INJECTION, SOLUTION INTRAMUSCULAR; INTRAVENOUS at 09:18

## 2024-05-03 RX ADMIN — PHENYLEPHRINE HYDROCHLORIDE 100 MCG: 10 INJECTION INTRAVENOUS at 07:33

## 2024-05-03 RX ADMIN — MIDAZOLAM 1 MG: 1 INJECTION INTRAMUSCULAR; INTRAVENOUS at 06:34

## 2024-05-03 RX ADMIN — VASOPRESSIN 0.5 UNITS: 20 INJECTION INTRAVENOUS at 07:59

## 2024-05-03 RX ADMIN — CALCIUM ACETATE 2001 MG: 667 CAPSULE ORAL at 11:15

## 2024-05-03 NOTE — PLAN OF CARE
Goal Outcome Evaluation:  Plan of Care Reviewed With: patient        Progress: no change  Outcome Evaluation: Pt NPO since 0000 for procedure today. No acute overnight events. Vitals WNL.

## 2024-05-03 NOTE — ANESTHESIA POSTPROCEDURE EVALUATION
"Patient: Sunni Mcneil    Procedure Summary       Date: 05/03/24 Room / Location: [facility] OR  [facility]V [facility]    Anesthesia Start: 0707 Anesthesia Stop: 0905    Procedure: Hemodialysis shunt thrombectomy with revision (Right) Diagnosis:       AV graft stenosis, initial encounter      (AV graft stenosis, initial encounter [T82.633B])    Surgeons: Alex Sanchez MD Provider: Javier Brewer MD    Anesthesia Type: general ASA Status: 3            Anesthesia Type: general    Vitals  Vitals Value Taken Time   /77 05/03/24 1046   Temp 36.4 °C (97.5 °F) 05/03/24 0905   Pulse 84 05/03/24 1046   Resp 12 05/03/24 1046   SpO2 100 % 05/03/24 1046           Post Anesthesia Care and Evaluation    Pain management: adequate    Airway patency: patent  Anesthetic complications: No anesthetic complications    Cardiovascular status: acceptable  Respiratory status: acceptable  Hydration status: acceptable    Comments: /85 (BP Location: Left arm, Patient Position: Lying)   Pulse 74   Temp 36.3 °C (97.3 °F) (Oral)   Resp 12   Ht 152.4 cm (60\")   Wt 74.8 kg (165 lb)   LMP 04/25/2024 (Approximate)   SpO2 100%   Breastfeeding No   BMI 32.22 kg/m²       "

## 2024-05-03 NOTE — OP NOTE
Date of Admission:  5/2/2024  Today's Date:  05/03/24  Alex Sanchez MD  [facility]    Preoperative Diagnosis:   Thrombosed dialysis access.    Postoperative Diagnosis:   Same    Procedure Performed:   Right arm brachial artery to axillary vein graft thrombectomy with revision.  Central venoplasty.    HD Graft/Fistula Revision Procedures : Revision w thrombectomy  Central angioplasty    Surgeon:   Alex Sanchez MD    Assistant:    Damaris Perez [facility], Provided critical assistance in exposure, retraction, and suction that overall decrease blood loss and operative time.    Anesthesia:   General    Estimated Blood Loss:    cc    Findings:    Overall difficult case due to advanced atherosclerosis inside the perianastomotic graft.    Implants:    Implant Name Type Inv. Item Serial No.  Lot No. LRB No. Used Action   STNT PROTEGE GPS SLF/EXP .035 10X40 80 - [id number] Stent STNT PROTEGE GPS SLF/EXP .035 10X40 80  [id number]  [employer] [id number] Right 1 Implanted   KT SEAL HEMOS ABS FLOSEAL MATRX 1.5/FAST/PREP 5000/IU 10ML - [id number] Implant KT SEAL HEMOS ABS FLOSEAL MATRX 1.5/FAST/PREP 5000/IU 10ML  [facility] [id number] Right 1 Implanted       Staff:   Circulator: Dina Ureña RN; Jodi Aviles RN  Scrub Person: Moe Castano; Nicole Driver; Damaris Perez, [facility]    Specimen:   none    Complications:   none    Dispo:   to PACU    Indication for procedure:   49 y.o. female with thrombosed right arm graft.  Recurrent problem.    Patient has been recommended for revision hemodialysis access surgery.  This is a major surgery.  Patient understands the inherent risks associated with hemodialysis access surgery.  These include but not are not limited to stroke, heart attack, bleeding, infection, need for secondary surgery/intervention, hemodialysis access steal phenomenon, failure to mature, and even death.  Patient also understands the nature of hemodialysis access and chronic kidney  disease/end-stage renal disease(a chronic illness that has progressed to requiring surgical dialysis access creation).       Description of procedure:   The patient was taken to the operating room, anesthesia was induced without difficulty.  Surgical sites were prepped and draped in the usual sterile manner.  A full surgical timeout was performed.  Incision made down over the distal biceps region over the graft.  Bovie electrocautery was used.  The graft was very sclerotic and hard to dissect from the subcutaneous tissue.  I was able to get circumferential control.  Heparinization was performed and anterior graftotomy performed.  The graft was heavily calcified on the inner surface.  Susan catheters were used to thrombectomize the venous limb.  I was unable to pass a Susan past the trailing edge of the previously placed venous anastomotic stent.  I suspected high grade stenosis.  With the wire and catheter, I was able to get across this.  This was aggressively angioplastied with a 9 mm balloon.  There were still some residual defects so a 10 x 4 EV3 stent was placed over the trailing edge venous anastomotic stent stenosis.  There was also severe stenosis of the thoracic inlet, likely from previous dialysis catheters and this was ballooned with 9 mm balloon with resolution.  Arterial limb was thrombectomized.  Pulsatile arterial flow was reinitiated, angiogram confirmed no residual thrombus.  The graftotomy was closed utilizing HS7 needles in a running manner.  Care was taken not to restrict the lumen.  Doppler was used to interrogate flow both at the wrist and the graft.  It was adequate.  At this point, Protamine was administered and meticulous hemostasis confirmed.  Deep tissues closed with 3-0 Vicryl and skin closed with interrupted Prolene sutures.  At case completion, all counts were correct x 2.       Alex Sanchez MD  05/03/24     Active Hospital Problems    Diagnosis  POA    **AV graft stenosis,  initial encounter [T82.410L]  Unknown    Arteriovenous fistula occlusion [T82.702Y]  Yes    ESRD (end stage renal disease) [N18.6]  Yes      Resolved Hospital Problems   No resolved problems to display.

## 2024-05-03 NOTE — ANESTHESIA PROCEDURE NOTES
Airway  Urgency: elective    Date/Time: 5/3/2024 7:16 AM  Airway not difficult    General Information and Staff    Patient location during procedure: OR  CRNA/CAA: Janine Leal CRNA    Indications and Patient Condition  Indications for airway management: airway protection    Preoxygenated: yes  Mask difficulty assessment: 1 - vent by mask    Final Airway Details  Final airway type: supraglottic airway      Successful airway: I-gel, classic and unique  Size 4     Number of attempts at approach: 1  Assessment: lips, teeth, and gum same as pre-op    Additional Comments  LMA placed without difficulty, ventilation with assist, equal breath sounds and symmetric chest rise and fall

## 2024-05-03 NOTE — H&P
HISTORY AND PHYSICAL   [facility]        Date of Admission: 2024  Patient Identification:  Name: Sunni Mcneil  Age: 49 y.o.  Sex: female  :  1975  MRN: [id number]                     Primary Care Physician: Regla Ying APRN    Chief Complaint:  49 year old female presented to the emergency room with malfunction of her dialysis access; she had a normal treatment yesterday but did not notice the thrill of her av fistula; she denies shortness of breath or chest pain; she was advised to come to the ED; no fever or chills    History of Present Illness:   As above    Past Medical History:  Past Medical History:   Diagnosis Date    Acid reflux     Anemia     WITH ESRD    Anxiety     Arthritis     Cough     related to fluid overload    Dependence on renal dialysis 2021    Depression     Diabetes mellitus     NO MEDICATIONS FOR    End stage renal disease 2021    ESRD on dialysis     SUNG BEJARANO    History of peritoneal dialysis     KIDNEY TRANSPLANT 2016     History of transfusion     BEEN A WHILE no reaction    Hypercalcemia     Hyperparathyroidism     Hypertension     Kidney disease     End-stage renal disease. TRANSPLANT    Kidney transplant recipient 2016    RIGHT KIDNEY. ? 2018 KIDNEY REJECTED    PONV (postoperative nausea and vomiting)     Seasonal allergies     CURRENTLY     Vascular dialysis catheter in place     RIGHT TUNNEL CATH     Past Surgical History:  Past Surgical History:   Procedure Laterality Date    ARTERIOVENOUS FISTULA/SHUNT SURGERY Right 2020    Procedure: RIGHT ARM ARTERIAL VENOUS FISTULA;  Surgeon: Elijah Herrera MD;  Location: [facility];  Service: Vascular;  Laterality: Right;    ARTERIOVENOUS FISTULA/SHUNT SURGERY Left 2021    Procedure: LEFT BRACHIOAXILLARY ARTERIOVENOUS FISTULA GRAFT;  Surgeon: Anya Hernandez Jr., MD;  Location: [facility];  Service: Vascular;  Laterality: Left;    ARTERIOVENOUS  FISTULA/SHUNT SURGERY Right 03/16/2021    Procedure: RIGHT BRACHIOAXILLARY ARTERVENIOUS GRAFT PLACEMENT;  Surgeon: Adán Maurer MD;  Location: [facility] MAIN OR;  Service: Vascular;  Laterality: Right;    ARTERIOVENOUS FISTULA/SHUNT SURGERY Right 8/4/2023    Procedure: RIGHT ARM FISTULOGRAM PEUCUTANEOUS AND PERIPHERAL TRANSLUMINAL ANGIOPLSTY/STENT;  Surgeon: Elijah Herrera MD;  Location: [facility] OR 18/19;  Service: Vascular;  Laterality: Right;    BRACHIAL EMBOLECTOMY Right 10/6/2023    Procedure: RIGHT AV GRAFT WITH ANGIOPLASTY AND CENTROVENOUS ANGIOPLASTY;  Surgeon: Phu Gaviria MD;  Location: [facility] OR 18/19;  Service: Vascular;  Laterality: Right;    COLONOSCOPY N/A 11/30/2022    Procedure: COLONOSCOPY TO CECUM AND TERM. ILEUM;  Surgeon: Casimiro Perkins MD;  Location: [facility] ENDOSCOPY;  Service: Gastroenterology;  Laterality: N/A;  PRE OP - SCREENING  POST OP - DIVERTICULOSIS, SM. HEMORRHOIDS    INSERTION AND REMOVAL HEMODIALYSIS CATHETER N/A 02/13/2021    Procedure: INSERTION AND REMOVAL OF HEMODIALYSIS CATHETER;  Surgeon: Adán Maurer MD;  Location: [facility] MAIN OR;  Service: Vascular;  Laterality: N/A;    INSERTION HEMODIALYSIS CATHETER Right 01/15/2021    Procedure: TUNNELED DIAYLIS CATHETER PLACEMENT;  Surgeon: Phu Gaviria MD;  Location: [facility] OR 18/19;  Service: Vascular;  Laterality: Right;    INSERTION HEMODIALYSIS CATHETER Left 1/21/2023    Procedure: HEMODIALYSIS CATHETER INSERTION;  Surgeon: Elijah Herrera MD;  Location: [facility] MAIN OR;  Service: Vascular;  Laterality: Left;    INSERTION PERITONEAL DIALYSIS CATHETER  07/29/2014    Laparoscopic placement of Curl Cath peritoneal dialysis catheter, Dr. Vinod Goldstein    INSERTION PERITONEAL DIALYSIS CATHETER N/A 07/24/2019    Procedure: LAPAROSCOPIC INSERTION PERITONEAL DIALYSIS CATHETER;  Surgeon: Damon Rooney MD;  Location: [facility] MAIN OR;  Service: General    REMOVAL  HEMODIALYSIS CATHETER N/A 2/15/2023    Procedure: PALINDROME REMOVAL;  Surgeon: Elijah Herrera MD;  Location: [facility] MAIN OR;  Service: Vascular;  Laterality: N/A;    REMOVAL PERITONEAL DIALYSIS CATHETER N/A 10/17/2016    Procedure: REMOVAL PERITONEAL DIALYSIS CATHETER;  Surgeon: Damon Rooney MD;  Location: [facility] MAIN OR;  Service:     REMOVAL PERITONEAL DIALYSIS CATHETER N/A 10/21/2020    Procedure: REMOVAL PERITONEAL DIALYSIS CATHETER;  Surgeon: Damon Rooney MD;  Location: [facility] MAIN OR;  Service: General;  Laterality: N/A;    SHUNT O GRAM Right 2022    Procedure: RIGHT  ARM SHUNT O GRAM , ANGIOPLASTY OF AXILARY VEIN AND SUBCLAVIAN VEIN AT SVC JUNCTION;  Surgeon: Anya Hernandez Jr., MD;  Location: [facility] ;  Service: Vascular;  Laterality: Right;    SHUNT O GRAM Right 2023    Procedure: OPEN THROMBECTOMY AND ANGIOPLASTY;  Surgeon: Elijah Herrera MD;  Location: [facility] ;  Service: Vascular;  Laterality: Right;    SHUNT O GRAM Right 2023    Procedure: Right arm dialysis graft open thrombectomy, shuntogram, angioplasty and stent;  Surgeon: Karuna Villalba MD;  Location: [facility] ;  Service: Vascular;  Laterality: Right;    SHUNT O GRAM Right 2023    Procedure: RIGHT ARM FISTULOGRAM, ANGIOPLASTY;  Surgeon: Elijah Herrera MD;  Location: [facility] ;  Service: Vascular;  Laterality: Right;    TRANSPLANTATION RENAL Right 2016    from  donor    TUBAL ABDOMINAL LIGATION Bilateral       Home Meds:  Medications Prior to Admission   Medication Sig Dispense Refill Last Dose    acetaminophen (TYLENOL) 325 MG tablet Take 2 tablets by mouth Every 6 (Six) Hours As Needed for Mild Pain.   Past Month    calcitriol (ROCALTROL) 0.25 MCG capsule Take 1 capsule by mouth Take As Directed. Calcitriol 0.25 MCG Capsule (si capsule once per day )   2024    calcium acetate (PHOSLO) 667  MG capsule Take 3 capsules by mouth 3 (Three) Times a Day With Meals.   5/1/2024    cholecalciferol (VITAMIN D3) 1.25 MG (24451 UT) capsule Take 0.25 mcg by mouth Every 7 (Seven) Days.   5/1/2024    cloNIDine (CATAPRES) 0.1 MG tablet Take 1 tablet by mouth Daily. 30 tablet 3 5/1/2024    hydrALAZINE (APRESOLINE) 25 MG tablet Take 1 tablet by mouth Daily.   5/1/2024    hydrOXYzine (ATARAX) 50 MG tablet Take 1 tablet by mouth As Needed.   5/1/2024    ondansetron ODT (ZOFRAN-ODT) 4 MG disintegrating tablet Take 1 tablet by mouth As Needed for Nausea or Vomiting.   Past Week    Polyethylene Glycol 3350 (MIRALAX PO) Take 1 dose by mouth As Needed.       prochlorperazine (COMPAZINE) 10 MG tablet Take 1 tablet by mouth Every 6 (Six) Hours As Needed for Nausea or Vomiting. 10 tablet 0 Past Month    propranolol (INDERAL) 10 MG tablet Take 1 tablet by mouth 2 (Two) Times a Day.   5/1/2024    traZODone (DESYREL) 100 MG tablet Take 1.5 tablets by mouth every night at bedtime.   5/1/2024    trimethoprim-polymyxin b (POLYTRIM) 72533-4.1 UNIT/ML-% ophthalmic solution Apply 1 drop to eye(s) as directed by provider Every 4 (Four) Hours.   5/2/2024    warfarin (COUMADIN) 5 MG tablet Take 1 tablet by mouth Daily.   5/1/2024    apixaban (ELIQUIS) 2.5 MG tablet tablet Take 1 tablet by mouth 2 (Two) Times a Day. 60 tablet 60     HYDROcodone-acetaminophen (NORCO) 5-325 MG per tablet Take 1 tablet by mouth Every 4 (Four) Hours As Needed (Pain). 20 tablet 0        Allergies:  No Known Allergies  Immunizations:  Immunization History   Administered Date(s) Administered    Hepatitis A 10/10/2019    PPD Test 04/14/2014    Pneumococcal Conjugate 13-Valent (PCV13) 08/27/2019, 09/17/2021    Pneumococcal Polysaccharide (PPSV23) 12/02/2019, 11/12/2021     Social History:   Social History     Social History Narrative    Not on file     Social History     Socioeconomic History    Marital status:     Number of children: 2    Years of education:  "12   Tobacco Use    Smoking status: Never     Passive exposure: Never    Smokeless tobacco: Never    Tobacco comments:     Patient does not smoke   Vaping Use    Vaping status: Never Used   Substance and Sexual Activity    Alcohol use: No     Comment: caffeine use; Patient is unknown if ever drinks    Drug use: Never     Comment: Drug Abuse: none    Sexual activity: Defer       Family History:  Family History   Problem Relation Age of Onset    Hypertension Mother     Hypertension Father     Heart attack Maternal Grandfather     Malig Hyperthermia Neg Hx         Review of Systems  See history of present illness and past medical history.  Patient denies headache, dizziness, syncope, falls, trauma, change in vision, change in hearing, change in taste, changes in weight, changes in appetite, focal weakness, numbness, or paresthesia.  Patient denies chest pain, palpitations, dyspnea, orthopnea, PND, cough, sinus pressure, rhinorrhea, epistaxis, hemoptysis, nausea, vomiting,hematemesis, diarrhea, constipation or hematochezia.  Denies cold or heat intolerance, polydipsia, polyuria, polyphagia. Denies hematuria, pyuria, dysuria, hesitancy, frequency or urgency. Denies consumption of raw and under cooked meats foods or change in water source.  Denies fever, chills, sweats, night sweats.  Denies missing any routine medications. Remainder of ROS is negative.    Objective:  T Max 24 hrs: Temp (24hrs), Av.5 °F (36.9 °C), Min:98.4 °F (36.9 °C), Max:98.6 °F (37 °C)    Vitals Ranges:   Temp:  [98.4 °F (36.9 °C)-98.6 °F (37 °C)] 98.6 °F (37 °C)  Heart Rate:  [] 79  Resp:  [16-18] 18  BP: (127-157)/(82-98) 157/96      Exam:  /96 (BP Location: Left arm, Patient Position: Sitting)   Pulse 79   Temp 98.6 °F (37 °C) (Oral)   Resp 18   Ht 152.4 cm (60\")   Wt 74.8 kg (165 lb)   LMP 2024 (Approximate)   SpO2 97%   Breastfeeding No   BMI 32.22 kg/m²     General Appearance:    Alert, cooperative, no distress, " appears stated age   Head:    Normocephalic, without obvious abnormality, atraumatic   Eyes:    PERRL, conjunctivae/corneas clear, EOM's intact, both eyes   Ears:    Normal external ear canals, both ears   Nose:   Nares normal, septum midline, mucosa normal, no drainage    or sinus tenderness   Throat:   Lips, mucosa, and tongue normal   Neck:   Supple, symmetrical, trachea midline, no adenopathy;     thyroid:  no enlargement/tenderness/nodules; no carotid    bruit or JVD   Back:     Symmetric, no curvature, ROM normal, no CVA tenderness   Lungs:     Clear to auscultation bilaterally, respirations unlabored   Chest Wall:    No tenderness or deformity    Heart:    Regular rate and rhythm, S1 and S2 normal, no murmur, rub   or gallop   Abdomen:     Soft, nontender, bowel sounds active all four quadrants,     no masses, no hepatomegaly, no splenomegaly   Extremities:   Extremities normal, atraumatic, no cyanosis or edema                       .    Data Review:  Labs in chart were reviewed.  WBC   Date Value Ref Range Status   05/02/2024 7.49 3.40 - 10.80 10*3/mm3 Final     Hemoglobin   Date Value Ref Range Status   05/02/2024 11.9 (L) 12.0 - 15.9 g/dL Final   05/01/2024 11.3 (L) 12.0 - 16.0 g/dL Final   05/01/2024 33.9 (L) 36.0 - 48.0 % Final     Hematocrit   Date Value Ref Range Status   05/02/2024 37.6 34.0 - 46.6 % Final     Platelets   Date Value Ref Range Status   05/02/2024 229 140 - 450 10*3/mm3 Final     Sodium   Date Value Ref Range Status   05/02/2024 138 136 - 145 mmol/L Final     Potassium   Date Value Ref Range Status   05/02/2024 4.3 3.5 - 5.2 mmol/L Final     Chloride   Date Value Ref Range Status   05/02/2024 94 (L) 98 - 107 mmol/L Final     CO2   Date Value Ref Range Status   05/02/2024 24.5 22.0 - 29.0 mmol/L Final     BUN   Date Value Ref Range Status   05/02/2024 43 (H) 6 - 20 mg/dL Final     Creatinine   Date Value Ref Range Status   05/02/2024 8.57 (H) 0.57 - 1.00 mg/dL Final     Glucose   Date  Value Ref Range Status   05/02/2024 122 (H) 65 - 99 mg/dL Final     Calcium   Date Value Ref Range Status   05/02/2024 9.3 8.6 - 10.5 mg/dL Final                Imaging Results (All)       None              Assessment:  Active Hospital Problems    Diagnosis  POA    **AV graft stenosis, initial encounter [T82.568A]  Unknown    Arteriovenous fistula occlusion [T82.898A]  Yes    ESRD (end stage renal disease) [N18.6]  Yes      Resolved Hospital Problems   No resolved problems to display.   Diabetes  Hypertension  Anemia  Obesity       Plan:  Will have a thrombectomy tomorrow  Npo after midnight  Accu checks, sliding scale  Nephrology consult  Vascular has seen her  Hold coumadin for now  Dw patient, family and ed provider    Angie Soriano MD  5/2/2024  21:35 EDT

## 2024-05-03 NOTE — CONSULTS
[facility] Consult Note      Patient Name: Sunni Mcneil  : 1975  MRN: [id number]  Primary Care Physician:  Regla Ying APRN  Referring Physician: Everardo Gerard MD  Date of admission: 2024    Subjective     Reason for Consult: End-stage renal disease    HPI:   Sunni Mcneil is a 49 y.o. female with past medical history of chronic normocytic anemia, gastroesophageal flux disease, osteoarthritis, history of diabetes mellitus, hypertension, dyslipidemia, chronic kidney disease that progressed to end-stage renal disease in  and require initiation of hemodialysis patient underwent cadaveric renal transplantation in 2018 unfortunately with renal graft loss and restarted on hemodialysis through a right upper extremity graft, Undergoing hemodialysis on a Monday, Wednesday, and Friday schedule, secondary hyperparathyroidism, hyperlipidemia, vitamin D deficiency    Patient underwent hemodialysis last Wednesday without any complications.  On Thursday she noticed that her graft had no thrill patient presented to the emergency department where she was found to have a clot AV graft, and admitted for further management    Patient was seen by vascular surgery and underwent fistulogram with thrombectomy of her graft, without any complications    Nephrology consultation will be requested for the management       review of Systems:   14 point review of systems is otherwise negative except for mentioned above on HPI    Personal History     Past Medical History:   Diagnosis Date    Acid reflux     Anemia     WITH ESRD    Anxiety     Arthritis     Cough     related to fluid overload    Dependence on renal dialysis 2021    Depression     Diabetes mellitus     NO MEDICATIONS FOR    End stage renal disease 2021    ESRD on dialysis     FRESINUS MWF    History of peritoneal dialysis     KIDNEY TRANSPLANT 2016     History of transfusion     BEEN A WHILE no reaction     Hypercalcemia     Hyperparathyroidism     Hypertension     Kidney disease     End-stage renal disease. TRANSPLANT    Kidney transplant recipient 09/02/2016    RIGHT KIDNEY. ? 2018 KIDNEY REJECTED    PONV (postoperative nausea and vomiting)     Seasonal allergies     CURRENTLY     Vascular dialysis catheter in place     RIGHT TUNNEL CATH       Past Surgical History:   Procedure Laterality Date    ARTERIOVENOUS FISTULA/SHUNT SURGERY Right 02/26/2020    Procedure: RIGHT ARM ARTERIAL VENOUS FISTULA;  Surgeon: Elijah Herrera MD;  Location: [facility] MAIN OR;  Service: Vascular;  Laterality: Right;    ARTERIOVENOUS FISTULA/SHUNT SURGERY Left 02/04/2021    Procedure: LEFT BRACHIOAXILLARY ARTERIOVENOUS FISTULA GRAFT;  Surgeon: Anya Hernandez Jr., MD;  Location: [facility] MAIN OR;  Service: Vascular;  Laterality: Left;    ARTERIOVENOUS FISTULA/SHUNT SURGERY Right 03/16/2021    Procedure: RIGHT BRACHIOAXILLARY ARTERVENIOUS GRAFT PLACEMENT;  Surgeon: Adán Maurer MD;  Location: [facility] MAIN OR;  Service: Vascular;  Laterality: Right;    ARTERIOVENOUS FISTULA/SHUNT SURGERY Right 8/4/2023    Procedure: RIGHT ARM FISTULOGRAM PEUCUTANEOUS AND PERIPHERAL TRANSLUMINAL ANGIOPLSTY/STENT;  Surgeon: Elijah Herrera MD;  Location: [facility] OR 18/19;  Service: Vascular;  Laterality: Right;    BRACHIAL EMBOLECTOMY Right 10/6/2023    Procedure: RIGHT AV GRAFT WITH ANGIOPLASTY AND CENTROVENOUS ANGIOPLASTY;  Surgeon: Phu Gaviria MD;  Location: [facility] OR 18/19;  Service: Vascular;  Laterality: Right;    COLONOSCOPY N/A 11/30/2022    Procedure: COLONOSCOPY TO CECUM AND TERM. ILEUM;  Surgeon: Casimiro Perkins MD;  Location: [facility] ENDOSCOPY;  Service: Gastroenterology;  Laterality: N/A;  PRE OP - SCREENING  POST OP - DIVERTICULOSIS, SM. HEMORRHOIDS    INSERTION AND REMOVAL HEMODIALYSIS CATHETER N/A 02/13/2021    Procedure: INSERTION AND REMOVAL OF HEMODIALYSIS CATHETER;  Surgeon: Adán Maurer  MD Osmany;  Location: [facility] MAIN OR;  Service: Vascular;  Laterality: N/A;    INSERTION HEMODIALYSIS CATHETER Right 01/15/2021    Procedure: TUNNELED DIAYLIS CATHETER PLACEMENT;  Surgeon: Phu Gaviria MD;  [facility];  Service: Vascular;  Laterality: Right;    INSERTION HEMODIALYSIS CATHETER Left 1/21/2023    Procedure: HEMODIALYSIS CATHETER INSERTION;  Surgeon: Elijah Herrera MD;  Location: [facility] MAIN OR;  Service: Vascular;  Laterality: Left;    INSERTION PERITONEAL DIALYSIS CATHETER  07/29/2014    Laparoscopic placement of Curl Cath peritoneal dialysis catheter, Dr. Vinod Goldstein    INSERTION PERITONEAL DIALYSIS CATHETER N/A 07/24/2019    Procedure: LAPAROSCOPIC INSERTION PERITONEAL DIALYSIS CATHETER;  Surgeon: Damon Rooney MD;  Location: [facility] MAIN OR;  Service: General    REMOVAL HEMODIALYSIS CATHETER N/A 2/15/2023    Procedure: PALINDROME REMOVAL;  Surgeon: Elijah Herrera MD;  Location: [facility] MAIN OR;  Service: Vascular;  Laterality: N/A;    REMOVAL PERITONEAL DIALYSIS CATHETER N/A 10/17/2016    Procedure: REMOVAL PERITONEAL DIALYSIS CATHETER;  Surgeon: Damon Rooney MD;  Location: [facility] MAIN OR;  Service:     REMOVAL PERITONEAL DIALYSIS CATHETER N/A 10/21/2020    Procedure: REMOVAL PERITONEAL DIALYSIS CATHETER;  Surgeon: Damon Rooney MD;  Location: [facility] MAIN OR;  Service: General;  Laterality: N/A;    SHUNT O GRAM Right 07/28/2022    Procedure: RIGHT  ARM SHUNT O GRAM , ANGIOPLASTY OF AXILARY VEIN AND SUBCLAVIAN VEIN AT SVC JUNCTION;  Surgeon: Anya Hernandez Jr., MD;  [facility];  Service: Vascular;  Laterality: Right;    SHUNT O GRAM Right 1/20/2023    Procedure: OPEN THROMBECTOMY AND ANGIOPLASTY;  Surgeon: Elijah Herrera MD;  [facility];  Service: Vascular;  Laterality: Right;    SHUNT O GRAM Right 1/23/2023    Procedure: Right arm dialysis graft open thrombectomy,  shuntogram, angioplasty and stent;  Surgeon: Karuna Villalba MD;  Location: [facility] OR ;  Service: Vascular;  Laterality: Right;    SHUNT O GRAM Right 2023    Procedure: RIGHT ARM FISTULOGRAM, ANGIOPLASTY;  Surgeon: Elijah Herrera MD;  Location: [facility] OR ;  Service: Vascular;  Laterality: Right;    TRANSPLANTATION RENAL Right 2016    from  donor    TUBAL ABDOMINAL LIGATION Bilateral        Family History: family history includes Heart attack in her maternal grandfather; Hypertension in her father and mother.    Social History:  reports that she has never smoked. She has never been exposed to tobacco smoke. She has never used smokeless tobacco. She reports that she does not drink alcohol and does not use drugs.    Home Medications:  Prior to Admission medications    Medication Sig Start Date End Date Taking? Authorizing Provider   acetaminophen (TYLENOL) 325 MG tablet Take 2 tablets by mouth Every 6 (Six) Hours As Needed for Mild Pain.   Yes Charline Hernandez MD   calcitriol (ROCALTROL) 0.25 MCG capsule Take 1 capsule by mouth Take As Directed. Calcitriol 0.25 MCG Capsule (si capsule once per day )   Yes Charline Hernandez MD   calcium acetate (PHOSLO) 667 MG capsule Take 3 capsules by mouth 3 (Three) Times a Day With Meals.   Yes Charline Hernandez MD   cholecalciferol (VITAMIN D3) 1.25 MG (41771 UT) capsule Take 0.25 mcg by mouth Every 7 (Seven) Days. 5/15/23 5/10/24 Yes hCarline Hernandez MD   cloNIDine (CATAPRES) 0.1 MG tablet Take 1 tablet by mouth Daily. 23  Yes Jess Benz MD   hydrALAZINE (APRESOLINE) 25 MG tablet Take 1 tablet by mouth Daily.   Yes Charline Hernandez MD   hydrOXYzine (ATARAX) 50 MG tablet Take 1 tablet by mouth As Needed. 12/3/20  Yes Charline Hernandez MD   ondansetron ODT (ZOFRAN-ODT) 4 MG disintegrating tablet Take 1 tablet by mouth As Needed for Nausea or Vomiting. 3/1/24  Yes Charline Hernandez  MD   Polyethylene Glycol 3350 (MIRALAX PO) Take 1 dose by mouth As Needed.   Yes ProviderCharline MD   prochlorperazine (COMPAZINE) 10 MG tablet Take 1 tablet by mouth Every 6 (Six) Hours As Needed for Nausea or Vomiting. 3/6/21  Yes Adán Everett MD   propranolol (INDERAL) 10 MG tablet Take 1 tablet by mouth 2 (Two) Times a Day.   Yes Charline Hernandez MD   traZODone (DESYREL) 100 MG tablet Take 1.5 tablets by mouth every night at bedtime. 6/17/22  Yes Charline Hernandez MD   trimethoprim-polymyxin b (POLYTRIM) 61277-3.1 UNIT/ML-% ophthalmic solution Apply 1 drop to eye(s) as directed by provider Every 4 (Four) Hours. 4/27/24 5/7/24 Yes Charline Hernandez MD   warfarin (COUMADIN) 5 MG tablet Take 1 tablet by mouth Daily. 2/21/24  Yes Charline Hernandez MD   apixaban (ELIQUIS) 2.5 MG tablet tablet Take 1 tablet by mouth 2 (Two) Times a Day. 10/6/23   Phu Gaviria MD   HYDROcodone-acetaminophen (NORCO) 5-325 MG per tablet Take 1 tablet by mouth Every 4 (Four) Hours As Needed (Pain). 10/6/23   Phu Gaviria MD       Allergies:  No Known Allergies    Objective     Vitals:   Temp:  [97.5 °F (36.4 °C)-98.6 °F (37 °C)] 97.5 °F (36.4 °C)  Heart Rate:  [] 84  Resp:  [10-18] 12  BP: (110-172)/() 136/77  Flow (L/min):  [2] 2    Intake/Output Summary (Last 24 hours) at 5/3/2024 1125  Last data filed at 5/3/2024 0707  Gross per 24 hour   Intake 100 ml   Output --   Net 100 ml       Physical Exam:   Constitutional: Awake, alert, no acute distress.  HEENT: Sclera anicteric, no conjunctival injection  Neck: Supple, no thyromegaly, no lymphadenopathy, trachea at midline, no JVD  Respiratory: Clear to auscultation bilaterally, nonlabored respiration  Cardiovascular: RRR, no murmurs, no rubs or gallops, no carotid bruit  Gastrointestinal: Positive bowel sounds, abdomen is soft, nontender and nondistended  : No palpable bladder  Musculoskeletal: No edema, no clubbing or cyanosis  Psychiatric:  Appropriate affect, cooperative  Neurologic: Oriented x3, moving all extremities, normal speech and mental status  Skin: Warm and dry       Scheduled Meds:     calcitriol, 0.5 mcg, Oral, Daily  calcium acetate, 2,001 mg, Oral, TID With Meals  cloNIDine, 0.1 mg, Oral, Daily  hydrALAZINE, 25 mg, Oral, Daily  insulin lispro, 2-7 Units, Subcutaneous, 4x Daily AC & at Bedtime  polyethylene glycol, 17 g, Oral, Daily  propranolol, 10 mg, Oral, BID  traZODone, 150 mg, Oral, Nightly  trimethoprim-polymyxin b, 1 drop, Both Eyes, Q4H      IV Meds:        Results Reviewed:   I have personally reviewed the results from the time of this admission to 5/3/2024 11:25 EDT     Lab Results   Component Value Date    GLUCOSE 78 05/03/2024    CALCIUM 8.5 (L) 05/03/2024     05/03/2024    K 4.3 05/03/2024    CO2 23.2 05/03/2024    CL 96 (L) 05/03/2024    BUN 59 (H) 05/03/2024    CREATININE 9.95 (H) 05/03/2024    EGFRIFAFRI  03/17/2021      Comment:      <15 Indicative of kidney failure.    EGFRIFNONA 9 (L) 03/17/2021    BCR 5.9 (L) 05/03/2024    ANIONGAP 19.8 (H) 05/03/2024      Lab Results   Component Value Date    MG 2.5 01/24/2023    PHOS 3.6 01/24/2023    ALBUMIN 4.1 05/02/2024       Results from last 7 days   Lab Units 05/03/24  0441   WBC 10*3/mm3 7.09   HEMOGLOBIN g/dL 10.8*   HEMATOCRIT % 34.2   PLATELETS 10*3/mm3 199             Assessment / Plan       AV graft stenosis, initial encounter    ESRD (end stage renal disease)    Arteriovenous fistula occlusion      ASSESSMENT:    -End Stage Renal Disease ( ESRD ) on Hemodialysis on a Monday, Wednesday, and Friday schedule.s/p   RUE AVG functional .Continue to arrange hemodialysis treatment during patient  admission. Continue renal diet .  History of cadaveric kidney transplant in 2018 status post graft loss after 18 months.  scheduled for hemodialysis today    -Malfunctioning and clotted AV graft.  Patient was seen by vascular surgery and underwent fistulogram and thrombectomy  with central venoplasty this morning.  Her access currently functional and is awaiting to undergo hemodialysis today    -Chronic normocytic anemia. On  Mircera protocol at [facility] unit . We will continue to follow H&H closely      -Hyperphosphatemia. Continue binders/calcium carbonate  2001 mg with meals, Continue renal diet. We will follow phosphorus to make further adjustments.  Phosphorus 3.6      -Hypertension w/ CKD .  Continue  combination of hydralazine, clonidine and propranolol      . We will continue to follow closely. Heart healthy diet     -Secondary hyperparathyrodism . On calcitriol protocol . will follow closely     PLAN:  -I will arrange for hemodialysis today, orders placed  -Patient requested to have lidocaine placed on AV graft prior to dialysis ordered  -If access is functional , while undergoing hemodialysis without any complications , patient can be discharged and follow-up closely at dialysis and to resume treatment on a Monday, Wednesday and Friday schedule    Discussed with nursing staff    Discussed with Dr. Will    Thank you for involving us in the care of Sunni Mcneil.  Please feel free to call with any questions.    Luis Clark MD  05/03/24  11:25 EDT    [facility]  [phone]      Please note that portions of this note were completed with a voice recognition program.

## 2024-05-03 NOTE — ANESTHESIA PREPROCEDURE EVALUATION
Anesthesia Evaluation     NPO Solid Status: > 8 hours             Airway   Mallampati: III  Small opening  Dental      Pulmonary    (-) sleep apnea, not a smoker    ROS comment: Negative patient screen for RAUL    Cardiovascular     (+) hypertension  (-) past MI, CAD      Neuro/Psych  GI/Hepatic/Renal/Endo    (+) obesity, GERD, renal disease- ESRD and dialysis    Musculoskeletal     Abdominal    Substance History      OB/GYN          Other                          Anesthesia Plan    ASA 3     general       Anesthetic plan, risks, benefits, and alternatives have been provided, discussed and informed consent has been obtained with: patient.        CODE STATUS:    Code Status (Patient has no pulse and is not breathing): CPR (Attempt to Resuscitate)  Medical Interventions (Patient has pulse or is breathing): Full

## 2024-05-03 NOTE — NURSING NOTE
HD WITHOUT INCIDENT OR C/O. TOLERATED WELL. REMOVED 2 L. NO MEDS ADMINISTERED. AVF NEEDLES REMOVED X 2. HEMOSTASIS ACHIEVED. STABLE, NO C/O POST COMPLETION OF HD.

## 2024-05-03 NOTE — PROGRESS NOTES
Name: Sunni Mcneil ADMIT: 2024   : 1975  PCP: Regla Ying APRN    MRN: [id number] LOS: 0 days   AGE/SEX: 49 y.o. female  ROOM: [facility]     Subjective   Subjective   Patient seen and examined, resting in bed, no acute distress at present. Nephrology following.     Objective   Objective   Vital Signs  Temp:  [97.3 °F (36.3 °C)-98.6 °F (37 °C)] 97.3 °F (36.3 °C)  Heart Rate:  [67-84] 74  Resp:  [10-18] 12  BP: (110-172)/() 145/85  SpO2:  [97 %-100 %] 100 %  on  Flow (L/min):  [2] 2;   Device (Oxygen Therapy): room air  Body mass index is 32.22 kg/m².  Physical Exam  Vitals and nursing note reviewed.   Constitutional:       General: She is not in acute distress.  Cardiovascular:      Rate and Rhythm: Normal rate and regular rhythm.      Pulses: Normal pulses.      Heart sounds: Normal heart sounds.   Pulmonary:      Effort: Pulmonary effort is normal. No respiratory distress.      Breath sounds: Normal breath sounds. No wheezing.   Abdominal:      General: Bowel sounds are normal.      Palpations: Abdomen is soft.      Tenderness: There is no abdominal tenderness.   Musculoskeletal:      Cervical back: No tenderness.      Right lower leg: No edema.      Left lower leg: No edema.      Comments: RUE fistula   Skin:     General: Skin is warm and dry.   Neurological:      Mental Status: She is alert.       Results Review     I reviewed the patient's new clinical results.  Results from last 7 days   Lab Units 24  0441 24  1410 24  0000   WBC 10*3/mm3 7.09 7.49  --    HEMOGLOBIN g/dL 10.8* 11.9* 11.3*  33.9*   PLATELETS 10*3/mm3 199 229  --      Results from last 7 days   Lab Units 24  0441 24  1410   SODIUM mmol/L 139 138   POTASSIUM mmol/L 4.3 4.3   CHLORIDE mmol/L 96* 94*   CO2 mmol/L 23.2 24.5   BUN mg/dL 59* 43*   CREATININE mg/dL 9.95* 8.57*   GLUCOSE mg/dL 78 122*   EGFR mL/min/1.73 4.4* 5.3*     Results from last 7 days   Lab Units 24  1410   ALBUMIN g/dL  4.1   BILIRUBIN mg/dL 0.3   ALK PHOS U/L 62   AST (SGOT) U/L 11   ALT (SGPT) U/L <5     Results from last 7 days   Lab Units 05/03/24  0441 05/02/24  1410   CALCIUM mg/dL 8.5* 9.3   ALBUMIN g/dL  --  4.1       Glucose   Date/Time Value Ref Range Status   05/03/2024 0910 111 70 - 130 mg/dL Final   05/03/2024 0550 93 70 - 130 mg/dL Final   05/02/2024 2053 197 (H) 70 - 130 mg/dL Final       No radiology results for the last day    I have personally reviewed all medications:  Scheduled Medications  calcitriol, 0.5 mcg, Oral, Daily  calcium acetate, 2,001 mg, Oral, TID With Meals  cloNIDine, 0.1 mg, Oral, Daily  hydrALAZINE, 25 mg, Oral, Daily  polyethylene glycol, 17 g, Oral, Daily  propranolol, 10 mg, Oral, BID  traZODone, 150 mg, Oral, Nightly  trimethoprim-polymyxin b, 1 drop, Both Eyes, Q4H    Infusions   Diet  Diet: Cardiac, Diabetic; Healthy Heart (2-3 Na+); Consistent Carbohydrate; Fluid Consistency: Thin (IDDSI 0)    I have personally reviewed:  [x]  Laboratory   [x]  Microbiology   [x]  Radiology   [x]  EKG/Telemetry  [x]  Cardiology/Vascular   []  Pathology    []  Records       Assessment/Plan     Active Hospital Problems    Diagnosis  POA    **AV graft stenosis, initial encounter [T82.858A]  Yes    Arteriovenous fistula occlusion [T82.898A]  Yes    ESRD (end stage renal disease) [N18.6]  Yes    GERD without esophagitis [K21.9]  Yes    Anemia [D64.9]  Yes    Obesity (BMI 30-39.9) [E66.9]  Yes    Hypertension [I10]  Yes    Hyperparathyroidism [E21.3]  Yes      Resolved Hospital Problems   No resolved problems to display.       49 y.o. female admitted with AV graft stenosis, initial encounter.    AV graft stenosis with occlusion  - Vascular surgery following. Patient s/p Right arm brachial artery to axillary vein graft thrombectomy with revision, central venoplasty today (05/03).     ESRD on HD  - Nephrology consulted and following. Defer management decisions to them, greatly appreciate their  help.    Hypertension  - Blood pressure appears stable and acceptable acutely at this time. No indications to warrant acute changes/intervention at this time.  - Continue current medications as prescribed. Trend BP to guide ongoing management decisions.    Anemia  - Hemoglobin low on most recent labs.No evidence of overt blood loss, but does require close monitoring.  - Order repeat CBC in AM for reassessment. Continue to monitor, transfuse for hemoglobin <7.    Hyperglycemia  - Glucose elevated on most recent labs. Patient without known history of T2DM.  Most recent hemoglobin A1c 4.4 (04/02/24).  - Monitor for now, continue with POC glucose checks, can add correctional SSI if needed.    Obesity  - BMI 32.22 kg/m2. Complicating all medical problems.    Hyperparathyroidism  - On Calcitriol, continue as prescribed.    SCDs for DVT prophylaxis.  Full code.  Discussed with patient, nursing staff, CCP, and care team on multidisciplinary rounds.  Anticipate discharge home tomorrow.  Expected discharge date/ time has not been documented.      Farooq Will DO  [facility]  05/03/24

## 2024-05-03 NOTE — PROGRESS NOTES
"  Nephrology Progress Note - Chart Review     Miss Mcneil is a ESRD on HD on a MWF schedule s/p THEODORA NICOLE , presented for malfunctioning access     Patient was evaluated by vascular and schedule for fistulogram this am w/ thrombectomy w/ possible revision or TDC placement     /92 (BP Location: Left arm, Patient Position: Lying)   Pulse 78   Temp 98.1 °F (36.7 °C) (Oral)   Resp 17   Ht 152.4 cm (60\")   Wt 74.8 kg (165 lb)   LMP 04/25/2024 (Approximate)   SpO2 100%   Breastfeeding No   BMI 32.22 kg/m²       Laboratories     Results from last 7 days   Lab Units 05/03/24  0441 05/02/24  1410 05/01/24  0000   WBC 10*3/mm3 7.09 7.49  --    HEMOGLOBIN g/dL 10.8* 11.9* 11.3*  33.9*   HEMATOCRIT % 34.2 37.6  --    PLATELETS 10*3/mm3 199 229  --          Results from last 7 days   Lab Units 05/03/24  0441 05/02/24  1410   SODIUM mmol/L 139 138   POTASSIUM mmol/L 4.3 4.3   CHLORIDE mmol/L 96* 94*   CO2 mmol/L 23.2 24.5   BUN mg/dL 59* 43*   CREATININE mg/dL 9.95* 8.57*   CALCIUM mg/dL 8.5* 9.3   BILIRUBIN mg/dL  --  0.3   ALK PHOS U/L  --  62   ALT (SGPT) U/L  --  <5   AST (SGOT) U/L  --  11   GLUCOSE mg/dL 78 122*     Plan   - appreciated vascular recommendations   - Will schedule for HD after access , is available   - Will place orders   - Full consultation will follow .   "

## 2024-05-03 NOTE — PLAN OF CARE
Problem: Adult Inpatient Plan of Care  Goal: Absence of Hospital-Acquired Illness or Injury  Intervention: Identify and Manage Fall Risk  Description: Perform standard risk assessment on admission using a validated tool or comprehensive approach appropriate to the patient; reassess fall risk frequently, with change in status or transfer to another level of care.  Communicate fall injury risk to interprofessional healthcare team.  Determine need for increased observation, equipment and environmental modification, such as low bed, signage and supportive, nonskid footwear.  Adjust safety measures to individual developmental age, stage and identified risk factors.  Reinforce the importance of safety and physical activity with patient and family.  Perform regular intentional rounding to assess need for position change, pain assessment and personal needs, including assistance with toileting.  Recent Flowsheet Documentation  Taken 5/3/2024 1609 by Miko Greenberg RN  Safety Promotion/Fall Prevention: patient off unit  Taken 5/3/2024 1400 by Miko Greenberg RN  Safety Promotion/Fall Prevention: patient off unit  Taken 5/3/2024 1209 by Miko Greenberg RN  Safety Promotion/Fall Prevention:   activity supervised   assistive device/personal items within reach   clutter free environment maintained   safety round/check completed   room organization consistent  Intervention: Prevent Skin Injury  Description: Perform a screening for skin injury risk, such as pressure or moisture associated skin damage on admission and at regular intervals throughout hospital stay.  Keep all areas of skin (especially folds) clean and dry.  Maintain adequate skin hydration.  Relieve and redistribute pressure and protect bony prominences; implement measures based on patient-specific risk factors.  Match turning and repositioning schedule to clinical condition.  Encourage weight shift frequently; assist with reposition if unable to complete  independently.  Float heels off bed; avoid pressure on the Achilles tendon.  Keep skin free from extended contact with medical devices.  Encourage functional activity and mobility, as early as tolerated.  Use aids (e.g., slide boards, mechanical lift) during transfer.  Recent Flowsheet Documentation  Taken 5/3/2024 1209 by Miko Greenberg, RN  Body Position: position changed independently  Intervention: Prevent Infection  Description: Maintain skin and mucous membrane integrity; promote hand, oral and pulmonary hygiene.  Optimize fluid balance, nutrition, sleep and glycemic control to maximize infection resistance.  Identify potential sources of infection early to prevent or mitigate progression of infection (e.g., wound, lines, devices).  Evaluate ongoing need for invasive devices; remove promptly when no longer indicated.  Recent Flowsheet Documentation  Taken 5/3/2024 1209 by Miko Greenberg, RN  Infection Prevention:   environmental surveillance performed   rest/sleep promoted   single patient room provided   Goal Outcome Evaluation:      Patient having fistula shunt thrombectomy this morning for occluded fistula. Patient then sent for dialysis. AXFarzaneh, VSS.

## 2024-05-04 VITALS
DIASTOLIC BLOOD PRESSURE: 83 MMHG | TEMPERATURE: 98.2 F | HEART RATE: 94 BPM | WEIGHT: 166.45 LBS | SYSTOLIC BLOOD PRESSURE: 112 MMHG | HEIGHT: 60 IN | RESPIRATION RATE: 18 BRPM | BODY MASS INDEX: 32.68 KG/M2 | OXYGEN SATURATION: 100 %

## 2024-05-04 LAB
ANION GAP SERPL CALCULATED.3IONS-SCNC: 16 MMOL/L (ref 5–15)
BASOPHILS # BLD AUTO: 0.03 10*3/MM3 (ref 0–0.2)
BASOPHILS NFR BLD AUTO: 0.3 % (ref 0–1.5)
BUN SERPL-MCNC: 43 MG/DL (ref 6–20)
BUN/CREAT SERPL: 5.4 (ref 7–25)
CALCIUM SPEC-SCNC: 8.3 MG/DL (ref 8.6–10.5)
CHLORIDE SERPL-SCNC: 101 MMOL/L (ref 98–107)
CLUMPED PLATELETS: PRESENT
CO2 SERPL-SCNC: 23 MMOL/L (ref 22–29)
CREAT SERPL-MCNC: 7.98 MG/DL (ref 0.57–1)
DEPRECATED RDW RBC AUTO: 59.4 FL (ref 37–54)
EGFRCR SERPLBLD CKD-EPI 2021: 5.7 ML/MIN/1.73
EOSINOPHIL # BLD AUTO: 0.05 10*3/MM3 (ref 0–0.4)
EOSINOPHIL NFR BLD AUTO: 0.5 % (ref 0.3–6.2)
ERYTHROCYTE [DISTWIDTH] IN BLOOD BY AUTOMATED COUNT: 18.2 % (ref 12.3–15.4)
GLUCOSE SERPL-MCNC: 128 MG/DL (ref 65–99)
HCT VFR BLD AUTO: 32.6 % (ref 34–46.6)
HGB BLD-MCNC: 10.5 G/DL (ref 12–15.9)
IMM GRANULOCYTES # BLD AUTO: 0.02 10*3/MM3 (ref 0–0.05)
IMM GRANULOCYTES NFR BLD AUTO: 0.2 % (ref 0–0.5)
INR PPP: 1.49 (ref 0.9–1.1)
LYMPHOCYTES # BLD AUTO: 1.05 10*3/MM3 (ref 0.7–3.1)
LYMPHOCYTES NFR BLD AUTO: 11.1 % (ref 19.6–45.3)
MCH RBC QN AUTO: 29.4 PG (ref 26.6–33)
MCHC RBC AUTO-ENTMCNC: 32.2 G/DL (ref 31.5–35.7)
MCV RBC AUTO: 91.3 FL (ref 79–97)
MONOCYTES # BLD AUTO: 0.7 10*3/MM3 (ref 0.1–0.9)
MONOCYTES NFR BLD AUTO: 7.4 % (ref 5–12)
NEUTROPHILS NFR BLD AUTO: 7.59 10*3/MM3 (ref 1.7–7)
NEUTROPHILS NFR BLD AUTO: 80.5 % (ref 42.7–76)
NRBC BLD AUTO-RTO: 0 /100 WBC (ref 0–0.2)
PLATELET # BLD AUTO: 192 10*3/MM3 (ref 140–450)
PMV BLD AUTO: 10 FL (ref 6–12)
POTASSIUM SERPL-SCNC: 4.5 MMOL/L (ref 3.5–5.2)
PROTHROMBIN TIME: 18.2 SECONDS (ref 11.7–14.2)
RBC # BLD AUTO: 3.57 10*6/MM3 (ref 3.77–5.28)
RBC MORPH BLD: NORMAL
SODIUM SERPL-SCNC: 140 MMOL/L (ref 136–145)
WBC MORPH BLD: NORMAL
WBC NRBC COR # BLD AUTO: 9.44 10*3/MM3 (ref 3.4–10.8)

## 2024-05-04 PROCEDURE — 85025 COMPLETE CBC W/AUTO DIFF WBC: CPT | Performed by: STUDENT IN AN ORGANIZED HEALTH CARE EDUCATION/TRAINING PROGRAM

## 2024-05-04 PROCEDURE — 80048 BASIC METABOLIC PNL TOTAL CA: CPT | Performed by: STUDENT IN AN ORGANIZED HEALTH CARE EDUCATION/TRAINING PROGRAM

## 2024-05-04 PROCEDURE — G0378 HOSPITAL OBSERVATION PER HR: HCPCS

## 2024-05-04 PROCEDURE — A9270 NON-COVERED ITEM OR SERVICE: HCPCS | Performed by: SURGERY

## 2024-05-04 PROCEDURE — 63710000001 CALCIUM ACETATE 667 MG CAPSULE: Performed by: SURGERY

## 2024-05-04 PROCEDURE — 85007 BL SMEAR W/DIFF WBC COUNT: CPT | Performed by: STUDENT IN AN ORGANIZED HEALTH CARE EDUCATION/TRAINING PROGRAM

## 2024-05-04 PROCEDURE — 63710000001 HYDRALAZINE 25 MG TABLET: Performed by: SURGERY

## 2024-05-04 PROCEDURE — 63710000001 CALCITRIOL 0.25 MCG CAPSULE: Performed by: SURGERY

## 2024-05-04 PROCEDURE — 99024 POSTOP FOLLOW-UP VISIT: CPT | Performed by: SURGERY

## 2024-05-04 PROCEDURE — 85610 PROTHROMBIN TIME: CPT | Performed by: STUDENT IN AN ORGANIZED HEALTH CARE EDUCATION/TRAINING PROGRAM

## 2024-05-04 PROCEDURE — 63710000001 PROPRANOLOL 10 MG TABLET: Performed by: SURGERY

## 2024-05-04 RX ORDER — AMOXICILLIN 250 MG
2 CAPSULE ORAL 2 TIMES DAILY PRN
Qty: 60 TABLET | Refills: 0 | Status: SHIPPED | OUTPATIENT
Start: 2024-05-04

## 2024-05-04 RX ORDER — POLYETHYLENE GLYCOL 3350 17 G/17G
17 POWDER, FOR SOLUTION ORAL DAILY PRN
Qty: 510 G | Refills: 0 | Status: SHIPPED | OUTPATIENT
Start: 2024-05-04

## 2024-05-04 RX ORDER — HYDROCODONE BITARTRATE AND ACETAMINOPHEN 5; 325 MG/1; MG/1
1 TABLET ORAL EVERY 6 HOURS PRN
Qty: 8 TABLET | Refills: 0 | Status: SHIPPED | OUTPATIENT
Start: 2024-05-04 | End: 2024-05-08

## 2024-05-04 RX ORDER — BISACODYL 5 MG/1
5 TABLET, DELAYED RELEASE ORAL DAILY PRN
Qty: 30 TABLET | Refills: 0 | Status: SHIPPED | OUTPATIENT
Start: 2024-05-04

## 2024-05-04 RX ORDER — NALOXONE HYDROCHLORIDE 4 MG/.1ML
SPRAY NASAL
Qty: 2 EACH | Refills: 0 | Status: SHIPPED | OUTPATIENT
Start: 2024-05-04

## 2024-05-04 RX ADMIN — CALCIUM ACETATE 2001 MG: 667 CAPSULE ORAL at 08:34

## 2024-05-04 RX ADMIN — POLYMYXIN B SULFATE AND TRIMETHOPRIM 1 DROP: 10000; 1 SOLUTION OPHTHALMIC at 06:20

## 2024-05-04 RX ADMIN — PROPRANOLOL HYDROCHLORIDE 10 MG: 10 TABLET ORAL at 08:33

## 2024-05-04 RX ADMIN — POLYMYXIN B SULFATE AND TRIMETHOPRIM 1 DROP: 10000; 1 SOLUTION OPHTHALMIC at 10:00

## 2024-05-04 RX ADMIN — HYDRALAZINE HYDROCHLORIDE 25 MG: 25 TABLET ORAL at 08:34

## 2024-05-04 RX ADMIN — CALCITRIOL 0.5 MCG: 0.25 CAPSULE ORAL at 08:34

## 2024-05-04 NOTE — DISCHARGE SUMMARY
Patient Name: Sunni Mcneil  : 1975  MRN: [id number]    Date of Admission: 2024  Date of Discharge:  2024  Primary Care Physician: Regla Ying APRN      Chief Complaint:   Vascular Access Problem      Discharge Diagnoses     Active Hospital Problems    Diagnosis  POA    **AV graft stenosis, initial encounter [T82.858A]  Yes    Arteriovenous fistula occlusion [T82.898A]  Yes    ESRD (end stage renal disease) [N18.6]  Yes    GERD without esophagitis [K21.9]  Yes    Anemia [D64.9]  Yes    Obesity (BMI 30-39.9) [E66.9]  Yes    Hypertension [I10]  Yes    Hyperparathyroidism [E21.3]  Yes      Resolved Hospital Problems   No resolved problems to display.        Hospital Course     Ms. Mcneil is a 49 y.o. female with a history of ESRD, HTN, secondary hyperparathyroidism, anemia, chronic anticoagulation who presented to [facility] initially with malfunction of dialysis access.  Please see the admitting history and physical for further details.  She admitted to the hospital for further evaluation and treatment.     AV graft stenosis with occlusion  - Vascular surgery consulted and followed. Patient s/p Right arm brachial artery to axillary vein graft thrombectomy with revision, central venoplasty (). She was evaluated by vascular surgery prior to discharge, per note written by Dr. Sanchez, she is to follow up with him in 3 weeks for right arm graft Duplex and is to have her sutures removed at that time as well.     ESRD on HD  - Nephrology consulted and followed, patient received dialysis. Continue as previously prescribed after discharge and follow up with Nephrology after discharge as scheduled.     Hypertension  - Blood pressure appears stable and acceptable acutely at this time. No indications to warrant acute changes/intervention at this time. Continue current medications as prescribed. Recommend that patient check her blood pressure 2-3 times daily and record those values in  log book and take with her to next PCP visit to allow for greater insight into treatment efficacy to guide further management decisions. Recommend heart healthy/low sodium diet.    Chronic anticoagulation  - Patient states that she takes warfarin now, and INR is monitored at [facility]. Chart reviewed with pharmacist, it appears that patient is on this for prevention of graft thrombosis. She was previously on Eliquis, but was recently transitioned. Warfarin was held for procedure, but was okay to resume at discharge, discussed with vascular and she needs to resume her medication and have a close follow up for repeat level check. She will need close follow up with managing provider for INR recheck as soon as possible to allow for reassessment to determine need for medication adjustments. I discussed with patient at length prior to discharge, encouraged her to call anticoagulation clinic first thing Monday morning on 05/06 to get an appointment to come in for repeat level testing. She voiced understanding and was agreeable. Continue her warfarin after discharge, further management at discretion of prescribing provider.      Anemia  - Hemoglobin low on most recent labs, but stable from prior. Recommend repeat CBC with PCP within 1 week for reassessment.    Hyperglycemia  - Glucose elevated. Patient without known history of T2DM.  Most recent hemoglobin A1c 4.4 (04/02/24). Recommend that patient check her blood glucose 2-3 times daily and record those values in log book and take with her to next PCP visit to allow for greater insight into treatment efficacy to guide further management decisions. Recommend consistent carbohydrate/diabetic diet.    Secondary Hyperparathyroidism  - On Calcitriol, continue as prescribed. Follow up with Nephrology as scheduled.    Obesity  - BMI 32.51 kg/m2. Complicating all medical problems.    Day of Discharge     Subjective:  Patient seen and examined this morning.  Hospital day 2.  She is  awake and resting in bed, doing well at present, no complications from procedure yesterday, no acute complaints at this time.  She has been cleared for discharge by consultants.    Physical Exam:  Temp:  [96.9 °F (36.1 °C)-98.2 °F (36.8 °C)] 98.2 °F (36.8 °C)  Heart Rate:  [] 94  Resp:  [10-18] 18  BP: ()/() 112/83  Body mass index is 32.51 kg/m².  Physical Exam  Vitals and nursing note reviewed.   Constitutional:       General: She is not in acute distress.  Cardiovascular:      Rate and Rhythm: Normal rate and regular rhythm.      Pulses: Normal pulses.      Heart sounds: Normal heart sounds.   Pulmonary:      Effort: Pulmonary effort is normal. No respiratory distress.      Breath sounds: Normal breath sounds. No wheezing.   Abdominal:      General: Bowel sounds are normal. There is no distension.      Palpations: Abdomen is soft.      Tenderness: There is no abdominal tenderness. There is no guarding or rebound.   Musculoskeletal:      Cervical back: No tenderness.      Right lower leg: No edema.      Left lower leg: No edema.      Comments: RUE fistula   Skin:     General: Skin is warm and dry.   Neurological:      Mental Status: She is alert.         Consultants     Consult Orders (all) (From admission, onward)       Start     Ordered    05/02/24 1803  Inpatient Nephrology Consult  Once        Specialty:  Nephrology  Provider:  Víctor Bee MD    05/02/24 1802    05/02/24 1515  [facility] (on-call MD unless specified) Details  Once        Specialty:  Hospitalist  Provider:  (Not yet assigned)    05/02/24 1514    05/02/24 1356  Vascular Surgery Consult  Once        Specialty:  Vascular Surgery  Provider:  Alex Sanchez MD    05/02/24 1355                  Procedures     Hemodialysis shunt thrombectomy with revision    Imaging Results (All)       Procedure Component Value Units Date/Time    Arteriogram (Autofinalize) [[id number]] Resulted: 05/03/24 0858     Updated: 05/03/24 0858     "Narrative:      This procedure was auto-finalized with no dictation required.          Results for orders placed during the hospital encounter of 01/14/21    Duplex Initial Vein Mapping Hemodialysis Access Unilateral Left or Right CAR    Interpretation Summary  · Acute superficial thrombophlebitis noted in the left cephalic vein in the upper arm at the antecubital fossa  · The left radial artery is patent and of adequate size.  · The left brachial artery is patent and of adequate size.  · Acute superficial thrombophlebitis noted in the left basilic vein in the upper arm at the antecubital fossa. The basilic vein cephalad to this is of adequate size.    Results for orders placed during the hospital encounter of 12/27/22    Adult Transthoracic Echo Complete w/ Color, Spectral and Contrast if Necessary Per Protocol    Interpretation Summary    Left ventricular systolic function is normal. Left ventricular ejection fraction appears to be 61 - 65%.    Left ventricular diastolic function was normal.    Pertinent Labs     Results from last 7 days   Lab Units 05/04/24  0744 05/03/24 0441 05/02/24  1410 05/01/24  0000   WBC 10*3/mm3 9.44 7.09 7.49  --    HEMOGLOBIN g/dL 10.5* 10.8* 11.9* 11.3*  33.9*   PLATELETS 10*3/mm3 192 199 229  --      Results from last 7 days   Lab Units 05/04/24  0744 05/03/24 0441 05/02/24  1410   SODIUM mmol/L 140 139 138   POTASSIUM mmol/L 4.5 4.3 4.3   CHLORIDE mmol/L 101 96* 94*   CO2 mmol/L 23.0 23.2 24.5   BUN mg/dL 43* 59* 43*   CREATININE mg/dL 7.98* 9.95* 8.57*   GLUCOSE mg/dL 128* 78 122*   EGFR mL/min/1.73 5.7* 4.4* 5.3*     Results from last 7 days   Lab Units 05/02/24  1410   ALBUMIN g/dL 4.1   BILIRUBIN mg/dL 0.3   ALK PHOS U/L 62   AST (SGOT) U/L 11   ALT (SGPT) U/L <5     Results from last 7 days   Lab Units 05/04/24  0744 05/03/24 0441 05/02/24  1410   CALCIUM mg/dL 8.3* 8.5* 9.3   ALBUMIN g/dL  --   --  4.1               Invalid input(s): \"LDLCALC\"          Test Results Pending " at Discharge       Discharge Details        Discharge Medications        New Medications        Instructions Start Date   bisacodyl 5 MG EC tablet  Commonly known as: DULCOLAX   5 mg, Oral, Daily PRN      naloxone 4 MG/0.1ML nasal spray  Commonly known as: NARCAN   Call 911. Don't prime. [place] in 1 nostril for overdose. Repeat in 2-3 minutes in other nostril if no or minimal breathing/responsiveness.      sennosides-docusate 8.6-50 MG per tablet  Commonly known as: PERICOLACE   2 tablets, Oral, 2 Times Daily PRN             Changes to Medications        Instructions Start Date   HYDROcodone-acetaminophen 5-325 MG per tablet  Commonly known as: NORCO  What changed:   when to take this  reasons to take this   1 tablet, Oral, Every 6 Hours PRN      MIRALAX PO  What changed: Another medication with the same name was added. Make sure you understand how and when to take each.   1 dose, Oral, As Needed      polyethylene glycol 17 g packet  Commonly known as: MIRALAX  What changed: You were already taking a medication with the same name, and this prescription was added. Make sure you understand how and when to take each.   17 g, Oral, Daily PRN             Continue These Medications        Instructions Start Date   acetaminophen 325 MG tablet  Commonly known as: TYLENOL   650 mg, Oral, Every 6 Hours PRN      apixaban 2.5 MG tablet tablet  Commonly known as: ELIQUIS   2.5 mg, Oral, 2 Times Daily      calcitriol 0.25 MCG capsule  Commonly known as: ROCALTROL   0.25 mcg, Oral, Take As Directed, Calcitriol 0.25 MCG Capsule (si capsule once per day )        calcium acetate 667 MG capsule  Commonly known as: PHOSLO   2,001 mg, Oral, 3 Times Daily With Meals      cholecalciferol 1.25 MG (04194 UT) capsule  Commonly known as: VITAMIN D3   0.25 mcg, Oral, Every 7 Days      cloNIDine 0.1 MG tablet  Commonly known as: CATAPRES   0.1 mg, Oral, Daily      hydrALAZINE 25 MG tablet  Commonly known as: APRESOLINE   25 mg, Oral, Daily       hydrOXYzine 50 MG tablet  Commonly known as: ATARAX   50 mg, Oral, As Needed      ondansetron ODT 4 MG disintegrating tablet  Commonly known as: ZOFRAN-ODT   4 mg, Oral, As Needed      prochlorperazine 10 MG tablet  Commonly known as: COMPAZINE   10 mg, Oral, Every 6 Hours PRN      propranolol 10 MG tablet  Commonly known as: INDERAL   10 mg, Oral, 2 Times Daily      traZODone 100 MG tablet  Commonly known as: DESYREL   150 mg, Oral, Every Night at Bedtime      trimethoprim-polymyxin b 05194-1.1 UNIT/ML-% ophthalmic solution  Commonly known as: POLYTRIM   1 drop, Ophthalmic, Every 4 Hours      warfarin 5 MG tablet  Commonly known as: COUMADIN   1 tablet, Oral, Daily               No Known Allergies    Discharge Disposition:  Home or Self Care      Discharge Diet:  Diet Order   Procedures    Diet: Cardiac, Diabetic; Healthy Heart (2-3 Na+); Consistent Carbohydrate; Fluid Consistency: Thin (IDDSI 0)       Discharge Activity:   Activity Instructions       Gradually Increase Activity Until at Pre-Hospitalization Level              CODE STATUS:    Code Status and Medical Interventions:   Ordered at: 05/02/24 1801     Code Status (Patient has no pulse and is not breathing):    CPR (Attempt to Resuscitate)     Medical Interventions (Patient has pulse or is breathing):    Full       No future appointments.  Additional Instructions for the Follow-ups that You Need to Schedule       Discharge Follow-up with PCP   As directed       Currently Documented PCP:    Regla Ying APRN    PCP Phone Number:    [phone]     Follow Up Details: within 3-5 days after discharge for reassessment        Discharge Follow-up with Specialty: INR clinic, Nephrology   As directed      Specialty: INR clinic, Nephrology   Follow Up Details: within 3-5 days after discharge for reassessment and repeat INR and for HD               Follow-up Information       Regla Ying APRN .    Specialty: Nurse Practitioner  Why: within 3-5 days after  discharge for reassessment  Contact information:  [address] [facility]  [address]  [facility] [address]  [phone]                             Additional Instructions for the Follow-ups that You Need to Schedule       Discharge Follow-up with PCP   As directed       Currently Documented PCP:    Regla Ying APRN    PCP Phone Number:    [phone]     Follow Up Details: within 3-5 days after discharge for reassessment        Discharge Follow-up with Specialty: INR clinic, Nephrology   As directed      Specialty: INR clinic, Nephrology   Follow Up Details: within 3-5 days after discharge for reassessment and repeat INR and for HD            Time Spent on Discharge:  Greater than 30 minutes      Farooq Will DO  [facility]  05/04/24  08:45 EDT

## 2024-05-04 NOTE — CASE MANAGEMENT/SOCIAL WORK
Case Management Discharge Note      Final Note: dc home         Selected Continued Care - Discharged on 5/4/2024 Admission date: 5/2/2024 - Discharge disposition: Home or Self Care      Destination    No services have been selected for the patient.                Durable Medical Equipment    No services have been selected for the patient.                Dialysis/Infusion    No services have been selected for the patient.                Home Medical Care    No services have been selected for the patient.                Therapy    No services have been selected for the patient.                Community Resources    No services have been selected for the patient.                Community & DME    No services have been selected for the patient.                         Final Discharge Disposition Code: 01 - home or self-care

## 2024-05-04 NOTE — PROGRESS NOTES
Patient doing well post right arm dialysis shunt thrombectomy.  Dialysis was successful yesterday.  She is okay for discharge from my standpoint should follow-up with me in the office in 3 weeks with right arm graft duplex.  She also has sutures in the right arm that will be removed at that time

## 2024-05-10 ENCOUNTER — TELEPHONE (OUTPATIENT)
Age: 49
End: 2024-05-10
Payer: MEDICARE

## 2024-05-20 ENCOUNTER — OFFICE VISIT (OUTPATIENT)
Age: 49
End: 2024-05-20
Payer: MEDICARE

## 2024-05-20 VITALS
DIASTOLIC BLOOD PRESSURE: 71 MMHG | BODY MASS INDEX: 32.59 KG/M2 | HEIGHT: 60 IN | SYSTOLIC BLOOD PRESSURE: 102 MMHG | WEIGHT: 166 LBS | HEART RATE: 111 BPM

## 2024-05-20 DIAGNOSIS — T82.898A ARTERIOVENOUS FISTULA OCCLUSION, INITIAL ENCOUNTER: Primary | ICD-10-CM

## 2024-05-20 NOTE — PROGRESS NOTES
"Chief Complaint  Post-op Follow-up    Subjective          HPI: Sunni Mcneil presents to [facility] after recent right arm AV graft thrombectomy and peripheral and central balloon angioplasty by Dr. Sanchez.  Patient is chronically anticoagulated on warfarin.    Review of Systems     History Review Reviewed Comments   Past Medical History:  [x]     Past Surgical History: [x]     Family History: [x]     Social History: [x]       Objective   Vital Signs:  /71   Pulse 111   Ht 152.4 cm (60\")   Wt 75.3 kg (166 lb)   BMI 32.42 kg/m²   Estimated body mass index is 32.42 kg/m² as calculated from the following:    Height as of this encounter: 152.4 cm (60\").    Weight as of this encounter: 75.3 kg (166 lb).  BMI is >= 30 and <35. (Class 1 Obesity). The following options were offered after discussion;: exercise counseling/recommendations          Physical Exam  Vascular: She is already removed her suture.  She has a nice bruit in the access    Result Review :      Most notable findings include: Last INR 2.9        Sunni Mcneil  reports that she has never smoked. She has never been exposed to tobacco smoke. She has never used smokeless tobacco..           Assessment and Plan     Assessment & Plan             49-year-old patient who I have treated in the past but most recently underwent a thrombectomy with both peripheral and central venous interventions by Dr. Alex Sanchez on 5/3/2024.  His operative report was reviewed.  It was a difficult incision.  The graft was very sclerotic and hard to dissect out.  Outflow lesion had to be crossed with a wire because a thrombectomy catheter would not cross and it was angioplastied with a 9 mm balloon and ultimately stented with a 10 x 4 uncovered self-expanding stent.  There was also severe stenosis at the thoracic outlet treated with a 9 mm balloon.  He recommended short-term follow-up and ultrasound with him but her ultrasound was not " done in preparation for today's visit    On exam, it looks like she is already taken out the suture.  She has a bruit.  Dialysis has been going well.  She has a contralateral brachial artery to axillary vein AV graft that failed.    Looking at the images she is at high risk for subsequent graft failure.  I agree with Dr. Sanchez that we an ultrasound for follow-up in the short-term makes the most sense so I have asked that we do that in the very near future.  She is welcome to follow-up with either me or him depending on the timing of her study.     Follow Up     Return for 2 weeks after after AVG scan.  Patient was given instructions and counseling regarding her condition or for health maintenance advice. Please see specific information pulled into the AVS if appropriate.

## 2024-05-29 ENCOUNTER — TELEPHONE (OUTPATIENT)
Age: 49
End: 2024-05-29
Payer: MEDICARE

## 2024-05-29 NOTE — TELEPHONE ENCOUNTER
Will called concerning about the pts insurnace due to the patient being cancelled when she came for her ultrasound of her fistula. Her scan was scheduled but cancelled due to insurance because she has a HMO plan. We need to schedule her scan with saran.     Schedule for AVF fistula scan.   [id number]

## 2024-11-10 ENCOUNTER — APPOINTMENT (OUTPATIENT)
Dept: GENERAL RADIOLOGY | Facility: HOSPITAL | Age: 49
DRG: 286 | End: 2024-11-10
Payer: MEDICARE

## 2024-11-10 ENCOUNTER — APPOINTMENT (OUTPATIENT)
Dept: CT IMAGING | Facility: HOSPITAL | Age: 49
DRG: 286 | End: 2024-11-10
Payer: MEDICARE

## 2024-11-10 ENCOUNTER — HOSPITAL ENCOUNTER (INPATIENT)
Facility: HOSPITAL | Age: 49
LOS: 5 days | Discharge: HOME | DRG: 286 | End: 2024-11-15
Attending: EMERGENCY MEDICINE | Admitting: INTERNAL MEDICINE
Payer: MEDICARE

## 2024-11-10 ENCOUNTER — APPOINTMENT (OUTPATIENT)
Dept: CARDIOLOGY | Facility: HOSPITAL | Age: 49
DRG: 286 | End: 2024-11-10
Payer: MEDICARE

## 2024-11-10 DIAGNOSIS — E87.20 LACTIC ACIDOSIS: ICD-10-CM

## 2024-11-10 DIAGNOSIS — R93.89 ABNORMAL CT SCAN: ICD-10-CM

## 2024-11-10 DIAGNOSIS — I21.3 ST ELEVATION MYOCARDIAL INFARCTION (STEMI), UNSPECIFIED ARTERY: ICD-10-CM

## 2024-11-10 DIAGNOSIS — I16.1 HYPERTENSIVE EMERGENCY: ICD-10-CM

## 2024-11-10 DIAGNOSIS — N39.0 ACUTE UTI: ICD-10-CM

## 2024-11-10 DIAGNOSIS — R65.21 SEPTIC SHOCK: ICD-10-CM

## 2024-11-10 DIAGNOSIS — A41.9 SEPTIC SHOCK: ICD-10-CM

## 2024-11-10 DIAGNOSIS — T82.858A AV GRAFT STENOSIS, INITIAL ENCOUNTER: ICD-10-CM

## 2024-11-10 DIAGNOSIS — J81.0 ACUTE PULMONARY EDEMA: ICD-10-CM

## 2024-11-10 DIAGNOSIS — G93.40 ACUTE ENCEPHALOPATHY: Primary | ICD-10-CM

## 2024-11-10 DIAGNOSIS — N18.6 ESRD (END STAGE RENAL DISEASE): ICD-10-CM

## 2024-11-10 PROBLEM — J81.1 PULMONARY EDEMA: Status: ACTIVE | Noted: 2024-11-10

## 2024-11-10 LAB
ALBUMIN SERPL-MCNC: 4 G/DL (ref 3.5–5.2)
ALBUMIN/GLOB SERPL: 0.9 G/DL
ALP SERPL-CCNC: 58 U/L (ref 39–117)
ALT SERPL W P-5'-P-CCNC: 9 U/L (ref 1–33)
ANION GAP SERPL CALCULATED.3IONS-SCNC: 30 MMOL/L (ref 5–15)
AORTIC DIMENSIONLESS INDEX: 0.8 (DI)
APTT PPP: 37.5 SECONDS (ref 22.7–35.4)
ARTERIAL PATENCY WRIST A: ABNORMAL
AST SERPL-CCNC: 22 U/L (ref 1–32)
ATMOSPHERIC PRESS: 745.7 MMHG
ATMOSPHERIC PRESS: 746.5 MMHG
ATMOSPHERIC PRESS: 747 MMHG
ATMOSPHERIC PRESS: 748.1 MMHG
B PARAPERT DNA SPEC QL NAA+PROBE: NOT DETECTED
B PERT DNA SPEC QL NAA+PROBE: NOT DETECTED
BASE EXCESS BLDA CALC-SCNC: -10.6 MMOL/L (ref 0–2)
BASE EXCESS BLDA CALC-SCNC: -4.7 MMOL/L (ref 0–2)
BASE EXCESS BLDA CALC-SCNC: -8.1 MMOL/L (ref 0–2)
BASE EXCESS BLDA CALC-SCNC: -8.8 MMOL/L (ref 0–2)
BASOPHILS # BLD AUTO: 0.03 10*3/MM3 (ref 0–0.2)
BASOPHILS NFR BLD AUTO: 0.2 % (ref 0–1.5)
BDY SITE: ABNORMAL
BH CV ECHO MEAS - AO MAX PG: 7.3 MMHG
BH CV ECHO MEAS - AO MEAN PG: 4.3 MMHG
BH CV ECHO MEAS - AO V2 MAX: 135.5 CM/SEC
BH CV ECHO MEAS - AO V2 VTI: 18.3 CM
BH CV ECHO MEAS - AVA(I,D): 1.79 CM2
BH CV ECHO MEAS - EDV(CUBED): 81.2 ML
BH CV ECHO MEAS - EDV(MOD-SP2): 93 ML
BH CV ECHO MEAS - EDV(MOD-SP4): 79 ML
BH CV ECHO MEAS - EF(MOD-BP): 34.8 %
BH CV ECHO MEAS - EF(MOD-SP2): 38.7 %
BH CV ECHO MEAS - EF(MOD-SP4): 31.6 %
BH CV ECHO MEAS - ESV(CUBED): 80 ML
BH CV ECHO MEAS - ESV(MOD-SP2): 57 ML
BH CV ECHO MEAS - ESV(MOD-SP4): 54 ML
BH CV ECHO MEAS - FS: 0.5 %
BH CV ECHO MEAS - IVS/LVPW: 1.08 CM
BH CV ECHO MEAS - IVSD: 0.78 CM
BH CV ECHO MEAS - LAT PEAK E' VEL: 13.8 CM/SEC
BH CV ECHO MEAS - LV MASS(C)D: 98.9 GRAMS
BH CV ECHO MEAS - LV MAX PG: 5 MMHG
BH CV ECHO MEAS - LV MEAN PG: 2.47 MMHG
BH CV ECHO MEAS - LV V1 MAX: 112 CM/SEC
BH CV ECHO MEAS - LV V1 VTI: 14.8 CM
BH CV ECHO MEAS - LVIDD: 4.3 CM
BH CV ECHO MEAS - LVIDS: 4.3 CM
BH CV ECHO MEAS - LVOT AREA: 2.2 CM2
BH CV ECHO MEAS - LVOT DIAM: 1.67 CM
BH CV ECHO MEAS - LVPWD: 0.73 CM
BH CV ECHO MEAS - MED PEAK E' VEL: 8.3 CM/SEC
BH CV ECHO MEAS - MR MAX PG: 44.2 MMHG
BH CV ECHO MEAS - MR MAX VEL: 332.6 CM/SEC
BH CV ECHO MEAS - MV DEC SLOPE: 906.9 CM/SEC2
BH CV ECHO MEAS - MV DEC TIME: 0.12 SEC
BH CV ECHO MEAS - MV E MAX VEL: 130 CM/SEC
BH CV ECHO MEAS - MV MAX PG: 8.2 MMHG
BH CV ECHO MEAS - MV MEAN PG: 3.6 MMHG
BH CV ECHO MEAS - MV P1/2T: 47.3 MSEC
BH CV ECHO MEAS - MV V2 VTI: 18.4 CM
BH CV ECHO MEAS - MVA(P1/2T): 4.6 CM2
BH CV ECHO MEAS - MVA(VTI): 1.77 CM2
BH CV ECHO MEAS - PA V2 MAX: 111.7 CM/SEC
BH CV ECHO MEAS - PULM A REVS DUR: 0.15 SEC
BH CV ECHO MEAS - PULM A REVS VEL: 44.5 CM/SEC
BH CV ECHO MEAS - PULM DIAS VEL: 27.9 CM/SEC
BH CV ECHO MEAS - PULM S/D: 1.31
BH CV ECHO MEAS - PULM SYS VEL: 36.4 CM/SEC
BH CV ECHO MEAS - QP/QS: 0.7
BH CV ECHO MEAS - RAP SYSTOLE: 3 MMHG
BH CV ECHO MEAS - RV MAX PG: 2.7 MMHG
BH CV ECHO MEAS - RV V1 MAX: 81.4 CM/SEC
BH CV ECHO MEAS - RV V1 VTI: 9.1 CM
BH CV ECHO MEAS - RVOT DIAM: 1.79 CM
BH CV ECHO MEAS - RVSP: 30 MMHG
BH CV ECHO MEAS - SV(LVOT): 32.6 ML
BH CV ECHO MEAS - SV(MOD-SP2): 36 ML
BH CV ECHO MEAS - SV(MOD-SP4): 25 ML
BH CV ECHO MEAS - SV(RVOT): 22.9 ML
BH CV ECHO MEAS - TAPSE (>1.6): 1.1 CM
BH CV ECHO MEAS - TR MAX PG: 26.9 MMHG
BH CV ECHO MEAS - TR MAX VEL: 259.3 CM/SEC
BH CV ECHO MEASUREMENTS AVERAGE E/E' RATIO: 11.76
BH CV XLRA - RV BASE: 3 CM
BH CV XLRA - RV LENGTH: 6.1 CM
BH CV XLRA - RV MID: 2.3 CM
BH CV XLRA - TDI S': 16 CM/SEC
BILIRUB SERPL-MCNC: 0.6 MG/DL (ref 0–1.2)
BUN SERPL-MCNC: 56 MG/DL (ref 6–20)
BUN/CREAT SERPL: 4.8 (ref 7–25)
C PNEUM DNA NPH QL NAA+NON-PROBE: NOT DETECTED
CALCIUM SPEC-SCNC: 9.6 MG/DL (ref 8.6–10.5)
CHLORIDE SERPL-SCNC: 87 MMOL/L (ref 98–107)
CO2 BLDA-SCNC: 18.2 MMOL/L (ref 23–27)
CO2 BLDA-SCNC: 18.9 MMOL/L (ref 23–27)
CO2 BLDA-SCNC: 22 MMOL/L (ref 23–27)
CO2 BLDA-SCNC: 23.9 MMOL/L (ref 23–27)
CO2 SERPL-SCNC: 14 MMOL/L (ref 22–29)
CREAT SERPL-MCNC: 11.59 MG/DL (ref 0.57–1)
D-LACTATE SERPL-SCNC: 2.5 MMOL/L (ref 0.5–2)
D-LACTATE SERPL-SCNC: 2.6 MMOL/L (ref 0.5–2)
D-LACTATE SERPL-SCNC: 3.4 MMOL/L (ref 0.5–2)
D-LACTATE SERPL-SCNC: 3.5 MMOL/L (ref 0.5–2)
D-LACTATE SERPL-SCNC: 5.2 MMOL/L (ref 0.5–2)
DEPRECATED RDW RBC AUTO: 56.6 FL (ref 37–54)
DEVICE COMMENT: ABNORMAL
EGFRCR SERPLBLD CKD-EPI 2021: 3.7 ML/MIN/1.73
EOSINOPHIL # BLD AUTO: 0 10*3/MM3 (ref 0–0.4)
EOSINOPHIL NFR BLD AUTO: 0 % (ref 0.3–6.2)
ERYTHROCYTE [DISTWIDTH] IN BLOOD BY AUTOMATED COUNT: 17.7 % (ref 12.3–15.4)
ETHANOL BLD-MCNC: <10 MG/DL (ref 0–10)
ETHANOL UR QL: <0.01 %
FLUAV SUBTYP SPEC NAA+PROBE: NOT DETECTED
FLUBV RNA ISLT QL NAA+PROBE: NOT DETECTED
GEN 5 2HR TROPONIN T REFLEX: 689 NG/L
GLOBULIN UR ELPH-MCNC: 4.3 GM/DL
GLUCOSE BLDC GLUCOMTR-MCNC: 133 MG/DL (ref 70–130)
GLUCOSE BLDC GLUCOMTR-MCNC: 171 MG/DL (ref 70–130)
GLUCOSE BLDC GLUCOMTR-MCNC: 181 MG/DL (ref 70–130)
GLUCOSE BLDC GLUCOMTR-MCNC: 195 MG/DL (ref 70–130)
GLUCOSE SERPL-MCNC: 118 MG/DL (ref 65–99)
HADV DNA SPEC NAA+PROBE: NOT DETECTED
HCO3 BLDA-SCNC: 17.1 MMOL/L (ref 22–28)
HCO3 BLDA-SCNC: 17.5 MMOL/L (ref 22–28)
HCO3 BLDA-SCNC: 20.8 MMOL/L (ref 22–28)
HCO3 BLDA-SCNC: 22 MMOL/L (ref 22–28)
HCOV 229E RNA SPEC QL NAA+PROBE: NOT DETECTED
HCOV HKU1 RNA SPEC QL NAA+PROBE: NOT DETECTED
HCOV NL63 RNA SPEC QL NAA+PROBE: NOT DETECTED
HCOV OC43 RNA SPEC QL NAA+PROBE: NOT DETECTED
HCT VFR BLD AUTO: 35.9 % (ref 34–46.6)
HCT VFR BLDA CALC: 31 % (ref 38–51)
HCT VFR BLDA CALC: 32 % (ref 38–51)
HCT VFR BLDA CALC: 35 % (ref 38–51)
HEMODILUTION: NO
HGB BLD-MCNC: 12 G/DL (ref 12–15.9)
HGB BLDA-MCNC: 10.5 G/DL (ref 12–17)
HGB BLDA-MCNC: 10.9 G/DL (ref 12–17)
HGB BLDA-MCNC: 11.9 G/DL (ref 12–17)
HMPV RNA NPH QL NAA+NON-PROBE: NOT DETECTED
HPIV1 RNA ISLT QL NAA+PROBE: NOT DETECTED
HPIV2 RNA SPEC QL NAA+PROBE: NOT DETECTED
HPIV3 RNA NPH QL NAA+PROBE: NOT DETECTED
HPIV4 P GENE NPH QL NAA+PROBE: NOT DETECTED
IMM GRANULOCYTES # BLD AUTO: 0.15 10*3/MM3 (ref 0–0.05)
IMM GRANULOCYTES NFR BLD AUTO: 0.9 % (ref 0–0.5)
INHALED O2 CONCENTRATION: 100 %
INHALED O2 CONCENTRATION: 95 %
INR PPP: 2.36 (ref 0.9–1.1)
INSPIRATORY TIME: 1
LEFT ATRIUM VOLUME INDEX: 27.3 ML/M2
LEFT ATRIUM VOLUME: 47 ML
LV EF 2D ECHO EST: 25 %
LYMPHOCYTES # BLD AUTO: 0.73 10*3/MM3 (ref 0.7–3.1)
LYMPHOCYTES NFR BLD AUTO: 4.4 % (ref 19.6–45.3)
Lab: ABNORMAL
M PNEUMO IGG SER IA-ACNC: NOT DETECTED
MCH RBC QN AUTO: 29.7 PG (ref 26.6–33)
MCHC RBC AUTO-ENTMCNC: 33.4 G/DL (ref 31.5–35.7)
MCV RBC AUTO: 88.9 FL (ref 79–97)
MODALITY: ABNORMAL
MONOCYTES # BLD AUTO: 0.48 10*3/MM3 (ref 0.1–0.9)
MONOCYTES NFR BLD AUTO: 2.9 % (ref 5–12)
MRSA DNA SPEC QL NAA+PROBE: NORMAL
NEUTROPHILS NFR BLD AUTO: 15.31 10*3/MM3 (ref 1.7–7)
NEUTROPHILS NFR BLD AUTO: 91.6 % (ref 42.7–76)
NOTIFIED WHO: ABNORMAL
NRBC BLD AUTO-RTO: 0 /100 WBC (ref 0–0.2)
O2 A-A PPRESDIFF RESPIRATORY: 0.1 MMHG
O2 A-A PPRESDIFF RESPIRATORY: 0.1 MMHG
O2 A-A PPRESDIFF RESPIRATORY: 0.3 MMHG
O2 A-A PPRESDIFF RESPIRATORY: 0.4 MMHG
PCO2 BLDA: 36.1 MM HG (ref 35–45)
PCO2 BLDA: 38.9 MM HG (ref 35–45)
PCO2 BLDA: 46.7 MM HG (ref 35–45)
PCO2 BLDA: 64.5 MM HG (ref 35–45)
PEEP RESPIRATORY: 12 CM[H2O]
PEEP RESPIRATORY: 5 CM[H2O]
PH BLDA: 7.14 PH UNITS (ref 7.35–7.45)
PH BLDA: 7.18 PH UNITS (ref 7.35–7.45)
PH BLDA: 7.28 PH UNITS (ref 7.35–7.45)
PH BLDA: 7.34 PH UNITS (ref 7.35–7.45)
PLATELET # BLD AUTO: 162 10*3/MM3 (ref 140–450)
PMV BLD AUTO: 9.5 FL (ref 6–12)
PO2 BLD: 200 MM[HG] (ref 0–500)
PO2 BLD: 263 MM[HG] (ref 0–500)
PO2 BLD: 84 MM[HG] (ref 0–500)
PO2 BLD: 87 MM[HG] (ref 0–500)
PO2 BLDA: 189.7 MM HG (ref 80–100)
PO2 BLDA: 262.8 MM HG (ref 80–100)
PO2 BLDA: 84 MM HG (ref 80–100)
PO2 BLDA: 86.9 MM HG (ref 80–100)
POTASSIUM SERPL-SCNC: 4.6 MMOL/L (ref 3.5–5.2)
POTASSIUM SERPL-SCNC: 6.2 MMOL/L (ref 3.5–5.2)
PROCALCITONIN SERPL-MCNC: 37.8 NG/ML (ref 0–0.25)
PROCALCITONIN SERPL-MCNC: 40.4 NG/ML (ref 0–0.25)
PROT SERPL-MCNC: 8.3 G/DL (ref 6–8.5)
PROTHROMBIN TIME: 26.1 SECONDS (ref 11.7–14.2)
QT INTERVAL: 347 MS
QT INTERVAL: 398 MS
QTC INTERVAL: 559 MS
QTC INTERVAL: 601 MS
RBC # BLD AUTO: 4.04 10*6/MM3 (ref 3.77–5.28)
READ BACK: YES
RHINOVIRUS RNA SPEC NAA+PROBE: NOT DETECTED
RSV RNA NPH QL NAA+NON-PROBE: NOT DETECTED
SAO2 % BLDA: 49 % (ref 95–98)
SAO2 % BLDA: 94 % (ref 95–98)
SAO2 % BLDA: 95 % (ref 95–98)
SAO2 % BLDCOA: 92.6 % (ref 92–98.5)
SAO2 % BLDCOA: 93.1 % (ref 92–98.5)
SAO2 % BLDCOA: 99.5 % (ref 92–98.5)
SAO2 % BLDCOA: 99.8 % (ref 92–98.5)
SARS-COV-2 RNA NPH QL NAA+NON-PROBE: NOT DETECTED
SET MECH RESP RATE: 16
SET MECH RESP RATE: 16
SET MECH RESP RATE: 24
SET MECH RESP RATE: 24
SINUS: 3 CM
SODIUM SERPL-SCNC: 131 MMOL/L (ref 136–145)
TOTAL RATE: 16 BREATHS/MINUTE
TOTAL RATE: 16 BREATHS/MINUTE
TOTAL RATE: 24 BREATHS/MINUTE
TOTAL RATE: 25 BREATHS/MINUTE
TROPONIN T DELTA: 450 NG/L
TROPONIN T SERPL HS-MCNC: 239 NG/L
VENTILATOR MODE: ABNORMAL
VENTILATOR MODE: AC
VT ON VENT VENT: 500 ML
WBC NRBC COR # BLD AUTO: 16.7 10*3/MM3 (ref 3.4–10.8)

## 2024-11-10 PROCEDURE — C1887 CATHETER, GUIDING: HCPCS | Performed by: INTERNAL MEDICINE

## 2024-11-10 PROCEDURE — 87154 CUL TYP ID BLD PTHGN 6+ TRGT: CPT | Performed by: PHYSICIAN ASSISTANT

## 2024-11-10 PROCEDURE — C1751 CATH, INF, PER/CENT/MIDLINE: HCPCS | Performed by: INTERNAL MEDICINE

## 2024-11-10 PROCEDURE — 93460 R&L HRT ART/VENTRICLE ANGIO: CPT | Performed by: INTERNAL MEDICINE

## 2024-11-10 PROCEDURE — 99291 CRITICAL CARE FIRST HOUR: CPT

## 2024-11-10 PROCEDURE — 25510000001 IOPAMIDOL PER 1 ML: Performed by: INTERNAL MEDICINE

## 2024-11-10 PROCEDURE — 84145 PROCALCITONIN (PCT): CPT | Performed by: PHYSICIAN ASSISTANT

## 2024-11-10 PROCEDURE — C1769 GUIDE WIRE: HCPCS | Performed by: INTERNAL MEDICINE

## 2024-11-10 PROCEDURE — 94799 UNLISTED PULMONARY SVC/PX: CPT

## 2024-11-10 PROCEDURE — 71045 X-RAY EXAM CHEST 1 VIEW: CPT

## 2024-11-10 PROCEDURE — C1894 INTRO/SHEATH, NON-LASER: HCPCS | Performed by: INTERNAL MEDICINE

## 2024-11-10 PROCEDURE — B2111ZZ FLUOROSCOPY OF MULTIPLE CORONARY ARTERIES USING LOW OSMOLAR CONTRAST: ICD-10-PCS | Performed by: INTERNAL MEDICINE

## 2024-11-10 PROCEDURE — 84484 ASSAY OF TROPONIN QUANT: CPT | Performed by: INTERNAL MEDICINE

## 2024-11-10 PROCEDURE — 93005 ELECTROCARDIOGRAM TRACING: CPT | Performed by: INTERNAL MEDICINE

## 2024-11-10 PROCEDURE — 25010000002 HYDROMORPHONE PER 4 MG: Performed by: INTERNAL MEDICINE

## 2024-11-10 PROCEDURE — 82803 BLOOD GASES ANY COMBINATION: CPT | Performed by: INTERNAL MEDICINE

## 2024-11-10 PROCEDURE — 94002 VENT MGMT INPAT INIT DAY: CPT

## 2024-11-10 PROCEDURE — 31500 INSERT EMERGENCY AIRWAY: CPT

## 2024-11-10 PROCEDURE — 25010000002 EPINEPHRINE 1 MG/ML SOLUTION 30 ML VIAL: Performed by: INTERNAL MEDICINE

## 2024-11-10 PROCEDURE — 63710000001 INSULIN REGULAR HUMAN PER 5 UNITS: Performed by: INTERNAL MEDICINE

## 2024-11-10 PROCEDURE — B2151ZZ FLUOROSCOPY OF LEFT HEART USING LOW OSMOLAR CONTRAST: ICD-10-PCS | Performed by: INTERNAL MEDICINE

## 2024-11-10 PROCEDURE — 85018 HEMOGLOBIN: CPT

## 2024-11-10 PROCEDURE — 82810 BLOOD GASES O2 SAT ONLY: CPT

## 2024-11-10 PROCEDURE — 25010000002 PIPERACILLIN SOD-TAZOBACTAM PER 1 G: Performed by: PHYSICIAN ASSISTANT

## 2024-11-10 PROCEDURE — 70450 CT HEAD/BRAIN W/O DYE: CPT

## 2024-11-10 PROCEDURE — 25010000002 SUCCINYLCHOLINE PER 20 MG: Performed by: EMERGENCY MEDICINE

## 2024-11-10 PROCEDURE — 85025 COMPLETE CBC W/AUTO DIFF WBC: CPT | Performed by: PHYSICIAN ASSISTANT

## 2024-11-10 PROCEDURE — 25510000001 IOPAMIDOL 61 % SOLUTION: Performed by: EMERGENCY MEDICINE

## 2024-11-10 PROCEDURE — 99222 1ST HOSP IP/OBS MODERATE 55: CPT | Performed by: INTERNAL MEDICINE

## 2024-11-10 PROCEDURE — 25010000002 VANCOMYCIN 10 G RECONSTITUTED SOLUTION: Performed by: PHYSICIAN ASSISTANT

## 2024-11-10 PROCEDURE — 74018 RADEX ABDOMEN 1 VIEW: CPT

## 2024-11-10 PROCEDURE — 36556 INSERT NON-TUNNEL CV CATH: CPT | Performed by: INTERNAL MEDICINE

## 2024-11-10 PROCEDURE — 94761 N-INVAS EAR/PLS OXIMETRY MLT: CPT

## 2024-11-10 PROCEDURE — 36415 COLL VENOUS BLD VENIPUNCTURE: CPT

## 2024-11-10 PROCEDURE — 0202U NFCT DS 22 TRGT SARS-COV-2: CPT | Performed by: PHYSICIAN ASSISTANT

## 2024-11-10 PROCEDURE — 25010000002 PROPOFOL 10 MG/ML EMULSION: Performed by: INTERNAL MEDICINE

## 2024-11-10 PROCEDURE — 25010000002 PIPERACILLIN SOD-TAZOBACTAM PER 1 G: Performed by: INTERNAL MEDICINE

## 2024-11-10 PROCEDURE — 25010000002 HALOPERIDOL LACTATE PER 5 MG: Performed by: PHYSICIAN ASSISTANT

## 2024-11-10 PROCEDURE — 83605 ASSAY OF LACTIC ACID: CPT | Performed by: PHYSICIAN ASSISTANT

## 2024-11-10 PROCEDURE — 93005 ELECTROCARDIOGRAM TRACING: CPT | Performed by: PHYSICIAN ASSISTANT

## 2024-11-10 PROCEDURE — 25810000003 SODIUM CHLORIDE 0.9 % SOLUTION: Performed by: INTERNAL MEDICINE

## 2024-11-10 PROCEDURE — 84132 ASSAY OF SERUM POTASSIUM: CPT | Performed by: INTERNAL MEDICINE

## 2024-11-10 PROCEDURE — 82803 BLOOD GASES ANY COMBINATION: CPT | Performed by: EMERGENCY MEDICINE

## 2024-11-10 PROCEDURE — 4A023N8 MEASUREMENT OF CARDIAC SAMPLING AND PRESSURE, BILATERAL, PERCUTANEOUS APPROACH: ICD-10-PCS | Performed by: INTERNAL MEDICINE

## 2024-11-10 PROCEDURE — 80053 COMPREHEN METABOLIC PANEL: CPT | Performed by: PHYSICIAN ASSISTANT

## 2024-11-10 PROCEDURE — 93306 TTE W/DOPPLER COMPLETE: CPT | Performed by: INTERNAL MEDICINE

## 2024-11-10 PROCEDURE — 82948 REAGENT STRIP/BLOOD GLUCOSE: CPT

## 2024-11-10 PROCEDURE — 36600 WITHDRAWAL OF ARTERIAL BLOOD: CPT | Performed by: INTERNAL MEDICINE

## 2024-11-10 PROCEDURE — 0BH17EZ INSERTION OF ENDOTRACHEAL AIRWAY INTO TRACHEA, VIA NATURAL OR ARTIFICIAL OPENING: ICD-10-PCS | Performed by: EMERGENCY MEDICINE

## 2024-11-10 PROCEDURE — 25010000002 PROPOFOL 10 MG/ML EMULSION

## 2024-11-10 PROCEDURE — 85610 PROTHROMBIN TIME: CPT | Performed by: PHYSICIAN ASSISTANT

## 2024-11-10 PROCEDURE — 02HV33Z INSERTION OF INFUSION DEVICE INTO SUPERIOR VENA CAVA, PERCUTANEOUS APPROACH: ICD-10-PCS | Performed by: INTERNAL MEDICINE

## 2024-11-10 PROCEDURE — 74177 CT ABD & PELVIS W/CONTRAST: CPT

## 2024-11-10 PROCEDURE — 93010 ELECTROCARDIOGRAM REPORT: CPT | Performed by: INTERNAL MEDICINE

## 2024-11-10 PROCEDURE — 87147 CULTURE TYPE IMMUNOLOGIC: CPT | Performed by: PHYSICIAN ASSISTANT

## 2024-11-10 PROCEDURE — 25010000002 DIPHENHYDRAMINE PER 50 MG: Performed by: EMERGENCY MEDICINE

## 2024-11-10 PROCEDURE — 5A1945Z RESPIRATORY VENTILATION, 24-96 CONSECUTIVE HOURS: ICD-10-PCS | Performed by: INTERNAL MEDICINE

## 2024-11-10 PROCEDURE — 93306 TTE W/DOPPLER COMPLETE: CPT

## 2024-11-10 PROCEDURE — 25010000002 FENTANYL CITRATE (PF) 50 MCG/ML SOLUTION: Performed by: INTERNAL MEDICINE

## 2024-11-10 PROCEDURE — 87641 MR-STAPH DNA AMP PROBE: CPT | Performed by: INTERNAL MEDICINE

## 2024-11-10 PROCEDURE — 84145 PROCALCITONIN (PCT): CPT | Performed by: INTERNAL MEDICINE

## 2024-11-10 PROCEDURE — 36600 WITHDRAWAL OF ARTERIAL BLOOD: CPT | Performed by: EMERGENCY MEDICINE

## 2024-11-10 PROCEDURE — 25010000002 VANCOMYCIN 750 MG RECONSTITUTED SOLUTION 1 EACH VIAL: Performed by: INTERNAL MEDICINE

## 2024-11-10 PROCEDURE — 82077 ASSAY SPEC XCP UR&BREATH IA: CPT | Performed by: PHYSICIAN ASSISTANT

## 2024-11-10 PROCEDURE — 85014 HEMATOCRIT: CPT

## 2024-11-10 PROCEDURE — 87186 SC STD MICRODIL/AGAR DIL: CPT | Performed by: PHYSICIAN ASSISTANT

## 2024-11-10 PROCEDURE — 5A1D70Z PERFORMANCE OF URINARY FILTRATION, INTERMITTENT, LESS THAN 6 HOURS PER DAY: ICD-10-PCS | Performed by: INTERNAL MEDICINE

## 2024-11-10 PROCEDURE — 85730 THROMBOPLASTIN TIME PARTIAL: CPT | Performed by: PHYSICIAN ASSISTANT

## 2024-11-10 PROCEDURE — 25010000002 LIDOCAINE 2% SOLUTION: Performed by: INTERNAL MEDICINE

## 2024-11-10 PROCEDURE — 25810000003 SODIUM CHLORIDE 0.9 % SOLUTION: Performed by: PHYSICIAN ASSISTANT

## 2024-11-10 PROCEDURE — 25010000002 LORAZEPAM PER 2 MG: Performed by: EMERGENCY MEDICINE

## 2024-11-10 PROCEDURE — 25810000003 SODIUM CHLORIDE 0.9 % SOLUTION 250 ML FLEX CONT: Performed by: INTERNAL MEDICINE

## 2024-11-10 PROCEDURE — 87040 BLOOD CULTURE FOR BACTERIA: CPT | Performed by: PHYSICIAN ASSISTANT

## 2024-11-10 PROCEDURE — 25010000002 ZIPRASIDONE MESYLATE PER 10 MG

## 2024-11-10 RX ORDER — NOREPINEPHRINE BITARTRATE 0.03 MG/ML
.02-.3 INJECTION, SOLUTION INTRAVENOUS
Status: DISCONTINUED | OUTPATIENT
Start: 2024-11-10 | End: 2024-11-13

## 2024-11-10 RX ORDER — DIPHENHYDRAMINE HYDROCHLORIDE 50 MG/ML
25 INJECTION INTRAMUSCULAR; INTRAVENOUS ONCE
Status: COMPLETED | OUTPATIENT
Start: 2024-11-10 | End: 2024-11-10

## 2024-11-10 RX ORDER — SODIUM CHLORIDE 0.9 % (FLUSH) 0.9 %
10 SYRINGE (ML) INJECTION EVERY 12 HOURS SCHEDULED
Status: DISCONTINUED | OUTPATIENT
Start: 2024-11-10 | End: 2024-11-15 | Stop reason: HOSPADM

## 2024-11-10 RX ORDER — CHLORHEXIDINE GLUCONATE ORAL RINSE 1.2 MG/ML
15 SOLUTION DENTAL EVERY 12 HOURS SCHEDULED
Status: DISCONTINUED | OUTPATIENT
Start: 2024-11-10 | End: 2024-11-15 | Stop reason: HOSPADM

## 2024-11-10 RX ORDER — LORAZEPAM 2 MG/ML
1 INJECTION INTRAMUSCULAR ONCE
Status: COMPLETED | OUTPATIENT
Start: 2024-11-10 | End: 2024-11-10

## 2024-11-10 RX ORDER — PHENYLEPHRINE HCL IN 0.9% NACL 0.5 MG/5ML
.5-3 SYRINGE (ML) INTRAVENOUS
Status: DISCONTINUED | OUTPATIENT
Start: 2024-11-10 | End: 2024-11-12

## 2024-11-10 RX ORDER — FAMOTIDINE 10 MG/ML
20 INJECTION, SOLUTION INTRAVENOUS DAILY
Status: DISCONTINUED | OUTPATIENT
Start: 2024-11-10 | End: 2024-11-13

## 2024-11-10 RX ORDER — DEXTROSE MONOHYDRATE 25 G/50ML
25 INJECTION, SOLUTION INTRAVENOUS
Status: DISCONTINUED | OUTPATIENT
Start: 2024-11-10 | End: 2024-11-15 | Stop reason: HOSPADM

## 2024-11-10 RX ORDER — SODIUM CHLORIDE 9 MG/ML
40 INJECTION, SOLUTION INTRAVENOUS AS NEEDED
Status: DISCONTINUED | OUTPATIENT
Start: 2024-11-10 | End: 2024-11-13

## 2024-11-10 RX ORDER — SUCCINYLCHOLINE CHLORIDE 20 MG/ML
INJECTION INTRAMUSCULAR; INTRAVENOUS
Status: COMPLETED | OUTPATIENT
Start: 2024-11-10 | End: 2024-11-10

## 2024-11-10 RX ORDER — POLYETHYLENE GLYCOL 3350 17 G/17G
17 POWDER, FOR SOLUTION ORAL DAILY PRN
Status: DISCONTINUED | OUTPATIENT
Start: 2024-11-10 | End: 2024-11-13

## 2024-11-10 RX ORDER — OXYMETAZOLINE HYDROCHLORIDE 0.05 G/100ML
2 SPRAY NASAL 2 TIMES DAILY
Status: DISPENSED | OUTPATIENT
Start: 2024-11-10 | End: 2024-11-11

## 2024-11-10 RX ORDER — ROCURONIUM BROMIDE 10 MG/ML
INJECTION, SOLUTION INTRAVENOUS
Status: COMPLETED | OUTPATIENT
Start: 2024-11-10 | End: 2024-11-10

## 2024-11-10 RX ORDER — LORAZEPAM 2 MG/ML
2 INJECTION INTRAMUSCULAR ONCE
Status: COMPLETED | OUTPATIENT
Start: 2024-11-10 | End: 2024-11-10

## 2024-11-10 RX ORDER — PHENYLEPHRINE HCL IN 0.9% NACL 0.5 MG/5ML
SYRINGE (ML) INTRAVENOUS
Status: COMPLETED
Start: 2024-11-10 | End: 2024-11-10

## 2024-11-10 RX ORDER — LORAZEPAM 2 MG/ML
2 INJECTION INTRAMUSCULAR ONCE
Status: DISCONTINUED | OUTPATIENT
Start: 2024-11-10 | End: 2024-11-15 | Stop reason: HOSPADM

## 2024-11-10 RX ORDER — NICOTINE POLACRILEX 4 MG
15 LOZENGE BUCCAL
Status: DISCONTINUED | OUTPATIENT
Start: 2024-11-10 | End: 2024-11-15 | Stop reason: HOSPADM

## 2024-11-10 RX ORDER — BISACODYL 10 MG
10 SUPPOSITORY, RECTAL RECTAL DAILY PRN
Status: DISCONTINUED | OUTPATIENT
Start: 2024-11-10 | End: 2024-11-13

## 2024-11-10 RX ORDER — LIDOCAINE HYDROCHLORIDE 20 MG/ML
INJECTION, SOLUTION INFILTRATION; PERINEURAL
Status: DISCONTINUED | OUTPATIENT
Start: 2024-11-10 | End: 2024-11-10 | Stop reason: HOSPADM

## 2024-11-10 RX ORDER — IBUPROFEN 600 MG/1
1 TABLET ORAL
Status: DISCONTINUED | OUTPATIENT
Start: 2024-11-10 | End: 2024-11-15 | Stop reason: HOSPADM

## 2024-11-10 RX ORDER — HALOPERIDOL 5 MG/ML
5 INJECTION INTRAMUSCULAR ONCE
Status: DISCONTINUED | OUTPATIENT
Start: 2024-11-10 | End: 2024-11-10

## 2024-11-10 RX ORDER — AMOXICILLIN 250 MG
2 CAPSULE ORAL 2 TIMES DAILY
Status: DISCONTINUED | OUTPATIENT
Start: 2024-11-10 | End: 2024-11-13

## 2024-11-10 RX ORDER — ZIPRASIDONE MESYLATE 20 MG/ML
INJECTION, POWDER, LYOPHILIZED, FOR SOLUTION INTRAMUSCULAR
Status: COMPLETED
Start: 2024-11-10 | End: 2024-11-10

## 2024-11-10 RX ORDER — BISACODYL 5 MG/1
5 TABLET, DELAYED RELEASE ORAL DAILY PRN
Status: DISCONTINUED | OUTPATIENT
Start: 2024-11-10 | End: 2024-11-13

## 2024-11-10 RX ORDER — NOREPINEPHRINE BITARTRATE/D5W 8 MG/250ML
PLASTIC BAG, INJECTION (ML) INTRAVENOUS
Status: COMPLETED | OUTPATIENT
Start: 2024-11-10 | End: 2024-11-10

## 2024-11-10 RX ORDER — IOPAMIDOL 755 MG/ML
INJECTION, SOLUTION INTRAVASCULAR
Status: DISCONTINUED | OUTPATIENT
Start: 2024-11-10 | End: 2024-11-10 | Stop reason: HOSPADM

## 2024-11-10 RX ORDER — IOPAMIDOL 612 MG/ML
85 INJECTION, SOLUTION INTRAVASCULAR
Status: COMPLETED | OUTPATIENT
Start: 2024-11-10 | End: 2024-11-10

## 2024-11-10 RX ORDER — SODIUM CHLORIDE 0.9 % (FLUSH) 0.9 %
10 SYRINGE (ML) INJECTION AS NEEDED
Status: DISCONTINUED | OUTPATIENT
Start: 2024-11-10 | End: 2024-11-15 | Stop reason: HOSPADM

## 2024-11-10 RX ORDER — ZIPRASIDONE MESYLATE 20 MG/ML
20 INJECTION, POWDER, LYOPHILIZED, FOR SOLUTION INTRAMUSCULAR ONCE
Status: COMPLETED | OUTPATIENT
Start: 2024-11-10 | End: 2024-11-10

## 2024-11-10 RX ORDER — ACETAMINOPHEN 650 MG/1
650 SUPPOSITORY RECTAL EVERY 4 HOURS PRN
Status: DISCONTINUED | OUTPATIENT
Start: 2024-11-10 | End: 2024-11-15 | Stop reason: HOSPADM

## 2024-11-10 RX ORDER — VANCOMYCIN/0.9 % SOD CHLORIDE 1.5G/250ML
20 PLASTIC BAG, INJECTION (ML) INTRAVENOUS ONCE
Status: COMPLETED | OUTPATIENT
Start: 2024-11-10 | End: 2024-11-10

## 2024-11-10 RX ORDER — ACETAMINOPHEN 650 MG/1
650 SUPPOSITORY RECTAL ONCE
Status: COMPLETED | OUTPATIENT
Start: 2024-11-10 | End: 2024-11-10

## 2024-11-10 RX ORDER — ONDANSETRON 2 MG/ML
4 INJECTION INTRAMUSCULAR; INTRAVENOUS EVERY 6 HOURS PRN
Status: DISCONTINUED | OUTPATIENT
Start: 2024-11-10 | End: 2024-11-15 | Stop reason: HOSPADM

## 2024-11-10 RX ORDER — ACETAMINOPHEN 325 MG/1
650 TABLET ORAL EVERY 4 HOURS PRN
Status: DISCONTINUED | OUTPATIENT
Start: 2024-11-10 | End: 2024-11-15 | Stop reason: HOSPADM

## 2024-11-10 RX ORDER — HALOPERIDOL 5 MG/ML
2 INJECTION INTRAMUSCULAR ONCE
Status: COMPLETED | OUTPATIENT
Start: 2024-11-10 | End: 2024-11-10

## 2024-11-10 RX ORDER — ONDANSETRON 4 MG/1
4 TABLET, ORALLY DISINTEGRATING ORAL EVERY 6 HOURS PRN
Status: DISCONTINUED | OUTPATIENT
Start: 2024-11-10 | End: 2024-11-15 | Stop reason: HOSPADM

## 2024-11-10 RX ORDER — MANNITOL 250 MG/ML
12.5 INJECTION, SOLUTION INTRAVENOUS AS NEEDED
Status: ACTIVE | OUTPATIENT
Start: 2024-11-10 | End: 2024-11-11

## 2024-11-10 RX ORDER — FENTANYL CITRATE 50 UG/ML
100 INJECTION, SOLUTION INTRAMUSCULAR; INTRAVENOUS
Status: DISCONTINUED | OUTPATIENT
Start: 2024-11-10 | End: 2024-11-12

## 2024-11-10 RX ORDER — SODIUM CHLORIDE 9 MG/ML
INJECTION, SOLUTION INTRAVENOUS
Status: COMPLETED | OUTPATIENT
Start: 2024-11-10 | End: 2024-11-10

## 2024-11-10 RX ORDER — SODIUM CHLORIDE 0.9 % (FLUSH) 0.9 %
10 SYRINGE (ML) INJECTION AS NEEDED
Status: DISCONTINUED | OUTPATIENT
Start: 2024-11-10 | End: 2024-11-13

## 2024-11-10 RX ORDER — HYDROMORPHONE HYDROCHLORIDE 2 MG/ML
2 INJECTION, SOLUTION INTRAMUSCULAR; INTRAVENOUS; SUBCUTANEOUS ONCE
Status: COMPLETED | OUTPATIENT
Start: 2024-11-10 | End: 2024-11-10

## 2024-11-10 RX ORDER — NITROGLYCERIN 0.4 MG/1
0.4 TABLET SUBLINGUAL
Status: DISCONTINUED | OUTPATIENT
Start: 2024-11-10 | End: 2024-11-15 | Stop reason: HOSPADM

## 2024-11-10 RX ORDER — PROPOFOL 10 MG/ML
VIAL (ML) INTRAVENOUS
Status: COMPLETED
Start: 2024-11-10 | End: 2024-11-10

## 2024-11-10 RX ADMIN — LORAZEPAM 2 MG: 2 INJECTION INTRAMUSCULAR; INTRAVENOUS at 10:07

## 2024-11-10 RX ADMIN — PROPOFOL INJECTABLE EMULSION 7.5 MCG/KG/MIN: 10 INJECTION, EMULSION INTRAVENOUS at 11:37

## 2024-11-10 RX ADMIN — VANCOMYCIN HYDROCHLORIDE 750 MG: 750 INJECTION, POWDER, LYOPHILIZED, FOR SOLUTION INTRAVENOUS at 21:32

## 2024-11-10 RX ADMIN — INSULIN HUMAN 3 UNITS: 100 INJECTION, SOLUTION PARENTERAL at 17:38

## 2024-11-10 RX ADMIN — EPINEPHRINE 0.06 MCG/KG/MIN: 1 INJECTION INTRAMUSCULAR; INTRAVENOUS; SUBCUTANEOUS at 17:39

## 2024-11-10 RX ADMIN — PROPOFOL 7.5 MCG/KG/MIN: 10 INJECTION, EMULSION INTRAVENOUS at 11:37

## 2024-11-10 RX ADMIN — Medication 10 ML: at 22:19

## 2024-11-10 RX ADMIN — ROCURONIUM BROMIDE 100 MG: 10 INJECTION, SOLUTION INTRAVENOUS at 11:31

## 2024-11-10 RX ADMIN — INSULIN HUMAN 3 UNITS: 100 INJECTION, SOLUTION PARENTERAL at 23:44

## 2024-11-10 RX ADMIN — Medication 3 MCG/KG/MIN: at 18:52

## 2024-11-10 RX ADMIN — ACETAMINOPHEN 650 MG: 650 SUPPOSITORY RECTAL at 12:16

## 2024-11-10 RX ADMIN — PIPERACILLIN AND TAZOBACTAM 4.5 G: 4; .5 INJECTION, POWDER, FOR SOLUTION INTRAVENOUS at 21:37

## 2024-11-10 RX ADMIN — SODIUM CHLORIDE 2250 ML: 900 INJECTION, SOLUTION INTRAVENOUS at 13:11

## 2024-11-10 RX ADMIN — Medication 1 MCG/KG/MIN: at 23:35

## 2024-11-10 RX ADMIN — SUCCINYLCHOLINE CHLORIDE 20 MG: 20 INJECTION, SOLUTION INTRAMUSCULAR; INTRAVENOUS; PARENTERAL at 11:31

## 2024-11-10 RX ADMIN — Medication 0.8 MCG/KG/MIN: at 15:09

## 2024-11-10 RX ADMIN — ZIPRASIDONE MESYLATE 20 MG: 20 INJECTION, POWDER, LYOPHILIZED, FOR SOLUTION INTRAMUSCULAR at 10:36

## 2024-11-10 RX ADMIN — NITROGLYCERIN 1 INCH: 20 OINTMENT TOPICAL at 13:07

## 2024-11-10 RX ADMIN — DIPHENHYDRAMINE HYDROCHLORIDE 25 MG: 50 INJECTION, SOLUTION INTRAMUSCULAR; INTRAVENOUS at 09:59

## 2024-11-10 RX ADMIN — FAMOTIDINE 20 MG: 10 INJECTION INTRAVENOUS at 17:38

## 2024-11-10 RX ADMIN — PIPERACILLIN AND TAZOBACTAM 4.5 G: 4; .5 INJECTION, POWDER, FOR SOLUTION INTRAVENOUS at 13:17

## 2024-11-10 RX ADMIN — CHLORHEXIDINE GLUCONATE 15 ML: 1.2 RINSE ORAL at 21:43

## 2024-11-10 RX ADMIN — HYDROMORPHONE HYDROCHLORIDE 2 MG: 2 INJECTION, SOLUTION INTRAMUSCULAR; INTRAVENOUS; SUBCUTANEOUS at 14:04

## 2024-11-10 RX ADMIN — VANCOMYCIN HYDROCHLORIDE 1500 MG: 10 INJECTION, POWDER, LYOPHILIZED, FOR SOLUTION INTRAVENOUS at 14:40

## 2024-11-10 RX ADMIN — DIPHENHYDRAMINE HYDROCHLORIDE 25 MG: 50 INJECTION, SOLUTION INTRAMUSCULAR; INTRAVENOUS at 10:07

## 2024-11-10 RX ADMIN — PROPOFOL INJECTABLE EMULSION 30 MCG/KG/MIN: 10 INJECTION, EMULSION INTRAVENOUS at 21:46

## 2024-11-10 RX ADMIN — NOREPINEPHRINE BITARTRATE 0.3 MCG/KG/MIN: 32 SOLUTION INTRAVENOUS at 21:59

## 2024-11-10 RX ADMIN — IOPAMIDOL 85 ML: 612 INJECTION, SOLUTION INTRAVENOUS at 12:00

## 2024-11-10 RX ADMIN — PROPOFOL INJECTABLE EMULSION 15 MCG/KG/MIN: 10 INJECTION, EMULSION INTRAVENOUS at 18:52

## 2024-11-10 RX ADMIN — FENTANYL CITRATE 100 MCG: 50 INJECTION, SOLUTION INTRAMUSCULAR; INTRAVENOUS at 22:00

## 2024-11-10 RX ADMIN — METOPROLOL TARTRATE 5 MG: 1 INJECTION, SOLUTION INTRAVENOUS at 12:37

## 2024-11-10 RX ADMIN — HALOPERIDOL LACTATE 2 MG: 5 INJECTION, SOLUTION INTRAMUSCULAR at 09:58

## 2024-11-10 RX ADMIN — ACETAMINOPHEN 325MG 650 MG: 325 TABLET ORAL at 23:29

## 2024-11-10 RX ADMIN — LORAZEPAM 1 MG: 2 INJECTION INTRAMUSCULAR; INTRAVENOUS at 09:59

## 2024-11-10 RX ADMIN — MUPIROCIN 1 APPLICATION: 20 OINTMENT TOPICAL at 23:07

## 2024-11-10 NOTE — CONSULTS
[facility] Consult    Patient Name: Sunni Mcneil  Age/Sex: 49 y.o. female  : 1975  MRN: [id number]    Date of Admission: 11/10/2024  Date of Encounter Visit: 11/10/24  Encounter Provider: Maribell Sandhu MD  Referring Provider: No ref. provider found  Place of Service: [facility] CARDIOLOGY  Patient Care Team:  Regla Ying APRN as PCP - General (Nurse Practitioner)  Bo Hansen MD as Consulting Physician (Nephrology)  Damon Rooney MD as Surgeon (General Surgery)    Subjective:     Consulted for:  Anterolateral ST elevation    History of Present Illness:  Sunni Mcneil is a 49 y.o. female with ESRD on hemodialysis (status post kidney transplant in 2016 that has since failed), hypertension, hyperlipidemia, diabetes mellitus type 2, who presented to the hospital with altered mental status.     The patient reportedly was complaining of chills, nausea and diarrhea on the day prior to presentation.  This morning she became confused and agitated and EMS was called.  She was apparently uncooperative when EMS arrived.  Due to ongoing issues with agitation the patient was intubated for airway protection to the hospital.  Lab work was significant for elevated procalcitonin of 37.8, WBC of 16.7, INR of 2.36, creatinine of 11.59, sodium of 131, potassium of 4.6, lactic acid of 5.2.  Respiratory viral panel was negative.  CT of the head was unremarkable.  CT of the abdomen and pelvis showed lower lobe patchy consolidations concerning for pneumonia.  Potassium elevated 6.2.    An EKG was performed around 1330 to measure her QT while she was still in the emergency room.  This showed sinus tachycardia with a rate in 150s, hyperacute anterolateral T waves, and ST elevation in 1 and aVL.  These changes were new compared to prior tracing.   It is unclear if the EKG was reviewed by a physician at that time.  Regardless the patient was then  transferred up to the ICU.  EKG was noticed sometime after arrival.  Repeat EKG was performed again looking at the QT which showed improvement in the lateral ST elevations but now showed lateral T wave inversions and ongoing hyperacute T waves in the anterolateral leads.  Heart rate was down into the 130s and is told showed sinus tachycardia.  I was notified around this time regarding her EKG changes.  Troponin drawn around this time was elevated to 39.    On my arrival the patient was intubated.  There is no family at bedside initially.  She was tachycardic with heart rates in the 120s.  Systolic pressures although quite elevated on arrival had declined with systolic in the 90s after she received both Dilaudid and an increase in propofol infusion.    The patient was previously evaluated by Dr. Herzog in 11/2022 for preoperative evaluation.  She underwent a treadmill stress test at that time that was submaximal but showed no ischemic changes.  It appears that she underwent an echocardiogram in 12/2022 that showed normal left ventricular function and wall motion with an EF of 60 to 65%, normal diastolic function and no significant valvular disease.    Past Medical History:  Past Medical History:   Diagnosis Date    Acid reflux     Anemia     WITH ESRD    Anxiety     Arthritis     Cough     related to fluid overload    Dependence on renal dialysis 05/17/2021    Depression     Diabetes mellitus     NO MEDICATIONS FOR    End stage renal disease 05/17/2021    ESRD on dialysis     FRESINUS MWF    History of peritoneal dialysis     KIDNEY TRANSPLANT SEPT 2016     History of transfusion     BEEN A WHILE no reaction    Hypercalcemia     Hyperparathyroidism     Hypertension     Kidney disease     End-stage renal disease. TRANSPLANT    Kidney transplant recipient 09/02/2016    RIGHT KIDNEY. ? 2018 KIDNEY REJECTED    PONV (postoperative nausea and vomiting)     Seasonal allergies     CURRENTLY     Vascular dialysis catheter in  place     RIGHT TUNNEL CATH       Past Surgical History:   Procedure Laterality Date    ARTERIOVENOUS FISTULA/SHUNT SURGERY Right 02/26/2020    Procedure: RIGHT ARM ARTERIAL VENOUS FISTULA;  Surgeon: Elijah Herrera MD;  Location: [facility] MAIN OR;  Service: Vascular;  Laterality: Right;    ARTERIOVENOUS FISTULA/SHUNT SURGERY Left 02/04/2021    Procedure: LEFT BRACHIOAXILLARY ARTERIOVENOUS FISTULA GRAFT;  Surgeon: Anya Hernandez Jr., MD;  Location: [facility] MAIN OR;  Service: Vascular;  Laterality: Left;    ARTERIOVENOUS FISTULA/SHUNT SURGERY Right 03/16/2021    Procedure: RIGHT BRACHIOAXILLARY ARTERVENIOUS GRAFT PLACEMENT;  Surgeon: dAán Maurer MD;  Location: [facility] MAIN OR;  Service: Vascular;  Laterality: Right;    ARTERIOVENOUS FISTULA/SHUNT SURGERY Right 8/4/2023    Procedure: RIGHT ARM FISTULOGRAM PEUCUTANEOUS AND PERIPHERAL TRANSLUMINAL ANGIOPLSTY/STENT;  Surgeon: Elijah Herrera MD;  Location: [facility] OR 18/19;  Service: Vascular;  Laterality: Right;    BRACHIAL EMBOLECTOMY Right 10/6/2023    Procedure: RIGHT AV GRAFT WITH ANGIOPLASTY AND CENTROVENOUS ANGIOPLASTY;  Surgeon: Phu Gaviria MD;  Location: [facility] OR 18/19;  Service: Vascular;  Laterality: Right;    COLONOSCOPY N/A 11/30/2022    Procedure: COLONOSCOPY TO CECUM AND TERM. ILEUM;  Surgeon: Casimiro Perkins MD;  Location: [facility] ENDOSCOPY;  Service: Gastroenterology;  Laterality: N/A;  PRE OP - SCREENING  POST OP - DIVERTICULOSIS, SM. HEMORRHOIDS    INSERTION AND REMOVAL HEMODIALYSIS CATHETER N/A 02/13/2021    Procedure: INSERTION AND REMOVAL OF HEMODIALYSIS CATHETER;  Surgeon: Adán Maurer MD;  Location: [facility] MAIN OR;  Service: Vascular;  Laterality: N/A;    INSERTION HEMODIALYSIS CATHETER Right 01/15/2021    Procedure: TUNNELED DIAYLIS CATHETER PLACEMENT;  Surgeon: Phu Gaviria MD;  Location: [facility] OR 18/19;  Service: Vascular;  Laterality: Right;    INSERTION HEMODIALYSIS  CATHETER Left 1/21/2023    Procedure: HEMODIALYSIS CATHETER INSERTION;  Surgeon: Elijah Herrera MD;  Location: [facility] MAIN OR;  Service: Vascular;  Laterality: Left;    INSERTION PERITONEAL DIALYSIS CATHETER  07/29/2014    Laparoscopic placement of [place]url Cath peritoneal dialysis catheter, Dr. Vinod Goldstein    INSERTION PERITONEAL DIALYSIS CATHETER N/A 07/24/2019    Procedure: LAPAROSCOPIC INSERTION PERITONEAL DIALYSIS CATHETER;  Surgeon: Damon Rooney MD;  Location: [facility] MAIN OR;  Service: General    REMOVAL HEMODIALYSIS CATHETER N/A 2/15/2023    Procedure: PALINDROME REMOVAL;  Surgeon: Elijah Herrera MD;  Location: [facility] MAIN OR;  Service: Vascular;  Laterality: N/A;    REMOVAL PERITONEAL DIALYSIS CATHETER N/A 10/17/2016    Procedure: REMOVAL PERITONEAL DIALYSIS CATHETER;  Surgeon: Damon Rooney MD;  Location: [facility] MAIN OR;  Service:     REMOVAL PERITONEAL DIALYSIS CATHETER N/A 10/21/2020    Procedure: REMOVAL PERITONEAL DIALYSIS CATHETER;  Surgeon: Damon Rooney MD;  Location: [facility] MAIN OR;  Service: General;  Laterality: N/A;    SHUNT O GRAM Right 07/28/2022    Procedure: RIGHT  ARM SHUNT O GRAM , ANGIOPLASTY OF AXILARY VEIN AND SUBCLAVIAN VEIN AT SVC JUNCTION;  Surgeon: Anya Hernandez Jr., MD;  [facility];  Service: Vascular;  Laterality: Right;    SHUNT O GRAM Right 1/20/2023    Procedure: OPEN THROMBECTOMY AND ANGIOPLASTY;  Surgeon: Elijah Herrera MD;  [facility];  Service: Vascular;  Laterality: Right;    SHUNT O GRAM Right 1/23/2023    Procedure: Right arm dialysis graft open thrombectomy, shuntogram, angioplasty and stent;  Surgeon: Karuna Villalba MD;  [facility];  Service: Vascular;  Laterality: Right;    SHUNT O GRAM Right 12/20/2023    Procedure: RIGHT ARM FISTULOGRAM, ANGIOPLASTY;  Surgeon: Elijah Herrera MD;  [facility];  Service:  Vascular;  Laterality: Right;    SHUNT O GRAM Right 5/3/2024    Procedure: Hemodialysis shunt thrombectomy with revision;  Surgeon: Alex Sanchez MD;  Location: [facility];  Service: Vascular;  Laterality: Right;    TRANSPLANTATION RENAL Right 2016    from  donor    TUBAL ABDOMINAL LIGATION Bilateral        Home Medications:   Medications Prior to Admission   Medication Sig Dispense Refill Last Dose/Taking    acetaminophen (TYLENOL) 325 MG tablet Take 2 tablets by mouth Every 6 (Six) Hours As Needed for Mild Pain.       bisacodyl (DULCOLAX) 5 MG EC tablet Take 1 tablet by mouth Daily As Needed for Constipation (Use if polyethylene glycol is ineffective). 30 tablet 0     calcitriol (ROCALTROL) 0.25 MCG capsule Take 1 capsule by mouth Take As Directed. Calcitriol 0.25 MCG Capsule (si capsule once per day )       calcium acetate (PHOSLO) 667 MG capsule Take 3 capsules by mouth 3 (Three) Times a Day With Meals.       cloNIDine (CATAPRES) 0.1 MG tablet Take 1 tablet by mouth Daily. 30 tablet 3     hydrALAZINE (APRESOLINE) 25 MG tablet Take 1 tablet by mouth Daily.       hydrOXYzine (ATARAX) 50 MG tablet Take 1 tablet by mouth As Needed.       naloxone (NARCAN) 4 MG/0.1ML nasal spray Call 911. Don't prime. [place] in 1 nostril for overdose. Repeat in 2-3 minutes in other nostril if no or minimal breathing/responsiveness. 2 each 0     ondansetron ODT (ZOFRAN-ODT) 4 MG disintegrating tablet Take 1 tablet by mouth As Needed for Nausea or Vomiting.       polyethylene glycol (MIRALAX) 17 GM/SCOOP powder Take 17 g by mouth Daily As Needed (Use if senna-docusate is ineffective). 510 g 0     Polyethylene Glycol 3350 (MIRALAX PO) Take 1 dose by mouth As Needed.       prochlorperazine (COMPAZINE) 10 MG tablet Take 1 tablet by mouth Every 6 (Six) Hours As Needed for Nausea or Vomiting. 10 tablet 0     propranolol (INDERAL) 10 MG tablet Take 1 tablet by mouth 2 (Two) Times a Day.       sennosides-docusate  (PERICOLACE) 8.6-50 MG per tablet Take 2 tablets by mouth 2 (Two) Times a Day As Needed for Constipation. 60 tablet 0     traZODone (DESYREL) 100 MG tablet Take 1.5 tablets by mouth every night at bedtime.       warfarin (COUMADIN) 5 MG tablet Take 1 tablet by mouth Daily.          Allergies:  No Known Allergies    Past Social History:  Social History     Socioeconomic History    Marital status:     Number of children: 2    Years of education: 12   Tobacco Use    Smoking status: Never     Passive exposure: Never    Smokeless tobacco: Never    Tobacco comments:     Patient does not smoke   Vaping Use    Vaping status: Never Used   Substance and Sexual Activity    Alcohol use: No     Comment: caffeine use; Patient is unknown if ever drinks    Drug use: Never     Comment: Drug Abuse: none    Sexual activity: Defer       Past Family History:  Family History   Problem Relation Age of Onset    Hypertension Mother     Hypertension Father     Heart attack Maternal Grandfather     Malig Hyperthermia Neg Hx        Review of Systems:   All systems reviewed. Pertinent positives identified in HPI. All other systems are negative.    Objective:   Temp:  [100.5 °F (38.1 °C)] 100.5 °F (38.1 °C)  Heart Rate:  [113-154] 121  Resp:  [22] 22  BP: ()/() 83/27   No intake or output data in the 24 hours ending 11/10/24 1515  Body mass index is 32.42 kg/m².      11/10/24  1042 11/10/24  1425   Weight: 75.3 kg (166 lb 0.1 oz) 75.3 kg (166 lb)     Weight change:     Physical Exam:   General Appearance:  Sedated, intubated   Head:    Normocephalic, without obvious abnormality, atraumatic   Eyes:            Lids and lashes normal, conjunctivae and sclerae normal, no   icterus, no pallor, corneas clear, PERRLA   Ears:    Ears appear intact with no abnormalities noted   Neck:   No adenopathy, supple, trachea midline, no thyromegaly, no   carotid bruit, no JVD   Lungs:     Clear to auscultation,respirations regular, even and  unlabored    Heart:    Tachycardic, regular, normal S1 and S2, no murmur, no gallop, no rub, no click   Chest Wall:    No abnormalities observed   Abdomen:     Normal bowel sounds, no masses, no organomegaly, soft        non-tender, non-distended, no guarding, no rebound  tenderness   Extremities:   Moves all extremities well, no edema, no cyanosis, no redness   Pulses:   Pulses palpable and equal bilaterally. Normal radial, carotid, femoral, dorsalis pedis and posterior tibial pulses bilaterally. Normal abdominal aorta   Skin:  Psychiatric:   No bleeding, bruising or rash  Sedated       Lab Review:   Results from last 7 days   Lab Units 11/10/24  1420 11/10/24  1002   SODIUM mmol/L  --  131*   POTASSIUM mmol/L 6.2* 4.6   CHLORIDE mmol/L  --  87*   CO2 mmol/L  --  14.0*   BUN mg/dL  --  56*   CREATININE mg/dL  --  11.59*   GLUCOSE mg/dL  --  118*   CALCIUM mg/dL  --  9.6   AST (SGOT) U/L  --  22   ALT (SGPT) U/L  --  9     Results from last 7 days   Lab Units 11/10/24  1420   HSTROP T ng/L 239*     Results from last 7 days   Lab Units 11/10/24  1002 11/08/24  0000   WBC 10*3/mm3 16.70*  --    HEMOGLOBIN g/dL 12.0 11*  33*   HEMATOCRIT % 35.9  --    PLATELETS 10*3/mm3 162  --      Results from last 7 days   Lab Units 11/10/24  1002   INR  2.36*   APTT seconds 37.5*         Imaging:  Imaging Results (Most Recent)       Procedure Component Value Units Date/Time    XR Abdomen KUB [[id number]] Resulted: 11/10/24 1449     Updated: 11/10/24 1456    XR Chest 1 View [[id number]] Collected: 11/10/24 1309     Updated: 11/10/24 1313    Narrative:      XR CHEST 1 VW-     DATE OF EXAM: 11/10/2024 12:45 PM     INDICATION: SOA, hypoxia.     COMPARISON: Radiographs 11/10/2024, 5/8/2023, and 7/26/2022. CT abdomen  pelvis 11/10/2024.     TECHNIQUE: A single portable AP view of the chest was obtained.     FINDINGS:  Lordotic positioning. Overlying artifacts. Appropriate retraction of the  endotracheal tube now terminating in the mid  thoracic trachea,  approximately 4 cm proximal to the jose. Low lung volumes with  worsening patchy predominately perihilar and medial bibasilar opacities,  likely pneumonia. No pneumothorax. Cardiomediastinal contours are  unchanged. No acute osseous abnormality is identified. Moderate air  distention of the partially included stomach.       Impression:         1. Appropriate repositioning of the endotracheal tube now terminating in  the mid thoracic trachea.  2. Worsening predominately perihilar and medial bibasilar opacities,  likely pneumonia.     This report was finalized on 11/10/2024 1:10 PM by Corbin West MD on  Workstation: [id number]       CT Abdomen Pelvis With Contrast [[id number]] Collected: 11/10/24 1226     Updated: 11/10/24 1237    Narrative:      CT ABDOMEN PELVIS W CONTRAST-     DATE OF EXAM: 11/10/2024 11:38 AM     INDICATION: Altered mental status, fever, abdominal pain.     COMPARISON: Radiographs 11/10/2024, 7/6/2022, and 3/15/2021. CT abdomen  and pelvis 5/5/2020.     TECHNIQUE: Multiple contiguous axial images were acquired through the  abdomen and pelvis following the intravenous administration of 85 mL of  Isovue-300. Reformatted coronal and sagittal sequences were also  reviewed. Radiation dose reduction techniques were utilized, including  automated exposure control and exposure modulation based on body size.     FINDINGS:  Partially imaged patchy consolidation in each lower lobe, likely  pneumonia, with trace bilateral pleural effusions. Calcified remnants of  prior granulomatous disease in the right lung base and the partially  imaged mediastinum and bilateral minor. Mild to moderate calcified  coronary artery disease. Trace pericardial fluid.     Calcified remnants of prior granulomatous disease in the liver and  spleen. The spleen measures at the upper limits of normal at 14 cm in  greatest diameter. The gallbladder, pancreas, and adrenal glands are  unremarkable. Chronic  bilateral renal atrophy parenchymal atrophy of the  right pelvic renal transplant with nonspecific peripelvic and  periureteral fat stranding. The urinary bladder is nondistended. Diffuse  urinary bladder wall thickening and mild perivesical fat stranding,  which could be accentuated by under distention. Bulky fibroid uterus.  The adnexa are unremarkable in CT appearance.     Mild fluid and air distention of the stomach. Mild rectal stool. Colonic  diverticula, without CT evidence of diverticulitis. No bowel obstruction  or significant bowel wall thickening. The appendix is normal.     Trace free fluid in the pelvis. No free intraperitoneal air. No  pathologically enlarged lymph nodes in the abdomen or pelvis. No acute  osseous abnormality or concerning osseous lesion. The upper extremities  are partially included in the field-of-view but not adequately  evaluated.       Impression:         1. Parenchymal atrophy of the right pelvic renal transplant with right  peripelvic and periureteral fat stranding. Urinary bladder wall  thickening and mild perivesical fat stranding, which could be  accentuated by under distention. Findings could reflect urinary tract  infection. Recommend correlating with urinalysis.  2. Partially imaged patchy consolidation in each lower lobe, likely  pneumonia, with trace bilateral pleural effusions.     This report was finalized on 11/10/2024 12:34 PM by Corbin West MD on  Workstation: [id number]       CT Head Without Contrast [[id number]] Collected: 11/10/24 1224     Updated: 11/10/24 1232    Narrative:      CT HEAD WO CONTRAST-     INDICATION: Altered mental status     COMPARISON: CT head March 6, 2021     TECHNIQUE:  Routine CT head without IV contrast. Coronal and sagittal reformats.  Radiation dose reduction techniques were utilized, including automated  exposure control and exposure modulation based on body size.     FINDINGS:      Brain: No intraparenchymal hemorrhage. Preserved  gray-white  differentiation. No mass effect or midline shift.     Ventricles: No hydrocephalus. No intraventricular hemorrhage.     Extra-axial spaces: No extra-axial hemorrhage or fluid collection.     Osseous structures: No fracture or bone lesion.     Sinuses: Patent mastoid air cells and middle ears. Mucosal thickening in  the ethmoid air cells with partial opacification of the right ethmoid  air cells.     Other: Large amount of secretions seen in the nasopharynx extending into  the nasal cavity.       Impression:         1. No acute intracranial process.  2. Large amount of secretions seen in the nasal cavity extending into  the posterior nasal cavity     This report was finalized on 11/10/2024 12:29 PM by Dr. Casimiro Schneider M.D on Workstation: [id number]       XR Chest 1 View [[id number]] Collected: 11/10/24 1157     Updated: 11/10/24 1203    Narrative:      XR CHEST 1 VW-        INDICATION: Altered mental status     COMPARISON: Chest radiograph May 8, 2023     TECHNIQUE: 1 view chest     FINDINGS:      Increased lung markings. Vascular congestion. No focal opacity. No  effusions. Low volumes. Stable mediastinum. Endotracheal tube tip is  located 1.2 cm above the jose, projecting towards the right mainstem  bronchus. Right axillary stent.       Impression:         1. Endotracheal tube tip is just above the jose, projects towards the  right mainstem bronchus. Consider retraction by 2 cm.  2. Increased lung markings. Question some underlying airways disease and  vascular congestion     This report was finalized on 11/10/2024 12:00 PM by Dr. Casimiro Schneider M.D on Workstation: [id number]               I personally viewed and interpreted the patient's EKG    Assessment/Plan:     1.  Possible anterolateral myocardial infarction.  Repeat EKG following arrival to the unit showed improvement in the lateral ST elevations although she continues to have hyperacute anterolateral T waves. A stat echocardiogram  was performed which shows wall motion abnormalities including of the anterior and anteroseptal wall.    2.  Metabolic acidosis  3.  Pneumonia  4.  Acute metabolic encephalopathy  5.  Acute hypoxic respiratory failure  6.  Hyponatremia  7.  Hyperkalemia  8.  Lactic acidosis  9.  End-stage renal disease on hemodialysis  10.  Recurrent AV fistula thrombosis.  On chronic anticoagulation with warfarin.  Her INR is 2.3 here.    -Recommend proceeding emergently with coronary angiograms for further assessment.  I discussed with the patient's nurses, IM, and the patient's daughter at bedside.      Thank you for allowing me to participate in the care of Sunni Mcneil. Feel free to contact me directly with any further questions or concerns.    Maribell Sandhu MD  [facility]  11/10/24  15:15 EST

## 2024-11-10 NOTE — ED PROVIDER NOTES
EMERGENCY DEPARTMENT ENCOUNTER      Room Number:  08/08  PCP: Regla Ying APRN  Independent Historians: Family  Patient Care Team:  Regla Ying APRN as PCP - General (Nurse Practitioner)  Bo Hansen MD as Consulting Physician (Nephrology)  Damon Rooney MD as Surgeon (General Surgery)       HPI:  Chief Complaint: AMS    A complete HPI/ROS/PMH/PSH/SH/FH are unobtainable due to: Altered Mental Status    Chronic or social conditions impacting patient care (Social Determinants of Health): None      Context: Sunni Mcneil is a 49 y.o. female with a PMH significant for hypertension, ESRD on dialysis, type 2 diabetes, chronic anemia who presents to the ED c/o acute altered mental status.  Family reports the patient talk to her yesterday and reported that she was not feeling well with chills and nausea and diarrhea.  Family did not speak to the patient through the evening yesterday but this morning her brother called her asking to call an ambulance because her mother was not acting normally.  The patient arrives combative, psychotic, uncooperative.  No apparent injuries.  She is febrile with a 100.5 temp.      Upon review of prior external notes (non-ED) -and- Review of prior external test results outside of this encounter it appears the patient was evaluated in the office with family medicine for STD screening on 8/21/2024.  The patient had a normal glucose and hCG on 5/3/2024.      PAST MEDICAL HISTORY  Active Ambulatory Problems     Diagnosis Date Noted    Hyperparathyroidism 01/24/2018    Acute rejection of kidney transplant 05/10/2017    ARF (acute renal failure) 04/04/2014    Hx of kidney transplant 07/06/2017    Hypertension 01/24/2018    Kidney transplant status, cadaveric 07/06/2017    Nephritis 04/13/2014    Hypercalcemia 01/24/2018    ESRD on hemodialysis 07/02/2019    Prolonged QT interval 11/07/2019    ESRD (end stage renal disease) 11/07/2019    Hyperphosphatemia 11/07/2019     Leukocytosis 11/07/2019    Type 2 diabetes mellitus 11/07/2019    Acute UTI (urinary tract infection) 03/13/2020    Obesity (BMI 30-39.9) 03/13/2020    A-V fistula 03/13/2020    Anemia, chronic disease 03/13/2020    Folate deficiency anemia 03/16/2020    Febrile illness, acute 05/06/2020    Sepsis 05/06/2020    Screening for colon cancer 09/15/2020    Hyperkalemia 01/14/2021    Shunt malfunction 01/15/2021    Anemia 02/13/2021    Constipation 03/10/2021    Screening for colorectal cancer 03/10/2021    Hypervolemia 03/15/2021    AV graft thrombosis, initial encounter 01/20/2023    ESRD (end stage renal disease) 01/21/2023    GERD without esophagitis 01/21/2023    AV graft stenosis, initial encounter 12/20/2023    Arteriovenous fistula occlusion 05/02/2024     Resolved Ambulatory Problems     Diagnosis Date Noted    Peritoneal dialysis catheter in place 07/24/2019    Hypokalemia 11/07/2019    Nausea 11/07/2019    Hypocalcemia 11/07/2019    Diarrhea 03/13/2020    Acute cystitis with hematuria 03/13/2020    Mechanical complication of hemodialysis catheter 02/13/2021    Arteriovenous fistula occlusion, initial encounter 01/21/2023    Hyperkalemia 01/21/2023    Hyperphosphatemia 01/21/2023     Past Medical History:   Diagnosis Date    Acid reflux     Anxiety     Arthritis     Cough     Dependence on renal dialysis 05/17/2021    Depression     Diabetes mellitus     End stage renal disease 05/17/2021    ESRD on dialysis     History of peritoneal dialysis     History of transfusion     Kidney disease     Kidney transplant recipient 09/02/2016    PONV (postoperative nausea and vomiting)     Seasonal allergies     Vascular dialysis catheter in place          PAST SURGICAL HISTORY  Past Surgical History:   Procedure Laterality Date    ARTERIOVENOUS FISTULA/SHUNT SURGERY Right 02/26/2020    Procedure: RIGHT ARM ARTERIAL VENOUS FISTULA;  Surgeon: Elijah Herrera MD;  Location: [facility] OR;  Service: Vascular;   Laterality: Right;    ARTERIOVENOUS FISTULA/SHUNT SURGERY Left 02/04/2021    Procedure: LEFT BRACHIOAXILLARY ARTERIOVENOUS FISTULA GRAFT;  Surgeon: Anya Hernandez Jr., MD;  Location: [facility] MAIN OR;  Service: Vascular;  Laterality: Left;    ARTERIOVENOUS FISTULA/SHUNT SURGERY Right 03/16/2021    Procedure: RIGHT BRACHIOAXILLARY ARTERVENIOUS GRAFT PLACEMENT;  Surgeon: Adán Maurer MD;  Location: [facility] MAIN OR;  Service: Vascular;  Laterality: Right;    ARTERIOVENOUS FISTULA/SHUNT SURGERY Right 8/4/2023    Procedure: RIGHT ARM FISTULOGRAM PEUCUTANEOUS AND PERIPHERAL TRANSLUMINAL ANGIOPLSTY/STENT;  Surgeon: Elijah Herrera MD;  Location: [facility] OR 18/19;  Service: Vascular;  Laterality: Right;    BRACHIAL EMBOLECTOMY Right 10/6/2023    Procedure: RIGHT AV GRAFT WITH ANGIOPLASTY AND CENTROVENOUS ANGIOPLASTY;  Surgeon: Phu Gaviria MD;  Location: [facility] OR 18/19;  Service: Vascular;  Laterality: Right;    COLONOSCOPY N/A 11/30/2022    Procedure: COLONOSCOPY TO CECUM AND TERM. ILEUM;  Surgeon: Casimiro Perkins MD;  Location: [facility] ENDOSCOPY;  Service: Gastroenterology;  Laterality: N/A;  PRE OP - SCREENING  POST OP - DIVERTICULOSIS, SM. HEMORRHOIDS    INSERTION AND REMOVAL HEMODIALYSIS CATHETER N/A 02/13/2021    Procedure: INSERTION AND REMOVAL OF HEMODIALYSIS CATHETER;  Surgeon: Adán Maurer MD;  Location: [facility] MAIN OR;  Service: Vascular;  Laterality: N/A;    INSERTION HEMODIALYSIS CATHETER Right 01/15/2021    Procedure: TUNNELED DIAYLIS CATHETER PLACEMENT;  Surgeon: Phu Gaviria MD;  Location: [facility] OR 18/19;  Service: Vascular;  Laterality: Right;    INSERTION HEMODIALYSIS CATHETER Left 1/21/2023    Procedure: HEMODIALYSIS CATHETER INSERTION;  Surgeon: Elijah Herrera MD;  Location: [facility] MAIN OR;  Service: Vascular;  Laterality: Left;    INSERTION PERITONEAL DIALYSIS CATHETER  07/29/2014    Laparoscopic placement of Curl Cath peritoneal  dialysis catheter, Dr. Vinod Goldstein    INSERTION PERITONEAL DIALYSIS CATHETER N/A 07/24/2019    Procedure: LAPAROSCOPIC INSERTION PERITONEAL DIALYSIS CATHETER;  Surgeon: Damon Rooney MD;  Location: [facility] MAIN OR;  Service: General    REMOVAL HEMODIALYSIS CATHETER N/A 2/15/2023    Procedure: PALINDROME REMOVAL;  Surgeon: Elijah Herrera MD;  Location: [facility] MAIN OR;  Service: Vascular;  Laterality: N/A;    REMOVAL PERITONEAL DIALYSIS CATHETER N/A 10/17/2016    Procedure: REMOVAL PERITONEAL DIALYSIS CATHETER;  Surgeon: Damon Rooney MD;  Location: [facility] MAIN OR;  Service:     REMOVAL PERITONEAL DIALYSIS CATHETER N/A 10/21/2020    Procedure: REMOVAL PERITONEAL DIALYSIS CATHETER;  Surgeon: Damon Rooney MD;  Location: [facility] MAIN OR;  Service: General;  Laterality: N/A;    SHUNT O GRAM Right 07/28/2022    Procedure: RIGHT  ARM SHUNT O GRAM , ANGIOPLASTY OF AXILARY VEIN AND SUBCLAVIAN VEIN AT SVC JUNCTION;  Surgeon: Anya Hernandez Jr., MD;  Location: [facility] OR 18/19;  Service: Vascular;  Laterality: Right;    SHUNT O GRAM Right 1/20/2023    Procedure: OPEN THROMBECTOMY AND ANGIOPLASTY;  Surgeon: Elijah Herrera MD;  Location: [facility] OR 18/19;  Service: Vascular;  Laterality: Right;    SHUNT O GRAM Right 1/23/2023    Procedure: Right arm dialysis graft open thrombectomy, shuntogram, angioplasty and stent;  Surgeon: Karuna Villalba MD;  Location: [facility] OR 18/19;  Service: Vascular;  Laterality: Right;    SHUNT O GRAM Right 12/20/2023    Procedure: RIGHT ARM FISTULOGRAM, ANGIOPLASTY;  Surgeon: Elijah Herrera MD;  Location: [facility] OR 18/19;  Service: Vascular;  Laterality: Right;    SHUNT O GRAM Right 5/3/2024    Procedure: Hemodialysis shunt thrombectomy with revision;  Surgeon: Alex Sanchez MD;  Location: [facility] OR;  Service: Vascular;  Laterality: Right;    TRANSPLANTATION RENAL Right 09/02/2016    from   donor    TUBAL ABDOMINAL LIGATION Bilateral          FAMILY HISTORY  Family History   Problem Relation Age of Onset    Hypertension Mother     Hypertension Father     Heart attack Maternal Grandfather     Malig Hyperthermia Neg Hx          SOCIAL HISTORY  Social History     Socioeconomic History    Marital status:     Number of children: 2    Years of education: 12   Tobacco Use    Smoking status: Never     Passive exposure: Never    Smokeless tobacco: Never    Tobacco comments:     Patient does not smoke   Vaping Use    Vaping status: Never Used   Substance and Sexual Activity    Alcohol use: No     Comment: caffeine use; Patient is unknown if ever drinks    Drug use: Never     Comment: Drug Abuse: none    Sexual activity: Defer         ALLERGIES  Patient has no known allergies.      REVIEW OF SYSTEMS  Included in HPI  All systems reviewed and negative except for those discussed in HPI.      PHYSICAL EXAM    I have reviewed the triage vital signs and nursing notes.    ED Triage Vitals [11/10/24 0947]   Temp Heart Rate Resp BP SpO2   -- 113 -- -- 97 %      Temp src Heart Rate Source Patient Position BP Location FiO2 (%)   -- -- -- -- --       Physical Exam  Constitutional:       General: She is not in acute distress.     Appearance: She is well-developed.   HENT:      Head: Normocephalic and atraumatic.   Eyes:      General: No scleral icterus.     Conjunctiva/sclera: Conjunctivae normal.   Neck:      Trachea: No tracheal deviation.   Cardiovascular:      Rate and Rhythm: Regular rhythm. Tachycardia present.   Pulmonary:      Effort: Pulmonary effort is normal.      Breath sounds: Normal breath sounds.   Abdominal:      Palpations: Abdomen is soft.      Tenderness: There is no abdominal tenderness.   Musculoskeletal:         General: No deformity.      Cervical back: Normal range of motion.   Lymphadenopathy:      Cervical: No cervical adenopathy.   Skin:     General: Skin is warm and dry.    Neurological:      Mental Status: She is alert and oriented to person, place, and time.   Psychiatric:         Mood and Affect: Affect is labile.         Behavior: Behavior is agitated and combative.         Cognition and Memory: Cognition is impaired. Memory is impaired.         Vital signs and nursing notes reviewed.      PPE: I wore a surgical mask throughout my encounters with the pt. I performed hand hygiene on entry into the pt room and upon exit.     LAB RESULTS  Recent Results (from the past 24 hours)   Comprehensive Metabolic Panel    Collection Time: 11/10/24 10:02 AM    Specimen: Blood   Result Value Ref Range    Glucose 118 (H) 65 - 99 mg/dL    BUN 56 (H) 6 - 20 mg/dL    Creatinine 11.59 (H) 0.57 - 1.00 mg/dL    Sodium 131 (L) 136 - 145 mmol/L    Potassium 4.6 3.5 - 5.2 mmol/L    Chloride 87 (L) 98 - 107 mmol/L    CO2 14.0 (L) 22.0 - 29.0 mmol/L    Calcium 9.6 8.6 - 10.5 mg/dL    Total Protein 8.3 6.0 - 8.5 g/dL    Albumin 4.0 3.5 - 5.2 g/dL    ALT (SGPT) 9 1 - 33 U/L    AST (SGOT) 22 1 - 32 U/L    Alkaline Phosphatase 58 39 - 117 U/L    Total Bilirubin 0.6 0.0 - 1.2 mg/dL    Globulin 4.3 gm/dL    A/G Ratio 0.9 g/dL    BUN/Creatinine Ratio 4.8 (L) 7.0 - 25.0    Anion Gap 30.0 (H) 5.0 - 15.0 mmol/L    eGFR 3.7 (L) >60.0 mL/min/1.73   Ethanol    Collection Time: 11/10/24 10:02 AM    Specimen: Blood   Result Value Ref Range    Ethanol <10 0 - 10 mg/dL    Ethanol % <0.010 %   Protime-INR    Collection Time: 11/10/24 10:02 AM    Specimen: Blood   Result Value Ref Range    Protime 26.1 (H) 11.7 - 14.2 Seconds    INR 2.36 (H) 0.90 - 1.10   aPTT    Collection Time: 11/10/24 10:02 AM    Specimen: Blood   Result Value Ref Range    PTT 37.5 (H) 22.7 - 35.4 seconds   CBC Auto Differential    Collection Time: 11/10/24 10:02 AM    Specimen: Blood   Result Value Ref Range    WBC 16.70 (H) 3.40 - 10.80 10*3/mm3    RBC 4.04 3.77 - 5.28 10*6/mm3    Hemoglobin 12.0 12.0 - 15.9 g/dL    Hematocrit 35.9 34.0 - 46.6 %    MCV  88.9 79.0 - 97.0 fL    MCH 29.7 26.6 - 33.0 pg    MCHC 33.4 31.5 - 35.7 g/dL    RDW 17.7 (H) 12.3 - 15.4 %    RDW-SD 56.6 (H) 37.0 - 54.0 fl    MPV 9.5 6.0 - 12.0 fL    Platelets 162 140 - 450 10*3/mm3    Neutrophil % 91.6 (H) 42.7 - 76.0 %    Lymphocyte % 4.4 (L) 19.6 - 45.3 %    Monocyte % 2.9 (L) 5.0 - 12.0 %    Eosinophil % 0.0 (L) 0.3 - 6.2 %    Basophil % 0.2 0.0 - 1.5 %    Immature Grans % 0.9 (H) 0.0 - 0.5 %    Neutrophils, Absolute 15.31 (H) 1.70 - 7.00 10*3/mm3    Lymphocytes, Absolute 0.73 0.70 - 3.10 10*3/mm3    Monocytes, Absolute 0.48 0.10 - 0.90 10*3/mm3    Eosinophils, Absolute 0.00 0.00 - 0.40 10*3/mm3    Basophils, Absolute 0.03 0.00 - 0.20 10*3/mm3    Immature Grans, Absolute 0.15 (H) 0.00 - 0.05 10*3/mm3    nRBC 0.0 0.0 - 0.2 /100 WBC   Lactic Acid, Plasma    Collection Time: 11/10/24 10:03 AM    Specimen: Blood   Result Value Ref Range    Lactate 5.2 (C) 0.5 - 2.0 mmol/L   Respiratory Panel PCR w/COVID-19(SARS-CoV-2) RAIZA/ABUNDIO/JA/PAD/COR/IRENE In-House, NP Swab in UTM/VTM, 2 HR TAT - Swab, Nasopharynx    Collection Time: 11/10/24 10:04 AM    Specimen: Nasopharynx; Swab   Result Value Ref Range    ADENOVIRUS, PCR Not Detected Not Detected    Coronavirus 229E Not Detected Not Detected    Coronavirus HKU1 Not Detected Not Detected    Coronavirus NL63 Not Detected Not Detected    Coronavirus OC43 Not Detected Not Detected    COVID19 Not Detected Not Detected - Ref. Range    Human Metapneumovirus Not Detected Not Detected    Human Rhinovirus/Enterovirus Not Detected Not Detected    Influenza A PCR Not Detected Not Detected    Influenza B PCR Not Detected Not Detected    Parainfluenza Virus 1 Not Detected Not Detected    Parainfluenza Virus 2 Not Detected Not Detected    Parainfluenza Virus 3 Not Detected Not Detected    Parainfluenza Virus 4 Not Detected Not Detected    RSV, PCR Not Detected Not Detected    Bordetella pertussis pcr Not Detected Not Detected    Bordetella parapertussis PCR Not Detected  Not Detected    Chlamydophila pneumoniae PCR Not Detected Not Detected    Mycoplasma pneumo by PCR Not Detected Not Detected   Blood Gas, Arterial -    Collection Time: 11/10/24 12:15 PM    Specimen: Arterial Blood   Result Value Ref Range    Site Left Brachial     Cristopher's Test N/A     pH, Arterial 7.336 (L) 7.350 - 7.450 pH units    pCO2, Arterial 38.9 35.0 - 45.0 mm Hg    pO2, Arterial 262.8 (H) 80.0 - 100.0 mm Hg    HCO3, Arterial 20.8 (L) 22.0 - 28.0 mmol/L    Base Excess, Arterial -4.7 (L) 0.0 - 2.0 mmol/L    O2 Saturation, Arterial 99.8 (H) 92.0 - 98.5 %    A-a DO2 0.4 mmHg    CO2 Content 22.0 (L) 23 - 27 mmol/L    Barometric Pressure for Blood Gas 748.1000 mmHg    Modality Adult Vent     FIO2 100 %    Ventilator Mode VC     Set Tidal Volume 500     Set Mech Resp Rate 16     Rate 16 Breaths/minute    PEEP 5     Hemodilution No     Device Comment sat 100%     PO2/FIO2 263 0 - 500   STAT Lactic Acid, Reflex    Collection Time: 11/10/24 12:45 PM    Specimen: Blood   Result Value Ref Range    Lactate 2.6 (C) 0.5 - 2.0 mmol/L         RADIOLOGY  XR Chest 1 View    Result Date: 11/10/2024  XR CHEST 1 VW-  DATE OF EXAM: 11/10/2024 12:45 PM  INDICATION: SOA, hypoxia.  COMPARISON: Radiographs 11/10/2024, 5/8/2023, and 7/26/2022. CT abdomen pelvis 11/10/2024.  TECHNIQUE: A single portable AP view of the chest was obtained.  FINDINGS: Lordotic positioning. Overlying artifacts. Appropriate retraction of the endotracheal tube now terminating in the mid thoracic trachea, approximately 4 cm proximal to the jose. Low lung volumes with worsening patchy predominately perihilar and medial bibasilar opacities, likely pneumonia. No pneumothorax. Cardiomediastinal contours are unchanged. No acute osseous abnormality is identified. Moderate air distention of the partially included stomach.       1. Appropriate repositioning of the endotracheal tube now terminating in the mid thoracic trachea. 2. Worsening predominately perihilar  and medial bibasilar opacities, likely pneumonia.  This report was finalized on 11/10/2024 1:10 PM by Corbin West MD on Workstation: [id number]      CT Abdomen Pelvis With Contrast    Result Date: 11/10/2024  CT ABDOMEN PELVIS W CONTRAST-  DATE OF EXAM: 11/10/2024 11:38 AM  INDICATION: Altered mental status, fever, abdominal pain.  COMPARISON: Radiographs 11/10/2024, 7/6/2022, and 3/15/2021. CT abdomen and pelvis 5/5/2020.  TECHNIQUE: Multiple contiguous axial images were acquired through the abdomen and pelvis following the intravenous administration of 85 mL of Isovue-300. Reformatted coronal and sagittal sequences were also reviewed. Radiation dose reduction techniques were utilized, including automated exposure control and exposure modulation based on body size.  FINDINGS: Partially imaged patchy consolidation in each lower lobe, likely pneumonia, with trace bilateral pleural effusions. Calcified remnants of prior granulomatous disease in the right lung base and the partially imaged mediastinum and bilateral minor. Mild to moderate calcified coronary artery disease. Trace pericardial fluid.  Calcified remnants of prior granulomatous disease in the liver and spleen. The spleen measures at the upper limits of normal at 14 cm in greatest diameter. The gallbladder, pancreas, and adrenal glands are unremarkable. Chronic bilateral renal atrophy parenchymal atrophy of the right pelvic renal transplant with nonspecific peripelvic and periureteral fat stranding. The urinary bladder is nondistended. Diffuse urinary bladder wall thickening and mild perivesical fat stranding, which could be accentuated by under distention. Bulky fibroid uterus. The adnexa are unremarkable in CT appearance.  Mild fluid and air distention of the stomach. Mild rectal stool. Colonic diverticula, without CT evidence of diverticulitis. No bowel obstruction or significant bowel wall thickening. The appendix is normal.  Trace free fluid in the  pelvis. No free intraperitoneal air. No pathologically enlarged lymph nodes in the abdomen or pelvis. No acute osseous abnormality or concerning osseous lesion. The upper extremities are partially included in the field-of-view but not adequately evaluated.       1. Parenchymal atrophy of the right pelvic renal transplant with right peripelvic and periureteral fat stranding. Urinary bladder wall thickening and mild perivesical fat stranding, which could be accentuated by under distention. Findings could reflect urinary tract infection. Recommend correlating with urinalysis. 2. Partially imaged patchy consolidation in each lower lobe, likely pneumonia, with trace bilateral pleural effusions.  This report was finalized on 11/10/2024 12:34 PM by Corbin West MD on Workstation: [id number]      CT Head Without Contrast    Result Date: 11/10/2024  CT HEAD WO CONTRAST-  INDICATION: Altered mental status  COMPARISON: CT head March 6, 2021  TECHNIQUE: Routine CT head without IV contrast. Coronal and sagittal reformats. Radiation dose reduction techniques were utilized, including automated exposure control and exposure modulation based on body size.  FINDINGS:  Brain: No intraparenchymal hemorrhage. Preserved gray-white differentiation. No mass effect or midline shift.  Ventricles: No hydrocephalus. No intraventricular hemorrhage.  Extra-axial spaces: No extra-axial hemorrhage or fluid collection.  Osseous structures: No fracture or bone lesion.  Sinuses: Patent mastoid air cells and middle ears. Mucosal thickening in the ethmoid air cells with partial opacification of the right ethmoid air cells.  Other: Large amount of secretions seen in the nasopharynx extending into the nasal cavity.       1. No acute intracranial process. 2. Large amount of secretions seen in the nasal cavity extending into the posterior nasal cavity  This report was finalized on 11/10/2024 12:29 PM by Dr. Casimiro Schneider M.D on Workstation:  [id number]      XR Chest 1 View    Result Date: 11/10/2024  XR CHEST 1 VW-   INDICATION: Altered mental status  COMPARISON: Chest radiograph May 8, 2023  TECHNIQUE: 1 view chest  FINDINGS:  Increased lung markings. Vascular congestion. No focal opacity. No effusions. Low volumes. Stable mediastinum. Endotracheal tube tip is located 1.2 cm above the jose, projecting towards the right mainstem bronchus. Right axillary stent.       1. Endotracheal tube tip is just above the jose, projects towards the right mainstem bronchus. Consider retraction by 2 cm. 2. Increased lung markings. Question some underlying airways disease and vascular congestion  This report was finalized on 11/10/2024 12:00 PM by Dr. Casimiro Schneider M.D on Workstation: [id number]         MEDICATIONS GIVEN IN ER  Medications   sodium chloride 0.9 % flush 10 mL (has no administration in time range)   sodium chloride 0.9 % bolus 2,250 mL (2,250 mL Intravenous New Bag 11/10/24 1311)   succinylcholine (ANECTINE) injection (20 mg Intravenous Given 11/10/24 1131)   rocuronium (ZEMURON) injection (100 mg Intravenous Given 11/10/24 1131)   propofol (DIPRIVAN) infusion 10 mg/mL 100 mL (7.5 mcg/kg/min × 75.3 kg Intravenous New Bag 11/10/24 1137)   vancomycin IVPB 1500 mg in 0.9% NaCl (Premix) 500 mL (has no administration in time range)   piperacillin-tazobactam (ZOSYN) 4.5 g IVPB in 100 mL NS MBP (CD) (has no administration in time range)   LORazepam (ATIVAN) injection 1 mg (1 mg Intravenous Given 11/10/24 0959)   diphenhydrAMINE (BENADRYL) injection 25 mg (25 mg Intravenous Given 11/10/24 0959)   haloperidol lactate (HALDOL) injection 2 mg (2 mg Intravenous Given 11/10/24 0958)   LORazepam (ATIVAN) injection 2 mg (2 mg Intravenous Given 11/10/24 1007)   diphenhydrAMINE (BENADRYL) injection 25 mg (25 mg Intravenous Given 11/10/24 1007)   acetaminophen (TYLENOL) suppository 650 mg (650 mg Rectal Given 11/10/24 1216)   ziprasidone (GEODON) injection 20  mg (20 mg Intramuscular Given 11/10/24 1036)   iopamidol (ISOVUE-300) 61 % injection 85 mL (85 mL Intravenous Given by Other 11/10/24 1200)   metoprolol tartrate (LOPRESSOR) injection 5 mg (5 mg Intravenous Given 11/10/24 1237)   nitroglycerin (NITROSTAT) ointment 1 inch (1 inch Topical Given 11/10/24 1307)         ORDERS PLACED DURING THIS VISIT:  Orders Placed This Encounter   Procedures    Intubation    Respiratory Panel PCR w/COVID-19(SARS-CoV-2) RAIZA/ABUNDIO/JA/PAD/COR/IRENE In-House, NP Swab in UTM/VTM, 2 HR TAT - Swab, Nasopharynx    Blood Culture - Blood,    Blood Culture - Blood,    CT Head Without Contrast    XR Chest 1 View    CT Abdomen Pelvis With Contrast    XR Chest 1 View    Comprehensive Metabolic Panel    Urine Drug Screen - Urine, Clean Catch    Ethanol    Protime-INR    aPTT    Urinalysis With Microscopic If Indicated (No Culture) - Urine, Catheter    CBC Auto Differential    Lactic Acid, Plasma    Procalcitonin    STAT Lactic Acid, Reflex    Blood Gas, Arterial -    STAT Lactic Acid, Reflex    Target Arousal Level RASS -1 to -2    Ok to stick lower extremities for lab draw if necessary  Misc Nursing Order (Specify)    Pulmonology (on-call MD unless specified)    Nephrology (on -call MD unless specified)    Ventilator - Vent Mode: AC/VC; Rate: Other; Rate: 16; FiO2: 100%; PEEP: 5; Tidal Volume: mL; TV: 500    ECG 12 Lead QT Measurement    Insert Peripheral IV    Inpatient Admission    Restraints non-violent or non-self destructive    CBC & Differential         OUTPATIENT MEDICATION MANAGEMENT:  Current Facility-Administered Medications Ordered in Epic   Medication Dose Route Frequency Provider Last Rate Last Admin    piperacillin-tazobactam (ZOSYN) 4.5 g IVPB in 100 mL NS MBP (CD)  4.5 g Intravenous Once Adán Rios PA        propofol (DIPRIVAN) infusion 10 mg/mL 100 mL  5-50 mcg/kg/min Intravenous Titrated Sundeep Mendoza MD 3.39 mL/hr at 11/10/24 1137 7.5 mcg/kg/min at 11/10/24 1137     rocuronium (ZEMURON) injection    Code / Trauma / Sedation Medication Sundeep Mendoza MD   100 mg at 11/10/24 1131    sodium chloride 0.9 % bolus 2,250 mL  2,250 mL Intravenous Once Adán Rios PA 2,250 mL/hr at 11/10/24 1311 2,250 mL at 11/10/24 1311    sodium chloride 0.9 % flush 10 mL  10 mL Intravenous PRN Adán Rios PA        succinylcholine (ANECTINE) injection    Code / Trauma / Sedation Medication Sundeep Mendoza MD   20 mg at 11/10/24 1131    vancomycin IVPB 1500 mg in 0.9% NaCl (Premix) 500 mL  20 mg/kg Intravenous Once Adán Rios PA         Current Outpatient Medications Ordered in Epic   Medication Sig Dispense Refill    acetaminophen (TYLENOL) 325 MG tablet Take 2 tablets by mouth Every 6 (Six) Hours As Needed for Mild Pain.      bisacodyl (DULCOLAX) 5 MG EC tablet Take 1 tablet by mouth Daily As Needed for Constipation (Use if polyethylene glycol is ineffective). 30 tablet 0    calcitriol (ROCALTROL) 0.25 MCG capsule Take 1 capsule by mouth Take As Directed. Calcitriol 0.25 MCG Capsule (si capsule once per day )      calcium acetate (PHOSLO) 667 MG capsule Take 3 capsules by mouth 3 (Three) Times a Day With Meals.      cloNIDine (CATAPRES) 0.1 MG tablet Take 1 tablet by mouth Daily. 30 tablet 3    hydrALAZINE (APRESOLINE) 25 MG tablet Take 1 tablet by mouth Daily.      hydrOXYzine (ATARAX) 50 MG tablet Take 1 tablet by mouth As Needed.      naloxone (NARCAN) 4 MG/0.1ML nasal spray Call 911. Don't prime. [place] in 1 nostril for overdose. Repeat in 2-3 minutes in other nostril if no or minimal breathing/responsiveness. 2 each 0    ondansetron ODT (ZOFRAN-ODT) 4 MG disintegrating tablet Take 1 tablet by mouth As Needed for Nausea or Vomiting.      polyethylene glycol (MIRALAX) 17 GM/SCOOP powder Take 17 g by mouth Daily As Needed (Use if senna-docusate is ineffective). 510 g 0    Polyethylene Glycol 3350 (MIRALAX PO) Take 1 dose by mouth As Needed.      prochlorperazine  (COMPAZINE) 10 MG tablet Take 1 tablet by mouth Every 6 (Six) Hours As Needed for Nausea or Vomiting. 10 tablet 0    propranolol (INDERAL) 10 MG tablet Take 1 tablet by mouth 2 (Two) Times a Day.      sennosides-docusate (PERICOLACE) 8.6-50 MG per tablet Take 2 tablets by mouth 2 (Two) Times a Day As Needed for Constipation. 60 tablet 0    traZODone (DESYREL) 100 MG tablet Take 1.5 tablets by mouth every night at bedtime.      warfarin (COUMADIN) 5 MG tablet Take 1 tablet by mouth Daily.           Critical care provider statement:    Critical care time (minutes): 39.   Critical care time was exclusive of:  Separately billable procedures and treating other patients   Critical care was necessary to treat or prevent imminent or life-threatening deterioration of the following conditions:  Respiratory Failure and Severe Shock   Critical care was time spent personally by me on the following activities:  Development of treatment plan with patient or surrogate, discussions with consultants, evaluation of patient's response to treatment, examination of patient, obtaining history from patient or surrogate, ordering and performing treatments and interventions, ordering and review of laboratory studies, ordering and review of radiographic studies, pulse oximetry, re-evaluation of patient's condition and review of old charts. Critical Care indicators: Pulmonary edema or embolism, Sepsis / septicemia, and Shock, unresponsive patient Vasodilators: nitro, nipride, et al. and Others: aminophylline, diazepam, glucagon, heparin, lovenox, morphine, sodium bicarbonate, et al.      PROGRESS, DATA ANALYSIS, CONSULTS, AND MEDICAL DECISION MAKING  All labs have been independently interpreted by me.  All radiology studies have been reviewed by me. All EKG's have been independently viewed and interpreted by me.  Discussion below represents my analysis of pertinent findings related to patient's condition, differential diagnosis, treatment plan  and final disposition.      DIFFERENTIAL DIAGNOSIS INCLUDE BUT NOT LIMITED TO:     Sepsis, septic shock, pulmonary edema    Clinical Scores: N/A      ED Course as of 11/10/24 1318   Sun Nov 10, 2024   0957 Patient arrives acutely altered and unable to contribute to the history in any way.  She is combative and will require mild sedation for lab draw, CT imaging.  EMS EKG reveals normal QT interval.  Haldol ordered with Benadryl and Ativan at this time. [DC]   1004 Temp: 100.5 °F (38.1 °C) [DC]   1004 Heart Rate: 113 [DC]   1006 Patient has had no improvement of agitation and combativeness despite initial doses of medications.  25 mg more Benadryl and 2 mg of Ativan ordered at this time.  Soft restraints ordered as well for patient safety and staff safety. [DC]   1022 WBC(!): 16.70 [DC]   1031 The patient calm down enough to be transferred to the CT scanner but became abruptly combative and inconsolable once again.  20 of Geodon ordered at this time.  Patient is back in the ER but CTs were unsuccessful. [DC]   1042 Lactate(!!): 5.2  30 mg/kg fluid bolus ordered at this time [DC]   1151 Per my independent interpretation of the chest x-ray, there is no obvious pneumothorax. [DC]   1302 Patient continues with poor oxygenation and 89% despite 100% at 7.5 PEEP.  Pulmonary edema has developed on her chest x-ray per my read at this time.  I do not feel she would benefit from diuresis as she does not make urine.  Plan for Nitropaste and consult to the pulmonology service for ICU admission and further management.  [DC]   1314 I discussed the case with MD Christian with Nephrology at this time regarding the patient.  I discussed work-up, results, concerns.  I discussed the consulting provider's desire for dialysis.  He is placing orders currently.   [DC]   1316 I discussed the case with MD Allegra with ICU/Pulm at this time regarding the patient.  I discussed work-up, results, concerns.  I discussed the consulting provider's  desire for ICU admit.   [DC]      ED Course User Index  [DC] Adán Rios, PA            5596 I rechecked the patient.  I discussed the patient's labs, radiology findings (including all incidental findings), diagnosis, and plan for admission. The patient understands and agrees with the plan.      AS OF 13:18 EST VITALS:    BP - (!) 174/109  HR - (!) 154  TEMP - 100.5 °F (38.1 °C) (Tympanic)  O2 SATS - 93%    COMPLEXITY OF CARE  The patient requires admission.      DIAGNOSIS  Final diagnoses:   Acute encephalopathy   Septic shock   Lactic acidosis   Acute UTI   Abnormal CT scan   Acute pulmonary edema   Hypertensive emergency         DISPOSITION  ED Disposition       ED Disposition   Decision to Admit    Condition   --    Comment   Level of Care: Critical Care [6]   Diagnosis: Pulmonary edema [[id number]]   Admitting Physician: ROOPA HYATT [[id number]]   Attending Physician: ROOPA HYATT [[id number]]   Certification: I Certify That Inpatient Hospital Services Are Medically Necessary For Greater Than 2 Midnights                  Please note that portions of this document were completed with a voice recognition program.    Note Disclaimer: At [facility], we believe that sharing information builds trust and better relationships. You are receiving this note because you recently visited [facility]. It is possible you will see health information before a provider has talked with you about it. This kind of information can be easy to misunderstand. To help you fully understand what it means for your health, we urge you to discuss this note with your provider.         Adán Rios PA  11/11/24 0929

## 2024-11-10 NOTE — CONSULTS
[facility] Consult Note      Patient Name: Sunni Mcneil  : 1975  MRN: [id number]  Primary Care Physician:  Regla Ying APRN  Referring Physician: No ref. provider found  Date of admission: 11/10/2024    Subjective     Reason for Consult: ESRD on HD    HPI:   Sunni Mcneil is a 49 y.o. female with ESRD on HD (MWF, via R AV fistula, at [facility], followed by my partner Dr. Jose Moss), type II DM, hyperparathyroidism, and hypertension, presented to the hospital today for acute onset of N/V/D, SOA, fever, and disorientation/agitation x1 day.     Upon arrival, patient was tachycardic, hypertensive, and febrile.  Lactate 5.2, SCr 11.59, patient was emergently intubated due to hypoxia and pulmonary edema.      Patient is sedated and intubated, unable to provide any information.  Medical history was obtained from daughter at bedside, who stated patient was doing well without chest pain, SOA, N/V/D until yesterday morning. Last HD was Friday without any issues.    Review of Systems:   Not obtainable    Personal History     Past Medical History:   Diagnosis Date    Acid reflux     Anemia     WITH ESRD    Anxiety     Arthritis     Cough     related to fluid overload    Dependence on renal dialysis 2021    Depression     Diabetes mellitus     NO MEDICATIONS FOR    End stage renal disease 2021    ESRD on dialysis     [facility]    History of peritoneal dialysis     KIDNEY TRANSPLANT 2016     History of transfusion     BEEN A WHILE no reaction    Hypercalcemia     Hyperparathyroidism     Hypertension     Kidney disease     End-stage renal disease. TRANSPLANT    Kidney transplant recipient 2016    RIGHT KIDNEY. ? 2018 KIDNEY REJECTED    PONV (postoperative nausea and vomiting)     Seasonal allergies     CURRENTLY     Vascular dialysis catheter in place     RIGHT TUNNEL CATH       Past Surgical History:   Procedure Laterality Date    ARTERIOVENOUS FISTULA/SHUNT  SURGERY Right 02/26/2020    Procedure: RIGHT ARM ARTERIAL VENOUS FISTULA;  Surgeon: Elijah Herrera MD;  Location: [facility] MAIN OR;  Service: Vascular;  Laterality: Right;    ARTERIOVENOUS FISTULA/SHUNT SURGERY Left 02/04/2021    Procedure: LEFT BRACHIOAXILLARY ARTERIOVENOUS FISTULA GRAFT;  Surgeon: Anya Hernandez Jr., MD;  Location: [facility] MAIN OR;  Service: Vascular;  Laterality: Left;    ARTERIOVENOUS FISTULA/SHUNT SURGERY Right 03/16/2021    Procedure: RIGHT BRACHIOAXILLARY ARTERVENIOUS GRAFT PLACEMENT;  Surgeon: Adán Maurer MD;  Location: [facility] MAIN OR;  Service: Vascular;  Laterality: Right;    ARTERIOVENOUS FISTULA/SHUNT SURGERY Right 8/4/2023    Procedure: RIGHT ARM FISTULOGRAM PEUCUTANEOUS AND PERIPHERAL TRANSLUMINAL ANGIOPLSTY/STENT;  Surgeon: Elijah Herrera MD;  Location: [facility] OR 18/19;  Service: Vascular;  Laterality: Right;    BRACHIAL EMBOLECTOMY Right 10/6/2023    Procedure: RIGHT AV GRAFT WITH ANGIOPLASTY AND CENTROVENOUS ANGIOPLASTY;  Surgeon: Phu Gaviria MD;  Location: [facility] OR 18/19;  Service: Vascular;  Laterality: Right;    COLONOSCOPY N/A 11/30/2022    Procedure: COLONOSCOPY TO CECUM AND TERM. ILEUM;  Surgeon: Casimiro Perkins MD;  Location: [facility] ENDOSCOPY;  Service: Gastroenterology;  Laterality: N/A;  PRE OP - SCREENING  POST OP - DIVERTICULOSIS, SM. HEMORRHOIDS    INSERTION AND REMOVAL HEMODIALYSIS CATHETER N/A 02/13/2021    Procedure: INSERTION AND REMOVAL OF HEMODIALYSIS CATHETER;  Surgeon: Adán Maurer MD;  Location: [facility] MAIN OR;  Service: Vascular;  Laterality: N/A;    INSERTION HEMODIALYSIS CATHETER Right 01/15/2021    Procedure: TUNNELED DIAYLIS CATHETER PLACEMENT;  Surgeon: Phu Gaviria MD;  Location: [facility] OR 18/19;  Service: Vascular;  Laterality: Right;    INSERTION HEMODIALYSIS CATHETER Left 1/21/2023    Procedure: HEMODIALYSIS CATHETER INSERTION;  Surgeon: Elijah Herrera MD;  Location:  [facility] MAIN OR;  Service: Vascular;  Laterality: Left;    INSERTION PERITONEAL DIALYSIS CATHETER  07/29/2014    Laparoscopic placement of Curl Cath peritoneal dialysis catheter, Dr. Vinod Goldstein    INSERTION PERITONEAL DIALYSIS CATHETER N/A 07/24/2019    Procedure: LAPAROSCOPIC INSERTION PERITONEAL DIALYSIS CATHETER;  Surgeon: Damon Rooney MD;  Location: [facility] MAIN OR;  Service: General    REMOVAL HEMODIALYSIS CATHETER N/A 2/15/2023    Procedure: PALINDROME REMOVAL;  Surgeon: Elijah Herrera MD;  Location: [facility] MAIN OR;  Service: Vascular;  Laterality: N/A;    REMOVAL PERITONEAL DIALYSIS CATHETER N/A 10/17/2016    Procedure: REMOVAL PERITONEAL DIALYSIS CATHETER;  Surgeon: Damon Rooney MD;  Location: [facility] MAIN OR;  Service:     REMOVAL PERITONEAL DIALYSIS CATHETER N/A 10/21/2020    Procedure: REMOVAL PERITONEAL DIALYSIS CATHETER;  Surgeon: Damon Rooney MD;  Location: [facility] MAIN OR;  Service: General;  Laterality: N/A;    SHUNT O GRAM Right 07/28/2022    Procedure: RIGHT  ARM SHUNT O GRAM , ANGIOPLASTY OF AXILARY VEIN AND SUBCLAVIAN VEIN AT SVC JUNCTION;  Surgeon: Anya Hernandez Jr., MD;  Location: [facility] OR 18/19;  Service: Vascular;  Laterality: Right;    SHUNT O GRAM Right 1/20/2023    Procedure: OPEN THROMBECTOMY AND ANGIOPLASTY;  Surgeon: Elijah Herrera MD;  Location: [facility] 18/19;  Service: Vascular;  Laterality: Right;    SHUNT O GRAM Right 1/23/2023    Procedure: Right arm dialysis graft open thrombectomy, shuntogram, angioplasty and stent;  Surgeon: Karuna Villalba MD;  Location: [facility] 18/19;  Service: Vascular;  Laterality: Right;    SHUNT O GRAM Right 12/20/2023    Procedure: RIGHT ARM FISTULOGRAM, ANGIOPLASTY;  Surgeon: Elijah Herrera MD;  Location: [facility] 18/19;  Service: Vascular;  Laterality: Right;    SHUNT O GRAM Right 5/3/2024    Procedure: Hemodialysis shunt thrombectomy with revision;   Surgeon: Alex Sanchez MD;  Location: [facility] OR;  Service: Vascular;  Laterality: Right;    TRANSPLANTATION RENAL Right 2016    from  donor    TUBAL ABDOMINAL LIGATION Bilateral        Family History: family history includes Heart attack in her maternal grandfather; Hypertension in her father and mother.    Social History:  reports that she has never smoked. She has never been exposed to tobacco smoke. She has never used smokeless tobacco. She reports that she does not drink alcohol and does not use drugs.    Home Medications:  Prior to Admission medications    Medication Sig Start Date End Date Taking? Authorizing Provider   acetaminophen (TYLENOL) 325 MG tablet Take 2 tablets by mouth Every 6 (Six) Hours As Needed for Mild Pain.    Charline Hernandez MD   bisacodyl (DULCOLAX) 5 MG EC tablet Take 1 tablet by mouth Daily As Needed for Constipation (Use if polyethylene glycol is ineffective). 24   Farooq Will DO   calcitriol (ROCALTROL) 0.25 MCG capsule Take 1 capsule by mouth Take As Directed. Calcitriol 0.25 MCG Capsule (si capsule once per day )    Charline Hernandez MD   calcium acetate (PHOSLO) 667 MG capsule Take 3 capsules by mouth 3 (Three) Times a Day With Meals.    Charline Hernandez MD   cloNIDine (CATAPRES) 0.1 MG tablet Take 1 tablet by mouth Daily. 23   Jess Benz MD   hydrALAZINE (APRESOLINE) 25 MG tablet Take 1 tablet by mouth Daily.    Charline Hernandez MD   hydrOXYzine (ATARAX) 50 MG tablet Take 1 tablet by mouth As Needed. 12/3/20   Charline Hernandez MD   naloxone (NARCAN) 4 MG/0.1ML nasal spray Call 911. Don't prime. [place] in 1 nostril for overdose. Repeat in 2-3 minutes in other nostril if no or minimal breathing/responsiveness. 24   Farooq Will DO   ondansetron ODT (ZOFRAN-ODT) 4 MG disintegrating tablet Take 1 tablet by mouth As Needed for Nausea or Vomiting. 3/1/24   Charline Hernandez MD   polyethylene glycol (MIRALAX)  17 GM/SCOOP powder Take 17 g by mouth Daily As Needed (Use if senna-docusate is ineffective). 5/4/24   Farooq Will DO   Polyethylene Glycol 3350 (MIRALAX PO) Take 1 dose by mouth As Needed.    Charline Hernandez MD   prochlorperazine (COMPAZINE) 10 MG tablet Take 1 tablet by mouth Every 6 (Six) Hours As Needed for Nausea or Vomiting. 3/6/21   Adán Everett MD   propranolol (INDERAL) 10 MG tablet Take 1 tablet by mouth 2 (Two) Times a Day.    Charline Hernandez MD   sennosides-docusate (PERICOLACE) 8.6-50 MG per tablet Take 2 tablets by mouth 2 (Two) Times a Day As Needed for Constipation. 5/4/24   Farooq Will DO   traZODone (DESYREL) 100 MG tablet Take 1.5 tablets by mouth every night at bedtime. 6/17/22   Charline Hernandez MD   warfarin (COUMADIN) 5 MG tablet Take 1 tablet by mouth Daily. 2/21/24   Charline Hernandez MD       Allergies:  No Known Allergies    Objective     Vitals:   Temp:  [100.5 °F (38.1 °C)] 100.5 °F (38.1 °C)  Heart Rate:  [113-154] 146  Resp:  [22] 22  BP: (152-237)/() 191/105  No intake or output data in the 24 hours ending 11/10/24 1424    Physical Exam:   Constitutional: Intubated, and sedated, chronic ill, NAD, in restraints  HEENT: Sclera anicteric, no conjunctival injection  Neck: No JVD  Respiratory: Rhonchi, no dyspnea with FiO2 100%, PEEP 10  Cardiovascular: Tachycardic, regular rhythm, no rub  Gastrointestinal: Normoactive bowel sounds, abdomen is soft, nontender and nondistended  : No palpable bladder  Musculoskeletal: No edema, no clubbing or cyanosis  Psychiatric: Unable to assess  Neurologic: Unable to assess  Skin: Warm and dry       Scheduled Meds:     chlorhexidine, 15 mL, Mouth/Throat, Q12H  famotidine, 20 mg, Intravenous, Daily  LORazepam, 2 mg, Intravenous, Once  mupirocin, 1 Application, Each Nare, BID  senna-docusate sodium, 2 tablet, Oral, BID  sodium chloride, 10 mL, Intravenous, Q12H  vancomycin, 20 mg/kg, Intravenous, Once      IV Meds:    propofol, 5-50 mcg/kg/min, Last Rate: 7.5 mcg/kg/min (11/10/24 1137)        Results Reviewed:   I have personally reviewed the results from the time of this admission to 11/10/2024 14:24 EST     Lab Results   Component Value Date    GLUCOSE 118 (H) 11/10/2024    CALCIUM 9.6 11/10/2024     (L) 11/10/2024    K 4.6 11/10/2024    CO2 14.0 (L) 11/10/2024    CL 87 (L) 11/10/2024    BUN 56 (H) 11/10/2024    CREATININE 11.59 (H) 11/10/2024    EGFRIFAFRI  03/17/2021      Comment:      <15 Indicative of kidney failure.    EGFRIFNONA 9 (L) 03/17/2021    BCR 4.8 (L) 11/10/2024    ANIONGAP 30.0 (H) 11/10/2024      Lab Results   Component Value Date    MG 2.6 06/21/2024    PHOS 3.6 01/24/2023    ALBUMIN 4.0 11/10/2024           Assessment / Plan       Pulmonary edema      ASSESSMENT:  ESRD on HD, MWF, via R AV fistula, last HD was Friday without issues.  Patient now has pulmonary edema with trace bilateral pleural effusions.  Electrolytes within acceptable range except low sodium 131, CO2 14  High anion gap metabolic acidosis, secondary to ESRD and lactic acidosis  Volume overload, with the acute pulmonary edema  Acute respiratory failure currently on the ventilator  Anemia with CKD, receives long-acting TERESA outpatient, hemoglobin 12  Hypertension with CKD, BP elevated, associated with volume overload  Type II DM with CKD, managed by primary team  Fever/leukocytosis, blood cultures pending, on empiric IV antibiotics, managed by primary team  Secondary hyperparathyroidism of renal etiology    PLAN:  Stat HD today with challenge fluid removal  I agree with the present treatment antibiotics  Surveillance labs    I reviewed the chart and other providers notes and I reviewed labs.  I reviewed the imaging  I discussed the case with the family at bedside.    Thank you for involving us in the care of Sunni Mcneil.  Please feel free to call with any questions.    Carlos Manuel Gonzales MD  11/10/24  14:24 EST    Nephrology  [facility]  [phone]      Please note that portions of this note were completed with a voice recognition program.

## 2024-11-10 NOTE — CODE DOCUMENTATION
Patient Name:  Sunni Mcneil  YOB: 1975  MRN:  [id number]  Admit Date:  11/10/2024    Visit Diagnoses:     ICD-10-CM ICD-9-CM   1. Acute encephalopathy  G93.40 348.30   2. Septic shock  A41.9 038.9    R65.21 785.52     995.92   3. Lactic acidosis  E87.20 276.2   4. Acute UTI  N39.0 599.0   5. Abnormal CT scan  R93.89 793.99   6. Acute pulmonary edema  J81.0 518.4   7. Hypertensive emergency  I16.1 401.9   8. ST elevation myocardial infarction (STEMI), unspecified artery  I21.3 410.90       Reason For Rapid:    STEMI on EKG    RN Communicated With:   Dr. Dinh at bedside asking for STEMI alert to be called; Dr. Sandhu called to bedside stat and CATH lab activated      Rapid Outcome:   to cath lab and plan to transfer to CICU afterwards    Communication From Rapid Team:    Called STAT to bedside by Maria Eugenia, ICU charge and Dr. Dinh to assess EKG showing possible STEMI. Pt on ventilator, sedated, and unable to relay symptoms. Dr. Sandhu called to bedside STAT to evaluate. STAT Echo done. Cath lab activated by Dr. Sandhu. Pt became hypotensive while waiting for cath lab, started on LAURO gtt. Accompanied to cath lab accompanied by RNx2 and RT.       Most Recent Vital Signs  Temp:  [100.5 °F (38.1 °C)] 100.5 °F (38.1 °C)  Heart Rate:  [113-154] 131  Resp:  [22-24] 24  BP: ()/() 151/87  FiO2 (%):  [100 %] 100 %  SpO2:  [87 %-97 %] 95 %  on   ;   Device (Oxygen Therapy): room air    Labs:  Results from last 7 days   Lab Units 11/10/24  1004   COVID19  Not Detected     Glucose   Date/Time Value Ref Range Status   11/10/2024 1355 195 (H) 70 - 130 mg/dL Final     Site   Date Value Ref Range Status   11/10/2024 Left Brachial  Final     Cristopher's Test   Date Value Ref Range Status   11/10/2024 N/A  Final     pH, Arterial   Date Value Ref Range Status   11/10/2024 7.140 (C) 7.350 - 7.450 pH units Final     pCO2, Arterial   Date Value Ref Range Status   11/10/2024 64.5 (C) 35.0 - 45.0 mm Hg Final     pO2,  Arterial   Date Value Ref Range Status   11/10/2024 86.9 80.0 - 100.0 mm Hg Final     HCO3, Arterial   Date Value Ref Range Status   11/10/2024 22.0 22.0 - 28.0 mmol/L Final     Base Excess, Arterial   Date Value Ref Range Status   11/10/2024 -8.1 (L) 0.0 - 2.0 mmol/L Final     Comment:     Serial Number: [id number]:  [id number]     O2 Saturation, Arterial   Date Value Ref Range Status   11/10/2024 92.6 92.0 - 98.5 % Final     CO2 Content   Date Value Ref Range Status   11/10/2024 23.9 23 - 27 mmol/L Final     Barometric Pressure for Blood Gas   Date Value Ref Range Status   11/10/2024 746.5000 mmHg Final     Modality   Date Value Ref Range Status   11/10/2024 Adult Vent  Final     FIO2   Date Value Ref Range Status   11/10/2024 100 % Final     Results from last 7 days   Lab Units 11/10/24  1002 11/08/24  0000   WBC 10*3/mm3 16.70*  --    HEMOGLOBIN g/dL 12.0 11*  33*   PLATELETS 10*3/mm3 162  --      Results from last 7 days   Lab Units 11/10/24  1420 11/10/24  1002   SODIUM mmol/L  --  131*   POTASSIUM mmol/L 6.2* 4.6   CHLORIDE mmol/L  --  87*   CO2 mmol/L  --  14.0*   BUN mg/dL  --  56*   CREATININE mg/dL  --  11.59*   GLUCOSE mg/dL  --  118*   ALBUMIN g/dL  --  4.0   BILIRUBIN mg/dL  --  0.6   ALK PHOS U/L  --  58   AST (SGOT) U/L  --  22   ALT (SGPT) U/L  --  9   Estimated Creatinine Clearance: 5.3 mL/min (A) (by C-G formula based on SCr of 11.59 mg/dL (H)).  Results from last 7 days   Lab Units 11/10/24  1420   HSTROP T ng/L 239*     Results from last 7 days   Lab Units 11/10/24  1420 11/10/24  1245 11/10/24  1003 11/10/24  1002   PROCALCITONIN ng/mL 40.40*  --   --  37.80*   LACTATE mmol/L 3.4* 2.6* 5.2*  --      Results from last 7 days   Lab Units 11/10/24  1439 11/10/24  1215   PH, ARTERIAL pH units 7.140* 7.336*   PO2 ART mm Hg 86.9 262.8*   PCO2, ARTERIAL mm Hg 64.5* 38.9   HCO3 ART mmol/L 22.0 20.8*   O2 SATURATION ART % 92.6 99.8*   MODALITY  Adult Vent Adult Vent   No results found for:  "\"STREPPNEUAG\", \"LEGANTIGENUR\"    Results from last 7 days   Lab Units 11/10/24  1004   ADENOVIRUS DETECTION BY PCR  Not Detected   CORONAVIRUS 229E  Not Detected   CORONAVIRUS HKU1  Not Detected   CORONAVIRUS NL63  Not Detected   CORONAVIRUS OC43  Not Detected   HUMAN METAPNEUMOVIRUS  Not Detected   HUMAN RHINOVIRUS/ENTEROVIRUS  Not Detected   INFLUENZA B PCR  Not Detected   PARAINFLUENZA 1  Not Detected   PARAINFLUENZA VIRUS 2  Not Detected   PARAINFLUENZA VIRUS 3  Not Detected   PARAINFLUENZA VIRUS 4  Not Detected   BORDETELLA PERTUSSIS PCR  Not Detected   CHLAMYDOPHILA PNEUMONIAE PCR  Not Detected   MYCOPLAMA PNEUMO PCR  Not Detected   INFLUENZA A PCR  Not Detected   RSV, PCR  Not Detected       NIH Stroke Scale:   n/a                                                         Please refer to full rapid documentation on summary page under Index / Code Timeline  "

## 2024-11-10 NOTE — NURSING NOTE
Pt arrived to unit at 1350. Tachycardic (150-160), diaphoretic, hypertensive (189/115), and satting 82% on the ventilator. Stat EKG ordered. RT began bagging Pt and Pulmonolgy paged stat. Dr. Dinh at bedside. Orders for 2mg dialudid and asking for STEMI alert to be called. ABG reflects pH of 7.1.    Renee Peter, and Dr. Sandhu at bedside. Cath lab activated.    Pt left for cath lab at 1519.    Report called to Jo in CICU.

## 2024-11-10 NOTE — NURSING NOTE
CARMEN notified of patient admission d/t multiple pressors on ventilator. CARMEN following along for now.

## 2024-11-10 NOTE — PROGRESS NOTES
[facility] Clinical Pharmacy Services: Vancomycin Pharmacokinetic Initial Consult Note    Sunni Mcneil is a 49 y.o. female who is on day 1 of pharmacy to dose vancomycin.    Indication: Pneumonia and Urinary Tract Infection  Consulting Provider: Martin Dinh MD   Planned Duration of Therapy: 3 days   Loading Dose Ordered or Given: 1500 mg on  11/10/24 1440   MRSA PCR Ordered   Culture/Source:   BCx x2 sets in process  Target: Dose by Levels  Pertinent Vanc Dosing History: N/A   Other Antimicrobials: Zosyn     Vitals/Labs  Ht:  ; Wt: 75.3 kg (166 lb 0.1 oz)  Temp Readings from Last 1 Encounters:   11/10/24 100.5 °F (38.1 °C) (Tympanic)    Estimated Creatinine Clearance: 5.3 mL/min (A) (by C-G formula based on SCr of 11.59 mg/dL (H)).    Dialysis MWF  STAT HD planned today    Results from last 7 days   Lab Units 11/10/24  1002   CREATININE mg/dL 11.59*   WBC 10*3/mm3 16.70*     Assessment/Plan:    Vancomycin Dose:   Intermittent dosing with HD. Loading dose initiated prior to HD. Ordered empiric 750 mg (~10 mg/kg) IV x1 dose to be administered after HD session today is complete.   Vanc Random has been ordered for 11 with AM labs.     Thank you for involving pharmacy in this patient's care. Please contact pharmacy with any questions or concerns.                           Casimiro Nicholas, PharmD  Clinical Pharmacist

## 2024-11-10 NOTE — ED PROVIDER NOTES
MD ATTESTATION NOTE    The ALISSA and I have discussed this patient's history, physical exam, and treatment plan.  I have reviewed the documentation and personally had a face to face interaction with the patient. I affirm the documentation and agree with the treatment and plan.  The attached note describes my personal findings.      I provided a substantive portion of the care of the patient.  I personally performed the physical exam in its entirety, and below are my findings.  For this patient encounter, the patient wore surgical mask, I wore full protective PPE including N95 and eye protection.      Brief HPI: This patient is a 49-year-old female with a history of dialysis dependent ESRD as well as type 2 diabetes presenting to the ED today with mental status changes.  The patient's family reported that she was not feeling well yesterday but was in her normal state of health but this morning began screaming incoherently.  The patient's family reports that she has never had issues like this before.  EMS does report a fever.      PHYSICAL EXAM  ED Triage Vitals   Temp Heart Rate Resp BP SpO2   11/10/24 1002 11/10/24 0947 11/10/24 1015 11/10/24 1016 11/10/24 0947   100.5 °F (38.1 °C) 113 22 152/87 97 %      Temp src Heart Rate Source Patient Position BP Location FiO2 (%)   11/10/24 1002 -- -- -- --   Tympanic             GENERAL: No adequate response to verbal stimulus, unable to follow commands, ill-appearing  HENT: nares patent  EYES: no scleral icterus  CV: regular rhythm, tachycardic, no M/R/G  RESPIRATORY: normal effort, lungs clear bilaterally  ABDOMEN: soft, nontender, no rebound or guarding  MUSCULOSKELETAL: no deformity, no edema  NEURO: Moves all extremities appropriate, no cranial nerve deficit  PSYCH: Agitated with inappropriate responses  SKIN: warm, dry    Vital signs and nursing notes reviewed.      Differential diagnosis includes but is not limited to sepsis, acute bacteremia, urinary tract infection,  pneumonia, COVID-19, viral upper respiratory infection, encephalopathy, or severe electrolyte disturbance        Plan: The patient is altered and does appear in significant distress and ill.  We will attempt adequate sedation as we obtain blood work, chest x-ray, as well as head CT for ED evaluation.  We will monitor and reassess following.      Chest x-ray independently interpreted by myself with my interpretation showing no cardiomegaly nor area of edema, infiltrate, or pneumothorax.      1130  After multiple medications given for sedation purposes, she continues to be confused and agitated.  The decision was made at this point to intubate with mechanical ventilation for airway protection as well as appropriate completion of her workup.      Intubation    Date/Time: 11/10/2024 11:54 AM    Performed by: Sundeep Mendoza MD  Authorized by: Sundeep Mendoza MD    Consent:     Consent obtained:  Emergent situation  Universal protocol:     Patient identity confirmed:  Provided demographic data and arm band  Pre-procedure details:     Indications: airway protection      Patient status:  Altered mental status    Look externally: no concerns      Mouth opening - incisor distance:  3 or more finger widths    Hyoid-mental distance: 3 or more finger widths      Hyoid-thyroid distance: 2 or more finger widths      Mallampati score:  II    Obstruction: none      Neck mobility: normal      Pharmacologic strategy: RSI      Induction agents:  Etomidate    Paralytics:  Rocuronium  Procedure details:     Preoxygenation:  Bag valve mask    CPR in progress: no      Number of attempts:  1  Successful intubation attempt details:     Intubation method:  Oral    Intubation technique: video assisted      Laryngoscope blade:  Mac 4    Bougie used: no      Tube size (mm):  7.5    Tube type:  Cuffed    Tube visualized through cords: yes    Placement assessment:     ETT at teeth/gumline (cm):  23    Tube secured with:  ETT darnell     Breath sounds:  Equal    Placement verification: chest rise, colorimetric ETCO2, direct visualization and equal breath sounds      CXR findings:  Low    Tube repositioned: yes    Post-procedure details:     Procedure completion:  Tolerated well, no immediate complications           Sundeep Mendoza MD  11/10/24 4023

## 2024-11-10 NOTE — H&P
Group: [facility]         Critical care admission    Patient Identification:  Sunni Mcneil  49 y.o.  female  1975  [id number]                 CC: Altered mental status    History of Present Illness:  49-year-old female who arrives with acute altered mental status.  Upon arrival the patient was combative, psychotic and uncooperative.  The patient was febrile with a temperature of 100.5.  The ER proceeded with intubation to secure airway.  The patient is currently on mechanical ventilation.  I discussed the case with LONI Conte.  It is unclear how long the patient has been ill but apparently she developed some chills, nausea and diarrhea yesterday.  She does not end-stage renal disease, type 2 diabetes and chronic anemia.  She is currently severely hypertensive and combative while on mechanical ventilation.  Medical records were reviewed  Patient was admitted in May of this year for malfunction of AV fistula.    Review of Systems   Unable to perform ROS: Intubated       Past Medical History:  Past Medical History:   Diagnosis Date    Acid reflux     Anemia     WITH ESRD    Anxiety     Arthritis     Cough     related to fluid overload    Dependence on renal dialysis 05/17/2021    Depression     Diabetes mellitus     NO MEDICATIONS FOR    End stage renal disease 05/17/2021    [facility]    History of [facility] dialysis     KIDNEY TRANSPLANT SEPT 2016     History of transfusion     BEEN A WHILE no reaction    Hypercalcemia     Hyperparathyroidism     Hypertension     Kidney disease     End-stage renal disease. TRANSPLANT    Kidney transplant recipient 09/02/2016    RIGHT KIDNEY. ? 2018 KIDNEY REJECTED    PONV (postoperative nausea and vomiting)     Seasonal allergies     CURRENTLY     Vascular dialysis catheter in place     RIGHT TUNNEL CATH       Past Surgical History:  Past Surgical History:   Procedure Laterality Date    ARTERIOVENOUS FISTULA/SHUNT SURGERY Right  02/26/2020    Procedure: RIGHT ARM ARTERIAL VENOUS FISTULA;  Surgeon: Elijah Herrera MD;  Location: [facility] MAIN OR;  Service: Vascular;  Laterality: Right;    ARTERIOVENOUS FISTULA/SHUNT SURGERY Left 02/04/2021    Procedure: LEFT BRACHIOAXILLARY ARTERIOVENOUS FISTULA GRAFT;  Surgeon: Anya Hernandez Jr., MD;  Location: [facility] MAIN OR;  Service: Vascular;  Laterality: Left;    ARTERIOVENOUS FISTULA/SHUNT SURGERY Right 03/16/2021    Procedure: RIGHT BRACHIOAXILLARY ARTERVENIOUS GRAFT PLACEMENT;  Surgeon: Adán Maurer MD;  Location: [facility] OR;  Service: Vascular;  Laterality: Right;    ARTERIOVENOUS FISTULA/SHUNT SURGERY Right 8/4/2023    Procedure: RIGHT ARM FISTULOGRAM PEUCUTANEOUS AND PERIPHERAL TRANSLUMINAL ANGIOPLSTY/STENT;  Surgeon: Elijah Herrera MD;  Location: [facility] OR 18/19;  Service: Vascular;  Laterality: Right;    BRACHIAL EMBOLECTOMY Right 10/6/2023    Procedure: RIGHT AV GRAFT WITH ANGIOPLASTY AND CENTROVENOUS ANGIOPLASTY;  Surgeon: Phu Gaviria MD;  Location: [facility] OR 18/19;  Service: Vascular;  Laterality: Right;    COLONOSCOPY N/A 11/30/2022    Procedure: COLONOSCOPY TO CECUM AND TERM. ILEUM;  Surgeon: Casimiro Perkins MD;  Location: [facility] ENDOSCOPY;  Service: Gastroenterology;  Laterality: N/A;  PRE OP - SCREENING  POST OP - DIVERTICULOSIS, SM. HEMORRHOIDS    INSERTION AND REMOVAL HEMODIALYSIS CATHETER N/A 02/13/2021    Procedure: INSERTION AND REMOVAL OF HEMODIALYSIS CATHETER;  Surgeon: Adán Maurer MD;  Location: [facility] OR;  Service: Vascular;  Laterality: N/A;    INSERTION HEMODIALYSIS CATHETER Right 01/15/2021    Procedure: TUNNELED DIAYLIS CATHETER PLACEMENT;  Surgeon: Phu Gaviria MD;  Location: [facility] OR 18/19;  Service: Vascular;  Laterality: Right;    INSERTION HEMODIALYSIS CATHETER Left 1/21/2023    Procedure: HEMODIALYSIS CATHETER INSERTION;  Surgeon: Elijah Herrera MD;  Location: [facility]  OR;  Service: Vascular;  Laterality: Left;    INSERTION PERITONEAL DIALYSIS CATHETER  07/29/2014    Laparoscopic placement of Curl Cath peritoneal dialysis catheter, Dr. Vinod Goldstein    INSERTION PERITONEAL DIALYSIS CATHETER N/A 07/24/2019    Procedure: LAPAROSCOPIC INSERTION PERITONEAL DIALYSIS CATHETER;  Surgeon: Damon Rooney MD;  Location: [facility] MAIN OR;  Service: General    REMOVAL HEMODIALYSIS CATHETER N/A 2/15/2023    Procedure: PALINDROME REMOVAL;  Surgeon: Elijah Herrera MD;  [facility];  Service: Vascular;  Laterality: N/A;    REMOVAL PERITONEAL DIALYSIS CATHETER N/A 10/17/2016    Procedure: REMOVAL PERITONEAL DIALYSIS CATHETER;  Surgeon: Damon Rooney MD;  [facility];  Service:     REMOVAL PERITONEAL DIALYSIS CATHETER N/A 10/21/2020    Procedure: REMOVAL PERITONEAL DIALYSIS CATHETER;  Surgeon: Damon Rooney MD;  Location: [facility] MAIN OR;  Service: General;  Laterality: N/A;    SHUNT O GRAM Right 07/28/2022    Procedure: RIGHT  ARM SHUNT O GRAM , ANGIOPLASTY OF AXILARY VEIN AND SUBCLAVIAN VEIN AT SVC JUNCTION;  Surgeon: Anya Hernandez Jr., MD;  Location: [facility] OR 18/19;  Service: Vascular;  Laterality: Right;    SHUNT O GRAM Right 1/20/2023    Procedure: OPEN THROMBECTOMY AND ANGIOPLASTY;  Surgeon: Elijah Herrera MD;  Location: [facility] 18/19;  Service: Vascular;  Laterality: Right;    SHUNT O GRAM Right 1/23/2023    Procedure: Right arm dialysis graft open thrombectomy, shuntogram, angioplasty and stent;  Surgeon: Karuna Villalba MD;  Location: [facility] OR 18/19;  Service: Vascular;  Laterality: Right;    SHUNT O GRAM Right 12/20/2023    Procedure: RIGHT ARM FISTULOGRAM, ANGIOPLASTY;  Surgeon: Elijah Herrera MD;  Location: [facility] OR 18/19;  Service: Vascular;  Laterality: Right;    SHUNT O GRAM Right 5/3/2024    Procedure: Hemodialysis shunt thrombectomy with revision;  Surgeon:  Alex Sanchez MD;  Location: [facility] HYBRID OR;  Service: Vascular;  Laterality: Right;    TRANSPLANTATION RENAL Right 2016    from  donor    TUBAL ABDOMINAL LIGATION Bilateral         Home Meds:  Medications Prior to Admission   Medication Sig Dispense Refill Last Dose/Taking    acetaminophen (TYLENOL) 325 MG tablet Take 2 tablets by mouth Every 6 (Six) Hours As Needed for Mild Pain.       bisacodyl (DULCOLAX) 5 MG EC tablet Take 1 tablet by mouth Daily As Needed for Constipation (Use if polyethylene glycol is ineffective). 30 tablet 0     calcitriol (ROCALTROL) 0.25 MCG capsule Take 1 capsule by mouth Take As Directed. Calcitriol 0.25 MCG Capsule (si capsule once per day )       calcium acetate (PHOSLO) 667 MG capsule Take 3 capsules by mouth 3 (Three) Times a Day With Meals.       cloNIDine (CATAPRES) 0.1 MG tablet Take 1 tablet by mouth Daily. 30 tablet 3     hydrALAZINE (APRESOLINE) 25 MG tablet Take 1 tablet by mouth Daily.       hydrOXYzine (ATARAX) 50 MG tablet Take 1 tablet by mouth As Needed.       naloxone (NARCAN) 4 MG/0.1ML nasal spray Call 911. Don't prime. [place] in 1 nostril for overdose. Repeat in 2-3 minutes in other nostril if no or minimal breathing/responsiveness. 2 each 0     ondansetron ODT (ZOFRAN-ODT) 4 MG disintegrating tablet Take 1 tablet by mouth As Needed for Nausea or Vomiting.       polyethylene glycol (MIRALAX) 17 GM/SCOOP powder Take 17 g by mouth Daily As Needed (Use if senna-docusate is ineffective). 510 g 0     Polyethylene Glycol 3350 (MIRALAX PO) Take 1 dose by mouth As Needed.       prochlorperazine (COMPAZINE) 10 MG tablet Take 1 tablet by mouth Every 6 (Six) Hours As Needed for Nausea or Vomiting. 10 tablet 0     propranolol (INDERAL) 10 MG tablet Take 1 tablet by mouth 2 (Two) Times a Day.       sennosides-docusate (PERICOLACE) 8.6-50 MG per tablet Take 2 tablets by mouth 2 (Two) Times a Day As Needed for Constipation. 60 tablet 0     traZODone (DESYREL)  100 MG tablet Take 1.5 tablets by mouth every night at bedtime.       warfarin (COUMADIN) 5 MG tablet Take 1 tablet by mouth Daily.          Allergies:  No Known Allergies    Social History:   Social History     Socioeconomic History    Marital status:     Number of children: 2    Years of education: 12   Tobacco Use    Smoking status: Never     Passive exposure: Never    Smokeless tobacco: Never    Tobacco comments:     Patient does not smoke   Vaping Use    Vaping status: Never Used   Substance and Sexual Activity    Alcohol use: No     Comment: caffeine use; Patient is unknown if ever drinks    Drug use: Never     Comment: Drug Abuse: none    Sexual activity: Defer       Family History:  Family History   Problem Relation Age of Onset    Hypertension Mother     Hypertension Father     Heart attack Maternal Grandfather     Malig Hyperthermia Neg Hx        Physical Exam:  BP (!) 191/105   Pulse (!) 146   Temp 100.5 °F (38.1 °C) (Tympanic)   Resp 22   Wt 75.3 kg (166 lb 0.1 oz)   SpO2 (!) 89%   BMI 32.42 kg/m²  Body mass index is 32.42 kg/m². (!) 89% 75.3 kg (166 lb 0.1 oz)  Physical Exam  Constitutional:       Comments: Sedated, intubated   HENT:      Right Ear: External ear normal.      Left Ear: External ear normal.      Nose: Nose normal.      Mouth/Throat:      Comments: Oral exam shows endotracheal tube in good position  Eyes:      Conjunctiva/sclera: Conjunctivae normal.   Neck:      Thyroid: No thyromegaly.      Vascular: No JVD.      Trachea: No tracheal deviation.   Cardiovascular:      Rate and Rhythm: Normal rate and regular rhythm.      Heart sounds: Normal heart sounds. No murmur heard.  Pulmonary:      Effort: Pulmonary effort is normal.      Breath sounds: Normal breath sounds.   Abdominal:      Palpations: Abdomen is soft.      Tenderness: There is no abdominal tenderness. There is no rebound.      Comments: Cannot palpate liver or spleen enlargement   Musculoskeletal:         General:  No deformity.      Cervical back: Neck supple. No rigidity.   Skin:     General: Skin is warm.      Findings: No rash.      Comments: No palpable nodules   Neurological:      Comments: Sedated, intubated   Psychiatric:      Comments: Sedated, intubated         LABS:  COVID19   Date Value Ref Range Status   11/10/2024 Not Detected Not Detected - Ref. Range Final       Lab Results   Component Value Date    CALCIUM 9.6 11/10/2024    PHOS 3.6 01/24/2023     Results from last 7 days   Lab Units 11/10/24  1002 11/08/24  0000   SODIUM mmol/L 131*  --    POTASSIUM mmol/L 4.6  --    CHLORIDE mmol/L 87*  --    CO2 mmol/L 14.0*  --    BUN mg/dL 56*  --    CREATININE mg/dL 11.59*  --    GLUCOSE mg/dL 118*  --    CALCIUM mg/dL 9.6  --    WBC 10*3/mm3 16.70*  --    HEMOGLOBIN g/dL 12.0 11*  33*   PLATELETS 10*3/mm3 162  --    ALT (SGPT) U/L 9  --    AST (SGOT) U/L 22  --      Lab Results   Component Value Date    TROPONINI 0.016 06/30/2019    TROPONINT <0.010 03/15/2021             Results from last 7 days   Lab Units 11/10/24  1245 11/10/24  1003   LACTATE mmol/L 2.6* 5.2*     Results from last 7 days   Lab Units 11/10/24  1215   PH, ARTERIAL pH units 7.336*   PCO2, ARTERIAL mm Hg 38.9   PO2 ART mm Hg 262.8*   O2 SATURATION ART % 99.8*   MODALITY  Adult Vent     Results from last 7 days   Lab Units 11/10/24  1004   ADENOVIRUS DETECTION BY PCR  Not Detected   CORONAVIRUS 229E  Not Detected   CORONAVIRUS HKU1  Not Detected   CORONAVIRUS NL63  Not Detected   CORONAVIRUS OC43  Not Detected   HUMAN METAPNEUMOVIRUS  Not Detected   HUMAN RHINOVIRUS/ENTEROVIRUS  Not Detected   INFLUENZA B PCR  Not Detected   PARAINFLUENZA 1  Not Detected   PARAINFLUENZA VIRUS 2  Not Detected   PARAINFLUENZA VIRUS 3  Not Detected   PARAINFLUENZA VIRUS 4  Not Detected   BORDETELLA PERTUSSIS PCR  Not Detected   BORDETELLA PARAPERTUSSIS PCR  Not Detected   CHLAMYDOPHILA PNEUMONIAE PCR  Not Detected   MYCOPLAMA PNEUMO PCR  Not Detected   RSV, PCR  Not  Detected     Results from last 7 days   Lab Units 11/10/24  1002   INR  2.36*         Lab Results   Component Value Date    TSH 0.565 04/06/2019          Imaging: I personally visualized the images of CT of the abdomen pelvis and chest x-rays.  There are some infiltrates at the bases of the lungs bilaterally.  There is nonspecific thickening of the urinary bladder.  Has atrophic kidney right side    I reviewed a twelve-lead EKG showing sinus tachycardia with what looks like some ST changes in the anterolateral leads and a new bundle branch block.      Assessment:  Acute ST wave changes on EKG  Acute pulmonary edema  Acute hypoxic respiratory failure  Pulmonary edema, volume overload  Acute metabolic encephalopathy  Fever  Pneumonia  Abnormal bladder appearance on CT  Hyponatremia  Metabolic acidosis  End-stage renal disease on hemodialysis  Lactic acidosis  Hypertensive emergency        Recommendations:  Admit to ICU.  Cover with empiric antibiotics.  Seems like has early aspiration pneumonia at the bases, and thickening of the urinary bladder on CT scan.  May have urinary tract infection and pneumonia.  Send sputum culture.  Monitor blood culture results.  Monitor white count, temperature and procalcitonin levels.  Adjust mechanical ventilation.  Repeat ABG.  She is requiring high FiO2.  Could be in pulmonary edema.  May need hemodialysis today.  Send sputum cultures.  Needs better sedation.  Chest x-ray in the morning.  Stat cardiology consultation based on acute ST elevation and EKG changes.  She was very hypertensive, however I think she is currently in pain, not adequately sedated.  Will give some analgesics and sedatives and recheck blood pressure.  Consult nephrology.  May need hemodialysis today to remove fluid.  Insulin sliding scale for treatment of diabetes.  Start Pepcid for stress ulcer prophylaxis.  Low-dose heparin subcu for DVT prophylaxis once okay with cardiology.      Total critical care time  40-minutes       Martin Dinh MD  11/10/2024  14:28 EST      Much of this encounter note is an electronic transcription/translation of spoken language to printed text using Dragon Software.

## 2024-11-11 ENCOUNTER — APPOINTMENT (OUTPATIENT)
Dept: CARDIOLOGY | Facility: HOSPITAL | Age: 49
DRG: 286 | End: 2024-11-11
Payer: MEDICARE

## 2024-11-11 ENCOUNTER — APPOINTMENT (OUTPATIENT)
Dept: GENERAL RADIOLOGY | Facility: HOSPITAL | Age: 49
DRG: 286 | End: 2024-11-11
Payer: MEDICARE

## 2024-11-11 LAB
ALBUMIN SERPL-MCNC: 3.1 G/DL (ref 3.5–5.2)
ANION GAP SERPL CALCULATED.3IONS-SCNC: 17 MMOL/L (ref 5–15)
ANION GAP SERPL CALCULATED.3IONS-SCNC: 18.9 MMOL/L (ref 5–15)
BACTERIA BLD CULT: ABNORMAL
BACTERIA SPEC RESP CULT: NORMAL
BASOPHILS # BLD AUTO: 0.03 10*3/MM3 (ref 0–0.2)
BASOPHILS NFR BLD AUTO: 0.2 % (ref 0–1.5)
BH CV VAS DIALYSIS ARTERIAL ANASTOMOSIS DIAMETER: 0.6 CM
BH CV VAS DIALYSIS ARTERIAL ANASTOMOSIS EDV: 187 CM/SEC
BH CV VAS DIALYSIS ARTERIAL ANASTOMOSIS PSV: 294 CM/SEC
BH CV VAS DIALYSIS CONDUIT DIST DEPTH: 0.5 CM
BH CV VAS DIALYSIS CONDUIT DIST DIAMETER: 0.4 CM
BH CV VAS DIALYSIS CONDUIT DIST EDV: 147 CM/SEC
BH CV VAS DIALYSIS CONDUIT DIST PSV: 227 CM/SEC
BH CV VAS DIALYSIS CONDUIT MID DEPTH: 0.6 CM
BH CV VAS DIALYSIS CONDUIT MID DIAMETER: 0.2 CM
BH CV VAS DIALYSIS CONDUIT MID EDV: 445 CM/SEC
BH CV VAS DIALYSIS CONDUIT MID PSV: 662 CM/SEC
BH CV VAS DIALYSIS CONDUIT MID/DIST DEPTH: 0.7 CM
BH CV VAS DIALYSIS CONDUIT MID/DIST DIAMETER: 0.4 CM
BH CV VAS DIALYSIS CONDUIT MID/DIST EDV: 115 CM/SEC
BH CV VAS DIALYSIS CONDUIT MID/DIST FLOW VOL: 786 ML/MIN
BH CV VAS DIALYSIS CONDUIT MID/DIST PSV: 179 CM/SEC
BH CV VAS DIALYSIS CONDUIT PROX DIAMETER: 0.5 CM
BH CV VAS DIALYSIS CONDUIT PROX EDV: 252 CM/SEC
BH CV VAS DIALYSIS CONDUIT PROX FLOW VOL: 601 ML/MIN
BH CV VAS DIALYSIS CONDUIT PROX PSV: 397 CM/SEC
BH CV VAS DIALYSIS CONDUIT PROX/MID DEPTH: 0.4 CM
BH CV VAS DIALYSIS CONDUIT PROX/MID DIAMETER: 0.4 CM
BH CV VAS DIALYSIS CONDUIT PROX/MID EDV: 95.9 CM/SEC
BH CV VAS DIALYSIS CONDUIT PROX/MID FLOW VOL: 479 ML/MIN
BH CV VAS DIALYSIS CONDUIT PROX/MID PSV: 159 CM/SEC
BH CV VAS DIALYSIS PRE-INFLOW BRACHIAL DIAMETER: 0.8 CM
BH CV VAS DIALYSIS PRE-INFLOW BRACHIAL EDV: 28.5 CM/SEC
BH CV VAS DIALYSIS PRE-INFLOW BRACHIAL FLOW VOL: 716 ML/MIN
BH CV VAS DIALYSIS PRE-INFLOW BRACHIAL PSV: 63.9 CM/SEC
BOTTLE TYPE: ABNORMAL
BUN SERPL-MCNC: 20 MG/DL (ref 6–20)
BUN SERPL-MCNC: 44 MG/DL (ref 6–20)
BUN/CREAT SERPL: 3.9 (ref 7–25)
BUN/CREAT SERPL: 5.2 (ref 7–25)
CALCIUM SPEC-SCNC: 7.5 MG/DL (ref 8.6–10.5)
CALCIUM SPEC-SCNC: 9.3 MG/DL (ref 8.6–10.5)
CHLORIDE SERPL-SCNC: 93 MMOL/L (ref 98–107)
CHLORIDE SERPL-SCNC: 96 MMOL/L (ref 98–107)
CO2 SERPL-SCNC: 17 MMOL/L (ref 22–29)
CO2 SERPL-SCNC: 21.1 MMOL/L (ref 22–29)
CREAT SERPL-MCNC: 5.09 MG/DL (ref 0.57–1)
CREAT SERPL-MCNC: 8.49 MG/DL (ref 0.57–1)
D-LACTATE SERPL-SCNC: 1.7 MMOL/L (ref 0.5–2)
DEPRECATED RDW RBC AUTO: 57.1 FL (ref 37–54)
EGFRCR SERPLBLD CKD-EPI 2021: 5.3 ML/MIN/1.73
EGFRCR SERPLBLD CKD-EPI 2021: 9.8 ML/MIN/1.73
EOSINOPHIL # BLD AUTO: 0.01 10*3/MM3 (ref 0–0.4)
EOSINOPHIL NFR BLD AUTO: 0.1 % (ref 0.3–6.2)
ERYTHROCYTE [DISTWIDTH] IN BLOOD BY AUTOMATED COUNT: 17.3 % (ref 12.3–15.4)
GLUCOSE BLDC GLUCOMTR-MCNC: 108 MG/DL (ref 70–130)
GLUCOSE BLDC GLUCOMTR-MCNC: 115 MG/DL (ref 70–130)
GLUCOSE BLDC GLUCOMTR-MCNC: 118 MG/DL (ref 70–130)
GLUCOSE BLDC GLUCOMTR-MCNC: 137 MG/DL (ref 70–130)
GLUCOSE BLDC GLUCOMTR-MCNC: 68 MG/DL (ref 70–130)
GLUCOSE BLDC GLUCOMTR-MCNC: 79 MG/DL (ref 70–130)
GLUCOSE BLDC GLUCOMTR-MCNC: 91 MG/DL (ref 70–130)
GLUCOSE BLDC GLUCOMTR-MCNC: 92 MG/DL (ref 70–130)
GLUCOSE SERPL-MCNC: 130 MG/DL (ref 65–99)
GLUCOSE SERPL-MCNC: 142 MG/DL (ref 65–99)
GRAM STN SPEC: NORMAL
HCT VFR BLD AUTO: 33.1 % (ref 34–46.6)
HGB BLD-MCNC: 11 G/DL (ref 12–15.9)
IMM GRANULOCYTES # BLD AUTO: 0.06 10*3/MM3 (ref 0–0.05)
IMM GRANULOCYTES NFR BLD AUTO: 0.5 % (ref 0–0.5)
LYMPHOCYTES # BLD AUTO: 0.69 10*3/MM3 (ref 0.7–3.1)
LYMPHOCYTES NFR BLD AUTO: 5.6 % (ref 19.6–45.3)
MAGNESIUM SERPL-MCNC: 1.9 MG/DL (ref 1.6–2.6)
MCH RBC QN AUTO: 30.1 PG (ref 26.6–33)
MCHC RBC AUTO-ENTMCNC: 33.2 G/DL (ref 31.5–35.7)
MCV RBC AUTO: 90.4 FL (ref 79–97)
MONOCYTES # BLD AUTO: 0.53 10*3/MM3 (ref 0.1–0.9)
MONOCYTES NFR BLD AUTO: 4.3 % (ref 5–12)
NEUTROPHILS NFR BLD AUTO: 10.94 10*3/MM3 (ref 1.7–7)
NEUTROPHILS NFR BLD AUTO: 89.3 % (ref 42.7–76)
NRBC BLD AUTO-RTO: 0 /100 WBC (ref 0–0.2)
PHOSPHATE SERPL-MCNC: 6.7 MG/DL (ref 2.5–4.5)
PLATELET # BLD AUTO: 137 10*3/MM3 (ref 140–450)
PMV BLD AUTO: 10.5 FL (ref 6–12)
POTASSIUM SERPL-SCNC: 4.2 MMOL/L (ref 3.5–5.2)
POTASSIUM SERPL-SCNC: 6 MMOL/L (ref 3.5–5.2)
RBC # BLD AUTO: 3.66 10*6/MM3 (ref 3.77–5.28)
SODIUM SERPL-SCNC: 130 MMOL/L (ref 136–145)
SODIUM SERPL-SCNC: 133 MMOL/L (ref 136–145)
VANCOMYCIN SERPL-MCNC: 35.4 MCG/ML (ref 5–40)
WBC NRBC COR # BLD AUTO: 12.26 10*3/MM3 (ref 3.4–10.8)

## 2024-11-11 PROCEDURE — 87205 SMEAR GRAM STAIN: CPT | Performed by: INTERNAL MEDICINE

## 2024-11-11 PROCEDURE — 93990 DOPPLER FLOW TESTING: CPT | Performed by: SURGERY

## 2024-11-11 PROCEDURE — 94799 UNLISTED PULMONARY SVC/PX: CPT

## 2024-11-11 PROCEDURE — 25010000002 PIPERACILLIN SOD-TAZOBACTAM PER 1 G: Performed by: INTERNAL MEDICINE

## 2024-11-11 PROCEDURE — 94761 N-INVAS EAR/PLS OXIMETRY MLT: CPT

## 2024-11-11 PROCEDURE — 85025 COMPLETE CBC W/AUTO DIFF WBC: CPT | Performed by: INTERNAL MEDICINE

## 2024-11-11 PROCEDURE — 80202 ASSAY OF VANCOMYCIN: CPT | Performed by: INTERNAL MEDICINE

## 2024-11-11 PROCEDURE — 80048 BASIC METABOLIC PNL TOTAL CA: CPT | Performed by: INTERNAL MEDICINE

## 2024-11-11 PROCEDURE — 93990 DOPPLER FLOW TESTING: CPT

## 2024-11-11 PROCEDURE — 25010000002 PROPOFOL 10 MG/ML EMULSION: Performed by: INTERNAL MEDICINE

## 2024-11-11 PROCEDURE — 25010000002 FENTANYL CITRATE (PF) 50 MCG/ML SOLUTION: Performed by: INTERNAL MEDICINE

## 2024-11-11 PROCEDURE — 25010000002 CEFAZOLIN PER 500 MG: Performed by: INTERNAL MEDICINE

## 2024-11-11 PROCEDURE — 83605 ASSAY OF LACTIC ACID: CPT | Performed by: PHYSICIAN ASSISTANT

## 2024-11-11 PROCEDURE — 82948 REAGENT STRIP/BLOOD GLUCOSE: CPT

## 2024-11-11 PROCEDURE — 83735 ASSAY OF MAGNESIUM: CPT | Performed by: INTERNAL MEDICINE

## 2024-11-11 PROCEDURE — 94003 VENT MGMT INPAT SUBQ DAY: CPT

## 2024-11-11 PROCEDURE — 99291 CRITICAL CARE FIRST HOUR: CPT | Performed by: INTERNAL MEDICINE

## 2024-11-11 PROCEDURE — 80069 RENAL FUNCTION PANEL: CPT | Performed by: INTERNAL MEDICINE

## 2024-11-11 PROCEDURE — 93005 ELECTROCARDIOGRAM TRACING: CPT | Performed by: INTERNAL MEDICINE

## 2024-11-11 PROCEDURE — 93010 ELECTROCARDIOGRAM REPORT: CPT | Performed by: INTERNAL MEDICINE

## 2024-11-11 PROCEDURE — 71045 X-RAY EXAM CHEST 1 VIEW: CPT

## 2024-11-11 PROCEDURE — 99232 SBSQ HOSP IP/OBS MODERATE 35: CPT | Performed by: STUDENT IN AN ORGANIZED HEALTH CARE EDUCATION/TRAINING PROGRAM

## 2024-11-11 RX ORDER — ALBUMIN (HUMAN) 12.5 G/50ML
12.5 SOLUTION INTRAVENOUS AS NEEDED
Status: CANCELLED | OUTPATIENT
Start: 2024-11-11 | End: 2024-11-12

## 2024-11-11 RX ORDER — SODIUM CHLORIDE 9 MG/ML
40 INJECTION, SOLUTION INTRAVENOUS AS NEEDED
Status: DISCONTINUED | OUTPATIENT
Start: 2024-11-11 | End: 2024-11-13

## 2024-11-11 RX ORDER — LORAZEPAM 0.5 MG/1
0.5 TABLET ORAL EVERY 12 HOURS SCHEDULED
Status: COMPLETED | OUTPATIENT
Start: 2024-11-11 | End: 2024-11-12

## 2024-11-11 RX ORDER — SODIUM CHLORIDE 0.9 % (FLUSH) 0.9 %
20 SYRINGE (ML) INJECTION AS NEEDED
Status: DISCONTINUED | OUTPATIENT
Start: 2024-11-11 | End: 2024-11-13

## 2024-11-11 RX ORDER — SODIUM CHLORIDE 0.9 % (FLUSH) 0.9 %
10 SYRINGE (ML) INJECTION EVERY 12 HOURS SCHEDULED
Status: DISCONTINUED | OUTPATIENT
Start: 2024-11-11 | End: 2024-11-15 | Stop reason: HOSPADM

## 2024-11-11 RX ORDER — SODIUM CHLORIDE 0.9 % (FLUSH) 0.9 %
10 SYRINGE (ML) INJECTION AS NEEDED
Status: DISCONTINUED | OUTPATIENT
Start: 2024-11-11 | End: 2024-11-15 | Stop reason: HOSPADM

## 2024-11-11 RX ADMIN — SENNOSIDES AND DOCUSATE SODIUM 2 TABLET: 50; 8.6 TABLET ORAL at 08:05

## 2024-11-11 RX ADMIN — Medication 10 ML: at 09:06

## 2024-11-11 RX ADMIN — PROPOFOL INJECTABLE EMULSION 40 MCG/KG/MIN: 10 INJECTION, EMULSION INTRAVENOUS at 06:46

## 2024-11-11 RX ADMIN — FENTANYL CITRATE 100 MCG: 50 INJECTION, SOLUTION INTRAMUSCULAR; INTRAVENOUS at 19:23

## 2024-11-11 RX ADMIN — FENTANYL CITRATE 100 MCG: 50 INJECTION, SOLUTION INTRAMUSCULAR; INTRAVENOUS at 05:57

## 2024-11-11 RX ADMIN — CHLORHEXIDINE GLUCONATE 15 ML: 1.2 RINSE ORAL at 08:04

## 2024-11-11 RX ADMIN — Medication 10 ML: at 21:16

## 2024-11-11 RX ADMIN — DEXTROSE MONOHYDRATE 25 G: 25 INJECTION, SOLUTION INTRAVENOUS at 17:10

## 2024-11-11 RX ADMIN — MUPIROCIN 1 APPLICATION: 20 OINTMENT TOPICAL at 09:49

## 2024-11-11 RX ADMIN — LORAZEPAM 0.5 MG: 0.5 TABLET ORAL at 12:38

## 2024-11-11 RX ADMIN — Medication 10 ML: at 09:07

## 2024-11-11 RX ADMIN — FENTANYL CITRATE 100 MCG: 50 INJECTION, SOLUTION INTRAMUSCULAR; INTRAVENOUS at 08:05

## 2024-11-11 RX ADMIN — MUPIROCIN 1 APPLICATION: 20 OINTMENT TOPICAL at 21:17

## 2024-11-11 RX ADMIN — FAMOTIDINE 20 MG: 10 INJECTION INTRAVENOUS at 08:05

## 2024-11-11 RX ADMIN — PROPOFOL INJECTABLE EMULSION 50 MCG/KG/MIN: 10 INJECTION, EMULSION INTRAVENOUS at 15:23

## 2024-11-11 RX ADMIN — FENTANYL CITRATE 100 MCG: 50 INJECTION, SOLUTION INTRAMUSCULAR; INTRAVENOUS at 10:19

## 2024-11-11 RX ADMIN — NOREPINEPHRINE BITARTRATE 0.2 MCG/KG/MIN: 32 SOLUTION INTRAVENOUS at 11:08

## 2024-11-11 RX ADMIN — NOREPINEPHRINE BITARTRATE 0.3 MCG/KG/MIN: 32 SOLUTION INTRAVENOUS at 19:45

## 2024-11-11 RX ADMIN — FENTANYL CITRATE 100 MCG: 50 INJECTION, SOLUTION INTRAMUSCULAR; INTRAVENOUS at 00:23

## 2024-11-11 RX ADMIN — FENTANYL CITRATE 100 MCG: 50 INJECTION, SOLUTION INTRAMUSCULAR; INTRAVENOUS at 13:17

## 2024-11-11 RX ADMIN — Medication 10 ML: at 08:06

## 2024-11-11 RX ADMIN — PIPERACILLIN AND TAZOBACTAM 4.5 G: 4; .5 INJECTION, POWDER, FOR SOLUTION INTRAVENOUS at 09:49

## 2024-11-11 RX ADMIN — CHLORHEXIDINE GLUCONATE 15 ML: 1.2 RINSE ORAL at 21:20

## 2024-11-11 RX ADMIN — PROPOFOL INJECTABLE EMULSION 50 MCG/KG/MIN: 10 INJECTION, EMULSION INTRAVENOUS at 11:07

## 2024-11-11 RX ADMIN — FENTANYL CITRATE 100 MCG: 50 INJECTION, SOLUTION INTRAMUSCULAR; INTRAVENOUS at 17:13

## 2024-11-11 RX ADMIN — PROPOFOL INJECTABLE EMULSION 50 MCG/KG/MIN: 10 INJECTION, EMULSION INTRAVENOUS at 19:45

## 2024-11-11 RX ADMIN — NOREPINEPHRINE BITARTRATE 0.3 MCG/KG/MIN: 32 SOLUTION INTRAVENOUS at 04:15

## 2024-11-11 RX ADMIN — LORAZEPAM 0.5 MG: 0.5 TABLET ORAL at 21:21

## 2024-11-11 RX ADMIN — ACETAMINOPHEN 325MG 650 MG: 325 TABLET ORAL at 21:24

## 2024-11-11 RX ADMIN — CEFAZOLIN 1000 MG: 1 INJECTION, POWDER, FOR SOLUTION INTRAMUSCULAR; INTRAVENOUS at 18:46

## 2024-11-11 RX ADMIN — PROPOFOL INJECTABLE EMULSION 50 MCG/KG/MIN: 10 INJECTION, EMULSION INTRAVENOUS at 02:30

## 2024-11-11 NOTE — PLAN OF CARE
Goal Outcome Evaluation:                       HD completed at bedside with 1.6L removed.  Pt tolerated well and able to titrate vasoactives off throughout night. Leaving only norepinephrine infusing at this time.  Titrated propofol to maintain RASS -2, as pt -3 at times, but awakes quickly or spontaneously sits up in bed.  Restraints in place for pulling at lines and tubes.  Pt moves all extremities spontaneously and can shake her head no, but will not follow commands.  Total bath/CHG wipes and pericare done.  Daughter remains at bedside.  Education given to patient and daughter related to medications, plan of care, vent weaning and labs.  Daughter indicates understanding.

## 2024-11-11 NOTE — PLAN OF CARE
Problem: Adult Inpatient Plan of Care  Goal: Plan of Care Review  Outcome: Progressing  Goal: Patient-Specific Goal (Individualized)  Outcome: Progressing  Goal: Absence of Hospital-Acquired Illness or Injury  Outcome: Progressing  Intervention: Identify and Manage Fall Risk  Recent Flowsheet Documentation  Taken 11/11/2024 0900 by Rika Aguilar RN  Safety Promotion/Fall Prevention: safety round/check completed  Taken 11/11/2024 0800 by Rika Aguilar RN  Safety Promotion/Fall Prevention: safety round/check completed  Taken 11/11/2024 0700 by Rika Aguilar RN  Safety Promotion/Fall Prevention: safety round/check completed  Intervention: Prevent Skin Injury  Recent Flowsheet Documentation  Taken 11/11/2024 0800 by Rika Aguilar RN  Body Position: right  Skin Protection: drying agents applied  Taken 11/11/2024 0700 by Rika Aguilar RN  Body Position:   turned   left   reverse Trendelenburg  Intervention: Prevent and Manage VTE (Venous Thromboembolism) Risk  Recent Flowsheet Documentation  Taken 11/11/2024 0800 by Rika Aguilar RN  VTE Prevention/Management: SCDs (sequential compression devices) on  Intervention: Prevent Infection  Recent Flowsheet Documentation  Taken 11/11/2024 0800 by Rika Aguilar RN  Infection Prevention:   hand hygiene promoted   rest/sleep promoted  Goal: Optimal Comfort and Wellbeing  Outcome: Progressing  Goal: Readiness for Transition of Care  Outcome: Progressing     Problem: Comorbidity Management  Goal: Maintenance of Behavioral Health Symptom Control  Outcome: Progressing  Intervention: Maintain Behavioral Health Symptom Control  Recent Flowsheet Documentation  Taken 11/11/2024 0800 by Rika Aguilar RN  Medication Review/Management: medications reviewed  Goal: Blood Pressure in Desired Range  Outcome: Progressing  Intervention: Maintain Blood Pressure Management  Recent Flowsheet Documentation  Taken 11/11/2024 0800 by Rika Aguilar RN  Medication  Review/Management: medications reviewed     Problem: Mechanical Ventilation Invasive  Goal: Effective Communication  Outcome: Progressing  Intervention: Ensure Effective Communication  Recent Flowsheet Documentation  Taken 11/11/2024 0800 by Rika Aguilar RN  Diversional Activities:   [employer]  Family/Support System Care: support provided  Communication Enhancement Strategies: (intubated/sedated) other (see comments)  Taken 11/11/2024 0741 by Rika Aguilar RN  Diversional Activities:   music   television  Goal: Optimal Device Function  Outcome: Progressing  Intervention: Optimize Device Care and Function  Recent Flowsheet Documentation  Taken 11/11/2024 0900 by Rika Aguilar RN  Airway Safety Measures: mask valve resuscitator at bedside  Taken 11/11/2024 0800 by Rika Aguilar RN  Airway/Ventilation Management:   airway patency maintained   calming measures promoted  Oral Care:   lip/mouth moisturizer applied   suction provided   mouth wash rinse  Airway Safety Measures: mask valve resuscitator at bedside  Taken 11/11/2024 0700 by Rika Aguilar RN  Airway Safety Measures: mask valve resuscitator at bedside  Goal: Mechanical Ventilation Liberation  Outcome: Progressing  Intervention: Promote Extubation and Mechanical Ventilation Liberation  Recent Flowsheet Documentation  Taken 11/11/2024 0800 by Rika Aguilar RN  Environmental Support:   calm environment promoted   caregiver consistency promoted   distractions minimized  Sleep/Rest Enhancement:   relaxation techniques promoted   room darkened   therapeutic touch utilized  Medication Review/Management: medications reviewed  Goal: Optimal Nutrition Delivery  Outcome: Progressing  Goal: Absence of Device-Related Skin and Tissue Injury  Outcome: Progressing  Intervention: Maintain Skin and Tissue Health  Recent Flowsheet Documentation  Taken 11/11/2024 0800 by Rika Aguilar RN  Device Skin Pressure Protection: positioning supports  utilized  Goal: Absence of Ventilator-Induced Lung Injury  Outcome: Progressing  Intervention: Facilitate Lung-Protection Measures  Recent Flowsheet Documentation  Taken 11/11/2024 0800 by Rika Aguilar RN  Lung Protection Measures: optimal PEEP applied  Intervention: Prevent Ventilator-Associated Pneumonia  Recent Flowsheet Documentation  Taken 11/11/2024 0800 by Rika Aguilar RN  Head of Bed (HOB) Positioning: HOB elevated  VAP Prevention Measures: completed  Oral Care:   lip/mouth moisturizer applied   suction provided   mouth wash rinse  Taken 11/11/2024 0700 by Rika Aguilar RN  Head of Bed (HOB) Positioning: HOB elevated     Problem: Skin Injury Risk Increased  Goal: Skin Health and Integrity  Outcome: Progressing  Intervention: Optimize Skin Protection  Recent Flowsheet Documentation  Taken 11/11/2024 0800 by Rika Aguilar RN  Activity Management: bedrest  Pressure Reduction Techniques:   heels elevated off bed   frequent weight shift encouraged   weight shift assistance provided  Head of Bed (HOB) Positioning: HOB elevated  Pressure Reduction Devices: positioning supports utilized  Skin Protection: drying agents applied  Taken 11/11/2024 0700 by Rika Aguilar RN  Activity Management: bedrest  Head of Bed (HOB) Positioning: HOB elevated     Problem: Fall Injury Risk  Goal: Absence of Fall and Fall-Related Injury  Outcome: Progressing  Intervention: Identify and Manage Contributors  Recent Flowsheet Documentation  Taken 11/11/2024 0800 by Rika Aguilar RN  Medication Review/Management: medications reviewed  Intervention: Promote Injury-Free Environment  Recent Flowsheet Documentation  Taken 11/11/2024 0900 by Rika Aguilar RN  Safety Promotion/Fall Prevention: safety round/check completed  Taken 11/11/2024 0800 by Rika Aguilar RN  Safety Promotion/Fall Prevention: safety round/check completed  Taken 11/11/2024 0700 by Rika Aguilar RN  Safety Promotion/Fall Prevention: safety  round/check completed     Problem: Pain Acute  Goal: Optimal Pain Control and Function  Outcome: Progressing  Intervention: Optimize Psychosocial Wellbeing  Recent Flowsheet Documentation  Taken 11/11/2024 0800 by Rika Aguilar RN  Supportive Measures: relaxation techniques promoted  Diversional Activities:   music   television  Taken 11/11/2024 0741 by Rika Aguilar RN  Diversional Activities:   music   television  Intervention: Prevent or Manage Pain  Recent Flowsheet Documentation  Taken 11/11/2024 0800 by Rika Aguilar RN  Sensory Stimulation Regulation: lighting decreased  Sleep/Rest Enhancement:   relaxation techniques promoted   room darkened   therapeutic touch utilized  Medication Review/Management: medications reviewed     Problem: Cardiac Catheterization (Diagnostic/Interventional)  Goal: Absence of Bleeding  Outcome: Progressing  Goal: Absence of Contrast-Induced Injury  Outcome: Progressing  Goal: Stable Heart Rate and Rhythm  Outcome: Progressing  Goal: Absence of Embolism Signs and Symptoms  Outcome: Progressing  Intervention: Prevent or Manage Embolism  Recent Flowsheet Documentation  Taken 11/11/2024 0800 by Rika Aguilar RN  VTE Prevention/Management: SCDs (sequential compression devices) on  Goal: Anesthesia/Sedation Recovery  Outcome: Progressing  Intervention: Optimize Anesthesia Recovery  Recent Flowsheet Documentation  Taken 11/11/2024 0900 by Rika Aguilar RN  Safety Promotion/Fall Prevention: safety round/check completed  Taken 11/11/2024 0800 by Rika Aguilar RN  Safety Promotion/Fall Prevention: safety round/check completed  Reorientation Measures: (intubated/sedated) other (see comments)  Taken 11/11/2024 0700 by Rika Aguilar RN  Safety Promotion/Fall Prevention: safety round/check completed  Goal: Optimal Pain Control and Function  Outcome: Progressing  Intervention: Prevent or Manage Pain  Recent Flowsheet Documentation  Taken 11/11/2024 0800 by Rika Aguilar  RN  Diversional Activities:   music   television  Taken 11/11/2024 0741 by Rika Aguilar RN  Diversional Activities:   music   television  Goal: Absence of Vascular Access Complication  Outcome: Progressing  Intervention: Prevent and Manage Access Complications  Recent Flowsheet Documentation  Taken 11/11/2024 0800 by Rika Aguilar RN  Activity Management: bedrest  Head of Bed (HOB) Positioning: HOB elevated  Taken 11/11/2024 0700 by Rika Aguilar RN  Activity Management: bedrest  Head of Bed (HOB) Positioning: HOB elevated     Problem: Heart Failure  Goal: Optimal Coping  Outcome: Progressing  Intervention: Support Psychosocial Response  Recent Flowsheet Documentation  Taken 11/11/2024 0800 by Rika Aguilar RN  Supportive Measures: relaxation techniques promoted  Family/Support System Care: support provided  Goal: Optimal Cardiac Output and Blood Flow  Outcome: Progressing  Intervention: Optimize Cardiac Output  Recent Flowsheet Documentation  Taken 11/11/2024 0800 by Rika Aguilar RN  Environmental Support:   calm environment promoted   caregiver consistency promoted   distractions minimized  Goal: Stable Heart Rate and Rhythm  Outcome: Progressing  Goal: Fluid and Electrolyte Balance  Outcome: Progressing  Goal: Optimal Functional Ability  Outcome: Progressing  Intervention: Optimize Functional Ability  Recent Flowsheet Documentation  Taken 11/11/2024 0800 by Rika Aguilar RN  Activity Management: bedrest  Taken 11/11/2024 0700 by Rika Aguilar RN  Activity Management: bedrest  Goal: Improved Oral Intake  Outcome: Progressing  Goal: Effective Oxygenation and Ventilation  Outcome: Progressing  Intervention: Promote Airway Secretion Clearance  Recent Flowsheet Documentation  Taken 11/11/2024 0800 by Rika Aguilar RN  Activity Management: bedrest  Breathing Techniques/Airway Clearance: lung expansion therapy utilized  Cough And Deep Breathing: (intubated) unable to perform  Taken 11/11/2024  0700 by Lauren, Rika, RN  Activity Management: bedrest  Intervention: Optimize Oxygenation and Ventilation  Recent Flowsheet Documentation  Taken 11/11/2024 0800 by Rika Aguilar RN  Head of Bed (HOB) Positioning: HOB elevated  Airway/Ventilation Management:   airway patency maintained   calming measures promoted  Taken 11/11/2024 0700 by Rika Aguilar RN  Head of Bed (HOB) Positioning: HOB elevated  Goal: Effective Breathing Pattern During Sleep  Outcome: Progressing  Intervention: Monitor and Manage Obstructive Sleep Apnea  Recent Flowsheet Documentation  Taken 11/11/2024 0800 by Rika Aguilar RN  Medication Review/Management: medications reviewed     Problem: Restraint, Nonviolent  Goal: Absence of Harm or Injury  Outcome: Progressing  Intervention: Implement Least Restrictive Safety Strategies  Recent Flowsheet Documentation  Taken 11/11/2024 0800 by Rika Aguilar RN  Medical Device Protection: IV pole/bag removed from visual field  Diversional Activities:   music   television  Taken 11/11/2024 0741 by Rika Aguilar RN  Medical Device Protection:   IV pole/bag removed from visual field   tubing secured  Diversional Activities:   music   television  Intervention: Protect Skin and Joint Integrity  Recent Flowsheet Documentation  Taken 11/11/2024 0800 by Rika Aguilar RN  Body Position: right  Skin Protection: drying agents applied  Range of Motion: ROM (range of motion) performed  Taken 11/11/2024 0700 by Rika Aguilar RN  Body Position:   turned   left   reverse Trendelenburg     Problem: Sepsis/Septic Shock  Goal: Optimal Coping  Outcome: Progressing  Intervention: Support Patient and Family Response  Recent Flowsheet Documentation  Taken 11/11/2024 0800 by Rika Aguilar RN  Supportive Measures: relaxation techniques promoted  Family/Support System Care: support provided  Goal: Absence of Bleeding  Outcome: Progressing  Goal: Blood Glucose Level Within Target Range  Outcome:  Progressing  Goal: Absence of Infection Signs and Symptoms  Outcome: Progressing  Intervention: Initiate Sepsis Management  Recent Flowsheet Documentation  Taken 11/11/2024 0800 by Rika Aguilar RN  Infection Prevention:   hand hygiene promoted   rest/sleep promoted  Intervention: Promote Stabilization  Recent Flowsheet Documentation  Taken 11/11/2024 0800 by Rika Aguilar RN  Lung Protection Measures: optimal PEEP applied  Intervention: Promote Recovery  Recent Flowsheet Documentation  Taken 11/11/2024 0800 by Rika Aguilar RN  Activity Management: bedrest  Airway/Ventilation Management:   airway patency maintained   calming measures promoted  Sleep/Rest Enhancement:   relaxation techniques promoted   room darkened   therapeutic touch utilized  Taken 11/11/2024 0700 by Rika Aguilar RN  Activity Management: bedrest  Goal: Optimal Nutrition Delivery  Outcome: Progressing   Goal Outcome Evaluation: remain HDS, monitor vs/labs, vent mgmt, HD today, wean pressors as able, promote adequate oxygenation.

## 2024-11-11 NOTE — PROGRESS NOTES
Dr. SAMMY Dinh    [facility] CARDIAC INTENSIVE CARE        Patient ID:  Name:  Sunni Mcneil  MRN:  [id number]  1975  49 y.o.  female            CC/Reason for visit: Acute hypoxic respiratory failure, pulmonary edema    Interval hx: Remains intubated, mechanically ventilated  Had heart catheterization and echocardiogram yesterday.  Diagnosed with Takotsubo cardiomyopathy.  Now has bacteremia due to Staphylococcus aureus.  Has septicemia with blood cultures positive.  Remains on IV pressors.  I reviewed mechanical ventilator settings today    ROS: Unobtainable, intubated    I reviewed old medical records.  Past medical history, social history and family history: Unchanged from admission H&P.      Vitals:  Vitals:    11/11/24 0716 11/11/24 0800 11/11/24 0900 11/11/24 1000   BP:       Pulse: 93 103 91 97   Resp: 24 22 22    Temp:  100 °F (37.8 °C) 100 °F (37.8 °C) 100 °F (37.8 °C)   TempSrc:  Rectal Rectal Rectal   SpO2: 100% 100% 100%    Weight:       Height:         FiO2 (%): 50 %     Body mass index is 33.24 kg/m².    Intake/Output Summary (Last 24 hours) at 11/11/2024 1042  Last data filed at 11/11/2024 0900  Gross per 24 hour   Intake 2657 ml   Output 300 ml   Net 2357 ml       Exam:  GEN:  No distress  Sedated  Mechanically ventilated  Oral exam shows endotracheal tube in good position  Not following commands  LUNGS: Few distant crackles bilat, no use of accessory muscles  CV:  Normal S1S2, without murmur, some ankle edema  ABD:  Non tender, no enlarged liver or masses      Scheduled meds:  chlorhexidine, 15 mL, Mouth/Throat, Q12H  famotidine, 20 mg, Intravenous, Daily  insulin regular, 3-14 Units, Subcutaneous, Q6H  LORazepam, 2 mg, Intravenous, Once  mupirocin, 1 Application, Each Nare, BID  oxymetazoline, 2 spray, Each Nare, BID  piperacillin-tazobactam, 4.5 g, Intravenous, Q12H  senna-docusate sodium, 2 tablet, Oral, BID  sodium chloride, 10 mL, Intravenous, Q12H  sodium chloride, 10 mL,  Intravenous, Q12H  sodium chloride, 10 mL, Intravenous, Q12H  sodium chloride, 10 mL, Intravenous, Q12H  Vancomycin Pharmacy Intermittent/Pulse Dosing, , Does not apply, Daily      IV meds:                      EPINEPHrine, 0.02-0.3 mcg/kg/min, Last Rate: Stopped (11/10/24 2054)  norepinephrine, 0.02-0.3 mcg/kg/min, Last Rate: 0.2 mcg/kg/min (11/11/24 0945)  Pharmacy to dose vancomycin,   phenylephrine, 0.5-3 mcg/kg/min, Last Rate: Stopped (11/11/24 0430)  propofol, 5-50 mcg/kg/min, Last Rate: 50 mcg/kg/min (11/11/24 8366)  vasopressin, 0.04 Units/min, Last Rate: Stopped (11/10/24 2332)        Data Review:   I reviewed the patient's medications and new clinical results.            Results from last 7 days   Lab Units 11/11/24  0400 11/10/24  1605 11/10/24  1602 11/10/24  1539 11/10/24  1420 11/10/24  1002   SODIUM mmol/L 130*  --   --   --   --  131*   POTASSIUM mmol/L 6.0*  --   --   --  6.2* 4.6   CHLORIDE mmol/L 96*  --   --   --   --  87*   CO2 mmol/L 17.0*  --   --   --   --  14.0*   BUN mg/dL 44*  --   --   --   --  56*   CREATININE mg/dL 8.49*  --   --   --   --  11.59*   CALCIUM mg/dL 7.5*  --   --   --   --  9.6   BILIRUBIN mg/dL  --   --   --   --   --  0.6   ALK PHOS U/L  --   --   --   --   --  58   ALT (SGPT) U/L  --   --   --   --   --  9   AST (SGOT) U/L  --   --   --   --   --  22   GLUCOSE mg/dL 130*  --   --   --   --  118*   WBC 10*3/mm3 12.26*  --   --   --   --  16.70*   HEMOGLOBIN g/dL 11.0*  --   --   --   --  12.0   HEMOGLOBIN, POC g/dL  --  10.5* 10.9*   < >  --   --    PLATELETS 10*3/mm3 137*  --   --   --   --  162   INR   --   --   --   --   --  2.36*   PROCALCITONIN ng/mL  --   --   --   --  40.40* 37.80*    < > = values in this interval not displayed.     Results from last 7 days   Lab Units 11/10/24  1245 11/10/24  1229   BLOODCX  Growth present, too young to evaluate Abnormal Stain*   BCIDPCR  Staph aureus. mecA/C and MREJ (methicillin resistance gene) NOT detected. Identification by  BCID2 PCR*  --          Results from last 7 days   Lab Units 11/10/24  1745 11/10/24  1420   HSTROP T ng/L 689* 239*     Results from last 7 days   Lab Units 11/10/24  2228 11/10/24  1700 11/10/24  1439 11/10/24  1215   PH, ARTERIAL pH units 7.284* 7.181* 7.140* 7.336*   PCO2, ARTERIAL mm Hg 36.1 46.7* 64.5* 38.9   PO2 ART mm Hg 189.7* 84.0 86.9 262.8*   O2 SATURATION ART % 99.5* 93.1 92.6 99.8*   MODALITY  Adult Vent Adult Vent Adult Vent Adult Vent            ASSESSMENT:   Takotsubo cardiomyopathy caused by sepsis  Staphylococcal septicemia, present on admission  Acute pulmonary edema  Acute hypoxic respiratory failure  Pulmonary edema, volume overload  Acute metabolic encephalopathy  Fever  Pneumonia  Abnormal bladder appearance on CT  Hyponatremia  Metabolic acidosis  End-stage renal disease on hemodialysis  Lactic acidosis  Hypertensive emergency  Anemia of chronic disease  Thrombocytopenia due to sepsis      PLAN:  Remains critically ill.  Source of her cardiomyopathy is likely due to staphylococcal septicemia with bacteremia.  Continue antistaphylococcal IV antibiotics.  May stop Zosyn.  Continue adjusting mechanical ventilation.  Echocardiogram and coronary angiography confirmed Takotsubo cardiomyopathy.  Supportive care for cardiomyopathy.  Cardiology following along.  Control of sepsis is important in this case.  Continue hemodialysis as per nephrology team.  Patient has ESRD on hemodialysis.  Volume was removed yesterday which has helped in her oxygenation.  Try sedation vacation tomorrow morning and see if patient is appropriate for weaning from mechanical ventilation.  Today FiO2 still elevated at 50%.  For anemia and thrombocytopenia, check CBC daily.  Pepcid and heparin for prophylaxis    Total critical care time 35 minutes  I reviewed the chart and other providers notes and reviewed labs.  Copied text in this note has been reviewed and is accurate as of today      Martin Dinh MD  11/11/2024

## 2024-11-11 NOTE — PLAN OF CARE
Goal Outcome Evaluation:  Pt transferred to CICU on ventilator s/p heart cath. R fem sheath remains in place - C/D/I/S. STAT dialysis orders. Upon arrival to unit pt was maxed on 3 pressors - Dr. Gonzales notified; no new orders. Currently maxed on jill, levo and vaso, and epi initiated and titrating when able. Propofol gtt infusing per orders and titrating when able. NG to LWS with 100 output. Anuric. Daughter updated at bedside.

## 2024-11-11 NOTE — CASE MANAGEMENT/SOCIAL WORK
"Discharge Planning Assessment  Psychiatric     Patient Name: Sunni Mcneil  MRN: [id number]  Today's Date: 11/11/2024    Admit Date: 11/10/2024    Plan: Pending clinical course: intubated 11/10   Discharge Needs Assessment       Row Name 11/11/24 1415       Living Environment    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), adult    Name(s) of People in Home Rajeev England    Current Living Arrangements apartment    Potentially Unsafe Housing Conditions none    In the past 12 months has the electric, gas, oil, or water company threatened to shut off services in your home? No    Primary Care Provided by self    Family Caregiver if Needed child(dagmar), adult    Family Caregiver Names Tootieroxana \"Judy\" Colon/daughter or Rajeev England/son    Able to Return to Prior Arrangements yes       Resource/Environmental Concerns    Resource/Environmental Concerns none    Transportation Concerns none       Transportation Needs    In the past 12 months, has lack of transportation kept you from medical appointments or from getting medications? no       Food Insecurity    Within the past 12 months, you worried that your food would run out before you got the money to buy more. Never true    Within the past 12 months, the food you bought just didn't last and you didn't have money to get more. Never true       Transition Planning    Patient/Family Anticipates Transition to home with family;other (see comments)  Pending hospital course, currenty intubated, pt may need HH or SNF    Patient/Family Anticipated Services at Transition none    Transportation Anticipated family or friend will provide       Discharge Needs Assessment    Readmission Within the Last 30 Days no previous admission in last 30 days    Current Outpatient/Agency/Support Group outpatient hemodialysis    Equipment Currently Used at Home none    Concerns to be Addressed discharge planning    Equipment Needed After Discharge other (see comments)  TBD    Provided Post Acute Provider List? N/A " "   N/A Provider List Comment Pending hospital course, currenty intubated, pt may need HH or SNF    Provided Post Acute Provider Quality & Resource List? N/A    N/A Quality & Resource List Comment Pending hospital course, currenty intubated, pt may need HH or SNF                   Discharge Plan       Row Name 11/11/24 1415       Plan    Plan Pending clinical course: intubated 11/10    Patient/Family in Agreement with Plan yes    Plan Comments CCP called pt's daughter, Angelique \"Judy\" Colon, RT pt being intubated. CCP introduced self, role, & DC planning discussed. Face sheet information & pharmacy verified. Pt lives in 2nd floor apartment with her son, Rajeev England. Pt's daughter reports 10-15 stairs to 2nd floor with no elevator access & no stairs inside. Judy denied pt having issues with managing stairs. Pt drives. Pt is independent with ADLs. Pt denies any DME. Pt is [facility] with HD at [facility]. Judy was unsure if pt would want to use [facility] or not. Judy denies pt having issues with affording or managing medications, affording food, or affording utilities. Pt has not used home health nor been to a rehab facility. DC plan is pending clinical course. CCP explained HH & SNF options. Once pt is extubated, PT & OT will be consulted when pt is appropriate to work with. Judy verbalized understanding. DC plan pending clinical course. CCP will follow. DC barriers: intubated on 11/10, NGT, restraints, arterial sheath, central venous catheter, levophed gtt, & propofol gtt. LONI Chappell/ALLI                  Continued Care and Services - Admitted Since 11/10/2024    No active coordination exists for this encounter.       Expected Discharge Date and Time       Expected Discharge Date Expected Discharge Time    Nov 15, 2024            Demographic Summary       Row Name 11/11/24 1419       General Information    Admission Type inpatient    Arrived From home    Referral Source admission list    Reason for " Consult discharge planning    Preferred Language English       Contact Information    Permission Granted to Share Info With                    Functional Status       Row Name 11/11/24 7210       Functional Status    Usual Activity Tolerance good    Current Activity Tolerance good       Assessment of Health Literacy    How often do you have someone help you read hospital materials? Never    How often do you have problems learning about your medical condition because of difficulty understanding written information? Never    How often do you have a problem understanding what is told to you about your medical condition? Never    How confident are you filling out medical forms by yourself? Extremely    Health Literacy Excellent       Functional Status, IADL    Medications independent    Meal Preparation independent    Housekeeping independent    Laundry independent    Shopping independent    If for any reason you need help with day-to-day activities such as bathing, preparing meals, shopping, managing finances, etc., do you get the help you need? I don't need any help       Mental Status    General Appearance WDL WDL       Mental Status Summary    Recent Changes in Mental Status/Cognitive Functioning no changes       Employment/    Employment Status disabled           Lorenza Russo, RN

## 2024-11-11 NOTE — CONSULTS
Nutrition Services    Patient Name:  Sunni Mcneil  YOB: 1975  MRN: [id number]  Admit Date:  11/10/2024  Assessment Date:  11/11/24    Summary: Nutrition consult (per rounds) for TF assessment    49 y.o. female admitted with AMS, pulmonary edema.  Upon arrival patient was combative, psychotic and uncooperative.  Septic shock.  Sedated on vent.  Heart cath and echo completed yesterday - diagnosed with Takotsubo cardiomyopathy.  IV pressors.    TFs to begin.  Recommend starting TFs of Novasource Renal @ 10 mL/hr and advancing by 10 mL q 8 hours to goal rate of 30 mL/hr (with current propofol dose) + 30 mL q 4 hour free water flushes for now (defer to nephrology).    Initial Goal:  *initial goal conservative d/t risk of RFS     Novasource Renal at 10 mL/hr + 30 mL q 4 hr water flush      End Goal:    Novasource Renal at 30 mL/hr + 30 mL q 4 hr water flush      Calories  1320 kcals + 597 kcal from propofol = 1917 kcal (105%)    Protein  60 g (100%)    Free water  469 mL   Flushes  180 mL     The above end goal rate is for 22 hrs/day to assume interruptions for ADLs. Water flushes adjusted based on clinical picture + Rx flushes/IV fluids     Specialized formula chosen r/t ESRD on HD, hyperkalemia    Labs reviewed: Gluc 137/115/91, Na 130, K 6.0, Cl 96, BUN 44, Creat 8.49, Platelets 137, Phos 6.7, Alb 3.1  Meds reviewed: pepcid, pericolace, levo, propofol (597 kcal/day)    Plans/Recommendations:  Start TFs of Novasource Renal @ 10 mL/hr and advance by 10 mL q 8 hours to goal rate of 30 mL/hr.  30 mL q 4 hour free water flushes.  Defer free water management to nephrology.  Adjust goal rate as propofol dose is adjusted.  Monitor for tolerance.    RD to continue to follow closely.     CLINICAL NUTRITION ASSESSMENT      Reason for Assessment TF Assessment     Diagnosis/Problem   Pulmonary edema   Medical/Surgical History Past Medical History:   Diagnosis Date    Acid reflux     Anemia     WITH ESRD    Anxiety      Arthritis     Cough     related to fluid overload    Dependence on renal dialysis 05/17/2021    Depression     Diabetes mellitus     NO MEDICATIONS FOR    End stage renal disease 05/17/2021    ESRD on dialysis     SUNG GARRETTF    History of peritoneal dialysis     KIDNEY TRANSPLANT SEPT 2016     History of transfusion     BEEN A WHILE no reaction    Hypercalcemia     Hyperparathyroidism     Hypertension     Kidney disease     End-stage renal disease. TRANSPLANT    Kidney transplant recipient 09/02/2016    RIGHT KIDNEY. ? 2018 KIDNEY REJECTED    PONV (postoperative nausea and vomiting)     Seasonal allergies     CURRENTLY     Vascular dialysis catheter in place     RIGHT TUNNEL CATH       Past Surgical History:   Procedure Laterality Date    ARTERIOVENOUS FISTULA/SHUNT SURGERY Right 02/26/2020    Procedure: RIGHT ARM ARTERIAL VENOUS FISTULA;  Surgeon: Elijah Herrera MD;  Location: [facility] OR;  Service: Vascular;  Laterality: Right;    ARTERIOVENOUS FISTULA/SHUNT SURGERY Left 02/04/2021    Procedure: LEFT BRACHIOAXILLARY ARTERIOVENOUS FISTULA GRAFT;  Surgeon: Anya Hernandez Jr., MD;  Location: [facility] OR;  Service: Vascular;  Laterality: Left;    ARTERIOVENOUS FISTULA/SHUNT SURGERY Right 03/16/2021    Procedure: RIGHT BRACHIOAXILLARY ARTERVENIOUS GRAFT PLACEMENT;  Surgeon: Adán Maurer MD;  Location: [facility] OR;  Service: Vascular;  Laterality: Right;    ARTERIOVENOUS FISTULA/SHUNT SURGERY Right 8/4/2023    Procedure: RIGHT ARM FISTULOGRAM PEUCUTANEOUS AND PERIPHERAL TRANSLUMINAL ANGIOPLSTY/STENT;  Surgeon: Elijah Herrera MD;  Location: [facility] OR 18/19;  Service: Vascular;  Laterality: Right;    BRACHIAL EMBOLECTOMY Right 10/6/2023    Procedure: RIGHT AV GRAFT WITH ANGIOPLASTY AND CENTROVENOUS ANGIOPLASTY;  Surgeon: Phu Gaviria MD;  Location: [facility] OR 18/19;  Service: Vascular;  Laterality: Right;    CARDIAC CATHETERIZATION N/A 11/10/2024     Procedure: Left Heart Cath;  Surgeon: Maribell Sandhu MD;  Location: [facility] CATH INVASIVE LOCATION;  Service: Cardiology;  Laterality: N/A;    CARDIAC CATHETERIZATION N/A 11/10/2024    Procedure: Coronary angiography;  Surgeon: Maribell Sandhu MD;  Location: [facility] CATH INVASIVE LOCATION;  Service: Cardiology;  Laterality: N/A;    CARDIAC CATHETERIZATION N/A 11/10/2024    Procedure: Left ventriculography;  Surgeon: Maribell Sandhu MD;  Location: [facility] CATH INVASIVE LOCATION;  Service: Cardiology;  Laterality: N/A;    CARDIAC CATHETERIZATION N/A 11/10/2024    Procedure: Right Heart Cath;  Surgeon: Maribell Sandhu MD;  Location: [facility] CATH INVASIVE LOCATION;  Service: Cardiology;  Laterality: N/A;    COLONOSCOPY N/A 11/30/2022    Procedure: COLONOSCOPY TO CECUM AND TERM. ILEUM;  Surgeon: Casimiro Perkins MD;  Location: [facility] ENDOSCOPY;  Service: Gastroenterology;  Laterality: N/A;  PRE OP - SCREENING  POST OP - DIVERTICULOSIS, SM. HEMORRHOIDS    INSERTION AND REMOVAL HEMODIALYSIS CATHETER N/A 02/13/2021    Procedure: INSERTION AND REMOVAL OF HEMODIALYSIS CATHETER;  Surgeon: Adán Maurer MD;  Location: [facility] OR;  Service: Vascular;  Laterality: N/A;    INSERTION HEMODIALYSIS CATHETER Right 01/15/2021    Procedure: TUNNELED DIAYLIS CATHETER PLACEMENT;  Surgeon: Phu Gaviria MD;  Location: [facility] HYBRID OR 18/19;  Service: Vascular;  Laterality: Right;    INSERTION HEMODIALYSIS CATHETER Left 1/21/2023    Procedure: HEMODIALYSIS CATHETER INSERTION;  Surgeon: Elijah Herrera MD;  Location: [facility] MAIN OR;  Service: Vascular;  Laterality: Left;    INSERTION PERITONEAL DIALYSIS CATHETER  07/29/2014    Laparoscopic placement of Curl Cath peritoneal dialysis catheter, Dr. Vinod Goldstein    INSERTION PERITONEAL DIALYSIS CATHETER N/A 07/24/2019    Procedure: LAPAROSCOPIC INSERTION PERITONEAL DIALYSIS CATHETER;  Surgeon: Damon Rooney MD;  Location: [facility] MAIN OR;  Service: General  "   REMOVAL HEMODIALYSIS CATHETER N/A 2/15/2023    Procedure: PALINDROME REMOVAL;  Surgeon: Elijah Herrera MD;  Location: [facility] MAIN OR;  Service: Vascular;  Laterality: N/A;    REMOVAL PERITONEAL DIALYSIS CATHETER N/A 10/17/2016    Procedure: REMOVAL PERITONEAL DIALYSIS CATHETER;  Surgeon: Damon Rooney MD;  Location: [facility] MAIN OR;  Service:     REMOVAL PERITONEAL DIALYSIS CATHETER N/A 10/21/2020    Procedure: REMOVAL PERITONEAL DIALYSIS CATHETER;  Surgeon: Damon Rooney MD;  Location: [facility] MAIN OR;  Service: General;  Laterality: N/A;    SHUNT O GRAM Right 2022    Procedure: RIGHT  ARM SHUNT O GRAM , ANGIOPLASTY OF AXILARY VEIN AND SUBCLAVIAN VEIN AT SVC JUNCTION;  Surgeon: Anya Hernandez Jr., MD;  Location: [facility] OR ;  Service: Vascular;  Laterality: Right;    SHUNT O GRAM Right 2023    Procedure: OPEN THROMBECTOMY AND ANGIOPLASTY;  Surgeon: Elijah Herrera MD;  Location: [facility] OR ;  Service: Vascular;  Laterality: Right;    SHUNT O GRAM Right 2023    Procedure: Right arm dialysis graft open thrombectomy, shuntogram, angioplasty and stent;  Surgeon: Karuna Villalba MD;  Location: [facility] OR ;  Service: Vascular;  Laterality: Right;    SHUNT O GRAM Right 2023    Procedure: RIGHT ARM FISTULOGRAM, ANGIOPLASTY;  Surgeon: Elijah Herrera MD;  Location: [facility] OR ;  Service: Vascular;  Laterality: Right;    SHUNT O GRAM Right 5/3/2024    Procedure: Hemodialysis shunt thrombectomy with revision;  Surgeon: Alex Sanchez MD;  Location: [facility] OR;  Service: Vascular;  Laterality: Right;    TRANSPLANTATION RENAL Right 2016    from  donor    TUBAL ABDOMINAL LIGATION Bilateral         Anthropometrics        Current Height  Current Weight  BMI kg/m2 Height: 152.4 cm (60\")  Weight: 77.2 kg (170 lb 3.1 oz) (24 0420)  Body mass index is 33.24 kg/m².   Adjusted BMI (if " applicable)    BMI Category Obese, Class I (30 - 34.9)   Ideal Body Weight (IBW) 100 lb (45.5 kg)   Usual Body Weight (UBW) 158-170 lb   Weight Trend Gain   Weight History Wt Readings from Last 30 Encounters:   11/11/24 0420 77.2 kg (170 lb 3.1 oz)   11/10/24 1425 75.3 kg (166 lb)   11/10/24 1042 75.3 kg (166 lb 0.1 oz)   05/20/24 1213 75.3 kg (166 lb)   05/04/24 0700 75.5 kg (166 lb 7.2 oz)   05/02/24 1232 74.8 kg (165 lb)   12/18/23 1502 71.7 kg (158 lb)   10/06/23 1050 72.1 kg (159 lb)   10/05/23 1106 72.1 kg (159 lb)   07/28/23 1311 71.2 kg (157 lb)   02/15/23 1123 78.7 kg (173 lb 8 oz)   01/24/23 0500 79.6 kg (175 lb 8 oz)   01/23/23 0911 79.8 kg (175 lb 14.8 oz)   01/23/23 0535 79.8 kg (175 lb 14.4 oz)   01/22/23 0456 79.7 kg (175 lb 9.6 oz)   01/21/23 0835 81.6 kg (180 lb)   01/20/23 1319 81.5 kg (179 lb 10.8 oz)   12/27/22 1436 81.6 kg (180 lb)   11/30/22 1242 81.7 kg (180 lb 3.2 oz)   11/21/22 1534 79.7 kg (175 lb 9.6 oz)   10/18/22 1350 80.7 kg (178 lb)   09/22/22 1434 80.1 kg (176 lb 9.6 oz)   07/28/22 1033 79.3 kg (174 lb 14.4 oz)   07/27/22 1039 81.2 kg (179 lb)   03/15/21 0620 82.7 kg (182 lb 6.4 oz)   03/12/21 1336 81.6 kg (180 lb)   03/10/21 1529 81.6 kg (179 lb 12.8 oz)   03/02/21 1812 79.4 kg (175 lb)   02/14/21 0410 80.7 kg (177 lb 14.6 oz)   02/13/21 1024 81.6 kg (180 lb)   02/04/21 0831 80 kg (176 lb 5 oz)   02/02/21 1512 82.1 kg (181 lb)   01/14/21 2212 84.6 kg (186 lb 8 oz)   01/14/21 1804 81.6 kg (180 lb)   10/21/20 1044 82.2 kg (181 lb 4 oz)   10/19/20 1210 82.6 kg (182 lb)   09/15/20 1348 80.3 kg (177 lb)   05/06/20 0152 76.6 kg (168 lb 12.8 oz)   03/16/20 0700 82.8 kg (182 lb 9.6 oz)   03/15/20 0519 81.7 kg (180 lb 3.2 oz)   03/14/20 0545 81.2 kg (179 lb 1.6 oz)   03/13/20 0817 77.1 kg (170 lb)   02/26/20 0629 82.4 kg (181 lb 10.5 oz)   02/25/20 1416 81 kg (178 lb 8 oz)      --  Estimated/Assessed Needs        Current Weight  Weight: 77.2 kg (170 lb 3.1 oz) (11/11/24 0420)       Energy  Requirements    Weight for Calculation 100 lb (45.5 kg) IBW   Method for Estimation  35-40 kcal/kg   EST Needs (kcal/day) 2553-0610       Protein Requirements    Weight for Calculation 100 lb (45.5 kg) IBW   EST Protein Needs (g/kg) 1.2 - 1.5 gm/kg   EST Daily Needs (g/day) 55-68       Fluid Requirements     Method for Estimation Defer to physician    EST Needs (mL/day)      Labs       Pertinent Labs    Results from last 7 days   Lab Units 11/11/24  0400 11/10/24  1420 11/10/24  1002   SODIUM mmol/L 130*  --  131*   POTASSIUM mmol/L 6.0* 6.2* 4.6   CHLORIDE mmol/L 96*  --  87*   CO2 mmol/L 17.0*  --  14.0*   BUN mg/dL 44*  --  56*   CREATININE mg/dL 8.49*  --  11.59*   CALCIUM mg/dL 7.5*  --  9.6   BILIRUBIN mg/dL  --   --  0.6   ALK PHOS U/L  --   --  58   ALT (SGPT) U/L  --   --  9   AST (SGOT) U/L  --   --  22   GLUCOSE mg/dL 130*  --  118*     Results from last 7 days   Lab Units 11/11/24  0400   MAGNESIUM mg/dL 1.9   PHOSPHORUS mg/dL 6.7*   HEMOGLOBIN g/dL 11.0*   HEMATOCRIT % 33.1*   WBC 10*3/mm3 12.26*   ALBUMIN g/dL 3.1*     Results from last 7 days   Lab Units 11/11/24  0400 11/10/24  1002   INR   --  2.36*   APTT seconds  --  37.5*   PLATELETS 10*3/mm3 137* 162     COVID19   Date Value Ref Range Status   11/10/2024 Not Detected Not Detected - Ref. Range Final     Lab Results   Component Value Date    HGBA1C 4.3 (L) 08/14/2024          Medications           Scheduled Medications ceFAZolin, 1,000 mg, Intravenous, Q24H  chlorhexidine, 15 mL, Mouth/Throat, Q12H  famotidine, 20 mg, Intravenous, Daily  insulin regular, 3-14 Units, Subcutaneous, Q6H  LORazepam, 2 mg, Intravenous, Once  LORazepam, 0.5 mg, Oral, Q12H  mupirocin, 1 Application, Each Nare, BID  oxymetazoline, 2 spray, Each Nare, BID  senna-docusate sodium, 2 tablet, Oral, BID  sodium chloride, 10 mL, Intravenous, Q12H  sodium chloride, 10 mL, Intravenous, Q12H  sodium chloride, 10 mL, Intravenous, Q12H  sodium chloride, 10 mL, Intravenous, Q12H        Infusions EPINEPHrine, 0.02-0.3 mcg/kg/min, Last Rate: Stopped (11/10/24 2054)  norepinephrine, 0.02-0.3 mcg/kg/min, Last Rate: 0.16 mcg/kg/min (11/11/24 1400)  phenylephrine, 0.5-3 mcg/kg/min, Last Rate: Stopped (11/11/24 0430)  propofol, 5-50 mcg/kg/min, Last Rate: 50 mcg/kg/min (11/11/24 1400)  vasopressin, 0.04 Units/min, Last Rate: Stopped (11/10/24 2252)       PRN Medications   acetaminophen **OR** acetaminophen    senna-docusate sodium **AND** polyethylene glycol **AND** bisacodyl **AND** bisacodyl    Calcium Replacement - Follow Nurse / BPA Driven Protocol    dextrose    dextrose    fentaNYL citrate (PF)    glucagon (human recombinant)    Magnesium Standard Dose Replacement - Follow Nurse / BPA Driven Protocol    mannitol    nitroglycerin    ondansetron ODT **OR** ondansetron    Phosphorus Replacement - Follow Nurse / BPA Driven Protocol    Potassium Replacement - Follow Nurse / BPA Driven Protocol    [COMPLETED] Insert Peripheral IV **AND** sodium chloride    sodium chloride    sodium chloride    sodium chloride    sodium chloride    sodium chloride     Physical Findings          General Findings obese, responds/arouses to pain, sedate, ventilator support   Oral/Mouth Cavity tooth or teeth missing   Edema  generalized, 1+ (trace)   Gastrointestinal hypoactive bowel sounds, non-distended , last bowel movement: 11/10   Skin  skin intact   Tubes/Drains/Lines dialysis catheter, NG tube, other:CVC Triple Lumen   NFPE Not indicated at this time   --  Current Nutrition Orders & Evaluation of Intake       Oral Nutrition     Food Allergies NKFA   Current PO Diet NPO Diet NPO Type: Strict NPO   Supplement n/a   PO Evaluation     % PO Intake N/a    Factors Affecting Intake: altered respiratory status   --  PES STATEMENT / NUTRITION DIAGNOSIS      Nutrition Dx Problem  Problem: Inadequate Oral Intake  Etiology: Medical Diagnosis - pulmonary edema, sedated, vent    Signs/Symptoms: NPO and Report/Observation      NUTRITION INTERVENTION / PLAN OF CARE      Intervention Goal(s) Maintain nutrition status, Reduce/improve symptoms, Meet estimated needs, Disease management/therapy, Initiate TF/PN, Tolerate TF/PN at goal, and No significant weight loss         RD Intervention/Action Await initiation of EN/PN, Continue to monitor, Care plan reviewed, and Recommend/order: EN   --      Prescription/Orders:       PO Diet       Supplements       Enteral Nutrition    Enteral Prescription:     Enteral Route NG    TF Delivery Method Continuous    Enteral Product Novasource Renal    Modular None    Propofol Rate/Kcal     TF Start Rate  10 mL/hr    TF Goal Rate  30 mL/hr    Free Water Flush 30 mL Q 4 hr    Provision at Goal:          Calories 1320 kcal + 597 kcal from propofol = 1917 kcal, meets 105% needs         Protein  60 gm protein, meets 100% needs         Fluid (mL) 475 mL free water + 180 mL in flushes         Parenteral Nutrition    New Prescription Ordered? Yes   --      Monitor/Evaluation Per protocol, I&O, Supplement intake, Pertinent labs, EN delivery/tolerance, Weight, Symptoms, Hemodynamic stability   Discharge Plan/Needs Pending clinical course   --    RD to follow per protocol.      Electronically signed by:  Charlotte Tomlin RD  11/11/24 14:22 EST

## 2024-11-11 NOTE — CONSULTS
Referring Provider: No ref. provider found      Subjective   History of present illness: This very nice 49-year-old we are asked to provide evaluation opinion regarding sepsis.  Patient is intubated and sedated and unable to obtain history.  Patient's daughter is at bedside and reports that she was in her usual state of health until 11/9/2024 when she awoke with shaking chills and fever.  She had nausea and vomiting and did not feel well overall.  She had complained of some pain in the right upper extremity at her AV graft site.  She tried to sleep off the pain but became alert coherent the next day was brought to the emergency room on 11/10/2024.  Here she was intubated for respiratory failure and started on vasopressors for possible septic shock.  Blood cultures now growing MSSA. She was on vanc and zosyn but has been transitioned to cefazolin today.     On admission she had EKG changes and underwent LHC with normal coronary arteries. TTE with There is septal dyskinesis. There is severe hypokinesis of the basal inferior wall and the basal inferolateral wall. There is moderate hypokinesis of the anterior wall  Left ventricular systolic function is severely decreased. Estimated left ventricular EF = 25% c/f stress induced cardiomyopathy    Pt has a h/o ESRD (s/p failed kidney transplant in 2016), dm2, anemia, htn, hyperparathyroidism. Has extensive vascular surgery history with multiple access procedures.  Most recently underwent thrombectomy of right arm brachial artery to axillary vein graft by Dr Sanchez in May 2024.       Remains intubated on 50 fio2 and 12 peep. On levo.    No Known Allergies    Physical Exam:   Vital Signs   Temp:  [99.7 °F (37.6 °C)-101.5 °F (38.6 °C)] 99.8 °F (37.7 °C)  Heart Rate:  [] 99  Resp:  [20-24] 24  BP: ()/() 151/87  Arterial Line BP: ()/() 98/62  FiO2 (%):  [50 %-100 %] 50 %    GENERAL: Ill-appearing  HEENT: Oropharynx with ET tube in place. Hearing  is unable to assess  EYES:  No conjunctival injection. No lid lag.   HEART: Regular rate and regular rhythm. No peripheral edema.   LUNGS: Clear to auscultation anteriorly with normal respiratory effort.   GI: Soft, nontender, nondistended. No appreciable organomegaly.   SKIN: Warm and dry without cutaneous eruptions; R arm swollen and ttp  PSYCHIATRIC: Sedated, no purposeful movements over sedation for me      Results Review:  WBC 16.7--> 12.3, hgb 11, plt 137  PCT 40  Cr 11.6-->8.5  Na 130, K 6,   11/10 BCx 2/2 MSSA  11/10 MRSA, RPP neg     CXR, independently interpreted: bilateral opacites, appear improved on most recent imagin  CT a/p1. Parenchymal atrophy of the right pelvic renal transplant with right peripelvic and periureteral fat stranding. Urinary bladder wall thickening and mild perivesical fat stranding, which could be accentuated by under distention.  2. Partially imaged patchy consolidation in each lower lobe, likely pneumonia, with trace bilateral pleural effusions.     A/p  Septic Shock  MSSA septicemia  Acute hypoxic resp failure  ESRD  DM2, continue glycemic control efforts to prevent/control infectious complications  Suspected right arm brachial artery to axillary vein graft infection    Pt with septic shock from MSSA with stress induced cardiomyopathy.  Most likely source is AV graft infection in R arm  DC vanc and zosyn in favor of renally dosed cefazolin 1g IV q24  Repeat bcx  I ordered AVF duplex RUE to look for fluid collection  Critical care time 11:     Thank you for this consult.  We will continue to follow along and tailor antibiotics as the patient's clinical course evolves.

## 2024-11-11 NOTE — PROGRESS NOTES
"    Patient Name: Sunni Mcneil  :1975  49 y.o.      Patient Care Team:  Regla Ying APRN as PCP - General (Nurse Practitioner)  Bo Hansen MD as Consulting Physician (Nephrology)  Damon Rooney MD as Surgeon (General Surgery)    Chief Complaint:   Sepsis, takotsubo    Interval History:   Decreased pressor requirements.     Objective   Vital Signs  Temp:  [99.7 °F (37.6 °C)-101.5 °F (38.6 °C)] 99.7 °F (37.6 °C)  Heart Rate:  [] 93  Resp:  [22-24] 24  BP: ()/() 151/87  Arterial Line BP: ()/() 113/75  FiO2 (%):  [50 %-100 %] 50 %    Intake/Output Summary (Last 24 hours) at 2024 0817  Last data filed at 2024 0600  Gross per 24 hour   Intake 2577 ml   Output 300 ml   Net 2277 ml     Flowsheet Rows      Flowsheet Row First Filed Value   Admission Height 152.4 cm (60\") Documented at 11/10/2024 1425   Admission Weight 75.3 kg (166 lb 0.1 oz) Documented at 11/10/2024 1042            GEN: Intubated, sedated  HEENT: NACT, ET tube in place  Lungs: CTAB anteriorly, no wheezes, rales or rhonchi  CV: normal rate, regular rhythm, normal S1, S2, no murmurs, +2 radial pulses b/l, no carotid bruit  Abdomen: soft, nontender, nondistended, NABS  Extremities: no edema  Skin: no rash, warm, dry  Heme/Lymph: no bruising      Results Review:    Results from last 7 days   Lab Units 24  0400   SODIUM mmol/L 130*   POTASSIUM mmol/L 6.0*   CHLORIDE mmol/L 96*   CO2 mmol/L 17.0*   BUN mg/dL 44*   CREATININE mg/dL 8.49*   GLUCOSE mg/dL 130*   CALCIUM mg/dL 7.5*     Results from last 7 days   Lab Units 11/10/24  1745 11/10/24  1420   HSTROP T ng/L 689* 239*     Results from last 7 days   Lab Units 24  0400   WBC 10*3/mm3 12.26*   HEMOGLOBIN g/dL 11.0*   HEMATOCRIT % 33.1*   PLATELETS 10*3/mm3 137*     Results from last 7 days   Lab Units 11/10/24  1002   INR  2.36*   APTT seconds 37.5*     Results from last 7 days   Lab Units 24  0400   MAGNESIUM " mg/dL 1.9                   Medication Review:   chlorhexidine, 15 mL, Mouth/Throat, Q12H  famotidine, 20 mg, Intravenous, Daily  insulin regular, 3-14 Units, Subcutaneous, Q6H  LORazepam, 2 mg, Intravenous, Once  mupirocin, 1 Application, Each Nare, BID  oxymetazoline, 2 spray, Each Nare, BID  piperacillin-tazobactam, 4.5 g, Intravenous, Q12H  senna-docusate sodium, 2 tablet, Oral, BID  sodium chloride, 10 mL, Intravenous, Q12H  Vancomycin Pharmacy Intermittent/Pulse Dosing, , Does not apply, Daily         EPINEPHrine, 0.02-0.3 mcg/kg/min, Last Rate: Stopped (11/10/24 2054)  norepinephrine, 0.02-0.3 mcg/kg/min, Last Rate: 0.2 mcg/kg/min (11/11/24 5031)  Pharmacy to dose vancomycin,   phenylephrine, 0.5-3 mcg/kg/min, Last Rate: Stopped (11/11/24 6570)  propofol, 5-50 mcg/kg/min, Last Rate: 50 mcg/kg/min (11/11/24 4859)  vasopressin, 0.04 Units/min, Last Rate: Stopped (11/10/24 1019)        Assessment & Plan   #Septic shock secondary to pneumonia/UTI  #Stress cardiomyopathy secondary to above  #ESRD on HD  #Hyperlipidemia  #Diabetes    49-year-old woman with ESRD on HD, hypertension, hyperlipidemia, diabetes who presented with altered mental status, was intubated in route for airway protection.  On arrival she was found to have abnormal EKGs and underwent emergent coronary angiography which revealed normal coronary arteries with basal to mid anterior and inferior hypokinesis consistent with atypical stress cardiomyopathy.    -Wean pressors as tolerated    Luis Herzog MD, FACC, [facility]  11/11/24  08:17 EST

## 2024-11-11 NOTE — PROGRESS NOTES
[facility] Progress Note      Patient Name: Sunni Mcneil  : 1975  MRN: [id number]  Primary Care Physician:  Regla Ying APRN  Date of admission: 11/10/2024    Subjective     Interval History:   Follow-up end-stage renal disease    Patient currently on the ventilator, sedated, remains on vasopressors, she was dialyzed emergently yesterday because of pulmonary edema this morning her potassium is 6 and she remains on vasopressors.  Growing gram-positive cocci on her blood culture    Review of Systems:   Not obtainable    Objective     Vitals:   Temp:  [99.7 °F (37.6 °C)-101.5 °F (38.6 °C)] 99.7 °F (37.6 °C)  Heart Rate:  [] 93  Resp:  [22-24] 24  BP: ()/() 151/87  Arterial Line BP: ()/() 113/75  FiO2 (%):  [50 %-100 %] 50 %    Intake/Output Summary (Last 24 hours) at 2024 0840  Last data filed at 2024 0600  Gross per 24 hour   Intake 2577 ml   Output 300 ml   Net 2277 ml       Physical Exam:    General Appearance: On the ventilator, sedated, chronically ill, no acute distress  Skin: warm and dry  HEENT: Orally intubated  Neck: No JVD  Lungs: Bilateral rhonchi, unlabored breathing effort  Heart: RRR, no rub  Abdomen: soft, n normal, nondistended  : no palpable bladder  Extremities: no edema, cyanosis or clubbing, she has functional AV fistula in the right upper extremity with some aneurysmal dilatation with good thrill and bruit  Neuro: Unable to assess    Scheduled Meds:     chlorhexidine, 15 mL, Mouth/Throat, Q12H  famotidine, 20 mg, Intravenous, Daily  insulin regular, 3-14 Units, Subcutaneous, Q6H  LORazepam, 2 mg, Intravenous, Once  mupirocin, 1 Application, Each Nare, BID  oxymetazoline, 2 spray, Each Nare, BID  piperacillin-tazobactam, 4.5 g, Intravenous, Q12H  senna-docusate sodium, 2 tablet, Oral, BID  sodium chloride, 10 mL, Intravenous, Q12H  sodium chloride, 10 mL, Intravenous, Q12H  sodium chloride, 10 mL, Intravenous,  Q12H  sodium chloride, 10 mL, Intravenous, Q12H  Vancomycin Pharmacy Intermittent/Pulse Dosing, , Does not apply, Daily      IV Meds:   EPINEPHrine, 0.02-0.3 mcg/kg/min, Last Rate: Stopped (11/10/24 2054)  norepinephrine, 0.02-0.3 mcg/kg/min, Last Rate: 0.18 mcg/kg/min (11/11/24 0817)  Pharmacy to dose vancomycin,   phenylephrine, 0.5-3 mcg/kg/min, Last Rate: Stopped (11/11/24 0430)  propofol, 5-50 mcg/kg/min, Last Rate: 50 mcg/kg/min (11/11/24 0756)  vasopressin, 0.04 Units/min, Last Rate: Stopped (11/10/24 2252)        Results Reviewed:   I have personally reviewed the results from the time of this admission to 11/11/2024 08:40 EST     Results from last 7 days   Lab Units 11/11/24  0400 11/10/24  1420 11/10/24  1002   SODIUM mmol/L 130*  --  131*   POTASSIUM mmol/L 6.0* 6.2* 4.6   CHLORIDE mmol/L 96*  --  87*   CO2 mmol/L 17.0*  --  14.0*   BUN mg/dL 44*  --  56*   CREATININE mg/dL 8.49*  --  11.59*   CALCIUM mg/dL 7.5*  --  9.6   BILIRUBIN mg/dL  --   --  0.6   ALK PHOS U/L  --   --  58   ALT (SGPT) U/L  --   --  9   AST (SGOT) U/L  --   --  22   GLUCOSE mg/dL 130*  --  118*       Estimated Creatinine Clearance: 7.4 mL/min (A) (by C-G formula based on SCr of 8.49 mg/dL (H)).    Results from last 7 days   Lab Units 11/11/24  0400   MAGNESIUM mg/dL 1.9   PHOSPHORUS mg/dL 6.7*             Results from last 7 days   Lab Units 11/11/24  0400 11/10/24  1605 11/10/24  1602 11/10/24  1539 11/10/24  1002   WBC 10*3/mm3 12.26*  --   --   --  16.70*   HEMOGLOBIN g/dL 11.0*  --   --   --  12.0   HEMOGLOBIN, POC g/dL  --  10.5* 10.9* 11.9*  --    PLATELETS 10*3/mm3 137*  --   --   --  162       Results from last 7 days   Lab Units 11/10/24  1002   INR  2.36*       Assessment / Plan     ASSESSMENT:  ESRD on HD, MWF, via R AV fistula, she had dialysis last night to help with pulmonary edema but this morning her potassium is 6 need dialysis again.    High anion gap metabolic acidosis, secondary to ESRD and lactic  acidosis  Hyperkalemia, potassium 6  Septic shock on vasopressors  Acute respiratory failure currently on the ventilator  Anemia with CKD, receives long-acting TERESA outpatient, hemoglobin 11  Type II DM with CKD, managed by primary team  Gram-positive septicemia  Secondary hyperparathyroidism of renal etiology    PLAN:  Hemodialysis this morning again  Continue the same treatment  Surveillance labs    I reviewed the chart and other providers notes, reviewed labs.  I discussed the case with the patient's daughter at the bedside also with her nurse.  Copied text in this note has been reviewed and is accurate as of 11/11/24.       Thank you for involving us in the care of Sunni Mcneil.  Please feel free to call with any questions.    Carlos Manuel Gonzales MD  11/11/24  08:40 [facility]    [facility]  [phone]    Please note that portions of this note were completed with a voice recognition program.

## 2024-11-12 ENCOUNTER — ANCILLARY PROCEDURE (OUTPATIENT)
Dept: SPEECH THERAPY | Facility: HOSPITAL | Age: 49
DRG: 286 | End: 2024-11-12
Payer: MEDICARE

## 2024-11-12 ENCOUNTER — APPOINTMENT (OUTPATIENT)
Dept: GENERAL RADIOLOGY | Facility: HOSPITAL | Age: 49
DRG: 286 | End: 2024-11-12
Payer: MEDICARE

## 2024-11-12 LAB
ALBUMIN SERPL-MCNC: 2.9 G/DL (ref 3.5–5.2)
ALBUMIN/GLOB SERPL: 0.8 G/DL
ALP SERPL-CCNC: 64 U/L (ref 39–117)
ALT SERPL W P-5'-P-CCNC: 17 U/L (ref 1–33)
ANION GAP SERPL CALCULATED.3IONS-SCNC: 22.4 MMOL/L (ref 5–15)
AST SERPL-CCNC: 51 U/L (ref 1–32)
BASOPHILS # BLD AUTO: 0.03 10*3/MM3 (ref 0–0.2)
BASOPHILS NFR BLD AUTO: 0.4 % (ref 0–1.5)
BILIRUB SERPL-MCNC: 0.4 MG/DL (ref 0–1.2)
BUN SERPL-MCNC: 31 MG/DL (ref 6–20)
BUN/CREAT SERPL: 4.9 (ref 7–25)
CALCIUM SPEC-SCNC: 8.6 MG/DL (ref 8.6–10.5)
CHLORIDE SERPL-SCNC: 92 MMOL/L (ref 98–107)
CO2 SERPL-SCNC: 17.6 MMOL/L (ref 22–29)
CREAT SERPL-MCNC: 6.35 MG/DL (ref 0.57–1)
DEPRECATED RDW RBC AUTO: 54.2 FL (ref 37–54)
EGFRCR SERPLBLD CKD-EPI 2021: 7.5 ML/MIN/1.73
EOSINOPHIL # BLD AUTO: 0.1 10*3/MM3 (ref 0–0.4)
EOSINOPHIL NFR BLD AUTO: 1.3 % (ref 0.3–6.2)
ERYTHROCYTE [DISTWIDTH] IN BLOOD BY AUTOMATED COUNT: 17.1 % (ref 12.3–15.4)
GLOBULIN UR ELPH-MCNC: 3.8 GM/DL
GLUCOSE BLDC GLUCOMTR-MCNC: 103 MG/DL (ref 70–130)
GLUCOSE BLDC GLUCOMTR-MCNC: 91 MG/DL (ref 70–130)
GLUCOSE BLDC GLUCOMTR-MCNC: 91 MG/DL (ref 70–130)
GLUCOSE SERPL-MCNC: 141 MG/DL (ref 65–99)
HCT VFR BLD AUTO: 29.2 % (ref 34–46.6)
HGB BLD-MCNC: 10.1 G/DL (ref 12–15.9)
IMM GRANULOCYTES # BLD AUTO: 0.04 10*3/MM3 (ref 0–0.05)
IMM GRANULOCYTES NFR BLD AUTO: 0.5 % (ref 0–0.5)
LYMPHOCYTES # BLD AUTO: 0.59 10*3/MM3 (ref 0.7–3.1)
LYMPHOCYTES NFR BLD AUTO: 7.4 % (ref 19.6–45.3)
MAGNESIUM SERPL-MCNC: 2.2 MG/DL (ref 1.6–2.6)
MCH RBC QN AUTO: 30.2 PG (ref 26.6–33)
MCHC RBC AUTO-ENTMCNC: 34.6 G/DL (ref 31.5–35.7)
MCV RBC AUTO: 87.4 FL (ref 79–97)
MONOCYTES # BLD AUTO: 0.74 10*3/MM3 (ref 0.1–0.9)
MONOCYTES NFR BLD AUTO: 9.3 % (ref 5–12)
NEUTROPHILS NFR BLD AUTO: 6.44 10*3/MM3 (ref 1.7–7)
NEUTROPHILS NFR BLD AUTO: 81.1 % (ref 42.7–76)
NRBC BLD AUTO-RTO: 0 /100 WBC (ref 0–0.2)
PHOSPHATE SERPL-MCNC: 6.7 MG/DL (ref 2.5–4.5)
PLATELET # BLD AUTO: 120 10*3/MM3 (ref 140–450)
PMV BLD AUTO: 10.6 FL (ref 6–12)
POTASSIUM SERPL-SCNC: 4.3 MMOL/L (ref 3.5–5.2)
PROT SERPL-MCNC: 6.7 G/DL (ref 6–8.5)
QT INTERVAL: 428 MS
QT INTERVAL: 482 MS
QTC INTERVAL: 569 MS
QTC INTERVAL: 580 MS
RBC # BLD AUTO: 3.34 10*6/MM3 (ref 3.77–5.28)
SODIUM SERPL-SCNC: 132 MMOL/L (ref 136–145)
WBC NRBC COR # BLD AUTO: 7.94 10*3/MM3 (ref 3.4–10.8)

## 2024-11-12 PROCEDURE — 25010000002 PROPOFOL 10 MG/ML EMULSION: Performed by: INTERNAL MEDICINE

## 2024-11-12 PROCEDURE — 94799 UNLISTED PULMONARY SVC/PX: CPT

## 2024-11-12 PROCEDURE — 84100 ASSAY OF PHOSPHORUS: CPT | Performed by: INTERNAL MEDICINE

## 2024-11-12 PROCEDURE — 92612 ENDOSCOPY SWALLOW (FEES) VID: CPT

## 2024-11-12 PROCEDURE — 94760 N-INVAS EAR/PLS OXIMETRY 1: CPT

## 2024-11-12 PROCEDURE — 25010000002 CEFAZOLIN PER 500 MG: Performed by: INTERNAL MEDICINE

## 2024-11-12 PROCEDURE — 94003 VENT MGMT INPAT SUBQ DAY: CPT

## 2024-11-12 PROCEDURE — 94761 N-INVAS EAR/PLS OXIMETRY MLT: CPT

## 2024-11-12 PROCEDURE — 85025 COMPLETE CBC W/AUTO DIFF WBC: CPT | Performed by: INTERNAL MEDICINE

## 2024-11-12 PROCEDURE — 25010000002 FENTANYL CITRATE (PF) 50 MCG/ML SOLUTION: Performed by: INTERNAL MEDICINE

## 2024-11-12 PROCEDURE — 99223 1ST HOSP IP/OBS HIGH 75: CPT | Performed by: SURGERY

## 2024-11-12 PROCEDURE — 74018 RADEX ABDOMEN 1 VIEW: CPT

## 2024-11-12 PROCEDURE — 83735 ASSAY OF MAGNESIUM: CPT | Performed by: INTERNAL MEDICINE

## 2024-11-12 PROCEDURE — 99232 SBSQ HOSP IP/OBS MODERATE 35: CPT | Performed by: STUDENT IN AN ORGANIZED HEALTH CARE EDUCATION/TRAINING PROGRAM

## 2024-11-12 PROCEDURE — 99233 SBSQ HOSP IP/OBS HIGH 50: CPT | Performed by: INTERNAL MEDICINE

## 2024-11-12 PROCEDURE — 82948 REAGENT STRIP/BLOOD GLUCOSE: CPT

## 2024-11-12 PROCEDURE — 87040 BLOOD CULTURE FOR BACTERIA: CPT | Performed by: INTERNAL MEDICINE

## 2024-11-12 PROCEDURE — 80053 COMPREHEN METABOLIC PANEL: CPT | Performed by: INTERNAL MEDICINE

## 2024-11-12 RX ORDER — POLYETHYLENE GLYCOL 3350 17 G/17G
17 POWDER, FOR SOLUTION ORAL DAILY PRN
Status: CANCELLED | OUTPATIENT
Start: 2024-11-12

## 2024-11-12 RX ORDER — SODIUM CHLORIDE 0.9 % (FLUSH) 0.9 %
10 SYRINGE (ML) INJECTION EVERY 12 HOURS SCHEDULED
Status: CANCELLED | OUTPATIENT
Start: 2024-11-12

## 2024-11-12 RX ORDER — BISACODYL 5 MG/1
5 TABLET, DELAYED RELEASE ORAL DAILY PRN
Status: CANCELLED | OUTPATIENT
Start: 2024-11-12

## 2024-11-12 RX ORDER — ALBUMIN (HUMAN) 12.5 G/50ML
12.5 SOLUTION INTRAVENOUS AS NEEDED
Status: CANCELLED | OUTPATIENT
Start: 2024-11-13 | End: 2024-11-13

## 2024-11-12 RX ORDER — BISACODYL 10 MG
10 SUPPOSITORY, RECTAL RECTAL DAILY PRN
Status: CANCELLED | OUTPATIENT
Start: 2024-11-12

## 2024-11-12 RX ORDER — SODIUM CHLORIDE 0.9 % (FLUSH) 0.9 %
10 SYRINGE (ML) INJECTION AS NEEDED
Status: CANCELLED | OUTPATIENT
Start: 2024-11-12

## 2024-11-12 RX ORDER — SODIUM CHLORIDE 9 MG/ML
40 INJECTION, SOLUTION INTRAVENOUS AS NEEDED
Status: CANCELLED | OUTPATIENT
Start: 2024-11-12

## 2024-11-12 RX ORDER — AMOXICILLIN 250 MG
2 CAPSULE ORAL 2 TIMES DAILY
Status: CANCELLED | OUTPATIENT
Start: 2024-11-12

## 2024-11-12 RX ORDER — FENTANYL CITRATE 50 UG/ML
75 INJECTION, SOLUTION INTRAMUSCULAR; INTRAVENOUS
Status: DISCONTINUED | OUTPATIENT
Start: 2024-11-12 | End: 2024-11-15 | Stop reason: HOSPADM

## 2024-11-12 RX ORDER — DEXMEDETOMIDINE HYDROCHLORIDE 4 UG/ML
.2-1.5 INJECTION, SOLUTION INTRAVENOUS
Status: DISCONTINUED | OUTPATIENT
Start: 2024-11-12 | End: 2024-11-13

## 2024-11-12 RX ORDER — NITROGLYCERIN 0.4 MG/1
0.4 TABLET SUBLINGUAL
Status: CANCELLED | OUTPATIENT
Start: 2024-11-12

## 2024-11-12 RX ADMIN — MUPIROCIN 1 APPLICATION: 20 OINTMENT TOPICAL at 08:01

## 2024-11-12 RX ADMIN — SENNOSIDES AND DOCUSATE SODIUM 2 TABLET: 50; 8.6 TABLET ORAL at 08:01

## 2024-11-12 RX ADMIN — Medication 10 ML: at 08:08

## 2024-11-12 RX ADMIN — LORAZEPAM 0.5 MG: 0.5 TABLET ORAL at 21:18

## 2024-11-12 RX ADMIN — Medication 10 ML: at 21:20

## 2024-11-12 RX ADMIN — DEXMEDETOMIDINE HYDROCHLORIDE 0.2 MCG/KG/HR: 4 INJECTION, SOLUTION INTRAVENOUS at 07:57

## 2024-11-12 RX ADMIN — LORAZEPAM 0.5 MG: 0.5 TABLET ORAL at 08:01

## 2024-11-12 RX ADMIN — CEFAZOLIN 1000 MG: 1 INJECTION, POWDER, FOR SOLUTION INTRAMUSCULAR; INTRAVENOUS at 18:51

## 2024-11-12 RX ADMIN — NOREPINEPHRINE BITARTRATE 0.2 MCG/KG/MIN: 32 SOLUTION INTRAVENOUS at 01:35

## 2024-11-12 RX ADMIN — FENTANYL CITRATE 100 MCG: 50 INJECTION, SOLUTION INTRAMUSCULAR; INTRAVENOUS at 02:26

## 2024-11-12 RX ADMIN — PROPOFOL INJECTABLE EMULSION 50 MCG/KG/MIN: 10 INJECTION, EMULSION INTRAVENOUS at 04:41

## 2024-11-12 RX ADMIN — CHLORHEXIDINE GLUCONATE 15 ML: 1.2 RINSE ORAL at 08:01

## 2024-11-12 RX ADMIN — FAMOTIDINE 20 MG: 10 INJECTION INTRAVENOUS at 08:01

## 2024-11-12 RX ADMIN — MUPIROCIN 1 APPLICATION: 20 OINTMENT TOPICAL at 21:19

## 2024-11-12 RX ADMIN — PROPOFOL INJECTABLE EMULSION 50 MCG/KG/MIN: 10 INJECTION, EMULSION INTRAVENOUS at 00:08

## 2024-11-12 RX ADMIN — ACETAMINOPHEN 325MG 650 MG: 325 TABLET ORAL at 21:18

## 2024-11-12 NOTE — MBS/VFSS/FEES
Acute Care - Speech Language Pathology   Swallow Initial Evaluation [facility]     Patient Name: Sunni Mcneil  : 1975  MRN: [id number]  Today's Date: 2024               Admit Date: 11/10/2024    Visit Dx:     ICD-10-CM ICD-9-CM   1. Acute encephalopathy  G93.40 348.30   2. Septic shock  A41.9 038.9    R65.21 785.52     995.92   3. Lactic acidosis  E87.20 276.2   4. Acute UTI  N39.0 599.0   5. Abnormal CT scan  R93.89 793.99   6. Acute pulmonary edema  J81.0 518.4   7. Hypertensive emergency  I16.1 401.9   8. ST elevation myocardial infarction (STEMI), unspecified artery  I21.3 410.90     Patient Active Problem List   Diagnosis    Hyperparathyroidism    Acute rejection of kidney transplant    ARF (acute renal failure)    Hx of kidney transplant    Hypertension    Kidney transplant status, cadaveric    Nephritis    Hypercalcemia    ESRD on hemodialysis    Prolonged QT interval    ESRD (end stage renal disease)    Hyperphosphatemia    Leukocytosis    Type 2 diabetes mellitus    Acute UTI (urinary tract infection)    Obesity (BMI 30-39.9)    A-V fistula    Anemia, chronic disease    Folate deficiency anemia    Febrile illness, acute    Sepsis    Screening for colon cancer    Hyperkalemia    Shunt malfunction    Anemia    Constipation    Screening for colorectal cancer    Hypervolemia    AV graft thrombosis, initial encounter    ESRD (end stage renal disease)    GERD without esophagitis    AV graft stenosis, initial encounter    Arteriovenous fistula occlusion    Pulmonary edema    ST elevation myocardial infarction (STEMI)     Past Medical History:   Diagnosis Date    Acid reflux     Anemia     WITH ESRD    Anxiety     Arthritis     Cough     related to fluid overload    Dependence on renal dialysis 2021    Depression     Diabetes mellitus     NO MEDICATIONS FOR    End stage renal disease 2021    ESRD on dialysis     SUNG BEJARANO    History of peritoneal dialysis     KIDNEY TRANSPLANT SEPT  2016     History of transfusion     BEEN A WHILE no reaction    Hypercalcemia     Hyperparathyroidism     Hypertension     Kidney disease     End-stage renal disease. TRANSPLANT    Kidney transplant recipient 09/02/2016    RIGHT KIDNEY. ? 2018 KIDNEY REJECTED    PONV (postoperative nausea and vomiting)     Seasonal allergies     CURRENTLY     Vascular dialysis catheter in place     RIGHT TUNNEL CATH     Past Surgical History:   Procedure Laterality Date    ARTERIOVENOUS FISTULA/SHUNT SURGERY Right 02/26/2020    Procedure: RIGHT ARM ARTERIAL VENOUS FISTULA;  Surgeon: Elijah Herrera MD;  Location: [facility] MAIN OR;  Service: Vascular;  Laterality: Right;    ARTERIOVENOUS FISTULA/SHUNT SURGERY Left 02/04/2021    Procedure: LEFT BRACHIOAXILLARY ARTERIOVENOUS FISTULA GRAFT;  Surgeon: Anya Hernandez Jr., MD;  Location: [facility] MAIN OR;  Service: Vascular;  Laterality: Left;    ARTERIOVENOUS FISTULA/SHUNT SURGERY Right 03/16/2021    Procedure: RIGHT BRACHIOAXILLARY ARTERVENIOUS GRAFT PLACEMENT;  Surgeon: Adán Maurer MD;  Location: [facility] MAIN OR;  Service: Vascular;  Laterality: Right;    ARTERIOVENOUS FISTULA/SHUNT SURGERY Right 8/4/2023    Procedure: RIGHT ARM FISTULOGRAM PEUCUTANEOUS AND PERIPHERAL TRANSLUMINAL ANGIOPLSTY/STENT;  Surgeon: Elijah Herrera MD;  Location: [facility] OR 18/19;  Service: Vascular;  Laterality: Right;    BRACHIAL EMBOLECTOMY Right 10/6/2023    Procedure: RIGHT AV GRAFT WITH ANGIOPLASTY AND CENTROVENOUS ANGIOPLASTY;  Surgeon: Phu Gaviria MD;  Location: [facility] OR 18/19;  Service: Vascular;  Laterality: Right;    CARDIAC CATHETERIZATION N/A 11/10/2024    Procedure: Left Heart Cath;  Surgeon: Maribell Sandhu MD;  Location: [facility] CATH INVASIVE LOCATION;  Service: Cardiology;  Laterality: N/A;    CARDIAC CATHETERIZATION N/A 11/10/2024    Procedure: Coronary angiography;  Surgeon: Maribell Sandhu MD;  Location: [facility] CATH INVASIVE LOCATION;  Service:  Cardiology;  Laterality: N/A;    CARDIAC CATHETERIZATION N/A 11/10/2024    Procedure: Left ventriculography;  Surgeon: Maribell Sandhu MD;  Location: [facility] CATH INVASIVE LOCATION;  Service: Cardiology;  Laterality: N/A;    CARDIAC CATHETERIZATION N/A 11/10/2024    Procedure: Right Heart Cath;  Surgeon: Maribell Sandhu MD;  Location: [facility] CATH INVASIVE LOCATION;  Service: Cardiology;  Laterality: N/A;    COLONOSCOPY N/A 11/30/2022    Procedure: COLONOSCOPY TO CECUM AND TERM. ILEUM;  Surgeon: Casimiro Perkins MD;  Location: [facility] ENDOSCOPY;  Service: Gastroenterology;  Laterality: N/A;  PRE OP - SCREENING  POST OP - DIVERTICULOSIS, SM. HEMORRHOIDS    INSERTION AND REMOVAL HEMODIALYSIS CATHETER N/A 02/13/2021    Procedure: INSERTION AND REMOVAL OF HEMODIALYSIS CATHETER;  Surgeon: Adán Maurer MD;  Location: [facility] MAIN OR;  Service: Vascular;  Laterality: N/A;    INSERTION HEMODIALYSIS CATHETER Right 01/15/2021    Procedure: TUNNELED DIAYLIS CATHETER PLACEMENT;  Surgeon: Phu Gaviria MD;  Location: [facility] HYBRID OR 18/19;  Service: Vascular;  Laterality: Right;    INSERTION HEMODIALYSIS CATHETER Left 1/21/2023    Procedure: HEMODIALYSIS CATHETER INSERTION;  Surgeon: Elijah Herrera MD;  Location: [facility] MAIN OR;  Service: Vascular;  Laterality: Left;    INSERTION PERITONEAL DIALYSIS CATHETER  07/29/2014    Laparoscopic placement of Curl Cath peritoneal dialysis catheter, Dr. Vinod Goldstein    INSERTION PERITONEAL DIALYSIS CATHETER N/A 07/24/2019    Procedure: LAPAROSCOPIC INSERTION PERITONEAL DIALYSIS CATHETER;  Surgeon: Damon Rooney MD;  Location: [facility] MAIN OR;  Service: General    REMOVAL HEMODIALYSIS CATHETER N/A 2/15/2023    Procedure: PALINDROME REMOVAL;  Surgeon: Elijah Herrera MD;  Location: [facility] MAIN OR;  Service: Vascular;  Laterality: N/A;    REMOVAL PERITONEAL DIALYSIS CATHETER N/A 10/17/2016    Procedure: REMOVAL PERITONEAL DIALYSIS CATHETER;  Surgeon:  Damon Rooney MD;  Location: [facility] OR;  Service:     REMOVAL PERITONEAL DIALYSIS CATHETER N/A 10/21/2020    Procedure: REMOVAL PERITONEAL DIALYSIS CATHETER;  Surgeon: Damon Rooney MD;  Location: [facility] MAIN OR;  Service: General;  Laterality: N/A;    SHUNT O GRAM Right 2022    Procedure: RIGHT  ARM SHUNT O GRAM , ANGIOPLASTY OF AXILARY VEIN AND SUBCLAVIAN VEIN AT SVC JUNCTION;  Surgeon: Anya Hernandez Jr., MD;  Location: [facility] OR ;  Service: Vascular;  Laterality: Right;    SHUNT O GRAM Right 2023    Procedure: OPEN THROMBECTOMY AND ANGIOPLASTY;  Surgeon: Elijah Herrera MD;  Location: [facility] OR ;  Service: Vascular;  Laterality: Right;    SHUNT O GRAM Right 2023    Procedure: Right arm dialysis graft open thrombectomy, shuntogram, angioplasty and stent;  Surgeon: Karuna Villalba MD;  Location: [facility] ;  Service: Vascular;  Laterality: Right;    SHUNT O GRAM Right 2023    Procedure: RIGHT ARM FISTULOGRAM, ANGIOPLASTY;  Surgeon: Elijah Herrera MD;  Location: [facility] OR ;  Service: Vascular;  Laterality: Right;    SHUNT O GRAM Right 5/3/2024    Procedure: Hemodialysis shunt thrombectomy with revision;  Surgeon: Alex Sanchez MD;  Location: [facility];  Service: Vascular;  Laterality: Right;    TRANSPLANTATION RENAL Right 2016    from  donor    TUBAL ABDOMINAL LIGATION Bilateral        SLP Recommendation and Plan  SLP Swallowing Diagnosis: mild-moderate, oral dysphagia, pharyngeal dysphagia (24)  SLP Diet Recommendation: NPO, ice chips between meals after oral care, with supervision, temporary alternate methods of nutrition/hydration (24)     SLP Rec. for Method of Medication Administration: meds via alternate route (24)     Monitor for Signs of Aspiration: yes, notify SLP if any concerns (24)  Recommended Diagnostics:  "reassess via FEES (11/12/24 1430)  Swallow Criteria for Skilled Therapeutic Interventions Met: demonstrates skilled criteria (11/12/24 1430)  Anticipated Discharge Disposition (SLP): anticipate therapy at next level of care (11/12/24 1430)  Rehab Potential/Prognosis, Swallowing: good, to achieve stated therapy goals (11/12/24 1430)  Therapy Frequency (Swallow): PRN (11/12/24 1430)  Predicted Duration Therapy Intervention (Days): until discharge (11/12/24 1430)  Oral Care Recommendations: Oral Care BID/PRN (11/12/24 1430)                                        Outcome Evaluation: FEES completed. Recommend NPO, alternate means of nutrition and meds alternate route. Recommend ice chips sparingly s/p oral care. SLP to follow for re-evaluation as appropriate and coughing and vocal quality improved.      SWALLOW EVALUATION (Last 72 Hours)       SLP Adult Swallow Evaluation       Row Name 11/12/24 1430                   Rehab Evaluation    Document Type evaluation  -OC        Subjective Information no complaints  -OC        Patient Observations alert;cooperative;agree to therapy  -OC        Patient Effort good  -OC        Symptoms Noted During/After Treatment none  -OC           General Information    Patient Profile Reviewed yes  -OC        Pertinent History Of Current Problem \"49-year-old woman with ESRD on HD, hypertension, hyperlipidemia, diabetes who presented with altered mental status, was intubated in route for airway protection.  On arrival she was found to have abnormal EKGs and underwent emergent coronary angiography which revealed normal coronary arteries with basal to mid anterior and inferior hypokinesis consistent with atypical stress cardiomyopathy.\" Intubated 11/10/24-11/12/24,  -OC        Current Method of Nutrition NPO;large-bore;other (see comments)  pulled before FEES  -OC        Precautions/Limitations, Vision WFL;for purposes of eval  -OC        Precautions/Limitations, Hearing WFL;for purposes of " eval  -OC        Prior Level of Function-Communication unknown  -OC        Prior Level of Function-Swallowing no diet consistency restrictions  -OC        Plans/Goals Discussed with patient and family;agreed upon  -OC        Barriers to Rehab medically complex  -OC        Patient's Goals for Discharge return to PO diet  -OC        Family Goals for Discharge family did not state  -OC           Pain    Pretreatment Pain Rating 0/10 - no pain  -OC        Posttreatment Pain Rating 0/10 - no pain  -OC           Oral Motor Structure and Function    Dentition Assessment natural, present and adequate  -OC        Secretion Management other (see comments)  significant coughing s/p NG removal  -OC        Mucosal Quality moist, healthy  -OC        Volitional Swallow delayed  -OC        Volitional Cough WFL  -OC           Oral Musculature and Cranial Nerve Assessment    Oral Motor General Assessment generalized oral motor weakness  -OC        Vocal Impairment, Detail. Cranial Nerve X (Vagus) vocal quality abnormality (see comments)  mildly hoarse, difficulty to rate with coughing  -OC           Fiberoptic Endoscopic Evaluation of Swallowing (FEES)    Risks/Benefits Reviewed risks/benefits explained;patient;family;agreed to eval  -OC        Nasal Entry right:  -OC        Scope serial number/identification [id number]  -OC           Anatomy and Physiology    Anatomic Considerations no anatomic structural deviation  -OC        Velopharyngeal WFL  -OC        Base of Tongue symmetrical  -OC        Epiglottis WFL  -OC        Laryngeal Function Breathing symmetrical  -OC        Laryngeal Function Phonation symmetrical  -OC        Laryngeal Function to Breath Hold TVF contact  -OC        Secretion Rating Scale (Jeremy et al. 1996) 2- secretions initially outside the vestibule but later entered the vestibule  -OC        Secretion Description thin;clear;continuous  -OC        Ice Chips elicited swallow;not aspirated  -OC        Spontaneous  Swallow frequency reduced  -OC        Sensory reduced sensation  -OC        Utensils Used Spoon;Cup;Straw  -OC        Consistencies Trialed ice chips;thin liquids;nectar-thick liquids;honey-thick liquids;pudding/puree;soft to chew  -OC           FEES Interpretation    Oral Phase prespill of liquids into pharynx  -OC        Oral Phase, Comment mistiming across all textures  -OC           Initiation of Pharyngeal Swallow    Initiation of Pharyngeal Swallow bolus in valleculae;bolus in pyriform sinuses  -OC        Pharyngeal Phase impaired pharyngeal phase of swallowing  -OC        Penetration During the Swallow nectar-thick liquids;honey-thick liquids  -OC        Aspiration During the Swallow thin liquids  -OC        Penetration After the Swallow nectar-thick liquids;other (see comments)  residue mixing with secretions  -OC        Aspiration After the Swallow nectar-thick liquids;other (see comments)  secretions mixing with residue  -OC        Residue thin liquids;nectar-thick liquids;honey-thick liquids;pudding/puree  -OC        Response to Residue partial residue clearance;with spontaneous subsequent swallow;with cued swallow;other (see comments)  eventual penetration/aspiration secondary to mixing with secretions.  -OC        Response to Attempted Compensatory Maneuvers reduced residue  -OC        FEES Summary Mild-moderate oropharyngeal dysphagia. Patient demonstrated mistiming with prespill to the pyriforms with all consistencies trialed. Patient demonstrated eventual aspiration secondary to residue mixing with secretions. Continuous coughing and throat clearing throughout eval with and without penetration/aspiration. Patient demonstrated fatigue and difficulty with self feeding.  -OC           SLP Evaluation Clinical Impression    SLP Swallowing Diagnosis mild-moderate;oral dysphagia;pharyngeal dysphagia  -OC        Functional Impact risk of aspiration/pneumonia  -OC        Rehab Potential/Prognosis, Swallowing  good, to achieve stated therapy goals  -OC        Swallow Criteria for Skilled Therapeutic Interventions Met demonstrates skilled criteria  -OC           Recommendations    Therapy Frequency (Swallow) PRN  -OC        Predicted Duration Therapy Intervention (Days) until discharge  -OC        SLP Diet Recommendation NPO;ice chips between meals after oral care, with supervision;temporary alternate methods of nutrition/hydration  -OC        Recommended Diagnostics reassess via FEES  -OC        Oral Care Recommendations Oral Care BID/PRN  -OC        SLP Rec. for Method of Medication Administration meds via alternate route  -OC        Monitor for Signs of Aspiration yes;notify SLP if any concerns  -OC        Anticipated Discharge Disposition (SLP) anticipate therapy at next level of care  -OC           Swallow Goals (SLP)    Swallow LTGs Swallow Long Term Goal (free text)  -OC           (LTG) Swallow    (LTG) Swallow Tolerate least restrictive diet with no overt s/s aspiration.  -OC        Ava (Swallow Long Term Goal) with minimal cues (75-90% accuracy)  -OC        Time Frame (Swallow Long Term Goal) by discharge  -OC                  User Key  (r) = Recorded By, (t) = Taken By, (c) = Cosigned By      Initials Name Effective Dates    Terrie Joyce SLP 08/28/23 -                     EDUCATION  The patient has been educated in the following areas:   Dysphagia (Swallowing Impairment).        SLP GOALS       Row Name 11/12/24 1430             (LTG) Swallow    (LTG) Swallow Tolerate least restrictive diet with no overt s/s aspiration.  -OC      Ava (Swallow Long Term Goal) with minimal cues (75-90% accuracy)  -OC      Time Frame (Swallow Long Term Goal) by discharge  -OC                User Key  (r) = Recorded By, (t) = Taken By, (c) = Cosigned By      Initials Name Provider Type    Terrie Joyce SLP Speech and Language Pathologist                         Time Calculation:    Time Calculation- SLP        Row Name 11/12/24 1605             Time Calculation- SLP    SLP Start Time 1605  -OC      SLP Received On 11/12/24  -OC         Untimed Charges    SLP Eval/Re-eval  ST Fiberoptic Endo Eval Swallow - 51546  -OC      32155-IZ Fiberoptic Endo Eval Swallow Minutes 105  -OC         Total Minutes    Untimed Charges Total Minutes 105  -OC       Total Minutes 105  -OC                User Key  (r) = Recorded By, (t) = Taken By, (c) = Cosigned By      Initials Name Provider Type    Terrie Joyce SLP Speech and Language Pathologist                    Therapy Charges for Today       Code Description Service Date Service Provider Modifiers Qty    54001267361 HC HC SCP BRONCH ASCOPE FLX DISPOSABLE 11/12/2024 Terrie House SLP  1    [id number] HC ST FIBEROPTIC ENDO EVAL SWALL 7 11/12/2024 Terrie House SLP GN 1                 NOHEMI Pruitt  11/12/2024

## 2024-11-12 NOTE — PLAN OF CARE
Goal Outcome Evaluation:                    HD completed at change of shift.  Daughter in room.  Pt on levo for BP, although very labile.  Titrating down to maintain parameter of MAP .=65.  When awake pt is hypertensive.  Unable to titrate propofol.  Giving fentanyl prn and scheduled ativan.  Pt calm at times, but then restless, coughing, sits up in bed, attempts to remove tube.    Tylenol give x 1 for fever.  TF increased per order.  Pt tolerating well.

## 2024-11-12 NOTE — NURSING NOTE
Dialysis completed.  In the last hour the patient experienced hypotension   Dr. Bee was paged and ordered the treatment to end.  The patient ran for 3.5 hours and 3 L of fluid were removed.  Ending vital signs:  162/79   64   12   99.0   74.2 kg.

## 2024-11-12 NOTE — CONSULTS
Pt was crying happy tears as family members had arrived and she had just been extubated.  Chap provided hospitality and supportive presence.  Chap remains available.

## 2024-11-12 NOTE — PLAN OF CARE
Problem: Adult Inpatient Plan of Care  Goal: Plan of Care Review  Outcome: Not Progressing  Goal: Patient-Specific Goal (Individualized)  Outcome: Not Progressing  Goal: Absence of Hospital-Acquired Illness or Injury  Outcome: Not Progressing  Intervention: Identify and Manage Fall Risk  Recent Flowsheet Documentation  Taken 11/12/2024 1200 by Rika Aguilar RN  Safety Promotion/Fall Prevention: fall prevention program maintained  Taken 11/12/2024 1100 by Rika Aguilar RN  Safety Promotion/Fall Prevention: fall prevention program maintained  Taken 11/12/2024 1000 by Rika Aguilar RN  Safety Promotion/Fall Prevention: fall prevention program maintained  Taken 11/12/2024 0900 by Rika Aguilar RN  Safety Promotion/Fall Prevention: fall prevention program maintained  Taken 11/12/2024 0800 by Rika Aguilar RN  Safety Promotion/Fall Prevention: fall prevention program maintained  Taken 11/12/2024 0700 by Rika Aguilar RN  Safety Promotion/Fall Prevention: fall prevention program maintained  Intervention: Prevent Skin Injury  Recent Flowsheet Documentation  Taken 11/12/2024 1200 by Rika Aguilar RN  Body Position:   turned   right  Taken 11/12/2024 1000 by Rika Aguilar RN  Body Position:   right   turned  Taken 11/12/2024 0800 by Rika Aguilar RN  Body Position:   turned   left  Skin Protection: drying agents applied  Intervention: Prevent and Manage VTE (Venous Thromboembolism) Risk  Recent Flowsheet Documentation  Taken 11/12/2024 0800 by Rika Aguilar RN  VTE Prevention/Management: SCDs (sequential compression devices) on  Intervention: Prevent Infection  Recent Flowsheet Documentation  Taken 11/12/2024 1200 by Rika Aguilar RN  Infection Prevention: single patient room provided  Taken 11/12/2024 1100 by Rika Aguilar RN  Infection Prevention: single patient room provided  Taken 11/12/2024 1000 by Rika Aguilar RN  Infection Prevention: single patient room provided  Taken  11/12/2024 0900 by Rika Aguilar RN  Infection Prevention: single patient room provided  Taken 11/12/2024 0800 by Rika Aguilar RN  Infection Prevention: single patient room provided  Taken 11/12/2024 0700 by Rika Aguilar RN  Infection Prevention: single patient room provided  Goal: Optimal Comfort and Wellbeing  Outcome: Not Progressing  Goal: Readiness for Transition of Care  Outcome: Not Progressing  Intervention: Mutually Develop Transition Plan  Recent Flowsheet Documentation  Taken 11/12/2024 0728 by Rika Aguilar RN  Equipment Currently Used at Home: none     Problem: Comorbidity Management  Goal: Maintenance of Behavioral Health Symptom Control  Outcome: Not Progressing  Goal: Blood Pressure in Desired Range  Outcome: Not Progressing     Problem: Mechanical Ventilation Invasive  Goal: Effective Communication  Outcome: Not Progressing  Intervention: Ensure Effective Communication  Recent Flowsheet Documentation  Taken 11/12/2024 1200 by Rika Aguilar RN  Diversional Activities: television  Taken 11/12/2024 1000 by Rika Aguilar RN  Diversional Activities: television  Taken 11/12/2024 0800 by Rika Aguilar RN  Diversional Activities: television  Family/Support System Care: support provided  Communication Enhancement Strategies: other (see comments)  Taken 11/12/2024 0737 by Rika Aguilar RN  Diversional Activities: television  Goal: Optimal Device Function  Outcome: Not Progressing  Intervention: Optimize Device Care and Function  Recent Flowsheet Documentation  Taken 11/12/2024 1200 by Rika Aguilar RN  Airway Safety Measures: mask valve resuscitator at bedside  Taken 11/12/2024 1100 by Rika Aguilar RN  Airway Safety Measures: mask valve resuscitator at bedside  Taken 11/12/2024 1000 by Rika Aguilar RN  Airway Safety Measures: mask valve resuscitator at bedside  Taken 11/12/2024 0900 by Rika Aguilar RN  Airway Safety Measures: mask valve resuscitator at bedside  Taken  11/12/2024 0800 by Rika Aguilar RN  Airway/Ventilation Management: airway patency maintained  Airway Safety Measures: mask valve resuscitator at bedside  Taken 11/12/2024 0700 by Rika Aguilar RN  Airway Safety Measures: mask valve resuscitator at bedside  Goal: Mechanical Ventilation Liberation  Outcome: Not Progressing  Intervention: Promote Extubation and Mechanical Ventilation Liberation  Recent Flowsheet Documentation  Taken 11/12/2024 0800 by Rika Aguilar RN  Environmental Support:   calm environment promoted   caregiver consistency promoted   rest periods encouraged  Sleep/Rest Enhancement: awakenings minimized  Goal: Optimal Nutrition Delivery  Outcome: Not Progressing  Goal: Absence of Device-Related Skin and Tissue Injury  Outcome: Not Progressing  Intervention: Maintain Skin and Tissue Health  Recent Flowsheet Documentation  Taken 11/12/2024 0800 by Rika Aguilar RN  Device Skin Pressure Protection:   absorbent pad utilized/changed   adhesive use limited   skin-to-skin areas padded   tubing/devices free from skin contact   positioning supports utilized  Goal: Absence of Ventilator-Induced Lung Injury  Outcome: Not Progressing  Intervention: Facilitate Lung-Protection Measures  Recent Flowsheet Documentation  Taken 11/12/2024 0800 by Rika Aguilar RN  Lung Protection Measures: ventilator waveforms monitored  Intervention: Prevent Ventilator-Associated Pneumonia  Recent Flowsheet Documentation  Taken 11/12/2024 1200 by Rika Aguilar RN  Head of Bed (HOB) Positioning: HOB at 20-30 degrees  Taken 11/12/2024 1000 by Rika Aguilar RN  Head of Bed (HOB) Positioning: HOB at 20-30 degrees  Taken 11/12/2024 0800 by Rika Aguilar RN  Head of Bed (HOB) Positioning:   HOB at 20-30 degrees   HOB elevated  VAP Prevention Bundle:   oral care regularly provided   HOB elevation maintained  VAP Prevention Measures: completed     Problem: Skin Injury Risk Increased  Goal: Skin Health and  Integrity  Outcome: Not Progressing  Intervention: Optimize Skin Protection  Recent Flowsheet Documentation  Taken 11/12/2024 1200 by Rika Aguilar RN  Activity Management: bedrest  Head of Bed (HOB) Positioning: HOB at 20-30 degrees  Taken 11/12/2024 1000 by Rika Aguilar RN  Activity Management: bedrest  Head of Bed (HOB) Positioning: HOB at 20-30 degrees  Taken 11/12/2024 0800 by Rika Aguilar RN  Activity Management: bedrest  Pressure Reduction Techniques:   heels elevated off bed   pressure points protected   weight shift assistance provided  Head of Bed (HOB) Positioning:   HOB at 20-30 degrees   HOB elevated  Pressure Reduction Devices:   specialty bed utilized   heel offloading device utilized  Skin Protection: drying agents applied     Problem: Fall Injury Risk  Goal: Absence of Fall and Fall-Related Injury  Outcome: Not Progressing  Intervention: Promote Injury-Free Environment  Recent Flowsheet Documentation  Taken 11/12/2024 1200 by Rika Aguilar RN  Safety Promotion/Fall Prevention: fall prevention program maintained  Taken 11/12/2024 1100 by Rika Aguilar RN  Safety Promotion/Fall Prevention: fall prevention program maintained  Taken 11/12/2024 1000 by Rika Aguilar RN  Safety Promotion/Fall Prevention: fall prevention program maintained  Taken 11/12/2024 0900 by Rika Aguilar RN  Safety Promotion/Fall Prevention: fall prevention program maintained  Taken 11/12/2024 0800 by Rika Aguilar RN  Safety Promotion/Fall Prevention: fall prevention program maintained  Taken 11/12/2024 0700 by Rika Aguilar RN  Safety Promotion/Fall Prevention: fall prevention program maintained     Problem: Pain Acute  Goal: Optimal Pain Control and Function  Outcome: Not Progressing  Intervention: Optimize Psychosocial Wellbeing  Recent Flowsheet Documentation  Taken 11/12/2024 1200 by Rika Aguilar RN  Diversional Activities: television  Taken 11/12/2024 1000 by Rika Aguilar RN  Diversional  Activities: television  Taken 11/12/2024 0800 by Rika Aguilar RN  Supportive Measures: relaxation techniques promoted  Diversional Activities: television  Taken 11/12/2024 0737 by Rika Aguilar RN  Diversional Activities: television  Intervention: Prevent or Manage Pain  Recent Flowsheet Documentation  Taken 11/12/2024 0800 by Rika Aguilar RN  Sensory Stimulation Regulation: lighting decreased  Sleep/Rest Enhancement: awakenings minimized     Problem: Cardiac Catheterization (Diagnostic/Interventional)  Goal: Absence of Bleeding  Outcome: Not Progressing  Goal: Absence of Contrast-Induced Injury  Outcome: Not Progressing  Goal: Stable Heart Rate and Rhythm  Outcome: Not Progressing  Goal: Absence of Embolism Signs and Symptoms  Outcome: Not Progressing  Intervention: Prevent or Manage Embolism  Recent Flowsheet Documentation  Taken 11/12/2024 0800 by Rika Aguilar RN  VTE Prevention/Management: SCDs (sequential compression devices) on  Goal: Anesthesia/Sedation Recovery  Outcome: Not Progressing  Intervention: Optimize Anesthesia Recovery  Recent Flowsheet Documentation  Taken 11/12/2024 1200 by Rika Aguilar RN  Safety Promotion/Fall Prevention: fall prevention program maintained  Taken 11/12/2024 1100 by Rika Aguilar RN  Safety Promotion/Fall Prevention: fall prevention program maintained  Taken 11/12/2024 1000 by Rika Aguilar RN  Safety Promotion/Fall Prevention: fall prevention program maintained  Taken 11/12/2024 0900 by Rika Aguilar RN  Safety Promotion/Fall Prevention: fall prevention program maintained  Taken 11/12/2024 0800 by Rika gAuilar RN  Safety Promotion/Fall Prevention: fall prevention program maintained  Administration (IS): unable to perform  Reorientation Measures: other (see comments)  Taken 11/12/2024 0700 by Rika Aguilar RN  Safety Promotion/Fall Prevention: fall prevention program maintained  Goal: Optimal Pain Control and Function  Outcome: Not  Progressing  Intervention: Prevent or Manage Pain  Recent Flowsheet Documentation  Taken 11/12/2024 1200 by Rika Aguilar RN  Diversional Activities: television  Taken 11/12/2024 1000 by Rika Aguilar RN  Diversional Activities: television  Taken 11/12/2024 0800 by Rika Aguilar RN  Diversional Activities: television  Taken 11/12/2024 0737 by Rika Aguilar RN  Diversional Activities: television  Goal: Absence of Vascular Access Complication  Outcome: Not Progressing  Intervention: Prevent and Manage Access Complications  Recent Flowsheet Documentation  Taken 11/12/2024 1200 by Rika Aguilar RN  Activity Management: bedrest  Head of Bed (HOB) Positioning: HOB at 20-30 degrees  Bleeding Precautions: blood pressure closely monitored  Taken 11/12/2024 1100 by Rika Aguilar RN  Bleeding Precautions: blood pressure closely monitored  Taken 11/12/2024 1000 by Rika Aguilar RN  Activity Management: bedrest  Head of Bed (HOB) Positioning: HOB at 20-30 degrees  Bleeding Precautions: blood pressure closely monitored  Taken 11/12/2024 0900 by Rika Aguilar RN  Bleeding Precautions: blood pressure closely monitored  Taken 11/12/2024 0800 by Rika Aguilar RN  Activity Management: bedrest  Head of Bed (HOB) Positioning:   HOB at 20-30 degrees   HOB elevated  Bleeding Precautions: blood pressure closely monitored  Taken 11/12/2024 0700 by Rika Aguilar RN  Bleeding Precautions: blood pressure closely monitored     Problem: Heart Failure  Goal: Optimal Coping  Outcome: Not Progressing  Intervention: Support Psychosocial Response  Recent Flowsheet Documentation  Taken 11/12/2024 0800 by Rika Aguilar RN  Supportive Measures: relaxation techniques promoted  Family/Support System Care: support provided  Goal: Optimal Cardiac Output and Blood Flow  Outcome: Not Progressing  Intervention: Optimize Cardiac Output  Recent Flowsheet Documentation  Taken 11/12/2024 0800 by Rika Aguilar RN  Environmental  Support:   calm environment promoted   caregiver consistency promoted   rest periods encouraged  Goal: Stable Heart Rate and Rhythm  Outcome: Not Progressing  Goal: Fluid and Electrolyte Balance  Outcome: Not Progressing  Goal: Optimal Functional Ability  Outcome: Not Progressing  Intervention: Optimize Functional Ability  Recent Flowsheet Documentation  Taken 11/12/2024 1200 by Rika Aguilar RN  Activity Management: bedrest  Taken 11/12/2024 1000 by Rika Aguilar RN  Activity Management: bedrest  Taken 11/12/2024 0800 by Rika Aguilar RN  Activity Management: bedrest  Goal: Improved Oral Intake  Outcome: Not Progressing  Goal: Effective Oxygenation and Ventilation  Outcome: Not Progressing  Intervention: Promote Airway Secretion Clearance  Recent Flowsheet Documentation  Taken 11/12/2024 1200 by Rika Aguilar RN  Activity Management: bedrest  Taken 11/12/2024 1000 by Rika Aguilar RN  Activity Management: bedrest  Taken 11/12/2024 0800 by Rika Aguilar RN  Activity Management: bedrest  Breathing Techniques/Airway Clearance: lung expansion therapy utilized  Cough And Deep Breathing: unable to perform  Intervention: Optimize Oxygenation and Ventilation  Recent Flowsheet Documentation  Taken 11/12/2024 1200 by Rika Aguilar RN  Head of Bed (HOB) Positioning: HOB at 20-30 degrees  Taken 11/12/2024 1000 by Rika Aguilar RN  Head of Bed (HOB) Positioning: HOB at 20-30 degrees  Taken 11/12/2024 0800 by Rika Aguilar RN  Head of Bed (HOB) Positioning:   HOB at 20-30 degrees   HOB elevated  Airway/Ventilation Management: airway patency maintained  Goal: Effective Breathing Pattern During Sleep  Outcome: Not Progressing     Problem: Restraint, Nonviolent  Goal: Absence of Harm or Injury  Outcome: Not Progressing  Intervention: Implement Least Restrictive Safety Strategies  Recent Flowsheet Documentation  Taken 11/12/2024 1200 by Rika Aguilar RN  Medical Device Protection: IV pole/bag removed  from visual field  Diversional Activities: television  Taken 11/12/2024 1100 by Rika Aguilar RN  Medical Device Protection: IV pole/bag removed from visual field  Taken 11/12/2024 1000 by Rika Aguilar RN  Medical Device Protection: IV pole/bag removed from visual field  Diversional Activities: television  Taken 11/12/2024 0900 by Rika Aguilar RN  Medical Device Protection: IV pole/bag removed from visual field  Taken 11/12/2024 0800 by Rika Aguilar RN  Medical Device Protection: IV pole/bag removed from visual field  Diversional Activities: television  Taken 11/12/2024 0737 by Rika Aguilar RN  Medical Device Protection: IV pole/bag removed from visual field  Diversional Activities: television  Taken 11/12/2024 0700 by Rika Aguilar RN  Medical Device Protection: IV pole/bag removed from visual field  Intervention: Protect Skin and Joint Integrity  Recent Flowsheet Documentation  Taken 11/12/2024 1200 by Rika Aguilar RN  Body Position:   turned   right  Taken 11/12/2024 1000 by Rika Aguilar RN  Body Position:   right   turned  Taken 11/12/2024 0800 by Rika Aguilar RN  Body Position:   turned   left  Skin Protection: drying agents applied  Range of Motion: ROM (range of motion) performed     Problem: Sepsis/Septic Shock  Goal: Optimal Coping  Outcome: Not Progressing  Intervention: Support Patient and Family Response  Recent Flowsheet Documentation  Taken 11/12/2024 0800 by Rika Aguilar RN  Supportive Measures: relaxation techniques promoted  Family/Support System Care: support provided  Goal: Absence of Bleeding  Outcome: Not Progressing  Intervention: Monitor and Manage Bleeding  Recent Flowsheet Documentation  Taken 11/12/2024 1200 by Rika Aguilar RN  Bleeding Precautions: blood pressure closely monitored  Taken 11/12/2024 1100 by Rika Aguilar RN  Bleeding Precautions: blood pressure closely monitored  Taken 11/12/2024 1000 by Rika Aguilar RN  Bleeding Precautions:  blood pressure closely monitored  Taken 11/12/2024 0900 by Rika Aguilar RN  Bleeding Precautions: blood pressure closely monitored  Taken 11/12/2024 0800 by Rika Aguilar RN  Bleeding Precautions: blood pressure closely monitored  Taken 11/12/2024 0700 by Rika Aguilar RN  Bleeding Precautions: blood pressure closely monitored  Goal: Blood Glucose Level Within Target Range  Outcome: Not Progressing  Goal: Absence of Infection Signs and Symptoms  Outcome: Not Progressing  Intervention: Initiate Sepsis Management  Recent Flowsheet Documentation  Taken 11/12/2024 1200 by Rika Aguilar RN  Infection Prevention: single patient room provided  Taken 11/12/2024 1100 by Rika Aguilar RN  Infection Prevention: single patient room provided  Taken 11/12/2024 1000 by Rika Aguilar RN  Infection Prevention: single patient room provided  Taken 11/12/2024 0900 by Rika Aguilar RN  Infection Prevention: single patient room provided  Taken 11/12/2024 0800 by Rika Aguilar RN  Infection Prevention: single patient room provided  Taken 11/12/2024 0700 by Rika Aguilar RN  Infection Prevention: single patient room provided  Intervention: Promote Stabilization  Recent Flowsheet Documentation  Taken 11/12/2024 0800 by Rika Aguilar RN  Lung Protection Measures: ventilator waveforms monitored  Intervention: Promote Recovery  Recent Flowsheet Documentation  Taken 11/12/2024 1200 by Rika Aguilar RN  Activity Management: bedrest  Taken 11/12/2024 1000 by Rika Aguilar RN  Activity Management: bedrest  Taken 11/12/2024 0800 by Rika Aguilar RN  Activity Management: bedrest  Airway/Ventilation Management: airway patency maintained  Sleep/Rest Enhancement: awakenings minimized  Goal: Optimal Nutrition Delivery  Outcome: Not Progressing     Problem: Enteral Nutrition  Goal: Absence of Aspiration Signs and Symptoms  Outcome: Not Progressing  Intervention: Minimize Aspiration Risk  Recent Flowsheet  Documentation  Taken 11/12/2024 1200 by Rika Aguilar RN  Head of Bed (HOB) Positioning: HOB at 20-30 degrees  Taken 11/12/2024 1000 by Rika Aguilar RN  Head of Bed (HOB) Positioning: HOB at 20-30 degrees  Taken 11/12/2024 0800 by Rika Aguilar RN  Head of Bed (HOB) Positioning:   HOB at 20-30 degrees   HOB elevated  Enteral Feeding Safety: placement verified by x-ray  Goal: Safe, Effective Therapy Delivery  Outcome: Not Progressing  Intervention: Prevent Feeding-Related Adverse Events  Recent Flowsheet Documentation  Taken 11/12/2024 0800 by Rika Aguilar RN  Enteral Feeding Safety: placement verified by x-ray  Goal: Feeding Tolerance  Outcome: Not Progressing   Goal Outcome Evaluation: remain HDS, monitor vs/labs, wean levo as able, SBT for possible extubation today, possible TTF today.

## 2024-11-12 NOTE — PROGRESS NOTES
"    Patient Name: Sunni Mcneil  :1975  49 y.o.      Patient Care Team:  Regla Ying APRN as PCP - General (Nurse Practitioner)  Bo Hansen MD as Consulting Physician (Nephrology)  Damon Rooney MD as Surgeon (General Surgery)    Chief Complaint:   Sepsis, takotsubo    Interval History:   Decreased pressor requirements.     Objective   Vital Signs  Temp:  [98.2 °F (36.8 °C)-100.6 °F (38.1 °C)] 99.5 °F (37.5 °C)  Heart Rate:  [] 99  Resp:  [20-24] 20  BP: (141)/(74) 141/74  Arterial Line BP: ()/(51-93) 137/81  FiO2 (%):  [40 %-99 %] 40 %    Intake/Output Summary (Last 24 hours) at 2024 0917  Last data filed at 2024 0347  Gross per 24 hour   Intake 2772.58 ml   Output 0 ml   Net 2772.58 ml     Flowsheet Rows      Flowsheet Row First Filed Value   Admission Height 152.4 cm (60\") Documented at 11/10/2024 1425   Admission Weight 75.3 kg (166 lb 0.1 oz) Documented at 11/10/2024 1042            GEN: Intubated  HEENT: NACT, ET tube in place  Lungs: CTAB anteriorly, no wheezes, rales or rhonchi  CV: normal rate, regular rhythm, normal S1, S2, no murmurs, +2 radial pulses b/l, no carotid bruit  Abdomen: soft, nontender, nondistended, NABS  Extremities: no edema  Skin: no rash, warm, dry  Heme/Lymph: no bruising      Results Review:    Results from last 7 days   Lab Units 24  0507   SODIUM mmol/L 132*   POTASSIUM mmol/L 4.3   CHLORIDE mmol/L 92*   CO2 mmol/L 17.6*   BUN mg/dL 31*   CREATININE mg/dL 6.35*   GLUCOSE mg/dL 141*   CALCIUM mg/dL 8.6     Results from last 7 days   Lab Units 11/10/24  1745 11/10/24  1420   HSTROP T ng/L 689* 239*     Results from last 7 days   Lab Units 24  0507   WBC 10*3/mm3 7.94   HEMOGLOBIN g/dL 10.1*   HEMATOCRIT % 29.2*   PLATELETS 10*3/mm3 120*     Results from last 7 days   Lab Units 11/10/24  1002   INR  2.36*   APTT seconds 37.5*     Results from last 7 days   Lab Units 24  0507   MAGNESIUM mg/dL 2.2           "         Medication Review:   ceFAZolin, 1,000 mg, Intravenous, Q24H  chlorhexidine, 15 mL, Mouth/Throat, Q12H  famotidine, 20 mg, Intravenous, Daily  insulin regular, 3-14 Units, Subcutaneous, Q6H  LORazepam, 2 mg, Intravenous, Once  LORazepam, 0.5 mg, Oral, Q12H  mupirocin, 1 Application, Each Nare, BID  senna-docusate sodium, 2 tablet, Oral, BID  sodium chloride, 10 mL, Intravenous, Q12H  sodium chloride, 10 mL, Intravenous, Q12H  sodium chloride, 10 mL, Intravenous, Q12H  sodium chloride, 10 mL, Intravenous, Q12H         dexmedetomidine, 0.2-1.5 mcg/kg/hr, Last Rate: 0.2 mcg/kg/hr (11/12/24 1737)  norepinephrine, 0.02-0.3 mcg/kg/min, Last Rate: 0.06 mcg/kg/min (11/12/24 0915)        Assessment & Plan   #Septic shock secondary to MSSA bacteremia  #Stress cardiomyopathy secondary to above  #ESRD on HD  #Hyperlipidemia  #Diabetes    49-year-old woman with ESRD on HD, hypertension, hyperlipidemia, diabetes who presented with altered mental status, was intubated in route for airway protection.  On arrival she was found to have abnormal EKGs and underwent emergent coronary angiography which revealed normal coronary arteries with basal to mid anterior and inferior hypokinesis consistent with atypical stress cardiomyopathy.    - He on low-dose levo  - Antibiotics changed to cover MSSA  - Possible extubation today    Luis Hrezog MD, FACC, [facility]  11/12/24  09:17 EST

## 2024-11-12 NOTE — CONSULTS
Patient Name: Sunni Mcneil Account #: [id number]    MRN: [id number] Admission Date: 11/10/2024      Consulting Service: Vascular Surgery Date of Evaluation: November 12, 2024    Requesting Provider: Dr. Saji Villafana MD    CHIEF COMPLAINT: Possible right AV graft MSSA infection  HPI: Sunni Mcneil is a 49 y.o. female is being seen for a consultation and evaluation/management of possible right AV graft MSSA infection.  Currently this seems to be the most likely source based on findings of some mild fluid in multiple areas around the shunt as well as the positive blood cultures.  Otherwise clinically the arm looks fine.  There is really no localizing symptoms or focal symptoms that would allow us to resect only part of the graft and I am concerned that if we resect the whole graft and repair of the artery and ligate the vein in this arm will no longer be usable for dialysis.  As such we are recommending a conservative approach with IV antibiotics given the MSSA nature of the infection.    PAST MEDICAL HISTORY:   Past Medical History:   Diagnosis Date    Acid reflux     Anemia     WITH ESRD    Anxiety     Arthritis     Cough     related to fluid overload    Dependence on renal dialysis 05/17/2021    Depression     Diabetes mellitus     NO MEDICATIONS FOR    End stage renal disease 05/17/2021    ESRD on dialysis     FRESINUS MWF    History of peritoneal dialysis     KIDNEY TRANSPLANT SEPT 2016     History of transfusion     BEEN A WHILE no reaction    Hypercalcemia     Hyperparathyroidism     Hypertension     Kidney disease     End-stage renal disease. TRANSPLANT    Kidney transplant recipient 09/02/2016    RIGHT KIDNEY. ? 2018 KIDNEY REJECTED    PONV (postoperative nausea and vomiting)     Seasonal allergies     CURRENTLY     Vascular dialysis catheter in place     RIGHT TUNNEL CATH      PAST SURGICAL HISTORY:   Past Surgical History:   Procedure Laterality Date    ARTERIOVENOUS FISTULA/SHUNT SURGERY Right  02/26/2020    Procedure: RIGHT ARM ARTERIAL VENOUS FISTULA;  Surgeon: Elijah Herrera MD;  Location: [facility] MAIN OR;  Service: Vascular;  Laterality: Right;    ARTERIOVENOUS FISTULA/SHUNT SURGERY Left 02/04/2021    Procedure: LEFT BRACHIOAXILLARY ARTERIOVENOUS FISTULA GRAFT;  Surgeon: Anya Hernandez Jr., MD;  Location: [facility] MAIN OR;  Service: Vascular;  Laterality: Left;    ARTERIOVENOUS FISTULA/SHUNT SURGERY Right 03/16/2021    Procedure: RIGHT BRACHIOAXILLARY ARTERVENIOUS GRAFT PLACEMENT;  Surgeon: Adán Maurer MD;  Location: [facility] MAIN OR;  Service: Vascular;  Laterality: Right;    ARTERIOVENOUS FISTULA/SHUNT SURGERY Right 8/4/2023    Procedure: RIGHT ARM FISTULOGRAM PEUCUTANEOUS AND PERIPHERAL TRANSLUMINAL ANGIOPLSTY/STENT;  Surgeon: Elijah Herrera MD;  Location: [facility] OR 18/19;  Service: Vascular;  Laterality: Right;    BRACHIAL EMBOLECTOMY Right 10/6/2023    Procedure: RIGHT AV GRAFT WITH ANGIOPLASTY AND CENTROVENOUS ANGIOPLASTY;  Surgeon: Phu Gaviria MD;  Location: [facility] OR 18/19;  Service: Vascular;  Laterality: Right;    CARDIAC CATHETERIZATION N/A 11/10/2024    Procedure: Left Heart Cath;  Surgeon: Maribell Sandhu MD;  Location: [facility] CATH INVASIVE LOCATION;  Service: Cardiology;  Laterality: N/A;    CARDIAC CATHETERIZATION N/A 11/10/2024    Procedure: Coronary angiography;  Surgeon: Maribell Sandhu MD;  [facility];  Service: Cardiology;  Laterality: N/A;    CARDIAC CATHETERIZATION N/A 11/10/2024    Procedure: Left ventriculography;  Surgeon: Maribell Sandhu MD;  [facility];  Service: Cardiology;  Laterality: N/A;    CARDIAC CATHETERIZATION N/A 11/10/2024    Procedure: Right Heart Cath;  Surgeon: Maribell Sandhu MD;  Location: [facility] CATH INVASIVE LOCATION;  Service: Cardiology;  Laterality: N/A;    COLONOSCOPY N/A 11/30/2022    Procedure: COLONOSCOPY TO CECUM AND TERM. ILEUM;  Surgeon:  Casimiro Perkins MD;  Location: [facility] ENDOSCOPY;  Service: Gastroenterology;  Laterality: N/A;  PRE OP - SCREENING  POST OP - DIVERTICULOSIS, SM. HEMORRHOIDS    INSERTION AND REMOVAL HEMODIALYSIS CATHETER N/A 02/13/2021    Procedure: INSERTION AND REMOVAL OF HEMODIALYSIS CATHETER;  Surgeon: Adán Maurer MD;  Location: [facility] MAIN OR;  Service: Vascular;  Laterality: N/A;    INSERTION HEMODIALYSIS CATHETER Right 01/15/2021    Procedure: TUNNELED DIAYLIS CATHETER PLACEMENT;  Surgeon: Phu Gaviria MD;  Location: [facility] HYBRID OR 18/19;  Service: Vascular;  Laterality: Right;    INSERTION HEMODIALYSIS CATHETER Left 1/21/2023    Procedure: HEMODIALYSIS CATHETER INSERTION;  Surgeon: Elijah Herrera MD;  Location: [facility] MAIN OR;  Service: Vascular;  Laterality: Left;    INSERTION PERITONEAL DIALYSIS CATHETER  07/29/2014    Laparoscopic placement of Curl Cath peritoneal dialysis catheter, Dr. Vinod Goldstein    INSERTION PERITONEAL DIALYSIS CATHETER N/A 07/24/2019    Procedure: LAPAROSCOPIC INSERTION PERITONEAL DIALYSIS CATHETER;  Surgeon: Damon Rooney MD;  Location: [facility] MAIN OR;  Service: General    REMOVAL HEMODIALYSIS CATHETER N/A 2/15/2023    Procedure: PALINDROME REMOVAL;  Surgeon: Elijah Herrera MD;  Location: [facility] MAIN OR;  Service: Vascular;  Laterality: N/A;    REMOVAL PERITONEAL DIALYSIS CATHETER N/A 10/17/2016    Procedure: REMOVAL PERITONEAL DIALYSIS CATHETER;  Surgeon: Damon Rooney MD;  Location: [facility] MAIN OR;  Service:     REMOVAL PERITONEAL DIALYSIS CATHETER N/A 10/21/2020    Procedure: REMOVAL PERITONEAL DIALYSIS CATHETER;  Surgeon: Damon Rooney MD;  Location: [facility] MAIN OR;  Service: General;  Laterality: N/A;    SHUNT O GRAM Right 07/28/2022    Procedure: RIGHT  ARM SHUNT O GRAM , ANGIOPLASTY OF AXILARY VEIN AND SUBCLAVIAN VEIN AT SVC JUNCTION;  Surgeon: Anya Hernandez Jr., MD;  Location: [facility] HYBRID OR 18/19;  Service:  Vascular;  Laterality: Right;    SHUNT O GRAM Right 2023    Procedure: OPEN THROMBECTOMY AND ANGIOPLASTY;  Surgeon: Elijah Herrera MD;  Location: [facility] OR ;  Service: Vascular;  Laterality: Right;    SHUNT O GRAM Right 2023    Procedure: Right arm dialysis graft open thrombectomy, shuntogram, angioplasty and stent;  Surgeon: Karuna Villalba MD;  Location: [facility] OR ;  Service: Vascular;  Laterality: Right;    SHUNT O GRAM Right 2023    Procedure: RIGHT ARM FISTULOGRAM, ANGIOPLASTY;  Surgeon: Elijah Herrera MD;  Location: [facility] OR ;  Service: Vascular;  Laterality: Right;    SHUNT O GRAM Right 5/3/2024    Procedure: Hemodialysis shunt thrombectomy with revision;  Surgeon: Alex Sanchez MD;  Location: [facility] OR;  Service: Vascular;  Laterality: Right;    TRANSPLANTATION RENAL Right 2016    from  donor    TUBAL ABDOMINAL LIGATION Bilateral       FAMILY HISTORY:   Family History   Problem Relation Age of Onset    Hypertension Mother     Hypertension Father     Heart attack Maternal Grandfather     Malig Hyperthermia Neg Hx       SOCIAL HISTORY:   Social History     Tobacco Use    Smoking status: Never     Passive exposure: Never    Smokeless tobacco: Never    Tobacco comments:     Patient does not smoke   Vaping Use    Vaping status: Never Used   Substance Use Topics    Alcohol use: No     Comment: caffeine use; Patient is unknown if ever drinks    Drug use: Never     Comment: Drug Abuse: none      MEDICATIONS:   No current facility-administered medications on file prior to encounter.     Current Outpatient Medications on File Prior to Encounter   Medication Sig Dispense Refill    acetaminophen (TYLENOL) 325 MG tablet Take 2 tablets by mouth Every 6 (Six) Hours As Needed for Mild Pain.      bisacodyl (DULCOLAX) 5 MG EC tablet Take 1 tablet by mouth Daily As Needed for Constipation (Use if polyethylene glycol is  ineffective). 30 tablet 0    calcitriol (ROCALTROL) 0.25 MCG capsule Take 1 capsule by mouth Take As Directed. Calcitriol 0.25 MCG Capsule (si capsule once per day )      calcium acetate (PHOSLO) 667 MG capsule Take 3 capsules by mouth 3 (Three) Times a Day With Meals.      cloNIDine (CATAPRES) 0.1 MG tablet Take 1 tablet by mouth Daily. 30 tablet 3    hydrALAZINE (APRESOLINE) 25 MG tablet Take 1 tablet by mouth Daily.      hydrOXYzine (ATARAX) 50 MG tablet Take 1 tablet by mouth As Needed.      naloxone (NARCAN) 4 MG/0.1ML nasal spray Call 911. Don't prime. Helix in 1 nostril for overdose. Repeat in 2-3 minutes in other nostril if no or minimal breathing/responsiveness. 2 each 0    ondansetron ODT (ZOFRAN-ODT) 4 MG disintegrating tablet Take 1 tablet by mouth As Needed for Nausea or Vomiting.      polyethylene glycol (MIRALAX) 17 GM/SCOOP powder Take 17 g by mouth Daily As Needed (Use if senna-docusate is ineffective). 510 g 0    Polyethylene Glycol 3350 (MIRALAX PO) Take 1 dose by mouth As Needed.      prochlorperazine (COMPAZINE) 10 MG tablet Take 1 tablet by mouth Every 6 (Six) Hours As Needed for Nausea or Vomiting. 10 tablet 0    propranolol (INDERAL) 10 MG tablet Take 1 tablet by mouth 2 (Two) Times a Day.      sennosides-docusate (PERICOLACE) 8.6-50 MG per tablet Take 2 tablets by mouth 2 (Two) Times a Day As Needed for Constipation. 60 tablet 0    traZODone (DESYREL) 100 MG tablet Take 1.5 tablets by mouth every night at bedtime.      warfarin (COUMADIN) 5 MG tablet Take 1 tablet by mouth Daily.               ALLERGIES: Patient has no known allergies.   COMPLETE REVIEW OF SYSTEMS:     ENT ROS: negative  Cardiovascular ROS: no chest pain or dyspnea on exertion  Respiratory ROS: no cough, shortness of breath, or wheezing  Gastrointestinal ROS: no abdominal pain, change in bowel habits, or black or bloody stools  Neurological ROS: no TIA or stroke symptoms  Genito-Urinary ROS: no dysuria, trouble voiding,  or hematuria  Musculoskeletal ROS: negative  Dermatological ROS: negative  Psychological ROS: negative      PHYSICAL EXAM:   Patient Vitals for the past 24 hrs:   BP Temp Temp src Pulse Resp SpO2 Weight   11/12/24 1300 125/84 98.6 °F (37 °C) Oral 105 18 100 % --   11/12/24 1200 129/83 98.8 °F (37.1 °C) Oral 105 20 100 % --   11/12/24 1107 -- 98.4 °F (36.9 °C) Oral -- -- -- --   11/12/24 1100 -- 98.6 °F (37 °C) Oral 108 18 99 % --   11/12/24 1000 -- 98.6 °F (37 °C) Oral 108 20 99 % --   11/12/24 0950 -- -- -- 115 -- 99 % --   11/12/24 0900 -- 99.5 °F (37.5 °C) Oral 99 20 100 % --   11/12/24 0800 -- 99.5 °F (37.5 °C) Oral 95 22 100 % --   11/12/24 0737 -- -- -- 90 -- 100 % --   11/12/24 0733 -- 99.3 °F (37.4 °C) -- -- -- -- --   11/12/24 0700 -- 99.5 °F (37.5 °C) Rectal 90 20 100 % --   11/12/24 0659 -- -- -- 88 24 100 % --   11/12/24 0645 -- -- -- 87 -- 100 % --   11/12/24 0630 -- -- -- 84 -- 100 % --   11/12/24 0615 -- -- -- 83 -- 100 % --   11/12/24 0600 -- -- -- 81 -- 100 % --   11/12/24 0545 -- -- -- 79 -- 100 % --   11/12/24 0530 -- -- -- 80 -- 100 % --   11/12/24 0515 -- -- -- 79 -- 100 % 74.7 kg (164 lb 10.9 oz)   11/12/24 0500 -- -- -- 96 -- 100 % --   11/12/24 0445 -- -- -- 81 -- 100 % --   11/12/24 0430 -- -- -- 79 -- 100 % --   11/12/24 0415 -- -- -- 80 -- 100 % --   11/12/24 0400 -- -- -- 83 -- 100 % --   11/12/24 0351 -- 99.3 °F (37.4 °C) -- 85 24 100 % --   11/12/24 0345 -- -- -- 88 -- 100 % --   11/12/24 0330 -- -- -- 99 -- 100 % --   11/12/24 0315 -- -- -- 81 -- 100 % --   11/12/24 0300 -- -- -- 83 -- 100 % --   11/12/24 0245 -- -- -- 87 -- 100 % --   11/12/24 0230 -- -- -- 90 -- 100 % --   11/12/24 0215 -- -- -- 106 -- 100 % --   11/12/24 0200 -- -- -- 104 -- 99 % --   11/12/24 0130 -- -- -- 108 -- 100 % --   11/12/24 0115 -- -- -- 102 -- 100 % --   11/12/24 0100 -- -- -- 101 -- 100 % --   11/12/24 0045 -- -- -- 106 -- 100 % --   11/12/24 0030 -- -- -- 108 -- 100 % --   11/12/24 0015 -- -- -- 108 --  99 % --   11/12/24 0000 -- -- -- 102 -- 100 % --   11/11/24 2345 -- -- -- 94 -- 100 % --   11/11/24 2330 -- -- -- 91 -- 100 % --   11/11/24 2328 -- 100 °F (37.8 °C) Oral 95 24 100 % --   11/11/24 2315 -- -- -- 91 -- 100 % --   11/11/24 2300 -- -- -- 89 -- 100 % --   11/11/24 2245 -- -- -- 93 -- 100 % --   11/11/24 2230 -- -- -- 94 -- 100 % --   11/11/24 2215 -- -- -- 99 -- 100 % --   11/11/24 2200 -- -- -- 100 -- 100 % --   11/11/24 2145 -- -- -- 103 -- 100 % --   11/11/24 2130 -- -- -- 107 -- 100 % --   11/11/24 2115 -- (!) 100.6 °F (38.1 °C) Rectal 104 -- 100 % --   11/11/24 2100 141/74 -- -- 97 -- 100 % --   11/11/24 2045 -- -- -- 105 -- 100 % --   11/11/24 2030 -- -- -- 104 -- 100 % --   11/11/24 2015 -- -- -- 108 -- 100 % --   11/11/24 2000 -- 99.9 °F (37.7 °C) Rectal 110 -- 100 % --   11/11/24 1945 -- -- -- 105 -- 100 % --   11/11/24 1930 -- -- -- 105 -- 100 % --   11/11/24 1903 -- 99.9 °F (37.7 °C) -- 108 24 100 % --   11/11/24 1800 -- -- -- 97 20 100 % --   11/11/24 1700 -- 98.2 °F (36.8 °C) Axillary 69 22 -- --   11/11/24 1600 -- 98.4 °F (36.9 °C) Rectal 103 22 100 % --   11/11/24 1537 -- -- -- 99 24 100 % --   11/11/24 1509 -- 98.4 °F (36.9 °C) Axillary -- -- -- --   11/11/24 1500 -- 99.9 °F (37.7 °C) Rectal 99 24 100 % --        General appearance: in mild to moderate distress, ill-appearing, chronically ill appearing, and uncooperative.  Neurological exam reveals nonfocal but not oriented.  ENT exam reveals - ENT exam normal, no neck nodes or sinus tenderness.  CVS exam: normal rate, regular rhythm, normal S1, S2, no murmurs, rubs, clicks or gallops.  Chest: clear to auscultation, no wheezes, rales or rhonchi, symmetric air entry.  Abdominal exam: soft, nontender, nondistended, no masses or organomegaly.  Examination of the feet reveals warm, good capillary refill.  Right AV access site without cellulitis redness heat or tenderness.      LABS:      Results Review:       I reviewed the patient's new  clinical results.  Results from last 7 days   Lab Units 11/12/24  0507 11/11/24  0400 11/10/24  1605 11/10/24  1602 11/10/24  1539 11/10/24  1002 11/08/24  0000   WBC 10*3/mm3 7.94 12.26*  --   --   --  16.70*  --    HEMOGLOBIN g/dL 10.1* 11.0*  --   --   --  12.0 11*  33*   HEMOGLOBIN, POC g/dL  --   --  10.5* 10.9* 11.9*  --   --    PLATELETS 10*3/mm3 120* 137*  --   --   --  162  --      Results from last 7 days   Lab Units 11/12/24  0507 11/11/24  1857 11/11/24  0400 11/10/24  1420 11/10/24  1002   SODIUM mmol/L 132* 133* 130*  --  131*   POTASSIUM mmol/L 4.3 4.2 6.0* 6.2* 4.6   CHLORIDE mmol/L 92* 93* 96*  --  87*   CO2 mmol/L 17.6* 21.1* 17.0*  --  14.0*   BUN mg/dL 31* 20 44*  --  56*   CREATININE mg/dL 6.35* 5.09* 8.49*  --  11.59*   GLUCOSE mg/dL 141* 142* 130*  --  118*   Estimated Creatinine Clearance: 9.7 mL/min (A) (by C-G formula based on SCr of 6.35 mg/dL (H)).  Results from last 7 days   Lab Units 11/12/24  0507 11/11/24  1857 11/11/24  0400 11/10/24  1002   CALCIUM mg/dL 8.6 9.3 7.5* 9.6   ALBUMIN g/dL 2.9*  --  3.1* 4.0   MAGNESIUM mg/dL 2.2  --  1.9  --    PHOSPHORUS mg/dL 6.7*  --  6.7*  --      Results from last 7 days   Lab Units 11/10/24  1002   PROTIME Seconds 26.1*   INR  2.36*       The following radiologic or non-invasive studies have been reviewed by me: Right upper extremity AV shunt duplex    Active Hospital Problems    Diagnosis  POA    **Pulmonary edema [J81.1]  Yes    ST elevation myocardial infarction (STEMI) [I21.3]  Unknown      Resolved Hospital Problems   No resolved problems to display.         ASSESSMENT/PLAN: 49 y.o. female with what appears to be mental status changes related to sepsis which is felt to be MSSA based.  Positive blood cultures with lead to a look and search for source and we believe that is most likely coming from her shunt on the right arm.  Despite this her shunt looks great with no evidence of pointing abscess or even any site of focal infection.  Duplex  has some fluid around the graft fairly diffusely and at this point removing the entire graft seems to be more aggressive than possibly necessary.  After discussion with Dr. Villafana I feel that a 6-week course of IV antibiotics with an attempt to sterilize the graft or at least cause the infection to show itself in a focal area which would allow us to revise the graft and salvage most of it would be a best plan.  Obviously there is still a chance we will have to take the whole graft out but at least this gives us a chance to save the upper extremity for dialysis further down the road.  If we resect the entire graft this upper extremity will be unlikely to be used ever again.  I discussed with the family and with Dr. Villafana directly and we are going to proceed with this plan and defer the choice of antibiotics to him but would likely be given through dialysis.      I discussed the plan with the patient and her family.  Patient is obtunded but her family is agreeable to the plan of care at this point. Thank you for this consult.     Phu Gaviria MD   11/12/24

## 2024-11-12 NOTE — PROGRESS NOTES
Dr. SAMMY Dinh    [facility] CARDIAC INTENSIVE CARE        Patient ID:  Name:  Sunni Mcneil  MRN:  [id number]  1975  49 y.o.  female            CC/Reason for visit: Acute hypoxic respiratory failure, pulmonary edema     Interval hx: Remains intubated, mechanically ventilated  Follows all commands while intubated.  Discussed with Dr. Villafana, AV fistula may be infected, source of bacteremia     ROS: Unobtainable, intubated    Vitals:  Vitals:    11/12/24 0800 11/12/24 0900 11/12/24 0950 11/12/24 1107   BP:       Pulse: 95 99 115    Resp: 22 20     Temp: 99.5 °F (37.5 °C) 99.5 °F (37.5 °C)  98.4 °F (36.9 °C)   TempSrc: Oral Oral  Oral   SpO2: 100% 100% 99%    Weight:       Height:         FiO2 (%): 40 %     Body mass index is 32.16 kg/m².    Intake/Output Summary (Last 24 hours) at 11/12/2024 1121  Last data filed at 11/12/2024 0900  Gross per 24 hour   Intake 2876.63 ml   Output 0 ml   Net 2876.63 ml       Exam:  GEN:  No distress  Opens eyes, follows all commands while intubated on mechanical ventilator  LUNGS: Distant and diminished but clear breath sounds bilat, no use of accessory muscles  CV:  Normal S1S2, without murmur, there is some leg edema  ABD:  Non tender, obesity hampers rest of the exam      Scheduled meds:  atropine sulfate, , ,   ceFAZolin, 1,000 mg, Intravenous, Q24H  chlorhexidine, 15 mL, Mouth/Throat, Q12H  famotidine, 20 mg, Intravenous, Daily  insulin regular, 3-14 Units, Subcutaneous, Q6H  LORazepam, 2 mg, Intravenous, Once  LORazepam, 0.5 mg, Oral, Q12H  mupirocin, 1 Application, Each Nare, BID  senna-docusate sodium, 2 tablet, Oral, BID  sodium chloride, 10 mL, Intravenous, Q12H  sodium chloride, 10 mL, Intravenous, Q12H  sodium chloride, 10 mL, Intravenous, Q12H  sodium chloride, 10 mL, Intravenous, Q12H      IV meds:                      dexmedetomidine, 0.2-1.5 mcg/kg/hr, Last Rate: 0.2 mcg/kg/hr (11/12/24 0900)  norepinephrine, 0.02-0.3 mcg/kg/min, Last Rate: 0.06  mcg/kg/min (11/12/24 0915)        Data Review:   I reviewed the patient's medications and new clinical results.    COVID19   Date Value Ref Range Status   11/10/2024 Not Detected Not Detected - Ref. Range Final         Lab Results   Component Value Date    CALCIUM 8.6 11/12/2024    PHOS 6.7 (H) 11/12/2024    MG 2.2 11/12/2024    MG 1.9 11/11/2024    MG 2.6 06/21/2024     Results from last 7 days   Lab Units 11/12/24  0507 11/11/24  1857 11/11/24  0400 11/10/24  1605 11/10/24  1539 11/10/24  1420 11/10/24  1002   SODIUM mmol/L 132* 133* 130*  --   --   --  131*   POTASSIUM mmol/L 4.3 4.2 6.0*  --   --  6.2* 4.6   CHLORIDE mmol/L 92* 93* 96*  --   --   --  87*   CO2 mmol/L 17.6* 21.1* 17.0*  --   --   --  14.0*   BUN mg/dL 31* 20 44*  --   --   --  56*   CREATININE mg/dL 6.35* 5.09* 8.49*  --   --   --  11.59*   CALCIUM mg/dL 8.6 9.3 7.5*  --   --   --  9.6   BILIRUBIN mg/dL 0.4  --   --   --   --   --  0.6   ALK PHOS U/L 64  --   --   --   --   --  58   ALT (SGPT) U/L 17  --   --   --   --   --  9   AST (SGOT) U/L 51*  --   --   --   --   --  22   GLUCOSE mg/dL 141* 142* 130*  --   --   --  118*   WBC 10*3/mm3 7.94  --  12.26*  --   --   --  16.70*   HEMOGLOBIN g/dL 10.1*  --  11.0*  --   --   --  12.0   HEMOGLOBIN, POC g/dL  --   --   --  10.5*   < >  --   --    PLATELETS 10*3/mm3 120*  --  137*  --   --   --  162   INR   --   --   --   --   --   --  2.36*   PROCALCITONIN ng/mL  --   --   --   --   --  40.40* 37.80*    < > = values in this interval not displayed.     Results from last 7 days   Lab Units 11/11/24  0350 11/10/24  1245 11/10/24  1229   BLOODCX   --  Staphylococcus aureus* Staphylococcus aureus*   RESPCX  Rejected  --   --    BCIDPCR   --  Staph aureus. mecA/C and MREJ (methicillin resistance gene) NOT detected. Identification by BCID2 PCR*  --          Results from last 7 days   Lab Units 11/10/24  1745 11/10/24  1420   HSTROP T ng/L 689* 239*     Results from last 7 days   Lab Units 11/10/24  3898  11/10/24  1700 11/10/24  1439 11/10/24  1215   PH, ARTERIAL pH units 7.284* 7.181* 7.140* 7.336*   PCO2, ARTERIAL mm Hg 36.1 46.7* 64.5* 38.9   PO2 ART mm Hg 189.7* 84.0 86.9 262.8*   O2 SATURATION ART % 99.5* 93.1 92.6 99.8*   MODALITY  Adult Vent Adult Vent Adult Vent Adult Vent            ASSESSMENT:   Takotsubo cardiomyopathy caused by sepsis  Staphylococcal septicemia, present on admission  Acute pulmonary edema  Acute hypoxic respiratory failure  Pulmonary edema, volume overload  Acute metabolic encephalopathy  Fever  Pneumonia  Abnormal bladder appearance on CT  Hyponatremia  Metabolic acidosis  End-stage renal disease on hemodialysis  Lactic acidosis  Hypertensive emergency  Anemia of chronic disease  Thrombocytopenia due to sepsis      PLAN:  During spontaneous awakening trial patient has normal neurologic exam.  Try spontaneous breathing trial and try to extubate.  Switch to cefazolin for bacteremia with Staphylococcus.  Discussed with Dr. Villafana from infectious diseases.  Patient may need excision of her AV fistula.  Nephrology following along, requires hemodialysis 3 times a week.  Remove arterial femoral line.  Leave central venous femoral line in place since the patient has no other IV access.  She is currently off of IV pressors, but is going to need a lot of other procedures and have a prolonged hospital stay, therefore we need central line in place.  She has bilateral upper extremity AV fistulas.  May undergo AV fistula excision at this time and may need temporary hemodialysis catheter placed by vascular surgery.        Martin Dinh MD  11/12/2024

## 2024-11-12 NOTE — PROGRESS NOTES
ID note for sepsis  S: on levo. Remains intubated but on sbt. Fevers improved.    No Known Allergies    Physical Exam:   Vital Signs   Temp:  [98.2 °F (36.8 °C)-100.6 °F (38.1 °C)] 99.5 °F (37.5 °C)  Heart Rate:  [] 99  Resp:  [20-24] 20  BP: (141)/(74) 141/74  Arterial Line BP: ()/(51-93) 137/81  FiO2 (%):  [40 %-99 %] 40 %    GENERAL: Ill-appearing  HEENT: Oropharynx with ET tube in place. Hearing is unable to assess  EYES:  No conjunctival injection. No lid lag.   LUNGS normal respiratory effort.   GI: Soft, nontender, nondistended. No appreciable organomegaly.   SKIN: Warm and dry without cutaneous eruptions; R arm swollen and ttp  PSYCHIATRIC: Sedated, no purposeful movements over sedation for me      Results Review:  Cr 6.35  K 4, na 132  WBC 7.9  11/10 BCx 2/2 MSSA  11/10 MRSA, RPP neg  11/12 BCx P    A/p  Septic Shock  MSSA septicemia  Acute hypoxic resp failure  ESRD  DM2, continue glycemic control efforts to prevent/control infectious complications  Suspected right arm brachial artery to axillary vein graft infection    Pt with septic shock from MSSA with stress induced cardiomyopathy.  AVF duplex RUE did show fluid collection and that is most likely source of infection given her complaints of pain there.  We will ask for vascular to evaluate..  Cont renally dosed cefazolin 1g IV q24  Repeat bcx pending  D/w Dr Dinh re impression and plans

## 2024-11-12 NOTE — PROGRESS NOTES
[facility] Progress Note      Patient Name: Sunni Mcneil  : 1975  MRN: [id number]  Primary Care Physician:  Regla Ying APRN  Date of admission: 11/10/2024    Subjective     Interval History:   Follow-up end-stage renal disease    The patient is more awake and alert today with plan for extubation, vital signs much improved, she had dialysis yesterday without any difficulties.    Review of Systems:   Not obtainable    Objective     Vitals:   Temp:  [98.2 °F (36.8 °C)-100.6 °F (38.1 °C)] 99.5 °F (37.5 °C)  Heart Rate:  [] 115  Resp:  [20-24] 20  BP: (141)/(74) 141/74  Arterial Line BP: ()/(51-93) 137/81  Flow (L/min) (Oxygen Therapy):  [2] 2  FiO2 (%):  [40 %-99 %] 40 %    Intake/Output Summary (Last 24 hours) at 2024 1042  Last data filed at 2024 0900  Gross per 24 hour   Intake 2927.43 ml   Output 0 ml   Net 2927.43 ml       Physical Exam:    General Appearance: On the ventilator, more awake, chronically ill, no acute distress  Skin: warm and dry  HEENT: Orally intubated  Neck: No JVD  Lungs: Bilateral rhonchi, unlabored breathing effort  Heart: RRR, no rub  Abdomen: soft, n normal, nondistended  : no palpable bladder  Extremities: no edema, cyanosis or clubbing, she has functional AV fistula in the right upper extremity with some aneurysmal dilatation with good thrill and bruit  Neuro: Moving all extremities    Scheduled Meds:     ceFAZolin, 1,000 mg, Intravenous, Q24H  chlorhexidine, 15 mL, Mouth/Throat, Q12H  famotidine, 20 mg, Intravenous, Daily  insulin regular, 3-14 Units, Subcutaneous, Q6H  LORazepam, 2 mg, Intravenous, Once  LORazepam, 0.5 mg, Oral, Q12H  mupirocin, 1 Application, Each Nare, BID  senna-docusate sodium, 2 tablet, Oral, BID  sodium chloride, 10 mL, Intravenous, Q12H  sodium chloride, 10 mL, Intravenous, Q12H  sodium chloride, 10 mL, Intravenous, Q12H  sodium chloride, 10 mL, Intravenous, Q12H      IV Meds:   dexmedetomidine,  0.2-1.5 mcg/kg/hr, Last Rate: 0.2 mcg/kg/hr (11/12/24 0900)  norepinephrine, 0.02-0.3 mcg/kg/min, Last Rate: 0.06 mcg/kg/min (11/12/24 0915)        Results Reviewed:   I have personally reviewed the results from the time of this admission to 11/12/2024 10:42 EST     Results from last 7 days   Lab Units 11/12/24  0507 11/11/24  1857 11/11/24  0400 11/10/24  1420 11/10/24  1002   SODIUM mmol/L 132* 133* 130*  --  131*   POTASSIUM mmol/L 4.3 4.2 6.0*   < > 4.6   CHLORIDE mmol/L 92* 93* 96*  --  87*   CO2 mmol/L 17.6* 21.1* 17.0*  --  14.0*   BUN mg/dL 31* 20 44*  --  56*   CREATININE mg/dL 6.35* 5.09* 8.49*  --  11.59*   CALCIUM mg/dL 8.6 9.3 7.5*  --  9.6   BILIRUBIN mg/dL 0.4  --   --   --  0.6   ALK PHOS U/L 64  --   --   --  58   ALT (SGPT) U/L 17  --   --   --  9   AST (SGOT) U/L 51*  --   --   --  22   GLUCOSE mg/dL 141* 142* 130*  --  118*    < > = values in this interval not displayed.       Estimated Creatinine Clearance: 9.7 mL/min (A) (by C-G formula based on SCr of 6.35 mg/dL (H)).    Results from last 7 days   Lab Units 11/12/24  0507 11/11/24  0400   MAGNESIUM mg/dL 2.2 1.9   PHOSPHORUS mg/dL 6.7* 6.7*             Results from last 7 days   Lab Units 11/12/24  0507 11/11/24  0400 11/10/24  1605 11/10/24  1602 11/10/24  1539 11/10/24  1002   WBC 10*3/mm3 7.94 12.26*  --   --   --  16.70*   HEMOGLOBIN g/dL 10.1* 11.0*  --   --   --  12.0   HEMOGLOBIN, POC g/dL  --   --  10.5* 10.9* 11.9*  --    PLATELETS 10*3/mm3 120* 137*  --   --   --  162       Results from last 7 days   Lab Units 11/10/24  1002   INR  2.36*       Assessment / Plan     ASSESSMENT:  ESRD on HD, MWF, via R AV fistula, she had dialysis yesterday with improved potassium down to 4.3 and sodium is 132 patient appears to be euvolemic   high anion gap metabolic acidosis, secondary to ESRD and lactic acidosis  Hyperkalemia, resolved with dialysis potassium down to 4.3  Septic shock on vasopressors  Acute respiratory failure currently on the  ventilator, plan for extubation later today  Anemia with CKD, receives long-acting TERESA outpatient, hemoglobin 11  Type II DM with CKD, managed by primary team  Gram-positive septicemia  Secondary hyperparathyroidism of renal etiology    PLAN:  Hemodialysis tomorrow  Continue the same treatment  Surveillance labs    I reviewed the chart and other providers notes, reviewed labs.  I discussed the case with the patient's daughter at the bedside also with her nurse.  Also I discussed the case with Dr. Dinh  Copied text in this note has been reviewed and is accurate as of 11/12/24.       Thank you for involving us in the care of Sunni Mcneil.  Please feel free to call with any questions.    Carlos Manuel Gonzales MD  11/12/24  10:42 [facility]    [facility]  [phone]    Please note that portions of this note were completed with a voice recognition program.

## 2024-11-12 NOTE — PLAN OF CARE
Goal Outcome Evaluation:  Plan of Care Reviewed With: patient           Outcome Evaluation: FEES completed. Recommend NPO, alternate means of nutrition and meds alternate route. Recommend ice chips sparingly s/p oral care. SLP to follow for re-evaluation as appropriate and coughing and vocal quality improved.    Anticipated Discharge Disposition (SLP): anticipate therapy at next level of care          SLP Swallowing Diagnosis: mild-moderate, oral dysphagia, pharyngeal dysphagia (11/12/24 5084)

## 2024-11-13 LAB
ALBUMIN SERPL-MCNC: 2.9 G/DL (ref 3.5–5.2)
ANION GAP SERPL CALCULATED.3IONS-SCNC: 17.1 MMOL/L (ref 5–15)
BACTERIA SPEC AEROBE CULT: ABNORMAL
BACTERIA SPEC AEROBE CULT: ABNORMAL
BASOPHILS # BLD AUTO: 0.02 10*3/MM3 (ref 0–0.2)
BASOPHILS NFR BLD AUTO: 0.3 % (ref 0–1.5)
BUN SERPL-MCNC: 45 MG/DL (ref 6–20)
BUN/CREAT SERPL: 5.6 (ref 7–25)
CALCIUM SPEC-SCNC: 8.3 MG/DL (ref 8.6–10.5)
CHLORIDE SERPL-SCNC: 98 MMOL/L (ref 98–107)
CO2 SERPL-SCNC: 19.9 MMOL/L (ref 22–29)
CREAT SERPL-MCNC: 8.04 MG/DL (ref 0.57–1)
DEPRECATED RDW RBC AUTO: 56 FL (ref 37–54)
EGFRCR SERPLBLD CKD-EPI 2021: 5.7 ML/MIN/1.73
EOSINOPHIL # BLD AUTO: 0.15 10*3/MM3 (ref 0–0.4)
EOSINOPHIL NFR BLD AUTO: 2 % (ref 0.3–6.2)
ERYTHROCYTE [DISTWIDTH] IN BLOOD BY AUTOMATED COUNT: 17 % (ref 12.3–15.4)
GLUCOSE BLDC GLUCOMTR-MCNC: 110 MG/DL (ref 70–130)
GLUCOSE BLDC GLUCOMTR-MCNC: 94 MG/DL (ref 70–130)
GLUCOSE BLDC GLUCOMTR-MCNC: 96 MG/DL (ref 70–130)
GLUCOSE SERPL-MCNC: 82 MG/DL (ref 65–99)
GRAM STN SPEC: ABNORMAL
HCT VFR BLD AUTO: 26 % (ref 34–46.6)
HGB BLD-MCNC: 8.5 G/DL (ref 12–15.9)
IMM GRANULOCYTES # BLD AUTO: 0.06 10*3/MM3 (ref 0–0.05)
IMM GRANULOCYTES NFR BLD AUTO: 0.8 % (ref 0–0.5)
ISOLATED FROM: ABNORMAL
ISOLATED FROM: ABNORMAL
LYMPHOCYTES # BLD AUTO: 0.52 10*3/MM3 (ref 0.7–3.1)
LYMPHOCYTES NFR BLD AUTO: 6.9 % (ref 19.6–45.3)
MCH RBC QN AUTO: 29.4 PG (ref 26.6–33)
MCHC RBC AUTO-ENTMCNC: 32.7 G/DL (ref 31.5–35.7)
MCV RBC AUTO: 90 FL (ref 79–97)
MONOCYTES # BLD AUTO: 0.62 10*3/MM3 (ref 0.1–0.9)
MONOCYTES NFR BLD AUTO: 8.2 % (ref 5–12)
NEUTROPHILS NFR BLD AUTO: 6.15 10*3/MM3 (ref 1.7–7)
NEUTROPHILS NFR BLD AUTO: 81.8 % (ref 42.7–76)
NRBC BLD AUTO-RTO: 0 /100 WBC (ref 0–0.2)
PHOSPHATE SERPL-MCNC: 6 MG/DL (ref 2.5–4.5)
PLATELET # BLD AUTO: 119 10*3/MM3 (ref 140–450)
PMV BLD AUTO: 11.1 FL (ref 6–12)
POTASSIUM SERPL-SCNC: 3.8 MMOL/L (ref 3.5–5.2)
RBC # BLD AUTO: 2.89 10*6/MM3 (ref 3.77–5.28)
SODIUM SERPL-SCNC: 135 MMOL/L (ref 136–145)
WBC NRBC COR # BLD AUTO: 7.52 10*3/MM3 (ref 3.4–10.8)

## 2024-11-13 PROCEDURE — 25010000002 ONDANSETRON PER 1 MG: Performed by: INTERNAL MEDICINE

## 2024-11-13 PROCEDURE — 99232 SBSQ HOSP IP/OBS MODERATE 35: CPT | Performed by: SURGERY

## 2024-11-13 PROCEDURE — 99233 SBSQ HOSP IP/OBS HIGH 50: CPT | Performed by: INTERNAL MEDICINE

## 2024-11-13 PROCEDURE — 25010000002 CEFAZOLIN PER 500 MG: Performed by: INTERNAL MEDICINE

## 2024-11-13 PROCEDURE — 99232 SBSQ HOSP IP/OBS MODERATE 35: CPT | Performed by: STUDENT IN AN ORGANIZED HEALTH CARE EDUCATION/TRAINING PROGRAM

## 2024-11-13 PROCEDURE — 82948 REAGENT STRIP/BLOOD GLUCOSE: CPT

## 2024-11-13 PROCEDURE — 85025 COMPLETE CBC W/AUTO DIFF WBC: CPT | Performed by: INTERNAL MEDICINE

## 2024-11-13 PROCEDURE — 80069 RENAL FUNCTION PANEL: CPT | Performed by: INTERNAL MEDICINE

## 2024-11-13 PROCEDURE — 25010000002 ALTEPLASE 2 MG RECONSTITUTED SOLUTION: Performed by: INTERNAL MEDICINE

## 2024-11-13 RX ORDER — BISACODYL 5 MG/1
5 TABLET, DELAYED RELEASE ORAL DAILY PRN
Status: DISCONTINUED | OUTPATIENT
Start: 2024-11-13 | End: 2024-11-15 | Stop reason: HOSPADM

## 2024-11-13 RX ORDER — BISACODYL 10 MG
10 SUPPOSITORY, RECTAL RECTAL DAILY PRN
Status: DISCONTINUED | OUTPATIENT
Start: 2024-11-13 | End: 2024-11-15 | Stop reason: HOSPADM

## 2024-11-13 RX ORDER — POLYETHYLENE GLYCOL 3350 17 G/17G
17 POWDER, FOR SOLUTION ORAL DAILY PRN
Status: DISCONTINUED | OUTPATIENT
Start: 2024-11-13 | End: 2024-11-15 | Stop reason: HOSPADM

## 2024-11-13 RX ORDER — METOPROLOL SUCCINATE 25 MG/1
25 TABLET, EXTENDED RELEASE ORAL
Status: DISCONTINUED | OUTPATIENT
Start: 2024-11-13 | End: 2024-11-15 | Stop reason: HOSPADM

## 2024-11-13 RX ORDER — AMOXICILLIN 250 MG
2 CAPSULE ORAL NIGHTLY PRN
Status: DISCONTINUED | OUTPATIENT
Start: 2024-11-13 | End: 2024-11-15 | Stop reason: HOSPADM

## 2024-11-13 RX ORDER — SACCHAROMYCES BOULARDII 250 MG
250 CAPSULE ORAL 2 TIMES DAILY
Status: DISCONTINUED | OUTPATIENT
Start: 2024-11-13 | End: 2024-11-15 | Stop reason: HOSPADM

## 2024-11-13 RX ADMIN — CHLORHEXIDINE GLUCONATE 15 ML: 1.2 RINSE ORAL at 22:58

## 2024-11-13 RX ADMIN — Medication 10 ML: at 10:00

## 2024-11-13 RX ADMIN — METOPROLOL SUCCINATE 25 MG: 25 TABLET, EXTENDED RELEASE ORAL at 10:21

## 2024-11-13 RX ADMIN — CEFAZOLIN 1000 MG: 1 INJECTION, POWDER, FOR SOLUTION INTRAMUSCULAR; INTRAVENOUS at 18:58

## 2024-11-13 RX ADMIN — ALTEPLASE: 2.2 INJECTION, POWDER, LYOPHILIZED, FOR SOLUTION INTRAVENOUS at 19:49

## 2024-11-13 RX ADMIN — Medication 10 ML: at 23:00

## 2024-11-13 RX ADMIN — FAMOTIDINE 20 MG: 10 INJECTION INTRAVENOUS at 09:48

## 2024-11-13 RX ADMIN — Medication 10 ML: at 09:59

## 2024-11-13 RX ADMIN — ONDANSETRON 4 MG: 2 INJECTION, SOLUTION INTRAMUSCULAR; INTRAVENOUS at 19:25

## 2024-11-13 RX ADMIN — MUPIROCIN 1 APPLICATION: 20 OINTMENT TOPICAL at 23:00

## 2024-11-13 RX ADMIN — Medication 250 MG: at 23:00

## 2024-11-13 NOTE — PROGRESS NOTES
ID note for sepsis  S: Transferred to floor. No sig pain. AF. Tolerating abx.    Physical Exam:   Vital Signs   Temp:  [98.1 °F (36.7 °C)-100 °F (37.8 °C)] 99.5 °F (37.5 °C)  Heart Rate:  [100-115] 107  Resp:  [18-20] 20  BP: (125-149)/() 149/89  Arterial Line BP: (111-113)/(68-71) 111/68    GENERAL: Ill-appearing  EYES:  No conjunctival injection. No lid lag.   LUNGS normal respiratory effort.   GI: Soft, nontender, nondistended. No appreciable organomegaly.   SKIN: Warm and dry without cutaneous eruptions;          Results Review:  Wbc 7.5  Cr 8.04  Glc     11/10 BCx 2/2 MSSA  11/10 MRSA, RPP neg  11/12 BCx NGTD    A/p  Septic Shock  MSSA septicemia  Acute hypoxic resp failure  ESRD  DM2, continue glycemic control efforts to prevent/control infectious complications  Suspected right arm brachial artery to axillary vein graft infection    Pt with septic shock from MSSA with stress induced cardiomyopathy.  AVF duplex RUE did show fluid collection and that is most likely source of infection given her complaints of pain there.    Discussed with vascular surgery and they are planning to monitor on antibiotic therapy.  No intervention at this point.  Cont renally dosed cefazolin 1g IV q24 while inpatient; at discharge we will do antibiotics with dialysis cefazolin 2 g after Monday Wednesday session and 3 g after Friday session.  Plan 6 weeks of antibiotics, stop date 12/22  Repeat bcx ngtd  D/w Dr Gaviria re impression and plans

## 2024-11-13 NOTE — PROGRESS NOTES
[facility] Progress Note      Patient Name: Sunni Mcneil  : 1975  MRN: [id number]  Primary Care Physician:  Regla Ying APRN  Date of admission: 11/10/2024    Subjective     Interval History:   Follow-up ESRD.  Tmax 100.  Tachycardic.  Blood pressure rising.  Cardiology has started low-dose Toprol.  On 2 L nasal cannula.  On dialysis.  .  Blood pressure 142 systolic.  Throat sore.  Hungry.  Did not pass swallow study yesterday.  Having loose stools.  Review of Systems:   As noted above    Objective     Vitals:   Temp:  [98.1 °F (36.7 °C)-100 °F (37.8 °C)] 99.5 °F (37.5 °C)  Heart Rate:  [100-115] 107  Resp:  [18-20] 20  BP: (125-149)/() 149/89  Flow (L/min) (Oxygen Therapy):  [2] 2    Intake/Output Summary (Last 24 hours) at 2024 1249  Last data filed at 2024 1500  Gross per 24 hour   Intake 0 ml   Output 0 ml   Net 0 ml       Physical Exam:    General Appearance: alert, oriented x 3, no acute distress   Skin: warm and dry  HEENT: Core track in place.  Oral mucosa dry, nonicteric sclera  Neck: supple, no JVD  Lungs: Clear to auscultation bilaterally.  Heart: RRR, tachycardic normal S1 and S2  Abdomen: soft, nontender, nondistended. +bs  : no palpable bladder  Extremities: Right upper extremity AV fistula.  Neuro: normal speech and mental status     Scheduled Meds:     ceFAZolin, 1,000 mg, Intravenous, Q24H  chlorhexidine, 15 mL, Mouth/Throat, Q12H  famotidine, 20 mg, Intravenous, Daily  insulin regular, 3-14 Units, Subcutaneous, Q6H  LORazepam, 2 mg, Intravenous, Once  metoprolol succinate XL, 25 mg, Oral, Q24H  mupirocin, 1 Application, Each Nare, BID  senna-docusate sodium, 2 tablet, Oral, BID  sodium chloride, 10 mL, Intravenous, Q12H  sodium chloride, 10 mL, Intravenous, Q12H  sodium chloride, 10 mL, Intravenous, Q12H  sodium chloride, 10 mL, Intravenous, Q12H      IV Meds:   dexmedetomidine, 0.2-1.5 mcg/kg/hr, Last Rate: Stopped (24  1205)  norepinephrine, 0.02-0.3 mcg/kg/min, Last Rate: Stopped (11/12/24 1205)        Results Reviewed:   I have personally reviewed the results from the time of this admission to 11/13/2024 12:49 EST     Results from last 7 days   Lab Units 11/13/24  0616 11/12/24  0507 11/11/24  1857 11/10/24  1420 11/10/24  1002   SODIUM mmol/L 135* 132* 133*   < > 131*   POTASSIUM mmol/L 3.8 4.3 4.2   < > 4.6   CHLORIDE mmol/L 98 92* 93*   < > 87*   CO2 mmol/L 19.9* 17.6* 21.1*   < > 14.0*   BUN mg/dL 45* 31* 20   < > 56*   CREATININE mg/dL 8.04* 6.35* 5.09*   < > 11.59*   CALCIUM mg/dL 8.3* 8.6 9.3   < > 9.6   BILIRUBIN mg/dL  --  0.4  --   --  0.6   ALK PHOS U/L  --  64  --   --  58   ALT (SGPT) U/L  --  17  --   --  9   AST (SGOT) U/L  --  51*  --   --  22   GLUCOSE mg/dL 82 141* 142*   < > 118*    < > = values in this interval not displayed.       Estimated Creatinine Clearance: 8 mL/min (A) (by C-G formula based on SCr of 8.04 mg/dL (H)).    Results from last 7 days   Lab Units 11/13/24  0616 11/12/24  0507 11/11/24  0400   MAGNESIUM mg/dL  --  2.2 1.9   PHOSPHORUS mg/dL 6.0* 6.7* 6.7*             Results from last 7 days   Lab Units 11/13/24  0616 11/12/24  0507 11/11/24  0400 11/10/24  1605 11/10/24  1602 11/10/24  1539 11/10/24  1002   WBC 10*3/mm3 7.52 7.94 12.26*  --   --   --  16.70*   HEMOGLOBIN g/dL 8.5* 10.1* 11.0*  --   --   --  12.0   HEMOGLOBIN, POC g/dL  --   --   --  10.5* 10.9*   < >  --    PLATELETS 10*3/mm3 119* 120* 137*  --   --   --  162    < > = values in this interval not displayed.       Results from last 7 days   Lab Units 11/10/24  1002   INR  2.36*       Assessment / Plan     ASSESSMENT:  ESRD.  Dialysis today.  4-hour treatment with 3 potassium bath.  MSSA bacteremia.  Duplex of the right upper extremity AV fistula with a fluid collection thought most likely source of infection.  Vascular surgery has evaluated very thoroughly and planning conservative measures for now.  On Kefzol.  6 weeks planned  with stop date 12/22/2024.  Hypertension.  Low-dose Toprol started.  Diabetes mellitus type 2  Stress induced cardiomyopathy with normal coronary arteries.  Basal to mid anterior, inferior hypokinesis.  6.  Anemia of CKD.  7.  Mild thrombocytopenia.  Continue IV Pepcid.  PLAN:  Hemodialysis today.    Thank you for involving us in the care of Sunni Mcneil.  Please feel free to call with any questions.    Addie Watt MD  11/13/24  12:49 EST    [facility]  [phone]    Please note that portions of this note were completed with a voice recognition program.

## 2024-11-13 NOTE — PLAN OF CARE
Goal Outcome Evaluation:              Outcome Evaluation: transferred to floor from ICU.  Alert and oriented.  Family at bedside.  On RA.  Occasional congested cough.  NGT in place.  KUB ordered for placement prior to restarting tube feeds.  No resp distress

## 2024-11-13 NOTE — PROGRESS NOTES
Name: Sunni Mcneil ADMIT: 11/10/2024   : 1975  PCP: Regla Ying APRN    MRN: [id number] LOS: 3 days   AGE/SEX: 49 y.o. female  ROOM: [address]    Chief Complaint   Patient presents with    Anxiety    Altered Mental Status     CC: Sepsis  Subjective     49 y.o. female with sepsis and a possible source from the right arm AV shunt.  Unfortunately at this time we really do not have a site within the shunt is pointing.  At this juncture removing the entire shunt is a very large operation which will leave her right arm and not usable for dialysis long-term.  We are opting for conservative approach to either get the infection to clear or at least localize itself for more directed treatment.    Review of Systems denies pain    Objective     Scheduled Medications:   ceFAZolin, 1,000 mg, Intravenous, Q24H  chlorhexidine, 15 mL, Mouth/Throat, Q12H  famotidine, 20 mg, Intravenous, Daily  insulin regular, 3-14 Units, Subcutaneous, Q6H  LORazepam, 2 mg, Intravenous, Once  mupirocin, 1 Application, Each Nare, BID  senna-docusate sodium, 2 tablet, Oral, BID  sodium chloride, 10 mL, Intravenous, Q12H  sodium chloride, 10 mL, Intravenous, Q12H  sodium chloride, 10 mL, Intravenous, Q12H  sodium chloride, 10 mL, Intravenous, Q12H        Active Infusions:  dexmedetomidine, 0.2-1.5 mcg/kg/hr, Last Rate: Stopped (24 1205)  norepinephrine, 0.02-0.3 mcg/kg/min, Last Rate: Stopped (24 1205)        As Needed Medications:    acetaminophen **OR** acetaminophen    senna-docusate sodium **AND** polyethylene glycol **AND** bisacodyl **AND** bisacodyl    Calcium Replacement - Follow Nurse / BPA Driven Protocol    dextrose    dextrose    fentaNYL citrate (PF)    glucagon (human recombinant)    Magnesium Standard Dose Replacement - Follow Nurse / BPA Driven Protocol    nitroglycerin    ondansetron ODT **OR** ondansetron    Phosphorus Replacement - Follow Nurse / BPA Driven Protocol    Potassium Replacement -  Follow Nurse / BPA Driven Protocol    [COMPLETED] Insert Peripheral IV **AND** sodium chloride    sodium chloride    sodium chloride    sodium chloride    sodium chloride    sodium chloride    Vital Signs  Vital Signs Patient Vitals for the past 24 hrs:   BP Temp Temp src Pulse Resp SpO2 Weight   11/13/24 0725 149/89 99.5 °F (37.5 °C) Oral 107 20 98 % --   11/13/24 0418 -- -- -- -- -- -- 80.8 kg (178 lb 2.1 oz)   11/13/24 0357 146/88 98.1 °F (36.7 °C) Oral 100 20 97 % --   11/12/24 2322 141/81 99 °F (37.2 °C) Oral 103 20 98 % --   11/12/24 1926 128/75 100 °F (37.8 °C) Oral 111 20 92 % --   11/12/24 1500 (!) 141/128 -- -- 115 20 99 % --   11/12/24 1400 138/89 98.8 °F (37.1 °C) Oral 110 20 97 % --   11/12/24 1300 125/84 98.6 °F (37 °C) Oral 105 18 100 % --   11/12/24 1200 129/83 98.8 °F (37.1 °C) Oral 105 20 100 % --   11/12/24 1107 -- 98.4 °F (36.9 °C) Oral -- -- -- --   11/12/24 1100 -- 98.6 °F (37 °C) Oral 108 18 99 % --   11/12/24 1000 -- 98.6 °F (37 °C) Oral 108 20 99 % --   11/12/24 0950 -- -- -- 115 -- 99 % --   11/12/24 0900 -- 99.5 °F (37.5 °C) Oral 99 20 100 % --     Vital Signs (range)  Temp:  [98.1 °F (36.7 °C)-100 °F (37.8 °C)] 99.5 °F (37.5 °C)  Heart Rate:  [] 107  Resp:  [18-20] 20  BP: (125-149)/() 149/89  Arterial Line BP: (111-137)/(68-81) 111/68  FiO2 (%):  [40 %] 40 %  I/O:  I/O last 3 completed shifts:  In: 2492.2 [I.V.:2070.2; Other:60; NG/GT:362]  Out: 0   I/O:   Intake/Output Summary (Last 24 hours) at 11/13/2024 0827  Last data filed at 11/12/2024 1500  Gross per 24 hour   Intake 164.19 ml   Output 0 ml   Net 164.19 ml     BMI:  Body mass index is 34.79 kg/m².    Physical Exam:  Physical Exam   Awake and alert  Right arm without heat redness or pain.  Shunt patent    Results Review:     CBC    Results from last 7 days   Lab Units 11/13/24  0616 11/12/24  0507 11/11/24  0400 11/10/24  1605 11/10/24  1602 11/10/24  1539 11/10/24  1002   WBC 10*3/mm3 7.52 7.94 12.26*  --   --   --   16.70*   HEMOGLOBIN g/dL 8.5* 10.1* 11.0*  --   --   --  12.0   HEMOGLOBIN, POC g/dL  --   --   --  10.5* 10.9* 11.9*  --    PLATELETS 10*3/mm3 119* 120* 137*  --   --   --  162     BMP   Results from last 7 days   Lab Units 11/13/24  0616 11/12/24  0507 11/11/24  1857 11/11/24  0400 11/10/24  1420 11/10/24  1002   SODIUM mmol/L 135* 132* 133* 130*  --  131*   POTASSIUM mmol/L 3.8 4.3 4.2 6.0* 6.2* 4.6   CHLORIDE mmol/L 98 92* 93* 96*  --  87*   CO2 mmol/L 19.9* 17.6* 21.1* 17.0*  --  14.0*   BUN mg/dL 45* 31* 20 44*  --  56*   CREATININE mg/dL 8.04* 6.35* 5.09* 8.49*  --  11.59*   GLUCOSE mg/dL 82 141* 142* 130*  --  118*   MAGNESIUM mg/dL  --  2.2  --  1.9  --   --    PHOSPHORUS mg/dL 6.0* 6.7*  --  6.7*  --   --      Cr Clearance Estimated Creatinine Clearance: 8 mL/min (A) (by C-G formula based on SCr of 8.04 mg/dL (H)).  Coag   Results from last 7 days   Lab Units 11/10/24  1002   INR  2.36*   APTT seconds 37.5*     HbA1C   Lab Results   Component Value Date    HGBA1C 4.3 (L) 08/14/2024    HGBA1C 4.8 05/08/2024    HGBA1C 4.4 04/02/2024     Blood Glucose   Glucose   Date/Time Value Ref Range Status   11/13/2024 0632 94 70 - 130 mg/dL Final   11/12/2024 2331 91 70 - 130 mg/dL Final   11/12/2024 1725 91 70 - 130 mg/dL Final   11/12/2024 1127 103 70 - 130 mg/dL Final   11/11/2024 2325 118 70 - 130 mg/dL Final   11/11/2024 1946 108 70 - 130 mg/dL Final   11/11/2024 1729 92 70 - 130 mg/dL Final   11/11/2024 1708 68 (L) 70 - 130 mg/dL Final     Infection   Results from last 7 days   Lab Units 11/12/24  0507 11/11/24  0350 11/10/24  1420 11/10/24  1245 11/10/24  1229 11/10/24  1002   BLOODCX  No growth at 24 hours  No growth at 24 hours  --   --  Staphylococcus aureus* Staphylococcus aureus*  --    RESPCX   --  Rejected  --   --   --   --    BCIDPCR   --   --   --  Staph aureus. mecA/C and MREJ (methicillin resistance gene) NOT detected. Identification by BCID2 PCR*  --   --    PROCALCITONIN ng/mL  --   --  40.40*  --    --  37.80*     CMP   Results from last 7 days   Lab Units 11/13/24  0616 11/12/24  0507 11/11/24  1857 11/11/24  0400 11/10/24  1420 11/10/24  1002   SODIUM mmol/L 135* 132* 133* 130*  --  131*   POTASSIUM mmol/L 3.8 4.3 4.2 6.0* 6.2* 4.6   CHLORIDE mmol/L 98 92* 93* 96*  --  87*   CO2 mmol/L 19.9* 17.6* 21.1* 17.0*  --  14.0*   BUN mg/dL 45* 31* 20 44*  --  56*   CREATININE mg/dL 8.04* 6.35* 5.09* 8.49*  --  11.59*   GLUCOSE mg/dL 82 141* 142* 130*  --  118*   ALBUMIN g/dL 2.9* 2.9*  --  3.1*  --  4.0   BILIRUBIN mg/dL  --  0.4  --   --   --  0.6   ALK PHOS U/L  --  64  --   --   --  58   AST (SGOT) U/L  --  51*  --   --   --  22   ALT (SGPT) U/L  --  17  --   --   --  9     Radiology(recent) XR Abdomen KUB    Result Date: 11/12/2024   As described.   This report was finalized on 11/12/2024 7:07 PM by Dr. David Montoya M.D on Workstation: [id number]       Assessment & Plan     Assessment & Plan      Pulmonary edema    ST elevation myocardial infarction (STEMI)      49 y.o. female with MSSA sepsis from source felt to be most likely a segment of her shunt but currently the shunt has no evidence of redness heat or pain and there is nowhere to isolate the possible infection within the shunt.  This would require complete removal or long-term antibiotic therapy with hopes that it will either sterilize the shunt or force the infected segment to show itself for a more localized treatment.  We have opted for the latter she is going to undergo 6 weeks of antibiotics with her dialysis and then we will reassess with duplex.  She and her family are aware of the risks and benefits of this plan and agree.      Phu Gaviria MD  11/13/24  08:27 EST    Please call my office with any question: [phone]    Active Hospital Problems    Diagnosis  POA    **Pulmonary edema [J81.1]  Yes    ST elevation myocardial infarction (STEMI) [I21.3]  Unknown      Resolved Hospital Problems   No resolved problems to display.

## 2024-11-13 NOTE — CASE MANAGEMENT/SOCIAL WORK
Continued Stay Note  [facility]     Patient Name: Sunni Mcneil  MRN: [id number]  Today's Date: 11/13/2024    Admit Date: 11/10/2024    Plan: Home wtih family potentially HH/SNF pending course   Discharge Plan       Row Name 11/13/24 1440       Plan    Plan Home wtih family potentially HH/SNF pending course    Patient/Family in Agreement with Plan yes    N/A Quality & Resource List Comment CCP reviewed chart, discussed with primary RN, ST has not cleared for swallow evaluation, they are following for appropriateness. Orders received and entered for PT eval, to assess for dc needs. CCP will follow -Lisa MEEHAN                   Discharge Codes    No documentation.                 Expected Discharge Date and Time       Expected Discharge Date Expected Discharge Time    Nov 15, 2024               Lisa Kothari RN

## 2024-11-13 NOTE — NURSING NOTE
Femoral line assessed - brown lumen not providing blood return. Primary RN notified - cathflo ordered from pharmacy. IV team will continue to follow

## 2024-11-13 NOTE — NURSING NOTE
Hd completed. Pt talked into staying on tx the 4 hours ordered. Pt uf limited by cramps towards end of tx.   Vss. Uf net 1600ml. Post vss 131/97 Hr 61

## 2024-11-13 NOTE — PROGRESS NOTES
Patient out of ICU since yesterday.  Needs feeding tube, failed swallow study.  Request [facility] hospitalist service to take over care

## 2024-11-13 NOTE — PROGRESS NOTES
"    Patient Name: Sunni Mcneil  :1975  49 y.o.      Patient Care Team:  Regla Ying APRN as PCP - General (Nurse Practitioner)  Bo Hansen MD as Consulting Physician (Nephrology)  Damon Rooney MD as Surgeon (General Surgery)    Chief Complaint:   Sepsis, takotsubo    Interval History:   Extubated and transferred out of the ICU.  Currently feeling okay.    Objective   Vital Signs  Temp:  [98.1 °F (36.7 °C)-100 °F (37.8 °C)] 99.5 °F (37.5 °C)  Heart Rate:  [] 107  Resp:  [18-20] 20  BP: (125-149)/() 149/89  Arterial Line BP: (111-137)/(68-81) 111/68  FiO2 (%):  [40 %] 40 %    Intake/Output Summary (Last 24 hours) at 2024 0858  Last data filed at 2024 1500  Gross per 24 hour   Intake 164.19 ml   Output 0 ml   Net 164.19 ml     Flowsheet Rows      Flowsheet Row First Filed Value   Admission Height 152.4 cm (60\") Documented at 11/10/2024 1425   Admission Weight 75.3 kg (166 lb 0.1 oz) Documented at 11/10/2024 1042            GEN: Intubated  HEENT: NACT, NG tube in place  Lungs: CTAB anteriorly, no wheezes, rales or rhonchi  CV: normal rate, regular rhythm, normal S1, S2, no murmurs, +2 radial pulses b/l, no carotid bruit  Abdomen: soft, nontender, nondistended, NABS  Extremities: no edema  Skin: no rash, warm, dry  Heme/Lymph: no bruising      Results Review:    Results from last 7 days   Lab Units 24  0616   SODIUM mmol/L 135*   POTASSIUM mmol/L 3.8   CHLORIDE mmol/L 98   CO2 mmol/L 19.9*   BUN mg/dL 45*   CREATININE mg/dL 8.04*   GLUCOSE mg/dL 82   CALCIUM mg/dL 8.3*     Results from last 7 days   Lab Units 11/10/24  1745 11/10/24  1420   HSTROP T ng/L 689* 239*     Results from last 7 days   Lab Units 24  0616   WBC 10*3/mm3 7.52   HEMOGLOBIN g/dL 8.5*   HEMATOCRIT % 26.0*   PLATELETS 10*3/mm3 119*     Results from last 7 days   Lab Units 11/10/24  1002   INR  2.36*   APTT seconds 37.5*     Results from last 7 days   Lab Units 24  0507 "   MAGNESIUM mg/dL 2.2                   Medication Review:   ceFAZolin, 1,000 mg, Intravenous, Q24H  chlorhexidine, 15 mL, Mouth/Throat, Q12H  famotidine, 20 mg, Intravenous, Daily  insulin regular, 3-14 Units, Subcutaneous, Q6H  LORazepam, 2 mg, Intravenous, Once  mupirocin, 1 Application, Each Nare, BID  senna-docusate sodium, 2 tablet, Oral, BID  sodium chloride, 10 mL, Intravenous, Q12H  sodium chloride, 10 mL, Intravenous, Q12H  sodium chloride, 10 mL, Intravenous, Q12H  sodium chloride, 10 mL, Intravenous, Q12H         dexmedetomidine, 0.2-1.5 mcg/kg/hr, Last Rate: Stopped (11/12/24 1205)  norepinephrine, 0.02-0.3 mcg/kg/min, Last Rate: Stopped (11/12/24 1205)        Assessment & Plan   #Septic shock secondary to MSSA bacteremia  #Stress cardiomyopathy secondary to above  #ESRD on HD  #Hyperlipidemia  #Diabetes    49-year-old woman with ESRD on HD, hypertension, hyperlipidemia, diabetes who presented with altered mental status, was intubated in route for airway protection.  On arrival she was found to have abnormal EKGs and underwent emergent coronary angiography which revealed normal coronary arteries with basal to mid anterior and inferior hypokinesis consistent with atypical stress cardiomyopathy.    - Antibiotics changed to cover MSSA  - She is now slightly hypertensive, she was on clonidine and hydralazine the last time I saw her, for now we will start low-dose Toprol for hypertension and stress cardiomyopathy.  Can up-titrate antihypertensive regimen as needed    Luis Herzog MD, FACC, [facility]  11/13/24  08:58 EST

## 2024-11-13 NOTE — CONSULTS
Nutrition Services    Patient Name:  Sunni Mcenil  YOB: 1975  MRN: [id number]  Admit Date:  11/10/2024  Assessment Date:  11/13/24    Summary: TF follow up    Pt extubated and transferred to floor. Still with NG. S/p FEES yesterday with SLP. Recommended NPO with alternate means of nutrition. Will adjust TF rate now that propofol is off. Will defer water flushes to nephrology.      End Goal:    Novasource Renal at 35 mL/hr + 30 mLq 4 water flush      Calories  1540 kcals (96%)    Protein  70 g (102%)    Free water  547 mL   Flushes  180 mL     The above end goal rate is for 22 hrs/day to assume interruptions for ADLs. Water flushes adjusted based on clinical picture + Rx flushes/IV fluids    Specialized formula chosen r/t ESRD on HD, hyperkalemia    Labs reviewed: Na 132, K 4.3, BUN 31, Cr 6.35, P 6.7, AST 51, glu 141/103/91  Meds reviewed.    Plans/Recommendations:  Adjust and increase rate of novasource renal to 35mL/hr.   30 mL q 4 hour free water flushes.  Defer free water management to nephrology.  Monitor for tolerance and POC.    RD to continue to follow closely.     CLINICAL NUTRITION ASSESSMENT      Reason for Assessment Follow-up Protocol     Diagnosis/Problem   Pulmonary edema   Medical/Surgical History Past Medical History:   Diagnosis Date    Acid reflux     Anemia     WITH ESRD    Anxiety     Arthritis     Cough     related to fluid overload    Dependence on renal dialysis 05/17/2021    Depression     Diabetes mellitus     NO MEDICATIONS FOR    End stage renal disease 05/17/2021    ESRD on dialysis     NEYSINUS MWF    History of peritoneal dialysis     KIDNEY TRANSPLANT SEPT 2016     History of transfusion     BEEN A WHILE no reaction    Hypercalcemia     Hyperparathyroidism     Hypertension     Kidney disease     End-stage renal disease. TRANSPLANT    Kidney transplant recipient 09/02/2016    RIGHT KIDNEY. ? 2018 KIDNEY REJECTED    PONV (postoperative nausea and vomiting)     Seasonal  allergies     CURRENTLY     Vascular dialysis catheter in place     RIGHT TUNNEL CATH       Past Surgical History:   Procedure Laterality Date    ARTERIOVENOUS FISTULA/SHUNT SURGERY Right 02/26/2020    Procedure: RIGHT ARM ARTERIAL VENOUS FISTULA;  Surgeon: Elijah Herrera MD;  Location: [facility] MAIN OR;  Service: Vascular;  Laterality: Right;    ARTERIOVENOUS FISTULA/SHUNT SURGERY Left 02/04/2021    Procedure: LEFT BRACHIOAXILLARY ARTERIOVENOUS FISTULA GRAFT;  Surgeon: Anya Hernandez Jr., MD;  Location: [facility] MAIN OR;  Service: Vascular;  Laterality: Left;    ARTERIOVENOUS FISTULA/SHUNT SURGERY Right 03/16/2021    Procedure: RIGHT BRACHIOAXILLARY ARTERVENIOUS GRAFT PLACEMENT;  Surgeon: Adán Maurer MD;  Location: [facility] MAIN OR;  Service: Vascular;  Laterality: Right;    ARTERIOVENOUS FISTULA/SHUNT SURGERY Right 8/4/2023    Procedure: RIGHT ARM FISTULOGRAM PEUCUTANEOUS AND PERIPHERAL TRANSLUMINAL ANGIOPLSTY/STENT;  Surgeon: Elijah Herrera MD;  Location: [facility] OR 18/19;  Service: Vascular;  Laterality: Right;    BRACHIAL EMBOLECTOMY Right 10/6/2023    Procedure: RIGHT AV GRAFT WITH ANGIOPLASTY AND CENTROVENOUS ANGIOPLASTY;  Surgeon: Phu Gaviria MD;  Location: [facility] OR 18/19;  Service: Vascular;  Laterality: Right;    CARDIAC CATHETERIZATION N/A 11/10/2024    Procedure: Left Heart Cath;  Surgeon: Maribell Sandhu MD;  Location: [facility] CATH INVASIVE LOCATION;  Service: Cardiology;  Laterality: N/A;    CARDIAC CATHETERIZATION N/A 11/10/2024    Procedure: Coronary angiography;  Surgeon: Maribell Sandhu MD;  Location: [facility] CATH INVASIVE LOCATION;  Service: Cardiology;  Laterality: N/A;    CARDIAC CATHETERIZATION N/A 11/10/2024    Procedure: Left ventriculography;  Surgeon: Maribell Sandhu MD;  Location: [facility] CATH INVASIVE LOCATION;  Service: Cardiology;  Laterality: N/A;    CARDIAC CATHETERIZATION N/A 11/10/2024    Procedure: Right Heart Cath;  Surgeon: Edson  MD Maribell;  Location: [facility] CATH INVASIVE LOCATION;  Service: Cardiology;  Laterality: N/A;    COLONOSCOPY N/A 11/30/2022    Procedure: COLONOSCOPY TO CECUM AND TERM. ILEUM;  Surgeon: Casimiro Perkins MD;  Location: [facility] ENDOSCOPY;  Service: Gastroenterology;  Laterality: N/A;  PRE OP - SCREENING  POST OP - DIVERTICULOSIS, SM. HEMORRHOIDS    INSERTION AND REMOVAL HEMODIALYSIS CATHETER N/A 02/13/2021    Procedure: INSERTION AND REMOVAL OF HEMODIALYSIS CATHETER;  Surgeon: Adán Maurer MD;  Location: [facility] MAIN OR;  Service: Vascular;  Laterality: N/A;    INSERTION HEMODIALYSIS CATHETER Right 01/15/2021    Procedure: TUNNELED DIAYLIS CATHETER PLACEMENT;  Surgeon: Phu Gaviria MD;  Location: [facility] HYBRID OR 18/19;  Service: Vascular;  Laterality: Right;    INSERTION HEMODIALYSIS CATHETER Left 1/21/2023    Procedure: HEMODIALYSIS CATHETER INSERTION;  Surgeon: Elijah Herrera MD;  Location: [facility] MAIN OR;  Service: Vascular;  Laterality: Left;    INSERTION PERITONEAL DIALYSIS CATHETER  07/29/2014    Laparoscopic placement of Curl Cath peritoneal dialysis catheter, Dr. Vinod Goldstein    INSERTION PERITONEAL DIALYSIS CATHETER N/A 07/24/2019    Procedure: LAPAROSCOPIC INSERTION PERITONEAL DIALYSIS CATHETER;  Surgeon: Damon Rooney MD;  Location: [facility] MAIN OR;  Service: General    REMOVAL HEMODIALYSIS CATHETER N/A 2/15/2023    Procedure: PALINDROME REMOVAL;  Surgeon: Elijah Herrera MD;  Location: [facility] MAIN OR;  Service: Vascular;  Laterality: N/A;    REMOVAL PERITONEAL DIALYSIS CATHETER N/A 10/17/2016    Procedure: REMOVAL PERITONEAL DIALYSIS CATHETER;  Surgeon: Damon Rooney MD;  Location: [facility] MAIN OR;  Service:     REMOVAL PERITONEAL DIALYSIS CATHETER N/A 10/21/2020    Procedure: REMOVAL PERITONEAL DIALYSIS CATHETER;  Surgeon: Damon Rooney MD;  Location: [facility] MAIN OR;  Service: General;  Laterality: N/A;    SHUNT O GRAM Right 07/28/2022     "Procedure: RIGHT  ARM SHUNT O GRAM , ANGIOPLASTY OF AXILARY VEIN AND SUBCLAVIAN VEIN AT SVC JUNCTION;  Surgeon: Anya Hernandez Jr., MD;  Location: [facility] OR ;  Service: Vascular;  Laterality: Right;    SHUNT O GRAM Right 2023    Procedure: OPEN THROMBECTOMY AND ANGIOPLASTY;  Surgeon: Elijah Herrera MD;  Location: [facility] OR ;  Service: Vascular;  Laterality: Right;    SHUNT O GRAM Right 2023    Procedure: Right arm dialysis graft open thrombectomy, shuntogram, angioplasty and stent;  Surgeon: Karuna Villalba MD;  Location: [facility] OR ;  Service: Vascular;  Laterality: Right;    SHUNT O GRAM Right 2023    Procedure: RIGHT ARM FISTULOGRAM, ANGIOPLASTY;  Surgeon: Elijah Herrera MD;  Location: [facility] OR ;  Service: Vascular;  Laterality: Right;    SHUNT O GRAM Right 5/3/2024    Procedure: Hemodialysis shunt thrombectomy with revision;  Surgeon: Alex Sanchez MD;  Location: [facility];  Service: Vascular;  Laterality: Right;    TRANSPLANTATION RENAL Right 2016    from  donor    TUBAL ABDOMINAL LIGATION Bilateral         Anthropometrics        Current Height  Current Weight  BMI kg/m2 Height: 152.4 cm (60\")  Weight: 80.8 kg (178 lb 2.1 oz) (24)  Body mass index is 34.79 kg/m².   Adjusted BMI (if applicable)    BMI Category Obese, Class I (30 - 34.9)   Ideal Body Weight (IBW) 100 lb (45.5 kg)   Usual Body Weight (UBW) 158-170 lb   Weight Trend Gain   Weight History Wt Readings from Last 30 Encounters:   24 0418 80.8 kg (178 lb 2.1 oz)   24 0515 74.7 kg (164 lb 10.9 oz)   24 0420 77.2 kg (170 lb 3.1 oz)   11/10/24 1425 75.3 kg (166 lb)   11/10/24 1042 75.3 kg (166 lb 0.1 oz)   24 1213 75.3 kg (166 lb)   24 0700 75.5 kg (166 lb 7.2 oz)   24 1232 74.8 kg (165 lb)   23 1502 71.7 kg (158 lb)   10/06/23 1050 72.1 kg (159 lb)   10/05/23 1106 72.1 kg (159 lb) "   07/28/23 1311 71.2 kg (157 lb)   02/15/23 1123 78.7 kg (173 lb 8 oz)   01/24/23 0500 79.6 kg (175 lb 8 oz)   01/23/23 0911 79.8 kg (175 lb 14.8 oz)   01/23/23 0535 79.8 kg (175 lb 14.4 oz)   01/22/23 0456 79.7 kg (175 lb 9.6 oz)   01/21/23 0835 81.6 kg (180 lb)   01/20/23 1319 81.5 kg (179 lb 10.8 oz)   12/27/22 1436 81.6 kg (180 lb)   11/30/22 1242 81.7 kg (180 lb 3.2 oz)   11/21/22 1534 79.7 kg (175 lb 9.6 oz)   10/18/22 1350 80.7 kg (178 lb)   09/22/22 1434 80.1 kg (176 lb 9.6 oz)   07/28/22 1033 79.3 kg (174 lb 14.4 oz)   07/27/22 1039 81.2 kg (179 lb)   03/15/21 0620 82.7 kg (182 lb 6.4 oz)   03/12/21 1336 81.6 kg (180 lb)   03/10/21 1529 81.6 kg (179 lb 12.8 oz)   03/02/21 1812 79.4 kg (175 lb)   02/14/21 0410 80.7 kg (177 lb 14.6 oz)   02/13/21 1024 81.6 kg (180 lb)   02/04/21 0831 80 kg (176 lb 5 oz)   02/02/21 1512 82.1 kg (181 lb)   01/14/21 2212 84.6 kg (186 lb 8 oz)   01/14/21 1804 81.6 kg (180 lb)   10/21/20 1044 82.2 kg (181 lb 4 oz)   10/19/20 1210 82.6 kg (182 lb)   09/15/20 1348 80.3 kg (177 lb)   05/06/20 0152 76.6 kg (168 lb 12.8 oz)   03/16/20 0700 82.8 kg (182 lb 9.6 oz)   03/15/20 0519 81.7 kg (180 lb 3.2 oz)   03/14/20 0545 81.2 kg (179 lb 1.6 oz)   03/13/20 0817 77.1 kg (170 lb)   02/26/20 0629 82.4 kg (181 lb 10.5 oz)   02/25/20 1416 81 kg (178 lb 8 oz)      --  Estimated/Assessed Needs        Current Weight  Weight: 80.8 kg (178 lb 2.1 oz) (11/13/24 0418)       Energy Requirements    Weight for Calculation 100 lb (45.5 kg) IBW   Method for Estimation  35-40 kcal/kg   EST Needs (kcal/day) 3284-3591       Protein Requirements    Weight for Calculation 100 lb (45.5 kg) IBW   EST Protein Needs (g/kg) 1.2 - 1.5 gm/kg   EST Daily Needs (g/day) 55-68       Fluid Requirements     Method for Estimation Defer to physician    EST Needs (mL/day)      Labs       Pertinent Labs    Results from last 7 days   Lab Units 11/13/24  0616 11/12/24  0507 11/11/24  1857 11/10/24  1420 11/10/24  1002   SODIUM  mmol/L 135* 132* 133*   < > 131*   POTASSIUM mmol/L 3.8 4.3 4.2   < > 4.6   CHLORIDE mmol/L 98 92* 93*   < > 87*   CO2 mmol/L 19.9* 17.6* 21.1*   < > 14.0*   BUN mg/dL 45* 31* 20   < > 56*   CREATININE mg/dL 8.04* 6.35* 5.09*   < > 11.59*   CALCIUM mg/dL 8.3* 8.6 9.3   < > 9.6   BILIRUBIN mg/dL  --  0.4  --   --  0.6   ALK PHOS U/L  --  64  --   --  58   ALT (SGPT) U/L  --  17  --   --  9   AST (SGOT) U/L  --  51*  --   --  22   GLUCOSE mg/dL 82 141* 142*   < > 118*    < > = values in this interval not displayed.     Results from last 7 days   Lab Units 11/13/24  0616 11/12/24  0507 11/11/24  0400   MAGNESIUM mg/dL  --  2.2 1.9   PHOSPHORUS mg/dL 6.0* 6.7* 6.7*   HEMOGLOBIN g/dL 8.5* 10.1* 11.0*   HEMATOCRIT % 26.0* 29.2* 33.1*   WBC 10*3/mm3 7.52 7.94 12.26*   ALBUMIN g/dL 2.9* 2.9* 3.1*     Results from last 7 days   Lab Units 11/13/24  0616 11/12/24  0507 11/11/24  0400 11/10/24  1002   INR   --   --   --  2.36*   APTT seconds  --   --   --  37.5*   PLATELETS 10*3/mm3 119* 120* 137* 162     COVID19   Date Value Ref Range Status   11/10/2024 Not Detected Not Detected - Ref. Range Final     Lab Results   Component Value Date    HGBA1C 4.3 (L) 08/14/2024          Medications           Scheduled Medications ceFAZolin, 1,000 mg, Intravenous, Q24H  chlorhexidine, 15 mL, Mouth/Throat, Q12H  famotidine, 20 mg, Intravenous, Daily  insulin regular, 3-14 Units, Subcutaneous, Q6H  LORazepam, 2 mg, Intravenous, Once  metoprolol succinate XL, 25 mg, Oral, Q24H  mupirocin, 1 Application, Each Nare, BID  senna-docusate sodium, 2 tablet, Oral, BID  sodium chloride, 10 mL, Intravenous, Q12H  sodium chloride, 10 mL, Intravenous, Q12H  sodium chloride, 10 mL, Intravenous, Q12H  sodium chloride, 10 mL, Intravenous, Q12H       Infusions dexmedetomidine, 0.2-1.5 mcg/kg/hr, Last Rate: Stopped (11/12/24 1205)  norepinephrine, 0.02-0.3 mcg/kg/min, Last Rate: Stopped (11/12/24 1205)       PRN Medications   acetaminophen **OR**  acetaminophen    alteplase    senna-docusate sodium **AND** polyethylene glycol **AND** bisacodyl **AND** bisacodyl    Calcium Replacement - Follow Nurse / BPA Driven Protocol    dextrose    dextrose    fentaNYL citrate (PF)    glucagon (human recombinant)    Magnesium Standard Dose Replacement - Follow Nurse / BPA Driven Protocol    nitroglycerin    ondansetron ODT **OR** ondansetron    Phosphorus Replacement - Follow Nurse / BPA Driven Protocol    Potassium Replacement - Follow Nurse / BPA Driven Protocol    [COMPLETED] Insert Peripheral IV **AND** sodium chloride    sodium chloride    sodium chloride    sodium chloride    sodium chloride    sodium chloride     Physical Findings          General Findings obese, responds/arouses to pain, sedate, ventilator support   Oral/Mouth Cavity tooth or teeth missing   Edema  generalized, 1+ (trace)   Gastrointestinal hypoactive bowel sounds, non-distended , last bowel movement: 11/12   Skin  skin intact   Tubes/Drains/Lines dialysis catheter, NG tube, other:CVC Triple Lumen   NFPE Not indicated at this time   --  Current Nutrition Orders & Evaluation of Intake       Oral Nutrition     Food Allergies NKFA   Current PO Diet NPO Diet NPO Type: Sips with Meds   Supplement n/a   PO Evaluation     % PO Intake N/a    Factors Affecting Intake: altered respiratory status   --  PES STATEMENT / NUTRITION DIAGNOSIS      Nutrition Dx Problem  Problem: Inadequate Oral Intake  Etiology: Medical Diagnosis - pulmonary edema, sedated, vent    Signs/Symptoms: NPO and Report/Observation     NUTRITION INTERVENTION / PLAN OF CARE      Intervention Goal(s) Maintain nutrition status, Reduce/improve symptoms, Meet estimated needs, Disease management/therapy, Initiate TF/PN, Tolerate TF/PN at goal, and No significant weight loss         RD Intervention/Action Await initiation of EN/PN, Continue to monitor, Care plan reviewed, and Recommend/order: EN   --      Prescription/Orders:       PO Diet        Supplements       Enteral Nutrition    Enteral Prescription:     Enteral Route NG    TF Delivery Method Continuous    Enteral Product Novasource Renal    Modular None    Propofol Rate/Kcal     TF Start Rate  10 mL/hr    TF Goal Rate  35 mL/hr    Free Water Flush 30 mL Q 4 hr    Provision at Goal:          Calories 1540 kcal, meets 96% needs         Protein  70 gm protein, meets 102% needs         Fluid (mL) 547 mL free water + 180 mL in flushes         Parenteral Nutrition    New Prescription Ordered? Yes   --      Monitor/Evaluation Per protocol, I&O, Supplement intake, Pertinent labs, EN delivery/tolerance, Weight, Symptoms, Hemodynamic stability   Discharge Plan/Needs Pending clinical course   --    RD to follow per protocol.      Electronically signed by:  Willow Cooper RD  11/13/24 12:17 EST

## 2024-11-14 ENCOUNTER — ANCILLARY PROCEDURE (OUTPATIENT)
Dept: SPEECH THERAPY | Facility: HOSPITAL | Age: 49
DRG: 286 | End: 2024-11-14
Payer: MEDICARE

## 2024-11-14 LAB
ALBUMIN SERPL-MCNC: 3.4 G/DL (ref 3.5–5.2)
ALBUMIN/GLOB SERPL: 1 G/DL
ALP SERPL-CCNC: 88 U/L (ref 39–117)
ALT SERPL W P-5'-P-CCNC: 8 U/L (ref 1–33)
ANION GAP SERPL CALCULATED.3IONS-SCNC: 13 MMOL/L (ref 5–15)
AST SERPL-CCNC: 45 U/L (ref 1–32)
BILIRUB SERPL-MCNC: 0.3 MG/DL (ref 0–1.2)
BUN SERPL-MCNC: 29 MG/DL (ref 6–20)
BUN/CREAT SERPL: 6.1 (ref 7–25)
CALCIUM SPEC-SCNC: 8.5 MG/DL (ref 8.6–10.5)
CHLORIDE SERPL-SCNC: 96 MMOL/L (ref 98–107)
CO2 SERPL-SCNC: 29 MMOL/L (ref 22–29)
CREAT SERPL-MCNC: 4.79 MG/DL (ref 0.57–1)
DEPRECATED RDW RBC AUTO: 56.5 FL (ref 37–54)
EGFRCR SERPLBLD CKD-EPI 2021: 10.6 ML/MIN/1.73
ERYTHROCYTE [DISTWIDTH] IN BLOOD BY AUTOMATED COUNT: 17 % (ref 12.3–15.4)
GLOBULIN UR ELPH-MCNC: 3.5 GM/DL
GLUCOSE BLDC GLUCOMTR-MCNC: 100 MG/DL (ref 70–130)
GLUCOSE BLDC GLUCOMTR-MCNC: 104 MG/DL (ref 70–130)
GLUCOSE BLDC GLUCOMTR-MCNC: 107 MG/DL (ref 70–130)
GLUCOSE BLDC GLUCOMTR-MCNC: 120 MG/DL (ref 70–130)
GLUCOSE SERPL-MCNC: 98 MG/DL (ref 65–99)
HCT VFR BLD AUTO: 27.3 % (ref 34–46.6)
HGB BLD-MCNC: 8.8 G/DL (ref 12–15.9)
MCH RBC QN AUTO: 29.4 PG (ref 26.6–33)
MCHC RBC AUTO-ENTMCNC: 32.2 G/DL (ref 31.5–35.7)
MCV RBC AUTO: 91.3 FL (ref 79–97)
PLATELET # BLD AUTO: 155 10*3/MM3 (ref 140–450)
PMV BLD AUTO: 10.4 FL (ref 6–12)
POTASSIUM SERPL-SCNC: 3.4 MMOL/L (ref 3.5–5.2)
PROT SERPL-MCNC: 6.9 G/DL (ref 6–8.5)
RBC # BLD AUTO: 2.99 10*6/MM3 (ref 3.77–5.28)
SODIUM SERPL-SCNC: 138 MMOL/L (ref 136–145)
WBC NRBC COR # BLD AUTO: 9.14 10*3/MM3 (ref 3.4–10.8)

## 2024-11-14 PROCEDURE — 85027 COMPLETE CBC AUTOMATED: CPT | Performed by: INTERNAL MEDICINE

## 2024-11-14 PROCEDURE — 25010000002 ONDANSETRON PER 1 MG: Performed by: INTERNAL MEDICINE

## 2024-11-14 PROCEDURE — 97165 OT EVAL LOW COMPLEX 30 MIN: CPT

## 2024-11-14 PROCEDURE — 99233 SBSQ HOSP IP/OBS HIGH 50: CPT | Performed by: INTERNAL MEDICINE

## 2024-11-14 PROCEDURE — 80053 COMPREHEN METABOLIC PANEL: CPT | Performed by: INTERNAL MEDICINE

## 2024-11-14 PROCEDURE — 25010000002 CEFAZOLIN PER 500 MG: Performed by: INTERNAL MEDICINE

## 2024-11-14 PROCEDURE — 97161 PT EVAL LOW COMPLEX 20 MIN: CPT

## 2024-11-14 PROCEDURE — 82948 REAGENT STRIP/BLOOD GLUCOSE: CPT

## 2024-11-14 PROCEDURE — 99232 SBSQ HOSP IP/OBS MODERATE 35: CPT | Performed by: SURGERY

## 2024-11-14 PROCEDURE — 92612 ENDOSCOPY SWALLOW (FEES) VID: CPT

## 2024-11-14 PROCEDURE — 97535 SELF CARE MNGMENT TRAINING: CPT

## 2024-11-14 PROCEDURE — 99232 SBSQ HOSP IP/OBS MODERATE 35: CPT

## 2024-11-14 RX ADMIN — Medication 250 MG: at 09:18

## 2024-11-14 RX ADMIN — ONDANSETRON 4 MG: 2 INJECTION, SOLUTION INTRAMUSCULAR; INTRAVENOUS at 04:22

## 2024-11-14 RX ADMIN — METOPROLOL SUCCINATE 25 MG: 25 TABLET, EXTENDED RELEASE ORAL at 09:18

## 2024-11-14 RX ADMIN — Medication 10 ML: at 09:19

## 2024-11-14 RX ADMIN — MUPIROCIN 1 APPLICATION: 20 OINTMENT TOPICAL at 22:55

## 2024-11-14 RX ADMIN — Medication 10 ML: at 22:55

## 2024-11-14 RX ADMIN — CEFAZOLIN 1000 MG: 1 INJECTION, POWDER, FOR SOLUTION INTRAMUSCULAR; INTRAVENOUS at 17:05

## 2024-11-14 RX ADMIN — CHLORHEXIDINE GLUCONATE 15 ML: 1.2 RINSE ORAL at 20:00

## 2024-11-14 NOTE — PROGRESS NOTES
LOS: 4 days   Patient Care Team:  Regla Ying APRN as PCP - General (Nurse Practitioner)  Bo Hansen MD as Consulting Physician (Nephrology)  Damon Rooney MD as Surgeon (General Surgery)    Chief Complaint: follow up sepsis, nonischemic cardiomyopathy     Interval History: She was resting in bed with her family at bedside. She denies chest pain or discomfort, palpitations, or shortness of breath.     Vital Signs:  Temp:  [96.8 °F (36 °C)-99.7 °F (37.6 °C)] 99 °F (37.2 °C)  Heart Rate:  [] 102  Resp:  [16] 16  BP: (127-154)/() 134/88    Intake/Output Summary (Last 24 hours) at 11/14/2024 1117  Last data filed at 11/14/2024 0855  Gross per 24 hour   Intake 360 ml   Output 1600 ml   Net -1240 ml        Physical Exam  Vitals reviewed.   Constitutional:       General: She is not in acute distress.  HENT:      Head: Normocephalic.      Nose: Nose normal.   Eyes:      Extraocular Movements: Extraocular movements intact.      Pupils: Pupils are equal, round, and reactive to light.   Cardiovascular:      Rate and Rhythm: Normal rate and regular rhythm.      Pulses: Normal pulses.      Heart sounds: Normal heart sounds. Heart sounds not distant. No murmur heard.     No friction rub. No gallop. No S3 or S4 sounds.   Pulmonary:      Effort: Pulmonary effort is normal.      Breath sounds: Normal breath sounds.   Abdominal:      General: Abdomen is flat. Bowel sounds are normal.      Palpations: Abdomen is soft.      Tenderness: There is no abdominal tenderness.   Skin:     General: Skin is warm and dry.   Neurological:      General: No focal deficit present.      Mental Status: She is alert and oriented to person, place, and time. Mental status is at baseline.   Psychiatric:         Mood and Affect: Mood normal.         Behavior: Behavior normal.         Results Review:    Results from last 7 days   Lab Units 11/14/24  0802   SODIUM mmol/L 138   POTASSIUM mmol/L 3.4*   CHLORIDE mmol/L  96*   CO2 mmol/L 29.0   BUN mg/dL 29*   CREATININE mg/dL 4.79*   GLUCOSE mg/dL 98   CALCIUM mg/dL 8.5*     Results from last 7 days   Lab Units 11/10/24  1745 11/10/24  1420   HSTROP T ng/L 689* 239*     Results from last 7 days   Lab Units 11/14/24  0802   WBC 10*3/mm3 9.14   HEMOGLOBIN g/dL 8.8*   HEMATOCRIT % 27.3*   PLATELETS 10*3/mm3 155     Results from last 7 days   Lab Units 11/10/24  1002   INR  2.36*   APTT seconds 37.5*         Results from last 7 days   Lab Units 11/12/24  0507   MAGNESIUM mg/dL 2.2           I reviewed the patient's new clinical results.        Assessment & Plan:  Septic shock secondary to MSSA bacteremia  Stress cardiomyopathy  Secondary to #1  Underwent coronary angiography on 11/10/24 which revealed normal coronary arteries with basal to mid anterior and inferior hypokinesis consistent with atypical stress cardiomyopathy.   Continue Toprol-XL 25 mg daily   ESRD on HD  Hyperlipidemia  Diabetes     BP trend improved today, continue Toprol-XL.     Sultana Sarabia, APRN  11/14/24  11:17 EST

## 2024-11-14 NOTE — CONSULTS
CONSULT NOTE    INTERNAL MEDICINE   [facility]       Referring Provider: Martin Dinh MD  Reason for Consultation: To evaluate chronic medical conditions that may be exacerbated   PCP: Regla Ying APRN      HPI  Patient is a 49 y.o. female presents with h/o ESRD (s/p failed kidney transplant in 2016), dm2, anemia, htn, hyperparathyroidism,extensive vascular surgery history and presented to ER with septic shock.   Pt in usual state of health til  11/9/2024 when she awoke with shaking chills and fever. She had nausea and vomiting and pain in the right upper extremity at her AV graft site (h/o thrombectomy of right arm brachial artery to axillary vein graft by Dr Sanchez in May 2024).  When pt arrived in ER she was intubated for respiratory failure and started on vasopressors for possible septic shock.  Blood cultures now growing MSSA. She was on vanc and zosyn but has been transitioned to cefazolin 11/11.   Pt extubated 11/12 and transferred out of ICU where we are now being consulted    PAST MEDICAL HISTORY  Past Medical History:   Diagnosis Date    Acid reflux     Anemia     WITH ESRD    Anxiety     Arthritis     Cough     related to fluid overload    Dependence on renal dialysis 05/17/2021    Depression     Diabetes mellitus     NO MEDICATIONS FOR    End stage renal disease 05/17/2021    ESRD on dialysis     FRESINUS MWF    History of peritoneal dialysis     KIDNEY TRANSPLANT SEPT 2016     History of transfusion     BEEN A WHILE no reaction    Hypercalcemia     Hyperparathyroidism     Hypertension     Kidney disease     End-stage renal disease. TRANSPLANT    Kidney transplant recipient 09/02/2016    RIGHT KIDNEY. ? 2018 KIDNEY REJECTED    PONV (postoperative nausea and vomiting)     Seasonal allergies     CURRENTLY     Vascular dialysis catheter in place     RIGHT TUNNEL CATH    status post DDKTx at  (9/2016), complicated by  acute cellular rejection in 5/2017,  n December 2018.  A  biopsy was performed which again suggested acute humoral rejection with a negative DSA and C4 D staining.  March 2019, the patient developed acute renal failure requiring a additional biopsy showing severe [place] grade 3 acute cellular rejection.   status post parathyroidectomy in April 2018  with auto transplant to left forearm 4/18.     PAST SURGICAL HISTORY  Past Surgical History:   Procedure Laterality Date    ARTERIOVENOUS FISTULA/SHUNT SURGERY Right 02/26/2020    Procedure: RIGHT ARM ARTERIAL VENOUS FISTULA;  Surgeon: Elijah Herrera MD;  Location: [facility] OR;  Service: Vascular;  Laterality: Right;    ARTERIOVENOUS FISTULA/SHUNT SURGERY Left 02/04/2021    Procedure: LEFT BRACHIOAXILLARY ARTERIOVENOUS FISTULA GRAFT;  Surgeon: Anya Hernandez Jr., MD;  Location: [facility] OR;  Service: Vascular;  Laterality: Left;    ARTERIOVENOUS FISTULA/SHUNT SURGERY Right 03/16/2021    Procedure: RIGHT BRACHIOAXILLARY ARTERVENIOUS GRAFT PLACEMENT;  Surgeon: Adán Maurer MD;  Location: [facility] OR;  Service: Vascular;  Laterality: Right;    ARTERIOVENOUS FISTULA/SHUNT SURGERY Right 8/4/2023    Procedure: RIGHT ARM FISTULOGRAM PEUCUTANEOUS AND PERIPHERAL TRANSLUMINAL ANGIOPLSTY/STENT;  Surgeon: Elijah Herrera MD;  Location: [facility] OR 18/19;  Service: Vascular;  Laterality: Right;    BRACHIAL EMBOLECTOMY Right 10/6/2023    Procedure: RIGHT AV GRAFT WITH ANGIOPLASTY AND CENTROVENOUS ANGIOPLASTY;  Surgeon: Phu Gaviria MD;  Location: [facility] OR 18/19;  Service: Vascular;  Laterality: Right;    CARDIAC CATHETERIZATION N/A 11/10/2024    Procedure: Left Heart Cath;  Surgeon: Maribell Sandhu MD;  Location: [facility] CATH INVASIVE LOCATION;  Service: Cardiology;  Laterality: N/A;    CARDIAC CATHETERIZATION N/A 11/10/2024    Procedure: Coronary angiography;  Surgeon: Maribell Sandhu MD;  Location: [facility] CATH INVASIVE LOCATION;  Service: Cardiology;  Laterality: N/A;    CARDIAC  CATHETERIZATION N/A 11/10/2024    Procedure: Left ventriculography;  Surgeon: Maribell Sandhu MD;  Location: [facility] CATH INVASIVE LOCATION;  Service: Cardiology;  Laterality: N/A;    CARDIAC CATHETERIZATION N/A 11/10/2024    Procedure: Right Heart Cath;  Surgeon: Maribell Sandhu MD;  Location: [facility] CATH INVASIVE LOCATION;  Service: Cardiology;  Laterality: N/A;    COLONOSCOPY N/A 11/30/2022    Procedure: COLONOSCOPY TO CECUM AND TERM. ILEUM;  Surgeon: Casimiro Perkins MD;  Location: [facility] ENDOSCOPY;  Service: Gastroenterology;  Laterality: N/A;  PRE OP - SCREENING  POST OP - DIVERTICULOSIS, SM. HEMORRHOIDS    INSERTION AND REMOVAL HEMODIALYSIS CATHETER N/A 02/13/2021    Procedure: INSERTION AND REMOVAL OF HEMODIALYSIS CATHETER;  Surgeon: Adán Maurer MD;  Location: [facility] MAIN OR;  Service: Vascular;  Laterality: N/A;    INSERTION HEMODIALYSIS CATHETER Right 01/15/2021    Procedure: TUNNELED DIAYLIS CATHETER PLACEMENT;  Surgeon: Phu Gaviria MD;  Location: [facility] HYBRID OR 18/19;  Service: Vascular;  Laterality: Right;    INSERTION HEMODIALYSIS CATHETER Left 1/21/2023    Procedure: HEMODIALYSIS CATHETER INSERTION;  Surgeon: Elijah Herrera MD;  Location: [facility] MAIN OR;  Service: Vascular;  Laterality: Left;    INSERTION PERITONEAL DIALYSIS CATHETER  07/29/2014    Laparoscopic placement of [place]url Cath peritoneal dialysis catheter, Dr. Vinod Goldstein    INSERTION PERITONEAL DIALYSIS CATHETER N/A 07/24/2019    Procedure: LAPAROSCOPIC INSERTION PERITONEAL DIALYSIS CATHETER;  Surgeon: Damon Rooney MD;  Location: [facility] MAIN OR;  Service: General    REMOVAL HEMODIALYSIS CATHETER N/A 2/15/2023    Procedure: PALINDROME REMOVAL;  Surgeon: Elijah Herrera MD;  Location: [facility] MAIN OR;  Service: Vascular;  Laterality: N/A;    REMOVAL PERITONEAL DIALYSIS CATHETER N/A 10/17/2016    Procedure: REMOVAL PERITONEAL DIALYSIS CATHETER;  Surgeon: Damon Rooney MD;  Location:   [facility] MAIN OR;  Service:     REMOVAL PERITONEAL DIALYSIS CATHETER N/A 10/21/2020    Procedure: REMOVAL PERITONEAL DIALYSIS CATHETER;  Surgeon: Damon Rooney MD;  Location: [facility] MAIN OR;  Service: General;  Laterality: N/A;    SHUNT O GRAM Right 2022    Procedure: RIGHT  ARM SHUNT O GRAM , ANGIOPLASTY OF AXILARY VEIN AND SUBCLAVIAN VEIN AT SVC JUNCTION;  Surgeon: Anya Hernandez Jr., MD;  Location: [facility] OR ;  Service: Vascular;  Laterality: Right;    SHUNT O GRAM Right 2023    Procedure: OPEN THROMBECTOMY AND ANGIOPLASTY;  Surgeon: Elijah Herrera MD;  Location: [facility] OR ;  Service: Vascular;  Laterality: Right;    SHUNT O GRAM Right 2023    Procedure: Right arm dialysis graft open thrombectomy, shuntogram, angioplasty and stent;  Surgeon: Karuna Villalba MD;  Location: [facility] OR ;  Service: Vascular;  Laterality: Right;    SHUNT O GRAM Right 2023    Procedure: RIGHT ARM FISTULOGRAM, ANGIOPLASTY;  Surgeon: Elijah Herrera MD;  Location: [facility] OR ;  Service: Vascular;  Laterality: Right;    SHUNT O GRAM Right 5/3/2024    Procedure: Hemodialysis shunt thrombectomy with revision;  Surgeon: Alex Sanchez MD;  Location: [facility] OR;  Service: Vascular;  Laterality: Right;    TRANSPLANTATION RENAL Right 2016    from  donor    TUBAL ABDOMINAL LIGATION Bilateral        FAMILY HISTORY  Family History   Problem Relation Age of Onset    Hypertension Mother     Hypertension Father     Heart attack Maternal Grandfather     Malig Hyperthermia Neg Hx        SOCIAL HISTORY  Social History     Tobacco Use    Smoking status: Never     Passive exposure: Never    Smokeless tobacco: Never    Tobacco comments:     Patient does not smoke   Vaping Use    Vaping status: Never Used   Substance Use Topics    Alcohol use: No     Comment: caffeine use; Patient is unknown if ever drinks    Drug use: Never      Comment: Drug Abuse: none       MEDICATIONS:  Medications Prior to Admission   Medication Sig Dispense Refill Last Dose/Taking    acetaminophen (TYLENOL) 325 MG tablet Take 2 tablets by mouth Every 6 (Six) Hours As Needed for Mild Pain.       bisacodyl (DULCOLAX) 5 MG EC tablet Take 1 tablet by mouth Daily As Needed for Constipation (Use if polyethylene glycol is ineffective). 30 tablet 0     calcitriol (ROCALTROL) 0.25 MCG capsule Take 1 capsule by mouth Take As Directed. Calcitriol 0.25 MCG Capsule (si capsule once per day )       calcium acetate (PHOSLO) 667 MG capsule Take 3 capsules by mouth 3 (Three) Times a Day With Meals.       cloNIDine (CATAPRES) 0.1 MG tablet Take 1 tablet by mouth Daily. 30 tablet 3     hydrALAZINE (APRESOLINE) 25 MG tablet Take 1 tablet by mouth Daily.       hydrOXYzine (ATARAX) 50 MG tablet Take 1 tablet by mouth As Needed.       naloxone (NARCAN) 4 MG/0.1ML nasal spray Call 911. Don't prime. [place] in 1 nostril for overdose. Repeat in 2-3 minutes in other nostril if no or minimal breathing/responsiveness. 2 each 0     ondansetron ODT (ZOFRAN-ODT) 4 MG disintegrating tablet Take 1 tablet by mouth As Needed for Nausea or Vomiting.       polyethylene glycol (MIRALAX) 17 GM/SCOOP powder Take 17 g by mouth Daily As Needed (Use if senna-docusate is ineffective). 510 g 0     Polyethylene Glycol 3350 (MIRALAX PO) Take 1 dose by mouth As Needed.       prochlorperazine (COMPAZINE) 10 MG tablet Take 1 tablet by mouth Every 6 (Six) Hours As Needed for Nausea or Vomiting. 10 tablet 0     propranolol (INDERAL) 10 MG tablet Take 1 tablet by mouth 2 (Two) Times a Day.       sennosides-docusate (PERICOLACE) 8.6-50 MG per tablet Take 2 tablets by mouth 2 (Two) Times a Day As Needed for Constipation. 60 tablet 0     traZODone (DESYREL) 100 MG tablet Take 1.5 tablets by mouth every night at bedtime.       warfarin (COUMADIN) 5 MG tablet Take 1 tablet by mouth Daily.          Allergies:  Patient has no  "known allergies.    Review of Systems:  Fatigue and hungry        Vital Signs  Temp:  [96.8 °F (36 °C)-99.5 °F (37.5 °C)] 96.8 °F (36 °C)  Heart Rate:  [] 72  Resp:  [16-20] 16  BP: (139-149)/(50-89) 139/50  Flowsheet Rows      Flowsheet Row First Filed Value   Admission Height 152.4 cm (60\") Documented at 11/10/2024 1425   Admission Weight 75.3 kg (166 lb 0.1 oz) Documented at 11/10/2024 1042             Physical Exam:  General Appearance:    Alert, cooperative, in no acute distress   Head:    Normocephalic, without obvious abnormality, atraumatic   Eyes:         conjunctivae and sclerae normal, no icterus, PERRLA   ENT:    Ears grossly intact, oral mucosa moist, Core track in place    Neck:   No adenopathy, supple, trachea midline,        Lungs:     Clear to auscultation,respirations regular, even and                   unlabored    Heart:    Regular rhythm and normal rate,  no murmur, normal S1 and S2,   Abdomen:     Normal bowel sounds, no masses,  soft non-tender, non-distended,    Extremities:   Moves all extremities, no cyanosis, Right upper extremity AV fistula.              Pulses:   Pulses palpable and equal bilaterally   Skin:   No bleeding, rash, bruising    Neurologic:    Psych:   Cranial nerves 2 - 12 grossly intact, sensation intact,     Moves all extremities well, equal bilateral strength    Alert and Oriented x 3, Normal Affect     I washed my hands before entering the room and immediately upon leaving the room    LABS:  No results displayed because visit has over 200 results.        Results from last 7 days   Lab Units 11/13/24  0616 11/12/24  0507 11/11/24  0400   WBC 10*3/mm3 7.52 7.94 12.26*   HEMOGLOBIN g/dL 8.5* 10.1* 11.0*   HEMATOCRIT % 26.0* 29.2* 33.1*   PLATELETS 10*3/mm3 119* 120* 137*     Results from last 7 days   Lab Units 11/13/24  0616 11/12/24  0507 11/11/24  1857 11/10/24  1420 11/10/24  1002   SODIUM mmol/L 135* 132* 133*   < > 131*   POTASSIUM mmol/L 3.8 4.3 4.2   < > 4.6 " "  CHLORIDE mmol/L 98 92* 93*   < > 87*   CO2 mmol/L 19.9* 17.6* 21.1*   < > 14.0*   BUN mg/dL 45* 31* 20   < > 56*   CREATININE mg/dL 8.04* 6.35* 5.09*   < > 11.59*   CALCIUM mg/dL 8.3* 8.6 9.3   < > 9.6   BILIRUBIN mg/dL  --  0.4  --   --  0.6   ALK PHOS U/L  --  64  --   --  58   ALT (SGPT) U/L  --  17  --   --  9   AST (SGOT) U/L  --  51*  --   --  22   GLUCOSE mg/dL 82 141* 142*   < > 118*    < > = values in this interval not displayed.       DIAGNOSTICS:  XR Abdomen KUB    Result Date: 11/12/2024   As described.   This report was finalized on 11/12/2024 7:07 PM by Dr. David Montoya M.D on Workstation: [employer]          Results Review:   I reviewed the patient's new clinical results.  I personally viewed and interpreted the patient's EKG/Telemetry data  Old records reviewed    TTE with There is septal dyskinesis. There is severe hypokinesis of the basal inferior wall and the basal inferolateral wall. There is moderate hypokinesis of the anterior wall  Left ventricular systolic function is severely decreased. Estimated left ventricular EF = 25% c/f stress induced cardiomyopathy     ASSESSMENT AND PLAN    Pulmonary edema    ST elevation myocardial infarction (STEMI)  AV fistula may be infected, source of bacteremia   Septic Shock  MSSA septicemia  Acute hypoxic resp failure  ESRD  DM2, continue glycemic control efforts to prevent/control infectious complications  Suspected right arm brachial artery to axillary vein graft infection  septic shock from MSSA with stress induced cardiomyopathy.     49-year-old woman with ESRD on HD, hypertension, hyperlipidemia, diabetes who presented with altered mental status,      MSSA septicemia/ s/p septic shock (was on levo)  - vanc and zosyn in favor of renally dosed cefazolin 1g IV q24   -Repeat bcx pending  -Suspected right arm brachial artery to axillary vein graft infection  -per vasc surgery \"source felt to be most likely a segment of her shunt but currently the shunt " "has no evidence of redness heat or pain and there is nowhere to isolate the possible infection within the shunt. This would require complete removal or long-term antibiotic therapy with hopes that it will either sterilize the shunt or force the infected segment to show itself for a more localized treatment. We have opted for the latter she is going to undergo 6 weeks of antibiotics with her dialysis and then we will reassess with duplex. She and her family are aware of the risks and benefits of this plan and agree. \"  -Mild valvular dysfunction but no vegetation seen.  Plus patient is going to be having 6 weeks of IV antibiotics.      Estimated left ventricular EF = 25% c/f stress induced cardiomyopathy   -Cardiology adjusting medications  -Dialysis for diuresis  -Patient has small circumferential pericardial effusion but no evidence of tamponade.    ESRD on HD, MWF, via R AV fistula ; history of kidney transplant in 2016 with rejection in [id number] and has been off antirejection meds since 2019  - uf limited by cramps towards end of tx. Vss. Uf net 1600ml. P   -renal following  -Reorder PhosLo when patient starts taking p.o.      Type II DM with CKD, possibly secondary to steroid use but patient is not on any medications for, patient states she is not diabetic    History of hypertension.  Patient is only on hydralazine once daily, and clonidine once daily. And inderal  -Restart when appropriate     Loose stools- was on TF's-    Dysphagia throat sore. Hungry. Did not pass swallow study yesterday.   -Await speech therapy reevaluation  -Chloraseptic ordered    Anemia with CKD, receives long-acting TERESA outpatient, hemoglobin 11     RESOLVED:  -Acute respiratory failure was on ventilator - 2 L nasal cannula.   -septic shock on levophed  -Hyperkalemia, potassium 6   -High anion gap metabolic acidosis, secondary to ESRD and lactic acidosis    Chronic Medical problems  - Secondary hyperparathyroidism of renal etiology "   -Hyperlipidemia   -Depression-was on trazodone at home restart when appropriate    +DVT proph: Was on Coumadin at home  +Full code    I discussed the patients findings and my recommendations with the patient and/or family.  Please reference all orders placed.    Yoselyn Hoffman MD  11/13/24  19:49 EST      This document is intended for medical expert use only. Reading of this document by patients and/or patient's family without participating in medical staff guidance may result in misinterpretation and unintended morbidity. Any interpretation of such data is the responsibility of the patient and/or family member responsible for the patient in concert with their primary or specialist providers, and NOT to be left for sources of online searches such as [employer], [employer] or similar queries. Relying on these approaches to knowledge may result in misinterpretation, misguided goals of care and even death should patients or family members try recommendations outside of the realm of professional medical care in a supervised way.    Dictated utilizing Dragon dictation:  Parts of this note may be an electronic transcription/translation of spoke language to printed text using Dragon dictation system.

## 2024-11-14 NOTE — MBS/VFSS/FEES
Acute Care - Speech Language Pathology   Swallow Re-Evaluation [facility]     Patient Name: Sunni Mcneil  : 1975  MRN: [id number]  Today's Date: 2024               Admit Date: 11/10/2024    Visit Dx:     ICD-10-CM ICD-9-CM   1. Acute encephalopathy  G93.40 348.30   2. Septic shock  A41.9 038.9    R65.21 785.52     995.92   3. Lactic acidosis  E87.20 276.2   4. Acute UTI  N39.0 599.0   5. Abnormal CT scan  R93.89 793.99   6. Acute pulmonary edema  J81.0 518.4   7. Hypertensive emergency  I16.1 401.9   8. ST elevation myocardial infarction (STEMI), unspecified artery  I21.3 410.90   9. AV graft stenosis, initial encounter  T82.858A 996.1   10. ESRD (end stage renal disease)  N18.6 585.6     Patient Active Problem List   Diagnosis    Hyperparathyroidism    Acute rejection of kidney transplant    ARF (acute renal failure)    Hx of kidney transplant    Hypertension    Kidney transplant status, cadaveric    Nephritis    Hypercalcemia    ESRD on hemodialysis    Prolonged QT interval    ESRD (end stage renal disease)    Hyperphosphatemia    Leukocytosis    Type 2 diabetes mellitus    Acute UTI (urinary tract infection)    Obesity (BMI 30-39.9)    A-V fistula    Anemia, chronic disease    Folate deficiency anemia    Febrile illness, acute    Sepsis    Screening for colon cancer    Hyperkalemia    Shunt malfunction    Anemia    Constipation    Screening for colorectal cancer    Hypervolemia    AV graft thrombosis, initial encounter    ESRD (end stage renal disease)    GERD without esophagitis    AV graft stenosis, initial encounter    Arteriovenous fistula occlusion    Pulmonary edema    ST elevation myocardial infarction (STEMI)     Past Medical History:   Diagnosis Date    Acid reflux     Anemia     WITH ESRD    Anxiety     Arthritis     Cough     related to fluid overload    Dependence on renal dialysis 2021    Depression     Diabetes mellitus     NO MEDICATIONS FOR    End stage renal disease  05/17/2021    ESRD on dialysis     SUNG BEJARANO    History of peritoneal dialysis     KIDNEY TRANSPLANT SEPT 2016     History of transfusion     BEEN A WHILE no reaction    Hypercalcemia     Hyperparathyroidism     Hypertension     Kidney disease     End-stage renal disease. TRANSPLANT    Kidney transplant recipient 09/02/2016    RIGHT KIDNEY. ? 2018 KIDNEY REJECTED    PONV (postoperative nausea and vomiting)     Seasonal allergies     CURRENTLY     Vascular dialysis catheter in place     RIGHT TUNNEL CATH     Past Surgical History:   Procedure Laterality Date    ARTERIOVENOUS FISTULA/SHUNT SURGERY Right 02/26/2020    Procedure: RIGHT ARM ARTERIAL VENOUS FISTULA;  Surgeon: Elijah Herrera MD;  Location: [facility] OR;  Service: Vascular;  Laterality: Right;    ARTERIOVENOUS FISTULA/SHUNT SURGERY Left 02/04/2021    Procedure: LEFT BRACHIOAXILLARY ARTERIOVENOUS FISTULA GRAFT;  Surgeon: Anya Hernandez Jr., MD;  Location: [facility] OR;  Service: Vascular;  Laterality: Left;    ARTERIOVENOUS FISTULA/SHUNT SURGERY Right 03/16/2021    Procedure: RIGHT BRACHIOAXILLARY ARTERVENIOUS GRAFT PLACEMENT;  Surgeon: Adán Maurer MD;  Location: [facility] MAIN OR;  Service: Vascular;  Laterality: Right;    ARTERIOVENOUS FISTULA/SHUNT SURGERY Right 8/4/2023    Procedure: RIGHT ARM FISTULOGRAM PEUCUTANEOUS AND PERIPHERAL TRANSLUMINAL ANGIOPLSTY/STENT;  Surgeon: Elijah Herrera MD;  Location: [facility] OR 18/19;  Service: Vascular;  Laterality: Right;    BRACHIAL EMBOLECTOMY Right 10/6/2023    Procedure: RIGHT AV GRAFT WITH ANGIOPLASTY AND CENTROVENOUS ANGIOPLASTY;  Surgeon: Phu Gaviria MD;  Location: [facility] OR 18/19;  Service: Vascular;  Laterality: Right;    CARDIAC CATHETERIZATION N/A 11/10/2024    Procedure: Left Heart Cath;  Surgeon: Maribell Sandhu MD;  Location: [facility] CATH INVASIVE LOCATION;  Service: Cardiology;  Laterality: N/A;    CARDIAC CATHETERIZATION N/A 11/10/2024     Procedure: Coronary angiography;  Surgeon: Maribell Sandhu MD;  Location: [facility] CATH INVASIVE LOCATION;  Service: Cardiology;  Laterality: N/A;    CARDIAC CATHETERIZATION N/A 11/10/2024    Procedure: Left ventriculography;  Surgeon: Maribell Sandhu MD;  Location: [facility] CATH INVASIVE LOCATION;  Service: Cardiology;  Laterality: N/A;    CARDIAC CATHETERIZATION N/A 11/10/2024    Procedure: Right Heart Cath;  Surgeon: Maribell Sandhu MD;  Location: [facility] CATH INVASIVE LOCATION;  Service: Cardiology;  Laterality: N/A;    COLONOSCOPY N/A 11/30/2022    Procedure: COLONOSCOPY TO CECUM AND TERM. ILEUM;  Surgeon: Casimiro Perkins MD;  Location: [facility] ENDOSCOPY;  Service: Gastroenterology;  Laterality: N/A;  PRE OP - SCREENING  POST OP - DIVERTICULOSIS, SM. HEMORRHOIDS    INSERTION AND REMOVAL HEMODIALYSIS CATHETER N/A 02/13/2021    Procedure: INSERTION AND REMOVAL OF HEMODIALYSIS CATHETER;  Surgeon: Adán Maurer MD;  Location: [facility] MAIN OR;  Service: Vascular;  Laterality: N/A;    INSERTION HEMODIALYSIS CATHETER Right 01/15/2021    Procedure: TUNNELED DIAYLIS CATHETER PLACEMENT;  Surgeon: Phu Gaviria MD;  Location: [facility] HYBRID OR 18/19;  Service: Vascular;  Laterality: Right;    INSERTION HEMODIALYSIS CATHETER Left 1/21/2023    Procedure: HEMODIALYSIS CATHETER INSERTION;  Surgeon: Elijah Herrera MD;  Location: [facility] MAIN OR;  Service: Vascular;  Laterality: Left;    INSERTION PERITONEAL DIALYSIS CATHETER  07/29/2014    Laparoscopic placement of Curl Cath peritoneal dialysis catheter, Dr. Vinod Goldstein    INSERTION PERITONEAL DIALYSIS CATHETER N/A 07/24/2019    Procedure: LAPAROSCOPIC INSERTION PERITONEAL DIALYSIS CATHETER;  Surgeon: Damon Rooney MD;  Location: [facility] MAIN OR;  Service: General    REMOVAL HEMODIALYSIS CATHETER N/A 2/15/2023    Procedure: PALINDROME REMOVAL;  Surgeon: Elijah Herrera MD;  Location: [facility] MAIN OR;  Service: Vascular;  Laterality: N/A;     REMOVAL PERITONEAL DIALYSIS CATHETER N/A 10/17/2016    Procedure: REMOVAL PERITONEAL DIALYSIS CATHETER;  Surgeon: Damon Rooney MD;  Location: [facility] MAIN OR;  Service:     REMOVAL PERITONEAL DIALYSIS CATHETER N/A 10/21/2020    Procedure: REMOVAL PERITONEAL DIALYSIS CATHETER;  Surgeon: Damon Rooney MD;  Location: [facility] MAIN OR;  Service: General;  Laterality: N/A;    SHUNT O GRAM Right 2022    Procedure: RIGHT  ARM SHUNT O GRAM , ANGIOPLASTY OF AXILARY VEIN AND SUBCLAVIAN VEIN AT SVC JUNCTION;  Surgeon: Anya Hernandez Jr., MD;  Location: [facility] OR ;  Service: Vascular;  Laterality: Right;    SHUNT O GRAM Right 2023    Procedure: OPEN THROMBECTOMY AND ANGIOPLASTY;  Surgeon: Elijah Herrera MD;  Location: [facility] OR ;  Service: Vascular;  Laterality: Right;    SHUNT O GRAM Right 2023    Procedure: Right arm dialysis graft open thrombectomy, shuntogram, angioplasty and stent;  Surgeon: Karuna Villalba MD;  Location: [facility] OR ;  Service: Vascular;  Laterality: Right;    SHUNT O GRAM Right 2023    Procedure: RIGHT ARM FISTULOGRAM, ANGIOPLASTY;  Surgeon: Elijah Herrera MD;  Location: [facility] OR ;  Service: Vascular;  Laterality: Right;    SHUNT O GRAM Right 5/3/2024    Procedure: Hemodialysis shunt thrombectomy with revision;  Surgeon: Alex Sanchez MD;  Location: [facility];  Service: Vascular;  Laterality: Right;    TRANSPLANTATION RENAL Right 2016    from  donor    TUBAL ABDOMINAL LIGATION Bilateral        SLP Recommendation and Plan  SLP Swallowing Diagnosis: swallow WFL/no suspected pharyngeal impairment (24)  SLP Diet Recommendation: regular textures, thin liquids (24)  Recommended Precautions and Strategies: upright posture during/after eating, general aspiration precautions (24)  SLP Rec. for Method of Medication Administration: as  "tolerated (11/14/24 1542)     Monitor for Signs of Aspiration: yes, notify SLP if any concerns (11/14/24 1542)     Swallow Criteria for Skilled Therapeutic Interventions Met: no problems identified which require skilled intervention (11/14/24 1542)  Anticipated Discharge Disposition (SLP): No further SLP services warranted (11/14/24 1542)  Rehab Potential/Prognosis, Swallowing: good, to achieve stated therapy goals (11/14/24 1542)  Therapy Frequency (Swallow): evaluation only (11/14/24 1542)     Oral Care Recommendations: Oral Care BID/PRN (11/14/24 1542)                                        Outcome Evaluation: FEES completed. Recommend regular textures and thin liquids, meds whole as tolerated. Recommend general aspiration precautions. SLP to s/o, please re-consult as warranted.      SWALLOW EVALUATION (Last 72 Hours)       SLP Adult Swallow Evaluation       Row Name 11/14/24 1542 11/12/24 1430                Rehab Evaluation    Document Type evaluation  -OC evaluation  -OC       Subjective Information no complaints  -OC no complaints  -OC       Patient Observations alert;cooperative;agree to therapy  -OC alert;cooperative;agree to therapy  -OC       Patient Effort good  -OC good  -OC       Symptoms Noted During/After Treatment none  -OC none  -OC          General Information    Patient Profile Reviewed yes  -OC yes  -OC       Pertinent History Of Current Problem -- \"49-year-old woman with ESRD on HD, hypertension, hyperlipidemia, diabetes who presented with altered mental status, was intubated in route for airway protection.  On arrival she was found to have abnormal EKGs and underwent emergent coronary angiography which revealed normal coronary arteries with basal to mid anterior and inferior hypokinesis consistent with atypical stress cardiomyopathy.\" Intubated 11/10/24-11/12/24,  -OC       Current Method of Nutrition NPO;small-bore;nasogastric feedings  -OC NPO;large-bore;other (see comments)  pulled before " FEES  -OC       Precautions/Limitations, Vision WFL;for purposes of eval  -OC WFL;for purposes of eval  -OC       Precautions/Limitations, Hearing WFL;for purposes of eval  -OC WFL;for purposes of eval  -OC       Prior Level of Function-Communication unknown  -OC unknown  -OC       Prior Level of Function-Swallowing no diet consistency restrictions  -OC no diet consistency restrictions  -OC       Plans/Goals Discussed with patient and family;agreed upon  -OC patient and family;agreed upon  -OC       Barriers to Rehab medically complex  -OC medically complex  -OC       Patient's Goals for Discharge return to PO diet  -OC return to PO diet  -OC       Family Goals for Discharge patient able to return to PO diet  -OC family did not state  -OC          Pain    Pretreatment Pain Rating 0/10 - no pain  -OC 0/10 - no pain  -OC       Posttreatment Pain Rating 0/10 - no pain  -OC 0/10 - no pain  -OC          Oral Motor Structure and Function    Dentition Assessment -- natural, present and adequate  -OC       Secretion Management -- other (see comments)  significant coughing s/p NG removal  -OC       Mucosal Quality -- moist, healthy  -OC       Volitional Swallow -- delayed  -OC       Volitional Cough -- WFL  -OC          Oral Musculature and Cranial Nerve Assessment    Oral Motor General Assessment -- generalized oral motor weakness  -OC       Vocal Impairment, Detail. Cranial Nerve X (Vagus) -- vocal quality abnormality (see comments)  mildly hoarse, difficulty to rate with coughing  -OC          Fiberoptic Endoscopic Evaluation of Swallowing (FEES)    Risks/Benefits Reviewed risks/benefits explained;patient;family;agreed to eval  -OC risks/benefits explained;patient;family;agreed to eval  -OC       Nasal Entry right:  -OC right:  -OC       Scope serial number/identification [id number]  -OC AMBU  -OC          Anatomy and Physiology    Anatomic Considerations no anatomic structural deviation  -OC no anatomic structural deviation   -OC       Velopharyngeal WFL  -OC WFL  -OC       Base of Tongue symmetrical  -OC symmetrical  -OC       Epiglottis WFL  -OC WFL  -OC       Laryngeal Function Breathing symmetrical  -OC symmetrical  -OC       Laryngeal Function Phonation symmetrical  -OC symmetrical  -OC       Laryngeal Function to Breath Hold TVF contact  -OC TVF contact  -OC       Secretion Rating Scale (Jeremy et al. Lawanda) 0- normal, no visible secretions  -OC 2- secretions initially outside the vestibule but later entered the vestibule  -OC       Secretion Description -- thin;clear;continuous  -OC       Ice Chips elicited swallow;not aspirated  -OC elicited swallow;not aspirated  -OC       Spontaneous Swallow frequency adequate  -OC frequency reduced  -OC       Sensory reduced sensation  -OC reduced sensation  -OC       Utensils Used Spoon;Cup;Straw  -OC Spoon;Cup;Straw  -OC       Consistencies Trialed ice chips;thin liquids;pudding/puree;soft to chew;regular textures  -OC ice chips;thin liquids;nectar-thick liquids;honey-thick liquids;pudding/puree;soft to chew  -OC          FEES Interpretation    Oral Phase WFL  -OC prespill of liquids into pharynx  -OC       Oral Phase, Comment -- mistiming across all textures  -OC          Initiation of Pharyngeal Swallow    Initiation of Pharyngeal Swallow WFL  -OC bolus in valleculae;bolus in pyriform sinuses  -OC       Pharyngeal Phase functional pharyngeal phase of swallow  -OC impaired pharyngeal phase of swallowing  -OC       Penetration During the Swallow -- nectar-thick liquids;honey-thick liquids  -OC       Aspiration During the Swallow -- thin liquids  -OC       Penetration After the Swallow -- nectar-thick liquids;other (see comments)  residue mixing with secretions  -OC       Aspiration After the Swallow -- nectar-thick liquids;other (see comments)  secretions mixing with residue  -OC       Residue -- thin liquids;nectar-thick liquids;honey-thick liquids;pudding/puree  -OC       Response to  Residue -- partial residue clearance;with spontaneous subsequent swallow;with cued swallow;other (see comments)  eventual penetration/aspiration secondary to mixing with secretions.  -OC       Response to Attempted Compensatory Maneuvers -- reduced residue  -OC       FEES Summary Patient appears much improved. FEES completed, patient presents with functional oropharyngeal swallow. No penetration or aspiration across trials of ice chips, thin via cup/straw, puree, mechanical soft, and regular textures. No significant pharyngeal residue. Trace residue at posterior commissure, suspect white coloring on secretions.  -OC Mild-moderate oropharyngeal dysphagia. Patient demonstrated mistiming with prespill to the pyriforms with all consistencies trialed. Patient demonstrated eventual aspiration secondary to residue mixing with secretions. Continuous coughing and throat clearing throughout eval with and without penetration/aspiration. Patient demonstrated fatigue and difficulty with self feeding.  -OC          SLP Evaluation Clinical Impression    SLP Swallowing Diagnosis swallow WFL/no suspected pharyngeal impairment  -OC mild-moderate;oral dysphagia;pharyngeal dysphagia  -OC       Functional Impact no impact on function  -OC risk of aspiration/pneumonia  -OC       Rehab Potential/Prognosis, Swallowing good, to achieve stated therapy goals  -OC good, to achieve stated therapy goals  -OC       Swallow Criteria for Skilled Therapeutic Interventions Met no problems identified which require skilled intervention  -OC demonstrates skilled criteria  -OC          Recommendations    Therapy Frequency (Swallow) evaluation only  -OC PRN  -OC       Predicted Duration Therapy Intervention (Days) -- until discharge  -OC       SLP Diet Recommendation regular textures;thin liquids  -OC NPO;ice chips between meals after oral care, with supervision;temporary alternate methods of nutrition/hydration  -OC       Recommended Diagnostics --  reassess via FEES  -OC       Recommended Precautions and Strategies upright posture during/after eating;general aspiration precautions  -OC --       Oral Care Recommendations Oral Care BID/PRN  -OC Oral Care BID/PRN  -OC       SLP Rec. for Method of Medication Administration as tolerated  -OC meds via alternate route  -OC       Monitor for Signs of Aspiration yes;notify SLP if any concerns  -OC yes;notify SLP if any concerns  -OC       Anticipated Discharge Disposition (SLP) No further SLP services warranted  -OC anticipate therapy at next level of care  -OC          Swallow Goals (SLP)    Swallow LTGs -- Swallow Long Term Goal (free text)  -OC          (LTG) Swallow    (LTG) Swallow -- Tolerate least restrictive diet with no overt s/s aspiration.  -OC       [place] (Swallow Long Term Goal) -- with minimal cues (75-90% accuracy)  -OC       Time Frame (Swallow Long Term Goal) -- by discharge  -OC                 User Key  (r) = Recorded By, (t) = Taken By, (c) = Cosigned By      Initials Name Effective Dates    Terrie Joyce SLP 08/28/23 -                     EDUCATION  The patient has been educated in the following areas:   Dysphagia (Swallowing Impairment).        SLP GOALS       Row Name 11/12/24 1430             (LTG) Swallow    (LTG) Swallow Tolerate least restrictive diet with no overt s/s aspiration.  -OC      [place] (Swallow Long Term Goal) with minimal cues (75-90% accuracy)  -OC      Time Frame (Swallow Long Term Goal) by discharge  -OC                User Key  (r) = Recorded By, (t) = Taken By, (c) = Cosigned By      Initials Name Provider Type    Terrie Joyce SLP Speech and Language Pathologist                         Time Calculation:    Time Calculation- SLP       Row Name 11/14/24 1549             Time Calculation- SLP    SLP Start Time 1500  -OC      SLP Received On 11/14/24  -OC         Untimed Charges    SLP Eval/Re-eval  ST Fiberoptic Endo Eval Swallow - 42073  -OC      29191-OG  Fiberoptic Endo Eval Swallow Minutes 75  -OC         Total Minutes    Untimed Charges Total Minutes 75  -OC       Total Minutes 75  -OC                User Key  (r) = Recorded By, (t) = Taken By, (c) = Cosigned By      Initials Name Provider Type    Terrie Joyce SLP Speech and Language Pathologist                    Therapy Charges for Today       Code Description Service Date Service Provider Modifiers Qty    [id number] [facility] SCP BRONCH ASCOPE FLX DISPOSABLE 11/14/2024 Terrie House SLP  1    [id number]  ST FIBEROPTIC ENDO EVAL SWALL 5 11/14/2024 Terrie House SLP GN 1                 NOHEMI Pruitt  11/14/2024

## 2024-11-14 NOTE — PLAN OF CARE
Goal Outcome Evaluation:              Outcome Evaluation: Pt A&Ox4 on RA with standby assist for transfers. Regular diet following speech re-evaluation. NG tube removed. Femoral central line removed. Pt resting in bed with family at bedside. Bed alarm on, call light within reach.

## 2024-11-14 NOTE — PROGRESS NOTES
[facility] Progress Note      Patient Name: Sunni Mcneil  : 1975  MRN: [id number]  Primary Care Physician:  Regla Ying APRN  Date of admission: 11/10/2024    Subjective     Interval History:   Follow-up ESRD.  Dialysis yesterday with 1600 cc off.  Femoral line now leaking and needs to be removed.  Peripheral IV placed.  Due for Kefzol this afternoon.   Plan is to give Kefzol after each dialysis based on Dr. Villafana's note..  Very anxious to eat.  Hoping for speech reevaluation today.  Review of Systems:   As noted above    Objective     Vitals:   Temp:  [96.8 °F (36 °C)-99.7 °F (37.6 °C)] 99 °F (37.2 °C)  Heart Rate:  [] 102  Resp:  [16] 16  BP: (127-154)/() 134/88    Intake/Output Summary (Last 24 hours) at 2024 1236  Last data filed at 2024 0855  Gross per 24 hour   Intake 360 ml   Output 1600 ml   Net -1240 ml       Physical Exam:    General Appearance: alert, oriented x 3, no acute distress   Skin: warm and dry  HEENT: Core track in place.  Oral mucosa dry, nonicteric sclera  Neck: supple, no JVD  Lungs: Clear to auscultation bilaterally.  Heart: RRR, tachycardic normal S1 and S2  Abdomen: soft, nontender, nondistended. +bs  : no palpable bladder  Extremities: Right upper extremity AV graft thrill, bruit. Nontender.  Neuro: normal speech and mental status     Scheduled Meds:     ceFAZolin, 1,000 mg, Intravenous, Q24H  [START ON 2024] ceFAZolin, 2,000 mg, Intravenous, Once per day on   [START ON 11/15/2024] ceFAZolin, 3,000 mg, Intravenous, Every Friday  chlorhexidine, 15 mL, Mouth/Throat, Q12H  insulin regular, 3-14 Units, Subcutaneous, Q6H  LORazepam, 2 mg, Intravenous, Once  metoprolol succinate XL, 25 mg, Oral, Q24H  mupirocin, 1 Application, Each Nare, BID  saccharomyces boulardii, 250 mg, Oral, BID  sodium chloride, 10 mL, Intravenous, Q12H  sodium chloride, 10 mL, Intravenous, Q12H  sodium chloride, 10 mL,  Intravenous, Q12H  sodium chloride, 10 mL, Intravenous, Q12H      IV Meds:          Results Reviewed:   I have personally reviewed the results from the time of this admission to 11/14/2024 12:36 EST     Results from last 7 days   Lab Units 11/14/24  0802 11/13/24  0616 11/12/24  0507 11/10/24  1420 11/10/24  1002   SODIUM mmol/L 138 135* 132*   < > 131*   POTASSIUM mmol/L 3.4* 3.8 4.3   < > 4.6   CHLORIDE mmol/L 96* 98 92*   < > 87*   CO2 mmol/L 29.0 19.9* 17.6*   < > 14.0*   BUN mg/dL 29* 45* 31*   < > 56*   CREATININE mg/dL 4.79* 8.04* 6.35*   < > 11.59*   CALCIUM mg/dL 8.5* 8.3* 8.6   < > 9.6   BILIRUBIN mg/dL 0.3  --  0.4  --  0.6   ALK PHOS U/L 88  --  64  --  58   ALT (SGPT) U/L 8  --  17  --  9   AST (SGOT) U/L 45*  --  51*  --  22   GLUCOSE mg/dL 98 82 141*   < > 118*    < > = values in this interval not displayed.       Estimated Creatinine Clearance: 13 mL/min (A) (by C-G formula based on SCr of 4.79 mg/dL (H)).    Results from last 7 days   Lab Units 11/13/24  0616 11/12/24  0507 11/11/24  0400   MAGNESIUM mg/dL  --  2.2 1.9   PHOSPHORUS mg/dL 6.0* 6.7* 6.7*             Results from last 7 days   Lab Units 11/14/24  0802 11/13/24  0616 11/12/24  0507 11/11/24  0400 11/10/24  1605 11/10/24  1539 11/10/24  1002   WBC 10*3/mm3 9.14 7.52 7.94 12.26*  --   --  16.70*   HEMOGLOBIN g/dL 8.8* 8.5* 10.1* 11.0*  --   --  12.0   HEMOGLOBIN, POC g/dL  --   --   --   --  10.5*   < >  --    PLATELETS 10*3/mm3 155 119* 120* 137*  --   --  162    < > = values in this interval not displayed.       Results from last 7 days   Lab Units 11/10/24  1002   INR  2.36*       Assessment / Plan     ASSESSMENT:  ESRD.  Dialysis tomorrow.  4K bath.  MSSA bacteremia.  Duplex of the right upper extremity AV fistula with a fluid collection thought most likely source of infection.  Vascular surgery has evaluated very thoroughly and planning conservative measures for now.  On Kefzol.  6 weeks planned with stop date 12/22/2024.  Femoral  line leaking and needs to be removed.  Try to get a peripheral access for 1 dose of Kefzol today since this is not a dialysis day.   Hypertension.  Low-dose Toprol started.  Diabetes mellitus type 2  Stress induced cardiomyopathy with normal coronary arteries.  Basal to mid anterior, inferior hypokinesis.  6.  Anemia of CKD.  Hemoglobin stable.  TERESA as outpatient.  7.  Mild thrombocytopenia.  Discontinued IV Pepcid.  Resolved.  PLAN:  Hemodialysis tomorrow.   No need to replace potassium.  3.  I called [facility] dialysis unit to give antibiotic orders but I have not received a call back yet.  Thank you for involving us in the care of Sunni Mcneil.  Please feel free to call with any questions.    Addie Watt MD  11/14/24  12:36 [facility]    [facility]  [phone]    Please note that portions of this note were completed with a voice recognition program.

## 2024-11-14 NOTE — THERAPY DISCHARGE NOTE
Patient Name: Sunni Mcneil  : 1975    MRN: [id number]                              Today's Date: 2024       Admit Date: 11/10/2024    Visit Dx:     ICD-10-CM ICD-9-CM   1. Acute encephalopathy  G93.40 348.30   2. Septic shock  A41.9 038.9    R65.21 785.52     995.92   3. Lactic acidosis  E87.20 276.2   4. Acute UTI  N39.0 599.0   5. Abnormal CT scan  R93.89 793.99   6. Acute pulmonary edema  J81.0 518.4   7. Hypertensive emergency  I16.1 401.9   8. ST elevation myocardial infarction (STEMI), unspecified artery  I21.3 410.90   9. AV graft stenosis, initial encounter  T82.858A 996.1   10. ESRD (end stage renal disease)  N18.6 585.6     Patient Active Problem List   Diagnosis    Hyperparathyroidism    Acute rejection of kidney transplant    ARF (acute renal failure)    Hx of kidney transplant    Hypertension    Kidney transplant status, cadaveric    Nephritis    Hypercalcemia    ESRD on hemodialysis    Prolonged QT interval    ESRD (end stage renal disease)    Hyperphosphatemia    Leukocytosis    Type 2 diabetes mellitus    Acute UTI (urinary tract infection)    Obesity (BMI 30-39.9)    A-V fistula    Anemia, chronic disease    Folate deficiency anemia    Febrile illness, acute    Sepsis    Screening for colon cancer    Hyperkalemia    Shunt malfunction    Anemia    Constipation    Screening for colorectal cancer    Hypervolemia    AV graft thrombosis, initial encounter    ESRD (end stage renal disease)    GERD without esophagitis    AV graft stenosis, initial encounter    Arteriovenous fistula occlusion    Pulmonary edema    ST elevation myocardial infarction (STEMI)     Past Medical History:   Diagnosis Date    Acid reflux     Anemia     WITH ESRD    Anxiety     Arthritis     Cough     related to fluid overload    Dependence on renal dialysis 2021    Depression     Diabetes mellitus     NO MEDICATIONS FOR    End stage renal disease 2021    ESRD on dialysis     SUNG AMY    History of  peritoneal dialysis     KIDNEY TRANSPLANT SEPT 2016     History of transfusion     BEEN A WHILE no reaction    Hypercalcemia     Hyperparathyroidism     Hypertension     Kidney disease     End-stage renal disease. TRANSPLANT    Kidney transplant recipient 09/02/2016    RIGHT KIDNEY. ? 2018 KIDNEY REJECTED    PONV (postoperative nausea and vomiting)     Seasonal allergies     CURRENTLY     Vascular dialysis catheter in place     RIGHT TUNNEL CATH     Past Surgical History:   Procedure Laterality Date    ARTERIOVENOUS FISTULA/SHUNT SURGERY Right 02/26/2020    Procedure: RIGHT ARM ARTERIAL VENOUS FISTULA;  Surgeon: Elijah Herrera MD;  Location: [facility] OR;  Service: Vascular;  Laterality: Right;    ARTERIOVENOUS FISTULA/SHUNT SURGERY Left 02/04/2021    Procedure: LEFT BRACHIOAXILLARY ARTERIOVENOUS FISTULA GRAFT;  Surgeon: Anya Hernandez Jr., MD;  Location: [facility] OR;  Service: Vascular;  Laterality: Left;    ARTERIOVENOUS FISTULA/SHUNT SURGERY Right 03/16/2021    Procedure: RIGHT BRACHIOAXILLARY ARTERVENIOUS GRAFT PLACEMENT;  Surgeon: Adán Maurer MD;  Location: [facility] OR;  Service: Vascular;  Laterality: Right;    ARTERIOVENOUS FISTULA/SHUNT SURGERY Right 8/4/2023    Procedure: RIGHT ARM FISTULOGRAM PEUCUTANEOUS AND PERIPHERAL TRANSLUMINAL ANGIOPLSTY/STENT;  Surgeon: Elijah Herrera MD;  Location: [facility] OR 18/19;  Service: Vascular;  Laterality: Right;    BRACHIAL EMBOLECTOMY Right 10/6/2023    Procedure: RIGHT AV GRAFT WITH ANGIOPLASTY AND CENTROVENOUS ANGIOPLASTY;  Surgeon: Phu Gaviria MD;  Location: [facility] OR 18/19;  Service: Vascular;  Laterality: Right;    CARDIAC CATHETERIZATION N/A 11/10/2024    Procedure: Left Heart Cath;  Surgeon: Maribell Sandhu MD;  Location: [facility] INVASIVE LOCATION;  Service: Cardiology;  Laterality: N/A;    CARDIAC CATHETERIZATION N/A 11/10/2024    Procedure: Coronary angiography;  Surgeon: Maribell Sandhu MD;   Location: [facility] CATH INVASIVE LOCATION;  Service: Cardiology;  Laterality: N/A;    CARDIAC CATHETERIZATION N/A 11/10/2024    Procedure: Left ventriculography;  Surgeon: Maribell Sandhu MD;  Location: [facility] CATH INVASIVE LOCATION;  Service: Cardiology;  Laterality: N/A;    CARDIAC CATHETERIZATION N/A 11/10/2024    Procedure: Right Heart Cath;  Surgeon: Maribell Sandhu MD;  Location: [facility] CATH INVASIVE LOCATION;  Service: Cardiology;  Laterality: N/A;    COLONOSCOPY N/A 11/30/2022    Procedure: COLONOSCOPY TO CECUM AND TERM. ILEUM;  Surgeon: Casimiro Perkins MD;  Location: [facility] ENDOSCOPY;  Service: Gastroenterology;  Laterality: N/A;  PRE OP - SCREENING  POST OP - DIVERTICULOSIS, SM. HEMORRHOIDS    INSERTION AND REMOVAL HEMODIALYSIS CATHETER N/A 02/13/2021    Procedure: INSERTION AND REMOVAL OF HEMODIALYSIS CATHETER;  Surgeon: Adán Maurer MD;  Location: [facility] MAIN OR;  Service: Vascular;  Laterality: N/A;    INSERTION HEMODIALYSIS CATHETER Right 01/15/2021    Procedure: TUNNELED DIAYLIS CATHETER PLACEMENT;  Surgeon: Phu Gaviria MD;  Location: [facility] HYBRID OR 18/19;  Service: Vascular;  Laterality: Right;    INSERTION HEMODIALYSIS CATHETER Left 1/21/2023    Procedure: HEMODIALYSIS CATHETER INSERTION;  Surgeon: Elijah Herrera MD;  Location: [facility] MAIN OR;  Service: Vascular;  Laterality: Left;    INSERTION PERITONEAL DIALYSIS CATHETER  07/29/2014    Laparoscopic placement of Curl Cath peritoneal dialysis catheter, Dr. Vinod Goldstein    INSERTION PERITONEAL DIALYSIS CATHETER N/A 07/24/2019    Procedure: LAPAROSCOPIC INSERTION PERITONEAL DIALYSIS CATHETER;  Surgeon: Damon Rooney MD;  Location: [facility] MAIN OR;  Service: General    REMOVAL HEMODIALYSIS CATHETER N/A 2/15/2023    Procedure: PALINDROME REMOVAL;  Surgeon: Elijah Herrera MD;  Location: [facility] MAIN OR;  Service: Vascular;  Laterality: N/A;    REMOVAL PERITONEAL DIALYSIS CATHETER N/A 10/17/2016     Procedure: REMOVAL PERITONEAL DIALYSIS CATHETER;  Surgeon: Damon Rooney MD;  Location: [facility] OR;  Service:     REMOVAL PERITONEAL DIALYSIS CATHETER N/A 10/21/2020    Procedure: REMOVAL PERITONEAL DIALYSIS CATHETER;  Surgeon: Damon Rooney MD;  Location: [facility] OR;  Service: General;  Laterality: N/A;    SHUNT O GRAM Right 2022    Procedure: RIGHT  ARM SHUNT O GRAM , ANGIOPLASTY OF AXILARY VEIN AND SUBCLAVIAN VEIN AT SVC JUNCTION;  Surgeon: Anya Hernandez Jr., MD;  Location: [facility] OR ;  Service: Vascular;  Laterality: Right;    SHUNT O GRAM Right 2023    Procedure: OPEN THROMBECTOMY AND ANGIOPLASTY;  Surgeon: Elijah Herrera MD;  Location: [facility] ;  Service: Vascular;  Laterality: Right;    SHUNT O GRAM Right 2023    Procedure: Right arm dialysis graft open thrombectomy, shuntogram, angioplasty and stent;  Surgeon: Karuna Villalba MD;  Location: [facility] ;  Service: Vascular;  Laterality: Right;    SHUNT O GRAM Right 2023    Procedure: RIGHT ARM FISTULOGRAM, ANGIOPLASTY;  Surgeon: Elijah Herrera MD;  Location: [facility] OR ;  Service: Vascular;  Laterality: Right;    SHUNT O GRAM Right 5/3/2024    Procedure: Hemodialysis shunt thrombectomy with revision;  Surgeon: Alex Sanchez MD;  Location: [facility];  Service: Vascular;  Laterality: Right;    TRANSPLANTATION RENAL Right 2016    from  donor    TUBAL ABDOMINAL LIGATION Bilateral       General Information       Row Name 24 1107          Physical Therapy Time and Intention    Document Type evaluation  -MR     Mode of Treatment individual therapy;physical therapy  -MR       Row Name 24 1107          General Information    Patient Profile Reviewed yes  -MR     Prior Level of Function independent:;all household mobility;gait;transfer;ADL's  -MR     Existing Precautions/Restrictions NPO  -MR       Row  Name 11/14/24 1107          Living Environment    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), adult  -MR       Row Name 11/14/24 1107          Home Main Entrance    Number of Stairs, Main Entrance twelve  -MR     Stair Railings, Main Entrance railings safe and in good condition;railing on left side (ascending)  -MR       Row Name 11/14/24 1107          Cognition    Orientation Status (Cognition) oriented x 4  -MR               User Key  (r) = Recorded By, (t) = Taken By, (c) = Cosigned By      Initials Name Provider Type    MR GonzaleszDamaris, PT Physical Therapist                   Mobility       Row Name 11/14/24 1112          Bed Mobility    Bed Mobility bed mobility (all) activities  -MR     All Activities, [place] (Bed Mobility) contact guard;1 person assist  -MR       Row Name 11/14/24 1112          Sit-Stand Transfer    Sit-Stand [place] (Transfers) contact guard;1 person assist  -MR     Comment, (Sit-Stand Transfer) No AD  -MR       Row Name 11/14/24 1112          Gait/Stairs (Locomotion)    [place] Level (Gait) contact guard;1 person assist  -MR     Patient was able to Ambulate yes  -MR     Distance in Feet (Gait) 50  -MR     Bilateral Gait Deviations decreased arm swing  -MR               User Key  (r) = Recorded By, (t) = Taken By, (c) = Cosigned By      Initials Name Provider Type    MR HawleyDamaris, PT Physical Therapist                   Obj/Interventions       Row Name 11/14/24 1115          Range of Motion Comprehensive    General Range of Motion bilateral lower extremity ROM WNL  -MR       Row Name 11/14/24 1115          Strength Comprehensive (MMT)    General Manual Muscle Testing (MMT) Assessment no strength deficits identified  -MR     Comment, General Manual Muscle Testing (MMT) Assessment Grossly 3+/5  -MR       Row Name 11/14/24 1115          Balance    Comment, Balance good  -MR       Row Name 11/14/24 1115          Sensory Assessment (Somatosensory)    Sensory Assessment (Somatosensory)  sensation intact  -MR               User Key  (r) = Recorded By, (t) = Taken By, (c) = Cosigned By      Initials Name Provider Type    MR Damaris Hawley, PT Physical Therapist                   Goals/Plan    No documentation.                  Clinical Impression       Row Name 11/14/24 1115          Pain    Pretreatment Pain Rating 0/10 - no pain  -MR     Posttreatment Pain Rating 0/10 - no pain  -MR       Row Name 11/14/24 1115          Plan of Care Review    Plan of Care Reviewed With patient;child  -MR     Progress improving  -MR     Outcome Evaluation PT performed a thorough assessment of pt’s musculoskeletal, nervous, cardiovascular, integumentary, respiratory, and digestive system and levels of functional independence. Examined current levels of functional components including but not limited to bed mobility, functional transfers, gait, seated balance, standing balance, posture, strength, fall risk.  PT received patient in supine with HOB elevated and family by bedside.  PT/OT provided assistance with donning gait belt, managing lines in preparation for OOB mobility.  Patient able to perform bed mobility with CGA x 1.  PT then providing CGA for patient transfer from EOB to standing with no AD.  PT continued assistance with patient ambulation of approximately 50 feet CGA.  Patient does demonstrate decreased step length and pam, VC needed for upright posture and environmental navigation.  PT monitored patient vitals throughout session with no abnormal events noted.  PT then returned patient to sitting EOB bed with call light and all lines and needs in reach. This evaluation is of low complexity due to medical stability of patient and minimal comorbidities.  -MR       Row Name 11/14/24 1115          Therapy Assessment/Plan (PT)    Criteria for Skilled Interventions Met (PT) no  -MR     Therapy Frequency (PT) evaluation only  -MR               User Key  (r) = Recorded By, (t) = Taken By, (c) = Cosigned By       Initials Name Provider Type     Damaris Hawley, PT Physical Therapist                   Outcome Measures       Row Name 11/14/24 1118 11/14/24 0855       How much help from another person do you currently need...    Turning from your back to your side while in flat bed without using bedrails? 4  -MR 4  -CC    Moving from lying on back to sitting on the side of a flat bed without bedrails? 4  -MR 3  -CC    Moving to and from a bed to a chair (including a wheelchair)? 4  -MR 3  -CC    Standing up from a chair using your arms (e.g., wheelchair, bedside chair)? 4  -MR 2  -CC    Climbing 3-5 steps with a railing? 3  -MR 3  -CC    To walk in hospital room? 4  -MR 3  -CC    AM-PAC 6 Clicks Score (PT) 23  -MR 18  -CC    Highest Level of Mobility Goal 7 --> Walk 25 feet or more  -MR 6 --> Walk 10 steps or more  -CC      Row Name 11/14/24 1118          Functional Assessment    Outcome Measure Options AM-PAC 6 Clicks Basic Mobility (PT)  -MR               User Key  (r) = Recorded By, (t) = Taken By, (c) = Cosigned By      Initials Name Provider Type    CC Marco Pringle, RN Registered Nurse    Damaris Rowe, PT Physical Therapist                  Physical Therapy Education       Title: PT OT SLP Therapies (Done)       Topic: Physical Therapy (Done)       Point: Mobility training (Done)       Learning Progress Summary            Patient Acceptance, E, VU by MR at 11/14/2024 1118   Family Acceptance, E, VU by MR at 11/14/2024 1118                      Point: Home exercise program (Done)       Learning Progress Summary            Patient Acceptance, E, VU by MR at 11/14/2024 1118   Family Acceptance, E, VU by MR at 11/14/2024 1118                      Point: Body mechanics (Done)       Learning Progress Summary            Patient Acceptance, E, VU by MR at 11/14/2024 1118   Family Acceptance, E, VU by MR at 11/14/2024 1118                      Point: Precautions (Done)       Learning Progress Summary            Patient  Acceptance, E, VU by MR at 11/14/2024 1118   Family Acceptance, E, VU by MR at 11/14/2024 1118                                      User Key       Initials Effective Dates Name Provider Type Discipline    MR 10/09/24 -  Damaris Hawley PT Physical Therapist PT                  PT Recommendation and Plan     Progress: improving  Outcome Evaluation: PT performed a thorough assessment of pt’s musculoskeletal, nervous, cardiovascular, integumentary, respiratory, and digestive system and levels of functional independence. Examined current levels of functional components including but not limited to bed mobility, functional transfers, gait, seated balance, standing balance, posture, strength, fall risk.  PT received patient in supine with HOB elevated and family by bedside.  PT/OT provided assistance with donning gait belt, managing lines in preparation for OOB mobility.  Patient able to perform bed mobility with CGA x 1.  PT then providing CGA for patient transfer from EOB to standing with no AD.  PT continued assistance with patient ambulation of approximately 50 feet CGA.  Patient does demonstrate decreased step length and pam, VC needed for upright posture and environmental navigation.  PT monitored patient vitals throughout session with no abnormal events noted.  PT then returned patient to sitting EOB bed with call light and all lines and needs in reach. This evaluation is of low complexity due to medical stability of patient and minimal comorbidities.     Time Calculation:   PT Evaluation Complexity  History, PT Evaluation Complexity: 1-2 personal factors and/or comorbidities  Examination of Body Systems (PT Eval Complexity): 1-2 elements  Clinical Presentation (PT Evaluation Complexity): stable  Clinical Decision Making (PT Evaluation Complexity): low complexity  Overall Complexity (PT Evaluation Complexity): low complexity     PT Charges       Row Name 11/14/24 1119             Time Calculation    Start Time  1004  -MR      Stop Time 1016  -MR      Time Calculation (min) 12 min  -MR                User Key  (r) = Recorded By, (t) = Taken By, (c) = Cosigned By      Initials Name Provider Type    Damaris Rowe, PT Physical Therapist                  Therapy Charges for Today       Code Description Service Date Service Provider Modifiers Qty    [id number] HC PT EVAL LOW COMPLEXITY 2 11/14/2024 Damaris Hawley, PT GP 1            PT G-Codes  Outcome Measure Options: AM-PAC 6 Clicks Basic Mobility (PT)  AM-PAC 6 Clicks Score (PT): 23    PT Discharge Summary  Anticipated Discharge Disposition (PT): home    Damaris Hawley, PT  11/14/2024

## 2024-11-14 NOTE — PLAN OF CARE
Goal Outcome Evaluation:  Plan of Care Reviewed With: patient           Outcome Evaluation: FEES completed. Recommend regular textures and thin liquids, meds whole as tolerated. Recommend general aspiration precautions. SLP to s/o, please re-consult as warranted.    Anticipated Discharge Disposition (SLP): No further SLP services warranted          SLP Swallowing Diagnosis: swallow WFL/no suspected pharyngeal impairment (11/14/24 1313)

## 2024-11-14 NOTE — PLAN OF CARE
Goal Outcome Evaluation:  Plan of Care Reviewed With: patient, daughter           Outcome Evaluation: Pt admitted to [facility] for septic shock and respiratory failure 2/2 MMSA bacteremia. Pt was intubated 11/9 and extubated 11/12. Lives w/ her adult children in an apartment w/ 1 flight of steps to enter. (I) w/ BADLs, IADLs, and mobility at baseline. Today, pt was (I) w/ bed mobility and SBA for LBD at EOB. Performed STS, functional mobility, and grooming/hygiene at sink -- initially w/ CGA then graded to SPV. No balance, endurance, or strength deficits noted. Pt is performing near baseline and has no further acute OT needs at this time. Recommend continued OOB with staff while inpatient.    Anticipated Discharge Disposition (OT): home, home with assist

## 2024-11-14 NOTE — PLAN OF CARE
Goal Outcome Evaluation:  Plan of Care Reviewed With: patient, child        Progress: improving  Outcome Evaluation: PT performed a thorough assessment of pt’s musculoskeletal, nervous, cardiovascular, integumentary, respiratory, and digestive system and levels of functional independence. Examined current levels of functional components including but not limited to bed mobility, functional transfers, gait, seated balance, standing balance, posture, strength, fall risk.  PT received patient in supine with HOB elevated and family by bedside.  PT/OT provided assistance with donning gait belt, managing lines in preparation for OOB mobility.  Patient able to perform bed mobility with CGA x 1.  PT then providing CGA for patient transfer from EOB to standing with no AD.  PT continued assistance with patient ambulation of approximately 50 feet CGA.  Patient does demonstrate decreased step length and pam, VC needed for upright posture and environmental navigation.  PT monitored patient vitals throughout session with no abnormal events noted.  PT then returned patient to sitting EOB bed with call light and all lines and needs in reach. This evaluation is of low complexity due to medical stability of patient and minimal comorbidities.    Anticipated Discharge Disposition (PT): home

## 2024-11-14 NOTE — PROGRESS NOTES
Name: Sunni Mcneil ADMIT: 11/10/2024   : 1975  PCP: Regla Ying APRN    MRN: [id number] LOS: 4 days   AGE/SEX: 49 y.o. female  ROOM: [address]    Chief Complaint   Patient presents with    Anxiety    Altered Mental Status     CC: Sepsis  Subjective     49 y.o. female with sepsis and a possible source from the right arm AV shunt.  Unfortunately at this time we really do not have a site within the shunt that is pointing.  At this juncture removing the entire shunt is a very large operation which will leave her right arm  not usable for dialysis long-term.  We are opting for conservative approach to either get the infection to clear or at least localize itself for more directed treatment.  Patient is much more awake today and understands and agrees with this plan    Review of Systems denies pain or tenderness in the right arm.  States shunt feels fine    Objective     Scheduled Medications:   ceFAZolin, 1,000 mg, Intravenous, Q24H  chlorhexidine, 15 mL, Mouth/Throat, Q12H  insulin regular, 3-14 Units, Subcutaneous, Q6H  LORazepam, 2 mg, Intravenous, Once  metoprolol succinate XL, 25 mg, Oral, Q24H  mupirocin, 1 Application, Each Nare, BID  saccharomyces boulardii, 250 mg, Oral, BID  sodium chloride, 10 mL, Intravenous, Q12H  sodium chloride, 10 mL, Intravenous, Q12H  sodium chloride, 10 mL, Intravenous, Q12H  sodium chloride, 10 mL, Intravenous, Q12H        Active Infusions:         As Needed Medications:    acetaminophen **OR** acetaminophen    alteplase    senna-docusate sodium **AND** polyethylene glycol **AND** bisacodyl **AND** bisacodyl    dextrose    dextrose    fentaNYL citrate (PF)    glucagon (human recombinant)    nitroglycerin    ondansetron ODT **OR** ondansetron    phenol    sodium chloride    sodium chloride    Vital Signs  Vital Signs Patient Vitals for the past 24 hrs:   BP Temp Temp src Pulse Resp SpO2 Weight   24 0735 134/88 99 °F (37.2 °C) Oral 102 16 99 % --    11/14/24 0426 -- -- -- -- -- -- 76.2 kg (167 lb 15.9 oz)   11/14/24 0423 132/76 98.6 °F (37 °C) Oral 98 16 94 % --   11/14/24 0111 127/75 99.3 °F (37.4 °C) Oral 103 16 90 % --   11/13/24 2029 (!) 154/127 99.7 °F (37.6 °C) Oral 117 16 95 % --   11/13/24 1410 139/50 96.8 °F (36 °C) Temporal 72 16 -- --     Vital Signs (range)  Temp:  [96.8 °F (36 °C)-99.7 °F (37.6 °C)] 99 °F (37.2 °C)  Heart Rate:  [] 102  Resp:  [16] 16  BP: (127-154)/() 134/88  I/O:  I/O last 3 completed shifts:  In: 200 [Other:60; NG/GT:140]  Out: 1600   I/O:   Intake/Output Summary (Last 24 hours) at 11/14/2024 0820  Last data filed at 11/14/2024 0300  Gross per 24 hour   Intake 200 ml   Output 1600 ml   Net -1400 ml     BMI:  Body mass index is 32.81 kg/m².    Physical Exam:  Physical Exam   Awake and alert  Right arm without heat redness or pain.  Shunt patent    Results Review:     CBC    Results from last 7 days   Lab Units 11/14/24  0802 11/13/24  0616 11/12/24  0507 11/11/24  0400 11/10/24  1605 11/10/24  1602 11/10/24  1539 11/10/24  1002   WBC 10*3/mm3 9.14 7.52 7.94 12.26*  --   --   --  16.70*   HEMOGLOBIN g/dL 8.8* 8.5* 10.1* 11.0*  --   --   --  12.0   HEMOGLOBIN, POC g/dL  --   --   --   --  10.5* 10.9* 11.9*  --    PLATELETS 10*3/mm3 155 119* 120* 137*  --   --   --  162     BMP   Results from last 7 days   Lab Units 11/13/24  0616 11/12/24  0507 11/11/24  1857 11/11/24  0400 11/10/24  1420 11/10/24  1002   SODIUM mmol/L 135* 132* 133* 130*  --  131*   POTASSIUM mmol/L 3.8 4.3 4.2 6.0* 6.2* 4.6   CHLORIDE mmol/L 98 92* 93* 96*  --  87*   CO2 mmol/L 19.9* 17.6* 21.1* 17.0*  --  14.0*   BUN mg/dL 45* 31* 20 44*  --  56*   CREATININE mg/dL 8.04* 6.35* 5.09* 8.49*  --  11.59*   GLUCOSE mg/dL 82 141* 142* 130*  --  118*   MAGNESIUM mg/dL  --  2.2  --  1.9  --   --    PHOSPHORUS mg/dL 6.0* 6.7*  --  6.7*  --   --      Cr Clearance Estimated Creatinine Clearance: 7.7 mL/min (A) (by C-G formula based on SCr of 8.04 mg/dL  (H)).  Coag   Results from last 7 days   Lab Units 11/10/24  1002   INR  2.36*   APTT seconds 37.5*     HbA1C   Lab Results   Component Value Date    HGBA1C 4.3 (L) 08/14/2024    HGBA1C 4.8 05/08/2024    HGBA1C 4.4 04/02/2024     Blood Glucose   Glucose   Date/Time Value Ref Range Status   11/14/2024 0605 120 70 - 130 mg/dL Final   11/13/2024 2033 110 70 - 130 mg/dL Final   11/13/2024 1130 96 70 - 130 mg/dL Final   11/13/2024 0632 94 70 - 130 mg/dL Final   11/12/2024 2331 91 70 - 130 mg/dL Final   11/12/2024 1725 91 70 - 130 mg/dL Final   11/12/2024 1127 103 70 - 130 mg/dL Final   11/11/2024 2325 118 70 - 130 mg/dL Final     Infection   Results from last 7 days   Lab Units 11/12/24  0507 11/11/24  0350 11/10/24  1420 11/10/24  1245 11/10/24  1229 11/10/24  1002   BLOODCX  No growth at 2 days  No growth at 2 days  --   --  Staphylococcus aureus* Staphylococcus aureus*  --    RESPCX   --  Rejected  --   --   --   --    BCIDPCR   --   --   --  Staph aureus. mecA/C and MREJ (methicillin resistance gene) NOT detected. Identification by BCID2 PCR*  --   --    PROCALCITONIN ng/mL  --   --  40.40*  --   --  37.80*     CMP   Results from last 7 days   Lab Units 11/13/24  0616 11/12/24  0507 11/11/24  1857 11/11/24  0400 11/10/24  1420 11/10/24  1002   SODIUM mmol/L 135* 132* 133* 130*  --  131*   POTASSIUM mmol/L 3.8 4.3 4.2 6.0* 6.2* 4.6   CHLORIDE mmol/L 98 92* 93* 96*  --  87*   CO2 mmol/L 19.9* 17.6* 21.1* 17.0*  --  14.0*   BUN mg/dL 45* 31* 20 44*  --  56*   CREATININE mg/dL 8.04* 6.35* 5.09* 8.49*  --  11.59*   GLUCOSE mg/dL 82 141* 142* 130*  --  118*   ALBUMIN g/dL 2.9* 2.9*  --  3.1*  --  4.0   BILIRUBIN mg/dL  --  0.4  --   --   --  0.6   ALK PHOS U/L  --  64  --   --   --  58   AST (SGOT) U/L  --  51*  --   --   --  22   ALT (SGPT) U/L  --  17  --   --   --  9     Radiology(recent) XR Abdomen KUB    Result Date: 11/12/2024   As described.   This report was finalized on 11/12/2024 7:07 PM by Dr. David RICHARD  ANDI Montoya on Workstation: [id number]       Assessment & Plan     Assessment & Plan      Pulmonary edema    ST elevation myocardial infarction (STEMI)      49 y.o. female with MSSA sepsis from source felt to be most likely a segment of her shunt but currently the shunt has no evidence of redness heat or pain and there is nowhere to isolate the possible infection within the shunt.  This would require complete removal or long-term antibiotic therapy with hopes that it will either sterilize the shunt or force the infected segment to show itself for a more localized treatment.  We have opted for the latter and she is going to undergo 6 weeks of antibiotics with her dialysis and then we will reassess with duplex.  Will arrange outpatient follow-up with duplex in the office same day.  She and her family are aware of the risks and benefits of this plan and agree.      Phu Gaviria MD  11/14/24  08:20 EST    Please call my office with any question: [phone]    Active Hospital Problems    Diagnosis  POA    **Pulmonary edema [J81.1]  Yes    ST elevation myocardial infarction (STEMI) [I21.3]  Unknown      Resolved Hospital Problems   No resolved problems to display.

## 2024-11-14 NOTE — THERAPY EVALUATION
Patient Name: Sunni Mcneil  : 1975    MRN: [id number]                              Today's Date: 2024       Admit Date: 11/10/2024    Visit Dx:     ICD-10-CM ICD-9-CM   1. Acute encephalopathy  G93.40 348.30   2. Septic shock  A41.9 038.9    R65.21 785.52     995.92   3. Lactic acidosis  E87.20 276.2   4. Acute UTI  N39.0 599.0   5. Abnormal CT scan  R93.89 793.99   6. Acute pulmonary edema  J81.0 518.4   7. Hypertensive emergency  I16.1 401.9   8. ST elevation myocardial infarction (STEMI), unspecified artery  I21.3 410.90   9. AV graft stenosis, initial encounter  T82.858A 996.1   10. ESRD (end stage renal disease)  N18.6 585.6     Patient Active Problem List   Diagnosis    Hyperparathyroidism    Acute rejection of kidney transplant    ARF (acute renal failure)    Hx of kidney transplant    Hypertension    Kidney transplant status, cadaveric    Nephritis    Hypercalcemia    ESRD on hemodialysis    Prolonged QT interval    ESRD (end stage renal disease)    Hyperphosphatemia    Leukocytosis    Type 2 diabetes mellitus    Acute UTI (urinary tract infection)    Obesity (BMI 30-39.9)    A-V fistula    Anemia, chronic disease    Folate deficiency anemia    Febrile illness, acute    Sepsis    Screening for colon cancer    Hyperkalemia    Shunt malfunction    Anemia    Constipation    Screening for colorectal cancer    Hypervolemia    AV graft thrombosis, initial encounter    ESRD (end stage renal disease)    GERD without esophagitis    AV graft stenosis, initial encounter    Arteriovenous fistula occlusion    Pulmonary edema    ST elevation myocardial infarction (STEMI)     Past Medical History:   Diagnosis Date    Acid reflux     Anemia     WITH ESRD    Anxiety     Arthritis     Cough     related to fluid overload    Dependence on renal dialysis 2021    Depression     Diabetes mellitus     NO MEDICATIONS FOR    End stage renal disease 2021    ESRD on dialysis     SUNG AMY    History of  peritoneal dialysis     KIDNEY TRANSPLANT SEPT 2016     History of transfusion     BEEN A WHILE no reaction    Hypercalcemia     Hyperparathyroidism     Hypertension     Kidney disease     End-stage renal disease. TRANSPLANT    Kidney transplant recipient 09/02/2016    RIGHT KIDNEY. ? 2018 KIDNEY REJECTED    PONV (postoperative nausea and vomiting)     Seasonal allergies     CURRENTLY     Vascular dialysis catheter in place     RIGHT TUNNEL CATH     Past Surgical History:   Procedure Laterality Date    ARTERIOVENOUS FISTULA/SHUNT SURGERY Right 02/26/2020    Procedure: RIGHT ARM ARTERIAL VENOUS FISTULA;  Surgeon: Elijah Herrera MD;  Location: [facility] OR;  Service: Vascular;  Laterality: Right;    ARTERIOVENOUS FISTULA/SHUNT SURGERY Left 02/04/2021    Procedure: LEFT BRACHIOAXILLARY ARTERIOVENOUS FISTULA GRAFT;  Surgeon: Anya Hernandez Jr., MD;  Location: [facility] OR;  Service: Vascular;  Laterality: Left;    ARTERIOVENOUS FISTULA/SHUNT SURGERY Right 03/16/2021    Procedure: RIGHT BRACHIOAXILLARY ARTERVENIOUS GRAFT PLACEMENT;  Surgeon: Adán Maurer MD;  Location: [facility] OR;  Service: Vascular;  Laterality: Right;    ARTERIOVENOUS FISTULA/SHUNT SURGERY Right 8/4/2023    Procedure: RIGHT ARM FISTULOGRAM PEUCUTANEOUS AND PERIPHERAL TRANSLUMINAL ANGIOPLSTY/STENT;  Surgeon: Elijah Herrera MD;  Location: [facility] OR 18/19;  Service: Vascular;  Laterality: Right;    BRACHIAL EMBOLECTOMY Right 10/6/2023    Procedure: RIGHT AV GRAFT WITH ANGIOPLASTY AND CENTROVENOUS ANGIOPLASTY;  Surgeon: Phu Gaviria MD;  Location: [facility] OR 18/19;  Service: Vascular;  Laterality: Right;    CARDIAC CATHETERIZATION N/A 11/10/2024    Procedure: Left Heart Cath;  Surgeon: Maribell Sandhu MD;  Location: [facility] INVASIVE LOCATION;  Service: Cardiology;  Laterality: N/A;    CARDIAC CATHETERIZATION N/A 11/10/2024    Procedure: Coronary angiography;  Surgeon: Maribell Sandhu MD;   Location: [facility] CATH INVASIVE LOCATION;  Service: Cardiology;  Laterality: N/A;    CARDIAC CATHETERIZATION N/A 11/10/2024    Procedure: Left ventriculography;  Surgeon: Maribell Sandhu MD;  Location: [facility] CATH INVASIVE LOCATION;  Service: Cardiology;  Laterality: N/A;    CARDIAC CATHETERIZATION N/A 11/10/2024    Procedure: Right Heart Cath;  Surgeon: Maribell Sandhu MD;  Location: [facility] CATH INVASIVE LOCATION;  Service: Cardiology;  Laterality: N/A;    COLONOSCOPY N/A 11/30/2022    Procedure: COLONOSCOPY TO CECUM AND TERM. ILEUM;  Surgeon: Casimiro Perkins MD;  Location: [facility] ENDOSCOPY;  Service: Gastroenterology;  Laterality: N/A;  PRE OP - SCREENING  POST OP - DIVERTICULOSIS, SM. HEMORRHOIDS    INSERTION AND REMOVAL HEMODIALYSIS CATHETER N/A 02/13/2021    Procedure: INSERTION AND REMOVAL OF HEMODIALYSIS CATHETER;  Surgeon: Adán Maurer MD;  Location: [facility] MAIN OR;  Service: Vascular;  Laterality: N/A;    INSERTION HEMODIALYSIS CATHETER Right 01/15/2021    Procedure: TUNNELED DIAYLIS CATHETER PLACEMENT;  Surgeon: Phu Gaviria MD;  Location: [facility] HYBRID OR 18/19;  Service: Vascular;  Laterality: Right;    INSERTION HEMODIALYSIS CATHETER Left 1/21/2023    Procedure: HEMODIALYSIS CATHETER INSERTION;  Surgeon: Elijah Herrera MD;  Location: [facility] MAIN OR;  Service: Vascular;  Laterality: Left;    INSERTION PERITONEAL DIALYSIS CATHETER  07/29/2014    Laparoscopic placement of [place] Cath peritoneal dialysis catheter, Dr. Vinod Goldstein    INSERTION PERITONEAL DIALYSIS CATHETER N/A 07/24/2019    Procedure: LAPAROSCOPIC INSERTION PERITONEAL DIALYSIS CATHETER;  Surgeon: Damon Rooney MD;  Location: [facility] MAIN OR;  Service: General    REMOVAL HEMODIALYSIS CATHETER N/A 2/15/2023    Procedure: PALINDROME REMOVAL;  Surgeon: Elijah Herrera MD;  Location: [facility] MAIN OR;  Service: Vascular;  Laterality: N/A;    REMOVAL PERITONEAL DIALYSIS CATHETER N/A 10/17/2016     Procedure: REMOVAL PERITONEAL DIALYSIS CATHETER;  Surgeon: Damon Rooney MD;  Location: [facility] OR;  Service:     REMOVAL PERITONEAL DIALYSIS CATHETER N/A 10/21/2020    Procedure: REMOVAL PERITONEAL DIALYSIS CATHETER;  Surgeon: Damon Rooney MD;  Location: [facility] OR;  Service: General;  Laterality: N/A;    SHUNT O GRAM Right 2022    Procedure: RIGHT  ARM SHUNT O GRAM , ANGIOPLASTY OF AXILARY VEIN AND SUBCLAVIAN VEIN AT SVC JUNCTION;  Surgeon: Anya Hernandez Jr., MD;  Location: [facility] ;  Service: Vascular;  Laterality: Right;    SHUNT O GRAM Right 2023    Procedure: OPEN THROMBECTOMY AND ANGIOPLASTY;  Surgeon: Elijah Herrera MD;  Location: [facility] ;  Service: Vascular;  Laterality: Right;    SHUNT O GRAM Right 2023    Procedure: Right arm dialysis graft open thrombectomy, shuntogram, angioplasty and stent;  Surgeon: Karuna Villalba MD;  Location: [facility] ;  Service: Vascular;  Laterality: Right;    SHUNT O GRAM Right 2023    Procedure: RIGHT ARM FISTULOGRAM, ANGIOPLASTY;  Surgeon: Elijah Herrera MD;  Location: [facility] ;  Service: Vascular;  Laterality: Right;    SHUNT O GRAM Right 5/3/2024    Procedure: Hemodialysis shunt thrombectomy with revision;  Surgeon: Alex Sanchez MD;  Location: [facility];  Service: Vascular;  Laterality: Right;    TRANSPLANTATION RENAL Right 2016    from  donor    TUBAL ABDOMINAL LIGATION Bilateral       General Information       Row Name 24 1239          OT Time and Intention    Subjective Information no complaints  -KG     Document Type evaluation  -KG     Mode of Treatment individual therapy;occupational therapy  -KG     Patient Effort excellent  -KG     Symptoms Noted During/After Treatment none  -KG       Row Name 24 1231          General Information    Prior Level of Function independent:;driving;all household  mobility;community mobility;home management  -KG     Existing Precautions/Restrictions no known precautions/restrictions  -KG     Barriers to Rehab none identified  -KG       Row Name 11/14/24 1239          Living Environment    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), adult  -KG       Row Name 11/14/24 1239          Home Main Entrance    Number of Stairs, Main Entrance ten  -KG       Row Name 11/14/24 1239          Cognition    Orientation Status (Cognition) oriented x 4  -KG               User Key  (r) = Recorded By, (t) = Taken By, (c) = Cosigned By      Initials Name Provider Type    KG Rk Shields OT Occupational Therapist                     Mobility/ADL's       Row Name 11/14/24 1239          Bed Mobility    Bed Mobility supine-sit;sit-supine  -KG     Supine-Sit Bradley (Bed Mobility) independent  -KG     Sit-Supine Bradley (Bed Mobility) independent  -KG       Row Name 11/14/24 1239          Transfers    Transfers sit-stand transfer;stand-sit transfer  -KG       Row Name 11/14/24 1239          Sit-Stand Transfer    Sit-Stand Bradley (Transfers) supervision;contact guard  -KG       Row Name 11/14/24 1239          Stand-Sit Transfer    Stand-Sit Bradley (Transfers) supervision;contact guard  -KG       Row Name 11/14/24 1239          Functional Mobility    Functional Mobility- Ind. Level supervision required  From EOB to hallway, to sink, and then back to EOB (simulating household distance) w/o AD -- initially w/ CGA then grading to SPV  -KG       Row Name 11/14/24 1239          Activities of Daily Living    BADL Assessment/Intervention lower body dressing;grooming  -KG       Row Name 11/14/24 1239          Lower Body Dressing Assessment/Training    Bradley Level (Lower Body Dressing) don;shoes/slippers;set up  -KG     Position (Lower Body Dressing) edge of bed sitting  -KG       Row Name 11/14/24 1239          Grooming Assessment/Training    Bradley Level (Grooming) oral care  regimen;standby assist  -KG     Position (Grooming) sink side  -KG               User Key  (r) = Recorded By, (t) = Taken By, (c) = Cosigned By      Initials Name Provider Type    Rk Coreas OT Occupational Therapist                   Obj/Interventions       Row Name 11/14/24 1242          Sensory Assessment (Somatosensory)    Sensory Assessment (Somatosensory) sensation intact  -KG       Row Name 11/14/24 1242          Vision Assessment/Intervention    Visual Impairment/Limitations WNL  -KG       Row Name 11/14/24 1242          Range of Motion Comprehensive    General Range of Motion no range of motion deficits identified  -KG       Row Name 11/14/24 1242          Strength Comprehensive (MMT)    Comment, General Manual Muscle Testing (MMT) Assessment BUE strengh 4/5 grossly  -KG       Row Name 11/14/24 1242          Motor Skills    Motor Skills functional endurance  -KG     Functional Endurance no endurance deficits noted w/ activity  -KG       Row Name 11/14/24 1242          Balance    Balance Assessment sitting static balance;sitting dynamic balance;standing static balance;standing dynamic balance  -KG     Static Sitting Balance independent  -KG     Dynamic Sitting Balance independent  -KG     Position, Sitting Balance sitting edge of bed  -KG     Static Standing Balance supervision  -KG     Dynamic Standing Balance supervision  -KG     Position/Device Used, Standing Balance supported;unsupported  -KG     Balance Interventions sitting;standing;sit to stand;supported;static;dynamic;occupation based/functional task  -KG               User Key  (r) = Recorded By, (t) = Taken By, (c) = Cosigned By      Initials Name Provider Type    Rk Coreas OT Occupational Therapist                   Goals/Plan       Row Name 11/14/24 1251          Bed Mobility Goal 1 (OT)    Activity/Assistive Device (Bed Mobility Goal 1, OT) sit to supine;supine to sit  -KG     [place] Level/Cues Needed (Bed Mobility Goal 1,  OT) independent  -KG     Time Frame (Bed Mobility Goal 1, OT) short term goal (STG);2 weeks  -KG     Progress/Outcomes (Bed Mobility Goal 1, OT) new goal;goal met  -KG               User Key  (r) = Recorded By, (t) = Taken By, (c) = Cosigned By      Initials Name Provider Type    KG Rk Shields, OT Occupational Therapist                   Clinical Impression       Row Name 11/14/24 1243          Pain Assessment    Pretreatment Pain Rating 0/10 - no pain  -KG     Posttreatment Pain Rating 0/10 - no pain  -KG       Row Name 11/14/24 1243          Plan of Care Review    Plan of Care Reviewed With patient;daughter  -KG     Outcome Evaluation Pt admitted to [facility] for septic shock and respiratory failure 2/2 MMSA bacteremia. Pt was intubated 11/9 and extubated 11/12. Lives w/ her adult children in an apartment w/ 1 flight of steps to enter. (I) w/ BADLs, IADLs, and mobility at baseline. Today, pt was (I) w/ bed mobility and SBA for LBD at EOB. Performed STS, functional mobility, and grooming/hygiene at sink -- initially w/ CGA then graded to SPV. No balance, endurance, or strength deficits noted. Pt is performing near baseline and has no further acute OT needs at this time. Recommend continued OOB with staff while inpatient.  -KG       Row Name 11/14/24 1243          Therapy Assessment/Plan (OT)    Criteria for Skilled Therapeutic Interventions Met (OT) no problems identified which require skilled intervention  -KG     Therapy Frequency (OT) evaluation only  -KG       Row Name 11/14/24 1243          Therapy Plan Review/Discharge Plan (OT)    Anticipated Discharge Disposition (OT) home;home with assist  -KG       Row Name 11/14/24 1243          Vital Signs    Pre Patient Position Supine  -KG     Intra Patient Position Standing  -KG     Post Patient Position Supine  -KG       Row Name 11/14/24 1243          Positioning and Restraints    Pre-Treatment Position in bed  -KG     Post Treatment Position bed  -KG     In Bed  notified nsg;supine;call light within reach;encouraged to call for assist;with family/caregiver  -KG               User Key  (r) = Recorded By, (t) = Taken By, (c) = Cosigned By      Initials Name Provider Type    Rk Coreas, OT Occupational Therapist                   Outcome Measures       Row Name 11/14/24 1251          How much help from another is currently needed...    Putting on and taking off regular lower body clothing? 4  -KG     Bathing (including washing, rinsing, and drying) 4  -KG     Toileting (which includes using toilet bed pan or urinal) 4  -KG     Putting on and taking off regular upper body clothing 4  -KG     Taking care of personal grooming (such as brushing teeth) 4  -KG     Eating meals 4  -KG     AM-PAC 6 Clicks Score (OT) 24  -KG       Row Name 11/14/24 1118 11/14/24 0855       How much help from another person do you currently need...    Turning from your back to your side while in flat bed without using bedrails? 4  -MR 4  -CC    Moving from lying on back to sitting on the side of a flat bed without bedrails? 4  -MR 3  -CC    Moving to and from a bed to a chair (including a wheelchair)? 4  -MR 3  -CC    Standing up from a chair using your arms (e.g., wheelchair, bedside chair)? 4  -MR 2  -CC    Climbing 3-5 steps with a railing? 3  -MR 3  -CC    To walk in hospital room? 4  -MR 3  -CC    AM-PAC 6 Clicks Score (PT) 23  -MR 18  -CC    Highest Level of Mobility Goal 7 --> Walk 25 feet or more  -MR 6 --> Walk 10 steps or more  -CC      Row Name 11/14/24 1251          Modified David Scale    Modified Bradley Scale 1 - No significant disability despite symptoms.  Able to carry out all usual duties and activities.  -KG       Row Name 11/14/24 1251 11/14/24 1118       Functional Assessment    Outcome Measure Options AM-PAC 6 Clicks Daily Activity (OT);Modified Bradley  -KG AM-PAC 6 Clicks Basic Mobility (PT)  -MR              User Key  (r) = Recorded By, (t) = Taken By, (c) = Cosigned By       Initials Name Provider Type    Rk Coreas, OT Occupational Therapist    Marco Love, RN Registered Nurse    MR Damaris Hawley, PT Physical Therapist                    Occupational Therapy Education       Title: PT OT SLP Therapies (In Progress)       Topic: Occupational Therapy (Not Started)       Point: ADL training (Not Started)       Description:   Instruct learner(s) on proper safety adaptation and remediation techniques during self care or transfers.   Instruct in proper use of assistive devices.                  Learner Progress:  Not documented in this visit.                                  OT Recommendation and Plan  Therapy Frequency (OT): evaluation only  Plan of Care Review  Plan of Care Reviewed With: patient, daughter  Outcome Evaluation: Pt [facility] to [facility] for septic shock and respiratory failure 2/2 MMSA bacteremia. Pt was intubated 11/9 and extubated 11/12. Lives w/ her adult children in an apartment w/ 1 flight of steps to enter. (I) w/ BADLs, IADLs, and mobility at baseline. Today, pt was (I) w/ bed mobility and SBA for LBD at EOB. Performed STS, functional mobility, and grooming/hygiene at sink -- initially w/ CGA then graded to SPV. No balance, endurance, or strength deficits noted. Pt is performing near baseline and has no further acute OT needs at this time. Recommend continued OOB with staff while inpatient.     Time Calculation:   Evaluation Complexity (OT)  Review Occupational Profile/Medical/Therapy History Complexity: brief/low complexity  Assessment, Occupational Performance/Identification of Deficit Complexity: 1-3 performance deficits  Clinical Decision Making Complexity (OT): problem focused assessment/low complexity  Overall Complexity of Evaluation (OT): low complexity     Time Calculation- OT       Row Name 11/14/24 1252             Time Calculation- OT    OT Start Time 1005  -KG      OT Stop Time 1020  -KG      OT Time Calculation (min) 15 min  -KG      Total  Timed Code Minutes- OT 10 minute(s)  -KG      OT Non-Billable Time (min) 5 min  -KG      OT Received On 11/14/24  -KG         Timed Charges    22461 - OT Self Care/Mgmt Minutes 10  -KG         Untimed Charges    OT Eval/Re-eval Minutes 5  -KG         Total Minutes    Timed Charges Total Minutes 10  -KG      Untimed Charges Total Minutes 5  -KG       Total Minutes 15  -KG                User Key  (r) = Recorded By, (t) = Taken By, (c) = Cosigned By      Initials Name Provider Type    Rk Coreas OT Occupational Therapist                  Therapy Charges for Today       Code Description Service Date Service Provider Modifiers Qty    16525199375 HC OT SELF CARE/MGMT/TRAIN EA 15 MIN 11/14/2024 Rk Shields OT GO 1    05004729688 HC OT EVAL LOW COMPLEXITY 2 11/14/2024 Rk Shields OT GO 1                 Rk Shields OT  11/14/2024

## 2024-11-14 NOTE — PLAN OF CARE
Goal Outcome Evaluation:      Pt A&OX4. Appears to be tolerating nova source renal fair. No acute distress noted this shift so far. Safety Alarms active and audible. Will continue to observe and assist for remainder of shift and f/up w/ oncoming RN.

## 2024-11-14 NOTE — PROGRESS NOTES
ID note for sepsis  S: Asking to go home no sig pain. AF. Tolerating abx.    Physical Exam:   Vital Signs   Temp:  [96.8 °F (36 °C)-99.7 °F (37.6 °C)] 99 °F (37.2 °C)  Heart Rate:  [] 102  Resp:  [16] 16  BP: (127-154)/() 134/88    GENERAL: Ill-appearing  EYES:  No conjunctival injection. No lid lag.   LUNGS normal respiratory effort.   GI: Soft, nontender, nondistended. No appreciable organomegaly.   SKIN: Warm and dry without cutaneous eruptions;          Results Review:  White count 9.1, hemoglobin 8.8, platelets 155  Creatinine 4.79  Glucose 94 through 120    11/10 BCx 2/2 MSSA  11/10 MRSA, RPP neg  11/12 BCx NGTD    A/p  Septic Shock  MSSA septicemia  Acute hypoxic resp failure  ESRD  DM2, continue glycemic control efforts to prevent/control infectious complications  Suspected right arm brachial artery to axillary vein graft infection    Pt with septic shock from MSSA with stress induced cardiomyopathy.   Cont renally dosed cefazolin 1g IV q24  today then switch to post HD dosing tomorrow in anticipation of discharge: cefazolin 2 g after Monday Wednesday session and 3 g after Friday session.  Plan 6 weeks of antibiotics, stop date 12/22  Recommend checking weekly CBC with differential  She can follow-up with me in infectious diseases clinic on 12/18.  Discussed with Dr. Alysa hongpatient antibiotic plan and appreciate her help in arranging postdialysis antibiotics.

## 2024-11-14 NOTE — PROGRESS NOTES
Dedicated to Hospital Care    [phone]   LOS: 4 days     Name: Sunni Mcneil  Age/Sex: 49 y.o. female  :  1975        PCP: Regla Ying APRN  Chief Complaint   Patient presents with    Anxiety    Altered Mental Status      Subjective   She is feeling okay today denies new issues or complaints.  She is eager to get the tube out of her nose and start eating as she really wants to go home.  She slept well overnight denies any pain or discomfort.  General: No Fever or Chills, Cardiac: No Chest Pain or Palpitations, Resp: No Cough or SOA, GI: No Nausea, Vomiting, or Diarrhea, and Other: No bleeding    ceFAZolin, 1,000 mg, Intravenous, Q24H  chlorhexidine, 15 mL, Mouth/Throat, Q12H  insulin regular, 3-14 Units, Subcutaneous, Q6H  LORazepam, 2 mg, Intravenous, Once  metoprolol succinate XL, 25 mg, Oral, Q24H  mupirocin, 1 Application, Each Nare, BID  saccharomyces boulardii, 250 mg, Oral, BID  sodium chloride, 10 mL, Intravenous, Q12H  sodium chloride, 10 mL, Intravenous, Q12H  sodium chloride, 10 mL, Intravenous, Q12H  sodium chloride, 10 mL, Intravenous, Q12H           Objective   Vital Signs  Temp:  [96.8 °F (36 °C)-99.7 °F (37.6 °C)] 98.6 °F (37 °C)  Heart Rate:  [] 98  Resp:  [16-20] 16  BP: (127-154)/() 132/76  Body mass index is 32.81 kg/m².    Intake/Output Summary (Last 24 hours) at 2024 0722  Last data filed at 2024 0300  Gross per 24 hour   Intake 200 ml   Output 1600 ml   Net -1400 ml       Physical Exam  Vitals reviewed.   Constitutional:       General: She is not in acute distress.     Appearance: She is ill-appearing.   Cardiovascular:      Rate and Rhythm: Normal rate and regular rhythm.   Pulmonary:      Effort: Pulmonary effort is normal. No respiratory distress.      Breath sounds: Normal breath sounds.   Abdominal:      General: Bowel sounds are normal. There is no distension.      Palpations: Abdomen is soft.   Skin:     General: Skin is warm and dry.    Neurological:      Mental Status: She is alert and oriented to person, place, and time. Mental status is at baseline.           Results Review:       I reviewed the patient's new clinical results.  Results from last 7 days   Lab Units 11/14/24  0802 11/13/24  0616 11/12/24  0507 11/11/24  0400 11/10/24  1605 11/10/24  1602 11/10/24  1539 11/10/24  1002   WBC 10*3/mm3 9.14 7.52 7.94 12.26*  --   --   --  16.70*   HEMOGLOBIN g/dL 8.8* 8.5* 10.1* 11.0*  --   --   --  12.0   HEMOGLOBIN, POC g/dL  --   --   --   --  10.5* 10.9* 11.9*  --    PLATELETS 10*3/mm3 155 119* 120* 137*  --   --   --  162     Results from last 7 days   Lab Units 11/14/24  0802 11/13/24  0616 11/12/24  0507 11/11/24  1857 11/11/24  0400 11/10/24  1420 11/10/24  1002   SODIUM mmol/L 138 135* 132* 133* 130*  --  131*   POTASSIUM mmol/L 3.4* 3.8 4.3 4.2 6.0* 6.2* 4.6   CHLORIDE mmol/L 96* 98 92* 93* 96*  --  87*   CO2 mmol/L 29.0 19.9* 17.6* 21.1* 17.0*  --  14.0*   BUN mg/dL 29* 45* 31* 20 44*  --  56*   CREATININE mg/dL 4.79* 8.04* 6.35* 5.09* 8.49*  --  11.59*   CALCIUM mg/dL 8.5* 8.3* 8.6 9.3 7.5*  --  9.6   MAGNESIUM mg/dL  --   --  2.2  --  1.9  --   --    PHOSPHORUS mg/dL  --  6.0* 6.7*  --  6.7*  --   --    Estimated Creatinine Clearance: 7.7 mL/min (A) (by C-G formula based on SCr of 8.04 mg/dL (H)).      Assessment & Plan   Active Hospital Problems    Diagnosis  POA    **Pulmonary edema [J81.1]  Yes    ST elevation myocardial infarction (STEMI) [I21.3]  Unknown      Resolved Hospital Problems   No resolved problems to display.       PLAN  This is a complicated 49-year-old female with history of end-stage renal disease, type 2 diabetes, anemia of chronic disease, hypertension, hyperparathyroidism and previous vascular access issues who presented to the emergency room with septic shock and was admitted to the ICU for stabilization and evaluation.  Ultimately found to have MSSA bacteremia concerning for an AV graft source of infection.  She  had respiratory failure due to severe sepsis and was intubated and placed on the ventilator and IV medications to keep her blood pressure up.  She was stabilized and successfully extubated on 11/12.  She was transferred out of the ICU on 11/13 and A was consulted to take over care.  -Plan is for medical management plan IV antibiotics for the time being.  The source of her bacteremia is felt to be a short segment of her shunt.  -Big barrier to her leaving the hospital now with her oropharyngeal dysphagia likely secondary to intubation.  She currently has a core track in place and is receiving tube feeds at goal.  Speech therapy is following hopefully we can initiate diet soon and remove her core track.  The patient is eager to get out of the hospital.  -The plan of 6 weeks of antibiotic therapy.  To receive antibiotics dialysis.  Reviewed recommendations of infectious disease.  -Called about electrolyte abnormalities today.  Patient has end-stage renal disease and would not recommend potassium replacement for now.  -Noted thrombocytopenia but platelets are stable this is likely a consumptive process related to her infection.  Platelets are trending back up at this time.  - A little bit concerned about the hemoglobin.  We have dropped about 3.2 g over the course the hospitalization but there is been no source of active bleeding.  Likely represents anemia of chronic disease exacerbated by her acute infection.  -She has a stress-induced cardiomyopathy related to her septic shock and bacteremia.  She had coronary angiography performed on November 10 with normal coronary arteries and findings consistent with atypical stress cardiomyopathy.  Tolerating beta-blockade at this time.  Continuing medical management.  And follow-up with cardiology in the outpatient setting  -Mechanical DVT prophylaxis however if remains in the hospital longer would probably recommend initiation of subcu heparin  -Full  code    Disposition  Expected Discharge Date: 11/15/2024; Expected Discharge Time:        Donovan Galarza MD  [facility]  11/14/24  07:22 EST

## 2024-11-15 ENCOUNTER — READMISSION MANAGEMENT (OUTPATIENT)
Dept: CALL CENTER | Facility: HOSPITAL | Age: 49
End: 2024-11-15
Payer: MEDICARE

## 2024-11-15 VITALS
DIASTOLIC BLOOD PRESSURE: 112 MMHG | TEMPERATURE: 99 F | HEART RATE: 96 BPM | SYSTOLIC BLOOD PRESSURE: 185 MMHG | HEIGHT: 60 IN | RESPIRATION RATE: 16 BRPM | OXYGEN SATURATION: 99 % | BODY MASS INDEX: 32.98 KG/M2 | WEIGHT: 167.99 LBS

## 2024-11-15 LAB
ALBUMIN SERPL-MCNC: 3.4 G/DL (ref 3.5–5.2)
ANION GAP SERPL CALCULATED.3IONS-SCNC: 18.6 MMOL/L (ref 5–15)
BASOPHILS # BLD AUTO: 0.04 10*3/MM3 (ref 0–0.2)
BASOPHILS NFR BLD AUTO: 0.5 % (ref 0–1.5)
BUN SERPL-MCNC: 60 MG/DL (ref 6–20)
BUN/CREAT SERPL: 7.3 (ref 7–25)
CALCIUM SPEC-SCNC: 8.7 MG/DL (ref 8.6–10.5)
CHLORIDE SERPL-SCNC: 94 MMOL/L (ref 98–107)
CO2 SERPL-SCNC: 25.4 MMOL/L (ref 22–29)
CREAT SERPL-MCNC: 8.26 MG/DL (ref 0.57–1)
DEPRECATED RDW RBC AUTO: 57.2 FL (ref 37–54)
EGFRCR SERPLBLD CKD-EPI 2021: 5.5 ML/MIN/1.73
EOSINOPHIL # BLD AUTO: 0.41 10*3/MM3 (ref 0–0.4)
EOSINOPHIL NFR BLD AUTO: 5.1 % (ref 0.3–6.2)
ERYTHROCYTE [DISTWIDTH] IN BLOOD BY AUTOMATED COUNT: 17.2 % (ref 12.3–15.4)
GLUCOSE BLDC GLUCOMTR-MCNC: 104 MG/DL (ref 70–130)
GLUCOSE SERPL-MCNC: 91 MG/DL (ref 65–99)
HCT VFR BLD AUTO: 27.9 % (ref 34–46.6)
HGB BLD-MCNC: 8.7 G/DL (ref 12–15.9)
IMM GRANULOCYTES # BLD AUTO: 0.32 10*3/MM3 (ref 0–0.05)
IMM GRANULOCYTES NFR BLD AUTO: 4 % (ref 0–0.5)
LYMPHOCYTES # BLD AUTO: 1.07 10*3/MM3 (ref 0.7–3.1)
LYMPHOCYTES NFR BLD AUTO: 13.3 % (ref 19.6–45.3)
MAGNESIUM SERPL-MCNC: 2.4 MG/DL (ref 1.6–2.6)
MCH RBC QN AUTO: 28.5 PG (ref 26.6–33)
MCHC RBC AUTO-ENTMCNC: 31.2 G/DL (ref 31.5–35.7)
MCV RBC AUTO: 91.5 FL (ref 79–97)
MONOCYTES # BLD AUTO: 0.7 10*3/MM3 (ref 0.1–0.9)
MONOCYTES NFR BLD AUTO: 8.7 % (ref 5–12)
NEUTROPHILS NFR BLD AUTO: 5.49 10*3/MM3 (ref 1.7–7)
NEUTROPHILS NFR BLD AUTO: 68.4 % (ref 42.7–76)
NRBC BLD AUTO-RTO: 0 /100 WBC (ref 0–0.2)
PHOSPHATE SERPL-MCNC: 5 MG/DL (ref 2.5–4.5)
PLATELET # BLD AUTO: 210 10*3/MM3 (ref 140–450)
PMV BLD AUTO: 10.7 FL (ref 6–12)
POTASSIUM SERPL-SCNC: 3.8 MMOL/L (ref 3.5–5.2)
RBC # BLD AUTO: 3.05 10*6/MM3 (ref 3.77–5.28)
SODIUM SERPL-SCNC: 138 MMOL/L (ref 136–145)
WBC NRBC COR # BLD AUTO: 8.03 10*3/MM3 (ref 3.4–10.8)

## 2024-11-15 PROCEDURE — 99232 SBSQ HOSP IP/OBS MODERATE 35: CPT | Performed by: INTERNAL MEDICINE

## 2024-11-15 PROCEDURE — 99232 SBSQ HOSP IP/OBS MODERATE 35: CPT | Performed by: NURSE PRACTITIONER

## 2024-11-15 PROCEDURE — 82948 REAGENT STRIP/BLOOD GLUCOSE: CPT

## 2024-11-15 PROCEDURE — 85025 COMPLETE CBC W/AUTO DIFF WBC: CPT | Performed by: HOSPITALIST

## 2024-11-15 PROCEDURE — 83735 ASSAY OF MAGNESIUM: CPT | Performed by: HOSPITALIST

## 2024-11-15 PROCEDURE — 25010000002 CEFAZOLIN 3 G RECONSTITUTED SOLUTION 1 EACH VIAL: Performed by: INTERNAL MEDICINE

## 2024-11-15 PROCEDURE — 80069 RENAL FUNCTION PANEL: CPT | Performed by: HOSPITALIST

## 2024-11-15 RX ORDER — CLONIDINE HYDROCHLORIDE 0.1 MG/1
0.1 TABLET ORAL ONCE
Status: COMPLETED | OUTPATIENT
Start: 2024-11-15 | End: 2024-11-15

## 2024-11-15 RX ORDER — METOPROLOL SUCCINATE 25 MG/1
25 TABLET, EXTENDED RELEASE ORAL
Qty: 30 TABLET | Refills: 0 | Status: SHIPPED | OUTPATIENT
Start: 2024-11-15

## 2024-11-15 RX ORDER — SACCHAROMYCES BOULARDII 250 MG
250 CAPSULE ORAL 2 TIMES DAILY
Qty: 60 CAPSULE | Refills: 0 | Status: SHIPPED | OUTPATIENT
Start: 2024-11-15

## 2024-11-15 RX ADMIN — CLONIDINE HYDROCHLORIDE 0.1 MG: 0.1 TABLET ORAL at 00:32

## 2024-11-15 RX ADMIN — SODIUM CHLORIDE 3000 MG: 900 INJECTION INTRAVENOUS at 13:08

## 2024-11-15 RX ADMIN — METOPROLOL SUCCINATE 25 MG: 25 TABLET, EXTENDED RELEASE ORAL at 13:08

## 2024-11-15 RX ADMIN — Medication 10 ML: at 13:38

## 2024-11-15 RX ADMIN — Medication 250 MG: at 13:08

## 2024-11-15 NOTE — PROGRESS NOTES
"    Patient Name: Sunni Mcneil  :1975  49 y.o.      Patient Care Team:  Regla Ying APRN as PCP - General (Nurse Practitioner)  Bo Hansen MD as Consulting Physician (Nephrology)  Damon Rooney MD as Surgeon (General Surgery)    Chief Complaint: follow up stress induced cardiomyopathy    Interval History:    I saw her on dialysis. She is feeling well.     Objective   Vital Signs  Temp:  [97.1 °F (36.2 °C)-98.8 °F (37.1 °C)] 97.1 °F (36.2 °C)  Heart Rate:  [] 80  Resp:  [16-18] 16  BP: (126-167)/() 141/85    Intake/Output Summary (Last 24 hours) at 11/15/2024 1339  Last data filed at 11/15/2024 1200  Gross per 24 hour   Intake --   Output 1500 ml   Net -1500 ml     Flowsheet Rows      Flowsheet Row First Filed Value   Admission Height 152.4 cm (60\") Documented at 11/10/2024 1425   Admission Weight 75.3 kg (166 lb 0.1 oz) Documented at 11/10/2024 1042            Physical Exam:   General Appearance:    Alert, cooperative, in no acute distress   Lungs:     Clear to auscultation.  Normal respiratory effort and rate.      Heart:    Regular rhythm and normal rate, normal S1 and S2, no murmurs, gallops or rubs.     Chest Wall:    No abnormalities observed   Abdomen:     Soft, nontender, positive bowel sounds.     Extremities:   no cyanosis, clubbing or edema.  No marked joint deformities.  Adequate musculoskeletal strength.       Results Review:    Results from last 7 days   Lab Units 11/15/24  0550   SODIUM mmol/L 138   POTASSIUM mmol/L 3.8   CHLORIDE mmol/L 94*   CO2 mmol/L 25.4   BUN mg/dL 60*   CREATININE mg/dL 8.26*   GLUCOSE mg/dL 91   CALCIUM mg/dL 8.7     Results from last 7 days   Lab Units 11/10/24  1745 11/10/24  1420   HSTROP T ng/L 689* 239*     Results from last 7 days   Lab Units 11/15/24  0550   WBC 10*3/mm3 8.03   HEMOGLOBIN g/dL 8.7*   HEMATOCRIT % 27.9*   PLATELETS 10*3/mm3 210     Results from last 7 days   Lab Units 11/10/24  1002   INR  2.36*   APTT " seconds 37.5*     Results from last 7 days   Lab Units 11/15/24  0550   MAGNESIUM mg/dL 2.4                   Medication Review:   [START ON 11/18/2024] ceFAZolin, 2,000 mg, Intravenous, Once per day on Monday Wednesday  ceFAZolin, 3,000 mg, Intravenous, Every Friday  chlorhexidine, 15 mL, Mouth/Throat, Q12H  insulin regular, 3-14 Units, Subcutaneous, Q6H  LORazepam, 2 mg, Intravenous, Once  metoprolol succinate XL, 25 mg, Oral, Q24H  saccharomyces boulardii, 250 mg, Oral, BID  sodium chloride, 10 mL, Intravenous, Q12H  sodium chloride, 10 mL, Intravenous, Q12H  sodium chloride, 10 mL, Intravenous, Q12H  sodium chloride, 10 mL, Intravenous, Q12H              Assessment & Plan   Septic shock secondary to MSSA bacteremia  Stress induced cardiomyopathy, coronary angiography 11/10 with normal coronaries. GDMT limited by ESRD. Tolerating metoprolol succinate - can likely be titrated as outpatient.   ESRD on HD  Diabetes mellitus type II     No objection to discharge. Will arrange close follow up in office.     JERONIMO Aleman  [facility]  11/15/24  13:39 EST

## 2024-11-15 NOTE — CASE MANAGEMENT/SOCIAL WORK
Continued Stay Note  [facility]     Patient Name: Sunni Mcneil  MRN: [id number]  Today's Date: 11/15/2024    Admit Date: 11/10/2024    Plan: Home with family   Discharge Plan       Row Name 11/15/24 0921       Plan    Plan Home with family    Patient/Family in Agreement with Plan yes    N/A Quality & Resource List Comment CCP reviewed chart, plan for dc today, ST, OT and PT have cleared for home with family. Spoke with pt puja Kim at bedside, she denies needs, states she lives close and can assist if needed. Pt plans antibiotic post HD in clinic, Dr. Villafana and Dr. Watt have arranged per notes. Based on interdisciplinary assessments, the recommended discharge plan is home with family . CCP will follow -Lisa MEEHAN                   Discharge Codes    No documentation.                 Expected Discharge Date and Time       Expected Discharge Date Expected Discharge Time    Nov 15, 2024               Lisa Kothari RN

## 2024-11-15 NOTE — DISCHARGE SUMMARY
Patient Name: Sunni Mcneil  : 1975  MRN: [id number]    Date of Admission: 11/10/2024  Date of Discharge:  11/15/2024  Primary Care Physician: Regla Ying APRN      Chief Complaint:   Anxiety and Altered Mental Status      Discharge Diagnoses     Active Hospital Problems    Diagnosis  POA    **Pulmonary edema [J81.1]  Yes    ST elevation myocardial infarction (STEMI) [I21.3]  Unknown    Sepsis [A41.9]  Yes    Obesity (BMI 30-39.9) [E66.9]  Yes    Anemia, chronic disease [D63.8]  Yes    Type 2 diabetes mellitus [E11.9]  Yes    ESRD on hemodialysis [N18.6, Z99.2]  Not Applicable    Hypertension [I10]  Yes    Hyperparathyroidism [E21.3]  Yes      Resolved Hospital Problems   No resolved problems to display.        Hospital Course     Ms. Mcneil is a 49 y.o. female with a history of end-stage renal disease, type 2 diabetes, hypertension, hyperparathyroidism, anemia of chronic disease and obesity who presented to [facility] initially complaining of confusion and altered mental status and anxiety.  Please see the admitting history and physical for further details.  She was found to have septic shock and was admitted to the hospital for further evaluation and treatment.  She was admitted to the hospital with septic shock initially admitted to PICU.  She was found with MSSA bacteremia with the probable source being her AV graft.  She had significant respiratory failure and intubation and was ultimately intubated and placed on the ventilator in the ICU.  IV medications were given and blood pressure improved.  She stabilized and  successfully extubated on .  She was transferred out of the ICU on .  Show feeding tube in place due to oropharyngeal dysphagia and was ultimately able to pass a swallow study and was started on regular diet.  At this point the plan for IV antibiotics has been made with dialysis in the outpatient setting.  She needs to follow-up with vascular surgery to  further evaluate the AV shunt.  Her Coumadin was held throughout this hospitalization and can be resumed on discharge.  She has a history of a clot in her shunt in the past.  Will defer to vascular surgery and her outpatient physicians whether this needs to be continued.        Day of Discharge     Subjective:  She feels well today she wants to go home denies new issues or complaints.  Patient seen while on dialysis and tolerating well    Physical Exam:  Temp:  [97.1 °F (36.2 °C)-98.8 °F (37.1 °C)] 97.1 °F (36.2 °C)  Heart Rate:  [] 80  Resp:  [16-18] 16  BP: (126-167)/() 141/85  Body mass index is 32.81 kg/m².  Physical Exam  Vitals and nursing note reviewed.   Constitutional:       General: She is not in acute distress.     Appearance: She is ill-appearing.   Cardiovascular:      Rate and Rhythm: Normal rate and regular rhythm.   Pulmonary:      Effort: Pulmonary effort is normal.   Neurological:      Mental Status: She is alert and oriented to person, place, and time. Mental status is at baseline.         Consultants     Consult Orders (all) (From admission, onward)       Start     Ordered    11/13/24 1451  Inpatient Hospitalist Consult  Once        Specialty:  Hospitalist  Provider:  Donovan Galarza MD    11/13/24 1451    11/12/24 0901  Inpatient Vascular Surgery Consult  Once        Specialty:  Vascular Surgery  Provider:  Elijah Herrera MD    11/12/24 0900    11/11/24 1422  Inpatient Nutrition Consult  Once        Provider:  (Not yet assigned)    11/11/24 1421    11/11/24 1121  Inpatient Infectious Diseases Consult  Once        Specialty:  Infectious Diseases  Provider:  Valarie Walker MD    11/11/24 1121    11/10/24 1428  Inpatient Cardiology Consult  Once        Specialty:  Cardiology  Provider:  Maribell Sandhu MD    11/10/24 1428    11/10/24 1259  Pulmonology (on-call MD unless specified)  Once        Specialty:  Pulmonary Disease  Provider:  (Not yet assigned)    11/10/24 1259     11/10/24 1259  Nephrology (on -call MD unless specified)  Once        Specialty:  Nephrology  Provider:  (Not yet assigned)    11/10/24 1259                  Procedures     Left Heart Cath, Coronary angiography, Left ventriculography, Right Heart Cath    Imaging Results (All)       Procedure Component Value Units Date/Time    [facility] FEES - Fiberoptic Endo Eval Swallow [[id number]] Resulted: 11/15/24 0921     Updated: 11/15/24 0921    Narrative:      This procedure was auto-finalized with no dictation required.    XR Abdomen KUB [[id number]] Collected: 11/12/24 1906     Updated: 11/12/24 1910    Narrative:      XR ABDOMEN KUB-     INDICATIONS: NG tube placement     TECHNIQUE: FRONTAL VIEWS OF THE ABDOMEN     COMPARISON: 11/10/2024     FINDINGS:     In the thorax, the NG tube projects over the spine, extends to left  upper quadrant, crosses over the right upper quadrant, and back to the  left upper quadrant, tip at the expected location of the distal  duodenum. The bowel gas pattern is nonobstructive. Follow-up as  clinically indicated.       Impression:         As described.        This report was finalized on 11/12/2024 7:07 PM by Dr. David Montoya M.D on Workstation: [id number]       [facility] FEES - Fiberoptic Endo Eval Swallow [[id number]] Resulted: 11/12/24 1512     Updated: 11/12/24 1512    Narrative:      This procedure was auto-finalized with no dictation required.    XR Chest 1 View [[id number]] Collected: 11/11/24 0408     Updated: 11/11/24 0412    Narrative:      SINGLE VIEW OF THE CHEST     HISTORY: Respiratory failure     COMPARISON: November 10, 2024     FINDINGS:  Nasogastric tube has been placed. This appears to terminate proximal  stomach. Cardiac silhouette is unchanged. Endotracheal tube in stable  position. There are bilateral alveolar and interstitial infiltrates.  Overall, aeration appears improved when compared to prior exam. No  pneumothorax or large effusion is seen.       Impression:      Interval  improvement in aeration.     This report was finalized on 11/11/2024 4:09 AM by Dr. Zena Pham M.D on Workstation: [id number]       XR Abdomen KUB [[id number]] Collected: 11/10/24 1530     Updated: 11/10/24 1534    Narrative:      XR ABDOMEN KUB-        INDICATION: NG tube placement     COMPARISON: CT abdomen pelvis November 10, 2024     TECHNIQUE: 1 view abdomen     FINDINGS:      NG tube tip is in the gastric fundus. Calcifications in the spleen,  consistent with prior granulomatous infection. Nonobstructive bowel gas  pattern. Defibrillation pads seen.       Impression:      NG tube tip is in the gastric fundus.     This report was finalized on 11/10/2024 3:31 PM by Dr. Casimiro Schneider M.D on Workstation: [id number]       XR Chest 1 View [[id number]] Collected: 11/10/24 1309     Updated: 11/10/24 1313    Narrative:      XR CHEST 1 VW-     DATE OF EXAM: 11/10/2024 12:45 PM     INDICATION: SOA, hypoxia.     COMPARISON: Radiographs 11/10/2024, 5/8/2023, and 7/26/2022. CT abdomen  pelvis 11/10/2024.     TECHNIQUE: A single portable AP view of the chest was obtained.     FINDINGS:  Lordotic positioning. Overlying artifacts. Appropriate retraction of the  endotracheal tube now terminating in the mid thoracic trachea,  approximately 4 cm proximal to the jose. Low lung volumes with  worsening patchy predominately perihilar and medial bibasilar opacities,  likely pneumonia. No pneumothorax. Cardiomediastinal contours are  unchanged. No acute osseous abnormality is identified. Moderate air  distention of the partially included stomach.       Impression:         1. Appropriate repositioning of the endotracheal tube now terminating in  the mid thoracic trachea.  2. Worsening predominately perihilar and medial bibasilar opacities,  likely pneumonia.     This report was finalized on 11/10/2024 1:10 PM by Corbin Wset MD on  Workstation: [id number]       CT Abdomen Pelvis With Contrast [[id number]] Collected:  11/10/24 1226     Updated: 11/10/24 1237    Narrative:      CT ABDOMEN PELVIS W CONTRAST-     DATE OF EXAM: 11/10/2024 11:38 AM     INDICATION: Altered mental status, fever, abdominal pain.     COMPARISON: Radiographs 11/10/2024, 7/6/2022, and 3/15/2021. CT abdomen  and pelvis 5/5/2020.     TECHNIQUE: Multiple contiguous axial images were acquired through the  abdomen and pelvis following the intravenous administration of 85 mL of  Isovue-300. Reformatted coronal and sagittal sequences were also  reviewed. Radiation dose reduction techniques were utilized, including  automated exposure control and exposure modulation based on body size.     FINDINGS:  Partially imaged patchy consolidation in each lower lobe, likely  pneumonia, with trace bilateral pleural effusions. Calcified remnants of  prior granulomatous disease in the right lung base and the partially  imaged mediastinum and bilateral minor. Mild to moderate calcified  coronary artery disease. Trace pericardial fluid.     Calcified remnants of prior granulomatous disease in the liver and  spleen. The spleen measures at the upper limits of normal at 14 cm in  greatest diameter. The gallbladder, pancreas, and adrenal glands are  unremarkable. Chronic bilateral renal atrophy parenchymal atrophy of the  right pelvic renal transplant with nonspecific peripelvic and  periureteral fat stranding. The urinary bladder is nondistended. Diffuse  urinary bladder wall thickening and mild perivesical fat stranding,  which could be accentuated by under distention. Bulky fibroid uterus.  The adnexa are unremarkable in CT appearance.     Mild fluid and air distention of the stomach. Mild rectal stool. Colonic  diverticula, without CT evidence of diverticulitis. No bowel obstruction  or significant bowel wall thickening. The appendix is normal.     Trace free fluid in the pelvis. No free intraperitoneal air. No  pathologically enlarged lymph nodes in the abdomen or pelvis. No  acute  osseous abnormality or concerning osseous lesion. The upper extremities  are partially included in the field-of-view but not adequately  evaluated.       Impression:         1. Parenchymal atrophy of the right pelvic renal transplant with right  peripelvic and periureteral fat stranding. Urinary bladder wall  thickening and mild perivesical fat stranding, which could be  accentuated by under distention. Findings could reflect urinary tract  infection. Recommend correlating with urinalysis.  2. Partially imaged patchy consolidation in each lower lobe, likely  pneumonia, with trace bilateral pleural effusions.     This report was finalized on 11/10/2024 12:34 PM by Corbin West MD on  Workstation: [id number]       CT Head Without Contrast [[id number]] Collected: 11/10/24 1224     Updated: 11/10/24 1232    Narrative:      CT HEAD WO CONTRAST-     INDICATION: Altered mental status     COMPARISON: CT head March 6, 2021     TECHNIQUE:  Routine CT head without IV contrast. Coronal and sagittal reformats.  Radiation dose reduction techniques were utilized, including automated  exposure control and exposure modulation based on body size.     FINDINGS:      Brain: No intraparenchymal hemorrhage. Preserved gray-white  differentiation. No mass effect or midline shift.     Ventricles: No hydrocephalus. No intraventricular hemorrhage.     Extra-axial spaces: No extra-axial hemorrhage or fluid collection.     Osseous structures: No fracture or bone lesion.     Sinuses: Patent mastoid air cells and middle ears. Mucosal thickening in  the ethmoid air cells with partial opacification of the right ethmoid  air cells.     Other: Large amount of secretions seen in the nasopharynx extending into  the nasal cavity.       Impression:         1. No acute intracranial process.  2. Large amount of secretions seen in the nasal cavity extending into  the posterior nasal cavity     This report was finalized on 11/10/2024 12:29 PM by   Casimiro Schneider M.D on Workstation: [id number]       XR Chest 1 View [[id number]] Collected: 11/10/24 1157     Updated: 11/10/24 1203    Narrative:      XR CHEST 1 VW-        INDICATION: Altered mental status     COMPARISON: Chest radiograph May 8, 2023     TECHNIQUE: 1 view chest     FINDINGS:      Increased lung markings. Vascular congestion. No focal opacity. No  effusions. Low volumes. Stable mediastinum. Endotracheal tube tip is  located 1.2 cm above the jose, projecting towards the right mainstem  bronchus. Right axillary stent.       Impression:         1. Endotracheal tube tip is just above the jose, projects towards the  right mainstem bronchus. Consider retraction by 2 cm.  2. Increased lung markings. Question some underlying airways disease and  vascular congestion     This report was finalized on 11/10/2024 12:00 PM by Dr. Casimiro Schneider M.D on Workstation: [id number]             Results for orders placed during the hospital encounter of 11/10/24    Duplex Hemodialysis Access CAR    Interpretation Summary    Patent right brachioaxillary arteriovenous graft in the upper arm with low normal volume flow ranging 479-786 mL/min. Mid graft stenosis with residual diameter 2 mm. Patent outflow stent without restenosis.    Possible nonvascular perigraft fluid collection in the distal arm near the proximal portion of the graft with respect to direction of flow, best seen on image 9.    Results for orders placed during the hospital encounter of 11/10/24    STAT Adult Transthoracic Echo Complete W/ Cont if Necessary Per Protocol    Interpretation Summary    There is septal dyskinesis. There is severe hypokinesis of the basal inferior wall and the basal inferolateral wall. There is moderate hypokinesis of the anterior wall    Left ventricular systolic function is severely decreased. Estimated left ventricular EF = 25%    Left ventricular diastolic function was indeterminate.    Normal right ventricular  "cavity size and systolic function noted.    Mild tricuspid valve regurgitation is present.    Calculated right ventricular systolic pressure from tricuspid regurgitation is 30 mmHg.    There is a small circumferential pericardial effusion which is most prominent anteriorly. There is no evidence of cardiac tamponade    Pertinent Labs     Results from last 7 days   Lab Units 11/15/24  0550 11/14/24  0802 11/13/24  0616 11/12/24  0507   WBC 10*3/mm3 8.03 9.14 7.52 7.94   HEMOGLOBIN g/dL 8.7* 8.8* 8.5* 10.1*   PLATELETS 10*3/mm3 210 155 119* 120*     Results from last 7 days   Lab Units 11/15/24  0550 11/14/24  0802 11/13/24  0616 11/12/24  0507   SODIUM mmol/L 138 138 135* 132*   POTASSIUM mmol/L 3.8 3.4* 3.8 4.3   CHLORIDE mmol/L 94* 96* 98 92*   CO2 mmol/L 25.4 29.0 19.9* 17.6*   BUN mg/dL 60* 29* 45* 31*   CREATININE mg/dL 8.26* 4.79* 8.04* 6.35*   GLUCOSE mg/dL 91 98 82 141*   EGFR mL/min/1.73 5.5* 10.6* 5.7* 7.5*     Results from last 7 days   Lab Units 11/15/24  0550 11/14/24  0802 11/13/24  0616 11/12/24  0507 11/11/24  0400 11/10/24  1002   ALBUMIN g/dL 3.4* 3.4* 2.9* 2.9*   < > 4.0   BILIRUBIN mg/dL  --  0.3  --  0.4  --  0.6   ALK PHOS U/L  --  88  --  64  --  58   AST (SGOT) U/L  --  45*  --  51*  --  22   ALT (SGPT) U/L  --  8  --  17  --  9    < > = values in this interval not displayed.     Results from last 7 days   Lab Units 11/15/24  0550 11/14/24  0802 11/13/24  0616 11/12/24  0507 11/11/24  1857 11/11/24  0400   CALCIUM mg/dL 8.7 8.5* 8.3* 8.6   < > 7.5*   ALBUMIN g/dL 3.4* 3.4* 2.9* 2.9*  --  3.1*   MAGNESIUM mg/dL 2.4  --   --  2.2  --  1.9   PHOSPHORUS mg/dL 5.0*  --  6.0* 6.7*  --  6.7*    < > = values in this interval not displayed.       Results from last 7 days   Lab Units 11/10/24  1745 11/10/24  1420   HSTROP T ng/L 689* 239*           Invalid input(s): \"LDLCALC\"  Results from last 7 days   Lab Units 11/12/24  0507 11/11/24  0350 11/10/24  1747 11/10/24  1245 11/10/24  1229   BLOODCX  No " growth at 3 days  No growth at 3 days  --   --  Staphylococcus aureus* Staphylococcus aureus*   RESPCX   --  Rejected  --   --   --    MRSAPCR   --   --  No MRSA Detected  --   --    BCIDPCR   --   --   --  Staph aureus. mecA/C and MREJ (methicillin resistance gene) NOT detected. Identification by BCID2 PCR*  --      Results from last 7 days   Lab Units 11/10/24  1004   COVID19  Not Detected       Test Results Pending at Discharge     Pending Results       None              Discharge Details        Discharge Medications        New Medications        Instructions Start Date   ceFAZolin 2000 mg IVPB in 100 mL NS (MBP)   2,000 mg, Intravenous, Take As Directed, Give after HD on Monday and Wednesday      ceFAZolin 3000 mg IVPB in 100 mL NS (MBP)   3,000 mg, Intravenous, Take As Directed, Give after HD on hd Friday      metoprolol succinate XL 25 MG 24 hr tablet  Commonly known as: TOPROL-XL   25 mg, Oral, Every 24 Hours Scheduled      saccharomyces boulardii 250 MG capsule  Commonly known as: FLORASTOR   250 mg, Oral, 2 Times Daily             Changes to Medications        Instructions Start Date   polyethylene glycol 17 GM/SCOOP powder  Commonly known as: MIRALAX  What changed: Another medication with the same name was removed. Continue taking this medication, and follow the directions you see here.   17 g, Oral, Daily PRN             Continue These Medications        Instructions Start Date   acetaminophen 325 MG tablet  Commonly known as: TYLENOL   650 mg, Oral, Every 6 Hours PRN      calcitriol 0.25 MCG capsule  Commonly known as: ROCALTROL   0.25 mcg, Oral, Take As Directed, Calcitriol 0.25 MCG Capsule (si capsule once per day )        calcium acetate 667 MG capsule  Commonly known as: PHOSLO   2,001 mg, Oral, 3 Times Daily With Meals      hydrOXYzine 50 MG tablet  Commonly known as: ATARAX   50 mg, Oral, As Needed      naloxone 4 MG/0.1ML nasal spray  Commonly known as: NARCAN   Call 911. Don't prime. [place]  in 1 nostril for overdose. Repeat in 2-3 minutes in other nostril if no or minimal breathing/responsiveness.      ondansetron ODT 4 MG disintegrating tablet  Commonly known as: ZOFRAN-ODT   4 mg, Oral, As Needed      prochlorperazine 10 MG tablet  Commonly known as: COMPAZINE   10 mg, Oral, Every 6 Hours PRN      sennosides-docusate 8.6-50 MG per tablet  Commonly known as: PERICOLACE   2 tablets, Oral, 2 Times Daily PRN      traZODone 100 MG tablet  Commonly known as: DESYREL   150 mg, Oral, Every Night at Bedtime      warfarin 5 MG tablet  Commonly known as: COUMADIN   1 tablet, Oral, Daily             Stop These Medications      bisacodyl 5 MG EC tablet  Commonly known as: DULCOLAX     cloNIDine 0.1 MG tablet  Commonly known as: CATAPRES     hydrALAZINE 25 MG tablet  Commonly known as: APRESOLINE     propranolol 10 MG tablet  Commonly known as: INDERAL              No Known Allergies    Discharge Disposition:  Home or Self Care      Discharge Diet:  Diet Order   Procedures    Diet: Regular/House, Diabetic; Consistent Carbohydrate; Texture: Regular (IDDSI 7); Fluid Consistency: Thin (IDDSI 0)       Discharge Activity:   Activity Instructions       Activity as Tolerated              CODE STATUS:    Code Status and Medical Interventions: CPR (Attempt to Resuscitate); Full Support   Ordered at: 11/10/24 1403     Code Status (Patient has no pulse and is not breathing):    CPR (Attempt to Resuscitate)     Medical Interventions (Patient has pulse or is breathing):    Full Support       Future Appointments   Date Time Provider Department Center   12/18/2024  1:20 PM Saji Villafana MD [facility]   2/26/2025  8:00 AM [facility] OP VAS RM 3 BH [facility]   3/5/2025  9:00 AM Phu Gaviria MD [facility] VEIN [facility]     Additional Instructions for the Follow-ups that You Need to Schedule       Discharge Follow-up with PCP   As directed       Currently Documented PCP:    Regla Ying APRN    PCP Phone Number:     [phone]     Follow Up Details: 1-2 weeks        Discharge Follow-up with Specified Provider: Cardiology as directed   As directed      To: Cardiology as directed        Discharge Follow-up with Specified Provider: Nephrology with HD   As directed      To: Nephrology with HD        Discharge Follow-up with Specified Provider: Vascualr surgery as directed   As directed      To: Vascualr surgery as directed               Follow-up Information       Regla Ying APRN .    Specialty: Nurse Practitioner  Why: 1-2 weeks  Contact information:  [address] [facility]  [address]  [facility] [address]  [phone]               Luis Mayorga MD. Schedule an appointment as soon as possible for a visit.    Specialty: Cardiology  Contact information:  [address] [facility]  [address]  [facility] [place] [address]  [phone]               Phu Gaviria MD. Schedule an appointment as soon as possible for a visit.    Specialty: Vascular Surgery  Contact information:  [address]  [facility] [address]  [phone]                             Additional Instructions for the Follow-ups that You Need to Schedule       Discharge Follow-up with PCP   As directed       Currently Documented PCP:    Regla Ying APRN    PCP Phone Number:    [phone]     Follow Up Details: 1-2 weeks        Discharge Follow-up with Specified Provider: Cardiology as directed   As directed      To: Cardiology as directed        Discharge Follow-up with Specified Provider: Nephrology with HD   As directed      To: Nephrology with HD        Discharge Follow-up with Specified Provider: Vascualr surgery as directed   As directed      To: Vascualr surgery as directed            Time Spent on Discharge:  Greater than 30 minutes      Donovan Galarza MD  [facility]  11/15/24  13:35 EST

## 2024-11-15 NOTE — PROGRESS NOTES
[facility] Progress Note      Patient Name: Sunni Mcneil  : 1975  MRN: [id number]  Primary Care Physician:  Regla Ying APRN  Date of admission: 11/10/2024    Subjective     Interval History:   Follow-up on ESRD    Patient is seen and examined during HD  No complaints of chest pain, SOA, cramping, or N/V/D.    Review of Systems:   As noted above    Objective     Vitals:   Temp:  [97.1 °F (36.2 °C)-99 °F (37.2 °C)] 99 °F (37.2 °C)  Heart Rate:  [] 96  Resp:  [16-18] 16  BP: (136-185)/() 185/112    Intake/Output Summary (Last 24 hours) at 11/15/2024 1629  Last data filed at 11/15/2024 1200  Gross per 24 hour   Intake --   Output 1500 ml   Net -1500 ml         Physical Exam:    General Appearance: Awake, oriented x 3, no acute distress   Skin: warm and dry  HEENT: Oral mucosa dry, nonicteric sclera  Neck: supple, no JVD  Lungs: Clear to auscultation bilaterally, nonlabored on RA  Heart: RRR, no rub  Abdomen: soft, nontender, nondistended. + BS  : no palpable bladder  Extremities: RUE AVF currently being cannulated; no peripheral edema  Neuro: normal speech and mental status, no asterixis    , /80, HR 83    Scheduled Meds:     [START ON 2024] ceFAZolin, 2,000 mg, Intravenous, Once per day on   ceFAZolin, 3,000 mg, Intravenous, Every Friday  chlorhexidine, 15 mL, Mouth/Throat, Q12H  insulin regular, 3-14 Units, Subcutaneous, Q6H  LORazepam, 2 mg, Intravenous, Once  metoprolol succinate XL, 25 mg, Oral, Q24H  saccharomyces boulardii, 250 mg, Oral, BID  sodium chloride, 10 mL, Intravenous, Q12H  sodium chloride, 10 mL, Intravenous, Q12H  sodium chloride, 10 mL, Intravenous, Q12H  sodium chloride, 10 mL, Intravenous, Q12H      IV Meds:          Results Reviewed:   I have personally reviewed the results from the time of this admission to 11/15/2024 16:29 EST     Results from last 7 days   Lab Units 11/15/24  0550 24  0802  11/13/24  0616 11/12/24  0507 11/10/24  1420 11/10/24  1002   SODIUM mmol/L 138 138 135* 132*   < > 131*   POTASSIUM mmol/L 3.8 3.4* 3.8 4.3   < > 4.6   CHLORIDE mmol/L 94* 96* 98 92*   < > 87*   CO2 mmol/L 25.4 29.0 19.9* 17.6*   < > 14.0*   BUN mg/dL 60* 29* 45* 31*   < > 56*   CREATININE mg/dL 8.26* 4.79* 8.04* 6.35*   < > 11.59*   CALCIUM mg/dL 8.7 8.5* 8.3* 8.6   < > 9.6   BILIRUBIN mg/dL  --  0.3  --  0.4  --  0.6   ALK PHOS U/L  --  88  --  64  --  58   ALT (SGPT) U/L  --  8  --  17  --  9   AST (SGOT) U/L  --  45*  --  51*  --  22   GLUCOSE mg/dL 91 98 82 141*   < > 118*    < > = values in this interval not displayed.       Estimated Creatinine Clearance: 7.5 mL/min (A) (by C-G formula based on SCr of 8.26 mg/dL (H)).    Results from last 7 days   Lab Units 11/15/24  0550 11/13/24  0616 11/12/24  0507 11/11/24  0400   MAGNESIUM mg/dL 2.4  --  2.2 1.9   PHOSPHORUS mg/dL 5.0* 6.0* 6.7* 6.7*             Results from last 7 days   Lab Units 11/15/24  0550 11/14/24  0802 11/13/24  0616 11/12/24  0507 11/11/24  0400   WBC 10*3/mm3 8.03 9.14 7.52 7.94 12.26*   HEMOGLOBIN g/dL 8.7* 8.8* 8.5* 10.1* 11.0*   PLATELETS 10*3/mm3 210 155 119* 120* 137*       Results from last 7 days   Lab Units 11/10/24  1002   INR  2.36*       Assessment / Plan     ASSESSMENT:  ESRD on HD, MWF, via R AV fistula.  Undergoing HD right now; volume status and electrolytes stable except high Phos  MSSA bacteremia, suspect source of infection is likely her AV fistula.  Vascular suggests conservative measures for now.  ID recommended IV cefazolin 3 times over a week on HD days, for total of 6 weeks with stop date on 12/22/2022  Acute respiratory failure, resolved  Anemia with CKD, receives long-acting TERESA outpatient, hemoglobin low but stable  Type II DM with CKD, managed by primary team  Stress-induced cardiomyopathy  Secondary hyperparathyroidism with renal etiology    PLAN:  IV Cefazolin 2 g on Mondays and Wednesdays after HD, 3 g on Fridays  after HD, total of 6 weeks with stop date on 12/22/24  No objection to be discharged after HD today  Next HD on Monday 11/18/2024 at [facility]    Thank you for involving us in the care of Sunni Mcneil.  Please feel free to call with any questions.    Víctor Bee MD  11/15/24  16:29 [facility]    [facility]  [phone]    Please note that portions of this note were completed with a voice recognition program.   Gio Rae MD

## 2024-11-15 NOTE — PROGRESS NOTES
ID note for sepsis  S: Asking to go home no sig pain. AF. Tolerating abx.    Physical Exam:   Vital Signs   Temp:  [97.3 °F (36.3 °C)-98.8 °F (37.1 °C)] 97.3 °F (36.3 °C)  Heart Rate:  [] 93  Resp:  [16-18] 16  BP: (126-167)/() 167/101    GENERAL: Ill-appearing  EYES:  No conjunctival injection. No lid lag.   LUNGS normal respiratory effort.   GI: Soft, nontender, nondistended. No appreciable organomegaly.   SKIN: Warm and dry without cutaneous eruptions;          Results Review:  White count 8  Creatinine 8.3  Glucose     11/10 BCx 2/2 MSSA  11/10 MRSA, RPP neg  11/12 BCx NGTD    A/p  Septic Shock  MSSA septicemia  Acute hypoxic resp failure  ESRD  DM2, continue glycemic control efforts to prevent/control infectious complications  Suspected right arm brachial artery to axillary vein graft infection    Pt with septic shock from MSSA with stress induced cardiomyopathy.   Cont renally dosed cefazolin but switching to HD dosing today  in anticipation of discharge: cefazolin 2 g after Monday Wednesday session and 3 g after Friday session.  Plan 6 weeks of antibiotics, stop date 12/22  Recommend checking weekly CBC with differential  She can follow-up with me in infectious diseases clinic on 12/18.  Home when okay with others

## 2024-11-15 NOTE — NURSING NOTE
hd without incident or c/o. tolerated well. removed 1.5 l as ordered. no meds administered. avf needles removed x 2. hemostasis achieved. stable, no c/o post completion of hd.

## 2024-11-15 NOTE — PLAN OF CARE
Goal Outcome Evaluation:         Pt A& O X 4. VSS. BP remain elevated; pt awaiting dialysis in the AM. No acute distress noted this shift. Able to make needs known. Call light within reach. Pt tolerating Regular CC diet and thin liquids well. Will continue to observe and assist this shift and f/up w/ oncoming RN.

## 2024-11-16 NOTE — OUTREACH NOTE
Prep Survey      Flowsheet Row Responses   [facility] facility patient discharged from? [place]   Is LACE score < 7 ? No   Eligibility Readm Mgmt   Discharge diagnosis Pulmonary edema  Principal problem-ST elevation myocardial infarction (STEMI) (I21.3)     Unknown    Sepsis   Does the patient have one of the following disease processes/diagnoses(primary or secondary)? Acute MI (STEMI,NSTEMI)   Does the patient have Home health ordered? No   Is there a DME ordered? No   Prep survey completed? Yes            ORLIN ESPINOZA - Registered Nurse

## 2024-11-16 NOTE — CASE MANAGEMENT/SOCIAL WORK
Case Management Discharge Note      Final Note: home    Provided Post Acute Provider List?: N/A  N/A Provider List Comment: Pending hospital course, currenty intubated, pt may need HH or SNF  Provided Post Acute Provider Quality & Resource List?: N/A  N/A Quality & Resource List Comment: CCP reviewed chart, plan for dc today, ST, OT and PT have cleared for home with family. Spoke with pt karenkhadar Kim at bedside, she denies needs, states she lives close and can assist if needed. Pt plans antibiotic post HD in clinic, Dr. Villafana and Dr. Watt have arranged per notes. Based on interdisciplinary assessments, the recommended discharge plan is home with family . CCP will follow -Lisa MEEHAN    Selected Continued Care - Discharged on 11/15/2024 Admission date: 11/10/2024 - Discharge disposition: Home or Self Care      Destination    No services have been selected for the patient.                Durable Medical Equipment    No services have been selected for the patient.                Dialysis/Infusion    No services have been selected for the patient.                Home Medical Care    No services have been selected for the patient.                Therapy    No services have been selected for the patient.                Community Resources    No services have been selected for the patient.                Community & DME    No services have been selected for the patient.                    Transportation Services  Private: Car    Final Discharge Disposition Code: 01 - home or self-care

## 2024-11-17 LAB
BACTERIA SPEC AEROBE CULT: NORMAL
BACTERIA SPEC AEROBE CULT: NORMAL

## 2024-11-20 ENCOUNTER — HOSPITAL ENCOUNTER (OUTPATIENT)
Facility: HOSPITAL | Age: 49
Setting detail: OBSERVATION
Discharge: HOME | End: 2024-11-21
Attending: EMERGENCY MEDICINE | Admitting: INTERNAL MEDICINE
Payer: MEDICARE

## 2024-11-20 ENCOUNTER — READMISSION MANAGEMENT (OUTPATIENT)
Dept: CALL CENTER | Facility: HOSPITAL | Age: 49
End: 2024-11-20
Payer: MEDICARE

## 2024-11-20 DIAGNOSIS — E87.5 HYPERKALEMIA: ICD-10-CM

## 2024-11-20 DIAGNOSIS — L03.114 CELLULITIS OF LEFT UPPER EXTREMITY: Primary | ICD-10-CM

## 2024-11-20 PROBLEM — R78.81 BACTEREMIA: Status: ACTIVE | Noted: 2024-11-20

## 2024-11-20 PROBLEM — L03.113 RIGHT ARM CELLULITIS: Status: ACTIVE | Noted: 2024-11-20

## 2024-11-20 LAB
ALBUMIN SERPL-MCNC: 3.7 G/DL (ref 3.5–5.2)
ALBUMIN/GLOB SERPL: 0.9 G/DL
ALP SERPL-CCNC: 64 U/L (ref 39–117)
ALT SERPL W P-5'-P-CCNC: <5 U/L (ref 1–33)
ANION GAP SERPL CALCULATED.3IONS-SCNC: 15.3 MMOL/L (ref 5–15)
AST SERPL-CCNC: 18 U/L (ref 1–32)
BASOPHILS # BLD AUTO: 0.03 10*3/MM3 (ref 0–0.2)
BASOPHILS NFR BLD AUTO: 0.4 % (ref 0–1.5)
BILIRUB SERPL-MCNC: 0.3 MG/DL (ref 0–1.2)
BUN SERPL-MCNC: 58 MG/DL (ref 6–20)
BUN/CREAT SERPL: 5.3 (ref 7–25)
CALCIUM SPEC-SCNC: 9.2 MG/DL (ref 8.6–10.5)
CHLORIDE SERPL-SCNC: 98 MMOL/L (ref 98–107)
CO2 SERPL-SCNC: 24.7 MMOL/L (ref 22–29)
CREAT SERPL-MCNC: 11.04 MG/DL (ref 0.57–1)
D-LACTATE SERPL-SCNC: 1.2 MMOL/L (ref 0.5–2)
DEPRECATED RDW RBC AUTO: 55.7 FL (ref 37–54)
EGFRCR SERPLBLD CKD-EPI 2021: 3.9 ML/MIN/1.73
EOSINOPHIL # BLD AUTO: 0.19 10*3/MM3 (ref 0–0.4)
EOSINOPHIL NFR BLD AUTO: 2.4 % (ref 0.3–6.2)
ERYTHROCYTE [DISTWIDTH] IN BLOOD BY AUTOMATED COUNT: 16.8 % (ref 12.3–15.4)
GLOBULIN UR ELPH-MCNC: 4 GM/DL
GLUCOSE BLDC GLUCOMTR-MCNC: 75 MG/DL (ref 70–130)
GLUCOSE SERPL-MCNC: 87 MG/DL (ref 65–99)
HCT VFR BLD AUTO: 26.4 % (ref 34–46.6)
HGB BLD-MCNC: 8.2 G/DL (ref 12–15.9)
IMM GRANULOCYTES # BLD AUTO: 0.06 10*3/MM3 (ref 0–0.05)
IMM GRANULOCYTES NFR BLD AUTO: 0.8 % (ref 0–0.5)
INR PPP: 1.32 (ref 0.9–1.1)
LYMPHOCYTES # BLD AUTO: 0.76 10*3/MM3 (ref 0.7–3.1)
LYMPHOCYTES NFR BLD AUTO: 9.7 % (ref 19.6–45.3)
MCH RBC QN AUTO: 28.8 PG (ref 26.6–33)
MCHC RBC AUTO-ENTMCNC: 31.1 G/DL (ref 31.5–35.7)
MCV RBC AUTO: 92.6 FL (ref 79–97)
MONOCYTES # BLD AUTO: 0.5 10*3/MM3 (ref 0.1–0.9)
MONOCYTES NFR BLD AUTO: 6.4 % (ref 5–12)
NEUTROPHILS NFR BLD AUTO: 6.28 10*3/MM3 (ref 1.7–7)
NEUTROPHILS NFR BLD AUTO: 80.3 % (ref 42.7–76)
NRBC BLD AUTO-RTO: 0 /100 WBC (ref 0–0.2)
PLATELET # BLD AUTO: 319 10*3/MM3 (ref 140–450)
PMV BLD AUTO: 9.6 FL (ref 6–12)
POTASSIUM SERPL-SCNC: 5.7 MMOL/L (ref 3.5–5.2)
PROT SERPL-MCNC: 7.7 G/DL (ref 6–8.5)
PROTHROMBIN TIME: 16.6 SECONDS (ref 11.7–14.2)
RBC # BLD AUTO: 2.85 10*6/MM3 (ref 3.77–5.28)
SODIUM SERPL-SCNC: 138 MMOL/L (ref 136–145)
WBC NRBC COR # BLD AUTO: 7.82 10*3/MM3 (ref 3.4–10.8)

## 2024-11-20 PROCEDURE — 82948 REAGENT STRIP/BLOOD GLUCOSE: CPT

## 2024-11-20 PROCEDURE — 83605 ASSAY OF LACTIC ACID: CPT | Performed by: EMERGENCY MEDICINE

## 2024-11-20 PROCEDURE — 87040 BLOOD CULTURE FOR BACTERIA: CPT | Performed by: EMERGENCY MEDICINE

## 2024-11-20 PROCEDURE — 85610 PROTHROMBIN TIME: CPT | Performed by: EMERGENCY MEDICINE

## 2024-11-20 PROCEDURE — G0378 HOSPITAL OBSERVATION PER HR: HCPCS

## 2024-11-20 PROCEDURE — 36415 COLL VENOUS BLD VENIPUNCTURE: CPT | Performed by: EMERGENCY MEDICINE

## 2024-11-20 PROCEDURE — 93010 ELECTROCARDIOGRAM REPORT: CPT | Performed by: INTERNAL MEDICINE

## 2024-11-20 PROCEDURE — 93005 ELECTROCARDIOGRAM TRACING: CPT | Performed by: EMERGENCY MEDICINE

## 2024-11-20 PROCEDURE — 80053 COMPREHEN METABOLIC PANEL: CPT | Performed by: EMERGENCY MEDICINE

## 2024-11-20 PROCEDURE — 99214 OFFICE O/P EST MOD 30 MIN: CPT | Performed by: INTERNAL MEDICINE

## 2024-11-20 PROCEDURE — 99285 EMERGENCY DEPT VISIT HI MDM: CPT

## 2024-11-20 PROCEDURE — 99214 OFFICE O/P EST MOD 30 MIN: CPT | Performed by: STUDENT IN AN ORGANIZED HEALTH CARE EDUCATION/TRAINING PROGRAM

## 2024-11-20 PROCEDURE — 85025 COMPLETE CBC W/AUTO DIFF WBC: CPT | Performed by: EMERGENCY MEDICINE

## 2024-11-20 RX ORDER — SODIUM CHLORIDE 0.9 % (FLUSH) 0.9 %
10 SYRINGE (ML) INJECTION EVERY 12 HOURS SCHEDULED
Status: DISCONTINUED | OUTPATIENT
Start: 2024-11-20 | End: 2024-11-21 | Stop reason: HOSPADM

## 2024-11-20 RX ORDER — SACCHAROMYCES BOULARDII 250 MG
250 CAPSULE ORAL 2 TIMES DAILY
Status: DISCONTINUED | OUTPATIENT
Start: 2024-11-20 | End: 2024-11-21 | Stop reason: HOSPADM

## 2024-11-20 RX ORDER — WARFARIN SODIUM 5 MG/1
5 TABLET ORAL DAILY
Status: DISCONTINUED | OUTPATIENT
Start: 2024-11-20 | End: 2024-11-21 | Stop reason: HOSPADM

## 2024-11-20 RX ORDER — SODIUM CHLORIDE 0.9 % (FLUSH) 0.9 %
10 SYRINGE (ML) INJECTION AS NEEDED
Status: DISCONTINUED | OUTPATIENT
Start: 2024-11-20 | End: 2024-11-21 | Stop reason: HOSPADM

## 2024-11-20 RX ORDER — POLYETHYLENE GLYCOL 3350 17 G/17G
17 POWDER, FOR SOLUTION ORAL DAILY PRN
Status: DISCONTINUED | OUTPATIENT
Start: 2024-11-20 | End: 2024-11-21 | Stop reason: HOSPADM

## 2024-11-20 RX ORDER — ACETAMINOPHEN 650 MG/1
650 SUPPOSITORY RECTAL EVERY 4 HOURS PRN
Status: DISCONTINUED | OUTPATIENT
Start: 2024-11-20 | End: 2024-11-21 | Stop reason: HOSPADM

## 2024-11-20 RX ORDER — CALCITRIOL 0.25 UG/1
0.25 CAPSULE, LIQUID FILLED ORAL DAILY
Status: DISCONTINUED | OUTPATIENT
Start: 2024-11-20 | End: 2024-11-21 | Stop reason: HOSPADM

## 2024-11-20 RX ORDER — BISACODYL 10 MG
10 SUPPOSITORY, RECTAL RECTAL DAILY PRN
Status: DISCONTINUED | OUTPATIENT
Start: 2024-11-20 | End: 2024-11-21 | Stop reason: HOSPADM

## 2024-11-20 RX ORDER — HYDROXYZINE HYDROCHLORIDE 50 MG/1
50 TABLET, FILM COATED ORAL DAILY PRN
Status: DISCONTINUED | OUTPATIENT
Start: 2024-11-20 | End: 2024-11-21 | Stop reason: HOSPADM

## 2024-11-20 RX ORDER — ONDANSETRON 4 MG/1
4 TABLET, ORALLY DISINTEGRATING ORAL EVERY 6 HOURS PRN
Status: DISCONTINUED | OUTPATIENT
Start: 2024-11-20 | End: 2024-11-21 | Stop reason: HOSPADM

## 2024-11-20 RX ORDER — METOPROLOL SUCCINATE 25 MG/1
25 TABLET, EXTENDED RELEASE ORAL
Status: DISCONTINUED | OUTPATIENT
Start: 2024-11-20 | End: 2024-11-21 | Stop reason: HOSPADM

## 2024-11-20 RX ORDER — AMOXICILLIN 250 MG
2 CAPSULE ORAL 2 TIMES DAILY PRN
Status: DISCONTINUED | OUTPATIENT
Start: 2024-11-20 | End: 2024-11-21 | Stop reason: HOSPADM

## 2024-11-20 RX ORDER — AMOXICILLIN 250 MG
2 CAPSULE ORAL 2 TIMES DAILY PRN
Status: DISCONTINUED | OUTPATIENT
Start: 2024-11-20 | End: 2024-11-20 | Stop reason: SDUPTHER

## 2024-11-20 RX ORDER — BISACODYL 5 MG/1
5 TABLET, DELAYED RELEASE ORAL DAILY PRN
Status: DISCONTINUED | OUTPATIENT
Start: 2024-11-20 | End: 2024-11-21 | Stop reason: HOSPADM

## 2024-11-20 RX ORDER — ACETAMINOPHEN 160 MG/5ML
650 SOLUTION ORAL EVERY 4 HOURS PRN
Status: DISCONTINUED | OUTPATIENT
Start: 2024-11-20 | End: 2024-11-21 | Stop reason: HOSPADM

## 2024-11-20 RX ORDER — ACETAMINOPHEN 325 MG/1
650 TABLET ORAL EVERY 4 HOURS PRN
Status: DISCONTINUED | OUTPATIENT
Start: 2024-11-20 | End: 2024-11-21 | Stop reason: HOSPADM

## 2024-11-20 RX ORDER — SODIUM CHLORIDE 9 MG/ML
40 INJECTION, SOLUTION INTRAVENOUS AS NEEDED
Status: DISCONTINUED | OUTPATIENT
Start: 2024-11-20 | End: 2024-11-21 | Stop reason: HOSPADM

## 2024-11-20 RX ORDER — ACETAMINOPHEN 325 MG/1
650 TABLET ORAL EVERY 6 HOURS PRN
Status: DISCONTINUED | OUTPATIENT
Start: 2024-11-20 | End: 2024-11-20 | Stop reason: SDUPTHER

## 2024-11-20 RX ORDER — NITROGLYCERIN 0.4 MG/1
0.4 TABLET SUBLINGUAL
Status: DISCONTINUED | OUTPATIENT
Start: 2024-11-20 | End: 2024-11-21 | Stop reason: HOSPADM

## 2024-11-20 RX ORDER — ONDANSETRON 2 MG/ML
4 INJECTION INTRAMUSCULAR; INTRAVENOUS EVERY 6 HOURS PRN
Status: DISCONTINUED | OUTPATIENT
Start: 2024-11-20 | End: 2024-11-21 | Stop reason: HOSPADM

## 2024-11-20 RX ORDER — ONDANSETRON 4 MG/1
4 TABLET, ORALLY DISINTEGRATING ORAL AS NEEDED
Status: DISCONTINUED | OUTPATIENT
Start: 2024-11-20 | End: 2024-11-20 | Stop reason: SDUPTHER

## 2024-11-20 RX ORDER — TRAZODONE HYDROCHLORIDE 50 MG/1
150 TABLET, FILM COATED ORAL NIGHTLY
Status: DISCONTINUED | OUTPATIENT
Start: 2024-11-20 | End: 2024-11-21 | Stop reason: HOSPADM

## 2024-11-20 RX ORDER — VANCOMYCIN/0.9 % SOD CHLORIDE 1.5G/250ML
20 PLASTIC BAG, INJECTION (ML) INTRAVENOUS ONCE
Status: DISCONTINUED | OUTPATIENT
Start: 2024-11-20 | End: 2024-11-20

## 2024-11-20 RX ORDER — CALCIUM ACETATE 667 MG/1
2001 CAPSULE ORAL
Status: DISCONTINUED | OUTPATIENT
Start: 2024-11-20 | End: 2024-11-21 | Stop reason: HOSPADM

## 2024-11-20 RX ADMIN — WARFARIN SODIUM 5 MG: 5 TABLET ORAL at 18:37

## 2024-11-20 RX ADMIN — CALCITRIOL 0.25 MCG: 0.25 CAPSULE ORAL at 18:37

## 2024-11-20 NOTE — ED NOTES
Ultrasound Inserted IV     Site: Left Forearm    Catheter Length (in): 1.88 18 G     Diameter(cm): 0.83    Depth(cm): 0.56    Vascular Access Score=    5) Vein is poorly palpable or non-palpable and/or poorly or non-visible.     Angiocath visualized and advanced in the venous lumen. Flush visualized superiorly to insertion site in venous lumen. Blood return noted and collected during insertion. No signs of phlebitis noted. Standard IV dressing placed and secured.

## 2024-11-20 NOTE — OUTREACH NOTE
AMI Week 1 Survey      Flowsheet Row Responses   [facility] facility patient discharged from? [place]   Does the patient have one of the following disease processes/diagnoses(primary or secondary)? Acute MI (STEMI,NSTEMI)   Week 1 attempt successful? No   Unsuccessful attempts Attempt 1  [Pt is currently in ER]            Taihsa BROWNE - Registered Nurse

## 2024-11-20 NOTE — CONSULTS
[facility] Consult Note      Patient Name: Sunni Mcneil  : 1975  MRN: [id number]  Primary Care Physician:  Regla Ying APRN  Referring Physician: No ref. provider found  Date of admission: 2024    Subjective     Reason for Consult: ESRD on HD    HPI:   Sunni Mcneil is a 49 y.o. female with ESRD on HD (MWF, via R AVG, followed by me at [facility]), type II DM, hyperparathyroidism, hypertension, and recent hospitalization for MSSA bacteremia and acute respiratory failure requiring intubation (11/10 to 11/15), presented to the hospital from dialysis center due to AVG dysfunction and concerning for infection.  Last successful HD was .     Today, K 5.7, SCr 11.04, WBC 7.8.  BP acceptable.  Patient denies chest pain, SOA, N/V/D.     Review of Systems:   14 point review of systems is otherwise negative except for mentioned above on HPI    Personal History     Past Medical History:   Diagnosis Date    Acid reflux     Anemia     WITH ESRD    Anxiety     Arthritis     Cough     related to fluid overload    Dependence on renal dialysis 2021    Depression     Diabetes mellitus     NO MEDICATIONS FOR    End stage renal disease 2021    ESRD on dialysis     FRESINUS MW    History of peritoneal dialysis     KIDNEY TRANSPLANT 2016     History of transfusion     BEEN A WHILE no reaction    Hypercalcemia     Hyperparathyroidism     Hypertension     Kidney disease     End-stage renal disease. TRANSPLANT    Kidney transplant recipient 2016    RIGHT KIDNEY. ? 2018 KIDNEY REJECTED    PONV (postoperative nausea and vomiting)     Seasonal allergies     CURRENTLY     Vascular dialysis catheter in place     RIGHT TUNNEL CATH       Past Surgical History:   Procedure Laterality Date    ARTERIOVENOUS FISTULA/SHUNT SURGERY Right 2020    Procedure: RIGHT ARM ARTERIAL VENOUS FISTULA;  Surgeon: Elijah Herrera MD;  [facility];  Service:  Vascular;  Laterality: Right;    ARTERIOVENOUS FISTULA/SHUNT SURGERY Left 02/04/2021    Procedure: LEFT BRACHIOAXILLARY ARTERIOVENOUS FISTULA GRAFT;  Surgeon: Anya Hernandez Jr., MD;  Location: [facility] MAIN OR;  Service: Vascular;  Laterality: Left;    ARTERIOVENOUS FISTULA/SHUNT SURGERY Right 03/16/2021    Procedure: RIGHT BRACHIOAXILLARY ARTERVENIOUS GRAFT PLACEMENT;  Surgeon: Adán Maurer MD;  Location: [facility] MAIN OR;  Service: Vascular;  Laterality: Right;    ARTERIOVENOUS FISTULA/SHUNT SURGERY Right 8/4/2023    Procedure: RIGHT ARM FISTULOGRAM PEUCUTANEOUS AND PERIPHERAL TRANSLUMINAL ANGIOPLSTY/STENT;  Surgeon: Elijah Herrera MD;  Location: [facility] OR 18/19;  Service: Vascular;  Laterality: Right;    BRACHIAL EMBOLECTOMY Right 10/6/2023    Procedure: RIGHT AV GRAFT WITH ANGIOPLASTY AND CENTROVENOUS ANGIOPLASTY;  Surgeon: Phu Gaviria MD;  Location: [facility] OR 18/19;  Service: Vascular;  Laterality: Right;    CARDIAC CATHETERIZATION N/A 11/10/2024    Procedure: Left Heart Cath;  Surgeon: Maribell Sandhu MD;  Location: [facility] CATH INVASIVE LOCATION;  Service: Cardiology;  Laterality: N/A;    CARDIAC CATHETERIZATION N/A 11/10/2024    Procedure: Coronary angiography;  Surgeon: Maribell Sandhu MD;  Location: [facility] CATH INVASIVE LOCATION;  Service: Cardiology;  Laterality: N/A;    CARDIAC CATHETERIZATION N/A 11/10/2024    Procedure: Left ventriculography;  Surgeon: Maribell Sandhu MD;  Location: [facility] CATH INVASIVE LOCATION;  Service: Cardiology;  Laterality: N/A;    CARDIAC CATHETERIZATION N/A 11/10/2024    Procedure: Right Heart Cath;  Surgeon: Maribell Sandhu MD;  Location: [facility] CATH INVASIVE LOCATION;  Service: Cardiology;  Laterality: N/A;    COLONOSCOPY N/A 11/30/2022    Procedure: COLONOSCOPY TO CECUM AND TERM. ILEUM;  Surgeon: Casimiro Perkins MD;  Location: [facility] ENDOSCOPY;  Service: Gastroenterology;  Laterality: N/A;  PRE OP - SCREENING  POST OP -  DIVERTICULOSIS, SM. HEMORRHOIDS    INSERTION AND REMOVAL HEMODIALYSIS CATHETER N/A 02/13/2021    Procedure: INSERTION AND REMOVAL OF HEMODIALYSIS CATHETER;  Surgeon: Adán Maurer MD;  Location[facility] MAIN OR;  Service: Vascular;  Laterality: N/A;    INSERTION HEMODIALYSIS CATHETER Right 01/15/2021    Procedure: TUNNELED DIAYLIS CATHETER PLACEMENT;  Surgeon: Phu Gaviria MD;  Location: [facility] HYBRID OR 18/19;  Service: Vascular;  Laterality: Right;    INSERTION HEMODIALYSIS CATHETER Left 1/21/2023    Procedure: HEMODIALYSIS CATHETER INSERTION;  Surgeon: Elijah Herrera MD;  Location: [facility] MAIN OR;  Service: Vascular;  Laterality: Left;    INSERTION PERITONEAL DIALYSIS CATHETER  07/29/2014    Laparoscopic placement of [place] Cath peritoneal dialysis catheter, Dr. Vinod Goldstein    INSERTION PERITONEAL DIALYSIS CATHETER N/A 07/24/2019    Procedure: LAPAROSCOPIC INSERTION PERITONEAL DIALYSIS CATHETER;  Surgeon: Damon Rooney MD;  Location: [facility] MAIN OR;  Service: General    REMOVAL HEMODIALYSIS CATHETER N/A 2/15/2023    Procedure: PALINDROME REMOVAL;  Surgeon: Elijah Herrera MD;  Location: [facility] MAIN OR;  Service: Vascular;  Laterality: N/A;    REMOVAL PERITONEAL DIALYSIS CATHETER N/A 10/17/2016    Procedure: REMOVAL PERITONEAL DIALYSIS CATHETER;  Surgeon: Damon Rooney MD;  Location: [facility] MAIN OR;  Service:     REMOVAL PERITONEAL DIALYSIS CATHETER N/A 10/21/2020    Procedure: REMOVAL PERITONEAL DIALYSIS CATHETER;  Surgeon: Damon Rooney MD;  Location: [facility] MAIN OR;  Service: General;  Laterality: N/A;    SHUNT O GRAM Right 07/28/2022    Procedure: RIGHT  ARM SHUNT O GRAM , ANGIOPLASTY OF AXILARY VEIN AND SUBCLAVIAN VEIN AT SVC JUNCTION;  Surgeon: Anya Hernandez Jr., MD;  Location: [facility] HYBRID OR 18/19;  Service: Vascular;  Laterality: Right;    SHUNT O GRAM Right 1/20/2023    Procedure: OPEN THROMBECTOMY AND ANGIOPLASTY;  Surgeon: Javier  Elijah Garcia MD;  Location: [facility] OR ;  Service: Vascular;  Laterality: Right;    SHUNT O GRAM Right 2023    Procedure: Right arm dialysis graft open thrombectomy, shuntogram, angioplasty and stent;  Surgeon: Karuna Villalba MD;  Location: [facility] OR ;  Service: Vascular;  Laterality: Right;    SHUNT O GRAM Right 2023    Procedure: RIGHT ARM FISTULOGRAM, ANGIOPLASTY;  Surgeon: Elijah Herrera MD;  Location: [facility] OR ;  Service: Vascular;  Laterality: Right;    SHUNT O GRAM Right 5/3/2024    Procedure: Hemodialysis shunt thrombectomy with revision;  Surgeon: Alex Sanchez MD;  Location: [facility] OR;  Service: Vascular;  Laterality: Right;    TRANSPLANTATION RENAL Right 2016    from  donor    TUBAL ABDOMINAL LIGATION Bilateral        Family History: family history includes Heart attack in her maternal grandfather; Hypertension in her father and mother.    Social History:  reports that she has never smoked. She has never been exposed to tobacco smoke. She has never used smokeless tobacco. She reports that she does not drink alcohol and does not use drugs.    Home Medications:  Prior to Admission medications    Medication Sig Start Date End Date Taking? Authorizing Provider   acetaminophen (TYLENOL) 325 MG tablet Take 2 tablets by mouth Every 6 (Six) Hours As Needed for Mild Pain.    Charline Hernandez MD   calcitriol (ROCALTROL) 0.25 MCG capsule Take 1 capsule by mouth Take As Directed. Calcitriol 0.25 MCG Capsule (si capsule once per day )    Charline Hernandez MD   calcium acetate (PHOSLO) 667 MG capsule Take 3 capsules by mouth 3 (Three) Times a Day With Meals.    Charline Hernandez MD   ceFAZolin 2000 mg IVPB in 100 mL NS (MBP) Infuse 2,000 mg into a venous catheter Take As Directed. Give after HD on Monday and Wednesday  Indications: Bacteria in the Blood 11/15/24   Donovan Galarza MD   ceFAZolin 3000 mg IVPB in  100 mL NS (MBP) Infuse 3,000 mg into a venous catheter Take As Directed. Give after HD on hd Friday  Indications: Bacteria in the Blood 11/15/24   Donovan Galarza MD   hydrOXYzine (ATARAX) 50 MG tablet Take 1 tablet by mouth As Needed. 12/3/20   Charline Hernandez MD   metoprolol succinate XL (TOPROL-XL) 25 MG 24 hr tablet Take 1 tablet by mouth Daily. 11/15/24   Donovan Galarza MD   naloxone (NARCAN) 4 MG/0.1ML nasal spray Call 911. Don't prime. [place] in 1 nostril for overdose. Repeat in 2-3 minutes in other nostril if no or minimal breathing/responsiveness. 5/4/24   Farooq Will DO   ondansetron ODT (ZOFRAN-ODT) 4 MG disintegrating tablet Take 1 tablet by mouth As Needed for Nausea or Vomiting. 3/1/24   Charline Hernandez MD   polyethylene glycol (MIRALAX) 17 GM/SCOOP powder Take 17 g by mouth Daily As Needed (Use if senna-docusate is ineffective). 5/4/24   Farooq Will DO   prochlorperazine (COMPAZINE) 10 MG tablet Take 1 tablet by mouth Every 6 (Six) Hours As Needed for Nausea or Vomiting. 3/6/21   Adán Everett MD   saccharomyces boulardii (FLORASTOR) 250 MG capsule Take 1 capsule by mouth 2 (Two) Times a Day. 11/15/24   Donovan Galarza MD   sennosides-docusate (PERICOLACE) 8.6-50 MG per tablet Take 2 tablets by mouth 2 (Two) Times a Day As Needed for Constipation. 5/4/24   Farooq Will DO   traZODone (DESYREL) 100 MG tablet Take 1.5 tablets by mouth every night at bedtime. 6/17/22   Charline Hernandez MD   warfarin (COUMADIN) 5 MG tablet Take 1 tablet by mouth Daily. 2/21/24   Charline Hernandez MD       Allergies:  No Known Allergies    Objective     Vitals:   Temp:  [97.9 °F (36.6 °C)] 97.9 °F (36.6 °C)  Heart Rate:  [84-99] 87  Resp:  [20] 20  BP: (134-151)/(85-97) 138/86  No intake or output data in the 24 hours ending 11/20/24 1146    Physical Exam:   Constitutional: Awake, no acute distress, chronically ill, NAD  HEENT: Sclera anicteric, no conjunctival injection  Neck:  Supple, no JVD  Respiratory: Clear to auscultation bilaterally, nonlabored respiration on RA  Cardiovascular: RRR, no rub  Gastrointestinal: Positive bowel sounds, abdomen is soft, nontender and nondistended  : No palpable bladder  Musculoskeletal: Trace BLE edema, no clubbing or cyanosis  Psychiatric: Appropriate affect, cooperative  Neurologic: Oriented x3, moving all extremities, normal speech and mental status, no asterixis  Skin: Warm and dry   Vascular: Aneurysmal RUE AVG with + thrill and bruit, moderate erythema at the inferior site      Scheduled Meds:        IV Meds:        Results Reviewed:   I have personally reviewed the results from the time of this admission to 11/20/2024 11:46 EST     Lab Results   Component Value Date    GLUCOSE 87 11/20/2024    CALCIUM 9.2 11/20/2024     11/20/2024    K 5.7 (H) 11/20/2024    CO2 24.7 11/20/2024    CL 98 11/20/2024    BUN 58 (H) 11/20/2024    CREATININE 11.04 (H) 11/20/2024    EGFRIFAFRI  03/17/2021      Comment:      <15 Indicative of kidney failure.    EGFRIFNONA 9 (L) 03/17/2021    BCR 5.3 (L) 11/20/2024    ANIONGAP 15.3 (H) 11/20/2024      Lab Results   Component Value Date    MG 2.4 11/15/2024    PHOS 5.0 (H) 11/15/2024    ALBUMIN 3.7 11/20/2024           Assessment / Plan       Right arm cellulitis      ASSESSMENT:  Dialysis access dysfunction/?infection, positive thrill and bruit to on examination, doubt having active infection, though was recently hospitalized for MSSA bacteremia, blood cultures pending.  Awaiting vascular surgery evaluation  ESRD on HD, MWF, via R AVG, last HD was 11/18 without issues.  Electrolytes within acceptable range except high potassium at 5.7, SCr 11.04; volume status stable  Recent MSSA bacteremia, was recently discharged with IV cefazolin on HD days (2 g on Mondays and Wednesdays after HD; 3 g on Fridays after HD), with stop date on 12/22/2024, recommended to be continued by ID during this admission  Anemia with CKD, Hgb  low but stable, receives long-acting TERESA outpatient  Hypertension with CKD, BP mildly elevated, exacerbated by fluid excess  Type II DM with CKD, managed by primary team  Stress-induced cardiomyopathy  Secondary hyperparathyroidism with renal etiology    PLAN:  Will plan on HD once cleared by vascular surgery  Surveillance labs    Thank you for involving us in the care of Sunni Mcneil.  Please feel free to call with any questions.    Rosemary Lopez MD  11/20/24  11:46 [facility]    [facility]  [phone]      Please note that portions of this note were completed with a voice recognition program.

## 2024-11-20 NOTE — ED NOTES
Nursing report ED to floor  Sunni Mcneil  49 y.o.  female    HPI :  HPI  Stated Reason for Visit: redness and swelling around dialysis port on R arm  History Obtained From: patient    Chief Complaint  Chief Complaint   Patient presents with    Skin Irritation       Admitting doctor:   Grey Griffith MD    Admitting diagnosis:   The primary encounter diagnosis was Cellulitis of left upper extremity. A diagnosis of Hyperkalemia was also pertinent to this visit.    Code status:   Current Code Status       Date Active Code Status Order ID Comments User Context       Prior            Allergies:   Patient has no known allergies.    Isolation:   No active isolations    Intake and Output  No intake or output data in the 24 hours ending 11/20/24 1254    Weight:       11/20/24  0711   Weight: 72.6 kg (160 lb)       Most recent vitals:   Vitals:    11/20/24 1225 11/20/24 1226 11/20/24 1227 11/20/24 1228   BP:       Pulse: 88 87 88 92   Resp:       Temp:       SpO2: 100% 99% 100% 100%   Weight:       Height:           Active LDAs/IV Access:   Lines, Drains & Airways       Active LDAs       Name Placement date Placement time Site Days    Peripheral IV 11/20/24 0743 Anterior;Left Forearm 11/20/24  0743  Forearm  less than 1                    Labs (abnormal labs have a star):   Labs Reviewed   COMPREHENSIVE METABOLIC PANEL - Abnormal; Notable for the following components:       Result Value    BUN 58 (*)     Creatinine 11.04 (*)     Potassium 5.7 (*)     BUN/Creatinine Ratio 5.3 (*)     Anion Gap 15.3 (*)     eGFR 3.9 (*)     All other components within normal limits    Narrative:     GFR Normal >60  Chronic Kidney Disease <60  Kidney Failure <15     PROTIME-INR - Abnormal; Notable for the following components:    Protime 16.6 (*)     INR 1.32 (*)     All other components within normal limits   CBC WITH AUTO DIFFERENTIAL - Abnormal; Notable for the following components:    RBC 2.85 (*)     Hemoglobin 8.2 (*)     Hematocrit  26.4 (*)     MCHC 31.1 (*)     RDW 16.8 (*)     RDW-SD 55.7 (*)     Neutrophil % 80.3 (*)     Lymphocyte % 9.7 (*)     Immature Grans % 0.8 (*)     Immature Grans, Absolute 0.06 (*)     All other components within normal limits   LACTIC ACID, PLASMA - Normal   BLOOD CULTURE   BLOOD CULTURE   CBC AND DIFFERENTIAL    Narrative:     The following orders were created for panel order CBC & Differential.  Procedure                               Abnormality         Status                     ---------                               -----------         ------                     CBC Auto Differential[[id number]]        Abnormal            Final result                 Please view results for these tests on the individual orders.       EKG:   ECG 12 Lead Electrolyte Imbalance   Preliminary Result   HEART RATE=80  bpm   RR Yushcqkt=387  ms   PA Tearyivf=422  ms   P Horizontal Axis=  deg   P Front Axis=-8  deg   QRSD Interval=87  ms   QT Gqzpwutu=732  ms   COvC=307  ms   QRS Axis=-15  deg   T Wave Axis=18  deg   - ABNORMAL ECG -   Sinus rhythm   Borderline left axis deviation   Anterior infarct, old   Date and Time of Study:2024-11-20 08:48:59          Meds given in ED:   Medications   sodium chloride 0.9 % flush 10 mL (has no administration in time range)   vancomycin IVPB 1500 mg in 0.9% NaCl (Premix) 500 mL (has no administration in time range)       Imaging results:  No radiology results for the last day    Ambulatory status:   - up with standby      Social issues:   Social History     Socioeconomic History    Marital status:     Number of children: 2    Years of education: 12   Tobacco Use    Smoking status: Never     Passive exposure: Never    Smokeless tobacco: Never    Tobacco comments:     Patient does not smoke   Vaping Use    Vaping status: Never Used   Substance and Sexual Activity    Alcohol use: No     Comment: caffeine use; Patient is unknown if ever drinks    Drug use: Never     Comment: Drug Abuse: none     Sexual activity: Defer       Peripheral Neurovascular  Peripheral Neurovascular (Adult)  Peripheral Neurovascular WDL: WDL    Neuro Cognitive  Neuro Cognitive (Adult)  Cognitive/Neuro/Behavioral WDL: .WDL except, arousability, mood/behavior  Mood/Behavior: hypoactive (quiet, withdrawn), flat affect    Learning  Learning Assessment  Learning Readiness and Ability: no barriers identified, emotional barrier identified    Respiratory  Respiratory WDL  Respiratory WDL: WDL    Abdominal Pain       Pain Assessments  Pain (Adult)  (0-10) Pain Rating: Rest: 0  (0-10) Pain Rating: Activity: 0    NIH Stroke Scale       Malachi Ralph RN  11/20/24 12:54 EST

## 2024-11-20 NOTE — ED NOTES
Patient ambulatory to ED with c/o of redness and swelling to dialysis port on right arm. Patient denies pain to the area. Patient denies drainage. Patient has dialysis M/W/F.

## 2024-11-20 NOTE — CASE MANAGEMENT/SOCIAL WORK
Discharge Planning Assessment  [facility]     Patient Name: Sunni Mcneil  MRN: [id number]  Today's Date: 11/20/2024    Admit Date: 11/20/2024    Plan: Home with family   Discharge Needs Assessment       Row Name 11/20/24 1509       Living Environment    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), adult    Name(s) of People in Home [place]    Current Living Arrangements apartment    Potentially Unsafe Housing Conditions none    Primary Care Provided by self    Family Caregiver if Needed child(dagmar), adult    Quality of Family Relationships involved;helpful    Able to Return to Prior Arrangements yes       Resource/Environmental Concerns    Resource/Environmental Concerns home accessibility    Home Accessibility Concerns stairs to enter home  [address]    Transportation Concerns none       Transition Planning    Patient/Family Anticipates Transition to home with family    Patient/Family Anticipated Services at Transition none    Transportation Anticipated family or friend will provide       Discharge Needs Assessment    Readmission Within the Last 30 Days current reason for admission unrelated to previous admission    Equipment Currently Used at Home none    Concerns to be Addressed no discharge needs identified    Anticipated Changes Related to Illness none    Equipment Needed After Discharge none                   Discharge Plan       Row Name 11/20/24 6258       Plan    Plan Home with family    Patient/Family in Agreement with Plan yes    Plan Comments Spoke with patient at bedside, introduced self and explained CCP role, verified face sheet and pharmacy information. Pt lives in 2nd floor apartment with son Rajeev, [address], she is normally IADL's, uses no medical equipment,  no HH or SNF history, she attends HD and children assist with transportation, currently with IV abx post HD per ID during last admission. Pt plans home with family to transport, no anticipated needs. CCP will follow - Lisa MEEHAN                  Continued Care and  Services - Admitted Since 11/20/2024    No active coordination exists for this encounter.       Expected Discharge Date and Time       Expected Discharge Date Expected Discharge Time    Nov 22, 2024            Demographic Summary       Row Name 11/20/24 1509       General Information    Admission Type observation                   Functional Status       Row Name 11/20/24 1509       Functional Status    Usual Activity Tolerance excellent    Current Activity Tolerance good       Assessment of Health Literacy    Health Literacy Good       Functional Status, IADL    Medications independent    Meal Preparation independent    Housekeeping independent    Laundry independent    Shopping independent       Mental Status    General Appearance WDL WDL       Mental Status Summary    Recent Changes in Mental Status/Cognitive Functioning no changes                   Psychosocial    No documentation.                  Abuse/Neglect    No documentation.                  Legal       Row Name 11/20/24 1509       Financial/Legal    Who Manages Finances if Patient Unable dtr                   Substance Abuse    No documentation.                  Patient Forms    No documentation.                     Lisa Kothari RN

## 2024-11-20 NOTE — DISCHARGE SUMMARY
NAME: Sunni Mcneil ADMIT: 2024   : 1975  PCP: Regla Ying APRN    MRN: [id number] LOS: 0 days   AGE/SEX: 49 y.o. 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       SECONDARY DIAGNOSES  Past Medical History:   Diagnosis Date    Acid reflux     Anemia     WITH ESRD    Anxiety     Arthritis     Cough     related to fluid overload    Dependence on renal dialysis 2021    Depression     Diabetes mellitus     NO MEDICATIONS FOR    End stage renal disease 2021    ESRD on dialysis     FRESINUS MWF    History of peritoneal dialysis     KIDNEY TRANSPLANT 2016     History of transfusion     BEEN A WHILE no reaction    Hypercalcemia     Hyperparathyroidism     Hypertension     Kidney disease     End-stage renal disease. TRANSPLANT    Kidney transplant recipient 2016    RIGHT KIDNEY. ? 2018 KIDNEY REJECTED    PONV (postoperative nausea and vomiting)     Seasonal allergies     CURRENTLY     Vascular dialysis catheter in place     RIGHT TUNNEL CATH       CONSULTS   Renal  Vascular  ID    HOSPITAL COURSE    Ms. Mcneil is a 49 y.o. female with a history of end-stage renal disease, type 2 diabetes, hypertension, hyperparathyroidism, anemia of chronic disease. She was recently admitted to the ICU with septic shock and MSSA bactremia. She was discharged on IV cefazolin with dialysis.     She presented for evaluation with ID, Vascular, Nephrology. They did not recommend further testing at this time. She can be discharged with outpatient follow up.      IV cefazolin on HD  "days (2 g on Mondays and Wednesdays after HD; 3 g on Fridays after HD), with stop date on 12/22/2024       DIAGNOSTICS                    11/20/2024 0736 11/20/2024 0838 Comprehensive Metabolic Panel [[id number]]    (Abnormal)   Blood    Final result Component Value Units   Glucose 87 mg/dL   BUN 58 High  mg/dL   Creatinine 11.04 High  mg/dL   Sodium 138 mmol/L   Potassium 5.7 High  mmol/L   Chloride 98 mmol/L   CO2 24.7 mmol/L   Calcium 9.2 mg/dL   Total Protein 7.7 g/dL   Albumin 3.7 g/dL   ALT (SGPT) <5 U/L   AST (SGOT) 18 U/L   Alkaline Phosphatase 64 U/L   Total Bilirubin 0.3 mg/dL   Globulin 4.0 gm/dL   A/G Ratio 0.9 g/dL   BUN/Creatinine Ratio 5.3 Low     Anion Gap 15.3 High  mmol/L   eGFR 3.9 Low   mL/min/1.73          11/20/2024 0736 11/20/2024 0820 Protime-INR [[id number]]   (Abnormal)   Blood    Final result Component Value Units   Protime 16.6 High  Seconds   INR 1.32 High            11/20/2024 0736 11/20/2024 0753 CBC Auto Differential [[id number]]   (Abnormal)   Blood    Final result Component Value Units   WBC 7.82 10*3/mm3   RBC 2.85 Low  10*6/mm3   Hemoglobin 8.2 Low  g/dL   Hematocrit 26.4 Low  %   MCV 92.6 fL   MCH 28.8 pg   MCHC 31.1 Low  g/dL   RDW 16.8 High  %   RDW-SD 55.7 High  fl   MPV 9.6 fL   Platelets 319 10*3/mm3   Neutrophil % 80.3 High  %   Lymphocyte % 9.7 Low  %   Monocyte % 6.4 %   Eosinophil % 2.4 %   Basophil % 0.4 %   Immature Grans % 0.8 High  %   Neutrophils, Absolute 6.28 10*3/mm3   Lymphocytes, Absolute 0.76 10*3/mm3   Monocytes, Absolute 0.50 10*3/mm3   Eosinophils, Absolute 0.19 10*3/mm3   Basophils, Absolute 0.03 10*3/mm3   Immature Grans, Absolute 0.06 High  10*3/mm3   nRBC 0.0 /100 WBC                   PHYSICAL EXAM  Objective:  Vital signs: (most recent): Blood pressure 144/87, pulse 88, temperature 98.8 °F (37.1 °C), temperature source Oral, resp. rate 18, height 152.4 cm (60\"), weight 72.6 kg (160 lb), SpO2 100%, not currently breastfeeding.  "               Alert  nad  No resp distress    CONDITION ON DISCHARGE  Stable.      DISCHARGE DISPOSITION   Home or Self Care      DISCHARGE MEDICATIONS       Your medication list        CONTINUE taking these medications        Instructions Last Dose Given Next Dose Due   acetaminophen 325 MG tablet  Commonly known as: TYLENOL      Take 2 tablets by mouth Every 6 (Six) Hours As Needed for Mild Pain.       calcitriol 0.25 MCG capsule  Commonly known as: ROCALTROL      Take 1 capsule by mouth Take As Directed. Calcitriol 0.25 MCG Capsule (si capsule once per day )       calcium acetate 667 MG capsule  Commonly known as: PHOSLO      Take 3 capsules by mouth 3 (Three) Times a Day With Meals.       ceFAZolin 2000 mg IVPB in 100 mL NS (MBP)      Infuse 2,000 mg into a venous catheter Take As Directed. Give after HD on Monday and Wednesday  Indications: Bacteria in the Blood       ceFAZolin 3000 mg IVPB in 100 mL NS (MBP)      Infuse 3,000 mg into a venous catheter Take As Directed. Give after HD on hd Friday  Indications: Bacteria in the Blood       hydrOXYzine 50 MG tablet  Commonly known as: ATARAX      Take 1 tablet by mouth As Needed.       metoprolol succinate XL 25 MG 24 hr tablet  Commonly known as: TOPROL-XL      Take 1 tablet by mouth Daily.       naloxone 4 MG/0.1ML nasal spray  Commonly known as: NARCAN      Call 911. Don't prime. [place] in 1 nostril for overdose. Repeat in 2-3 minutes in other nostril if no or minimal breathing/responsiveness.       ondansetron ODT 4 MG disintegrating tablet  Commonly known as: ZOFRAN-ODT      Take 1 tablet by mouth As Needed for Nausea or Vomiting.       polyethylene glycol 17 GM/SCOOP powder  Commonly known as: MIRALAX      Take 17 g by mouth Daily As Needed (Use if senna-docusate is ineffective).       saccharomyces boulardii 250 MG capsule  Commonly known as: FLORASTOR      Take 1 capsule by mouth 2 (Two) Times a Day.       sennosides-docusate 8.6-50 MG per  tablet  Commonly known as: PERICOLACE      Take 2 tablets by mouth 2 (Two) Times a Day As Needed for Constipation.       traZODone 100 MG tablet  Commonly known as: DESYREL      Take 1.5 tablets by mouth every night at bedtime.       warfarin 5 MG tablet  Commonly known as: COUMADIN      Take 1 tablet by mouth Daily.                 Future Appointments   Date Time Provider Department Center   11/22/2024 10:30 AM Regla Hammond APRN [facility]   12/18/2024  1:20 PM Saji Villafana MD [facility] ID [facility]   2/26/2025  8:00 AM [facility] OP VAS RM 3 BH [facility]   3/5/2025  9:00 AM Phu Gaviria MD [facility] VEIN [facility]     Additional Instructions for the Follow-ups that You Need to Schedule       Discharge Follow-up with Specialty: ID as previously scheduled   As directed      Specialty: ID as previously scheduled        Discharge Follow-up with Specialty: PCP in 1-2 weeks, INR check this week, Nephrology for regularly scheduled HD   As directed      Specialty: PCP in 1-2 weeks, INR check this week, Nephrology for regularly scheduled HD               Follow-up Information       Regla Ying APRN .    Specialty: Nurse Practitioner  Contact information:  [address] [facility] [place] [id number] [phone]                             TEST  RESULTS PENDING AT DISCHARGE  Pending Labs       Order Current Status    Blood Culture - Blood, Arm, Left In process    Blood Culture - Blood, Hand, Left In process               Grey Griffith MD  [facility]  11/20/24  16:15 EST      Time: greater than 32 minutes on discharge  It was a pleasure taking care of this patient while in the hospital.

## 2024-11-20 NOTE — CONSULTS
[facility] Vascular Surgery  Consult Note    Patient Name: Sunni Mcneil  : 1975  MRN: [id number]  Primary Care Physician:  Regla Ying APRN  Referring Physician: No ref. provider found  Date of admission: 2024    Inpatient Vascular Surgery Consult  Consult performed by: Chao Rausch II, MD  Consult ordered by: Javier Perry II, MD      Subjective   Subjective     Reason for Consult/ Chief Complaint: Infected right arm arteriovenous graft    History of Present Illness  Sunni Mcneil is a 49 y.o. female patient with end-stage renal disease on dialysis, diabetes, hypertension, previous kidney transplant, who currently receives dialysis via right arm arteriovenous graft. She was previously admitted with concern for an infection of her right arm graft with some fluid around her graft on duplex.  The graft has otherwise been working well for dialysis.    Patient previously had an right arm arteriovenous fistula in  but subsequently had to be ligated, then had a left arm brachial axillary graft which it does not appear ever worked, and then had a right arm brachial axillary graft placed in 2021. She then had fistulagram, open thrombectomy x2, another fistulagram, and another open thrombectomy with open revision.       The time of my evaluation the patient is resting comfortably in bed in no acute distress.      Review of Systems     Personal History     Past Medical History:   Diagnosis Date    Acid reflux     Anemia     WITH ESRD    Anxiety     Arthritis     Cough     related to fluid overload    Dependence on renal dialysis 2021    Depression     Diabetes mellitus     NO MEDICATIONS FOR    End stage renal disease 2021    ESRD on dialysis     [facility] M[facility]    History of peritoneal dialysis     KIDNEY TRANSPLANT 2016     History of transfusion     BEEN A WHILE no reaction    Hypercalcemia     Hyperparathyroidism     Hypertension     Kidney disease     End-stage renal  disease. TRANSPLANT    Kidney transplant recipient 09/02/2016    RIGHT KIDNEY. ? 2018 KIDNEY REJECTED    PONV (postoperative nausea and vomiting)     Seasonal allergies     CURRENTLY     Vascular dialysis catheter in place     RIGHT TUNNEL CATH       Past Surgical History:   Procedure Laterality Date    ARTERIOVENOUS FISTULA/SHUNT SURGERY Right 02/26/2020    Procedure: RIGHT ARM ARTERIAL VENOUS FISTULA;  Surgeon: Elijah Herrera MD;  Location: [facility] MAIN OR;  Service: Vascular;  Laterality: Right;    ARTERIOVENOUS FISTULA/SHUNT SURGERY Left 02/04/2021    Procedure: LEFT BRACHIOAXILLARY ARTERIOVENOUS FISTULA GRAFT;  Surgeon: Anya Hernandez Jr., MD;  Location: [facility] OR;  Service: Vascular;  Laterality: Left;    ARTERIOVENOUS FISTULA/SHUNT SURGERY Right 03/16/2021    Procedure: RIGHT BRACHIOAXILLARY ARTERVENIOUS GRAFT PLACEMENT;  Surgeon: Adán Maurer MD;  Location: [facility] OR;  Service: Vascular;  Laterality: Right;    ARTERIOVENOUS FISTULA/SHUNT SURGERY Right 8/4/2023    Procedure: RIGHT ARM FISTULOGRAM PEUCUTANEOUS AND PERIPHERAL TRANSLUMINAL ANGIOPLSTY/STENT;  Surgeon: Elijah Herrera MD;  Location: [facility] OR 18/19;  Service: Vascular;  Laterality: Right;    BRACHIAL EMBOLECTOMY Right 10/6/2023    Procedure: RIGHT AV GRAFT WITH ANGIOPLASTY AND CENTROVENOUS ANGIOPLASTY;  Surgeon: Phu Gaviria MD;  Location: [facility] OR 18/19;  Service: Vascular;  Laterality: Right;    CARDIAC CATHETERIZATION N/A 11/10/2024    Procedure: Left Heart Cath;  Surgeon: Maribell Sandhu MD;  Location: [facility] CATH INVASIVE LOCATION;  Service: Cardiology;  Laterality: N/A;    CARDIAC CATHETERIZATION N/A 11/10/2024    Procedure: Coronary angiography;  Surgeon: Maribell Sandhu MD;  Location: [facility] CATH INVASIVE LOCATION;  Service: Cardiology;  Laterality: N/A;    CARDIAC CATHETERIZATION N/A 11/10/2024    Procedure: Left ventriculography;  Surgeon: Maribell Sandhu MD;  Location:   [facility] CATH INVASIVE LOCATION;  Service: Cardiology;  Laterality: N/A;    CARDIAC CATHETERIZATION N/A 11/10/2024    Procedure: Right Heart Cath;  Surgeon: Maribell Sandhu MD;  [facility] CATH INVASIVE LOC[facility];  Service: Cardiology;  Laterality: N/A;    COLONOSCOPY N/A 11/30/2022    Procedure: COLONOSCOPY TO CECUM AND TERM. ILEUM;  Surgeon: Casimiro Perkins MD;  Location: [facility] ENDOSCOPY;  Service: Gastroenterology;  Laterality: N/A;  PRE OP - SCREENING  POST OP - DIVERTICULOSIS, SM. HEMORRHOIDS    INSERTION AND REMOVAL HEMODIALYSIS CATHETER N/A 02/13/2021    Procedure: INSERTION AND REMOVAL OF HEMODIALYSIS CATHETER;  Surgeon: Adán Maurer MD;  Location: [facility] MAIN OR;  Service: Vascular;  Laterality: N/A;    INSERTION HEMODIALYSIS CATHETER Right 01/15/2021    Procedure: TUNNELED DIAYLIS CATHETER PLACEMENT;  Surgeon: Phu Gaviria MD;  Location: [facility] HYBRID OR 18/19;  Service: Vascular;  Laterality: Right;    INSERTION HEMODIALYSIS CATHETER Left 1/21/2023    Procedure: HEMODIALYSIS CATHETER INSERTION;  Surgeon: Elijah Herrera MD;  Location: [facility] MAIN OR;  Service: Vascular;  Laterality: Left;    INSERTION PERITONEAL DIALYSIS CATHETER  07/29/2014    Laparoscopic placement of Curl Cath peritoneal dialysis catheter, Dr. Vinod Goldstein    INSERTION PERITONEAL DIALYSIS CATHETER N/A 07/24/2019    Procedure: LAPAROSCOPIC INSERTION PERITONEAL DIALYSIS CATHETER;  Surgeon: Damon Rooney MD;  Location: [facility] MAIN OR;  Service: General    REMOVAL HEMODIALYSIS CATHETER N/A 2/15/2023    Procedure: PALINDROME REMOVAL;  Surgeon: Elijah Herrera MD;  Location: [facility] MAIN OR;  Service: Vascular;  Laterality: N/A;    REMOVAL PERITONEAL DIALYSIS CATHETER N/A 10/17/2016    Procedure: REMOVAL PERITONEAL DIALYSIS CATHETER;  Surgeon: Damon Rooney MD;  Location: [facility] MAIN OR;  Service:     REMOVAL PERITONEAL DIALYSIS CATHETER N/A 10/21/2020    Procedure: REMOVAL PERITONEAL  DIALYSIS CATHETER;  Surgeon: Damon Rooney MD;  Location: [facility] OR;  Service: General;  Laterality: N/A;    SHUNT O GRAM Right 2022    Procedure: RIGHT  ARM SHUNT O GRAM , ANGIOPLASTY OF AXILARY VEIN AND SUBCLAVIAN VEIN AT SVC JUNCTION;  Surgeon: Anya Hernandez Jr., MD;  [facility] ;  Service: Vascular;  Laterality: Right;    SHUNT O GRAM Right 2023    Procedure: OPEN THROMBECTOMY AND ANGIOPLASTY;  Surgeon: Elijah Herrera MD;  [facility] ;  Service: Vascular;  Laterality: Right;    SHUNT O GRAM Right 2023    Procedure: Right arm dialysis graft open thrombectomy, shuntogram, angioplasty and stent;  Surgeon: Karuna Villalba MD;  Location: [facility] ;  Service: Vascular;  Laterality: Right;    SHUNT O GRAM Right 2023    Procedure: RIGHT ARM FISTULOGRAM, ANGIOPLASTY;  Surgeon: Elijah Herrera MD;  Location: [facility] ;  Service: Vascular;  Laterality: Right;    SHUNT O GRAM Right 5/3/2024    Procedure: Hemodialysis shunt thrombectomy with revision;  Surgeon: Alex Sanchez MD;  Location: [facility];  Service: Vascular;  Laterality: Right;    TRANSPLANTATION RENAL Right 2016    from  donor    TUBAL ABDOMINAL LIGATION Bilateral        Family History: Her family history includes Heart attack in her maternal grandfather; Hypertension in her father and mother.     Social History: She  reports that she has never smoked. She has never been exposed to tobacco smoke. She has never used smokeless tobacco. She reports that she does not drink alcohol and does not use drugs.    Home Medications:  acetaminophen, calcitriol, calcium acetate, ceFAZolin 2000 mg IVPB in 100 mL NS (MBP), ceFAZolin 3000 mg IVPB in 100 mL NS (MBP), hydrOXYzine, metoprolol succinate XL, naloxone, ondansetron ODT, polyethylene glycol, prochlorperazine, saccharomyces boulardii, sennosides-docusate,  "traZODone, and warfarin    Allergies:  She has No Known Allergies.    Objective    Objective     Vitals:    Temp:  [97.9 °F (36.6 °C)] 97.9 °F (36.6 °C)  Heart Rate:  [84-99] 87  Resp:  [20] 20  BP: (134-151)/(85-97) 138/86    Physical Exam  NAD  Respirations unlabored  Arm AV graft with incisions healed.  Very mild superficial erythema at distal aspect, no induration, minimal tenderness. Good thrill in graft.   Palpable right radial pulse.     Result Review    Result Review:  I have personally reviewed the results from the time of this admission to 11/20/2024 12:27 EST and agree with these findings:  [x]  Laboratory list / accordion  [x]  Microbiology  [x]  Radiology  []  EKG/Telemetry   []  Cardiology/Vascular   []  Pathology  [x]  Old records  []  Other:  Most notable findings include: MSSA from blood cultures 11/10, potassium 5.7. no leukocytosis.       CBC    Results from last 7 days   Lab Units 11/20/24  0736 11/15/24  0550 11/14/24  0802   WBC 10*3/mm3 7.82 8.03 9.14   HEMOGLOBIN g/dL 8.2* 8.7* 8.8*   PLATELETS 10*3/mm3 319 210 155     BMP   Results from last 7 days   Lab Units 11/20/24  0736 11/15/24  0550 11/14/24  0802   SODIUM mmol/L 138 138 138   POTASSIUM mmol/L 5.7* 3.8 3.4*   CHLORIDE mmol/L 98 94* 96*   CO2 mmol/L 24.7 25.4 29.0   BUN mg/dL 58* 60* 29*   CREATININE mg/dL 11.04* 8.26* 4.79*   GLUCOSE mg/dL 87 91 98   MAGNESIUM mg/dL  --  2.4  --    PHOSPHORUS mg/dL  --  5.0*  --      Cr Clearance Estimated Creatinine Clearance: 5.5 mL/min (A) (by C-G formula based on SCr of 11.04 mg/dL (H)).  Coag   Results from last 7 days   Lab Units 11/20/24  0736   INR  1.32*     HbA1C   Lab Results   Component Value Date    HGBA1C 4.3 (L) 08/14/2024    HGBA1C 4.8 05/08/2024    HGBA1C 4.4 04/02/2024     Blood Glucose No results found for: \"POCGLU\"  Infection     CMP   Results from last 7 days   Lab Units 11/20/24  0736 11/15/24  0550 11/14/24  0802   SODIUM mmol/L 138 138 138   POTASSIUM mmol/L 5.7* 3.8 3.4* " "  CHLORIDE mmol/L 98 94* 96*   CO2 mmol/L 24.7 25.4 29.0   BUN mg/dL 58* 60* 29*   CREATININE mg/dL 11.04* 8.26* 4.79*   GLUCOSE mg/dL 87 91 98   ALBUMIN g/dL 3.7 3.4* 3.4*   BILIRUBIN mg/dL 0.3  --  0.3   ALK PHOS U/L 64  --  88   AST (SGOT) U/L 18  --  45*   ALT (SGPT) U/L <5  --  8     ABG      UA      SAUL  No results found for: \"POCMETH\", \"POCAMPHET\", \"POCBARBITUR\", \"POCBENZO\", \"POCCOCAINE\", \"POCOPIATES\", \"POCOXYCODO\", \"POCPHENCYC\", \"POCPROPOXY\", \"POCTHC\", \"POCTRICYC\"  Lysis Labs   Results from last 7 days   Lab Units 11/20/24  0736 11/15/24  0550 11/14/24  0802   INR  1.32*  --   --    HEMOGLOBIN g/dL 8.2* 8.7* 8.8*   PLATELETS 10*3/mm3 319 210 155   CREATININE mg/dL 11.04* 8.26* 4.79*     Radiology(recent) No radiology results for the last day    Assessment & Plan   Assessment / Plan     Brief Patient Summary:  Sunni Mcneil is a 49 y.o. female with end-stage renal disease on dialysis currently via right arm arteriovenous graft that is required multiple operations to maintain patency.  She has very limited options for dialysis access otherwise her exam is consistent with some mild very superficial cellulitis but I do not think she needs any urgent surgical intervention at this time.   I agree with Dr. Gaviria's recent assessment that it is in patients best long term interest for us to attempt to salvage this graft.   Agree with Dr. Villafana's plan for continuing attempted graft salvage with antibiotics.     Active Hospital Problems:  Active Hospital Problems    Diagnosis     **Right arm cellulitis        Plan:   Continue antibiotics per infectious disease.  No plans for surgical intervention per vascular surgery at this time  Discussed with Dr. Griffith, will plan for discharge with outpatient follow up after patient gets dialysis.       VTE Prophylaxis:  No VTE prophylaxis order currently exists.         MD Chao Tran II, II, MD  11/20/24  12:27 EST  Office Number [phone]    [facility] " Vascular Surgery

## 2024-11-20 NOTE — ED PROVIDER NOTES
EMERGENCY DEPARTMENT ENCOUNTER    Room Number:  09/09  PCP: Regla Ying APRN    HPI:  Chief Complaint: Redness to the right fistula  A complete HPI/ROS/PMH/PSH/SH/FH are unobtainable due to: None  Context: Sunni Mcneil is a 49 y.o. female who presents to the ED c/o acute redness to right arm fistula.  Onset of symptoms last night.  She has had no fever.  No pain.  No vomiting, diarrhea, cough.        PAST MEDICAL HISTORY  Active Ambulatory Problems     Diagnosis Date Noted    Hyperparathyroidism 01/24/2018    Acute rejection of kidney transplant 05/10/2017    ARF (acute renal failure) 04/04/2014    Hx of kidney transplant 07/06/2017    Hypertension 01/24/2018    Kidney transplant status, cadaveric 07/06/2017    Nephritis 04/13/2014    Hypercalcemia 01/24/2018    ESRD on hemodialysis 07/02/2019    Prolonged QT interval 11/07/2019    ESRD (end stage renal disease) 11/07/2019    Hyperphosphatemia 11/07/2019    Leukocytosis 11/07/2019    Type 2 diabetes mellitus 11/07/2019    Acute UTI (urinary tract infection) 03/13/2020    Obesity (BMI 30-39.9) 03/13/2020    A-V fistula 03/13/2020    Anemia, chronic disease 03/13/2020    Folate deficiency anemia 03/16/2020    Febrile illness, acute 05/06/2020    Sepsis 05/06/2020    Screening for colon cancer 09/15/2020    Hyperkalemia 01/14/2021    Shunt malfunction 01/15/2021    Anemia 02/13/2021    Constipation 03/10/2021    Screening for colorectal cancer 03/10/2021    Hypervolemia 03/15/2021    AV graft thrombosis, initial encounter 01/20/2023    ESRD (end stage renal disease) 01/21/2023    GERD without esophagitis 01/21/2023    AV graft stenosis, initial encounter 12/20/2023    Arteriovenous fistula occlusion 05/02/2024    Pulmonary edema 11/10/2024    ST elevation myocardial infarction (STEMI) 11/10/2024     Resolved Ambulatory Problems     Diagnosis Date Noted    Peritoneal dialysis catheter in place 07/24/2019    Hypokalemia 11/07/2019    Nausea 11/07/2019     Hypocalcemia 11/07/2019    Diarrhea 03/13/2020    Acute cystitis with hematuria 03/13/2020    Mechanical complication of hemodialysis catheter 02/13/2021    Arteriovenous fistula occlusion, initial encounter 01/21/2023    Hyperkalemia 01/21/2023    Hyperphosphatemia 01/21/2023     Past Medical History:   Diagnosis Date    Acid reflux     Anxiety     Arthritis     Cough     Dependence on renal dialysis 05/17/2021    Depression     Diabetes mellitus     End stage renal disease 05/17/2021    ESRD on dialysis     History of peritoneal dialysis     History of transfusion     Kidney disease     Kidney transplant recipient 09/02/2016    PONV (postoperative nausea and vomiting)     Seasonal allergies     Vascular dialysis catheter in place          PAST SURGICAL HISTORY  Past Surgical History:   Procedure Laterality Date    ARTERIOVENOUS FISTULA/SHUNT SURGERY Right 02/26/2020    Procedure: RIGHT ARM ARTERIAL VENOUS FISTULA;  Surgeon: Elijah Herrera MD;  Location: [facility];  Service: Vascular;  Laterality: Right;    ARTERIOVENOUS FISTULA/SHUNT SURGERY Left 02/04/2021    Procedure: LEFT BRACHIOAXILLARY ARTERIOVENOUS FISTULA GRAFT;  Surgeon: Anya Hernandez Jr., MD;  Location: [facility] OR;  Service: Vascular;  Laterality: Left;    ARTERIOVENOUS FISTULA/SHUNT SURGERY Right 03/16/2021    Procedure: RIGHT BRACHIOAXILLARY ARTERVENIOUS GRAFT PLACEMENT;  Surgeon: Adán Maurer MD;  Location: [facility] OR;  Service: Vascular;  Laterality: Right;    ARTERIOVENOUS FISTULA/SHUNT SURGERY Right 8/4/2023    Procedure: RIGHT ARM FISTULOGRAM PEUCUTANEOUS AND PERIPHERAL TRANSLUMINAL ANGIOPLSTY/STENT;  Surgeon: Elijah Hrerera MD;  Location: [facility] OR 18/19;  Service: Vascular;  Laterality: Right;    BRACHIAL EMBOLECTOMY Right 10/6/2023    Procedure: RIGHT AV GRAFT WITH ANGIOPLASTY AND CENTROVENOUS ANGIOPLASTY;  Surgeon: Phu Gaviria MD;  Location: [facility] OR 18/19;  Service: Vascular;   Laterality: Right;    CARDIAC CATHETERIZATION N/A 11/10/2024    Procedure: Left Heart Cath;  Surgeon: Maribell Sandhu MD;  Location: [facility] CATH INVASIVE LOCATION;  Service: Cardiology;  Laterality: N/A;    CARDIAC CATHETERIZATION N/A 11/10/2024    Procedure: Coronary angiography;  Surgeon: Maribell Sandhu MD;  Location: [facility] CATH INVASIVE LOCATION;  Service: Cardiology;  Laterality: N/A;    CARDIAC CATHETERIZATION N/A 11/10/2024    Procedure: Left ventriculography;  Surgeon: Maribell Sandhu MD;  Location: [facility] CATH INVASIVE LOCATION;  Service: Cardiology;  Laterality: N/A;    CARDIAC CATHETERIZATION N/A 11/10/2024    Procedure: Right Heart Cath;  Surgeon: Maribell Sandhu MD;  Location: [facility] CATH INVASIVE LOCATION;  Service: Cardiology;  Laterality: N/A;    COLONOSCOPY N/A 11/30/2022    Procedure: COLONOSCOPY TO CECUM AND TERM. ILEUM;  Surgeon: Casimiro Perkins MD;  Location: [facility] ENDOSCOPY;  Service: Gastroenterology;  Laterality: N/A;  PRE OP - SCREENING  POST OP - DIVERTICULOSIS, SM. HEMORRHOIDS    INSERTION AND REMOVAL HEMODIALYSIS CATHETER N/A 02/13/2021    Procedure: INSERTION AND REMOVAL OF HEMODIALYSIS CATHETER;  Surgeon: Adán Maurer MD;  Location: [facility] MAIN OR;  Service: Vascular;  Laterality: N/A;    INSERTION HEMODIALYSIS CATHETER Right 01/15/2021    Procedure: TUNNELED DIAYLIS CATHETER PLACEMENT;  Surgeon: Phu Gaviria MD;  Location: [facility] HYBRID OR 18/19;  Service: Vascular;  Laterality: Right;    INSERTION HEMODIALYSIS CATHETER Left 1/21/2023    Procedure: HEMODIALYSIS CATHETER INSERTION;  Surgeon: Elijah Herrera MD;  Location: [facility] MAIN OR;  Service: Vascular;  Laterality: Left;    INSERTION PERITONEAL DIALYSIS CATHETER  07/29/2014    Laparoscopic placement of Curl Cath peritoneal dialysis catheter, Dr. Vinod Goldstein    INSERTION PERITONEAL DIALYSIS CATHETER N/A 07/24/2019    Procedure: LAPAROSCOPIC INSERTION PERITONEAL DIALYSIS CATHETER;  Surgeon: Toribio  Damon Dutton MD;  Location: [facility] MAIN OR;  Service: General    REMOVAL HEMODIALYSIS CATHETER N/A 2/15/2023    Procedure: PALINDROME REMOVAL;  Surgeon: Elijah Herrera MD;  Location: [facility] MAIN OR;  Service: Vascular;  Laterality: N/A;    REMOVAL PERITONEAL DIALYSIS CATHETER N/A 10/17/2016    Procedure: REMOVAL PERITONEAL DIALYSIS CATHETER;  Surgeon: Damon Rooney MD;  Location: [facility] MAIN OR;  Service:     REMOVAL PERITONEAL DIALYSIS CATHETER N/A 10/21/2020    Procedure: REMOVAL PERITONEAL DIALYSIS CATHETER;  Surgeon: Damon Rooney MD;  Location: [facility] MAIN OR;  Service: General;  Laterality: N/A;    SHUNT O GRAM Right 2022    Procedure: RIGHT  ARM SHUNT O GRAM , ANGIOPLASTY OF AXILARY VEIN AND SUBCLAVIAN VEIN AT SVC JUNCTION;  Surgeon: Anya Hernandez Jr., MD;  Location: [facility] OR ;  Service: Vascular;  Laterality: Right;    SHUNT O GRAM Right 2023    Procedure: OPEN THROMBECTOMY AND ANGIOPLASTY;  Surgeon: Elijah Herrera MD;  Location: [facility] OR ;  Service: Vascular;  Laterality: Right;    SHUNT O GRAM Right 2023    Procedure: Right arm dialysis graft open thrombectomy, shuntogram, angioplasty and stent;  Surgeon: Karuna Villalba MD;  Location: [facility] OR ;  Service: Vascular;  Laterality: Right;    SHUNT O GRAM Right 2023    Procedure: RIGHT ARM FISTULOGRAM, ANGIOPLASTY;  Surgeon: Elijah Herrera MD;  Location: [facility] OR ;  Service: Vascular;  Laterality: Right;    SHUNT O GRAM Right 5/3/2024    Procedure: Hemodialysis shunt thrombectomy with revision;  Surgeon: Alex Sanchez MD;  Location: [facility] OR;  Service: Vascular;  Laterality: Right;    TRANSPLANTATION RENAL Right 2016    from  donor    TUBAL ABDOMINAL LIGATION Bilateral          FAMILY HISTORY  Family History   Problem Relation Age of Onset    Hypertension Mother     Hypertension Father     Heart  attack Maternal Grandfather     Malig Hyperthermia Neg Hx          SOCIAL HISTORY  Social History     Socioeconomic History    Marital status:     Number of children: 2    Years of education: 12   Tobacco Use    Smoking status: Never     Passive exposure: Never    Smokeless tobacco: Never    Tobacco comments:     Patient does not smoke   Vaping Use    Vaping status: Never Used   Substance and Sexual Activity    Alcohol use: No     Comment: caffeine use; Patient is unknown if ever drinks    Drug use: Never     Comment: Drug Abuse: none    Sexual activity: Defer         ALLERGIES  Patient has no known allergies.        REVIEW OF SYSTEMS  Review of Systems     Included in HPI  All systems reviewed and negative except for those discussed in HPI.       PHYSICAL EXAM  ED Triage Vitals   Temp Heart Rate Resp BP SpO2   11/20/24 0705 11/20/24 0705 11/20/24 0710 11/20/24 0710 11/20/24 0705   97.9 °F (36.6 °C) 99 20 148/86 96 %      Temp src Heart Rate Source Patient Position BP Location FiO2 (%)   -- -- -- -- --              Physical Exam      GENERAL: no acute distress  HENT: nares patent  EYES: no scleral icterus  CV: regular rhythm, normal rate, palpable thrill to the right upper extremity fistula  RESPIRATORY: normal effort, clear to auscultation bilaterally  ABDOMEN: soft, nontender  MUSCULOSKELETAL: no deformity  NEURO: alert, moves all extremities, follows commands  PSYCH:  calm, cooperative  SKIN: warm, dry, poorly demarcated warm blanching erythematous patch that is approximately 5 cm in diameter without fluctuance.  This is located along the ulnar side of the fistula    Vital signs and nursing notes reviewed.          LAB RESULTS  No results found for this or any previous visit (from the past 24 hours).    Ordered the above labs and reviewed the results.        RADIOLOGY  No Radiology Exams Resulted Within Past 24 Hours    Ordered the above noted radiological studies. Reviewed by me in PACS.         MEDICATIONS GIVEN IN ER  Medications   sodium chloride 0.9 % flush 10 mL (has no administration in time range)         ORDERS PLACED DURING THIS VISIT:  Orders Placed This Encounter   Procedures    Blood Culture - Blood,    Blood Culture - Blood,    Comprehensive Metabolic Panel    Protime-INR    CBC Auto Differential    Lactic Acid, Plasma    Insert Peripheral IV    CBC & Differential         OUTPATIENT MEDICATION MANAGEMENT:  Current Facility-Administered Medications Ordered in Epic   Medication Dose Route Frequency Provider Last Rate Last Admin    sodium chloride 0.9 % flush 10 mL  10 mL Intravenous Javier Albert II, MD         Current Outpatient Medications Ordered in Epic   Medication Sig Dispense Refill    acetaminophen (TYLENOL) 325 MG tablet Take 2 tablets by mouth Every 6 (Six) Hours As Needed for Mild Pain.      calcitriol (ROCALTROL) 0.25 MCG capsule Take 1 capsule by mouth Take As Directed. Calcitriol 0.25 MCG Capsule (si capsule once per day )      calcium acetate (PHOSLO) 667 MG capsule Take 3 capsules by mouth 3 (Three) Times a Day With Meals.      ceFAZolin 2000 mg IVPB in 100 mL NS (MBP) Infuse 2,000 mg into a venous catheter Take As Directed. Give after HD on Monday and Wednesday  Indications: Bacteria in the Blood      ceFAZolin 3000 mg IVPB in 100 mL NS (MBP) Infuse 3,000 mg into a venous catheter Take As Directed. Give after HD on hd Friday  Indications: Bacteria in the Blood      hydrOXYzine (ATARAX) 50 MG tablet Take 1 tablet by mouth As Needed.      metoprolol succinate XL (TOPROL-XL) 25 MG 24 hr tablet Take 1 tablet by mouth Daily. 30 tablet 0    naloxone (NARCAN) 4 MG/0.1ML nasal spray Call 911. Don't prime. [place] in 1 nostril for overdose. Repeat in 2-3 minutes in other nostril if no or minimal breathing/responsiveness. 2 each 0    ondansetron ODT (ZOFRAN-ODT) 4 MG disintegrating tablet Take 1 tablet by mouth As Needed for Nausea or Vomiting.      polyethylene glycol  (MIRALAX) 17 GM/SCOOP powder Take 17 g by mouth Daily As Needed (Use if senna-docusate is ineffective). 510 g 0    prochlorperazine (COMPAZINE) 10 MG tablet Take 1 tablet by mouth Every 6 (Six) Hours As Needed for Nausea or Vomiting. 10 tablet 0    saccharomyces boulardii (FLORASTOR) 250 MG capsule Take 1 capsule by mouth 2 (Two) Times a Day. 60 capsule 0    sennosides-docusate (PERICOLACE) 8.6-50 MG per tablet Take 2 tablets by mouth 2 (Two) Times a Day As Needed for Constipation. 60 tablet 0    traZODone (DESYREL) 100 MG tablet Take 1.5 tablets by mouth every night at bedtime.      warfarin (COUMADIN) 5 MG tablet Take 1 tablet by mouth Daily.         PROCEDURES  Procedures          MEDICAL DECISION MAKING, PROGRESS, and CONSULTS    Discussion below represents my analysis of pertinent findings related to patient's condition, differential diagnosis, treatment plan and final disposition.          Differential diagnosis:    Cellulitis, bacteremia, necrotizing fasciitis             Independent interpretation of labs, radiology studies, and discussions with consultants:  ED Course as of 11/20/24 0956   Wed Nov 20, 2024   0712 On medical chart review, reviewed this recent discharge summary from 5 days ago.  Patient has a history of end-stage renal disease, type 2 diabetes, hypertension, hyperparathyroidism and anemia of chronic disease.  She presented to the hospital initially with confusion and anxiety.  Found to have septic shock and admitted to the hospital for further evaluation.  She was found to have MSSA bacteremia with suspected source being AV graft.  She had significant respiratory failure and was ultimately debated and placed in the ICU.  Patient seen by infectious disease.  Treated with cefazolin dosing with 2 g after Monday and Wednesday session of dialysis and 3 g after Friday session.  Plan for 6 weeks of antibiotic.  Patient also had stress-induced cardiomyopathy. [TD]   0755 WBC: 7.82 [TD]   0755  Hemoglobin(!): 8.2 [TD]   0842 Potassium(!): 5.7 [TD]   0851 EKG interpreted by myself.  Time 8:48 AM.  Sinus rhythm.  Heart rate 80.  Normal intervals and axis.  Prolonged R wave progression.  No acute ST abnormality. [TD]   0953 I discussed the case with Dr. Griffith, hospitalist.  He will admit. [TD]      ED Course User Index  [TD] Javier Perry II, MD     I spoke with Dr. Jose Moss, nephrologist.  Today is the patient's stay for dialysis.  This should clear the patient's hyperkalemia.    I discussed the case with Dr. Herrera, vascular surgery.  Recommends admission.  Hold off on antibiotics until infectious disease can see the patient.        DIAGNOSIS  Final diagnoses:   Cellulitis of left upper extremity   Hyperkalemia         DISPOSITION  Admit      Latest Documented Vital Signs:  As of 07:17 EST  BP- 148/86 HR- 99 Temp- 97.9 °F (36.6 °C) O2 sat- 96%      --    Please note that portions of this were completed with a voice recognition program.       Note Disclaimer: At [facility], we believe that sharing information builds trust and better relationships. You are receiving this note because you are receiving care at [facility] or recently visited. It is possible you will see health information before a provider has talked with you about it. This kind of information can be easy to misunderstand. To help you fully understand what it means for your health, we urge you to discuss this note with your provider.         Javier Perry II, MD  11/20/24 7695

## 2024-11-20 NOTE — H&P
Patient Name:  Sunni Mcneil  YOB: 1975  MRN:  [id number]  Admit Date:  11/20/2024  Patient Care Team:  Regla Ying APRN as PCP - General (Nurse Practitioner)  Bo Hansen MD as Consulting Physician (Nephrology)  Damon Rooney MD as Surgeon (General Surgery)      Subjective   History Present Illness     Chief Complaint   Patient presents with    Skin Irritation           Ms. Mcneil is a 49 y.o. female with a history of end-stage renal disease, type 2 diabetes, hypertension, hyperparathyroidism, anemia of chronic disease. She was recently admitted to the ICU with septic shock and MSSA bactremia. She was discharged on IV cefazolin with dialysis. She presented back to the ER with erythema of her arm.       History of Present Illness  Review of Systems   Constitutional: Negative.    HENT: Negative.     Eyes: Negative.    Respiratory: Negative.     Cardiovascular:  Negative for chest pain and palpitations.   Gastrointestinal: Negative.    Endocrine: Negative.    Genitourinary: Negative.    Musculoskeletal: Negative.    Skin:  Positive for color change. Negative for wound.   Allergic/Immunologic: Negative.    Neurological: Negative.    Hematological: Negative.    Psychiatric/Behavioral: Negative.          Personal History     Past Medical History:   Diagnosis Date    Acid reflux     Anemia     WITH ESRD    Anxiety     Arthritis     Cough     related to fluid overload    Dependence on renal dialysis 05/17/2021    Depression     Diabetes mellitus     NO MEDICATIONS FOR    End stage renal disease 05/17/2021    ESRD on dialysis     [facility] M[facility]    History of peritoneal dialysis     KIDNEY TRANSPLANT SEPT 2016     History of transfusion     BEEN A WHILE no reaction    Hypercalcemia     Hyperparathyroidism     Hypertension     Kidney disease     End-stage renal disease. TRANSPLANT    Kidney transplant recipient 09/02/2016    RIGHT KIDNEY. ? 2018 KIDNEY REJECTED    PONV  (postoperative nausea and vomiting)     Seasonal allergies     CURRENTLY     Vascular dialysis catheter in place     RIGHT TUNNEL CATH     Past Surgical History:   Procedure Laterality Date    ARTERIOVENOUS FISTULA/SHUNT SURGERY Right 02/26/2020    Procedure: RIGHT ARM ARTERIAL VENOUS FISTULA;  Surgeon: Elijah Herrera MD;  Location: [facility] MAIN OR;  Service: Vascular;  Laterality: Right;    ARTERIOVENOUS FISTULA/SHUNT SURGERY Left 02/04/2021    Procedure: LEFT BRACHIOAXILLARY ARTERIOVENOUS FISTULA GRAFT;  Surgeon: Anya Hernandez Jr., MD;  Location: [facility] MAIN OR;  Service: Vascular;  Laterality: Left;    ARTERIOVENOUS FISTULA/SHUNT SURGERY Right 03/16/2021    Procedure: RIGHT BRACHIOAXILLARY ARTERVENIOUS GRAFT PLACEMENT;  Surgeon: Adán Maurer MD;  Location: [facility] MAIN OR;  Service: Vascular;  Laterality: Right;    ARTERIOVENOUS FISTULA/SHUNT SURGERY Right 8/4/2023    Procedure: RIGHT ARM FISTULOGRAM PEUCUTANEOUS AND PERIPHERAL TRANSLUMINAL ANGIOPLSTY/STENT;  Surgeon: Elijah Herrera MD;  Location: [facility] beds at the [facility] OR 18/19;  Service: Vascular;  Laterality: Right;    BRACHIAL EMBOLECTOMY Right 10/6/2023    Procedure: RIGHT AV GRAFT WITH ANGIOPLASTY AND CENTROVENOUS ANGIOPLASTY;  Surgeon: Phu Gaviria MD;  Location: [facility] include 234 beds at the [facility] OR 18/19;  Service: Vascular;  Laterality: Right;    CARDIAC CATHETERIZATION N/A 11/10/2024    Procedure: Left Heart Cath;  Surgeon: Maribell Sandhu MD;  Location: [facility] CATH INVASIVE LOCATION;  Service: Cardiology;  Laterality: N/A;    CARDIAC CATHETERIZATION N/A 11/10/2024    Procedure: Coronary angiography;  Surgeon: Maribell Sandhu MD;  Location: [facility] CATH INVASIVE LOCATION;  Service: Cardiology;  Laterality: N/A;    CARDIAC CATHETERIZATION N/A 11/10/2024    Procedure: Left ventriculography;  Surgeon: Maribell Sandhu MD;  Location: [facility] CATH INVASIVE LOCATION;  Service: Cardiology;  Laterality: N/A;    CARDIAC CATHETERIZATION N/A 11/10/2024     Procedure: Right Heart Cath;  Surgeon: Maribell Sandhu MD;  Location: [facility] CATH INVASIVE LOCATION;  Service: Cardiology;  Laterality: N/A;    COLONOSCOPY N/A 11/30/2022    Procedure: COLONOSCOPY TO CECUM AND TERM. ILEUM;  Surgeon: Casimiro Perkins MD;  Location: [facility] ENDOSCOPY;  Service: Gastroenterology;  Laterality: N/A;  PRE OP - SCREENING  POST OP - DIVERTICULOSIS, SM. HEMORRHOIDS    INSERTION AND REMOVAL HEMODIALYSIS CATHETER N/A 02/13/2021    Procedure: INSERTION AND REMOVAL OF HEMODIALYSIS CATHETER;  Surgeon: Adán Maurer MD;  Location: [facility] MAIN OR;  Service: Vascular;  Laterality: N/A;    INSERTION HEMODIALYSIS CATHETER Right 01/15/2021    Procedure: TUNNELED DIAYLIS CATHETER PLACEMENT;  Surgeon: Phu Gaviria MD;  Location: [facility] HYBRID OR 18/19;  Service: Vascular;  Laterality: Right;    INSERTION HEMODIALYSIS CATHETER Left 1/21/2023    Procedure: HEMODIALYSIS CATHETER INSERTION;  Surgeon: Elijah Herrera MD;  Location: [facility] MAIN OR;  Service: Vascular;  Laterality: Left;    INSERTION PERITONEAL DIALYSIS CATHETER  07/29/2014    Laparoscopic placement of [place] Cath peritoneal dialysis catheter, Dr. Vinod Goldstein    INSERTION PERITONEAL DIALYSIS CATHETER N/A 07/24/2019    Procedure: LAPAROSCOPIC INSERTION PERITONEAL DIALYSIS CATHETER;  Surgeon: Damon Rooney MD;  Location: [facility] MAIN OR;  Service: General    REMOVAL HEMODIALYSIS CATHETER N/A 2/15/2023    Procedure: PALINDROME REMOVAL;  Surgeon: Elijah Herrera MD;  Location: [facility] MAIN OR;  Service: Vascular;  Laterality: N/A;    REMOVAL PERITONEAL DIALYSIS CATHETER N/A 10/17/2016    Procedure: REMOVAL PERITONEAL DIALYSIS CATHETER;  Surgeon: Damon Rooney MD;  Location: [facility] MAIN OR;  Service:     REMOVAL PERITONEAL DIALYSIS CATHETER N/A 10/21/2020    Procedure: REMOVAL PERITONEAL DIALYSIS CATHETER;  Surgeon: Damon Rooney MD;  Location: [facility] MAIN OR;  Service: General;   Laterality: N/A;    SHUNT O GRAM Right 2022    Procedure: RIGHT  ARM SHUNT O GRAM , ANGIOPLASTY OF AXILARY VEIN AND SUBCLAVIAN VEIN AT SVC JUNCTION;  Surgeon: Anya Hernandez Jr., MD;  Location: [facility] OR ;  Service: Vascular;  Laterality: Right;    SHUNT O GRAM Right 2023    Procedure: OPEN THROMBECTOMY AND ANGIOPLASTY;  Surgeon: Elijah Herrera MD;  Location: [facility] OR ;  Service: Vascular;  Laterality: Right;    SHUNT O GRAM Right 2023    Procedure: Right arm dialysis graft open thrombectomy, shuntogram, angioplasty and stent;  Surgeon: Karuna Villalba MD;  Location: [facility] OR ;  Service: Vascular;  Laterality: Right;    SHUNT O GRAM Right 2023    Procedure: RIGHT ARM FISTULOGRAM, ANGIOPLASTY;  Surgeon: Elijah Herrera MD;  Location: [facility] OR ;  Service: Vascular;  Laterality: Right;    SHUNT O GRAM Right 5/3/2024    Procedure: Hemodialysis shunt thrombectomy with revision;  Surgeon: Alex Sanchez MD;  Location: [facility] OR;  Service: Vascular;  Laterality: Right;    TRANSPLANTATION RENAL Right 2016    from  donor    TUBAL ABDOMINAL LIGATION Bilateral      Family History   Problem Relation Age of Onset    Hypertension Mother     Hypertension Father     Heart attack Maternal Grandfather     Malig Hyperthermia Neg Hx      Social History     Tobacco Use    Smoking status: Never     Passive exposure: Never    Smokeless tobacco: Never    Tobacco comments:     Patient does not smoke   Vaping Use    Vaping status: Never Used   Substance Use Topics    Alcohol use: No     Comment: caffeine use; Patient is unknown if ever drinks    Drug use: Never     Comment: Drug Abuse: none     Medications Prior to Admission   Medication Sig Dispense Refill Last Dose/Taking    acetaminophen (TYLENOL) 325 MG tablet Take 2 tablets by mouth Every 6 (Six) Hours As Needed for Mild Pain.   Past Week    calcium acetate  (PHOSLO) 667 MG capsule Take 3 capsules by mouth 3 (Three) Times a Day With Meals.   2024    hydrOXYzine (ATARAX) 50 MG tablet Take 1 tablet by mouth As Needed.   Past Week    metoprolol succinate XL (TOPROL-XL) 25 MG 24 hr tablet Take 1 tablet by mouth Daily. 30 tablet 0 2024    saccharomyces boulardii (FLORASTOR) 250 MG capsule Take 1 capsule by mouth 2 (Two) Times a Day. 60 capsule 0 2024    sennosides-docusate (PERICOLACE) 8.6-50 MG per tablet Take 2 tablets by mouth 2 (Two) Times a Day As Needed for Constipation. 60 tablet 0 Past Week    traZODone (DESYREL) 100 MG tablet Take 1.5 tablets by mouth every night at bedtime.   2024    warfarin (COUMADIN) 5 MG tablet Take 1 tablet by mouth Daily.   2024    calcitriol (ROCALTROL) 0.25 MCG capsule Take 1 capsule by mouth Take As Directed. Calcitriol 0.25 MCG Capsule (si capsule once per day )   Unknown    ceFAZolin 2000 mg IVPB in 100 mL NS (MBP) Infuse 2,000 mg into a venous catheter Take As Directed. Give after HD on Monday and Wednesday  Indications: Bacteria in the Blood       ceFAZolin 3000 mg IVPB in 100 mL NS (MBP) Infuse 3,000 mg into a venous catheter Take As Directed. Give after HD on hd Friday  Indications: Bacteria in the Blood       naloxone (NARCAN) 4 MG/0.1ML nasal spray Call 911. Don't prime. [place] in 1 nostril for overdose. Repeat in 2-3 minutes in other nostril if no or minimal breathing/responsiveness. 2 each 0 Unknown    ondansetron ODT (ZOFRAN-ODT) 4 MG disintegrating tablet Take 1 tablet by mouth As Needed for Nausea or Vomiting.   Unknown    polyethylene glycol (MIRALAX) 17 GM/SCOOP powder Take 17 g by mouth Daily As Needed (Use if senna-docusate is ineffective). 510 g 0 Unknown     Allergies:  No Known Allergies    Objective    Objective     Vital Signs  Temp:  [97.9 °F (36.6 °C)-98.8 °F (37.1 °C)] 98.8 °F (37.1 °C)  Heart Rate:  [83-99] 88  Resp:  [18-20] 18  BP: (122-151)/(72-97) 144/87  SpO2:  [94 %-100 %]  100 %  on   ;   Device (Oxygen Therapy): room air  Body mass index is 31.25 kg/m².    Physical Exam  Vitals and nursing note reviewed.   Constitutional:       General: She is not in acute distress.     Appearance: She is well-developed.   HENT:      Head: Normocephalic and atraumatic.   Eyes:      General: No scleral icterus.     Pupils: Pupils are equal, round, and reactive to light.   Cardiovascular:      Rate and Rhythm: Normal rate and regular rhythm.      Heart sounds: No murmur heard.  Pulmonary:      Effort: Pulmonary effort is normal.      Breath sounds: Normal breath sounds.   Abdominal:      General: Bowel sounds are normal. There is no distension.      Palpations: Abdomen is soft.      Tenderness: There is no abdominal tenderness.   Musculoskeletal:      Cervical back: Normal range of motion and neck supple.   Skin:     General: Skin is warm and dry.      Findings: Erythema (minimal erythema to the upper extremity) present. No rash.   Neurological:      Mental Status: She is alert and oriented to person, place, and time.      Cranial Nerves: No cranial nerve deficit.   Psychiatric:         Behavior: Behavior normal.         Thought Content: Thought content normal.         Results Review:  I reviewed the patient's new clinical results.  Discussed with ED provider.    Lab Results (last 24 hours)       Procedure Component Value Units Date/Time    CBC & Differential [[id number]]  (Abnormal) Collected: 11/20/24 0736    Specimen: Blood Updated: 11/20/24 0753    Narrative:      The following orders were created for panel order CBC & Differential.  Procedure                               Abnormality         Status                     ---------                               -----------         ------                     CBC Auto Differential[[id number]]        Abnormal            Final result                 Please view results for these tests on the individual orders.    Comprehensive Metabolic Panel [[id number]]   (Abnormal) Collected: 11/20/24 0736    Specimen: Blood Updated: 11/20/24 0838     Glucose 87 mg/dL      BUN 58 mg/dL      Creatinine 11.04 mg/dL      Sodium 138 mmol/L      Potassium 5.7 mmol/L      Chloride 98 mmol/L      CO2 24.7 mmol/L      Calcium 9.2 mg/dL      Total Protein 7.7 g/dL      Albumin 3.7 g/dL      ALT (SGPT) <5 U/L      AST (SGOT) 18 U/L      Alkaline Phosphatase 64 U/L      Total Bilirubin 0.3 mg/dL      Globulin 4.0 gm/dL      A/G Ratio 0.9 g/dL      BUN/Creatinine Ratio 5.3     Anion Gap 15.3 mmol/L      eGFR 3.9 mL/min/1.73      Comment: <15 Indicative of kidney failure       Narrative:      GFR Normal >60  Chronic Kidney Disease <60  Kidney Failure <15      Protime-INR [[id number]]  (Abnormal) Collected: 11/20/24 0736    Specimen: Blood Updated: 11/20/24 0820     Protime 16.6 Seconds      INR 1.32    CBC Auto Differential [[id number]]  (Abnormal) Collected: 11/20/24 0736    Specimen: Blood Updated: 11/20/24 0753     WBC 7.82 10*3/mm3      RBC 2.85 10*6/mm3      Hemoglobin 8.2 g/dL      Hematocrit 26.4 %      MCV 92.6 fL      MCH 28.8 pg      MCHC 31.1 g/dL      RDW 16.8 %      RDW-SD 55.7 fl      MPV 9.6 fL      Platelets 319 10*3/mm3      Neutrophil % 80.3 %      Lymphocyte % 9.7 %      Monocyte % 6.4 %      Eosinophil % 2.4 %      Basophil % 0.4 %      Immature Grans % 0.8 %      Neutrophils, Absolute 6.28 10*3/mm3      Lymphocytes, Absolute 0.76 10*3/mm3      Monocytes, Absolute 0.50 10*3/mm3      Eosinophils, Absolute 0.19 10*3/mm3      Basophils, Absolute 0.03 10*3/mm3      Immature Grans, Absolute 0.06 10*3/mm3      nRBC 0.0 /100 WBC     Lactic Acid, Plasma [[id number]]  (Normal) Collected: 11/20/24 0736    Specimen: Blood Updated: 11/20/24 0817     Lactate 1.2 mmol/L     Blood Culture - Blood, Arm, Left [[id number]] Collected: 11/20/24 0939    Specimen: Blood from Arm, Left Updated: 11/20/24 0957    Blood Culture - Blood, Hand, Left [[id number]] Collected: 11/20/24 0950    Specimen: Blood  from Hand, Left Updated: 11/20/24 0958            Imaging Results (Last 24 Hours)       ** No results found for the last 24 hours. **            Results for orders placed during the hospital encounter of 11/10/24    STAT Adult Transthoracic Echo Complete W/ Cont if Necessary Per Protocol    Interpretation Summary    There is septal dyskinesis. There is severe hypokinesis of the basal inferior wall and the basal inferolateral wall. There is moderate hypokinesis of the anterior wall    Left ventricular systolic function is severely decreased. Estimated left ventricular EF = 25%    Left ventricular diastolic function was indeterminate.    Normal right ventricular cavity size and systolic function noted.    Mild tricuspid valve regurgitation is present.    Calculated right ventricular systolic pressure from tricuspid regurgitation is 30 mmHg.    There is a small circumferential pericardial effusion which is most prominent anteriorly. There is no evidence of cardiac tamponade      ECG 12 Lead Electrolyte Imbalance   Preliminary Result   HEART RATE=80  bpm   RR Dspdzftm=685  ms   NJ Erbiknwx=450  ms   P Horizontal Axis=  deg   P Front Axis=-8  deg   QRSD Interval=87  ms   QT Danexwuh=276  ms   KBiG=968  ms   QRS Axis=-15  deg   T Wave Axis=18  deg   - ABNORMAL ECG -   Sinus rhythm   Borderline left axis deviation   Anterior infarct, old   Date and Time of Study:2024-11-20 08:48:59           Assessment/Plan     Active Hospital Problems    Diagnosis  POA    **Right arm cellulitis [L03.113]  Yes    Bacteremia [R78.81]  Yes    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 She has had multiple interventions and a thrombectomy about 2-3 months ago.          ESRD on hemodialysis [N18.6, Z99.2]  Not Applicable     Added automatically from request for surgery [id number]      Hypertension [I10]  Yes     Ms. Mcneil is a  49 y.o. female with a history of end-stage renal disease, type 2 diabetes, hypertension, hyperparathyroidism, anemia of chronic disease. She was recently admitted to the ICU with septic shock and MSSA bactremia. She was discharged on IV cefazolin with dialysis. She presented back to the ER with erythema of her arm.     ID, Vascular surgery evaluation  Cefazolin  Nephrology evaluation for HD today  Resume home medications  I discussed the patients findings and my recommendations with patient and specialists         Grey Griffith MD  [facility]  11/20/24  15:26 EST

## 2024-11-20 NOTE — CONSULTS
Referring Provider: Dr Perry      Subjective   History of present illness: 49-year-old who  has a h/o ESRD (s/p failed kidney transplant in 2016), dm2, anemia, htn, hyperparathyroidism. Has extensive vascular surgery history with multiple access procedures.  Most recently underwent thrombectomy of right arm brachial artery to axillary vein graft by Dr Sanchez in May 2024.       She was admitted on 11/9 with sepsis and required intubation and pressors.  She also had stress-induced cardiomyopathy.  Blood cultures grew MSSA and patient had complained of some pain in the right upper extremity.  This was concerning that she might have a graft infection and ultrasound showed possible fluid collection.  In discussions with vascular surgery was elected to treat her medically with 6 weeks of IV cefazolin.  She improved and was discharged.    She represents today with the acute onset of right arm erythema, not painful, not associate with any drainage or constitutional symptoms of infection such as fever, chills, night sweats.  She says this has been going on since this morning and is mildly pruritic.    Physical Exam:   Vital Signs   Temp:  [97.9 °F (36.6 °C)] 97.9 °F (36.6 °C)  Heart Rate:  [84-99] 87  Resp:  [20] 20  BP: (134-151)/(85-97) 138/86    GENERAL: Awake and alert, in no acute distress.   HEENT: Oropharynx is clear. Hearing is grossly normal.   EYES: . No conjunctival injection. No lid lag.   LUNGS:normal respiratory effort.   SKIN: Minimal erythema to the right upper extremity without purulence or other cellulitic features  PSYCHIATRIC: Appropriate mood, affect, insight, and judgment.     Results Review:  WBC 7.8, hgb 8, plt 319  Cr 11  11/10 BCx 2/2 MSSA  11/10 MRSA, RPP neg  11/12 BCx neg  11/20 Bcx P  Radiology none         A/p  MSSA septicemia  ESRD  DM2, continue glycemic control efforts to prevent/control infectious complications  Suspected right arm brachial artery to axillary vein graft infection    I  think the arm is relatively unimpressive and actually looks better than when I saw it when she was in the hospital last time.  Vascular surgery is going to evaluate to see if we need additional imaging or workup. For now I think we should just continue the cefazolin with HD dosing 2 g after Monday Wednesday session and 3 g after Friday session.  Plan 6 weeks of antibiotics, stop date 12/22  Recommend checking weekly CBC with differential  She has follow-up with me in infectious diseases clinic on 12/18.      Thank you for this consult.  We will continue to follow along and tailor antibiotics as the patient's clinical course evolves.

## 2024-11-21 VITALS
DIASTOLIC BLOOD PRESSURE: 83 MMHG | RESPIRATION RATE: 16 BRPM | HEART RATE: 85 BPM | HEIGHT: 60 IN | SYSTOLIC BLOOD PRESSURE: 139 MMHG | TEMPERATURE: 98.5 F | BODY MASS INDEX: 31.41 KG/M2 | WEIGHT: 160 LBS | OXYGEN SATURATION: 100 %

## 2024-11-21 PROCEDURE — G0257 UNSCHED DIALYSIS ESRD PT HOS: HCPCS

## 2024-11-21 PROCEDURE — 25010000002 CEFAZOLIN PER 500 MG: Performed by: INTERNAL MEDICINE

## 2024-11-21 PROCEDURE — G0378 HOSPITAL OBSERVATION PER HR: HCPCS

## 2024-11-21 RX ADMIN — CEFAZOLIN 2000 MG: 2 INJECTION, POWDER, FOR SOLUTION INTRAMUSCULAR; INTRAVENOUS at 02:42

## 2024-11-21 RX ADMIN — Medication 10 ML: at 02:42

## 2024-11-21 NOTE — OUTREACH NOTE
AMI Week 1 Survey      Flowsheet Row Responses   [facility] facility patient discharged from? [facility]   Does the patient have one of the following disease processes/diagnoses(primary or secondary)? Acute MI (STEMI,NSTEMI)   Week 1 attempt successful? No   Unsuccessful attempts Attempt 2   Revoke Readmitted            HEIDY LOPEZ - Registered Nurse

## 2024-11-21 NOTE — NURSING NOTE
HD tx complete and net UF of 700cc removed r/t pt c/o abd cramping    B/p 139/77  hr 82  temp 98.1

## 2024-11-21 NOTE — NURSING NOTE
Hemodialysis completed tolerated well. IV antibiotic completed. Patient dressed and discharged home with daughter.

## 2024-11-21 NOTE — NURSING NOTE
Dialysis nurse called to say he plans to be here to start dialysis approximately [phone]. Informed patient and she states she will have a ride home as discharge order present for after dialysis.

## 2024-11-22 LAB
QT INTERVAL: 422 MS
QTC INTERVAL: 487 MS

## 2024-11-25 LAB
BACTERIA SPEC AEROBE CULT: NORMAL
BACTERIA SPEC AEROBE CULT: NORMAL

## 2024-12-09 ENCOUNTER — APPOINTMENT (OUTPATIENT)
Dept: GENERAL RADIOLOGY | Facility: HOSPITAL | Age: 49
End: 2024-12-09
Payer: MEDICARE

## 2024-12-09 ENCOUNTER — HOSPITAL ENCOUNTER (EMERGENCY)
Facility: HOSPITAL | Age: 49
Discharge: HOME | End: 2024-12-09
Attending: EMERGENCY MEDICINE | Admitting: EMERGENCY MEDICINE
Payer: MEDICARE

## 2024-12-09 VITALS
BODY MASS INDEX: 32.79 KG/M2 | OXYGEN SATURATION: 98 % | TEMPERATURE: 98.7 F | HEART RATE: 75 BPM | HEIGHT: 60 IN | WEIGHT: 167 LBS | SYSTOLIC BLOOD PRESSURE: 150 MMHG | RESPIRATION RATE: 18 BRPM | DIASTOLIC BLOOD PRESSURE: 83 MMHG

## 2024-12-09 DIAGNOSIS — R05.1 ACUTE COUGH: Primary | ICD-10-CM

## 2024-12-09 DIAGNOSIS — N18.6 ESRD (END STAGE RENAL DISEASE): ICD-10-CM

## 2024-12-09 DIAGNOSIS — J18.9 PNEUMONIA OF BOTH LUNGS DUE TO INFECTIOUS ORGANISM, UNSPECIFIED PART OF LUNG: ICD-10-CM

## 2024-12-09 LAB
ALBUMIN SERPL-MCNC: 3.7 G/DL (ref 3.5–5.2)
ALBUMIN/GLOB SERPL: 0.9 G/DL
ALP SERPL-CCNC: 66 U/L (ref 39–117)
ALT SERPL W P-5'-P-CCNC: <5 U/L (ref 1–33)
ANION GAP SERPL CALCULATED.3IONS-SCNC: 13 MMOL/L (ref 5–15)
AST SERPL-CCNC: 16 U/L (ref 1–32)
B PARAPERT DNA SPEC QL NAA+PROBE: NOT DETECTED
B PERT DNA SPEC QL NAA+PROBE: NOT DETECTED
BASOPHILS # BLD AUTO: 0.04 10*3/MM3 (ref 0–0.2)
BASOPHILS NFR BLD AUTO: 0.7 % (ref 0–1.5)
BILIRUB SERPL-MCNC: <0.2 MG/DL (ref 0–1.2)
BUN SERPL-MCNC: 18 MG/DL (ref 6–20)
BUN/CREAT SERPL: 2.8 (ref 7–25)
C PNEUM DNA NPH QL NAA+NON-PROBE: NOT DETECTED
CALCIUM SPEC-SCNC: 9 MG/DL (ref 8.6–10.5)
CHLORIDE SERPL-SCNC: 97 MMOL/L (ref 98–107)
CO2 SERPL-SCNC: 28 MMOL/L (ref 22–29)
CREAT SERPL-MCNC: 6.39 MG/DL (ref 0.57–1)
DEPRECATED RDW RBC AUTO: 53.5 FL (ref 37–54)
EGFRCR SERPLBLD CKD-EPI 2021: 7.5 ML/MIN/1.73
EOSINOPHIL # BLD AUTO: 0.23 10*3/MM3 (ref 0–0.4)
EOSINOPHIL NFR BLD AUTO: 4 % (ref 0.3–6.2)
ERYTHROCYTE [DISTWIDTH] IN BLOOD BY AUTOMATED COUNT: 15.9 % (ref 12.3–15.4)
FLUAV SUBTYP SPEC NAA+PROBE: NOT DETECTED
FLUBV RNA ISLT QL NAA+PROBE: NOT DETECTED
GLOBULIN UR ELPH-MCNC: 4.3 GM/DL
GLUCOSE SERPL-MCNC: 81 MG/DL (ref 65–99)
HADV DNA SPEC NAA+PROBE: NOT DETECTED
HCOV 229E RNA SPEC QL NAA+PROBE: NOT DETECTED
HCOV HKU1 RNA SPEC QL NAA+PROBE: NOT DETECTED
HCOV NL63 RNA SPEC QL NAA+PROBE: NOT DETECTED
HCOV OC43 RNA SPEC QL NAA+PROBE: NOT DETECTED
HCT VFR BLD AUTO: 27.6 % (ref 34–46.6)
HGB BLD-MCNC: 8.1 G/DL (ref 12–15.9)
HMPV RNA NPH QL NAA+NON-PROBE: NOT DETECTED
HOLD SPECIMEN: NORMAL
HPIV1 RNA ISLT QL NAA+PROBE: NOT DETECTED
HPIV2 RNA SPEC QL NAA+PROBE: NOT DETECTED
HPIV3 RNA NPH QL NAA+PROBE: NOT DETECTED
HPIV4 P GENE NPH QL NAA+PROBE: NOT DETECTED
IMM GRANULOCYTES # BLD AUTO: 0.04 10*3/MM3 (ref 0–0.05)
IMM GRANULOCYTES NFR BLD AUTO: 0.7 % (ref 0–0.5)
INR PPP: 2.25 (ref 0.9–1.1)
LYMPHOCYTES # BLD AUTO: 0.71 10*3/MM3 (ref 0.7–3.1)
LYMPHOCYTES NFR BLD AUTO: 12.3 % (ref 19.6–45.3)
M PNEUMO IGG SER IA-ACNC: NOT DETECTED
MCH RBC QN AUTO: 27.3 PG (ref 26.6–33)
MCHC RBC AUTO-ENTMCNC: 29.3 G/DL (ref 31.5–35.7)
MCV RBC AUTO: 92.9 FL (ref 79–97)
MONOCYTES # BLD AUTO: 0.42 10*3/MM3 (ref 0.1–0.9)
MONOCYTES NFR BLD AUTO: 7.3 % (ref 5–12)
NEUTROPHILS NFR BLD AUTO: 4.31 10*3/MM3 (ref 1.7–7)
NEUTROPHILS NFR BLD AUTO: 75 % (ref 42.7–76)
NRBC BLD AUTO-RTO: 0 /100 WBC (ref 0–0.2)
NT-PROBNP SERPL-MCNC: ABNORMAL PG/ML (ref 0–450)
PLATELET # BLD AUTO: 262 10*3/MM3 (ref 140–450)
PMV BLD AUTO: 10 FL (ref 6–12)
POTASSIUM SERPL-SCNC: 4.3 MMOL/L (ref 3.5–5.2)
PROCALCITONIN SERPL-MCNC: 0.82 NG/ML (ref 0–0.25)
PROT SERPL-MCNC: 8 G/DL (ref 6–8.5)
PROTHROMBIN TIME: 25.1 SECONDS (ref 11.7–14.2)
QT INTERVAL: 462 MS
QTC INTERVAL: 517 MS
RBC # BLD AUTO: 2.97 10*6/MM3 (ref 3.77–5.28)
RHINOVIRUS RNA SPEC NAA+PROBE: NOT DETECTED
RSV RNA NPH QL NAA+NON-PROBE: NOT DETECTED
SARS-COV-2 RNA NPH QL NAA+NON-PROBE: NOT DETECTED
SODIUM SERPL-SCNC: 138 MMOL/L (ref 136–145)
TROPONIN T SERPL HS-MCNC: 39 NG/L
WBC NRBC COR # BLD AUTO: 5.75 10*3/MM3 (ref 3.4–10.8)

## 2024-12-09 PROCEDURE — 93005 ELECTROCARDIOGRAM TRACING: CPT | Performed by: PHYSICIAN ASSISTANT

## 2024-12-09 PROCEDURE — 85610 PROTHROMBIN TIME: CPT | Performed by: PHYSICIAN ASSISTANT

## 2024-12-09 PROCEDURE — 99284 EMERGENCY DEPT VISIT MOD MDM: CPT

## 2024-12-09 PROCEDURE — 84484 ASSAY OF TROPONIN QUANT: CPT | Performed by: PHYSICIAN ASSISTANT

## 2024-12-09 PROCEDURE — 71045 X-RAY EXAM CHEST 1 VIEW: CPT

## 2024-12-09 PROCEDURE — 84145 PROCALCITONIN (PCT): CPT | Performed by: PHYSICIAN ASSISTANT

## 2024-12-09 PROCEDURE — 80053 COMPREHEN METABOLIC PANEL: CPT | Performed by: PHYSICIAN ASSISTANT

## 2024-12-09 PROCEDURE — 85025 COMPLETE CBC W/AUTO DIFF WBC: CPT | Performed by: PHYSICIAN ASSISTANT

## 2024-12-09 PROCEDURE — 0202U NFCT DS 22 TRGT SARS-COV-2: CPT | Performed by: PHYSICIAN ASSISTANT

## 2024-12-09 PROCEDURE — 83880 ASSAY OF NATRIURETIC PEPTIDE: CPT | Performed by: PHYSICIAN ASSISTANT

## 2024-12-09 RX ORDER — DOXYCYCLINE 100 MG/1
100 CAPSULE ORAL ONCE
Status: DISCONTINUED | OUTPATIENT
Start: 2024-12-09 | End: 2024-12-09

## 2024-12-09 RX ORDER — CEFDINIR 300 MG/1
CAPSULE ORAL
Qty: 4 CAPSULE | Refills: 0 | Status: SHIPPED | OUTPATIENT
Start: 2024-12-09 | End: 2024-12-09 | Stop reason: SDUPTHER

## 2024-12-09 RX ORDER — DOXYCYCLINE 100 MG/1
100 CAPSULE ORAL ONCE
Status: COMPLETED | OUTPATIENT
Start: 2024-12-09 | End: 2024-12-09

## 2024-12-09 RX ORDER — DOXYCYCLINE 100 MG/1
100 CAPSULE ORAL 2 TIMES DAILY
Qty: 14 CAPSULE | Refills: 0 | Status: SHIPPED | OUTPATIENT
Start: 2024-12-09

## 2024-12-09 RX ADMIN — DOXYCYCLINE 100 MG: 100 CAPSULE ORAL at 17:01

## 2024-12-09 NOTE — ED PROVIDER NOTES
EMERGENCY DEPARTMENT ENCOUNTER    Room Number:  13/13  PCP: Regla Ying APRN  Independent Historians: Patient    HPI:  Chief Complaint: had concerns including Abnormal Imaging.      A complete HPI/ROS/PMH/PSH/SH/FH are unobtainable due to: None    Chronic or social conditions impacting patient care (Social Determinants of Health): None      Context: Sunni Mcneil is a 49 y.o. female with a medical history of esrd on hd M/W/F with recent issue with bacteremia on IV abx on dialysis days who presents to the ED c/o acute concern for URI with abnl cxr at urgent care.  Pt sent for further eval. Pt had HD today and stayed for normal session. Pt with URI X few days with prod cough so went to urgen tcare for further eval.  NO fever, no n/v/d, no cp.        Review of prior external notes (non-ED) -and- Review of prior external test results outside of this encounter: urgent care xray report with concern for volume overload    Prescription drug monitoring program review:     N/A    PAST MEDICAL HISTORY  Active Ambulatory Problems     Diagnosis Date Noted    Hyperparathyroidism 01/24/2018    Acute rejection of kidney transplant 05/10/2017    ARF (acute renal failure) 04/04/2014    Hx of kidney transplant 07/06/2017    Hypertension 01/24/2018    Kidney transplant status, cadaveric 07/06/2017    Nephritis 04/13/2014    Hypercalcemia 01/24/2018    ESRD on hemodialysis 07/02/2019    Prolonged QT interval 11/07/2019    ESRD (end stage renal disease) 11/07/2019    Hyperphosphatemia 11/07/2019    Leukocytosis 11/07/2019    Type 2 diabetes mellitus 11/07/2019    Acute UTI (urinary tract infection) 03/13/2020    Obesity (BMI 30-39.9) 03/13/2020    A-V fistula 03/13/2020    Anemia, chronic disease 03/13/2020    Folate deficiency anemia 03/16/2020    Febrile illness, acute 05/06/2020    Sepsis 05/06/2020    Screening for colon cancer 09/15/2020    Hyperkalemia 01/14/2021    Shunt malfunction 01/15/2021    Anemia 02/13/2021     Constipation 03/10/2021    Screening for colorectal cancer 03/10/2021    Hypervolemia 03/15/2021    AV graft thrombosis, initial encounter 01/20/2023    ESRD (end stage renal disease) 01/21/2023    GERD without esophagitis 01/21/2023    AV graft stenosis, initial encounter 12/20/2023    Arteriovenous fistula occlusion 05/02/2024    Pulmonary edema 11/10/2024    ST elevation myocardial infarction (STEMI) 11/10/2024    Right arm cellulitis 11/20/2024    Bacteremia 11/20/2024     Resolved Ambulatory Problems     Diagnosis Date Noted    Peritoneal dialysis catheter in place 07/24/2019    Hypokalemia 11/07/2019    Nausea 11/07/2019    Hypocalcemia 11/07/2019    Diarrhea 03/13/2020    Acute cystitis with hematuria 03/13/2020    Mechanical complication of hemodialysis catheter 02/13/2021    Arteriovenous fistula occlusion, initial encounter 01/21/2023    Hyperkalemia 01/21/2023    Hyperphosphatemia 01/21/2023     Past Medical History:   Diagnosis Date    Acid reflux     Anxiety     Arthritis     Cough     Dependence on renal dialysis 05/17/2021    Depression     Diabetes mellitus     End stage renal disease 05/17/2021    ESRD on dialysis     History of peritoneal dialysis     History of transfusion     Kidney disease     Kidney transplant recipient 09/02/2016    PONV (postoperative nausea and vomiting)     Seasonal allergies     Vascular dialysis catheter in place          PAST SURGICAL HISTORY  Past Surgical History:   Procedure Laterality Date    ARTERIOVENOUS FISTULA/SHUNT SURGERY Right 02/26/2020    Procedure: RIGHT ARM ARTERIAL VENOUS FISTULA;  Surgeon: Elijah Herrera MD;  Location: [facility];  Service: Vascular;  Laterality: Right;    ARTERIOVENOUS FISTULA/SHUNT SURGERY Left 02/04/2021    Procedure: LEFT BRACHIOAXILLARY ARTERIOVENOUS FISTULA GRAFT;  Surgeon: Anya Hernandez Jr., MD;  Location: [facility] OR;  Service: Vascular;  Laterality: Left;    ARTERIOVENOUS FISTULA/SHUNT SURGERY Right  03/16/2021    Procedure: RIGHT BRACHIOAXILLARY ARTERVENIOUS GRAFT PLACEMENT;  Surgeon: Adán Maurer MD;  Location: [facility] MAIN OR;  Service: Vascular;  Laterality: Right;    ARTERIOVENOUS FISTULA/SHUNT SURGERY Right 8/4/2023    Procedure: RIGHT ARM FISTULOGRAM PEUCUTANEOUS AND PERIPHERAL TRANSLUMINAL ANGIOPLSTY/STENT;  Surgeon: Elijah eHrrera MD;  Location: [facility] HYBRID OR 18/19;  Service: Vascular;  Laterality: Right;    BRACHIAL EMBOLECTOMY Right 10/6/2023    Procedure: RIGHT AV GRAFT WITH ANGIOPLASTY AND CENTROVENOUS ANGIOPLASTY;  Surgeon: Phu Gaviria MD;  Location: [facility] OR 18/19;  Service: Vascular;  Laterality: Right;    CARDIAC CATHETERIZATION N/A 11/10/2024    Procedure: Left Heart Cath;  Surgeon: Maribell Sandhu MD;  Location: [facility] CATH INVASIVE LOCATION;  Service: Cardiology;  Laterality: N/A;    CARDIAC CATHETERIZATION N/A 11/10/2024    Procedure: Coronary angiography;  Surgeon: Maribell Sandhu MD;  Location: [facility] CATH INVASIVE LOCATION;  Service: Cardiology;  Laterality: N/A;    CARDIAC CATHETERIZATION N/A 11/10/2024    Procedure: Left ventriculography;  Surgeon: Maribell Sandhu MD;  Location: [facility] CATH INVASIVE LOCATION;  Service: Cardiology;  Laterality: N/A;    CARDIAC CATHETERIZATION N/A 11/10/2024    Procedure: Right Heart Cath;  Surgeon: Maribell Sandhu MD;  Location: [facility] CATH INVASIVE LOCATION;  Service: Cardiology;  Laterality: N/A;    COLONOSCOPY N/A 11/30/2022    Procedure: COLONOSCOPY TO CECUM AND TERM. ILEUM;  Surgeon: Casimiro Perkins MD;  Location: [facility] ENDOSCOPY;  Service: Gastroenterology;  Laterality: N/A;  PRE OP - SCREENING  POST OP - DIVERTICULOSIS, SM. HEMORRHOIDS    INSERTION AND REMOVAL HEMODIALYSIS CATHETER N/A 02/13/2021    Procedure: INSERTION AND REMOVAL OF HEMODIALYSIS CATHETER;  Surgeon: Adán Maurer MD;  Location: [facility] MAIN OR;  Service: Vascular;  Laterality: N/A;    INSERTION HEMODIALYSIS CATHETER Right  01/15/2021    Procedure: TUNNELED DIAYLIS CATHETER PLACEMENT;  Surgeon: Phu Gaviria MD;  Location: [facility] OR 18/19;  Service: Vascular;  Laterality: Right;    INSERTION HEMODIALYSIS CATHETER Left 1/21/2023    Procedure: HEMODIALYSIS CATHETER INSERTION;  Surgeon: Elijah Herrera MD;  Location: [facility] MAIN OR;  Service: Vascular;  Laterality: Left;    INSERTION PERITONEAL DIALYSIS CATHETER  07/29/2014    Laparoscopic placement of [place] Cath peritoneal dialysis catheter, Dr. Vinod Goldstein    INSERTION PERITONEAL DIALYSIS CATHETER N/A 07/24/2019    Procedure: LAPAROSCOPIC INSERTION PERITONEAL DIALYSIS CATHETER;  Surgeon: Damon Rooney MD;  Location: [facility] MAIN OR;  Service: General    REMOVAL HEMODIALYSIS CATHETER N/A 2/15/2023    Procedure: PALINDROME REMOVAL;  Surgeon: Elijah Herrera MD;  Location: [facility] MAIN OR;  Service: Vascular;  Laterality: N/A;    REMOVAL PERITONEAL DIALYSIS CATHETER N/A 10/17/2016    Procedure: REMOVAL PERITONEAL DIALYSIS CATHETER;  Surgeon: Damon Rooney MD;  Location: [facility] MAIN OR;  Service:     REMOVAL PERITONEAL DIALYSIS CATHETER N/A 10/21/2020    Procedure: REMOVAL PERITONEAL DIALYSIS CATHETER;  Surgeon: Damon Rooney MD;  Location: [facility] MAIN OR;  Service: General;  Laterality: N/A;    SHUNT O GRAM Right 07/28/2022    Procedure: RIGHT  ARM SHUNT O GRAM , ANGIOPLASTY OF AXILARY VEIN AND SUBCLAVIAN VEIN AT SVC JUNCTION;  Surgeon: Anya Hernandez Jr., MD;  Location: [facility] OR 18/19;  Service: Vascular;  Laterality: Right;    SHUNT O GRAM Right 1/20/2023    Procedure: OPEN THROMBECTOMY AND ANGIOPLASTY;  Surgeon: Elijah Herrera MD;  Location: [facility] OR 18/19;  Service: Vascular;  Laterality: Right;    SHUNT O GRAM Right 1/23/2023    Procedure: Right arm dialysis graft open thrombectomy, shuntogram, angioplasty and stent;  Surgeon: Karuna Villalba MD;  Location: [facility] OR 18/19;  Service:  Vascular;  Laterality: Right;    SHUNT O GRAM Right 2023    Procedure: RIGHT ARM FISTULOGRAM, ANGIOPLASTY;  Surgeon: Elijah Herrera MD;  Location: [facility] OR ;  Service: Vascular;  Laterality: Right;    SHUNT O GRAM Right 5/3/2024    Procedure: Hemodialysis shunt thrombectomy with revision;  Surgeon: Alex Sanchez MD;  Location: [facility] OR;  Service: Vascular;  Laterality: Right;    TRANSPLANTATION RENAL Right 2016    from  donor    TUBAL ABDOMINAL LIGATION Bilateral          FAMILY HISTORY  Family History   Problem Relation Age of Onset    Hypertension Mother     Hypertension Father     Heart attack Maternal Grandfather     Malig Hyperthermia Neg Hx          SOCIAL HISTORY  Social History     Socioeconomic History    Marital status:     Number of children: 2    Years of education: 12   Tobacco Use    Smoking status: Never     Passive exposure: Never    Smokeless tobacco: Never    Tobacco comments:     Patient does not smoke   Vaping Use    Vaping status: Never Used   Substance and Sexual Activity    Alcohol use: No     Comment: caffeine use; Patient is unknown if ever drinks    Drug use: Never     Comment: Drug Abuse: none    Sexual activity: Defer         ALLERGIES  Patient has no known allergies.        REVIEW OF SYSTEMS  Review of Systems  Included in HPI  All systems reviewed and negative except for those discussed in HPI.      PHYSICAL EXAM    I have reviewed the triage vital signs and nursing notes.    ED Triage Vitals   Temp Heart Rate Resp BP SpO2   24 1313 24 1313 24 1313 24 1325 24 1313   98.7 °F (37.1 °C) 75 18 (!) 186/99 98 %      Temp src Heart Rate Source Patient Position BP Location FiO2 (%)   24 1313 -- -- -- --   Tympanic           Physical Exam  GENERAL: pleasant cooeprative f, alert, no acute distress  SKIN: Warm, dry  HENT: Normocephalic, atraumatic  EYES: no scleral icterus  CV: regular rhythm, regular  rate  RESPIRATORY: normal effort, lungs clear, no wheezing, no rhonchi  ABDOMEN: soft, nontender, nondistended  MUSCULOSKELETAL: no deformity, no le edema  NEURO: alert, moves all extremities, follows commands      NIH:                                                       MEDICATIONS GIVEN IN ER  Medications   doxycycline (MONODOX) capsule 100 mg (100 mg Oral Given 24 1701)         ORDERS PLACED DURING THIS VISIT:  Orders Placed This Encounter   Procedures    Respiratory Panel PCR w/COVID-19(SARS-CoV-2) RAIZA/ABUNDIO/JA/PAD/COR/IRENE In-House, NP Swab in UTM/VTM, 2 HR TAT - Swab, Nasopharynx    XR Chest 1 View    Comprehensive Metabolic Panel    Single High Sensitivity Troponin T    BNP    Procalcitonin    Protime-INR    CBC Auto Differential    [place] Draw    ECG 12 Lead Dyspnea    CBC & Differential    [place] [facility]         OUTPATIENT MEDICATION MANAGEMENT:  No current Epic-ordered facility-administered medications on file.     Current Outpatient Medications Ordered in Epic   Medication Sig Dispense Refill    acetaminophen (TYLENOL) 325 MG tablet Take 2 tablets by mouth Every 6 (Six) Hours As Needed for Mild Pain.      calcitriol (ROCALTROL) 0.25 MCG capsule Take 1 capsule by mouth Take As Directed. Calcitriol 0.25 MCG Capsule (si capsule once per day )      calcium acetate (PHOSLO) 667 MG capsule Take 3 capsules by mouth 3 (Three) Times a Day With Meals.      ceFAZolin 2000 mg IVPB in 100 mL NS (MBP) Infuse 2,000 mg into a venous catheter Take As Directed. Give after HD on Monday and Wednesday  Indications: Bacteria in the Blood      ceFAZolin 3000 mg IVPB in 100 mL NS (MBP) Infuse 3,000 mg into a venous catheter Take As Directed. Give after HD on hd Friday  Indications: Bacteria in the Blood      doxycycline (MONODOX) 100 MG capsule Take 1 capsule by mouth 2 (Two) Times a Day. 14 capsule 0    hydrOXYzine (ATARAX) 50 MG tablet Take 1 tablet by mouth As Needed.      metoprolol succinate XL (TOPROL-XL) 25 MG  24 hr tablet Take 1 tablet by mouth Daily. 30 tablet 0    naloxone (NARCAN) 4 MG/0.1ML nasal spray Call 911. Don't prime. [place] in 1 nostril for overdose. Repeat in 2-3 minutes in other nostril if no or minimal breathing/responsiveness. 2 each 0    ondansetron ODT (ZOFRAN-ODT) 4 MG disintegrating tablet Take 1 tablet by mouth As Needed for Nausea or Vomiting.      polyethylene glycol (MIRALAX) 17 GM/SCOOP powder Take 17 g by mouth Daily As Needed (Use if senna-docusate is ineffective). 510 g 0    saccharomyces boulardii (FLORASTOR) 250 MG capsule Take 1 capsule by mouth 2 (Two) Times a Day. 60 capsule 0    sennosides-docusate (PERICOLACE) 8.6-50 MG per tablet Take 2 tablets by mouth 2 (Two) Times a Day As Needed for Constipation. 60 tablet 0    traZODone (DESYREL) 100 MG tablet Take 1.5 tablets by mouth every night at bedtime.      warfarin (COUMADIN) 5 MG tablet Take 1 tablet by mouth Daily.           PROCEDURES  Procedures            PROGRESS, DATA ANALYSIS, CONSULTS, AND MEDICAL DECISION MAKING  All labs have been independently interpreted by me.  All radiology studies have been reviewed by me. All EKG's have been independently viewed and interpreted by me.  Discussion below represents my analysis of pertinent findings related to patient's condition, differential diagnosis, treatment plan and final disposition.    This patient presents with a constellation of symptoms likely representing viral upper respiratory infection or atypical pna as characterized by: prod cough. No chest pain nor shortness of breath. The patient's presentation is not consistent with other acute cardiopulmonary emergencies like ACS, CHF, or pericardial effusion.   Possible COVID-19 (coronavirus) but no specific sick contacts, no respiratory compromise, nontoxic appearance.   Other diagnoses were considered in the differential diagnosis for this patient. Considered:  peritonsillar abscess or retropharyngeal abscess or other deep space  infection like [place]igs (but no hot potato voice, no uvular deviation, no submandibular swelling, no elevation of floor of mouth), esophageal rupture/tear (but no history of dysphagia nor forceful vomiting). Also considered CNS infection, bacterial sinusitis, and pneumonia but low likelihood given documented exam and history. Strep or Mononucleosis also thought less likely as no pharyngeal exudates, no posterior lymphadenopathy, and no splenomegaly on exam.            ED Course as of 12/11/24 1901   Mon Dec 09, 2024   1537 EKG ER MD interpretation   Time: 13: 36  Rhythm and rate: Normal sinus rhythm at a rate of 75  Axis: Normal  P waves: Normal  QRS complexes: Normal  ST segments: no elevation nor depressions  T waves: no flattening or inversions  QTc 517 comparison EKGs from November 12 (QTc 569), November 10(QTc 610), and even back to 2021 with prolonged QT   [AR]   1645 I discussed patient's case with our clinical pharmacist in the ER.  Patient is on cefazolin with dialysis for her sepsis admission in November.  She is continuing that therapy through December 22.  In order to cover for atypicals, plan for coverage with doxycycline orally despite its interaction with warfarin.  This seems to be the safest choice given patient's QTc prolongation and renal failure.  I discussed all of this with patient as well. [AR]   1708 I discussed all results with patient and answered all questions to the best of my ability. The patient was encouraged to follow up appropriately.      Routine discharge counseling was given, and the patient was instructed to promptly call or followup with their primary physician or even return to the ER if any worsening, changing or persistent symptoms.         [AR]      ED Course User Index  [AR] Elsy Roy MD             AS OF 19:01 EST VITALS:    BP - 150/83  HR - 75  TEMP - 98.7 °F (37.1 °C) (Tympanic)  O2 SATS - 98%      COMPLEXITY OF CARE  Admission was considered but after  careful review of the patient's presentation, physical examination, diagnostic results, and response to treatment the patient may be safely discharged with outpatient follow-up.    DIAGNOSIS  Final diagnoses:   Acute cough   Pneumonia of both lungs due to infectious organism, unspecified part of lung   ESRD (end stage renal disease)         DISPOSITION  ED Disposition       ED Disposition   Discharge    Condition   Stable    Comment   --                Please note that portions of this document were completed with a voice recognition program.    Note Disclaimer: At [facility], we believe that sharing information builds trust and better relationships. You are receiving this note because you recently visited [facility]. It is possible you will see health information before a provider has talked with you about it. This kind of information can be easy to misunderstand. To help you fully understand what it means for your health, we urge you to discuss this note with your provider.         Elsy Roy MD  12/11/24 0383

## 2024-12-09 NOTE — ED PROVIDER NOTES
"TRIAGE PROVIDER NOTE    I personally performed a brief face-to-face medical evaluation of the patient upon their arrival to the emergency department.  This exam was performed in an effort to decrease the time from intake to seeing a provider and to decrease delays in care.  The history of present illness is condensed.  Other providers may see the patient as well if deemed necessary after this evaluation.    HPI: Sunni Mcneil is a 49 y.o. female with a PMH significant for ESRD on dialysis who presents to the ED c/o acute abnormal imaging, cough. Cough has been present x 2 days. She had very small amount of blood tinged sputum with cough and nose blowing. She takes coumadin. No chest pain, leg swelling. She has dialysis MWF and had dialysis today. No reported sick contacts, fevers, chills. Pt had chest xray showing interstitial edema and negative covid/flu swab at urgent care. She was referred to ER for further evaluation.      FOCUSED PHYSICAL EXAM:  ED Triage Vitals   Temp Heart Rate Resp BP SpO2   12/09/24 1313 12/09/24 1313 12/09/24 1313 12/09/24 1325 12/09/24 1313   98.7 °F (37.1 °C) 75 18 (!) 186/99 98 %      Temp src Heart Rate Source Patient Position BP Location FiO2 (%)   12/09/24 1313 -- -- -- --   Tympanic         General: No acute distress  Lungs: Clear to auscultation bilaterally, no wheezes  Cardiovascular: Regular rate and rhythm, no murmur, fistula RUE  Abdomen: Soft, nontender, nondistended  MSK: No gross injury or deformity, no pitting edema  Neuro: No focal neurologic deficit.       ASSESSMENT/PLAN:  Chest xray report from outside facility shows \"diffuse interstitial prominence most compatible with mild interstitial edema. Atypical/viral infectious process may have similar appearance in appropriate clinical setting\".    Well appearing in NAD. Will obtain additional labs, EGK, full respiratory panel.    Orders Placed This Encounter   Procedures    Respiratory Panel PCR w/COVID-19(SARS-CoV-2) " [facility]/ABUNDIO/JA/[facility] In-House, NP Swab in UTM/VTM, 2 HR TAT - Swab, Nasopharynx    Comprehensive Metabolic Panel    Single High Sensitivity Troponin T    BNP    Procalcitonin    Protime-INR    CBC Auto Differential    ECG 12 Lead Dyspnea    CBC & Differential        Provider Attestation:  I personally reviewed the past medical history, past surgical history, social history, family history, current medications, and allergies as they appear in the chart.    I, Odette Gallegos PA-C, I evaluated the patient briefly at triage and performed an evaluation.  The contents of this note to this point have been provided by myself.          Odette Gallegos PA  12/09/24 1339       Odette Gallegos PA  12/09/24 1923

## 2024-12-18 ENCOUNTER — HOSPITAL ENCOUNTER (EMERGENCY)
Facility: HOSPITAL | Age: 49
Discharge: HOME | End: 2024-12-18
Attending: EMERGENCY MEDICINE | Admitting: EMERGENCY MEDICINE
Payer: MEDICARE

## 2024-12-18 ENCOUNTER — OFFICE VISIT (OUTPATIENT)
Dept: INFECTIOUS DISEASES | Facility: CLINIC | Age: 49
End: 2024-12-18
Payer: MEDICARE

## 2024-12-18 ENCOUNTER — APPOINTMENT (OUTPATIENT)
Dept: GENERAL RADIOLOGY | Facility: HOSPITAL | Age: 49
End: 2024-12-18
Payer: MEDICARE

## 2024-12-18 VITALS
TEMPERATURE: 98.1 F | RESPIRATION RATE: 18 BRPM | HEART RATE: 70 BPM | OXYGEN SATURATION: 99 % | SYSTOLIC BLOOD PRESSURE: 162 MMHG | WEIGHT: 169 LBS | BODY MASS INDEX: 33.18 KG/M2 | DIASTOLIC BLOOD PRESSURE: 84 MMHG | HEIGHT: 60 IN

## 2024-12-18 VITALS
SYSTOLIC BLOOD PRESSURE: 173 MMHG | DIASTOLIC BLOOD PRESSURE: 97 MMHG | BODY MASS INDEX: 30.78 KG/M2 | HEART RATE: 85 BPM | WEIGHT: 157.6 LBS | RESPIRATION RATE: 14 BRPM | TEMPERATURE: 97.1 F

## 2024-12-18 DIAGNOSIS — Z99.2 ESRD ON HEMODIALYSIS: Primary | ICD-10-CM

## 2024-12-18 DIAGNOSIS — Z79.2 LONG TERM (CURRENT) USE OF ANTIBIOTICS: ICD-10-CM

## 2024-12-18 DIAGNOSIS — I10 UNCONTROLLED HYPERTENSION: ICD-10-CM

## 2024-12-18 DIAGNOSIS — R78.81 MSSA BACTEREMIA: ICD-10-CM

## 2024-12-18 DIAGNOSIS — J20.9 ACUTE BRONCHITIS, UNSPECIFIED ORGANISM: Primary | ICD-10-CM

## 2024-12-18 DIAGNOSIS — B95.61 MSSA BACTEREMIA: ICD-10-CM

## 2024-12-18 DIAGNOSIS — Z99.2 ESRD ON DIALYSIS: ICD-10-CM

## 2024-12-18 DIAGNOSIS — N18.6 ESRD ON HEMODIALYSIS: Primary | ICD-10-CM

## 2024-12-18 DIAGNOSIS — N18.6 ESRD ON DIALYSIS: ICD-10-CM

## 2024-12-18 LAB
ALBUMIN SERPL-MCNC: 3.8 G/DL (ref 3.5–5.2)
ALBUMIN/GLOB SERPL: 1.1 G/DL
ALP SERPL-CCNC: 62 U/L (ref 39–117)
ALT SERPL W P-5'-P-CCNC: <5 U/L (ref 1–33)
ANION GAP SERPL CALCULATED.3IONS-SCNC: 18 MMOL/L (ref 5–15)
AST SERPL-CCNC: 10 U/L (ref 1–32)
B PARAPERT DNA SPEC QL NAA+PROBE: NOT DETECTED
B PERT DNA SPEC QL NAA+PROBE: NOT DETECTED
BASOPHILS # BLD AUTO: 0.03 10*3/MM3 (ref 0–0.2)
BASOPHILS NFR BLD AUTO: 0.5 % (ref 0–1.5)
BILIRUB SERPL-MCNC: 0.2 MG/DL (ref 0–1.2)
BUN SERPL-MCNC: 34 MG/DL (ref 6–20)
BUN/CREAT SERPL: 3.9 (ref 7–25)
C PNEUM DNA NPH QL NAA+NON-PROBE: NOT DETECTED
CALCIUM SPEC-SCNC: 8.8 MG/DL (ref 8.6–10.5)
CHLORIDE SERPL-SCNC: 96 MMOL/L (ref 98–107)
CO2 SERPL-SCNC: 24 MMOL/L (ref 22–29)
CREAT SERPL-MCNC: 8.67 MG/DL (ref 0.57–1)
DEPRECATED RDW RBC AUTO: 52 FL (ref 37–54)
EGFRCR SERPLBLD CKD-EPI 2021: 5.2 ML/MIN/1.73
EOSINOPHIL # BLD AUTO: 0.19 10*3/MM3 (ref 0–0.4)
EOSINOPHIL NFR BLD AUTO: 3.1 % (ref 0.3–6.2)
ERYTHROCYTE [DISTWIDTH] IN BLOOD BY AUTOMATED COUNT: 15.6 % (ref 12.3–15.4)
FLUAV SUBTYP SPEC NAA+PROBE: NOT DETECTED
FLUBV RNA ISLT QL NAA+PROBE: NOT DETECTED
GEN 5 1HR TROPONIN T REFLEX: 36 NG/L
GLOBULIN UR ELPH-MCNC: 3.5 GM/DL
GLUCOSE SERPL-MCNC: 78 MG/DL (ref 65–99)
HADV DNA SPEC NAA+PROBE: NOT DETECTED
HCOV 229E RNA SPEC QL NAA+PROBE: NOT DETECTED
HCOV HKU1 RNA SPEC QL NAA+PROBE: NOT DETECTED
HCOV NL63 RNA SPEC QL NAA+PROBE: NOT DETECTED
HCOV OC43 RNA SPEC QL NAA+PROBE: NOT DETECTED
HCT VFR BLD AUTO: 27.2 % (ref 34–46.6)
HGB BLD-MCNC: 8.2 G/DL (ref 12–15.9)
HMPV RNA NPH QL NAA+NON-PROBE: NOT DETECTED
HOLD SPECIMEN: NORMAL
HOLD SPECIMEN: NORMAL
HPIV1 RNA ISLT QL NAA+PROBE: NOT DETECTED
HPIV2 RNA SPEC QL NAA+PROBE: NOT DETECTED
HPIV3 RNA NPH QL NAA+PROBE: NOT DETECTED
HPIV4 P GENE NPH QL NAA+PROBE: NOT DETECTED
IMM GRANULOCYTES # BLD AUTO: 0.03 10*3/MM3 (ref 0–0.05)
IMM GRANULOCYTES NFR BLD AUTO: 0.5 % (ref 0–0.5)
INR PPP: 2.1 (ref 0.9–1.1)
LYMPHOCYTES # BLD AUTO: 0.84 10*3/MM3 (ref 0.7–3.1)
LYMPHOCYTES NFR BLD AUTO: 13.6 % (ref 19.6–45.3)
M PNEUMO IGG SER IA-ACNC: NOT DETECTED
MCH RBC QN AUTO: 27.5 PG (ref 26.6–33)
MCHC RBC AUTO-ENTMCNC: 30.1 G/DL (ref 31.5–35.7)
MCV RBC AUTO: 91.3 FL (ref 79–97)
MONOCYTES # BLD AUTO: 0.32 10*3/MM3 (ref 0.1–0.9)
MONOCYTES NFR BLD AUTO: 5.2 % (ref 5–12)
NEUTROPHILS NFR BLD AUTO: 4.77 10*3/MM3 (ref 1.7–7)
NEUTROPHILS NFR BLD AUTO: 77.1 % (ref 42.7–76)
NRBC BLD AUTO-RTO: 0 /100 WBC (ref 0–0.2)
NT-PROBNP SERPL-MCNC: ABNORMAL PG/ML (ref 0–450)
PLATELET # BLD AUTO: 226 10*3/MM3 (ref 140–450)
PMV BLD AUTO: 10.5 FL (ref 6–12)
POTASSIUM SERPL-SCNC: 4.5 MMOL/L (ref 3.5–5.2)
PROT SERPL-MCNC: 7.3 G/DL (ref 6–8.5)
PROTHROMBIN TIME: 23.8 SECONDS (ref 11.7–14.2)
QT INTERVAL: 452 MS
QTC INTERVAL: 528 MS
RBC # BLD AUTO: 2.98 10*6/MM3 (ref 3.77–5.28)
RHINOVIRUS RNA SPEC NAA+PROBE: NOT DETECTED
RSV RNA NPH QL NAA+NON-PROBE: NOT DETECTED
SARS-COV-2 RNA NPH QL NAA+NON-PROBE: NOT DETECTED
SODIUM SERPL-SCNC: 138 MMOL/L (ref 136–145)
TROPONIN T % DELTA: 6 %
TROPONIN T NUMERIC DELTA: 2 NG/L
TROPONIN T SERPL HS-MCNC: 34 NG/L
WBC NRBC COR # BLD AUTO: 6.18 10*3/MM3 (ref 3.4–10.8)
WHOLE BLOOD HOLD COAG: NORMAL
WHOLE BLOOD HOLD SPECIMEN: NORMAL

## 2024-12-18 PROCEDURE — 93005 ELECTROCARDIOGRAM TRACING: CPT

## 2024-12-18 PROCEDURE — 83880 ASSAY OF NATRIURETIC PEPTIDE: CPT | Performed by: PHYSICIAN ASSISTANT

## 2024-12-18 PROCEDURE — 3080F DIAST BP >= 90 MM HG: CPT | Performed by: INTERNAL MEDICINE

## 2024-12-18 PROCEDURE — 80053 COMPREHEN METABOLIC PANEL: CPT | Performed by: PHYSICIAN ASSISTANT

## 2024-12-18 PROCEDURE — 99284 EMERGENCY DEPT VISIT MOD MDM: CPT

## 2024-12-18 PROCEDURE — 36415 COLL VENOUS BLD VENIPUNCTURE: CPT

## 2024-12-18 PROCEDURE — 84484 ASSAY OF TROPONIN QUANT: CPT | Performed by: EMERGENCY MEDICINE

## 2024-12-18 PROCEDURE — 71045 X-RAY EXAM CHEST 1 VIEW: CPT

## 2024-12-18 PROCEDURE — 0202U NFCT DS 22 TRGT SARS-COV-2: CPT | Performed by: PHYSICIAN ASSISTANT

## 2024-12-18 PROCEDURE — 84484 ASSAY OF TROPONIN QUANT: CPT

## 2024-12-18 PROCEDURE — 93010 ELECTROCARDIOGRAM REPORT: CPT | Performed by: INTERNAL MEDICINE

## 2024-12-18 PROCEDURE — 85610 PROTHROMBIN TIME: CPT | Performed by: PHYSICIAN ASSISTANT

## 2024-12-18 PROCEDURE — 99214 OFFICE O/P EST MOD 30 MIN: CPT | Performed by: INTERNAL MEDICINE

## 2024-12-18 PROCEDURE — 93005 ELECTROCARDIOGRAM TRACING: CPT | Performed by: EMERGENCY MEDICINE

## 2024-12-18 PROCEDURE — 85025 COMPLETE CBC W/AUTO DIFF WBC: CPT | Performed by: PHYSICIAN ASSISTANT

## 2024-12-18 PROCEDURE — 3077F SYST BP >= 140 MM HG: CPT | Performed by: INTERNAL MEDICINE

## 2024-12-18 RX ORDER — METOPROLOL SUCCINATE 25 MG/1
25 TABLET, EXTENDED RELEASE ORAL ONCE
Status: COMPLETED | OUTPATIENT
Start: 2024-12-18 | End: 2024-12-18

## 2024-12-18 RX ORDER — METOPROLOL SUCCINATE 25 MG/1
25 TABLET, EXTENDED RELEASE ORAL
Qty: 30 TABLET | Refills: 0 | Status: SHIPPED | OUTPATIENT
Start: 2024-12-18

## 2024-12-18 RX ORDER — METHYLPREDNISOLONE 4 MG/1
TABLET ORAL
Qty: 21 TABLET | Refills: 0 | Status: SHIPPED | OUTPATIENT
Start: 2024-12-18

## 2024-12-18 RX ORDER — SODIUM CHLORIDE 0.9 % (FLUSH) 0.9 %
10 SYRINGE (ML) INJECTION AS NEEDED
Status: DISCONTINUED | OUTPATIENT
Start: 2024-12-18 | End: 2024-12-18 | Stop reason: HOSPADM

## 2024-12-18 RX ADMIN — METOPROLOL SUCCINATE 25 MG: 25 TABLET, EXTENDED RELEASE ORAL at 06:17

## 2024-12-18 NOTE — ED PROVIDER NOTES
EMERGENCY DEPARTMENT MD ATTESTATION NOTE    Room Number:  01/01  PCP: Regla Ying APRN  Independent Historians: Patient    HPI:  A complete HPI/ROS/PMH/PSH/SH/FH are unobtainable due to: None    Chronic or social conditions impacting patient care (Social Determinants of Health): None      Context: Sunni Mcneil is a 49 y.o. female with a medical history of end-stage renal disease on dialysis who presents to the ED c/o acute cough and congestion for the past several days.  She has not had any fevers or chills.  She recently completed a course of oral antibiotics for upper respiratory infection and had experienced some resolution of symptoms but now is having recurrence.  Any nausea or vomiting.  There have been no known sick contacts.      Review of prior external notes (non-ED) -and- Review of prior external test results outside of this encounter: I independently reviewed the family medicine progress note from December 9, 2024.  She was evaluated for cough and congestion symptoms at that time.  COVID and influenza swab which was negative.    Prescription drug monitoring program review: JASKARAN reviewed by Pierre Rocha MD         PHYSICAL EXAM    I have reviewed the triage vital signs and nursing notes.    ED Triage Vitals   Temp Heart Rate Resp BP SpO2   12/18/24 0512 12/18/24 0512 12/18/24 0512 12/18/24 0527 12/18/24 0512   98.1 °F (36.7 °C) 90 18 (!) 182/106 99 %      Temp src Heart Rate Source Patient Position BP Location FiO2 (%)   12/18/24 0512 12/18/24 0721 12/18/24 0721 12/18/24 0527 --   Tympanic Monitor Lying Left arm        Physical Exam  GENERAL: alert, no acute distress  SKIN: Warm, dry, no rashes  HENT: Normocephalic, atraumatic  EYES: no scleral icterus, normal conjunctiva  CV: regular rhythm, regular rate, normal perfusion  RESPIRATORY: normal effort, no stridor, there are few scattered rhonchi.  ABDOMEN: soft, nondistended, nontender  MUSCULOSKELETAL: no deformity, no asymmetry  extremities  NEURO: alert, moves all extremities, follows commands      MEDICATIONS GIVEN IN ER  Medications   sodium chloride 0.9 % flush 10 mL (has no administration in time range)   metoprolol succinate XL (TOPROL-XL) 24 hr tablet 25 mg (25 mg Oral Given 12/18/24 0617)         ORDERS PLACED DURING THIS VISIT:  Orders Placed This Encounter   Procedures    Respiratory Panel PCR w/COVID-19(SARS-CoV-2) RAIZA/ABUNDIO/JA/PAD/COR/IRENE In-House, NP Swab in UTM/VTM, 2 HR TAT - Swab, Nasopharynx    XR Chest 1 View    High Sensitivity Troponin T    Earth Draw    Comprehensive Metabolic Panel    BNP    Protime-INR    High Sensitivity Troponin T 1Hr    CBC Auto Differential    Continuous Pulse Oximetry    Vital Signs    Oxygen Therapy- Nasal Cannula; Titrate 1-6 LPM Per SpO2; 90 - 95%    ECG 12 Lead Dyspnea    Insert peripheral IV    Green Top (Gel)    Lavender Top    Gold Top - SST    Light Blue Top    CBC & Differential         PROCEDURES  Procedures        PROGRESS, DATA ANALYSIS, CONSULTS, AND MEDICAL DECISION MAKING  All labs have been independently interpreted by me.  All radiology studies have been reviewed by me. All EKG's have been independently viewed and interpreted by me.  Discussion below represents my analysis of pertinent findings related to patient's condition, differential diagnosis, treatment plan and final disposition.    Differential diagnosis includes but is not limited to pneumonia, URI, congestive heart failure, volume overload, pneumothorax, seasonal allergies.    Clinical Scores:                 MDM:  ED Course as of 12/18/24 0820   Wed Dec 18, 2024   0706 Per my independent interpretation of the chest x-ray, there is no obvious pneumothorax. [DC]   0807 Patient presentation and evaluation consistent with mild acute bronchitis requiring short course of steroids and outpatient management with supportive care.  I also plan to refill her blood pressure medications for her.  We called her dialysis center to  "arrange for an appointment for dialysis but the patient states \"I will call when I get home\" and asked us to forego making her an appointment.  No other worrisome or concerning findings to indicate the need for admission at this time.  Plan for discharge with close return precautions.  Patient agreeable. [DC]      ED Course User Index  [DC] Adán Rios PA       EKG         EKG time/Interp time: 0538/0715  Rhythm/Rate: Sinus rhythm, 82 bpm  P waves and WA: Present, normal interval, 150 ms  QRS, axis: 84 ms, narrow complex, normal axis  ST and T waves: No acute ischemic changes are present.  QTc is prolonged at 528 ms  Independently interpreted by me contemporaneously with treatment    I independently interpreted the chest x-ray and my findings are: Cardiomegaly, no pneumothorax, no infiltrate.    I reviewed the labs from today's ED visit.  The respiratory viral panel is unremarkable.  Troponin values are stable and as expected with her known history of end-stage renal disease.  Similarly, the BNP value is consistently elevated and comparable to prior values on record.  There is no evidence of volume overload on physical exam or on the chest x-ray.  Her work of breathing is normal and saturations are normal on room air.  Blood pressure was elevated on arrival but is improved at this time.  Perhaps that is playing some role with her dyspnea symptoms.  Either way at this time I think she is okay for discharge home as there does not appear to be any clinical indication or objective findings that would require admission.  In fact, she is due to have dialysis today and I think it would be best if she can be discharged from here to go to her dialysis session as scheduled.  She is agreeable with that plan.  Will discuss with her usual \"return to ER\" instructions prior to discharge.    COMPLEXITY OF CARE  Admission was considered but after careful review of the patient's presentation, physical examination, diagnostic " results, and response to treatment the patient may be safely discharged with outpatient follow-up.    Please note that portions of this document were completed with a voice recognition program.    Note Disclaimer: At [facility], we believe that sharing information builds trust and better relationships. You are receiving this note because you recently visited [facility]. It is possible you will see health information before a provider has talked with you about it. This kind of information can be easy to misunderstand. To help you fully understand what it means for your health, we urge you to discuss this note with your provider.       Pierre Rocha MD  12/18/24 0886

## 2024-12-18 NOTE — PROGRESS NOTES
cc: here for MSSA    49-year-old who  has a h/o ESRD (s/p failed kidney transplant in 2016), dm2, anemia, htn, hyperparathyroidism. Has extensive vascular surgery history with multiple access procedures.  Most recently underwent thrombectomy of right arm brachial artery to axillary vein graft by Dr Sanchez in May 2024.        She was admitted on 11/9 with sepsis and required intubation and pressors.  She also had stress-induced cardiomyopathy.  Blood cultures grew MSSA and patient had complained of some pain in the right upper extremity.  This was concerning that she might have a graft infection and ultrasound showed possible fluid collection.  In discussions with vascular surgery was elected to treat her medically with 6 weeks of IV cefazolin  With hemodialysis and she improved and was discharged.    Admitted overnight on 11/20 with concern for worsening infection but improved with current antibiotics and vascular surgery and I recommended continuing antibiotic to complete the 6 weeks.      Seen in ED earlier today with dyspnea.  BNP over 70,000.  Respiratory pathogen panel and chest x-ray okay.  Discharge.  White count was also okay.  She is feeling much better.  Says she is tolerating the antibiotics without missed doses but did have some did have some upset stomach with it.  She has not seen vascular surgery yet.  Her right upper extremity is not painful or swollen to her.  No constitutional symptoms of infection such as fever, chills, night sweats.          Blood pressure 173/97, pulse 85, temperature 97.1 °F (36.2 °C), resp. rate 14, weight 71.5 kg (157 lb 9.6 oz), last menstrual period 11/04/2024, not currently breastfeeding.  GENERAL: Awake and alert, in no acute distress.   HEENT: Oropharynx is clear. Hearing is grossly normal.   EYES: . No conjunctival injection. No lid lag.   LUNGS:normal respiratory effort.   SKIN: no cutaneous eruptions in exposed areas  PSYCHIATRIC: Appropriate mood, affect, insight,  and judgment.         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  Her white count is normal and her exam is okay.  I did discuss with her that if there was a graft infection we would likely see recrudescent infection.  We reviewed the signs and symptoms of recurrent infection when to seek medical attention.  Absent these, she can just follow-up with me prn    There are no diagnoses linked to this encounter.

## 2024-12-18 NOTE — ED PROVIDER NOTES
EMERGENCY DEPARTMENT ENCOUNTER      Room Number:  01/01  PCP: Regla Ying APRN  Independent Historians: Patient  Patient Care Team:  Regla Ying APRN as PCP - General (Nurse Practitioner)  Bo Hansen MD as Consulting Physician (Nephrology)  Damon Rooney MD as Surgeon (General Surgery)       HPI:  Chief Complaint: Congestion, Cough    A complete HPI/ROS/PMH/PSH/SH/FH are unobtainable due to: None    Chronic or social conditions impacting patient care (Social Determinants of Health): None      Context: Sunni Mcneil is a 49 y.o. female with a PMH significant for hyperparathyroidism, end-stage renal disease on dialysis, GERD, STEMI, anticoagulated on Coumadin who presents to the ED c/o acute upper respiratory congestion, cough for the past couple of days.  The patient reports that she was treated for pneumonia 10 days ago and completed a course of doxycycline over 7 days.  Her symptoms completely resolved but returned a couple of days ago.  She admits to some mild shortness of breath but denies chest pain, fever, chills.  Her cough is productive of light yellow sputum.  No blood in the sputum.  No nausea, vomiting, sick contacts at home.  Reports that she is scheduled for dialysis right now but is missing it to be here.      Upon review of prior external notes (non-ED) -and- Review of prior external test results outside of this encounter it appears the patient was evaluated in the office with family medicine for myalgia on 11/27/2024.  The patient had a normal lactic acid and blood culture on 11/20/2024.      PAST MEDICAL HISTORY  Active Ambulatory Problems     Diagnosis Date Noted    Hyperparathyroidism 01/24/2018    Acute rejection of kidney transplant 05/10/2017    ARF (acute renal failure) 04/04/2014    Hx of kidney transplant 07/06/2017    Hypertension 01/24/2018    Kidney transplant status, cadaveric 07/06/2017    Nephritis 04/13/2014    Hypercalcemia 01/24/2018    ESRD on  hemodialysis 07/02/2019    Prolonged QT interval 11/07/2019    ESRD (end stage renal disease) 11/07/2019    Hyperphosphatemia 11/07/2019    Leukocytosis 11/07/2019    Type 2 diabetes mellitus 11/07/2019    Acute UTI (urinary tract infection) 03/13/2020    Obesity (BMI 30-39.9) 03/13/2020    A-V fistula 03/13/2020    Anemia, chronic disease 03/13/2020    Folate deficiency anemia 03/16/2020    Febrile illness, acute 05/06/2020    Sepsis 05/06/2020    Screening for colon cancer 09/15/2020    Hyperkalemia 01/14/2021    Shunt malfunction 01/15/2021    Anemia 02/13/2021    Constipation 03/10/2021    Screening for colorectal cancer 03/10/2021    Hypervolemia 03/15/2021    AV graft thrombosis, initial encounter 01/20/2023    ESRD (end stage renal disease) 01/21/2023    GERD without esophagitis 01/21/2023    AV graft stenosis, initial encounter 12/20/2023    Arteriovenous fistula occlusion 05/02/2024    Pulmonary edema 11/10/2024    ST elevation myocardial infarction (STEMI) 11/10/2024    Right arm cellulitis 11/20/2024    Bacteremia 11/20/2024     Resolved Ambulatory Problems     Diagnosis Date Noted    Peritoneal dialysis catheter in place 07/24/2019    Hypokalemia 11/07/2019    Nausea 11/07/2019    Hypocalcemia 11/07/2019    Diarrhea 03/13/2020    Acute cystitis with hematuria 03/13/2020    Mechanical complication of hemodialysis catheter 02/13/2021    Arteriovenous fistula occlusion, initial encounter 01/21/2023    Hyperkalemia 01/21/2023    Hyperphosphatemia 01/21/2023     Past Medical History:   Diagnosis Date    Acid reflux     Anxiety     Arthritis     Cough     Dependence on renal dialysis 05/17/2021    Depression     Diabetes mellitus     End stage renal disease 05/17/2021    ESRD on dialysis     History of peritoneal dialysis     History of transfusion     Kidney disease     Kidney transplant recipient 09/02/2016    PONV (postoperative nausea and vomiting)     Seasonal allergies     Vascular dialysis catheter in  place          PAST SURGICAL HISTORY  Past Surgical History:   Procedure Laterality Date    ARTERIOVENOUS FISTULA/SHUNT SURGERY Right 02/26/2020    Procedure: RIGHT ARM ARTERIAL VENOUS FISTULA;  Surgeon: Elijah Herrera MD;  Location: [facility] MAIN OR;  Service: Vascular;  Laterality: Right;    ARTERIOVENOUS FISTULA/SHUNT SURGERY Left 02/04/2021    Procedure: LEFT BRACHIOAXILLARY ARTERIOVENOUS FISTULA GRAFT;  Surgeon: Anya Hernandez Jr., MD;  Location: [facility] MAIN OR;  Service: Vascular;  Laterality: Left;    ARTERIOVENOUS FISTULA/SHUNT SURGERY Right 03/16/2021    Procedure: RIGHT BRACHIOAXILLARY ARTERVENIOUS GRAFT PLACEMENT;  Surgeon: Adán Maurer MD;  Location: [facility] MAIN OR;  Service: Vascular;  Laterality: Right;    ARTERIOVENOUS FISTULA/SHUNT SURGERY Right 8/4/2023    Procedure: RIGHT ARM FISTULOGRAM PEUCUTANEOUS AND PERIPHERAL TRANSLUMINAL ANGIOPLSTY/STENT;  Surgeon: Elijah Herrera MD;  Location: [facility] OR 18/19;  Service: Vascular;  Laterality: Right;    BRACHIAL EMBOLECTOMY Right 10/6/2023    Procedure: RIGHT AV GRAFT WITH ANGIOPLASTY AND CENTROVENOUS ANGIOPLASTY;  Surgeon: Phu Gaviria MD;  Location: [facility] OR 18/19;  Service: Vascular;  Laterality: Right;    CARDIAC CATHETERIZATION N/A 11/10/2024    Procedure: Left Heart Cath;  Surgeon: Maribell Sandhu MD;  Location: [facility] CATH INVASIVE LOCATION;  Service: Cardiology;  Laterality: N/A;    CARDIAC CATHETERIZATION N/A 11/10/2024    Procedure: Coronary angiography;  Surgeon: Maribell Sandhu MD;  Location: [facility] CATH INVASIVE LOCATION;  Service: Cardiology;  Laterality: N/A;    CARDIAC CATHETERIZATION N/A 11/10/2024    Procedure: Left ventriculography;  Surgeon: Maribell Sandhu MD;  Location: [facility] CATH INVASIVE LOCATION;  Service: Cardiology;  Laterality: N/A;    CARDIAC CATHETERIZATION N/A 11/10/2024    Procedure: Right Heart Cath;  Surgeon: Maribell Sandhu MD;  Location: [facility] CATH INVASIVE LOCATION;   Service: Cardiology;  Laterality: N/A;    COLONOSCOPY N/A 11/30/2022    Procedure: COLONOSCOPY TO CECUM AND TERM. ILEUM;  Surgeon: Casimiro Perkins MD;  Location: [facility] ENDOSCOPY;  Service: Gastroenterology;  Laterality: N/A;  PRE OP - SCREENING  POST OP - DIVERTICULOSIS, SM. HEMORRHOIDS    INSERTION AND REMOVAL HEMODIALYSIS CATHETER N/A 02/13/2021    Procedure: INSERTION AND REMOVAL OF HEMODIALYSIS CATHETER;  Surgeon: Adán Maurer MD;  Location: [facility] MAIN OR;  Service: Vascular;  Laterality: N/A;    INSERTION HEMODIALYSIS CATHETER Right 01/15/2021    Procedure: TUNNELED DIAYLIS CATHETER PLACEMENT;  Surgeon: Phu Gaviria MD;  Location: [facility] HYBRID OR 18/19;  Service: Vascular;  Laterality: Right;    INSERTION HEMODIALYSIS CATHETER Left 1/21/2023    Procedure: HEMODIALYSIS CATHETER INSERTION;  Surgeon: Elijah Herrera MD;  Location: [facility] MAIN OR;  Service: Vascular;  Laterality: Left;    INSERTION PERITONEAL DIALYSIS CATHETER  07/29/2014    Laparoscopic placement of Curl Cath peritoneal dialysis catheter, Dr. Vinod Goldstein    INSERTION PERITONEAL DIALYSIS CATHETER N/A 07/24/2019    Procedure: LAPAROSCOPIC INSERTION PERITONEAL DIALYSIS CATHETER;  Surgeon: Damon Rooney MD;  Location: [facility] MAIN OR;  Service: General    REMOVAL HEMODIALYSIS CATHETER N/A 2/15/2023    Procedure: PALINDROME REMOVAL;  Surgeon: Elijah Herrera MD;  Location: [facility] MAIN OR;  Service: Vascular;  Laterality: N/A;    REMOVAL PERITONEAL DIALYSIS CATHETER N/A 10/17/2016    Procedure: REMOVAL PERITONEAL DIALYSIS CATHETER;  Surgeon: Damon Rooney MD;  Location: [facility] MAIN OR;  Service:     REMOVAL PERITONEAL DIALYSIS CATHETER N/A 10/21/2020    Procedure: REMOVAL PERITONEAL DIALYSIS CATHETER;  Surgeon: Damon Rooney MD;  Location: [facility] MAIN OR;  Service: General;  Laterality: N/A;    SHUNT O GRAM Right 07/28/2022    Procedure: RIGHT  ARM SHUNT O GRAM , ANGIOPLASTY OF  AXILARY VEIN AND SUBCLAVIAN VEIN AT SVC JUNCTION;  Surgeon: Anya Hernandez Jr., MD;  Location: [facility] OR ;  Service: Vascular;  Laterality: Right;    SHUNT O GRAM Right 2023    Procedure: OPEN THROMBECTOMY AND ANGIOPLASTY;  Surgeon: Elijah Herrera MD;  Location: [facility] OR ;  Service: Vascular;  Laterality: Right;    SHUNT O GRAM Right 2023    Procedure: Right arm dialysis graft open thrombectomy, shuntogram, angioplasty and stent;  Surgeon: Karuna Villalba MD;  Location: [facility] OR ;  Service: Vascular;  Laterality: Right;    SHUNT O GRAM Right 2023    Procedure: RIGHT ARM FISTULOGRAM, ANGIOPLASTY;  Surgeon: Elijah Herrera MD;  Location: [facility] OR ;  Service: Vascular;  Laterality: Right;    SHUNT O GRAM Right 5/3/2024    Procedure: Hemodialysis shunt thrombectomy with revision;  Surgeon: Alex Sanchez MD;  Location: [facility] OR;  Service: Vascular;  Laterality: Right;    TRANSPLANTATION RENAL Right 2016    from  donor    TUBAL ABDOMINAL LIGATION Bilateral          FAMILY HISTORY  Family History   Problem Relation Age of Onset    Hypertension Mother     Hypertension Father     Heart attack Maternal Grandfather     Malig Hyperthermia Neg Hx          SOCIAL HISTORY  Social History     Socioeconomic History    Marital status:     Number of children: 2    Years of education: 12   Tobacco Use    Smoking status: Never     Passive exposure: Never    Smokeless tobacco: Never    Tobacco comments:     Patient does not smoke   Vaping Use    Vaping status: Never Used   Substance and Sexual Activity    Alcohol use: No     Comment: caffeine use; Patient is unknown if ever drinks    Drug use: Never     Comment: Drug Abuse: none    Sexual activity: Defer         ALLERGIES  Patient has no known allergies.      REVIEW OF SYSTEMS  Included in HPI  All systems reviewed and negative except for those discussed in  HPI.      PHYSICAL EXAM    I have reviewed the triage vital signs and nursing notes.    ED Triage Vitals   Temp Heart Rate Resp BP SpO2   12/18/24 0512 12/18/24 0512 12/18/24 0512 12/18/24 0527 12/18/24 0512   98.1 °F (36.7 °C) 90 18 (!) 182/106 99 %      Temp src Heart Rate Source Patient Position BP Location FiO2 (%)   12/18/24 0512 -- -- 12/18/24 0527 --   Tympanic   Left arm        Physical Exam  Constitutional:       General: She is not in acute distress.     Appearance: She is well-developed.   HENT:      Head: Normocephalic and atraumatic.   Eyes:      General: No scleral icterus.     Conjunctiva/sclera: Conjunctivae normal.   Neck:      Trachea: No tracheal deviation.   Cardiovascular:      Rate and Rhythm: Normal rate and regular rhythm.      Comments: Dialysis access of the right upper arm with positive thrill  Pulmonary:      Effort: Pulmonary effort is normal.      Breath sounds: Normal breath sounds.      Comments: Very mild diffuse rhonchi  Abdominal:      Palpations: Abdomen is soft.      Tenderness: There is no abdominal tenderness.   Musculoskeletal:         General: No deformity.      Cervical back: Normal range of motion.   Lymphadenopathy:      Cervical: No cervical adenopathy.   Skin:     General: Skin is warm and dry.   Neurological:      Mental Status: She is alert and oriented to person, place, and time.   Psychiatric:         Behavior: Behavior normal.         Vital signs and nursing notes reviewed.      PPE: I wore a surgical mask throughout my encounters with the pt. I performed hand hygiene on entry into the pt room and upon exit.     LAB RESULTS  Recent Results (from the past 24 hours)   ECG 12 Lead Dyspnea    Collection Time: 12/18/24  5:38 AM   Result Value Ref Range    QT Interval 452 ms    QTC Interval 528 ms   High Sensitivity Troponin T    Collection Time: 12/18/24  5:51 AM    Specimen: Blood   Result Value Ref Range    HS Troponin T 34 (H) <14 ng/L   Green Top (Gel)    Collection  Time: 12/18/24  5:51 AM   Result Value Ref Range    Extra Tube Hold for add-ons.    Lavender Top    Collection Time: 12/18/24  5:51 AM   Result Value Ref Range    Extra Tube hold for add-on    Gold Top - SST    Collection Time: 12/18/24  5:51 AM   Result Value Ref Range    Extra Tube Hold for add-ons.    Light Blue Top    Collection Time: 12/18/24  5:51 AM   Result Value Ref Range    Extra Tube Hold for add-ons.    Comprehensive Metabolic Panel    Collection Time: 12/18/24  5:51 AM    Specimen: Blood   Result Value Ref Range    Glucose 78 65 - 99 mg/dL    BUN 34 (H) 6 - 20 mg/dL    Creatinine 8.67 (H) 0.57 - 1.00 mg/dL    Sodium 138 136 - 145 mmol/L    Potassium 4.5 3.5 - 5.2 mmol/L    Chloride 96 (L) 98 - 107 mmol/L    CO2 24.0 22.0 - 29.0 mmol/L    Calcium 8.8 8.6 - 10.5 mg/dL    Total Protein 7.3 6.0 - 8.5 g/dL    Albumin 3.8 3.5 - 5.2 g/dL    ALT (SGPT) <5 1 - 33 U/L    AST (SGOT) 10 1 - 32 U/L    Alkaline Phosphatase 62 39 - 117 U/L    Total Bilirubin 0.2 0.0 - 1.2 mg/dL    Globulin 3.5 gm/dL    A/G Ratio 1.1 g/dL    BUN/Creatinine Ratio 3.9 (L) 7.0 - 25.0    Anion Gap 18.0 (H) 5.0 - 15.0 mmol/L    eGFR 5.2 (L) >60.0 mL/min/1.73   BNP    Collection Time: 12/18/24  5:51 AM    Specimen: Blood   Result Value Ref Range    proBNP >70,000.0 (H) 0.0 - 450.0 pg/mL   Protime-INR    Collection Time: 12/18/24  5:51 AM    Specimen: Blood   Result Value Ref Range    Protime 23.8 (H) 11.7 - 14.2 Seconds    INR 2.10 (H) 0.90 - 1.10   CBC Auto Differential    Collection Time: 12/18/24  5:51 AM    Specimen: Blood   Result Value Ref Range    WBC 6.18 3.40 - 10.80 10*3/mm3    RBC 2.98 (L) 3.77 - 5.28 10*6/mm3    Hemoglobin 8.2 (L) 12.0 - 15.9 g/dL    Hematocrit 27.2 (L) 34.0 - 46.6 %    MCV 91.3 79.0 - 97.0 fL    MCH 27.5 26.6 - 33.0 pg    MCHC 30.1 (L) 31.5 - 35.7 g/dL    RDW 15.6 (H) 12.3 - 15.4 %    RDW-SD 52.0 37.0 - 54.0 fl    MPV 10.5 6.0 - 12.0 fL    Platelets 226 140 - 450 10*3/mm3    Neutrophil % 77.1 (H) 42.7 - 76.0 %     Lymphocyte % 13.6 (L) 19.6 - 45.3 %    Monocyte % 5.2 5.0 - 12.0 %    Eosinophil % 3.1 0.3 - 6.2 %    Basophil % 0.5 0.0 - 1.5 %    Immature Grans % 0.5 0.0 - 0.5 %    Neutrophils, Absolute 4.77 1.70 - 7.00 10*3/mm3    Lymphocytes, Absolute 0.84 0.70 - 3.10 10*3/mm3    Monocytes, Absolute 0.32 0.10 - 0.90 10*3/mm3    Eosinophils, Absolute 0.19 0.00 - 0.40 10*3/mm3    Basophils, Absolute 0.03 0.00 - 0.20 10*3/mm3    Immature Grans, Absolute 0.03 0.00 - 0.05 10*3/mm3    nRBC 0.0 0.0 - 0.2 /100 WBC   Respiratory Panel PCR w/COVID-19(SARS-CoV-2) RAIZA/ABUNDIO/JA/PAD/COR/IRENE In-House, NP Swab in UTM/VTM, 2 HR TAT - Swab, Nasopharynx    Collection Time: 12/18/24  6:16 AM    Specimen: Nasopharynx; Swab   Result Value Ref Range    ADENOVIRUS, PCR Not Detected Not Detected    Coronavirus 229E Not Detected Not Detected    Coronavirus HKU1 Not Detected Not Detected    Coronavirus NL63 Not Detected Not Detected    Coronavirus OC43 Not Detected Not Detected    COVID19 Not Detected Not Detected - Ref. Range    Human Metapneumovirus Not Detected Not Detected    Human Rhinovirus/Enterovirus Not Detected Not Detected    Influenza A PCR Not Detected Not Detected    Influenza B PCR Not Detected Not Detected    Parainfluenza Virus 1 Not Detected Not Detected    Parainfluenza Virus 2 Not Detected Not Detected    Parainfluenza Virus 3 Not Detected Not Detected    Parainfluenza Virus 4 Not Detected Not Detected    RSV, PCR Not Detected Not Detected    Bordetella pertussis pcr Not Detected Not Detected    Bordetella parapertussis PCR Not Detected Not Detected    Chlamydophila pneumoniae PCR Not Detected Not Detected    Mycoplasma pneumo by PCR Not Detected Not Detected   High Sensitivity Troponin T 1Hr    Collection Time: 12/18/24  6:53 AM    Specimen: Blood   Result Value Ref Range    HS Troponin T 36 (H) <14 ng/L    Troponin T Delta 2 ng/L    Troponin T % Change 6 %         RADIOLOGY  XR Chest 1 View    Result Date: 12/18/2024  XR CHEST 1  VW-2024  HISTORY: CHF protocol.  The heart size is mildly enlarged. The lungs are slightly underinflated but appear clear. Vascular stent overlies the right axilla. Bony structures appear unremarkable.      1. Cardiomegaly. 2. Lungs appear clear.   This report was finalized on 2024 6:30 AM by Dr. Pola Kee M.D on Workstation: [id number]         MEDICATIONS GIVEN IN ER  Medications   sodium chloride 0.9 % flush 10 mL (has no administration in time range)   metoprolol succinate XL (TOPROL-XL) 24 hr tablet 25 mg (25 mg Oral Given 24 0617)         ORDERS PLACED DURING THIS VISIT:  Orders Placed This Encounter   Procedures    Respiratory Panel PCR w/COVID-19(SARS-CoV-2) RAIZA/ABUNDIO/JA/PAD/COR/IRENE In-House, NP Swab in UTM/VTM, 2 HR TAT - Swab, Nasopharynx    XR Chest 1 View    High Sensitivity Troponin T    [place] Draw    Comprehensive Metabolic Panel    BNP    Protime-INR    High Sensitivity Troponin T 1Hr    CBC Auto Differential    Continuous Pulse Oximetry    Vital Signs    Oxygen Therapy- Nasal Cannula; Titrate 1-6 LPM Per SpO2; 90 - 95%    ECG 12 Lead Dyspnea    Insert peripheral IV    Green Top (Gel)    Lavender Top    Gold Top - SST    Light Blue Top    CBC & Differential         OUTPATIENT MEDICATION MANAGEMENT:  Current Facility-Administered Medications Ordered in Epic   Medication Dose Route Frequency Provider Last Rate Last Admin    sodium chloride 0.9 % flush 10 mL  10 mL Intravenous PRN Pierre Rocha MD         Current Outpatient Medications Ordered in Epic   Medication Sig Dispense Refill    metoprolol succinate XL (TOPROL-XL) 25 MG 24 hr tablet Take 1 tablet by mouth Daily. 30 tablet 0    acetaminophen (TYLENOL) 325 MG tablet Take 2 tablets by mouth Every 6 (Six) Hours As Needed for Mild Pain.      calcitriol (ROCALTROL) 0.25 MCG capsule Take 1 capsule by mouth Take As Directed. Calcitriol 0.25 MCG Capsule (si capsule once per day )      calcium acetate (PHOSLO) 667 MG  capsule Take 3 capsules by mouth 3 (Three) Times a Day With Meals.      ceFAZolin 2000 mg IVPB in 100 mL NS (MBP) Infuse 2,000 mg into a venous catheter Take As Directed. Give after HD on Monday and Wednesday  Indications: Bacteria in the Blood      ceFAZolin 3000 mg IVPB in 100 mL NS (MBP) Infuse 3,000 mg into a venous catheter Take As Directed. Give after HD on hd Friday  Indications: Bacteria in the Blood      doxycycline (MONODOX) 100 MG capsule Take 1 capsule by mouth 2 (Two) Times a Day. 14 capsule 0    hydrOXYzine (ATARAX) 50 MG tablet Take 1 tablet by mouth As Needed.      methylPREDNISolone (MEDROL) 4 MG dose pack Take as directed on package instructions. 21 tablet 0    naloxone (NARCAN) 4 MG/0.1ML nasal spray Call 911. Don't prime. Plaquemine in 1 nostril for overdose. Repeat in 2-3 minutes in other nostril if no or minimal breathing/responsiveness. 2 each 0    ondansetron ODT (ZOFRAN-ODT) 4 MG disintegrating tablet Take 1 tablet by mouth As Needed for Nausea or Vomiting.      polyethylene glycol (MIRALAX) 17 GM/SCOOP powder Take 17 g by mouth Daily As Needed (Use if senna-docusate is ineffective). 510 g 0    saccharomyces boulardii (FLORASTOR) 250 MG capsule Take 1 capsule by mouth 2 (Two) Times a Day. 60 capsule 0    sennosides-docusate (PERICOLACE) 8.6-50 MG per tablet Take 2 tablets by mouth 2 (Two) Times a Day As Needed for Constipation. 60 tablet 0    traZODone (DESYREL) 100 MG tablet Take 1.5 tablets by mouth every night at bedtime.      warfarin (COUMADIN) 5 MG tablet Take 1 tablet by mouth Daily.              PROGRESS, DATA ANALYSIS, CONSULTS, AND MEDICAL DECISION MAKING  All labs have been independently interpreted by me.  All radiology studies have been reviewed by me. All EKG's have been independently viewed and interpreted by me.  Discussion below represents my analysis of pertinent findings related to patient's condition, differential diagnosis, treatment plan and final  "disposition.      DIFFERENTIAL DIAGNOSIS INCLUDE BUT NOT LIMITED TO:     Differential diagnosis includes but is not limited to:  -COVID  -CHF  -acute coronary syndrome  -pulmonary embolism  -pneumothorax  -pneumonia  -asthma/COPD  -deconditioning  -anemia  -anxiety     Clinical Scores: N/A      ED Course as of 12/18/24 0813   Wed Dec 18, 2024   0706 Per my independent interpretation of the chest x-ray, there is no obvious pneumothorax. [DC]   0807 Patient presentation and evaluation consistent with mild acute bronchitis requiring short course of steroids and outpatient management with supportive care.  I also plan to refill her blood pressure medications for her.  We called her dialysis center to arrange for an appointment for dialysis but the patient states \"I will call when I get home\" and asked us to forego making her an appointment.  No other worrisome or concerning findings to indicate the need for admission at this time.  Plan for discharge with close return precautions.  Patient agreeable. [DC]      ED Course User Index  [DC] Adán Rios, PA         0814 I rechecked the patient.  I discussed the patient's labs, radiology findings (including all incidental findings), diagnosis, and plan for discharge.  A repeat exam reveals no new worrisome changes from my initial exam findings.  The patient understands that the fact that they are being discharged does not denote that nothing is abnormal, it indicates that no clinical emergency is present and that they must follow-up as directed in order to properly maintain their health.  Follow-up instructions (specifically listed below) and return to ER precautions were given at this time.  I specifically instructed the patient to follow-up with their PCP.  The patient understands and agrees with the plan, and is ready for discharge.  All questions answered.         AS OF 08:13 EST VITALS:    BP - 168/92  HR - 76  TEMP - 98.1 °F (36.7 °C) (Tympanic)  O2 SATS - " 97%    COMPLEXITY OF CARE  Admission was considered but after careful review of the patient's presentation, physical examination, diagnostic results, and response to treatment the patient may be safely discharged with outpatient follow-up.      DIAGNOSIS  Final diagnoses:   ESRD on dialysis   Acute bronchitis, unspecified organism   Uncontrolled hypertension         DISPOSITION  ED Disposition       ED Disposition   Discharge    Condition   Stable    Comment   --                Please note that portions of this document were completed with a voice recognition program.    Note Disclaimer: At [facility], we believe that sharing information builds trust and better relationships. You are receiving this note because you recently visited [facility]. It is possible you will see health information before a provider has talked with you about it. This kind of information can be easy to misunderstand. To help you fully understand what it means for your health, we urge you to discuss this note with your provider.         Adán Rios PA  12/18/24 0814

## 2024-12-18 NOTE — ED NOTES
Patient ambulatory to ED with c/o headache, SOA, and anxiety x1 day. Patient also reports a cough x2 weeks that she had already been seen and treated for with antibiotics, but it has not gone away. Patient is hypertensive at the time of triage and states that she ran out of her blood pressure medicine 2 days ago.     Patient has dialysis M/W/F.

## 2024-12-18 NOTE — ED NOTES
This RN called [facility] to try to reschedule her apt that she missed this morning.   1215 was available when checking with the pt she stated she would call them when she gets home as she was not wanting to go at 1215.     PT is aware we plan to discharge her in hopes that she will make it to dialysis on her own account.

## 2025-02-18 ENCOUNTER — TELEPHONE (OUTPATIENT)
Age: 50
End: 2025-02-18
Payer: MEDICARE

## 2025-02-18 NOTE — TELEPHONE ENCOUNTER
[place] [facility] [place] flows. Pt was previously seen by Dr. Gaviria whom she was unable to continue seeing as [facility] no [facility] took her [facility]. Pt seen Dr. Farley but stated she was not satisfied there and preferred to come back with us. Pt has new insurance starting 3/1/25. I got pt scheduled for a duplex and f/u with the APRN on 3/7/25 to evaluate. Adrea to relay appt information to the pt.

## (undated) DEVICE — TRAP FLD MINIVAC MEGADYNE 100ML

## (undated) DEVICE — PK AV FISTL/SHNT 40

## (undated) DEVICE — DRAPE,REIN 53X77,STERILE: Brand: MEDLINE

## (undated) DEVICE — DGW .035 FC J3MM 260CM TEF: Brand: EMERALD

## (undated) DEVICE — LOU LAP CHOLE: Brand: MEDLINE INDUSTRIES, INC.

## (undated) DEVICE — 3M™ STERI-DRAPE™ INSTRUMENT POUCH 1018: Brand: STERI-DRAPE™

## (undated) DEVICE — ADHS SKIN DERMABOND TOP ADVANCED

## (undated) DEVICE — ST. SORBAVIEW ULTIMATE IJ SYSTEM A,C: Brand: CENTURION

## (undated) DEVICE — RADIFOCUS GLIDEWIRE: Brand: GLIDEWIRE

## (undated) DEVICE — BANDAGE,GAUZE,BULKEE II,4.5"X4.1YD,STRL: Brand: MEDLINE

## (undated) DEVICE — PTA BALLOON DILATATION CATHETER: Brand: MUSTANG™

## (undated) DEVICE — SUT SILK 2/0 TIES 18IN A185H

## (undated) DEVICE — INFLATION DEVICE: Brand: ENCORE™ 26

## (undated) DEVICE — PK ENT MAJ 40

## (undated) DEVICE — SENSR O2 OXIMAX FNGR A/ 18IN NONSTR

## (undated) DEVICE — TBG PENCL TELESCP MEGADYNE SMOKE EVAC 10FT

## (undated) DEVICE — ARMADA 35 PTA CATHETER 8 MM X 60 MM X 80 CM / OVER-THE-WIRE: Brand: ARMADA

## (undated) DEVICE — KT MANIFLD CARDIAC

## (undated) DEVICE — PK ANGIO 40

## (undated) DEVICE — SUT VIC 3/0 CT2 27IN J232H

## (undated) DEVICE — PINNACLE R/O II HIFLO INTRODUCER SHEATH WITH RADIOPAQUE MARKER: Brand: PINNACLE

## (undated) DEVICE — CVR PROB 96IN LF STRL

## (undated) DEVICE — ANTIBACTERIAL UNDYED BRAIDED (POLYGLACTIN 910), SYNTHETIC ABSORBABLE SUTURE: Brand: COATED VICRYL

## (undated) DEVICE — LOU MINOR PROCEDURE: Brand: MEDLINE INDUSTRIES, INC.

## (undated) DEVICE — SOL NS 500ML

## (undated) DEVICE — PINNACLE INTRODUCER SHEATH: Brand: PINNACLE

## (undated) DEVICE — GLV SURG BIOGEL M LTX PF 6 1/2

## (undated) DEVICE — GLV SURG BIOGEL LTX PF 6 1/2

## (undated) DEVICE — SYR LUERLOK 20CC BX/50

## (undated) DEVICE — 5F PLUS 40CM OVER-THE-WIRE EMBOLECTOMY CATHETER: Brand: LEMAITRE EMBOLECTOMY CATHETER

## (undated) DEVICE — EQUIPMENT COVER BAG TYPE 48” X 36” (122CM X 91CM): Brand: EQUIPMENT COVER BAG TYPE

## (undated) DEVICE — NDL HYPO PRECISIONGLIDE REG 25G 1 1/2

## (undated) DEVICE — SYR LUERLOK 5CC

## (undated) DEVICE — VANTEX PI CVC 7F, 3L, 16CM BUNDLE: Brand: MEDLINE

## (undated) DEVICE — 4F 80 CM LEMAITRE EMBOLECTOMY CATHETER, EIFU: Brand: LEMAITRE EMBOLECTOMY CATHETER

## (undated) DEVICE — UNDERGLV SURG BIOGEL INDICAT PI SZ8.5 BLU

## (undated) DEVICE — GLV SURG SIGNATURE ESSENTIAL PF LTX SZ7.5

## (undated) DEVICE — 6F .070 XB 3 100CM: Brand: VISTA BRITE TIP

## (undated) DEVICE — SUT PROLN 3/0 SH D/A 36IN 8522H

## (undated) DEVICE — GLV SURG BIOGEL LTX PF 7 1/2

## (undated) DEVICE — VISUALIZATION SYSTEM: Brand: CLEARIFY

## (undated) DEVICE — Device

## (undated) DEVICE — CATH IV INTROCAN SFTY PUR 20G 1IN

## (undated) DEVICE — WIPE INST MEROCEL

## (undated) DEVICE — FOGARTY GRAFT THROMBECTOMY CATHETER: Brand: FOGARTY

## (undated) DEVICE — TUBING, SUCTION, 1/4" X 10', STRAIGHT: Brand: MEDLINE

## (undated) DEVICE — COVER,MAYO STAND,STERILE: Brand: MEDLINE

## (undated) DEVICE — ELECTRD BLD EDGE/INSUL1P 2.4X5.1MM STRL

## (undated) DEVICE — SUT PROLN 6/0 RB2 D/A 30IN 8711H

## (undated) DEVICE — APPL CHLORAPREP W/TINT 26ML ORNG

## (undated) DEVICE — STPLR SKIN VISISTAT WD 35CT

## (undated) DEVICE — GOWN,NON-REINFORCED,SIRUS,SET IN SLV,XXL: Brand: MEDLINE

## (undated) DEVICE — ST ACC MICROPUNCTURE STFF .018 ECHO/PLDM/TP 4F/10CM 21G/7CM

## (undated) DEVICE — INTENDED FOR TISSUE SEPARATION, AND OTHER PROCEDURES THAT REQUIRE A SHARP SURGICAL BLADE TO PUNCTURE OR CUT.: Brand: BARD-PARKER ® CARBON RIB-BACK BLADES

## (undated) DEVICE — SOL NACL 0.9PCT 1000ML

## (undated) DEVICE — DISPOSABLE BIPOLAR FORCEPS 4" (10.2CM) JEWELERS, STRAIGHT 0.4MM TIP AND 12 FT. (3.6M) CABLE: Brand: KIRWAN

## (undated) DEVICE — CANN O2 ETCO2 FITS ALL CONN CO2 SMPL A/ 7IN DISP LF

## (undated) DEVICE — COVER,LIGHT HANDLE,FLX,2/PK: Brand: MEDLINE INDUSTRIES, INC.

## (undated) DEVICE — DECANTER BAG 9": Brand: MEDLINE INDUSTRIES, INC.

## (undated) DEVICE — SUT VIC 5/0 PS2 18IN J495H

## (undated) DEVICE — ADHS SKIN SURG TISS VISC PREMIERPRO EXOFIN HI/VISC FAST/DRY

## (undated) DEVICE — ST ACC MICROPUNCTURE STFF .018 ECHO/PLAT/TP 4F/10CM 21G/7CM

## (undated) DEVICE — PATIENT RETURN ELECTRODE, SINGLE-USE, CONTACT QUALITY MONITORING, ADULT, WITH 9FT CORD, FOR PATIENTS WEIGING OVER 33LBS. (15KG): Brand: MEGADYNE

## (undated) DEVICE — SPNG GZ WOVN 4X4IN 12PLY 10/BX STRL

## (undated) DEVICE — GLV SURG BIOGEL LTX PF 7

## (undated) DEVICE — ENDOPATH XCEL BLADELESS TROCARS WITH STABILITY SLEEVES: Brand: ENDOPATH XCEL

## (undated) DEVICE — 3F 80 CM LEMAITRE EMBOLECTOMY CATHETER, EIFU: Brand: LEMAITRE EMBOLECTOMY CATHETER

## (undated) DEVICE — LP VESL MAXI 2.5X1MM RED 2PK

## (undated) DEVICE — CATH DIAG IMPULSE FR4 5F 100CM

## (undated) DEVICE — PINNACLE R/O II INTRODUCER SHEATH WITH RADIOPAQUE MARKER: Brand: PINNACLE

## (undated) DEVICE — KT CATH TAL PALINDROME SAPPHIRE 14.5F19CM

## (undated) DEVICE — GLV SURG SENSICARE PI MIC PF SZ7.5 LF STRL

## (undated) DEVICE — 3M™ STERI-STRIP™ COMPOUND BENZOIN TINCTURE 40 BAGS/CARTON 4 CARTONS/CASE C1544: Brand: 3M™ STERI-STRIP™

## (undated) DEVICE — DECANT BG O JET

## (undated) DEVICE — DRN WND JP RND W TROC SIL 10F 1/8IN

## (undated) DEVICE — CATH ANGIO TRCN NB BCN .038 5F 40CM KMP

## (undated) DEVICE — SYR LL TP 10ML STRL

## (undated) DEVICE — RADIFOCUS TORQUE DEVICE MULTI-TORQUE VISE: Brand: RADIFOCUS TORQUE DEVICE

## (undated) DEVICE — SUT PROLN 6/0 BV1 D/A 30IN 8709H

## (undated) DEVICE — EXTENSION SET, MALE LUER LOCK ADAPTER WITH RETRACTABLE COLLAR

## (undated) DEVICE — SYRINGE KIT,PACKAGED,,150FT,MK 7(ANGIO-ARTERION, 150ML SYR KIT W/QFT,MC)(60729385): Brand: MEDRAD® MARK 7 ARTERION DISPOSABLE SYRINGE 150 ML WITH QUICK FILL TUBE

## (undated) DEVICE — DRSNG SURESITE WNDW 4X4.5

## (undated) DEVICE — ST ACC MICROPUNCTURE .018 TRANSLSS/PLAT/TP 5F/10CM 21G/10CM

## (undated) DEVICE — DRSNG SURESITE WNDW 2.38X2.75

## (undated) DEVICE — IRRIGATOR BULB ASEPTO 60CC STRL

## (undated) DEVICE — GOWN,NON-REINFORCED,SIRUS,SET IN SLV,XL: Brand: MEDLINE

## (undated) DEVICE — PENCL E/S ULTRAVAC TELESCP NOSE HOLSTR 10FT

## (undated) DEVICE — SUT PROLN 7/0 BV175/6 D/A 24IN 8735H

## (undated) DEVICE — STPCK 3WY HP ROT

## (undated) DEVICE — JACKSON-PRATT 100CC BULB RESERVOIR: Brand: CARDINAL HEALTH

## (undated) DEVICE — CATHETER,URETHRAL,REDRUBBER,STERILE,22FR: Brand: MEDLINE

## (undated) DEVICE — SYR LUERLOK 30CC

## (undated) DEVICE — RUNTHROUGH NS EXTRA FLOPPY PTCA GUIDEWIRE: Brand: RUNTHROUGH

## (undated) DEVICE — 5F 80 CM LEMAITRE EMBOLECTOMY CATHETER, EIFU: Brand: LEMAITRE EMBOLECTOMY CATHETER

## (undated) DEVICE — SUT SILK 4/0 TIES 18IN A183H

## (undated) DEVICE — BALN PRESS WEDGE 5F 110CM

## (undated) DEVICE — CATH IV INSYTE AUTOGARD 14G 1 1/2IN ORNG

## (undated) DEVICE — 3M™ STERI-STRIP™ REINFORCED ADHESIVE SKIN CLOSURES, R1547, 1/2 IN X 4 IN (12 MM X 100 MM), 6 STRIPS/ENVELOPE: Brand: 3M™ STERI-STRIP™

## (undated) DEVICE — APPL CHLORAPREP HI/LITE 26ML ORNG

## (undated) DEVICE — SUT ETHLN 2/0 PS 18IN 585H

## (undated) DEVICE — ISOLATION BAG: Brand: CONVERTORS

## (undated) DEVICE — CONQUEST® PTA BALLOON DILATATION CATHETER 10 MM X 40 MM, 75 CM CATHETER: Brand: CONQUEST®

## (undated) DEVICE — BIOPATCH™ ANTIMICROBIAL DRESSING WITH CHLORHEXIDINE GLUCONATE IS A HYDROPHILLIC POLYURETHANE ABSORPTIVE FOAM WITH CHLORHEXIDINE GLUCONATE (CHG) WHICH INHIBITS BACTERIAL GROWTH UNDER THE DRESSING. THE DRESSING IS INTENDED TO BE USED TO ABSORB EXUDATE, COVER A WOUND CAUSED BY VASCULAR AND NONVASCULAR PERCUTANEOUS MEDICAL DEVICES DURING SURGERY, AS WELL AS REDUCE LOCAL INFECTION AND COLONIZATION OF MICROORGANISMS.: Brand: BIOPATCH

## (undated) DEVICE — SUT MNCRYL PLS ANTIB UD 4/0 PS2 18IN

## (undated) DEVICE — 1 ML TUBERCULIN SYRINGE REGULAR TIP: Brand: MONOJECT

## (undated) DEVICE — LN SMPL CO2 SHTRM SD STREAM W/M LUER

## (undated) DEVICE — PENCL E/S HNDSWCH ROCKR CB

## (undated) DEVICE — GOWN,REINF,POLY,SIRUS,BRTH SLV,XLNG/XXL: Brand: MEDLINE

## (undated) DEVICE — SKIN PREP TRAY W/CHG: Brand: MEDLINE INDUSTRIES, INC.

## (undated) DEVICE — SUT PROLN 0 CT1 30IN 8424H

## (undated) DEVICE — RADIFOCUS GLIDEWIRE ADVANTAGE GUIDEWIRE: Brand: GLIDEWIRE ADVANTAGE

## (undated) DEVICE — ADAPT CLN BIOGUARD AIR/H2O DISP

## (undated) DEVICE — SUT VIC 2/0 TIES 18IN J111T

## (undated) DEVICE — SUT PROLN 5/0 RB1 D/A 36IN 8556H

## (undated) DEVICE — BG ISOL DRWSTR INVISISHIELD 20X20IN

## (undated) DEVICE — SCANLAN® SUTURE BOOT™ INSTRUMENT JAW COVERS - ORIGINAL YELLOW, STANDARD PKG (5 PAIR/CARTRIDGE, 1 CARTRIDGE/PKG): Brand: SCANLAN® SUTURE BOOT™ INSTRUMENT JAW COVERS

## (undated) DEVICE — CONTAINER,SPECIMEN,OR STERILE,4OZ: Brand: MEDLINE

## (undated) DEVICE — PK CATH CARD 40

## (undated) DEVICE — SKIN AFFIX SURG ADHESIVE 72/CS 0.55ML: Brand: MEDLINE

## (undated) DEVICE — SHLD ANGIO 2LAYR CIR FEN

## (undated) DEVICE — ADHS SKIN PREMIERPRO EXOFIN TOPICAL HI/VISC .5ML

## (undated) DEVICE — SUT SILK 2/0 SH CR8 18IN CR8 C012D

## (undated) DEVICE — SUT VIC 3/0 TIES 18IN J110T

## (undated) DEVICE — SUT SILK 3/0 SH CR5 18IN C0135

## (undated) DEVICE — MAGNETIC DRAPE: Brand: DEVON

## (undated) DEVICE — DEV TORQ H2OTORQ GW .025TO.040IN ORNG

## (undated) DEVICE — DEV TUNNELING SUBCONTANIOUS

## (undated) DEVICE — Device: Brand: FABCO

## (undated) DEVICE — ST INTRO PERFORMER W/GW J/TP .038IN 14FR

## (undated) DEVICE — SUT ETHLN 5/0 PS2 18IN 1666H

## (undated) DEVICE — SUT PROLN 6/0 C1 D/A 30IN 8706H

## (undated) DEVICE — SUT SILK 3/0 TIES 18IN A184H

## (undated) DEVICE — ST IRR CYSTO W/SPK 77IN LF

## (undated) DEVICE — NDL HYPO ECLPS SFTY 22G 1 1/2IN

## (undated) DEVICE — ARMADA 35 PTA CATHETER 7 MM X 60 MM X 80 CM / OVER-THE-WIRE: Brand: ARMADA

## (undated) DEVICE — DEV INDEFLATOR P/N 580289

## (undated) DEVICE — CONQUEST® PTA BALLOON DILATATION CATHETER 12 MM X 40 MM, 75 CM CATHETER: Brand: CONQUEST®

## (undated) DEVICE — SUT VIC 0 TN 27IN DYED JTN0G

## (undated) DEVICE — VESSEL LOOPS X-RAY DETECTABLE: Brand: DEROYAL

## (undated) DEVICE — GLV SURG BIOGEL LTX PF 8 1/2

## (undated) DEVICE — WIPE INST 3X3IN 2MM BX/20

## (undated) DEVICE — ENDOCUT SCISSOR TIP, DISPOSABLE: Brand: RENEW

## (undated) DEVICE — GW CERBRL JB PTFE STD .035IN 20X145CM

## (undated) DEVICE — TBG PRESS EXCITE INJ HPF1200 CLR 100IN

## (undated) DEVICE — CATH DIAG IMPULSE PIG 5F 100CM

## (undated) DEVICE — KT ORCA ORCAPOD DISP STRL

## (undated) DEVICE — SUT VIC 4/0 RB1 27IN J214H

## (undated) DEVICE — CATH TEMPO 5F BER II 65CM: Brand: TEMPO

## (undated) DEVICE — SYR LL 3CC

## (undated) DEVICE — 5F PLUS 80CM OVER-THE-WIRE EMBOLECTOMY CATHETER: Brand: LEMAITRE EMBOLECTOMY CATHETER

## (undated) DEVICE — PK ANGIO CERBRL RAD 40

## (undated) DEVICE — DRSNG TELFA PAD NONADH STR 1S 3X4IN